# Patient Record
Sex: FEMALE | Race: WHITE | NOT HISPANIC OR LATINO | Employment: OTHER | URBAN - METROPOLITAN AREA
[De-identification: names, ages, dates, MRNs, and addresses within clinical notes are randomized per-mention and may not be internally consistent; named-entity substitution may affect disease eponyms.]

---

## 2017-01-04 DIAGNOSIS — R32 INCONTINENCE IN FEMALE: ICD-10-CM

## 2017-01-04 DIAGNOSIS — E78.5 HYPERLIPIDEMIA: ICD-10-CM

## 2017-01-04 RX ORDER — ATORVASTATIN CALCIUM 10 MG/1
TABLET, FILM COATED ORAL
Qty: 30 TABLET | Refills: 4 | Status: SHIPPED | OUTPATIENT
Start: 2017-01-04 | End: 2017-04-11 | Stop reason: SDUPTHER

## 2017-01-04 RX ORDER — SOLIFENACIN SUCCINATE 10 MG/1
TABLET, FILM COATED ORAL
Qty: 30 TABLET | Refills: 4 | Status: SHIPPED | OUTPATIENT
Start: 2017-01-04 | End: 2017-04-11 | Stop reason: SDUPTHER

## 2017-01-20 DIAGNOSIS — I10 ESSENTIAL HYPERTENSION: ICD-10-CM

## 2017-01-20 RX ORDER — POTASSIUM CHLORIDE 20 MEQ/1
TABLET, EXTENDED RELEASE ORAL
Qty: 30 TABLET | Refills: 4 | Status: SHIPPED | OUTPATIENT
Start: 2017-01-20 | End: 2017-04-11 | Stop reason: SDUPTHER

## 2017-04-11 ENCOUNTER — OFFICE VISIT (OUTPATIENT)
Dept: FAMILY MEDICINE | Facility: CLINIC | Age: 82
End: 2017-04-11
Payer: MEDICARE

## 2017-04-11 VITALS
DIASTOLIC BLOOD PRESSURE: 68 MMHG | HEART RATE: 66 BPM | RESPIRATION RATE: 18 BRPM | BODY MASS INDEX: 47.86 KG/M2 | HEIGHT: 65 IN | SYSTOLIC BLOOD PRESSURE: 118 MMHG | WEIGHT: 287.25 LBS

## 2017-04-11 DIAGNOSIS — M19.90 OSTEOARTHRITIS, UNSPECIFIED OSTEOARTHRITIS TYPE, UNSPECIFIED SITE: ICD-10-CM

## 2017-04-11 DIAGNOSIS — M25.562 CHRONIC PAIN OF LEFT KNEE: ICD-10-CM

## 2017-04-11 DIAGNOSIS — I10 ESSENTIAL HYPERTENSION: ICD-10-CM

## 2017-04-11 DIAGNOSIS — G89.29 CHRONIC PAIN OF LEFT KNEE: ICD-10-CM

## 2017-04-11 DIAGNOSIS — I25.10 ASCVD (ARTERIOSCLEROTIC CARDIOVASCULAR DISEASE): ICD-10-CM

## 2017-04-11 DIAGNOSIS — E66.3 OVERWEIGHT(278.02): ICD-10-CM

## 2017-04-11 DIAGNOSIS — E78.5 HYPERLIPIDEMIA, UNSPECIFIED HYPERLIPIDEMIA TYPE: ICD-10-CM

## 2017-04-11 DIAGNOSIS — G47.33 OSA (OBSTRUCTIVE SLEEP APNEA): Primary | ICD-10-CM

## 2017-04-11 DIAGNOSIS — E03.4 HYPOTHYROIDISM DUE TO ACQUIRED ATROPHY OF THYROID: ICD-10-CM

## 2017-04-11 PROCEDURE — 3078F DIAST BP <80 MM HG: CPT | Performed by: FAMILY MEDICINE

## 2017-04-11 PROCEDURE — 1157F ADVNC CARE PLAN IN RCRD: CPT | Performed by: FAMILY MEDICINE

## 2017-04-11 PROCEDURE — 3074F SYST BP LT 130 MM HG: CPT | Performed by: FAMILY MEDICINE

## 2017-04-11 PROCEDURE — 1159F MED LIST DOCD IN RCRD: CPT | Performed by: FAMILY MEDICINE

## 2017-04-11 PROCEDURE — 99213 OFFICE O/P EST LOW 20 MIN: CPT | Mod: PBBFAC | Performed by: FAMILY MEDICINE

## 2017-04-11 PROCEDURE — 99999 PR PBB SHADOW E&M-EST. PATIENT-LVL III: CPT | Mod: PBBFAC,,, | Performed by: FAMILY MEDICINE

## 2017-04-11 PROCEDURE — 99214 OFFICE O/P EST MOD 30 MIN: CPT | Mod: S$PBB | Performed by: FAMILY MEDICINE

## 2017-04-11 PROCEDURE — 1160F RVW MEDS BY RX/DR IN RCRD: CPT | Performed by: FAMILY MEDICINE

## 2017-04-11 PROCEDURE — 1126F AMNT PAIN NOTED NONE PRSNT: CPT | Performed by: FAMILY MEDICINE

## 2017-04-11 NOTE — PROGRESS NOTES
Pt is a 82 y.o. female who presents for check up for   Encounter Diagnoses   Name Primary?    JOY (obstructive sleep apnea) Yes    Overweight     Osteoarthritis, unspecified osteoarthritis type, unspecified site     Hypothyroidism due to acquired atrophy of thyroid     Hyperlipidemia, unspecified hyperlipidemia type     Essential hypertension     Chronic pain of left knee     ASCVD (arteriosclerotic cardiovascular disease)    . Doing well on current meds. Denies any side effects. Prevention is up to date.    History of present illness: Patient here for a check up.  She was diagnosed with lower extremity weakness due to neuropathy.  Uses a walker which has prevented further falls  She tolerates her blood pressure medication as well as not having side effects such as chronic cough or dizziness.    She takes Synthroid for her hypothyroidism.  She tolerates this well.  She denies having symptoms such as heat or cold intolerance.  Her weight is stable.  She denies any swelling in her thyroid.  Her reflux symptoms are under good control using Zantac  She takes Vesicare for urinary incontinence.  It is very effective.  She does not use CPAP.  She cannot tolerate it.  She uses oxygen at night to sleep however.  Pt's room air at rest O2 was 88 and that pt greatly benefited for using O2 at night   Patient sees cardiology for ASCVD.  This seems to be stable.  She is on Plavix and tolerating it well.  Patient takes Mobic for osteoarthritis of both knees.  This is effective.  She still has pain and weakness in her legs.  She is also overweight which does not help anything.  Patient saw neurologist for spinal stenosis, neuropathy.  PT helped.  She has not fallen in 6 months.  Starting to have memory issues and weakness.    Review of systems: No other complaints    Physical examination:   Vitals:    04/11/17 1233   BP: 118/68   Pulse: 66   Resp: 18     Gen.: Overweight, no apparent distress  HEENT: Pupils equal react to  light extraocular muscles intact throat clear neck supple no lymphadenopathy no JVD.  No bruits.  Normal thyroid  Heart: Regular rate and rhythm no murmurs rubs or gallops.  Occasional PCV.  Lungs: Clear to auscultation  Ext: chronic venous stasis changes.  2+ pulses.  Bilateral lower extremity bruising.   Both knees have midline scars from knee replacement surgery.  Ambulates with walker.    Lab Results   Component Value Date    LDLCALC 67.6 10/11/2016     No results found for: HGBA1C  Lab Results   Component Value Date    TSH 2.298 10/11/2016     Lab Results   Component Value Date    CREATININE 0.9 10/11/2016     Diagnoses/Plan:     Left knee pain.    Status post knee replacement surgery.  Continue using walker  Paperwork filled out for handicap license    HTN (hypertension)  Two gram sodium diet.    Weight loss discussed.    Try to walk 2 miles per day.    Quit smoking.    Current medications will be:  - Lower amlodipine (NORVASC) 2.5 MG tablet; Take 1 tablet once daily  - furosemide (LASIX) 40 MG tablet; Take 1 tablet (40 mg total) by mouth once daily.  Dispense: 30 tablet; Refill: 5  - losartan (COZAAR) 100 MG tablet; Take 1 tablet (100 mg total) by mouth once daily.  Dispense: 30 tablet; Refill: 5  - metoprolol succinate (TOPROL-XL) 200 MG 24 hr tablet; Take 1 tablet (200 mg total) by mouth once daily.  Dispense: 30 tablet; Refill: 5  - potassium chloride SA (K-DUR,KLOR-CON) 20 MEQ tablet; Take 1 tablet (20 mEq total) by mouth once.  Dispense: 1 tablet; Refill: 0    Hyperlipidemia  Low fat diet.  Avoid sweets.  A 10 pound weight loss by the next visit as a  good goal.  Increase consumption of fruits and vegetables, fish and chicken.  Use medications below:  - lipitor 10 mg Tab tablet; Take 1 tablet by mouth once daily.  Dispense: 30 tablet; Refill: 5  - omega-3 acid ethyl esters (LOVAZA) 1 gram capsule; Take 2 capsules (2 g total) by mouth 2 (two) times daily.  Dispense: 120 capsule; Refill:  5    Hypothyroidism  - levothyroxine (SYNTHROID) 75 MCG tablet; Take 1 tablet (75 mcg total) by mouth before breakfast.  Dispense: 30 tablet; Refill: 5  - TSH; Future    Osteoarthritis  - meloxicam (MOBIC) 7.5 MG tablet; Take 1 tablet (7.5 mg total) by mouth every other day.  Dispense: 30 tablet; Refill: 5    JOY (obstructive sleep apnea)  She cannot tolerate CPAP.  Encouraged patient to avoid sleeping supine.  Pt's room air at rest O2 was 88 and that pt greatly benefited for using O2 at night     ASCVD (arteriosclerotic cardiovascular disease)  - clopidogrel (PLAVIX) 75 mg tablet; Take 1 tablet (75 mg total) by mouth once daily.  Dispense: 30 tablet; Refill: 5  Cannot tolerate ASA    GERD (gastroesophageal reflux disease)  - ranitidine (ZANTAC) 150 MG tablet; Take 1 tablet (150 mg total) by mouth 2 (two) times daily.  Dispense: 30 tablet; Refill: 5    OAB  Continue Vesicare    JOY (obstructive sleep apnea)    Overweight    Osteoarthritis, unspecified osteoarthritis type, unspecified site    Hypothyroidism due to acquired atrophy of thyroid    Hyperlipidemia, unspecified hyperlipidemia type    Essential hypertension    Chronic pain of left knee    ASCVD (arteriosclerotic cardiovascular disease)      Return to clinic in 6 months.  She gets BW with Dr. Patel.

## 2017-04-11 NOTE — MR AVS SNAPSHOT
[facility]  [address]  [facility] [address]  Phone: [phone]  Fax: [phone]                  Stephany CARRILLO Brody   2017 12:30 PM   Office Visit    Description:  Female : 1935   Provider:  Pipo Flowers MD   Department:  [facility]           Reason for Visit     Follow-up           Diagnoses this Visit        Comments    JOY (obstructive sleep apnea)    -  Primary     Overweight         Osteoarthritis, unspecified osteoarthritis type, unspecified site         Hypothyroidism due to acquired atrophy of thyroid         Hyperlipidemia, unspecified hyperlipidemia type         Essential hypertension         Chronic pain of left knee         ASCVD (arteriosclerotic cardiovascular disease)                To Do List           Future Appointments        Provider Department Dept Phone    2017 9:45 AM Robby Ortiz MD [facility] [phone]      Goals (5 Years of Data)     None      Follow-Up and Disposition     Return in about 6 months (around 10/11/2017).      [facility]     [facility] On Call Nurse Care Line -  Assistance  Unless otherwise directed by your provider, please contact [facility], our nurse care line that is available for  assistance.     Registered nurses in the [facility] On Call Center provide: appointment scheduling, clinical advisement, health education, and other advisory services.  Call: [phone] (toll free)               Medications           Message regarding Medications     Verify the changes and/or additions to your medication regime listed below are the same as discussed with your clinician today.  If any of these changes or additions are incorrect, please notify your healthcare provider.             Verify that the below list of medications is an accurate representation of the medications you are currently taking.  If none reported, the list may be blank. If incorrect, please contact your  "healthcare provider. Carry this list with you in case of emergency.           Current Medications     amlodipine (NORVASC) 2.5 MG tablet Take 1 tablet (2.5 mg total) by mouth once daily.    atorvastatin (LIPITOR) 10 MG tablet Take 1 tablet (10 mg total) by mouth every evening.    clopidogrel (PLAVIX) 75 mg tablet Take 1 tablet (75 mg total) by mouth once daily.    cyanocobalamin (VITAMIN B-12) 100 MCG tablet Take 100 mcg by mouth once daily.    furosemide (LASIX) 40 MG tablet Take 1 tablet (40 mg total) by mouth once daily.    levothyroxine (SYNTHROID) 100 MCG tablet Take 1 tablet (100 mcg total) by mouth once daily.    meloxicam (MOBIC) 7.5 MG tablet Take 1 tablet (7.5 mg total) by mouth every other day.    metoprolol succinate (TOPROL-XL) 200 MG 24 hr tablet Take 1 tablet (200 mg total) by mouth once daily.    nitroGLYCERIN (NITROSTAT) 0.4 MG SL tablet Place 0.4 mg under the tongue every 5 (five) minutes as needed.    omega-3 acid ethyl esters (LOVAZA) 1 gram capsule Take 2 capsules (2 g total) by mouth 2 (two) times daily.    potassium chloride SA (K-DUR,KLOR-CON) 20 MEQ tablet Take 1 tablet (20 mEq total) by mouth once daily.    ranitidine (ZANTAC) 150 MG tablet Take 1 tablet (150 mg total) by mouth 2 (two) times daily.    solifenacin (VESICARE) 10 MG tablet Take 1 tablet (10 mg total) by mouth once daily.           Clinical Reference Information           Your Vitals Were     BP Pulse Resp Height Weight BMI    118/68 (BP Location: Right arm, Patient Position: Sitting, BP Method: Manual) 66 18 5' 5" (1.651 m) 130.3 kg (287 lb 4.2 oz) 47.8 kg/m2      Blood Pressure          Most Recent Value    BP  118/68      Allergies as of 4/11/2017     Statins-hmg-coa Reductase Inhibitors      Immunizations Administered on Date of Encounter - 4/11/2017     None      [facility] Sign-Up     Activating your [facility] account is as easy as 1-2-3!     1) Visit [email or web address], select Sign Up Now, enter this activation code and your " date of birth, then select Next.  [email or web address]  Expires: 5/26/2017 12:52 PM      2) Create a username and password to use when you visit [facility] in the future and select a security question in case you lose your password and select Next.    3) Enter your e-mail address and click Sign Up!    Additional Information  If you have questions, please e-mail [email or web address] or call [phone] to talk to our [employer] staff. Remember, [employer] is NOT to be used for urgent needs. For medical emergencies, dial 911.         Language Assistance Services     ATTENTION: Language assistance services are available, free of charge. Please call [phone].      ATENCIÓN: Si habla zaki, tiene a carrera disposición servicios gratuitos de asistencia lingüística. Llame al [phone].     CHÚ Ý: N?u b?n nói Ti?ng Vi?t, có các d?ch v? h? tr? ngôn ng? mi?n phí dành cho b?n. G?i s? [phone].         [facility] complies with applicable Federal civil rights laws and does not discriminate on the basis of race, color, national origin, age, disability, or sex.

## 2017-04-19 ENCOUNTER — TELEPHONE (OUTPATIENT)
Dept: FAMILY MEDICINE | Facility: CLINIC | Age: 82
End: 2017-04-19

## 2017-04-19 NOTE — TELEPHONE ENCOUNTER
Pt needs addendum added to last chart note stating that on room air at rest O2 was 88 and that pt greatly benefited for using O2 at night

## 2017-04-19 NOTE — TELEPHONE ENCOUNTER
----- Message from [place] sent at 2017  8:31 AM CDT -----  Contact: self  Stephany Skinner  MRN: [id number]  : 1935  PCP: Pipo Flwoers  Home Phone      [phone]  Work Phone      Not on file.  Mobile          [phone]      MESSAGE: pt says someone called her but she isnt sure if it is someone from out office or not, she didn't have a name, only a number, but it has to do with her having to get a new breathing machine. She is confused. Do you know who this may be contacting her and what it is about?  This is the number that was left for her:  [phone] ext [phone], looks like in her notes from visit  she cannot tolerate her cpap and uses oxygen at night to sleep.    Phone: [phone]

## 2017-05-01 ENCOUNTER — LAB VISIT (OUTPATIENT)
Dept: LAB | Facility: HOSPITAL | Age: 82
End: 2017-05-01
Attending: PSYCHIATRY & NEUROLOGY
Payer: MEDICARE

## 2017-05-01 ENCOUNTER — OFFICE VISIT (OUTPATIENT)
Dept: NEUROLOGY | Facility: CLINIC | Age: 82
End: 2017-05-01
Payer: MEDICARE

## 2017-05-01 VITALS
DIASTOLIC BLOOD PRESSURE: 82 MMHG | WEIGHT: 288.81 LBS | HEART RATE: 60 BPM | BODY MASS INDEX: 45.33 KG/M2 | RESPIRATION RATE: 18 BRPM | SYSTOLIC BLOOD PRESSURE: 148 MMHG | HEIGHT: 67 IN

## 2017-05-01 DIAGNOSIS — E53.8 B12 DEFICIENCY: ICD-10-CM

## 2017-05-01 DIAGNOSIS — G62.9 AXONAL POLYNEUROPATHY: ICD-10-CM

## 2017-05-01 DIAGNOSIS — M48.061 LUMBAR STENOSIS: Primary | ICD-10-CM

## 2017-05-01 DIAGNOSIS — G56.03 BILATERAL CARPAL TUNNEL SYNDROME: ICD-10-CM

## 2017-05-01 LAB — VIT B12 SERPL-MCNC: 1022 PG/ML

## 2017-05-01 PROCEDURE — 36415 COLL VENOUS BLD VENIPUNCTURE: CPT

## 2017-05-01 PROCEDURE — 82607 VITAMIN B-12: CPT

## 2017-05-01 PROCEDURE — 99999 PR PBB SHADOW E&M-EST. PATIENT-LVL III: CPT | Mod: PBBFAC,,, | Performed by: PSYCHIATRY & NEUROLOGY

## 2017-05-01 NOTE — PROGRESS NOTES
HPI:  Stephany Skinner is a 82 y.o. female with a few months of increased falls, some lumbar radicular pain.  Exam shows possible polyneuropathy in the feet. 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 No pain in the legs.   No falling  Some numbness in the feet rarely but not pain in the feet.  She has rare numbness from CTS in the hands/ not requiring brace at this time.  Reports good memory with some difficulty remembering some names- not getting worse. She performs her own bills and is driving without accidents or incidents.     Review of Systems   Constitutional: Negative for fever.   HENT: Negative for hearing loss.    Eyes: Negative for double vision.   Respiratory: Negative for hemoptysis.    Cardiovascular: Negative for chest pain.   Gastrointestinal: Negative for blood in stool.   Genitourinary: Negative for hematuria.   Musculoskeletal: Negative for falls.   Skin: Negative for rash.   Neurological: Negative for seizures.   Psychiatric/Behavioral: The patient does not have insomnia.              Exam:  Gen Appearance, well developed/nourished in no apparent distress  CV: 2+ distal pulses with no edema or swelling  Neuro:  MS: Awake, alert,Sustains attention. Recent/remote memory intact, Language is full to spontaneous speech/comprehension. Fund of Knowledge is full  CN: Optic discs are flat with normal vasculature, PERRL, Extraoccular movements and visual fields are full. Normal facial sensation and strength, Hearing symmetric, Tongue and Palate are midline and strong. Shoulder Shrug symmetric and strong.  Motor: Normal bulk, tone, no abnormal movements. 5/5 strength bilateral upper/lower extremities with 2+ reflexes in the arms, and are less in  the legs and bilateral plantar response  Sensory: symmetric to light touch, pain, temp, and vibration/proprioception except decreased sensation in the legs. Romberg negative  Cerebellar: Finger-nose,Heal-shin, Rapid alternating movements intact  Gait: Wide based stance, some sensory ataxia- improved-- patient has her walker here    Imagin/16 CT L spine: Multilevel degenerative changes of the lumbar spine without evidence for fracture or subluxation.  There is likely a component of mild neural foraminal narrowing and central canal stenosis at the L3-4 and L4-5 levels.  This could be better evaluated on a nonemergent basis with MRI.    2016 MRI L spine: Multilevel degenerative changes of the lumbar spine contributing to central canal stenosis and neural foraminal narrowing as detailed in the above report.    Edema-like signal about the posterior elements on the right at L5-S1 which can be a source of pain.    2016 EMG legs: 1. Mild Bilateral Carpal Tunnel Syndrome  2. 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 Bilateral Lumbar Radiculopathy best localizing from L4-5 on this study    Labs: 10/2016  glucose 136. Reviewed CMP, CBC TSh   SPEP normal  B12 low normal      Assessment/Plan: Stephany Skinner is a 82 y.o. female with a few months of increased falls, some lumbar radicular pain.  Exam shows possible polyneuropathy in the feet. 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 No treatment needed for mild CTS symptoms-- can wear a brace if needed  2. Continue B12 for low normal B12 levels- check level today.  She has some impaired fasting glucose which could be causing her neuropathy.  Reduce obesity and she is monitoring this with PCP- consider aggressive control suggested.   3. She has no pain  for lumbar radiculopathy and has some occassional lower back pain related to stenosis which she treats with rest. She would defer surgical consult. PT helped reduce pain greatly as well as falls. Fall precautions reviewed-- use walker. No pain from neuropathy    RTC 1 year

## 2017-05-01 NOTE — MR AVS SNAPSHOT
[facility]  [address] [address]  Phone: [phone]  Fax: [phone]                  Stephany Skinner   2017 9:45 AM   Office Visit    Description:  Female : 1935   Provider:  Robby Ortiz MD   Department:  [facility]           Reason for Visit     Neurologic Problem           Diagnoses this Visit        Comments    Lumbar stenosis    -  Primary     Axonal polyneuropathy         Bilateral carpal tunnel syndrome         B12 deficiency                To Do List           Future Appointments        Provider Department Dept Phone    2017 9:45 AM Robby Ortiz MD [facility] [phone]      Goals (5 Years of Data)     None      Follow-Up and Disposition     Return in about 1 year (around 2018).      [facility]     [facility] On Call Nurse Care Line -  Assistance  Unless otherwise directed by your provider, please contact [facility], our nurse care line that is available for  assistance.     Registered nurses in the [facility] On Call Center provide: appointment scheduling, clinical advisement, health education, and other advisory services.  Call: [phone] (toll free)               Medications           Message regarding Medications     Verify the changes and/or additions to your medication regime listed below are the same as discussed with your clinician today.  If any of these changes or additions are incorrect, please notify your healthcare provider.             Verify that the below list of medications is an accurate representation of the medications you are currently taking.  If none reported, the list may be blank. If incorrect, please contact your healthcare provider. Carry this list with you in case of emergency.           Current Medications     amlodipine (NORVASC) 2.5 MG tablet Take 1 tablet (2.5 mg total) by mouth once daily.    atorvastatin (LIPITOR) 10 MG tablet Take 1 tablet  "(10 mg total) by mouth every evening.    clopidogrel (PLAVIX) 75 mg tablet Take 1 tablet (75 mg total) by mouth once daily.    cyanocobalamin (VITAMIN B-12) 100 MCG tablet Take 100 mcg by mouth once daily.    furosemide (LASIX) 40 MG tablet Take 1 tablet (40 mg total) by mouth once daily.    levothyroxine (SYNTHROID) 100 MCG tablet Take 1 tablet (100 mcg total) by mouth once daily.    meloxicam (MOBIC) 7.5 MG tablet Take 1 tablet (7.5 mg total) by mouth every other day.    metoprolol succinate (TOPROL-XL) 200 MG 24 hr tablet Take 1 tablet (200 mg total) by mouth once daily.    omega-3 acid ethyl esters (LOVAZA) 1 gram capsule Take 2 capsules (2 g total) by mouth 2 (two) times daily.    potassium chloride SA (K-DUR,KLOR-CON) 20 MEQ tablet Take 1 tablet (20 mEq total) by mouth once daily.    ranitidine (ZANTAC) 150 MG tablet Take 1 tablet (150 mg total) by mouth 2 (two) times daily.    solifenacin (VESICARE) 10 MG tablet Take 1 tablet (10 mg total) by mouth once daily.    nitroGLYCERIN (NITROSTAT) 0.4 MG SL tablet Place 0.4 mg under the tongue every 5 (five) minutes as needed.           Clinical Reference Information           Your Vitals Were     BP Pulse Resp Height Weight BMI    148/82 (BP Location: Right arm, Patient Position: Sitting, BP Method: Manual) 60 18 5' 7" (1.702 m) 131 kg (288 lb 12.8 oz) 45.23 kg/m2      Blood Pressure          Most Recent Value    BP  (!)  148/82      Allergies as of 5/1/2017     Statins-hmg-coa Reductase Inhibitors      Immunizations Administered on Date of Encounter - 5/1/2017     None      Orders Placed During Today's Visit     Future Labs/Procedures Expected by Expires    VITAMIN B12  5/1/2017 6/30/2018      Language Assistance Services     ATTENTION: Language assistance services are available, free of charge. Please call [phone].      ATENCIÓN: Si habla zaki, tiene a carrera disposición servicios gratuitos de asistencia lingüística. Llame al [phone].     CHÚ Ý: N?u " b?n nói Ti?ng Vi?t, có các d?ch v? h? tr? ngôn ng? mi?n phí dành cho b?n. G?i s? [phone].         [facility] complies with applicable Federal civil rights laws and does not discriminate on the basis of race, color, national origin, age, disability, or sex.

## 2017-05-05 ENCOUNTER — TELEPHONE (OUTPATIENT)
Dept: FAMILY MEDICINE | Facility: CLINIC | Age: 82
End: 2017-05-05

## 2017-05-05 NOTE — TELEPHONE ENCOUNTER
----- Message from Tiny Gannon sent at 2017  2:54 PM CDT -----  Contact: [facility]STO  Stephany Donnellyne  MRN: [id number]  : 1935  PCP: Pipo Flowers  Home Phone      [phone]  Work Phone      Not on file.  Mobile          [phone]      MESSAGE:    PT HAS TO RE QUALIFY FOR OXYGEN DUE TO PATIENT HAVING JOY. PATIENT CAN HAVE OFFICE STATS DONE TO QUALIFY OR WOULD HAVE TO HAVE A LAB CPAP TITRATION SO THAT THE OVERNIGHT PULSE OX CAN BE USED. PLEASE CALL [facility] WITH ANY QUESTIONS    PHONE:  [phone]

## 2017-05-05 NOTE — TELEPHONE ENCOUNTER
Last office notes faxed to Yarely at [facility] she will call back if there is and additional information needed

## 2017-07-25 ENCOUNTER — OFFICE VISIT (OUTPATIENT)
Dept: FAMILY MEDICINE | Facility: CLINIC | Age: 82
End: 2017-07-25
Payer: MEDICARE

## 2017-07-25 VITALS
RESPIRATION RATE: 20 BRPM | HEART RATE: 64 BPM | SYSTOLIC BLOOD PRESSURE: 110 MMHG | DIASTOLIC BLOOD PRESSURE: 68 MMHG | BODY MASS INDEX: 45.15 KG/M2 | HEIGHT: 67 IN | WEIGHT: 287.69 LBS

## 2017-07-25 DIAGNOSIS — L97.909 VENOUS STASIS ULCERS: Primary | ICD-10-CM

## 2017-07-25 DIAGNOSIS — I83.009 VENOUS STASIS ULCERS: Primary | ICD-10-CM

## 2017-07-25 PROCEDURE — 29580 STRAPPING UNNA BOOT: CPT | Mod: PBBFAC | Performed by: FAMILY MEDICINE

## 2017-07-25 PROCEDURE — 99213 OFFICE O/P EST LOW 20 MIN: CPT | Mod: S$PBB | Performed by: FAMILY MEDICINE

## 2017-07-25 PROCEDURE — 99999 PR PBB SHADOW E&M-EST. PATIENT-LVL III: CPT | Mod: PBBFAC,,, | Performed by: FAMILY MEDICINE

## 2017-07-25 PROCEDURE — 99213 OFFICE O/P EST LOW 20 MIN: CPT | Mod: PBBFAC | Performed by: FAMILY MEDICINE

## 2017-07-25 RX ORDER — ATORVASTATIN CALCIUM 20 MG/1
TABLET, FILM COATED ORAL
COMMUNITY
Start: 2017-07-11 | End: 2017-07-25 | Stop reason: DRUGHIGH

## 2017-07-25 RX ORDER — AMLODIPINE BESYLATE 5 MG/1
TABLET ORAL
COMMUNITY
Start: 2017-07-11 | End: 2017-07-25 | Stop reason: DRUGHIGH

## 2017-07-25 RX ORDER — LOSARTAN POTASSIUM 100 MG/1
TABLET ORAL
COMMUNITY
Start: 2017-07-06 | End: 2017-07-25

## 2017-07-25 NOTE — PROGRESS NOTES
Subjective:       Patient ID: Stephany Skinner is a 82 y.o. female.    Chief Complaint: Leg Swelling (left leg (redness, drainage) x 3 days)    Patient developed leg swelling and drainage over left leg.  History of venous stasis issues.  No fever or redness.      Review of Systems   Constitutional: Negative for activity change, chills, fatigue, fever and unexpected weight change.   HENT: Negative for sore throat and trouble swallowing.    Respiratory: Negative for cough, chest tightness and shortness of breath.    Cardiovascular: Negative for chest pain and leg swelling.   Gastrointestinal: Negative for abdominal pain.   Endocrine: Negative for cold intolerance and heat intolerance.   Genitourinary: Negative for difficulty urinating.   Musculoskeletal: Negative for back pain and joint swelling.   Skin: Positive for rash and wound.   Neurological: Negative for numbness.   Hematological: Negative for adenopathy.   Psychiatric/Behavioral: Negative for decreased concentration.       Objective:      Vitals:    07/25/17 0847   BP: 110/68   Pulse: 64   Resp: 20     Physical Exam   Constitutional: She appears well-developed and well-nourished.   Cardiovascular: Normal rate, regular rhythm, normal heart sounds and intact distal pulses.    No murmur heard.  Pulmonary/Chest: Effort normal and breath sounds normal.   Skin: There is erythema.   Left leg with venous stasis changes, small draining ulcer over anterior leg       Assessment:       1. Venous stasis ulcers        Plan:   Stephany CARRILLO was seen today for leg swelling.    Diagnoses and all orders for this visit:    Venous stasis ulcers    UNNA boot applied to left leg    RTC in 1 week

## 2017-08-01 ENCOUNTER — OFFICE VISIT (OUTPATIENT)
Dept: FAMILY MEDICINE | Facility: CLINIC | Age: 82
End: 2017-08-01
Payer: MEDICARE

## 2017-08-01 VITALS
RESPIRATION RATE: 20 BRPM | HEART RATE: 64 BPM | WEIGHT: 289 LBS | SYSTOLIC BLOOD PRESSURE: 122 MMHG | DIASTOLIC BLOOD PRESSURE: 72 MMHG | BODY MASS INDEX: 45.36 KG/M2 | HEIGHT: 67 IN

## 2017-08-01 DIAGNOSIS — I87.8 VENOUS STASIS OF LOWER EXTREMITY: Primary | ICD-10-CM

## 2017-08-01 PROCEDURE — 99213 OFFICE O/P EST LOW 20 MIN: CPT | Mod: PBBFAC | Performed by: FAMILY MEDICINE

## 2017-08-01 PROCEDURE — 99213 OFFICE O/P EST LOW 20 MIN: CPT | Mod: S$PBB | Performed by: FAMILY MEDICINE

## 2017-08-01 PROCEDURE — 99999 PR PBB SHADOW E&M-EST. PATIENT-LVL III: CPT | Mod: PBBFAC,,, | Performed by: FAMILY MEDICINE

## 2017-08-01 PROCEDURE — 99999 PR STA SHADOW: CPT | Mod: PBBFAC,,, | Performed by: FAMILY MEDICINE

## 2017-08-01 NOTE — PROGRESS NOTES
Subjective:       Patient ID: Stephany Skinner is a 82 y.o. female.    Chief Complaint: Follow-up (1 week f/u left foot)    Here for recheck of UNNA wrap.  Patient developed leg swelling and drainage over left leg.  History of venous stasis issues.  No fever or redness.  Doing much better.  Wrap removed.  Looks good.      Review of Systems   Constitutional: Negative for activity change, chills, fatigue, fever and unexpected weight change.   HENT: Negative for sore throat and trouble swallowing.    Respiratory: Negative for cough, chest tightness and shortness of breath.    Cardiovascular: Negative for chest pain and leg swelling.   Gastrointestinal: Negative for abdominal pain.   Endocrine: Negative for cold intolerance and heat intolerance.   Genitourinary: Negative for difficulty urinating.   Musculoskeletal: Negative for back pain and joint swelling.   Skin: Positive for rash and wound.   Neurological: Negative for numbness.   Hematological: Negative for adenopathy.   Psychiatric/Behavioral: Negative for decreased concentration.       Objective:      Vitals:    08/01/17 0944   BP: 122/72   Pulse: 64   Resp: 20     Physical Exam   Constitutional: She appears well-developed and well-nourished.   Cardiovascular: Normal rate, regular rhythm, normal heart sounds and intact distal pulses.    No murmur heard.  Pulmonary/Chest: Effort normal and breath sounds normal.   Skin: There is erythema.   Left leg with venous stasis changes, small draining ulcer over anterior leg has resolved       Assessment:       1. Venous stasis of lower extremity        Plan:   Stephany CARRILLO was seen today for follow-up.    Diagnoses and all orders for this visit:    Venous stasis of lower extremity  -     COMPRESSION STOCKINGS        RTC as needed

## 2017-08-07 DIAGNOSIS — M25.562 CHRONIC PAIN OF LEFT KNEE: ICD-10-CM

## 2017-08-07 DIAGNOSIS — G89.29 CHRONIC PAIN OF LEFT KNEE: ICD-10-CM

## 2017-08-07 RX ORDER — MELOXICAM 7.5 MG/1
TABLET ORAL
Qty: 15 TABLET | Refills: 4 | Status: ON HOLD | OUTPATIENT
Start: 2017-08-07 | End: 2018-02-19 | Stop reason: HOSPADM

## 2017-10-23 DIAGNOSIS — R32 INCONTINENCE IN FEMALE: ICD-10-CM

## 2017-10-23 RX ORDER — SOLIFENACIN SUCCINATE 10 MG/1
TABLET, FILM COATED ORAL
Qty: 30 TABLET | Refills: 4 | Status: ON HOLD | OUTPATIENT
Start: 2017-10-23 | End: 2018-02-19 | Stop reason: HOSPADM

## 2017-11-07 DIAGNOSIS — E03.4 HYPOTHYROIDISM DUE TO ACQUIRED ATROPHY OF THYROID: ICD-10-CM

## 2017-11-08 RX ORDER — LEVOTHYROXINE SODIUM 100 UG/1
TABLET ORAL
Qty: 30 TABLET | Refills: 4 | Status: SHIPPED | OUTPATIENT
Start: 2017-11-08 | End: 2018-04-09 | Stop reason: SDUPTHER

## 2017-11-28 ENCOUNTER — OFFICE VISIT (OUTPATIENT)
Dept: FAMILY MEDICINE | Facility: CLINIC | Age: 82
End: 2017-11-28
Payer: MEDICARE

## 2017-11-28 VITALS
SYSTOLIC BLOOD PRESSURE: 124 MMHG | DIASTOLIC BLOOD PRESSURE: 70 MMHG | RESPIRATION RATE: 18 BRPM | WEIGHT: 290.81 LBS | HEIGHT: 67 IN | HEART RATE: 60 BPM | BODY MASS INDEX: 45.64 KG/M2

## 2017-11-28 DIAGNOSIS — L89.893 PRESSURE ULCER OF TOE OF LEFT FOOT, STAGE 3: Primary | ICD-10-CM

## 2017-11-28 DIAGNOSIS — E03.4 HYPOTHYROIDISM DUE TO ACQUIRED ATROPHY OF THYROID: ICD-10-CM

## 2017-11-28 DIAGNOSIS — G57.93 NEUROPATHY INVOLVING BOTH LOWER EXTREMITIES: ICD-10-CM

## 2017-11-28 DIAGNOSIS — E78.5 HYPERLIPIDEMIA, UNSPECIFIED HYPERLIPIDEMIA TYPE: ICD-10-CM

## 2017-11-28 DIAGNOSIS — I10 ESSENTIAL HYPERTENSION: ICD-10-CM

## 2017-11-28 LAB
ALBUMIN SERPL BCP-MCNC: 3.2 G/DL
ALP SERPL-CCNC: 51 U/L
ALT SERPL W/O P-5'-P-CCNC: 18 U/L
ANION GAP SERPL CALC-SCNC: 5 MMOL/L
AST SERPL-CCNC: 18 U/L
BASOPHILS # BLD AUTO: 0.03 K/UL
BASOPHILS NFR BLD: 0.4 %
BILIRUB SERPL-MCNC: 0.3 MG/DL
BUN SERPL-MCNC: 31 MG/DL
CALCIUM SERPL-MCNC: 9 MG/DL
CHLORIDE SERPL-SCNC: 104 MMOL/L
CO2 SERPL-SCNC: 34 MMOL/L
CREAT SERPL-MCNC: 1.2 MG/DL
DIFFERENTIAL METHOD: ABNORMAL
EOSINOPHIL # BLD AUTO: 0.2 K/UL
EOSINOPHIL NFR BLD: 2.5 %
ERYTHROCYTE [DISTWIDTH] IN BLOOD BY AUTOMATED COUNT: 13.9 %
EST. GFR  (AFRICAN AMERICAN): 49 ML/MIN/1.73 M^2
EST. GFR  (NON AFRICAN AMERICAN): 42 ML/MIN/1.73 M^2
GLUCOSE SERPL-MCNC: 112 MG/DL
HCT VFR BLD AUTO: 38.2 %
HGB BLD-MCNC: 11.8 G/DL
LYMPHOCYTES # BLD AUTO: 1.4 K/UL
LYMPHOCYTES NFR BLD: 20.7 %
MCH RBC QN AUTO: 31.3 PG
MCHC RBC AUTO-ENTMCNC: 30.9 G/DL
MCV RBC AUTO: 101 FL
MONOCYTES # BLD AUTO: 0.6 K/UL
MONOCYTES NFR BLD: 9.2 %
NEUTROPHILS # BLD AUTO: 4.5 K/UL
NEUTROPHILS NFR BLD: 67.2 %
PLATELET # BLD AUTO: 224 K/UL
PMV BLD AUTO: 9.8 FL
POTASSIUM SERPL-SCNC: 4.3 MMOL/L
PROT SERPL-MCNC: 6.9 G/DL
RBC # BLD AUTO: 3.77 M/UL
SODIUM SERPL-SCNC: 143 MMOL/L
T4 FREE SERPL-MCNC: 1 NG/DL
TSH SERPL DL<=0.005 MIU/L-ACNC: 4.18 UIU/ML
WBC # BLD AUTO: 6.75 K/UL

## 2017-11-28 PROCEDURE — 80053 COMPREHEN METABOLIC PANEL: CPT

## 2017-11-28 PROCEDURE — 84439 ASSAY OF FREE THYROXINE: CPT

## 2017-11-28 PROCEDURE — 99999 PR STA SHADOW: CPT | Mod: PBBFAC,,, | Performed by: FAMILY MEDICINE

## 2017-11-28 PROCEDURE — 84443 ASSAY THYROID STIM HORMONE: CPT

## 2017-11-28 PROCEDURE — 99999 FLU VACCINE - HIGH DOSE (65+) PRESERVATIVE FREE IM: CPT | Mod: PBBFAC,,,

## 2017-11-28 PROCEDURE — 99999 PR PBB SHADOW E&M-EST. PATIENT-LVL IV: CPT | Mod: PBBFAC,,, | Performed by: FAMILY MEDICINE

## 2017-11-28 PROCEDURE — G0008 ADMIN INFLUENZA VIRUS VAC: HCPCS | Mod: PBBFAC

## 2017-11-28 PROCEDURE — 99214 OFFICE O/P EST MOD 30 MIN: CPT | Mod: S$PBB | Performed by: FAMILY MEDICINE

## 2017-11-28 PROCEDURE — 85025 COMPLETE CBC W/AUTO DIFF WBC: CPT

## 2017-11-28 PROCEDURE — 99214 OFFICE O/P EST MOD 30 MIN: CPT | Mod: PBBFAC,25 | Performed by: FAMILY MEDICINE

## 2017-11-28 PROCEDURE — 36415 COLL VENOUS BLD VENIPUNCTURE: CPT | Mod: PBBFAC | Performed by: FAMILY MEDICINE

## 2017-11-28 RX ORDER — LOSARTAN POTASSIUM 100 MG/1
TABLET ORAL
Status: ON HOLD | COMMUNITY
Start: 2017-11-15 | End: 2018-02-19 | Stop reason: HOSPADM

## 2017-11-28 RX ORDER — SILVER SULFADIAZINE 10 G/1000G
CREAM TOPICAL 2 TIMES DAILY
Qty: 50 G | Refills: 5 | Status: SHIPPED | OUTPATIENT
Start: 2017-11-28 | End: 2017-12-08

## 2017-11-28 RX ORDER — ATORVASTATIN CALCIUM 20 MG/1
TABLET, FILM COATED ORAL
Status: ON HOLD | COMMUNITY
Start: 2017-11-07 | End: 2018-02-19 | Stop reason: HOSPADM

## 2017-11-28 RX ORDER — AMLODIPINE BESYLATE 5 MG/1
TABLET ORAL
COMMUNITY
Start: 2017-11-15 | End: 2017-11-28 | Stop reason: SDUPTHER

## 2017-11-28 NOTE — PROGRESS NOTES
Subjective:       Patient ID: Stephany Skinner is a 82 y.o. female.    Chief Complaint: Sore on Toe (left foot big toe)    Patient has a two-week history of an ulcer on her great toe.  She does not have very good feeling in this area.  It has been draining.  Patient needs blood work today.  She needs refills on her medications.  She takes Synthroid for her hypothyroidism.  She tolerates this well.  She denies having symptoms such as heat or cold intolerance.  Her weight is stable.  She denies any swelling in her thyroid.  She tolerates her blood pressure medication as well as not having side effects such as chronic cough or dizziness.    Patient is taking her statin every day for hyperlipidemia.  She is feeling well on the medication.  She denies side effects such as myalgias.        Review of Systems   Constitutional: Negative for activity change, chills, fatigue, fever and unexpected weight change.   HENT: Negative for sore throat and trouble swallowing.    Respiratory: Negative for cough, chest tightness and shortness of breath.    Cardiovascular: Negative for chest pain and leg swelling.   Gastrointestinal: Negative for abdominal pain.   Endocrine: Negative for cold intolerance and heat intolerance.   Genitourinary: Negative for difficulty urinating.   Musculoskeletal: Negative for back pain and joint swelling.   Skin: Positive for wound. Negative for rash.   Neurological: Negative for numbness.   Hematological: Negative for adenopathy.   Psychiatric/Behavioral: Negative for decreased concentration.       Objective:      Vitals:    11/28/17 1338   BP: 124/70   Pulse: 60   Resp: 18     Physical Exam   Constitutional: She is oriented to person, place, and time. She appears well-developed and well-nourished.   Cardiovascular: Normal rate, regular rhythm, normal heart sounds and intact distal pulses.    No murmur heard.  Pulmonary/Chest: Effort normal and breath sounds normal.   Neurological: She is alert and  oriented to person, place, and time. A sensory deficit (left foot) is present. No cranial nerve deficit.   Skin:   Left great toe with pressure ulcer - stage 3.  It actually has some decent granulation tissue.       Assessment:       1. Pressure ulcer of toe of left foot, stage 3    2. Essential hypertension    3. Hypothyroidism due to acquired atrophy of thyroid    4. Hyperlipidemia, unspecified hyperlipidemia type    5. Neuropathy involving both lower extremities        Plan:   Stephany CARRILLO was seen today for sore on toe.    Diagnoses and all orders for this visit:    Pressure ulcer of toe of left foot, stage 3  -     CBC auto differential; Future  -     Ambulatory referral to Wound Clinic  -     silver sulfADIAZINE 1% (SILVADENE) 1 % cream; Apply topically 2 (two) times daily.  -     CBC auto differential    Essential hypertension  -     Comprehensive metabolic panel; Future  -     Comprehensive metabolic panel    Hypothyroidism due to acquired atrophy of thyroid  -     TSH; Future  -     TSH    Hyperlipidemia, unspecified hyperlipidemia type    Neuropathy involving both lower extremities  -     CBC auto differential; Future  -     Ambulatory referral to Wound Clinic  -     CBC auto differential    Other orders  -     Influenza - High Dose (65+) (PF) (IM)  -     T4, free    Keep pressure off left foot

## 2017-11-29 ENCOUNTER — TELEPHONE (OUTPATIENT)
Dept: FAMILY MEDICINE | Facility: CLINIC | Age: 82
End: 2017-11-29

## 2017-12-01 ENCOUNTER — OFFICE VISIT (OUTPATIENT)
Dept: WOUND CARE | Facility: HOSPITAL | Age: 82
End: 2017-12-01
Attending: FAMILY MEDICINE
Payer: MEDICARE

## 2017-12-01 VITALS
TEMPERATURE: 98 F | DIASTOLIC BLOOD PRESSURE: 78 MMHG | RESPIRATION RATE: 20 BRPM | SYSTOLIC BLOOD PRESSURE: 141 MMHG | HEART RATE: 88 BPM

## 2017-12-01 DIAGNOSIS — L89.893 PRESSURE ULCER OF TOE OF LEFT FOOT, STAGE 3: ICD-10-CM

## 2017-12-01 DIAGNOSIS — L89.893 PRESSURE ULCER OF TOE OF RIGHT FOOT, STAGE 3: ICD-10-CM

## 2017-12-01 PROCEDURE — 11042 DBRDMT SUBQ TIS 1ST 20SQCM/<: CPT

## 2017-12-01 PROCEDURE — 27201912 HC WOUND CARE DEBRIDEMENT SUPPLIES

## 2017-12-01 NOTE — PROGRESS NOTES
[facility]  Wound Care  Progress Note    Problem List Items Addressed This Visit        Derm    Pressure ulcer of toe, stage 3    Decubitus ulcer of toe, stage 3    Overview     HPI:  82 YR OLD FEMALE WITH HISTORY OF WOUNDS TO LEFT GREAT TOE AND RIGHT 5TH TOE DUE TO IMPROPERLY FITTING SHOES  Location: RT 5TH TOE AND LEFT GREAT TOE    Duration: 2 WEEKS    Context: PRESSURE    Associated Signs & Symptoms:     Timing:     Severity:     Quality:  Modifying Factors:                  Pt's wounds are due to poorly fitting shoes that are causing pressure wounds. Wounds debrided today and apperature pads applied with mepilex ag, pt will get a new pair of shoes.  See wound doc progress notes. Documents will be scanned.        Reina Davis  [facility]

## 2017-12-15 ENCOUNTER — OFFICE VISIT (OUTPATIENT)
Dept: WOUND CARE | Facility: HOSPITAL | Age: 82
End: 2017-12-15
Attending: FAMILY MEDICINE
Payer: MEDICARE

## 2017-12-15 VITALS — SYSTOLIC BLOOD PRESSURE: 149 MMHG | DIASTOLIC BLOOD PRESSURE: 96 MMHG | RESPIRATION RATE: 20 BRPM | HEART RATE: 75 BPM

## 2017-12-15 DIAGNOSIS — L89.892 PRESSURE ULCER OF TOE OF RIGHT FOOT, STAGE 2: ICD-10-CM

## 2017-12-15 DIAGNOSIS — L89.893 PRESSURE ULCER OF TOE OF LEFT FOOT, STAGE 3: Primary | ICD-10-CM

## 2017-12-15 PROCEDURE — 11042 DBRDMT SUBQ TIS 1ST 20SQCM/<: CPT

## 2017-12-15 PROCEDURE — 27201912 HC WOUND CARE DEBRIDEMENT SUPPLIES

## 2017-12-15 NOTE — PROGRESS NOTES
[facility]  Wound Care  Progress Note    Problem List Items Addressed This Visit        Derm    Decubitus ulcer of toe, stage 2    Decubitus ulcer of toe, stage 3 - Primary    Overview     HPI:  82 YR OLD FEMALE WITH HISTORY OF WOUNDS TO LEFT GREAT TOE AND RIGHT 5TH TOE DUE TO IMPROPERLY FITTING SHOES  Location: RT 5TH TOE AND LEFT GREAT TOE    Duration: 2 WEEKS    Context: PRESSURE    Associated Signs & Symptoms:     Timing:     Severity:     Quality:  Modifying Factors:                pt AAO X 3, denies chest pain or SOB, obtained supportive shoes, wounds are improved, unable to get compression stockings due to cost of $80. Will use tubi . Pt ambulates with a walker, no complaints voiced at this time. Wound to left plantar toe debrided of non-viable tissue. Will use mepilex and apperature pads with gentian violet to right 5 th that is much improved.  See wound doc progress notes. Documents will be scanned.        Reina Davis  [facility]

## 2017-12-22 ENCOUNTER — OFFICE VISIT (OUTPATIENT)
Dept: WOUND CARE | Facility: HOSPITAL | Age: 82
End: 2017-12-22
Attending: FAMILY MEDICINE
Payer: MEDICARE

## 2017-12-22 VITALS
SYSTOLIC BLOOD PRESSURE: 132 MMHG | RESPIRATION RATE: 20 BRPM | TEMPERATURE: 98 F | HEART RATE: 88 BPM | DIASTOLIC BLOOD PRESSURE: 78 MMHG

## 2017-12-22 DIAGNOSIS — L89.893 PRESSURE ULCER OF TOE OF LEFT FOOT, STAGE 3: ICD-10-CM

## 2017-12-22 DIAGNOSIS — L89.892 PRESSURE ULCER OF TOE OF RIGHT FOOT, STAGE 2: Primary | ICD-10-CM

## 2017-12-22 PROCEDURE — 27201912 HC WOUND CARE DEBRIDEMENT SUPPLIES

## 2017-12-22 PROCEDURE — 11042 DBRDMT SUBQ TIS 1ST 20SQCM/<: CPT

## 2017-12-22 NOTE — PROGRESS NOTES
[facility]  Wound Care  Progress Note    Problem List Items Addressed This Visit        Derm    Decubitus ulcer of toe, stage 2 - Primary    Decubitus ulcer of toe, stage 3    Overview     HPI:  82 YR OLD FEMALE WITH HISTORY OF WOUNDS TO LEFT GREAT TOE AND RIGHT 5TH TOE DUE TO IMPROPERLY FITTING SHOES  Location: RT 5TH TOE AND LEFT GREAT TOE    Duration: 2 WEEKS    Context: PRESSURE    Associated Signs & Symptoms:     Timing:     Severity:     Quality:  Modifying Factors:                    See wound doc progress notes. Documents will be scanned.    Pt AAO X 3, walks with a walker, tubi  stockings half way down her lower legs upon arrival. Denies chest pain or SOB, wounds look better, left plantar great toe debrided of non-viable tissue and will continue mepilex ag   Reina Davis  [facility]

## 2018-01-05 ENCOUNTER — OFFICE VISIT (OUTPATIENT)
Dept: WOUND CARE | Facility: HOSPITAL | Age: 83
End: 2018-01-05
Attending: NURSE PRACTITIONER
Payer: MEDICARE

## 2018-01-05 VITALS — HEART RATE: 66 BPM | RESPIRATION RATE: 20 BRPM | DIASTOLIC BLOOD PRESSURE: 76 MMHG | SYSTOLIC BLOOD PRESSURE: 142 MMHG

## 2018-01-05 DIAGNOSIS — L89.892 PRESSURE ULCER OF TOE OF RIGHT FOOT, STAGE 2: Primary | ICD-10-CM

## 2018-01-05 DIAGNOSIS — L89.893 PRESSURE ULCER OF TOE OF LEFT FOOT, STAGE 3: ICD-10-CM

## 2018-01-05 PROCEDURE — 11042 DBRDMT SUBQ TIS 1ST 20SQCM/<: CPT

## 2018-01-05 PROCEDURE — 27201912 HC WOUND CARE DEBRIDEMENT SUPPLIES

## 2018-01-05 NOTE — PROGRESS NOTES
[facility]  Wound Care  Progress Note    Problem List Items Addressed This Visit        Derm    Decubitus ulcer of toe, stage 2 - Primary    Decubitus ulcer of toe, stage 3    Overview     HPI:  82 YR OLD FEMALE WITH HISTORY OF WOUNDS TO LEFT GREAT TOE AND RIGHT 5TH TOE DUE TO IMPROPERLY FITTING SHOES  Location: RT 5TH TOE AND LEFT GREAT TOE    Duration: 2 WEEKS    Context: PRESSURE    Associated Signs & Symptoms:     Timing:     Severity:     Quality:  Modifying Factors:                Pt AAO X 3, uses a walker to ambulate, denies chest pain or SOB, right 5th toe healed, plantar toe is better, debrided and will continue current treatment.    See wound doc progress notes. Documents will be scanned.        Reina Davis  [facility]

## 2018-01-12 ENCOUNTER — OFFICE VISIT (OUTPATIENT)
Dept: WOUND CARE | Facility: HOSPITAL | Age: 83
End: 2018-01-12
Attending: NURSE PRACTITIONER
Payer: MEDICARE

## 2018-01-12 VITALS
DIASTOLIC BLOOD PRESSURE: 66 MMHG | TEMPERATURE: 98 F | SYSTOLIC BLOOD PRESSURE: 108 MMHG | RESPIRATION RATE: 18 BRPM | HEART RATE: 57 BPM

## 2018-01-12 DIAGNOSIS — L89.893 PRESSURE ULCER OF TOE OF LEFT FOOT, STAGE 3: ICD-10-CM

## 2018-01-12 DIAGNOSIS — L89.892 PRESSURE ULCER OF TOE OF RIGHT FOOT, STAGE 2: Primary | ICD-10-CM

## 2018-01-12 PROCEDURE — 99211 OFF/OP EST MAY X REQ PHY/QHP: CPT

## 2018-01-12 NOTE — PROGRESS NOTES
[facility]  Wound Care  Progress Note    Problem List Items Addressed This Visit        Derm    Decubitus ulcer of toe, stage 2 - Primary    Decubitus ulcer of toe, stage 3    Overview     HPI:  82 YR OLD FEMALE WITH HISTORY OF WOUNDS TO LEFT GREAT TOE AND RIGHT 5TH TOE DUE TO IMPROPERLY FITTING SHOES  Location: RT 5TH TOE AND LEFT GREAT TOE    Duration: 2 WEEKS    Context: PRESSURE    Associated Signs & Symptoms:     Timing:     Severity:     Quality:  Modifying Factors:                  Pt AAO X 3, denies chest pain or SOB, ambulates with a walker. Left plantar toe is closed, right 5th closed but pt c/o pain. Will use gentian violet and apperature pads and re-check in 2 weeks. Pt has thigh high stocking rolled down to calf, encouraged proper use of compression stockings and the need for elevation. Pt instructed to wear shoes while up and walking and not to walk in bare feet.  See wound doc progress notes. Documents will be scanned.        Reina Davis  [facility]

## 2018-01-26 ENCOUNTER — OFFICE VISIT (OUTPATIENT)
Dept: WOUND CARE | Facility: HOSPITAL | Age: 83
End: 2018-01-26
Attending: NURSE PRACTITIONER
Payer: MEDICARE

## 2018-01-26 VITALS
TEMPERATURE: 98 F | HEART RATE: 88 BPM | SYSTOLIC BLOOD PRESSURE: 142 MMHG | RESPIRATION RATE: 20 BRPM | DIASTOLIC BLOOD PRESSURE: 84 MMHG

## 2018-01-26 DIAGNOSIS — L89.893 PRESSURE ULCER OF TOE OF LEFT FOOT, STAGE 3: Primary | ICD-10-CM

## 2018-01-26 PROCEDURE — 99211 OFF/OP EST MAY X REQ PHY/QHP: CPT

## 2018-01-26 NOTE — PROGRESS NOTES
[facility]  Wound Care  Progress Note    Problem List Items Addressed This Visit        Derm    Decubitus ulcer of toe, stage 3 - Primary    Overview     HPI:  82 YR OLD FEMALE WITH HISTORY OF WOUNDS TO LEFT GREAT TOE AND RIGHT 5TH TOE DUE TO IMPROPERLY FITTING SHOES  Location: RT 5TH TOE AND LEFT GREAT TOE    Duration: 2 WEEKS    Context: PRESSURE    Associated Signs & Symptoms:     Timing:     Severity:     Quality:  Modifying Factors:                  Physical Exam   Constitutional: She is oriented to person, place, and time. She appears well-developed and well-nourished.   HENT:   Head: Normocephalic.   Pulmonary/Chest: Effort normal.   Musculoskeletal:   Pt uses a walker to ambulate  2+ edema to both legs   Neurological: She is alert and oriented to person, place, and time.   Skin: Skin is warm and dry.   Psychiatric: She has a normal mood and affect. Her behavior is normal. Judgment and thought content normal.     Left great toe doing well. Will use gentian violet and apperature pad,   See wound doc progress notes. Documents will be scanned.        Reina Davis  [facility]

## 2018-01-28 ENCOUNTER — HOSPITAL ENCOUNTER (EMERGENCY)
Facility: HOSPITAL | Age: 83
Discharge: HOME | End: 2018-01-28
Attending: EMERGENCY MEDICINE
Payer: MEDICARE

## 2018-01-28 VITALS
OXYGEN SATURATION: 94 % | HEIGHT: 67 IN | TEMPERATURE: 99 F | DIASTOLIC BLOOD PRESSURE: 79 MMHG | WEIGHT: 293 LBS | HEART RATE: 81 BPM | SYSTOLIC BLOOD PRESSURE: 170 MMHG | BODY MASS INDEX: 45.99 KG/M2 | RESPIRATION RATE: 20 BRPM

## 2018-01-28 DIAGNOSIS — R06.00 DYSPNEA: ICD-10-CM

## 2018-01-28 DIAGNOSIS — J06.9 UPPER RESPIRATORY TRACT INFECTION, UNSPECIFIED TYPE: Primary | ICD-10-CM

## 2018-01-28 DIAGNOSIS — M54.2 MUSCULOSKELETAL NECK PAIN: ICD-10-CM

## 2018-01-28 LAB
ALBUMIN SERPL BCP-MCNC: 3.6 G/DL
ALP SERPL-CCNC: 55 U/L
ALT SERPL W/O P-5'-P-CCNC: 24 U/L
ANION GAP SERPL CALC-SCNC: 10 MMOL/L
AST SERPL-CCNC: 17 U/L
BASOPHILS # BLD AUTO: 0.02 K/UL
BASOPHILS NFR BLD: 0.2 %
BILIRUB SERPL-MCNC: 1.1 MG/DL
BNP SERPL-MCNC: 250 PG/ML
BUN SERPL-MCNC: 21 MG/DL
CALCIUM SERPL-MCNC: 9.3 MG/DL
CHLORIDE SERPL-SCNC: 100 MMOL/L
CK MB SERPL-MCNC: 0.7 NG/ML
CK MB SERPL-RTO: 2.1 %
CK SERPL-CCNC: 33 U/L
CK SERPL-CCNC: 33 U/L
CO2 SERPL-SCNC: 28 MMOL/L
CREAT SERPL-MCNC: 0.8 MG/DL
DIFFERENTIAL METHOD: ABNORMAL
EOSINOPHIL # BLD AUTO: 0 K/UL
EOSINOPHIL NFR BLD: 0.1 %
ERYTHROCYTE [DISTWIDTH] IN BLOOD BY AUTOMATED COUNT: 13.8 %
EST. GFR  (AFRICAN AMERICAN): >60 ML/MIN/1.73 M^2
EST. GFR  (NON AFRICAN AMERICAN): >60 ML/MIN/1.73 M^2
FLUAV AG SPEC QL IA: NEGATIVE
FLUBV AG SPEC QL IA: NEGATIVE
GLUCOSE SERPL-MCNC: 143 MG/DL
HCT VFR BLD AUTO: 39 %
HGB BLD-MCNC: 12.8 G/DL
LYMPHOCYTES # BLD AUTO: 0.9 K/UL
LYMPHOCYTES NFR BLD: 8.6 %
MCH RBC QN AUTO: 32.2 PG
MCHC RBC AUTO-ENTMCNC: 32.8 G/DL
MCV RBC AUTO: 98 FL
MONOCYTES # BLD AUTO: 1.1 K/UL
MONOCYTES NFR BLD: 10.9 %
NEUTROPHILS # BLD AUTO: 8.1 K/UL
NEUTROPHILS NFR BLD: 80.2 %
PLATELET # BLD AUTO: 192 K/UL
PMV BLD AUTO: 9 FL
POTASSIUM SERPL-SCNC: 4.5 MMOL/L
PROT SERPL-MCNC: 7.4 G/DL
RBC # BLD AUTO: 3.98 M/UL
SODIUM SERPL-SCNC: 138 MMOL/L
SPECIMEN SOURCE: NORMAL
TROPONIN I SERPL DL<=0.01 NG/ML-MCNC: 0.01 NG/ML
WBC # BLD AUTO: 10.11 K/UL

## 2018-01-28 PROCEDURE — 80053 COMPREHEN METABOLIC PANEL: CPT

## 2018-01-28 PROCEDURE — 82550 ASSAY OF CK (CPK): CPT

## 2018-01-28 PROCEDURE — 99284 EMERGENCY DEPT VISIT MOD MDM: CPT | Mod: 25

## 2018-01-28 PROCEDURE — 85025 COMPLETE CBC W/AUTO DIFF WBC: CPT

## 2018-01-28 PROCEDURE — 93010 ELECTROCARDIOGRAM REPORT: CPT | Mod: ,,, | Performed by: INTERNAL MEDICINE

## 2018-01-28 PROCEDURE — 93005 ELECTROCARDIOGRAM TRACING: CPT

## 2018-01-28 PROCEDURE — 82553 CREATINE MB FRACTION: CPT

## 2018-01-28 PROCEDURE — 83880 ASSAY OF NATRIURETIC PEPTIDE: CPT

## 2018-01-28 PROCEDURE — 84484 ASSAY OF TROPONIN QUANT: CPT

## 2018-01-28 PROCEDURE — 96372 THER/PROPH/DIAG INJ SC/IM: CPT

## 2018-01-28 PROCEDURE — 63600175 PHARM REV CODE 636 W HCPCS: Performed by: EMERGENCY MEDICINE

## 2018-01-28 PROCEDURE — 36415 COLL VENOUS BLD VENIPUNCTURE: CPT

## 2018-01-28 PROCEDURE — 25000003 PHARM REV CODE 250: Performed by: EMERGENCY MEDICINE

## 2018-01-28 PROCEDURE — 87400 INFLUENZA A/B EACH AG IA: CPT | Mod: 59

## 2018-01-28 RX ORDER — OXYCODONE AND ACETAMINOPHEN 5; 325 MG/1; MG/1
1 TABLET ORAL
Status: COMPLETED | OUTPATIENT
Start: 2018-01-28 | End: 2018-01-28

## 2018-01-28 RX ORDER — KETOROLAC TROMETHAMINE 30 MG/ML
30 INJECTION, SOLUTION INTRAMUSCULAR; INTRAVENOUS
Status: COMPLETED | OUTPATIENT
Start: 2018-01-28 | End: 2018-01-28

## 2018-01-28 RX ADMIN — KETOROLAC TROMETHAMINE 30 MG: 30 INJECTION, SOLUTION INTRAMUSCULAR at 06:01

## 2018-01-28 RX ADMIN — OXYCODONE HYDROCHLORIDE AND ACETAMINOPHEN 1 TABLET: 5; 325 TABLET ORAL at 06:01

## 2018-01-28 NOTE — ED TRIAGE NOTES
Patient presents to the ER with neck pain, c/o fever and feeling confused since yesterday.  Patient admits that she has not taken her fluid pills for at least 3 days.

## 2018-01-29 ENCOUNTER — TELEPHONE (OUTPATIENT)
Dept: FAMILY MEDICINE | Facility: CLINIC | Age: 83
End: 2018-01-29

## 2018-01-29 NOTE — ED PROVIDER NOTES
[facility]                                                  Chief Complaint  83 y.o. female with Fever; Neck Pain; and Altered Mental Status    History of Present Illness  Stephany Skinner presents to the emergency room with complaints of cough, congestion, fever, neck pain.  Her daughter is with her and says that she has not been acting herself today.  She reports that she took 2 Tylenol this morning for symptoms.  Her past medical history is significant for high blood pressure, atrial fibrillation and thyroid.  Patient appears awake and alert and is conversing appropriately.  At baseline the patient cooks, cleaned, drives, does chores and hobbies.  She does not live alone but with a family member.      Past Medical History:   Diagnosis Date    GERD (gastroesophageal reflux disease)     Hyperlipidemia     Hypertension      Past Surgical History:   Procedure Laterality Date    CHOLECYSTECTOMY      HERNIA REPAIR      HYSTERECTOMY      knee replacemene      deidra    THYROIDECTOMY        Review of patient's allergies indicates:   Allergen Reactions    Statins-hmg-coa reductase inhibitors      Other reaction(s): Unknown        Review of Systems and Physical Exam     Review of Systems  -- Constitution - reports fever but did not take temperature, denies fatigue, no weakness, no chills  -- Eyes - no tearing or redness, no visual disturbance  -- Ear, Nose - no tinnitus or earache, no nasal congestion or discharge  -- Mouth,Throat - no sore throat, no toothache, normal voice, normal swallowing  -- Respiratory - reports cough and congestion, mild shortness of breath, no wheezing  -- Cardiovascular - denies chest pain, no palpitations, denies claudication  -- Gastrointestinal - denies abdominal pain, nausea, vomiting, or diarrhea  -- Genitourinary - no dysuria, no denies flank pain, no hematuria or frequency   -- Musculoskeletal - denies back pain but reports neck pain, negative for myalgias and  "arthralgias   -- Neurological - no headache, denies weakness or seizure; no LOC  -- Skin - denies pallor, rash, or changes in skin. no hives or welts noted    Vital Signs   height is 5' 7" (1.702 m) and weight is 133.8 kg (295 lb). Her oral temperature is 98.8 °F (37.1 °C). Her blood pressure is 186/81 (abnormal) and her pulse is 74. Her respiration is 18 and oxygen saturation is 93% (abnormal).      Physical Exam  -- Nursing note and vitals reviewed  -- Constitutional: Appears well-developed and well-nourished, patient is awake alert and oriented ×3 and does not appear to have any signs of altered mental status.  -- Head: Atraumatic. Normocephalic. No obvious abnormality  -- Eyes: Pupils are equal and reactive to light. Normal conjunctiva and lids  -- Nose: Nose normal in appearance, nares grossly normal. No discharge  -- Throat: Mucous membranes moist, pharynx normal, normal tonsils. No lesions   -- Ears: External ears and TM normal bilaterally. Normal hearing and no drainage  -- Neck: Normal range of motion. Neck supple. No masses, trachea midline  -- Cardiac: Normal rate, regular rhythm and normal heart sounds  -- Pulmonary: Normal respiratory effort, breath sounds clear to auscultation  -- Abdominal: Soft, no tenderness. Normal bowel sounds. Normal liver edge  -- Musculoskeletal: Normal range of motion, no effusions. Joints stable no lower extremity edema.  Posterior neck mildly tender to palpation  -- Neurological: No focal deficits. Showed good interaction with staff  -- Vascular: Posterior tibial, dorsalis pedis and radial pulses 2+ bilaterally    -- Lymphatics: No cervical or peripheral lymphadenopathy. No edema noted  -- Skin: Warm and dry. No evidence of rash or cellulitis  -- Psychiatric: Normal mood and affect. Bedside behavior is appropriate    Emergency Room Course     Treatment and Evaluation  1.  Physical exam unremarkable, vitals within normal limits  2.  CBC/CMP within normal limits  3.  Chest " x-ray negative for acute process  4.  Cardiac enzymes negative ×1 and BMP within normal limits  5.  EKG shows atrial fibrillation without signs of ischemia or arrhythmia.  Patient has known history of A. Fib.  6.  Influenza negative  7.  Toradol 30 IM  8.  Percocet 5 mg by mouth  9.  DC home follow up PCP      Abnormal lab values  Labs Reviewed   CBC W/ AUTO DIFFERENTIAL - Abnormal; Notable for the following:        Result Value    RBC 3.98 (*)     MCH 32.2 (*)     MPV 9.0 (*)     Gran # (ANC) 8.1 (*)     Lymph # 0.9 (*)     Mono # 1.1 (*)     Gran% 80.2 (*)     Lymph% 8.6 (*)     All other components within normal limits   COMPREHENSIVE METABOLIC PANEL - Abnormal; Notable for the following:     Glucose 143 (*)     Total Bilirubin 1.1 (*)     All other components within normal limits   B-TYPE NATRIURETIC PEPTIDE - Abnormal; Notable for the following:      (*)     All other components within normal limits   INFLUENZA A AND B ANTIGEN   TROPONIN I   CK-MB   CK   URINALYSIS       Medications Given  Medications   ketorolac injection 30 mg (30 mg Intramuscular Given 1/28/18 1808)   oxyCODONE-acetaminophen 5-325 mg per tablet 1 tablet (1 tablet Oral Given 1/28/18 1808)           Diagnosis  -- Muscular skeletal neck pain  --URI    Disposition and Plan  -- Disposition: home  -- Condition: stable  -- Follow-up: Patient to follow up with Pipo Flowers MD in 1-2 days.  -- I advised the patient that we have found no life threatening condition today  -- At this time, I believe the patient is clinically stable for discharge.   -- The patient acknowledges that close follow up with a MD is required   -- Patient agrees to comply with all instruction and direction given in the ER  -- Patient counseled on strict return precautions as discussed       Renee Spicer MD  01/28/18 8817

## 2018-01-29 NOTE — TELEPHONE ENCOUNTER
----- Message from Lanie Schaffer sent at 2018  8:11 AM CST -----  Contact: Sister - Shira Skinner  MRN: [id number]  : 1935  PCP: Pipo Flowers  Home Phone      [phone]  Work Phone      Not on file.  Mobile          [phone]      MESSAGE: Seen over the weekend in ER - still not doing better, neck pain, breathing is labored [phone]

## 2018-01-30 ENCOUNTER — HOSPITAL ENCOUNTER (INPATIENT)
Facility: HOSPITAL | Age: 83
LOS: 8 days | Discharge: NURSING, REHAB OR LONG-TERM CARE | DRG: 291 | End: 2018-02-07
Attending: SURGERY | Admitting: INTERNAL MEDICINE
Payer: MEDICARE

## 2018-01-30 DIAGNOSIS — I50.9 ACUTE ON CHRONIC CONGESTIVE HEART FAILURE, UNSPECIFIED CONGESTIVE HEART FAILURE TYPE: Primary | ICD-10-CM

## 2018-01-30 DIAGNOSIS — J96.02 ACUTE RESPIRATORY FAILURE WITH HYPOXIA AND HYPERCARBIA: ICD-10-CM

## 2018-01-30 DIAGNOSIS — R05.9 COUGH: ICD-10-CM

## 2018-01-30 DIAGNOSIS — I50.9 CHF (CONGESTIVE HEART FAILURE): ICD-10-CM

## 2018-01-30 DIAGNOSIS — G70.00 MYASTHENIA GRAVIS, ADULT FORM: ICD-10-CM

## 2018-01-30 DIAGNOSIS — I25.10 CARDIOVASCULAR DISEASE: ICD-10-CM

## 2018-01-30 DIAGNOSIS — I50.9 ACUTE ON CHRONIC CONGESTIVE HEART FAILURE: ICD-10-CM

## 2018-01-30 DIAGNOSIS — J96.01 ACUTE RESPIRATORY FAILURE WITH HYPOXIA AND HYPERCARBIA: ICD-10-CM

## 2018-01-30 DIAGNOSIS — R06.02 SOB (SHORTNESS OF BREATH): ICD-10-CM

## 2018-01-30 DIAGNOSIS — I50.9 HEART FAILURE: ICD-10-CM

## 2018-01-30 PROBLEM — I48.20 CHRONIC ATRIAL FIBRILLATION: Status: ACTIVE | Noted: 2018-01-30

## 2018-01-30 LAB
ALBUMIN SERPL BCP-MCNC: 3.2 G/DL
ALLENS TEST: ABNORMAL
ALP SERPL-CCNC: 63 U/L
ALT SERPL W/O P-5'-P-CCNC: 36 U/L
ANION GAP SERPL CALC-SCNC: 8 MMOL/L
APTT BLDCRRT: 27.6 SEC
AST SERPL-CCNC: 23 U/L
BASOPHILS # BLD AUTO: 0.01 K/UL
BASOPHILS NFR BLD: 0.1 %
BILIRUB SERPL-MCNC: 0.9 MG/DL
BILIRUB UR QL STRIP: NEGATIVE
BNP SERPL-MCNC: 411 PG/ML
BUN SERPL-MCNC: 24 MG/DL
CALCIUM SERPL-MCNC: 9 MG/DL
CHLORIDE SERPL-SCNC: 97 MMOL/L
CK MB SERPL-MCNC: 1 NG/ML
CK MB SERPL-RTO: 3.4 %
CK SERPL-CCNC: 29 U/L
CK SERPL-CCNC: 29 U/L
CLARITY UR: CLEAR
CO2 SERPL-SCNC: 32 MMOL/L
COLOR UR: YELLOW
CREAT SERPL-MCNC: 0.8 MG/DL
D DIMER PPP IA.FEU-MCNC: 1.05 MG/L FEU
DELSYS: ABNORMAL
DIFFERENTIAL METHOD: ABNORMAL
EOSINOPHIL # BLD AUTO: 0 K/UL
EOSINOPHIL NFR BLD: 0 %
ERYTHROCYTE [DISTWIDTH] IN BLOOD BY AUTOMATED COUNT: 13.9 %
EST. GFR  (AFRICAN AMERICAN): >60 ML/MIN/1.73 M^2
EST. GFR  (NON AFRICAN AMERICAN): >60 ML/MIN/1.73 M^2
FLUAV AG SPEC QL IA: NEGATIVE
FLUBV AG SPEC QL IA: NEGATIVE
GLUCOSE SERPL-MCNC: 218 MG/DL
GLUCOSE UR QL STRIP: NEGATIVE
HCO3 UR-SCNC: 32.6 MMOL/L (ref 22–26)
HCT VFR BLD AUTO: 38.5 %
HGB BLD-MCNC: 12.3 G/DL
HGB UR QL STRIP: NEGATIVE
INR PPP: 1.1
KETONES UR QL STRIP: NEGATIVE
LEUKOCYTE ESTERASE UR QL STRIP: NEGATIVE
LYMPHOCYTES # BLD AUTO: 0.6 K/UL
LYMPHOCYTES NFR BLD: 3.9 %
MCH RBC QN AUTO: 31.5 PG
MCHC RBC AUTO-ENTMCNC: 31.9 G/DL
MCV RBC AUTO: 99 FL
MONOCYTES # BLD AUTO: 1 K/UL
MONOCYTES NFR BLD: 6.9 %
NEUTROPHILS # BLD AUTO: 12.9 K/UL
NEUTROPHILS NFR BLD: 89.1 %
NITRITE UR QL STRIP: NEGATIVE
PCO2 BLDA: 59 MMHG (ref 35–45)
PH SMN: 7.35 [PH] (ref 7.35–7.45)
PH UR STRIP: 6 [PH] (ref 5–8)
PLATELET # BLD AUTO: 212 K/UL
PMV BLD AUTO: 9 FL
PO2 BLDA: 81 MMHG (ref 75–100)
POC BE: 5.4 MMOL/L (ref -2–2)
POC COHB: 2 % (ref 0–3)
POC METHB: 1.1 % (ref 0–1.5)
POC O2HB ARTERIAL: 94.7 % (ref 94–100)
POC SATURATED O2: 97.8 % (ref 90–100)
POC TCO2: 34.4 MMOL/L
POC THB: 12.2 G/DL (ref 12–18)
POTASSIUM SERPL-SCNC: 4.4 MMOL/L
PROT SERPL-MCNC: 7.4 G/DL
PROT UR QL STRIP: NEGATIVE
PROTHROMBIN TIME: 11.6 SEC
RBC # BLD AUTO: 3.9 M/UL
SITE: ABNORMAL
SODIUM SERPL-SCNC: 137 MMOL/L
SP GR UR STRIP: 1.01 (ref 1–1.03)
SPECIMEN SOURCE: NORMAL
TROPONIN I SERPL DL<=0.01 NG/ML-MCNC: 0.01 NG/ML
TROPONIN I SERPL DL<=0.01 NG/ML-MCNC: <0.006 NG/ML
TROPONIN I SERPL DL<=0.01 NG/ML-MCNC: <0.006 NG/ML
URN SPEC COLLECT METH UR: NORMAL
UROBILINOGEN UR STRIP-ACNC: NEGATIVE EU/DL
WBC # BLD AUTO: 14.44 K/UL

## 2018-01-30 PROCEDURE — 80053 COMPREHEN METABOLIC PANEL: CPT

## 2018-01-30 PROCEDURE — 93010 ELECTROCARDIOGRAM REPORT: CPT | Mod: ,,, | Performed by: INTERNAL MEDICINE

## 2018-01-30 PROCEDURE — 36600 WITHDRAWAL OF ARTERIAL BLOOD: CPT

## 2018-01-30 PROCEDURE — 94640 AIRWAY INHALATION TREATMENT: CPT

## 2018-01-30 PROCEDURE — 83880 ASSAY OF NATRIURETIC PEPTIDE: CPT

## 2018-01-30 PROCEDURE — 84484 ASSAY OF TROPONIN QUANT: CPT

## 2018-01-30 PROCEDURE — 99220 PR INITIAL OBSERVATION CARE,LEVL III: CPT | Mod: ,,, | Performed by: INTERNAL MEDICINE

## 2018-01-30 PROCEDURE — 25000003 PHARM REV CODE 250: Performed by: SURGERY

## 2018-01-30 PROCEDURE — 87400 INFLUENZA A/B EACH AG IA: CPT

## 2018-01-30 PROCEDURE — 93005 ELECTROCARDIOGRAM TRACING: CPT

## 2018-01-30 PROCEDURE — 85379 FIBRIN DEGRADATION QUANT: CPT

## 2018-01-30 PROCEDURE — 82803 BLOOD GASES ANY COMBINATION: CPT | Performed by: SURGERY

## 2018-01-30 PROCEDURE — 94761 N-INVAS EAR/PLS OXIMETRY MLT: CPT

## 2018-01-30 PROCEDURE — 25000242 PHARM REV CODE 250 ALT 637 W/ HCPCS: Performed by: SURGERY

## 2018-01-30 PROCEDURE — 82553 CREATINE MB FRACTION: CPT

## 2018-01-30 PROCEDURE — 84484 ASSAY OF TROPONIN QUANT: CPT | Mod: 91

## 2018-01-30 PROCEDURE — 63600175 PHARM REV CODE 636 W HCPCS: Performed by: INTERNAL MEDICINE

## 2018-01-30 PROCEDURE — 11000001 HC ACUTE MED/SURG PRIVATE ROOM

## 2018-01-30 PROCEDURE — 85025 COMPLETE CBC W/AUTO DIFF WBC: CPT

## 2018-01-30 PROCEDURE — 99285 EMERGENCY DEPT VISIT HI MDM: CPT | Mod: 25

## 2018-01-30 PROCEDURE — 25000003 PHARM REV CODE 250: Performed by: INTERNAL MEDICINE

## 2018-01-30 PROCEDURE — 81003 URINALYSIS AUTO W/O SCOPE: CPT

## 2018-01-30 PROCEDURE — 85610 PROTHROMBIN TIME: CPT

## 2018-01-30 PROCEDURE — 27000221 HC OXYGEN, UP TO 24 HOURS

## 2018-01-30 PROCEDURE — 85730 THROMBOPLASTIN TIME PARTIAL: CPT

## 2018-01-30 PROCEDURE — 36415 COLL VENOUS BLD VENIPUNCTURE: CPT

## 2018-01-30 RX ORDER — IPRATROPIUM BROMIDE AND ALBUTEROL SULFATE 2.5; .5 MG/3ML; MG/3ML
3 SOLUTION RESPIRATORY (INHALATION)
Status: COMPLETED | OUTPATIENT
Start: 2018-01-30 | End: 2018-01-30

## 2018-01-30 RX ORDER — PANTOPRAZOLE SODIUM 40 MG/1
40 TABLET, DELAYED RELEASE ORAL DAILY
Status: DISCONTINUED | OUTPATIENT
Start: 2018-01-30 | End: 2018-02-07 | Stop reason: HOSPADM

## 2018-01-30 RX ORDER — LOSARTAN POTASSIUM 50 MG/1
100 TABLET ORAL DAILY
Status: DISCONTINUED | OUTPATIENT
Start: 2018-01-31 | End: 2018-02-05

## 2018-01-30 RX ORDER — NAPROXEN SODIUM 220 MG/1
81 TABLET, FILM COATED ORAL
Status: COMPLETED | OUTPATIENT
Start: 2018-01-30 | End: 2018-01-30

## 2018-01-30 RX ORDER — LEVOTHYROXINE SODIUM 100 UG/1
100 TABLET ORAL DAILY
Status: DISCONTINUED | OUTPATIENT
Start: 2018-01-30 | End: 2018-02-07 | Stop reason: HOSPADM

## 2018-01-30 RX ORDER — FUROSEMIDE 10 MG/ML
40 INJECTION INTRAMUSCULAR; INTRAVENOUS 2 TIMES DAILY
Status: DISCONTINUED | OUTPATIENT
Start: 2018-01-30 | End: 2018-02-02

## 2018-01-30 RX ORDER — AMLODIPINE BESYLATE 2.5 MG/1
2.5 TABLET ORAL DAILY
Status: DISCONTINUED | OUTPATIENT
Start: 2018-01-30 | End: 2018-02-07 | Stop reason: HOSPADM

## 2018-01-30 RX ORDER — LEVALBUTEROL 1.25 MG/.5ML
1.25 SOLUTION, CONCENTRATE RESPIRATORY (INHALATION)
Status: COMPLETED | OUTPATIENT
Start: 2018-01-30 | End: 2018-01-30

## 2018-01-30 RX ORDER — ONDANSETRON 2 MG/ML
4 INJECTION INTRAMUSCULAR; INTRAVENOUS EVERY 8 HOURS PRN
Status: DISCONTINUED | OUTPATIENT
Start: 2018-01-30 | End: 2018-02-07 | Stop reason: HOSPADM

## 2018-01-30 RX ORDER — FUROSEMIDE 10 MG/ML
20 INJECTION INTRAMUSCULAR; INTRAVENOUS 2 TIMES DAILY
Status: DISCONTINUED | OUTPATIENT
Start: 2018-01-30 | End: 2018-01-30

## 2018-01-30 RX ORDER — LOSARTAN POTASSIUM 50 MG/1
50 TABLET ORAL DAILY
Status: DISCONTINUED | OUTPATIENT
Start: 2018-01-30 | End: 2018-01-30

## 2018-01-30 RX ORDER — METOPROLOL SUCCINATE 50 MG/1
200 TABLET, EXTENDED RELEASE ORAL DAILY
Status: DISCONTINUED | OUTPATIENT
Start: 2018-01-30 | End: 2018-02-07 | Stop reason: HOSPADM

## 2018-01-30 RX ORDER — ATORVASTATIN CALCIUM 10 MG/1
10 TABLET, FILM COATED ORAL NIGHTLY
Status: DISCONTINUED | OUTPATIENT
Start: 2018-01-30 | End: 2018-02-07 | Stop reason: HOSPADM

## 2018-01-30 RX ORDER — ACETAMINOPHEN 325 MG/1
650 TABLET ORAL EVERY 8 HOURS PRN
Status: DISCONTINUED | OUTPATIENT
Start: 2018-01-30 | End: 2018-02-07 | Stop reason: HOSPADM

## 2018-01-30 RX ORDER — CLOPIDOGREL BISULFATE 75 MG/1
75 TABLET ORAL DAILY
Status: DISCONTINUED | OUTPATIENT
Start: 2018-01-30 | End: 2018-02-07 | Stop reason: HOSPADM

## 2018-01-30 RX ADMIN — ATORVASTATIN CALCIUM 10 MG: 10 TABLET, FILM COATED ORAL at 08:01

## 2018-01-30 RX ADMIN — LEVALBUTEROL 1.25 MG: 1.25 SOLUTION, CONCENTRATE RESPIRATORY (INHALATION) at 09:01

## 2018-01-30 RX ADMIN — IPRATROPIUM BROMIDE AND ALBUTEROL SULFATE 3 ML: .5; 3 SOLUTION RESPIRATORY (INHALATION) at 08:01

## 2018-01-30 RX ADMIN — PANTOPRAZOLE SODIUM 40 MG: 40 TABLET, DELAYED RELEASE ORAL at 11:01

## 2018-01-30 RX ADMIN — METOPROLOL SUCCINATE 200 MG: 50 TABLET, EXTENDED RELEASE ORAL at 11:01

## 2018-01-30 RX ADMIN — FUROSEMIDE 40 MG: 10 INJECTION, SOLUTION INTRAMUSCULAR; INTRAVENOUS at 05:01

## 2018-01-30 RX ADMIN — ACETAMINOPHEN 650 MG: 325 TABLET ORAL at 08:01

## 2018-01-30 RX ADMIN — LEVALBUTEROL 1.25 MG: 1.25 SOLUTION, CONCENTRATE RESPIRATORY (INHALATION) at 11:01

## 2018-01-30 RX ADMIN — ASPIRIN 81 MG 81 MG: 81 TABLET ORAL at 08:01

## 2018-01-30 NOTE — PLAN OF CARE
01/30/18 1140   GUERRA Message   Medicare Outpatient and Observation Notification regarding financial responsibility Given to patient/caregiver;Explained to patient/caregiver;Signed/date by patient/caregiver   Date GUERRA was signed 01/30/18   Time GUERRA was signed 1140

## 2018-01-30 NOTE — SUBJECTIVE & OBJECTIVE
Past Medical History:   Diagnosis Date    GERD (gastroesophageal reflux disease)     Hyperlipidemia     Hypertension        Past Surgical History:   Procedure Laterality Date    CHOLECYSTECTOMY      HERNIA REPAIR      HYSTERECTOMY      knee replacemene      deidra    THYROIDECTOMY         Review of patient's allergies indicates:   Allergen Reactions    Statins-hmg-coa reductase inhibitors      Other reaction(s): Unknown       No current facility-administered medications on file prior to encounter.      Current Outpatient Prescriptions on File Prior to Encounter   Medication Sig    amlodipine (NORVASC) 2.5 MG tablet Take 1 tablet (2.5 mg total) by mouth once daily.    atorvastatin (LIPITOR) 10 MG tablet Take 1 tablet (10 mg total) by mouth every evening.    atorvastatin (LIPITOR) 20 MG tablet     clopidogrel (PLAVIX) 75 mg tablet Take 1 tablet (75 mg total) by mouth once daily.    cyanocobalamin (VITAMIN B-12) 100 MCG tablet Take 100 mcg by mouth once daily.    furosemide (LASIX) 40 MG tablet Take 1 tablet (40 mg total) by mouth once daily.    levothyroxine (SYNTHROID) 100 MCG tablet TAKE ONE TABLET BY MOUTH EVERY DAY    losartan (COZAAR) 100 MG tablet     meloxicam (MOBIC) 7.5 MG tablet TAKE ONE TABLET BY MOUTH EVERY OTHER DAY    metoprolol succinate (TOPROL-XL) 200 MG 24 hr tablet Take 1 tablet (200 mg total) by mouth once daily.    omega-3 acid ethyl esters (LOVAZA) 1 gram capsule Take 2 capsules (2 g total) by mouth 2 (two) times daily.    potassium chloride SA (K-DUR,KLOR-CON) 20 MEQ tablet Take 1 tablet (20 mEq total) by mouth once daily.    ranitidine (ZANTAC) 150 MG tablet Take 1 tablet (150 mg total) by mouth 2 (two) times daily.    VESICARE 10 mg tablet TAKE ONE TABLET BY MOUTH EVERY DAY    nitroGLYCERIN (NITROSTAT) 0.4 MG SL tablet Place 0.4 mg under the tongue every 5 (five) minutes as needed.     Family History     Problem Relation (Age of Onset)    Cancer Sister        Social  History Main Topics    Smoking status: Never Smoker    Smokeless tobacco: Never Used    Alcohol use No    Drug use: No    Sexual activity: Not on file     Review of Systems   Constitutional: Negative for chills and fever.   HENT: Negative for congestion, hearing loss, sinus pressure and sore throat.    Eyes: Negative for photophobia.   Respiratory: Positive for shortness of breath. Negative for cough, choking, chest tightness and wheezing.    Cardiovascular: Negative for chest pain and palpitations.   Gastrointestinal: Negative for blood in stool, nausea and vomiting.   Genitourinary: Negative for dysuria and hematuria.   Musculoskeletal: Positive for neck pain. Negative for arthralgias and myalgias.   Skin: Positive for wound. Negative for pallor.        Toe ulcer    Neurological: Negative for dizziness and numbness.   Hematological: Does not bruise/bleed easily.   Psychiatric/Behavioral: Negative for confusion and suicidal ideas. The patient is not nervous/anxious.      Objective:     Vital Signs (Most Recent):  Temp: 97.9 °F (36.6 °C) (01/30/18 1024)  Pulse: 69 (01/30/18 1149)  Resp: (!) 22 (01/30/18 1149)  BP: (!) 155/71 (01/30/18 1121)  SpO2: 97 % (01/30/18 1149) Vital Signs (24h Range):  Temp:  [97.3 °F (36.3 °C)-97.9 °F (36.6 °C)] 97.9 °F (36.6 °C)  Pulse:  [] 69  Resp:  [20-31] 22  SpO2:  [76 %-98 %] 97 %  BP: (115-155)/(54-79) 155/71     Weight: 118.5 kg (261 lb 3.9 oz)  Body mass index is 40.92 kg/m².    Physical Exam   Constitutional: She is oriented to person, place, and time. She appears well-developed and well-nourished.   HENT:   Head: Normocephalic and atraumatic.   Cardiovascular: Normal rate and normal heart sounds.  An irregularly irregular rhythm present.   Pulmonary/Chest: Effort normal and breath sounds normal.   Abdominal: Soft. Bowel sounds are normal. There is no tenderness.   Musculoskeletal: She exhibits no edema.   Neurological: She is alert and oriented to person, place, and  time.   Skin: Skin is warm and dry.   Psychiatric: She has a normal mood and affect. Her behavior is normal. Judgment and thought content normal.   Nursing note and vitals reviewed.          Significant Labs:   CBC:   Recent Labs  Lab 01/28/18  1709 01/30/18  0846   WBC 10.11 14.44*   HGB 12.8 12.3   HCT 39.0 38.5    212     CMP:   Recent Labs  Lab 01/28/18  1709 01/30/18  0846    137   K 4.5 4.4    97   CO2 28 32*   * 218*   BUN 21 24*   CREATININE 0.8 0.8   CALCIUM 9.3 9.0   PROT 7.4 7.4   ALBUMIN 3.6 3.2*   BILITOT 1.1* 0.9   ALKPHOS 55 63   AST 17 23   ALT 24 36   ANIONGAP 10 8   EGFRNONAA >60 >60     Troponin:   Recent Labs  Lab 01/28/18  1709 01/30/18  0846   TROPONINI 0.009 <0.006     TSH:   Recent Labs  Lab 11/28/17  1411   TSH 4.176*     EKG:  AFIB     Significant Imaging:  Chest, 1 view: The heart isn't enlarged.  The pulmonary vasculature with mild pulmonary edema.  No definite pleural effusions.  The skeletal structures are intact

## 2018-01-30 NOTE — ED NOTES
Bed: ED 06  Expected date:   Expected time:   Means of arrival:   Comments:  83 y.o/female; SOB; oxygen sat 77% on RA4L NC sat up 96%; SR 90 bpm

## 2018-01-30 NOTE — ASSESSMENT & PLAN NOTE
Supplemental O2 via nasal canula; titrate O2 saturation to >92%.  Serial Cardiac enzymes × 3.  Diuretic administration. Monitor electrolytes for hypokalemia and hypomagnesemia.  Cardiology Consultation.  2-D Echocardiogram for evaluation of left ventricle systolic function or any valvular heart disease.  Daily weights.  Strict I/Os.  Fluid restriction.  Na restriction <2g/d.      IV lasix 40 mg bid

## 2018-01-30 NOTE — PLAN OF CARE
01/30/18 1152   Discharge Assessment   Assessment Type Discharge Planning Assessment   Confirmed/corrected address and phone number on facesheet? Yes   Assessment information obtained from? Patient;Caregiver;Medical Record   Expected Length of Stay (days) 2   Communicated expected length of stay with patient/caregiver yes   Prior to hospitilization cognitive status: Alert/Oriented   Prior to hospitalization functional status: Assistive Equipment;Needs Assistance   Current cognitive status: Alert/Oriented   Current Functional Status: Assistive Equipment;Needs Assistance   Lives With other relative(s)  (The pt lives with her sisters.)   Able to Return to Prior Arrangements yes   Is patient able to care for self after discharge? Yes   Who are your caregiver(s) and their phone number(s)? Tarah AriasBlanc [phone]   Patient's perception of discharge disposition home or selfcare   Readmission Within The Last 30 Days no previous admission in last 30 days   Patient currently being followed by outpatient case management? No   Patient currently receives any other outside agency services? No   Equipment Currently Used at Home rollator;walker, rolling;oxygen   Do you have any problems affording any of your prescribed medications? No   Is the patient taking medications as prescribed? yes   Does the patient have transportation home? Yes   Transportation Available family or friend will provide   Does the patient receive services at the [facility]? No   Discharge Plan A Home with family;Home Health   Discharge Plan B Home with family   Patient/Family In Agreement With Plan yes     The pt is a 83 year old female who was admitted with shortness of breath. The pt lives with her sisters. The pt uses a rollator to ambulate. The pt uses O2 at home. The pt does not have hh. The pt is able to afford her medications. The pt drives. The pt has no other SW needs noted at this time. SW will continue to follow and offer support as  needed.    Carlos Ramesh LMSW

## 2018-01-30 NOTE — ED NOTES
Patient placed on continuous cardiac monitor, automatic blood pressure cuff and continuous pulse oximeter. Patient arrived with [facility]. Patient 4 L NC. 20 g IV left hand. Patient dry non productive dry cough. Lungs clear. Patient uses 2 l NC at night.

## 2018-01-30 NOTE — CONSULTS
[facility]  Cardiology  Consult Note    Patient Name: Stephany Skinner  MRN: [id number]  Admission Date: 1/30/2018  Hospital Length of Stay: 0 days  Code Status: Full Code   Attending Provider: Za Herrera MD   Consulting Provider: RACHEL Leyva  Primary Care Physician: Pipo Flowers MD  Principal Problem:Acute on chronic congestive heart failure    Patient information was obtained from patient and ER records.     Inpatient consult to Cardiology-[facility]  Consult performed by: IJEOMA SÁNCHEZ  Consult ordered by: ZA HERRERA        Subjective:     Chief Complaint:  SOB      HPI: 83 year old w/f presents to the ED for the second time in the past few days for worsening SOB over the last few days. She endorses worsening BARGER and orthopnea. She denies chest pain or palpitations. She has apparently not taken Lasix for the past 4 days.     Past Medical History:   Diagnosis Date    GERD (gastroesophageal reflux disease)     Hyperlipidemia     Hypertension        Past Surgical History:   Procedure Laterality Date    CHOLECYSTECTOMY      HERNIA REPAIR      HYSTERECTOMY      knee replacemene      deidra    THYROIDECTOMY         Review of patient's allergies indicates:   Allergen Reactions    Statins-hmg-coa reductase inhibitors      Other reaction(s): Unknown       No current facility-administered medications on file prior to encounter.      Current Outpatient Prescriptions on File Prior to Encounter   Medication Sig    amlodipine (NORVASC) 2.5 MG tablet Take 1 tablet (2.5 mg total) by mouth once daily.    atorvastatin (LIPITOR) 10 MG tablet Take 1 tablet (10 mg total) by mouth every evening.    atorvastatin (LIPITOR) 20 MG tablet     clopidogrel (PLAVIX) 75 mg tablet Take 1 tablet (75 mg total) by mouth once daily.    cyanocobalamin (VITAMIN B-12) 100 MCG tablet Take 100 mcg by mouth once daily.    furosemide (LASIX) 40 MG tablet Take 1 tablet (40 mg total) by mouth once  daily.    levothyroxine (SYNTHROID) 100 MCG tablet TAKE ONE TABLET BY MOUTH EVERY DAY    losartan (COZAAR) 100 MG tablet     meloxicam (MOBIC) 7.5 MG tablet TAKE ONE TABLET BY MOUTH EVERY OTHER DAY    metoprolol succinate (TOPROL-XL) 200 MG 24 hr tablet Take 1 tablet (200 mg total) by mouth once daily.    omega-3 acid ethyl esters (LOVAZA) 1 gram capsule Take 2 capsules (2 g total) by mouth 2 (two) times daily.    potassium chloride SA (K-DUR,KLOR-CON) 20 MEQ tablet Take 1 tablet (20 mEq total) by mouth once daily.    ranitidine (ZANTAC) 150 MG tablet Take 1 tablet (150 mg total) by mouth 2 (two) times daily.    VESICARE 10 mg tablet TAKE ONE TABLET BY MOUTH EVERY DAY    nitroGLYCERIN (NITROSTAT) 0.4 MG SL tablet Place 0.4 mg under the tongue every 5 (five) minutes as needed.     Family History     Problem Relation (Age of Onset)    Cancer Sister        Social History Main Topics    Smoking status: Never Smoker    Smokeless tobacco: Never Used    Alcohol use No    Drug use: No    Sexual activity: Not on file     ROS   Constitutional: Negative   Eyes: Negative    Respiratory: SOB   Cardiovascular: orthopnea   Gastrointestinal: Negative   Genitourinary: Negative   Musculoskeletal: Negative   Skin: Negative .   Neurological: Negative   Objective:     Vital Signs (Most Recent):  Temp: 97.2 °F (36.2 °C) (01/30/18 1121)  Pulse: 86 (01/30/18 1204)  Resp: (!) 22 (01/30/18 1149)  BP: (!) 155/71 (01/30/18 1121)  SpO2: 97 % (01/30/18 1149) Vital Signs (24h Range):  Temp:  [97.2 °F (36.2 °C)-97.9 °F (36.6 °C)] 97.2 °F (36.2 °C)  Pulse:  [] 86  Resp:  [20-31] 22  SpO2:  [76 %-98 %] 97 %  BP: (115-155)/(54-79) 155/71     Weight: 118.5 kg (261 lb 3.9 oz)  Body mass index is 40.92 kg/m².    SpO2: 97 %  O2 Device (Oxygen Therapy): nasal cannula    No intake or output data in the 24 hours ending 01/30/18 1315    Lines/Drains/Airways     Peripheral Intravenous Line                 Peripheral IV - Single Lumen  01/30/18 Left Hand less than 1 day                Physical Exam  General appearance: alert, appears stated age and cooperative  Head: Normocephalic, without obvious abnormality, atraumatic  Eyes: conjunctivae/corneas clear. PERRL  Neck: no carotid bruit, no JVD and supple, symmetrical, trachea midline  Lungs: fine crackles in bilateral bases, normal respiratory effort  Chest Wall: no tenderness  Heart: regular rate and rhythm, S1, S2 normal, no murmur, click, rub or gallop  Abdomen: soft, non-tender; bowel sounds normal; no masses,  no organomegaly  Extremities: Extremities normal, atraumatic, no cyanosis, clubbing, trace BLE edema   Pulses: Dorsalis Pedis R: 2+ (normal)/L: 2+ (normal)  Skin: Skin color, texture, turgor normal. No rashes or lesions  Neurologic: Normal mood and affect  Alert and oriented X 3  Significant Labs:   ABG:   Recent Labs  Lab 01/30/18  1054   PH 7.35   PCO2 59*   HCO3 32.60*   POCSATURATED 97.8   BE 5.40*   , Blood Culture: No results for input(s): LABBLOO in the last 48 hours., BMP:   Recent Labs  Lab 01/28/18  1709 01/30/18  0846   * 218*    137   K 4.5 4.4    97   CO2 28 32*   BUN 21 24*   CREATININE 0.8 0.8   CALCIUM 9.3 9.0   , CMP   Recent Labs  Lab 01/28/18  1709 01/30/18  0846    137   K 4.5 4.4    97   CO2 28 32*   * 218*   BUN 21 24*   CREATININE 0.8 0.8   CALCIUM 9.3 9.0   PROT 7.4 7.4   ALBUMIN 3.6 3.2*   BILITOT 1.1* 0.9   ALKPHOS 55 63   AST 17 23   ALT 24 36   ANIONGAP 10 8   ESTGFRAFRICA >60 >60   EGFRNONAA >60 >60   , CBC   Recent Labs  Lab 01/28/18  1709 01/30/18  0846   WBC 10.11 14.44*   HGB 12.8 12.3   HCT 39.0 38.5    212   , INR   Recent Labs  Lab 01/30/18  0846   INR 1.1   , Lipid Panel No results for input(s): CHOL, HDL, LDLCALC, TRIG, CHOLHDL in the last 48 hours.,   Pathology Results  (Last 10 years)    None       and Troponin   Recent Labs  Lab 01/28/18  1709 01/30/18  0846   TROPONINI 0.009 <0.006       Significant  Imaging: Cardiac Cath: , CT scan: CT ABDOMEN PELVIS WITH CONTRAST: No results found for this visit on 01/30/18. and CT ABDOMEN PELVIS WITHOUT CONTRAST: No results found for this visit on 01/30/18., Echocardiogram: 2D echo with color flow doppler: No results found for this or any previous visit., EKG: , Stress Test: and X-Ray:   Assessment and Plan:     Active Diagnoses:    Diagnosis Date Noted POA    PRINCIPAL PROBLEM:  Acute on chronic congestive heart failure [I50.9] 01/30/2018 Yes    Chronic atrial fibrillation [I48.2] 01/30/2018 Yes    HTN (hypertension) [I10] 08/08/2012 Yes    Hyperlipidemia [E78.5] 08/08/2012 Yes    Hypothyroidism [E03.9] 08/08/2012 Yes      Problems Resolved During this Admission:    Diagnosis Date Noted Date Resolved POA       VTE Risk Mitigation         Ordered     Medium Risk of VTE  Once      01/30/18 1113     Place sequential compression device  Until discontinued      01/30/18 1113        Current Facility-Administered Medications   Medication    acetaminophen tablet 650 mg    amLODIPine tablet 2.5 mg    atorvastatin tablet 10 mg    clopidogrel tablet 75 mg    furosemide injection 40 mg    levothyroxine tablet 100 mcg    losartan tablet 50 mg    metoprolol succinate (TOPROL-XL) 24 hr tablet 200 mg    ondansetron injection 4 mg    pantoprazole EC tablet 40 mg    promethazine (PHENERGAN) 12.5 mg in dextrose 5 % 50 mL IVPB   Acute diastolic CHF due to decreased med compliance/high BP  Also suspect some bronchitis with high WBC chills and cough  Worsening BARGER and orthopnea for the past 3 days- hasnt taken prescribed lasix x 4 days.   Chest X ray with pulmonary edema, elevated BNP, mild volume overload on exam.   Echo 6/2012 EF 60%, stage 1 diastolic dysfunction, no significant valvular pathology.   MPI 1/2013- mild apical reversible ischemia.   Carotid US- less than 40% bilateral 2017    PLAN:iv lasix  Increase losartan to 100 mgqd  Continue amlodipin, atorvastatin,  plavix,metoprolol  dvt prophylaxis  Watch for sepsis/Pneumonia      Bakari Strauss, KAUSHALP  Cardiology   [facility]    I attest that I have personally seen and examined this patient. I have reviewed and discussed the management in detail as outlined above.

## 2018-01-30 NOTE — HPI
Stephany Skinner is a 83 y.o. female  Who is a patient of Dr ambriz and Dr cardenas .  Today was her second visit to ER .  She lives with her sister who reported that she is becoming more short winded for last few days.  She missed her lasix 40 mg daily for last 4 days. + PND, + dyspnea at rest .  + leg swelling is worsening .reports no chest pains   C/o neck pains   Can not recall weight gain .    Placed in observation for CHF.  BNP high   CXR is  suggestive of pulmonary edema

## 2018-01-30 NOTE — PLAN OF CARE
01/30/18 0950   Medicare Message   Important Message from Medicare regarding Discharge Appeal Rights Given to patient/caregiver;Explained to patient/caregiver;Signed/date by patient/caregiver   Date IMM was signed 01/30/18   Time IMM was signed 0950
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
## 2018-01-30 NOTE — H&P
[facility] Hospital Medicine  History & Physical    Patient Name: Stephany Skinner  MRN: [id number]  Admission Date: 1/30/2018  Attending Physician: Za Sandra MD   Primary Care Provider: Pipo Flowers MD         Patient information was obtained from patient, caregiver / friend and ER records.     Subjective:     Principal Problem:Acute on chronic congestive heart failure    Chief Complaint:   Chief Complaint   Patient presents with    Shortness of Breath     Onset Saturday         HPI: Stephany Skinner is a 83 y.o. female  Who is a patient of Dr ambriz and Dr cardenas .  Today was her second visit to ER .  She lives with her sister who reported that she is becoming more short winded for last few days.  She missed her lasix 40 mg daily for last 4 days. + PND, + dyspnea at rest .  + leg swelling is worsening .reports no chest pains   C/o neck pains   Can not recall weight gain .    Placed in observation for CHF.  BNP high   CXR is  suggestive of pulmonary edema        Past Medical History:   Diagnosis Date    GERD (gastroesophageal reflux disease)     Hyperlipidemia     Hypertension        Past Surgical History:   Procedure Laterality Date    CHOLECYSTECTOMY      HERNIA REPAIR      HYSTERECTOMY      knee replacemene      deidra    THYROIDECTOMY         Review of patient's allergies indicates:   Allergen Reactions    Statins-hmg-coa reductase inhibitors      Other reaction(s): Unknown       No current facility-administered medications on file prior to encounter.      Current Outpatient Prescriptions on File Prior to Encounter   Medication Sig    amlodipine (NORVASC) 2.5 MG tablet Take 1 tablet (2.5 mg total) by mouth once daily.    atorvastatin (LIPITOR) 10 MG tablet Take 1 tablet (10 mg total) by mouth every evening.    atorvastatin (LIPITOR) 20 MG tablet     clopidogrel (PLAVIX) 75 mg tablet Take 1 tablet (75 mg total) by mouth once daily.    cyanocobalamin (VITAMIN B-12) 100 MCG  tablet Take 100 mcg by mouth once daily.    furosemide (LASIX) 40 MG tablet Take 1 tablet (40 mg total) by mouth once daily.    levothyroxine (SYNTHROID) 100 MCG tablet TAKE ONE TABLET BY MOUTH EVERY DAY    losartan (COZAAR) 100 MG tablet     meloxicam (MOBIC) 7.5 MG tablet TAKE ONE TABLET BY MOUTH EVERY OTHER DAY    metoprolol succinate (TOPROL-XL) 200 MG 24 hr tablet Take 1 tablet (200 mg total) by mouth once daily.    omega-3 acid ethyl esters (LOVAZA) 1 gram capsule Take 2 capsules (2 g total) by mouth 2 (two) times daily.    potassium chloride SA (K-DUR,KLOR-CON) 20 MEQ tablet Take 1 tablet (20 mEq total) by mouth once daily.    ranitidine (ZANTAC) 150 MG tablet Take 1 tablet (150 mg total) by mouth 2 (two) times daily.    VESICARE 10 mg tablet TAKE ONE TABLET BY MOUTH EVERY DAY    nitroGLYCERIN (NITROSTAT) 0.4 MG SL tablet Place 0.4 mg under the tongue every 5 (five) minutes as needed.     Family History     Problem Relation (Age of Onset)    Cancer Sister        Social History Main Topics    Smoking status: Never Smoker    Smokeless tobacco: Never Used    Alcohol use No    Drug use: No    Sexual activity: Not on file     Review of Systems   Constitutional: Negative for chills and fever.   HENT: Negative for congestion, hearing loss, sinus pressure and sore throat.    Eyes: Negative for photophobia.   Respiratory: Positive for shortness of breath. Negative for cough, choking, chest tightness and wheezing.    Cardiovascular: Negative for chest pain and palpitations.   Gastrointestinal: Negative for blood in stool, nausea and vomiting.   Genitourinary: Negative for dysuria and hematuria.   Musculoskeletal: Positive for neck pain. Negative for arthralgias and myalgias.   Skin: Positive for wound. Negative for pallor.        Toe ulcer    Neurological: Negative for dizziness and numbness.   Hematological: Does not bruise/bleed easily.   Psychiatric/Behavioral: Negative for confusion and suicidal  ideas. The patient is not nervous/anxious.      Objective:     Vital Signs (Most Recent):  Temp: 97.9 °F (36.6 °C) (01/30/18 1024)  Pulse: 69 (01/30/18 1149)  Resp: (!) 22 (01/30/18 1149)  BP: (!) 155/71 (01/30/18 1121)  SpO2: 97 % (01/30/18 1149) Vital Signs (24h Range):  Temp:  [97.3 °F (36.3 °C)-97.9 °F (36.6 °C)] 97.9 °F (36.6 °C)  Pulse:  [] 69  Resp:  [20-31] 22  SpO2:  [76 %-98 %] 97 %  BP: (115-155)/(54-79) 155/71     Weight: 118.5 kg (261 lb 3.9 oz)  Body mass index is 40.92 kg/m².    Physical Exam   Constitutional: She is oriented to person, place, and time. She appears well-developed and well-nourished.   HENT:   Head: Normocephalic and atraumatic.   Cardiovascular: Normal rate and normal heart sounds.  An irregularly irregular rhythm present.   Pulmonary/Chest: Effort normal and breath sounds normal.   Abdominal: Soft. Bowel sounds are normal. There is no tenderness.   Musculoskeletal: She exhibits no edema.   Neurological: She is alert and oriented to person, place, and time.   Skin: Skin is warm and dry.   Psychiatric: She has a normal mood and affect. Her behavior is normal. Judgment and thought content normal.   Nursing note and vitals reviewed.          Significant Labs:   CBC:   Recent Labs  Lab 01/28/18  1709 01/30/18  0846   WBC 10.11 14.44*   HGB 12.8 12.3   HCT 39.0 38.5    212     CMP:   Recent Labs  Lab 01/28/18  1709 01/30/18  0846    137   K 4.5 4.4    97   CO2 28 32*   * 218*   BUN 21 24*   CREATININE 0.8 0.8   CALCIUM 9.3 9.0   PROT 7.4 7.4   ALBUMIN 3.6 3.2*   BILITOT 1.1* 0.9   ALKPHOS 55 63   AST 17 23   ALT 24 36   ANIONGAP 10 8   EGFRNONAA >60 >60     Troponin:   Recent Labs  Lab 01/28/18  1709 01/30/18  0846   TROPONINI 0.009 <0.006     TSH:   Recent Labs  Lab 11/28/17  1411   TSH 4.176*     EKG:  AFIB     Significant Imaging:  Chest, 1 view: The heart isn't enlarged.  The pulmonary vasculature with mild pulmonary edema.  No definite pleural  effusions.  The skeletal structures are intact    Assessment/Plan:     * Acute on chronic congestive heart failure    Supplemental O2 via nasal canula; titrate O2 saturation to >92%.  Serial Cardiac enzymes × 3.  Diuretic administration. Monitor electrolytes for hypokalemia and hypomagnesemia.  Cardiology Consultation.  2-D Echocardiogram for evaluation of left ventricle systolic function or any valvular heart disease.  Daily weights.  Strict I/Os.  Fluid restriction.  Na restriction <2g/d.      IV lasix 40 mg bid          Chronic atrial fibrillation    Continue metoprolol  She tells me she sees Dr cardenas   Continue plavix           HTN (hypertension)      Resume losartan, metoprolol and amlodipine          Hyperlipidemia      Resume lipitor          Hypothyroidism    TSH almost in range   Continue present dose             JOY (obstructive sleep apnea)                VTE Risk Mitigation         Ordered     Medium Risk of VTE  Once      01/30/18 1113     Place sequential compression device  Until discontinued      01/30/18 1113             Za Sandra MD  Department of Hospital Medicine   [facility]

## 2018-01-30 NOTE — ED NOTES
AAOX3. Patient on 4 L NC. Respiration and oxygen saturation abnormal. Remain VSS. Afebrile. Patient transported by stretcher. Lungs clear. Nonproductive cough. Allergy and fall risk band on. Yellow socks. Patient belonging with patient. Safety maintain.

## 2018-01-31 PROBLEM — J96.01 ACUTE RESPIRATORY FAILURE WITH HYPOXIA AND HYPERCARBIA: Status: ACTIVE | Noted: 2018-01-31

## 2018-01-31 PROBLEM — J96.02 ACUTE RESPIRATORY FAILURE WITH HYPOXIA AND HYPERCARBIA: Status: ACTIVE | Noted: 2018-01-31

## 2018-01-31 LAB
ALBUMIN SERPL BCP-MCNC: 3.1 G/DL
ALLENS TEST: ABNORMAL
ALP SERPL-CCNC: 62 U/L
ALT SERPL W/O P-5'-P-CCNC: 32 U/L
ANION GAP SERPL CALC-SCNC: 10 MMOL/L
AST SERPL-CCNC: 17 U/L
BASOPHILS # BLD AUTO: 0.01 K/UL
BASOPHILS NFR BLD: 0.1 %
BILIRUB SERPL-MCNC: 1 MG/DL
BUN SERPL-MCNC: 27 MG/DL
CALCIUM SERPL-MCNC: 9.2 MG/DL
CHLORIDE SERPL-SCNC: 96 MMOL/L
CO2 SERPL-SCNC: 32 MMOL/L
CREAT SERPL-MCNC: 0.8 MG/DL
DELSYS: ABNORMAL
DIFFERENTIAL METHOD: ABNORMAL
EOSINOPHIL # BLD AUTO: 0 K/UL
EOSINOPHIL NFR BLD: 0 %
ERYTHROCYTE [DISTWIDTH] IN BLOOD BY AUTOMATED COUNT: 14.1 %
EST. GFR  (AFRICAN AMERICAN): >60 ML/MIN/1.73 M^2
EST. GFR  (NON AFRICAN AMERICAN): >60 ML/MIN/1.73 M^2
ESTIMATED PA SYSTOLIC PRESSURE: 31.81
GLUCOSE SERPL-MCNC: 162 MG/DL
HCO3 UR-SCNC: 39.7 MMOL/L (ref 22–26)
HCT VFR BLD AUTO: 37.1 %
HGB BLD-MCNC: 11.6 G/DL
LYMPHOCYTES # BLD AUTO: 0.6 K/UL
LYMPHOCYTES NFR BLD: 4.2 %
MCH RBC QN AUTO: 31.2 PG
MCHC RBC AUTO-ENTMCNC: 31.3 G/DL
MCV RBC AUTO: 100 FL
MONOCYTES # BLD AUTO: 1 K/UL
MONOCYTES NFR BLD: 8 %
NEUTROPHILS # BLD AUTO: 11.4 K/UL
NEUTROPHILS NFR BLD: 87.7 %
PCO2 BLDA: 72 MMHG (ref 35–45)
PH SMN: 7.35 [PH] (ref 7.35–7.45)
PLATELET # BLD AUTO: 243 K/UL
PMV BLD AUTO: 8.8 FL
PO2 BLDA: 63 MMHG (ref 75–100)
POC BE: 11.4 MMOL/L (ref -2–2)
POC COHB: 2.3 % (ref 0–3)
POC METHB: 1.1 % (ref 0–1.5)
POC O2HB ARTERIAL: 92.9 % (ref 94–100)
POC SATURATED O2: 96.2 % (ref 90–100)
POC TCO2: 41.9 MMOL/L
POC THB: 12 G/DL (ref 12–18)
POTASSIUM SERPL-SCNC: 4.5 MMOL/L
PROT SERPL-MCNC: 7.4 G/DL
RBC # BLD AUTO: 3.72 M/UL
RETIRED EF AND QEF - SEE NOTES: 55 (ref 55–65)
SITE: ABNORMAL
SODIUM SERPL-SCNC: 138 MMOL/L
TRICUSPID VALVE REGURGITATION: NORMAL
TROPONIN I SERPL DL<=0.01 NG/ML-MCNC: 0.02 NG/ML
TROPONIN I SERPL DL<=0.01 NG/ML-MCNC: 0.02 NG/ML
WBC # BLD AUTO: 12.96 K/UL

## 2018-01-31 PROCEDURE — 80053 COMPREHEN METABOLIC PANEL: CPT

## 2018-01-31 PROCEDURE — 25500020 PHARM REV CODE 255: Performed by: INTERNAL MEDICINE

## 2018-01-31 PROCEDURE — 82803 BLOOD GASES ANY COMBINATION: CPT | Performed by: FAMILY MEDICINE

## 2018-01-31 PROCEDURE — 93306 TTE W/DOPPLER COMPLETE: CPT

## 2018-01-31 PROCEDURE — 99900035 HC TECH TIME PER 15 MIN (STAT)

## 2018-01-31 PROCEDURE — 25000242 PHARM REV CODE 250 ALT 637 W/ HCPCS: Performed by: INTERNAL MEDICINE

## 2018-01-31 PROCEDURE — 84484 ASSAY OF TROPONIN QUANT: CPT

## 2018-01-31 PROCEDURE — 94660 CPAP INITIATION&MGMT: CPT

## 2018-01-31 PROCEDURE — 36415 COLL VENOUS BLD VENIPUNCTURE: CPT

## 2018-01-31 PROCEDURE — 11000001 HC ACUTE MED/SURG PRIVATE ROOM

## 2018-01-31 PROCEDURE — 25000003 PHARM REV CODE 250: Performed by: INTERNAL MEDICINE

## 2018-01-31 PROCEDURE — 25000242 PHARM REV CODE 250 ALT 637 W/ HCPCS: Performed by: NURSE PRACTITIONER

## 2018-01-31 PROCEDURE — 36600 WITHDRAWAL OF ARTERIAL BLOOD: CPT

## 2018-01-31 PROCEDURE — 93010 ELECTROCARDIOGRAM REPORT: CPT | Mod: ,,, | Performed by: INTERNAL MEDICINE

## 2018-01-31 PROCEDURE — 27000221 HC OXYGEN, UP TO 24 HOURS

## 2018-01-31 PROCEDURE — 25000003 PHARM REV CODE 250: Performed by: NURSE PRACTITIONER

## 2018-01-31 PROCEDURE — 63600175 PHARM REV CODE 636 W HCPCS: Performed by: INTERNAL MEDICINE

## 2018-01-31 PROCEDURE — 63600175 PHARM REV CODE 636 W HCPCS: Performed by: NURSE PRACTITIONER

## 2018-01-31 PROCEDURE — 99233 SBSQ HOSP IP/OBS HIGH 50: CPT | Mod: ,,, | Performed by: FAMILY MEDICINE

## 2018-01-31 PROCEDURE — 25000242 PHARM REV CODE 250 ALT 637 W/ HCPCS

## 2018-01-31 PROCEDURE — 93005 ELECTROCARDIOGRAM TRACING: CPT

## 2018-01-31 PROCEDURE — 85025 COMPLETE CBC W/AUTO DIFF WBC: CPT

## 2018-01-31 PROCEDURE — 94640 AIRWAY INHALATION TREATMENT: CPT

## 2018-01-31 PROCEDURE — 27000190 HC CPAP FULL FACE MASK W/VALVE

## 2018-01-31 PROCEDURE — 25000003 PHARM REV CODE 250: Performed by: SURGERY

## 2018-01-31 PROCEDURE — 94761 N-INVAS EAR/PLS OXIMETRY MLT: CPT

## 2018-01-31 RX ORDER — LEVALBUTEROL 1.25 MG/.5ML
1.25 SOLUTION, CONCENTRATE RESPIRATORY (INHALATION) EVERY 6 HOURS
Status: DISCONTINUED | OUTPATIENT
Start: 2018-01-31 | End: 2018-02-07 | Stop reason: HOSPADM

## 2018-01-31 RX ORDER — ENOXAPARIN SODIUM 150 MG/ML
1 INJECTION SUBCUTANEOUS
Status: DISCONTINUED | OUTPATIENT
Start: 2018-01-31 | End: 2018-02-05 | Stop reason: ALTCHOICE

## 2018-01-31 RX ORDER — IPRATROPIUM BROMIDE 0.5 MG/2.5ML
0.5 SOLUTION RESPIRATORY (INHALATION) EVERY 6 HOURS
Status: DISCONTINUED | OUTPATIENT
Start: 2018-01-31 | End: 2018-01-31

## 2018-01-31 RX ORDER — LEVALBUTEROL 1.25 MG/.5ML
SOLUTION, CONCENTRATE RESPIRATORY (INHALATION)
Status: COMPLETED
Start: 2018-01-31 | End: 2018-01-31

## 2018-01-31 RX ORDER — IPRATROPIUM BROMIDE 0.5 MG/2.5ML
0.5 SOLUTION RESPIRATORY (INHALATION) EVERY 6 HOURS
Status: DISCONTINUED | OUTPATIENT
Start: 2018-01-31 | End: 2018-02-07 | Stop reason: HOSPADM

## 2018-01-31 RX ORDER — LEVALBUTEROL 1.25 MG/.5ML
1.25 SOLUTION, CONCENTRATE RESPIRATORY (INHALATION) EVERY 6 HOURS
Status: DISCONTINUED | OUTPATIENT
Start: 2018-01-31 | End: 2018-01-31

## 2018-01-31 RX ADMIN — FUROSEMIDE 40 MG: 10 INJECTION, SOLUTION INTRAMUSCULAR; INTRAVENOUS at 05:01

## 2018-01-31 RX ADMIN — IPRATROPIUM BROMIDE 0.5 MG: 0.5 SOLUTION RESPIRATORY (INHALATION) at 06:01

## 2018-01-31 RX ADMIN — ENOXAPARIN SODIUM 120 MG: 150 INJECTION SUBCUTANEOUS at 04:01

## 2018-01-31 RX ADMIN — IOHEXOL 100 ML: 350 INJECTION, SOLUTION INTRAVENOUS at 02:01

## 2018-01-31 RX ADMIN — LEVALBUTEROL HYDROCHLORIDE: 1.25 SOLUTION, CONCENTRATE RESPIRATORY (INHALATION) at 09:01

## 2018-01-31 RX ADMIN — AMLODIPINE BESYLATE 2.5 MG: 2.5 TABLET ORAL at 09:01

## 2018-01-31 RX ADMIN — LOSARTAN POTASSIUM 100 MG: 50 TABLET, FILM COATED ORAL at 09:01

## 2018-01-31 RX ADMIN — CLOPIDOGREL BISULFATE 75 MG: 75 TABLET ORAL at 09:01

## 2018-01-31 RX ADMIN — PANTOPRAZOLE SODIUM 40 MG: 40 TABLET, DELAYED RELEASE ORAL at 09:01

## 2018-01-31 RX ADMIN — LEVOTHYROXINE SODIUM 100 MCG: 100 TABLET ORAL at 09:01

## 2018-01-31 RX ADMIN — LEVALBUTEROL 1.25 MG: 1.25 SOLUTION, CONCENTRATE RESPIRATORY (INHALATION) at 06:01

## 2018-01-31 RX ADMIN — FUROSEMIDE 40 MG: 10 INJECTION, SOLUTION INTRAMUSCULAR; INTRAVENOUS at 09:01

## 2018-01-31 RX ADMIN — LEVALBUTEROL 1.25 MG: 1.25 SOLUTION, CONCENTRATE RESPIRATORY (INHALATION) at 12:01

## 2018-01-31 RX ADMIN — METOPROLOL SUCCINATE 200 MG: 50 TABLET, EXTENDED RELEASE ORAL at 09:01

## 2018-01-31 NOTE — CARE UPDATE
Pt admitted with acute/chronic CHF. [facility] consulted. afib on telemetry.  On IV lasix here. Faint crackles to right lung base. Shortness of breath and dyspnea on exertion. BLE pitting edema. Diuresing well. Up with assist to BSC. Vitals stable. Call bell within reach. Reviewed plan of care with pt and family; states agreement.

## 2018-01-31 NOTE — NURSING
Bedside report done. Patient resting in bed. NAD. Denies pain. Bed locked and in lowest position. Plan of care reviewed with pt., voiced understanding.  Call bell in reach.

## 2018-01-31 NOTE — ASSESSMENT & PLAN NOTE
Starting Bipap 12/5 and continue supplemental oxygen   Add xopenex q 6 hours   Consult Dr. Watson

## 2018-01-31 NOTE — PROGRESS NOTES
[facility] Medicine  Progress Note    Patient Name: Stephany Skinner  MRN: [id number]  Patient Class: IP- Inpatient   Admission Date: 1/30/2018  Length of Stay: 1 days  Attending Physician: Za Sandra MD  Primary Care Provider: Pipo Flowers MD        Subjective:     Principal Problem:Acute on chronic congestive heart failure    HPI:  Stephany Skinner is a 83 y.o. female  Who is a patient of Dr ambriz and Dr cardenas .  Today was her second visit to ER .  She lives with her sister who reported that she is becoming more short winded for last few days.  She missed her lasix 40 mg daily for last 4 days. + PND, + dyspnea at rest .  + leg swelling is worsening .reports no chest pains   C/o neck pains   Can not recall weight gain .    Placed in observation for CHF.  BNP high   CXR is  suggestive of pulmonary edema        Hospital Course:  1/31/18  84 YO WF admitted for tx of CHF  1-30-18; day 3; She reported missing  her lasix 40 mg daily for last 4 days prior to admit; Now receiving IV lasix twice daily. Continues to have some LE edema and BARGER.   This am on rounds pt developed worsening SOB, now requiring Bipap. Noted D-dimer elevated;  Lab Results   Component Value Date    DDIMER 1.05 (H) 01/30/2018   Pt with known chronic afib- on plavix only per cards. CT of chest ordered to rule out PE ordered.     Echo pending. (last Echo ?2012).   Also noted to have ^WBCs on admit; trending down 12.96 from 14.44 on admission; Dr. Cardenas suggesting possible bronchitis. U/A- ok.  I/O yesterday-  -640, wt 261. Today-pending    Past Medical History:   Diagnosis Date    GERD (gastroesophageal reflux disease)     Hyperlipidemia     Hypertension        Past Surgical History:   Procedure Laterality Date    CHOLECYSTECTOMY      HERNIA REPAIR      HYSTERECTOMY      knee replacemene      deidra    THYROIDECTOMY         Review of patient's allergies indicates:   Allergen Reactions    Statins-hmg-coa  reductase inhibitors      Other reaction(s): Unknown       No current facility-administered medications on file prior to encounter.      Current Outpatient Prescriptions on File Prior to Encounter   Medication Sig    amlodipine (NORVASC) 2.5 MG tablet Take 1 tablet (2.5 mg total) by mouth once daily.    atorvastatin (LIPITOR) 10 MG tablet Take 1 tablet (10 mg total) by mouth every evening.    atorvastatin (LIPITOR) 20 MG tablet     clopidogrel (PLAVIX) 75 mg tablet Take 1 tablet (75 mg total) by mouth once daily.    cyanocobalamin (VITAMIN B-12) 100 MCG tablet Take 100 mcg by mouth once daily.    furosemide (LASIX) 40 MG tablet Take 1 tablet (40 mg total) by mouth once daily.    levothyroxine (SYNTHROID) 100 MCG tablet TAKE ONE TABLET BY MOUTH EVERY DAY    losartan (COZAAR) 100 MG tablet     meloxicam (MOBIC) 7.5 MG tablet TAKE ONE TABLET BY MOUTH EVERY OTHER DAY    metoprolol succinate (TOPROL-XL) 200 MG 24 hr tablet Take 1 tablet (200 mg total) by mouth once daily.    omega-3 acid ethyl esters (LOVAZA) 1 gram capsule Take 2 capsules (2 g total) by mouth 2 (two) times daily.    potassium chloride SA (K-DUR,KLOR-CON) 20 MEQ tablet Take 1 tablet (20 mEq total) by mouth once daily.    ranitidine (ZANTAC) 150 MG tablet Take 1 tablet (150 mg total) by mouth 2 (two) times daily.    VESICARE 10 mg tablet TAKE ONE TABLET BY MOUTH EVERY DAY    nitroGLYCERIN (NITROSTAT) 0.4 MG SL tablet Place 0.4 mg under the tongue every 5 (five) minutes as needed.     Family History     Problem Relation (Age of Onset)    Cancer Sister        Social History Main Topics    Smoking status: Never Smoker    Smokeless tobacco: Never Used    Alcohol use No    Drug use: No    Sexual activity: Not on file     Review of Systems   Unable to perform ROS: Other (pt too SOB to speak )     Objective:     Vital Signs (Most Recent):  Temp: 98.3 °F (36.8 °C) (01/31/18 0319)  Pulse: 93 (01/31/18 0600)  Resp: (!) 22 (01/31/18 0319)  BP:  (!) 162/68 (recheck) (01/31/18 0325)  SpO2: (!) 93 % (recheck after repositioning) (01/31/18 0325) Vital Signs (24h Range):  Temp:  [97.2 °F (36.2 °C)-99.6 °F (37.6 °C)] 98.3 °F (36.8 °C)  Pulse:  [] 93  Resp:  [18-31] 22  SpO2:  [76 %-98 %] 93 %  BP: (115-182)/(54-85) 162/68     Weight: 118.5 kg (261 lb 3.9 oz)  Body mass index is 40.92 kg/m².    Physical Exam   Constitutional: She is oriented to person, place, and time. She appears well-developed and well-nourished. She appears distressed.   She is in resp distress    HENT:   Head: Normocephalic and atraumatic.   Eyes: Pupils are equal, round, and reactive to light.   Neck: Normal range of motion. Neck supple.   Cardiovascular: Normal rate and normal heart sounds.  An irregularly irregular rhythm present.   Pulmonary/Chest: Effort normal and breath sounds normal.   Very poor tidal volume  End exp wheeze    Abdominal: Soft. Bowel sounds are normal. There is no tenderness.   Musculoskeletal: She exhibits no edema.   Neurological: She is alert and oriented to person, place, and time.   Skin: Skin is warm and dry.   Psychiatric: She has a normal mood and affect. Her behavior is normal. Judgment and thought content normal.   Nursing note and vitals reviewed.        CRANIAL NERVES     CN III, IV, VI   Pupils are equal, round, and reactive to light.       Significant Labs:   CBC:   Lab Results   Component Value Date    WBC 12.96 (H) 01/31/2018    HGB 11.6 (L) 01/31/2018    HCT 37.1 01/31/2018     (H) 01/31/2018     01/31/2018           urinalysis- negative WBC  Rapid flu A/B negative  CMP:     Recent Labs  Lab 01/30/18  0846 01/31/18  0626    138   K 4.4 4.5   CL 97 96   CO2 32* 32*   * 162*   BUN 24* 27*   CREATININE 0.8 0.8   CALCIUM 9.0 9.2   PROT 7.4 7.4   ALBUMIN 3.2* 3.1*   BILITOT 0.9 1.0   ALKPHOS 63 62   AST 23 17   ALT 36 32   ANIONGAP 8 10   EGFRNONAA >60 >60     Troponin:     Recent Labs  Lab 01/30/18  1449 01/30/18  1802  01/31/18  0626   TROPONINI <0.006 0.010 0.017     BNP    Recent Labs  Lab 01/30/18  0846   *       TSH:     Recent Labs  Lab 11/28/17  1411   TSH 4.176*     Lab Results   Component Value Date    DDIMER 1.05 (H) 01/30/2018       EKG:  AFIB     Significant Imaging:  Chest, 1 view: The heart isn't enlarged.  The pulmonary vasculature with mild pulmonary edema.  No definite pleural effusions.  The skeletal structures are intact    Assessment/Plan:      * Acute on chronic congestive heart failure    Supplemental O2 via nasal canula; titrate O2 saturation to >92%.  Serial Cardiac enzymes × 3.  Diuretic administration. Monitor electrolytes for hypokalemia and hypomagnesemia.  Cardiology Consultation- Dr. Patel following   2-D Echocardiogram for evaluation of left ventricle systolic function or any valvular heart disease- ordered today  Daily weights.  Strict I/Os.  Fluid restriction.  Na restriction <2g/d.      IV lasix 40 mg bid          Acute respiratory failure with hypoxia and hypercarbia    Starting Bipap 12/5 and continue supplemental oxygen   Add xopenex q 6 hours   Consult Dr. Watson           Chronic atrial fibrillation    Continue metoprolol, plavix   Dr patel following discussed Afib; he reports fairly new dx, plavix was for previous + stress test; pt deferred angiography and had poor Asa tolerance           HTN (hypertension)      Continue losartan, metoprolol and amlodipine  Last several BP readings elevated; 162/68, 182/85, 161/71; d/w Dr. Patel; optimize meds as needed           Hyperlipidemia    Continue atorvastatin          Hypothyroidism    TSH almost in range   Continue present dose             JOY (obstructive sleep apnea)                VTE Risk Mitigation         Ordered     Medium Risk of VTE  Once      01/30/18 1113     Place sequential compression device  Until discontinued      01/30/18 1113              Morris Jain MD  Department of Hospital Medicine   [facility]

## 2018-01-31 NOTE — PROGRESS NOTES
[facility]  Cardiology  Progress Note    Patient Name: Stephany Skinner  MRN: [id number]  Admission Date: 1/30/2018  Hospital Length of Stay: 1 days  Code Status: Full Code   Attending Physician: Za Sandra MD   Primary Care Physician: Pipo Flowers MD  Expected Discharge Date:   Principal Problem:Acute on chronic congestive heart failure    Subjective:     Hospital Course: admitted with acute on chronic diastolic CHF, I&O negative 640ml    Interval History: 83 year old w/f presents to the ED for the second time in the past few days for worsening SOB over the last few days. She endorses worsening BARGER and orthopnea. She denies chest pain or palpitations. She has apparently not taken Lasix for the past 4 days.     ROS   Constitutional: Negative   Eyes: Negative    Respiratory: SOB   Cardiovascular: orthopnea   Gastrointestinal: Negative   Genitourinary: Negative   Musculoskeletal: Negative   Skin: Negative .   Neurological: Negative   Objective:     Vital Signs (Most Recent):  Temp: 98.3 °F (36.8 °C) (01/31/18 0319)  Pulse: 93 (01/31/18 0600)  Resp: (!) 22 (01/31/18 0319)  BP: (!) 162/68 (recheck) (01/31/18 0325)  SpO2: (!) 93 % (recheck after repositioning) (01/31/18 0325) Vital Signs (24h Range):  Temp:  [97.2 °F (36.2 °C)-99.6 °F (37.6 °C)] 98.3 °F (36.8 °C)  Pulse:  [] 93  Resp:  [18-31] 22  SpO2:  [76 %-98 %] 93 %  BP: (115-182)/(54-85) 162/68     Weight: 118.5 kg (261 lb 3.9 oz)  Body mass index is 40.92 kg/m².    SpO2: (!) 93 % (recheck after repositioning)  O2 Device (Oxygen Therapy): nasal cannula      Intake/Output Summary (Last 24 hours) at 01/31/18 0832  Last data filed at 01/31/18 0300   Gross per 24 hour   Intake              360 ml   Output             1000 ml   Net             -640 ml       Lines/Drains/Airways     Peripheral Intravenous Line                 Peripheral IV - Single Lumen 01/30/18 Left Hand 1 day                Physical Exam  General appearance: alert,  appears stated age and cooperative  Head: Normocephalic, without obvious abnormality, atraumatic  Eyes: conjunctivae/corneas clear. PERRL  Neck: no carotid bruit, no JVD and supple, symmetrical, trachea midline  Lungs: fine crackles in bilateral bases, normal respiratory effort  Chest Wall: no tenderness  Heart: regular rate and rhythm, S1, S2 normal, no murmur, click, rub or gallop  Abdomen: soft, non-tender; bowel sounds normal; no masses,  no organomegaly  Extremities: Extremities normal, atraumatic, no cyanosis, clubbing, trace BLE edema   Pulses: Dorsalis Pedis R: 2+ (normal)/L: 2+ (normal)  Skin: Skin color, texture, turgor normal. No rashes or lesions  Neurologic: Normal mood and affect  Alert and oriented X 3  Significant Labs:   Blood Culture: No results for input(s): LABBLOO in the last 48 hours., BMP:   Recent Labs  Lab 01/30/18  0846 01/31/18  0626   * 162*    138   K 4.4 4.5   CL 97 96   CO2 32* 32*   BUN 24* 27*   CREATININE 0.8 0.8   CALCIUM 9.0 9.2   , CMP   Recent Labs  Lab 01/30/18  0846 01/31/18  0626    138   K 4.4 4.5   CL 97 96   CO2 32* 32*   * 162*   BUN 24* 27*   CREATININE 0.8 0.8   CALCIUM 9.0 9.2   PROT 7.4 7.4   ALBUMIN 3.2* 3.1*   BILITOT 0.9 1.0   ALKPHOS 63 62   AST 23 17   ALT 36 32   ANIONGAP 8 10   ESTGFRAFRICA >60 >60   EGFRNONAA >60 >60   , CBC   Recent Labs  Lab 01/30/18  0846 01/31/18  0626   WBC 14.44* 12.96*   HGB 12.3 11.6*   HCT 38.5 37.1    243   , INR   Recent Labs  Lab 01/30/18  0846   INR 1.1   , Lipid Panel No results for input(s): CHOL, HDL, LDLCALC, TRIG, CHOLHDL in the last 48 hours.,   Pathology Results  (Last 10 years)    None       and Troponin   Recent Labs  Lab 01/30/18  1449 01/30/18  1802 01/31/18  0626   TROPONINI <0.006 0.010 0.017       Significant Imaging: CT scan: CT ABDOMEN PELVIS WITH CONTRAST: No results found for this visit on 01/30/18. and CT ABDOMEN PELVIS WITHOUT CONTRAST: No results found for this visit on  01/30/18., Echocardiogram: 2D echo with color flow doppler: No results found for this or any previous visit., EKG:  and X-Ray: CXR: X-Ray Chest 1 View (CXR):   Results for orders placed or performed during the hospital encounter of 01/30/18   X-Ray Chest 1 View    Narrative    Chest, 1 view: The heart isn't enlarged.  The pulmonary vasculature with mild pulmonary edema.  No definite pleural effusions.  The skeletal structures are intact.    Impression     Suspected cardiac decompensation.      Electronically signed by: Lacey Donovan MD  Date:     01/30/18  Time:    08:58      Assessment and Plan:         Active Diagnoses:    Diagnosis Date Noted POA    PRINCIPAL PROBLEM:  Acute on chronic congestive heart failure [I50.9] 01/30/2018 Yes    Chronic atrial fibrillation [I48.2] 01/30/2018 Yes    HTN (hypertension) [I10] 08/08/2012 Yes    Hyperlipidemia [E78.5] 08/08/2012 Yes    Hypothyroidism [E03.9] 08/08/2012 Yes      Problems Resolved During this Admission:    Diagnosis Date Noted Date Resolved POA       VTE Risk Mitigation         Ordered     Medium Risk of VTE  Once      01/30/18 1113     Place sequential compression device  Until discontinued      01/30/18 1113        Current Facility-Administered Medications   Medication    acetaminophen tablet 650 mg    amLODIPine tablet 2.5 mg    atorvastatin tablet 10 mg    clopidogrel tablet 75 mg    furosemide injection 40 mg    levothyroxine tablet 100 mcg    losartan tablet 100 mg    metoprolol succinate (TOPROL-XL) 24 hr tablet 200 mg    ondansetron injection 4 mg    pantoprazole EC tablet 40 mg    promethazine (PHENERGAN) 12.5 mg in dextrose 5 % 50 mL IVPB   Acute diastolic CHF due to decreased med compliance/high BP; improving  Also suspect some bronchitis with high WBC chills and cough  Worsening BARGER and orthopnea for the past 3 days- not taken prescribed lasix x 4 days.   Chest X ray with pulmonary edema, elevated BNP, mild volume overload on exam.    Echo 6/2012 EF 60%, stage 1 diastolic dysfunction, no significant valvular pathology.   MPI 1/2013- small reversible apical ischemia; was started on plavix then, pt had declined [facility]   Carotid US- less than 40% bilateral 2017    I&O negative 640ml      Plan  Questionable A fib. Will repeat EKG.   On Plavix Hx of GI bleeding with ASA. Hx of abnormal perfusion study in 2013. Patient deferred [facility] cath at that time and was started on Plavix.   Echocardiogram pending.   continue iv lasix  Increase losartan to 100 mgqd  Continue amlodipin, atorvastatin, plavix,metoprolol  dvt prophylaxis  Watch for sepsis/Pneumonia  May need to switch plavix to eliquis if AFIB confirmed      Bakari Strauss, RACHEL  Cardiology  [facility]  I attest that I have personally seen and examined this patient. I have reviewed and discussed the management in detail as outlined above.

## 2018-01-31 NOTE — ASSESSMENT & PLAN NOTE
Continue metoprolol, plavix   Dr cardenas following discussed Afib; he reports fairly new dx, plavix was for previous + stress test; pt deferred angiography and had poor Asa tolerance

## 2018-01-31 NOTE — PLAN OF CARE
Problem: Fall Risk (Adult)  Goal: Absence of Falls  Patient will demonstrate the desired outcomes by discharge/transition of care.   Outcome: Ongoing (interventions implemented as appropriate)  Pt free of falls/incidents. Fall precautions maintained. Bed alarm engaged.    Problem: Patient Care Overview  Goal: Plan of Care Review  Outcome: Ongoing (interventions implemented as appropriate)  Continuous bipap maintained. Pt family aware of plan of care. No questions or concerns at this time.

## 2018-01-31 NOTE — PLAN OF CARE
"Problem: Patient Care Overview  Goal: Plan of Care Review  Outcome: Ongoing (interventions implemented as appropriate)  Pt admitted with acute/chronic CHF. [facility] consulted. afib on telemetry. Pt states "didnt take lasix at home last 4 days". On IV lasix here. Faint crackles to right lung base. Shortness of breath and dyspnea on exertion. BLE pitting edema. Diuresing well. Up with assist to BSC. Vitals stable. Call bell within reach. Reviewed plan of care with pt and family; states agreement.      "

## 2018-01-31 NOTE — ASSESSMENT & PLAN NOTE
Continue losartan, metoprolol and amlodipine  Last several BP readings elevated; 162/68, 182/85, 161/71; d/w Dr. Patel; optimize meds as needed

## 2018-01-31 NOTE — SUBJECTIVE & OBJECTIVE
Past Medical History:   Diagnosis Date    GERD (gastroesophageal reflux disease)     Hyperlipidemia     Hypertension        Past Surgical History:   Procedure Laterality Date    CHOLECYSTECTOMY      HERNIA REPAIR      HYSTERECTOMY      knee replacemene      deidra    THYROIDECTOMY         Review of patient's allergies indicates:   Allergen Reactions    Statins-hmg-coa reductase inhibitors      Other reaction(s): Unknown       No current facility-administered medications on file prior to encounter.      Current Outpatient Prescriptions on File Prior to Encounter   Medication Sig    amlodipine (NORVASC) 2.5 MG tablet Take 1 tablet (2.5 mg total) by mouth once daily.    atorvastatin (LIPITOR) 10 MG tablet Take 1 tablet (10 mg total) by mouth every evening.    atorvastatin (LIPITOR) 20 MG tablet     clopidogrel (PLAVIX) 75 mg tablet Take 1 tablet (75 mg total) by mouth once daily.    cyanocobalamin (VITAMIN B-12) 100 MCG tablet Take 100 mcg by mouth once daily.    furosemide (LASIX) 40 MG tablet Take 1 tablet (40 mg total) by mouth once daily.    levothyroxine (SYNTHROID) 100 MCG tablet TAKE ONE TABLET BY MOUTH EVERY DAY    losartan (COZAAR) 100 MG tablet     meloxicam (MOBIC) 7.5 MG tablet TAKE ONE TABLET BY MOUTH EVERY OTHER DAY    metoprolol succinate (TOPROL-XL) 200 MG 24 hr tablet Take 1 tablet (200 mg total) by mouth once daily.    omega-3 acid ethyl esters (LOVAZA) 1 gram capsule Take 2 capsules (2 g total) by mouth 2 (two) times daily.    potassium chloride SA (K-DUR,KLOR-CON) 20 MEQ tablet Take 1 tablet (20 mEq total) by mouth once daily.    ranitidine (ZANTAC) 150 MG tablet Take 1 tablet (150 mg total) by mouth 2 (two) times daily.    VESICARE 10 mg tablet TAKE ONE TABLET BY MOUTH EVERY DAY    nitroGLYCERIN (NITROSTAT) 0.4 MG SL tablet Place 0.4 mg under the tongue every 5 (five) minutes as needed.     Family History     Problem Relation (Age of Onset)    Cancer Sister        Social  History Main Topics    Smoking status: Never Smoker    Smokeless tobacco: Never Used    Alcohol use No    Drug use: No    Sexual activity: Not on file     Review of Systems   Unable to perform ROS: Other (pt too SOB to speak )     Objective:     Vital Signs (Most Recent):  Temp: 98.3 °F (36.8 °C) (01/31/18 0319)  Pulse: 93 (01/31/18 0600)  Resp: (!) 22 (01/31/18 0319)  BP: (!) 162/68 (recheck) (01/31/18 0325)  SpO2: (!) 93 % (recheck after repositioning) (01/31/18 0325) Vital Signs (24h Range):  Temp:  [97.2 °F (36.2 °C)-99.6 °F (37.6 °C)] 98.3 °F (36.8 °C)  Pulse:  [] 93  Resp:  [18-31] 22  SpO2:  [76 %-98 %] 93 %  BP: (115-182)/(54-85) 162/68     Weight: 118.5 kg (261 lb 3.9 oz)  Body mass index is 40.92 kg/m².    Physical Exam   Constitutional: She is oriented to person, place, and time. She appears well-developed and well-nourished. She appears distressed.   She is in resp distress    HENT:   Head: Normocephalic and atraumatic.   Eyes: Pupils are equal, round, and reactive to light.   Neck: Normal range of motion. Neck supple.   Cardiovascular: Normal rate and normal heart sounds.  An irregularly irregular rhythm present.   Pulmonary/Chest: Effort normal and breath sounds normal.   Very poor tidal volume  End exp wheeze    Abdominal: Soft. Bowel sounds are normal. There is no tenderness.   Musculoskeletal: She exhibits no edema.   Neurological: She is alert and oriented to person, place, and time.   Skin: Skin is warm and dry.   Psychiatric: She has a normal mood and affect. Her behavior is normal. Judgment and thought content normal.   Nursing note and vitals reviewed.        CRANIAL NERVES     CN III, IV, VI   Pupils are equal, round, and reactive to light.       Significant Labs:   CBC:   Lab Results   Component Value Date    WBC 12.96 (H) 01/31/2018    HGB 11.6 (L) 01/31/2018    HCT 37.1 01/31/2018     (H) 01/31/2018     01/31/2018           urinalysis- negative WBC  Rapid flu A/B  negative  CMP:     Recent Labs  Lab 01/30/18  0846 01/31/18  0626    138   K 4.4 4.5   CL 97 96   CO2 32* 32*   * 162*   BUN 24* 27*   CREATININE 0.8 0.8   CALCIUM 9.0 9.2   PROT 7.4 7.4   ALBUMIN 3.2* 3.1*   BILITOT 0.9 1.0   ALKPHOS 63 62   AST 23 17   ALT 36 32   ANIONGAP 8 10   EGFRNONAA >60 >60     Troponin:     Recent Labs  Lab 01/30/18  1449 01/30/18  1802 01/31/18  0626   TROPONINI <0.006 0.010 0.017     BNP    Recent Labs  Lab 01/30/18  0846   *       TSH:     Recent Labs  Lab 11/28/17  1411   TSH 4.176*     Lab Results   Component Value Date    DDIMER 1.05 (H) 01/30/2018       EKG:  AFIB     Significant Imaging:  Chest, 1 view: The heart isn't enlarged.  The pulmonary vasculature with mild pulmonary edema.  No definite pleural effusions.  The skeletal structures are intact

## 2018-01-31 NOTE — HOSPITAL COURSE
1/31/18  82 YO WF admitted for tx of CHF  1-30-18; day 3; She reported missing  her lasix 40 mg daily for last 4 days prior to admit; Now receiving IV lasix twice daily. Continues to have some LE edema and BARGER.   This am on rounds pt developed worsening SOB, now requiring Bipap. Noted D-dimer elevated;  Lab Results   Component Value Date    DDIMER 1.05 (H) 01/30/2018   Pt with known chronic afib- on plavix only per cards. CT of chest ordered to rule out PE ordered.     Echo pending. (last Echo ?2012).   Also noted to have ^WBCs on admit; trending down 12.96 from 14.44 on admission; Dr. Patel suggesting possible bronchitis. U/A- ok.  I/O yesterday-  -640, wt 261. Today-pending    2/1/18 CT of chest negative for PE yesterday. Lovenox initiated per cards for recent new onset afib vs atrial tach per Dr. Patel. Pt with continued SOB requiring BIPAP; decreased LOC overnight. ABGs demonstrating ^CO2; required BIPAP adjustment;  ABG    Recent Labs  Lab 01/31/18  2235   PH 7.35   PO2 63   PCO2 72*   HCO3 39.70   BE 11.40   repeat ABGS this am with continued ^PCO2- 74 -Dr. Jain recommending RR ^  More awake now - opening eyes and taking in meds PO.  Sats have been good.    2/2/18- continues with BIPAP. Will respond with maximal stimuli; opens eyes and becomes more alert and awake   resp viral panel pending.   Echo - 1/31/18 Echo ejection fraction is 55-60%   Discussed with Dr. Watson- he feels she is compensated resp failure- does not recommend intubation at this time. Wants to rule out neuromuscular. He started Seroquel yesterday due to pt being very restless with Bipap 2 nights ago. Will discontinue.    Started on Zithromax yesterday for possible bacterial bronchitis.  BP now borderline; seems to now be fluid depleted. Weights reflect ? 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 She received one dose of azithromycin. She did have right upper lobe infil;trates on x ray as well as CTA. She also had leukocytosis on admission. She is also still on 5L NC and usually wears 2L at home. Dr Watson following and ordered cpap. She reports that she has not been able to tolerate that last night. ABG ordered for this am    Diuresed for elevated BNP on admission EF 60%, stage 1 diastolic dysfunction, no significant valvular pathology. No more lasix bnp normal but elevated creat  Over weekend. Reordered for today    Dr Ortiz consulted for poss N/M dz process. She responded well to steroids and mesatonin. Panels drawn and pending.     2/6/18 She is not feeling well today. Feels down today from being here for so long. Her WBC is a little higher today. We restarted rocephin for the pneumonia yesterday. Old CXR shows right upper lobe, she does report that she has choking when she swallows. Especially on liquids. Speech saw her and did not observe any over signs of aspiration. She is still requiring 5L O2 today. Has a CPAP and only able to tolerate for short amounts of time    She was also statred on NS at 100ml/hr. Creat slightly better 2.0>>1.9.   2/7/18 Pt with improved Renal fx with IV fluids and cessation of diuretics. Treating for pneumonia; Started on Levaquin yesterday. Negative for aspiration per speech eval.   Neurology following for new symptoms of diplopia and hypophonic voice, w/u for MG. That combined with hypercapnia is concerning for neuromuscular weakness- all of this improving on steroids as expected. See recommendations for steroid rx per neuro .   Did tolerate some Bipap last pm, but a little agitatied.   Will try to wean O2 (home Oxygen- 2L prn) ; stop IV fluids to avoid overload. Resume ARB when renal fx stable.   Currently on 3LNC-   Did not get up out of bed yet today; ambulated  15 feet yesterday with PT ; goal 50 feet.   Needs further strengthening - plan for skilled ; lives in apt, needs to be able to climb 10 steps.

## 2018-01-31 NOTE — ASSESSMENT & PLAN NOTE
Supplemental O2 via nasal canula; titrate O2 saturation to >92%.  Serial Cardiac enzymes × 3.  Diuretic administration. Monitor electrolytes for hypokalemia and hypomagnesemia.  Cardiology Consultation- Dr. Patel following   2-D Echocardiogram for evaluation of left ventricle systolic function or any valvular heart disease- ordered today  Daily weights.  Strict I/Os.  Fluid restriction.  Na restriction <2g/d.      IV lasix 40 mg bid

## 2018-01-31 NOTE — CONSULTS
[facility]  Pulmonology  Consult Note    Patient Name: Stephany Skinner  MRN: [id number]  Admission Date: 1/30/2018  Hospital Length of Stay: 1 days  Code Status: Full Code  Attending Physician: Za Sandra MD  Primary Care Provider: Pipo Flowers MD   Principal Problem: Acute on chronic congestive heart failure    Consults  Subjective:     HPI:  Stephany Skinner is a 83 y.o. year old female that's presents with a chief complaint of SOB and Wheezing for 5 days.Pt brought in to hospital for chf after diuresis pt continues to wheeze and SOB . Consulted to evaluate Respiratory status.    Past Medical History:   Diagnosis Date    GERD (gastroesophageal reflux disease)     Hyperlipidemia     Hypertension         Past Surgical History:   Procedure Laterality Date    CHOLECYSTECTOMY      HERNIA REPAIR      HYSTERECTOMY      knee replacemene      deidra    THYROIDECTOMY         Prior to Admission medications    Medication Sig Start Date End Date Taking? Authorizing Provider   amlodipine (NORVASC) 2.5 MG tablet Take 1 tablet (2.5 mg total) by mouth once daily. 10/11/16  Yes Pipo Flowers MD   atorvastatin (LIPITOR) 10 MG tablet Take 1 tablet (10 mg total) by mouth every evening. 10/11/16  Yes Pipo Flowers MD   atorvastatin (LIPITOR) 20 MG tablet  11/7/17  Yes Historical Provider, MD   clopidogrel (PLAVIX) 75 mg tablet Take 1 tablet (75 mg total) by mouth once daily. 10/11/16  Yes Pipo Flowers MD   cyanocobalamin (VITAMIN B-12) 100 MCG tablet Take 100 mcg by mouth once daily.   Yes Historical Provider, MD   furosemide (LASIX) 40 MG tablet Take 1 tablet (40 mg total) by mouth once daily. 10/11/16  Yes Pipo Flowers MD   levothyroxine (SYNTHROID) 100 MCG tablet TAKE ONE TABLET BY MOUTH EVERY DAY 11/8/17  Yes Pipo Flowers MD   losartan (COZAAR) 100 MG tablet  11/15/17  Yes Historical Provider, MD   meloxicam (MOBIC) 7.5 MG tablet TAKE ONE TABLET BY MOUTH  EVERY OTHER DAY 8/7/17  Yes Pipo Flowers MD   metoprolol succinate (TOPROL-XL) 200 MG 24 hr tablet Take 1 tablet (200 mg total) by mouth once daily. 10/11/16  Yes Pipo Flowers MD   omega-3 acid ethyl esters (LOVAZA) 1 gram capsule Take 2 capsules (2 g total) by mouth 2 (two) times daily. 10/11/16  Yes Pipo Flowers MD   potassium chloride SA (K-DUR,KLOR-CON) 20 MEQ tablet Take 1 tablet (20 mEq total) by mouth once daily. 10/11/16  Yes Pipo Flowers MD   ranitidine (ZANTAC) 150 MG tablet Take 1 tablet (150 mg total) by mouth 2 (two) times daily. 10/11/16  Yes Pipo Flowers MD   VESICARE 10 mg tablet TAKE ONE TABLET BY MOUTH EVERY DAY 10/23/17  Yes Pipo Flowers MD   nitroGLYCERIN (NITROSTAT) 0.4 MG SL tablet Place 0.4 mg under the tongue every 5 (five) minutes as needed.    Historical Provider, MD       Social History     Social History    Marital status:      Spouse name: N/A    Number of children: N/A    Years of education: N/A     Occupational History    Not on file.     Social History Main Topics    Smoking status: Never Smoker    Smokeless tobacco: Never Used    Alcohol use No    Drug use: No    Sexual activity: Not on file     Other Topics Concern    Not on file     Social History Narrative    No narrative on file       Family History   Problem Relation Age of Onset    Cancer Sister        Review of patient's allergies indicates:   Allergen Reactions    Statins-hmg-coa reductase inhibitors      Other reaction(s): Unknown    Allergies have been reviewed.     ROS: Review of Systems   Constitutional: Negative for chills, fever and weight loss.   HENT: Negative for hearing loss and sore throat.    Eyes: Negative for double vision and photophobia.   Respiratory: Positive for cough, sputum production and shortness of breath.    Cardiovascular: Positive for leg swelling (mild) and PND (occasional). Negative for palpitations.   Gastrointestinal: Negative  for abdominal pain and diarrhea.   Genitourinary: Negative for dysuria and frequency.   Musculoskeletal: Positive for myalgias (generalized). Negative for back pain and neck pain.   Skin: Negative.    Neurological: Negative for dizziness, weakness and headaches.   Endo/Heme/Allergies: Does not bruise/bleed easily.   Psychiatric/Behavioral: Negative for memory loss. The patient has insomnia (intermittant ).        PE:   Vitals:    01/31/18 0946 01/31/18 1003 01/31/18 1200 01/31/18 1209   BP:   (!) 174/84    BP Location:   Left arm    Patient Position:   Lying    Pulse: 94 102 71 69   Resp:   (!) 24 (!) 24   Temp:   98.5 °F (36.9 °C)    TempSrc:   Axillary    SpO2: 97%  97% 97%   Weight:       Height:        Physical Exam    Alert and orientated X 3   HEENT: Head: Normocephalic no trauma                Ears : Normal Pinna No Drainage no Battles sign                Eyes: Vision Unchanged, No conjunctivitis,No drainage                Neck: Supple, No JVD,No Abnormal Carotid Pulsations                Throat: No Erythema, No pus,No Swelling,Mallampati score= 3    Chest: course BS with wheezing and rhonchi   Cardiac: RRR S1+ S2 with a -S3: +M = 2/6, No R/H/G  Abdomen: Bowel Sounds are Normal.Soft Abdomen. No organomegaly of Liver,Spleen,or Kidneys   CNS: Non focal and intact. Cranial nerves 2, 346,8,9,10 and 12 are normal.Norrmal gait.Normal posture.  Extremities: No Clubbing,No Cyanosis with oxygen,Positive mild edema of lower extremities Bilateral  Skin: No Rash, No Ulcerative sores,and No cellulitis of the IV site.    Lab Results   Component Value Date    WBC 12.96 (H) 01/31/2018    HGB 11.6 (L) 01/31/2018    HCT 37.1 01/31/2018     01/31/2018    CHOL 144 10/11/2016    TRIG 177 (H) 10/11/2016    HDL 41 10/11/2016    ALT 32 01/31/2018    AST 17 01/31/2018     01/31/2018    K 4.5 01/31/2018    CL 96 01/31/2018    CREATININE 0.8 01/31/2018    BUN 27 (H) 01/31/2018    CO2 32 (H) 01/31/2018    TSH 4.176 (H)  11/28/2017    INR 1.1 01/30/2018               Assessment/Plan:     * Acute on chronic congestive heart failure    bnp 400        Acute respiratory failure with hypoxia and hypercarbia    Elevated co2 with po2 81        Chronic atrial fibrillation    afib with controlled rate secondary to underlying copd        will continue NIV for decreased ventilatory status which should also recruit and distend alveoli and should improve oxygenation       Thank you for your consult. I will follow-up with patient. Please contact us if you have any additional questions.     Nicolas Watson MD  Pulmonology  [facility]

## 2018-02-01 LAB
ALBUMIN SERPL BCP-MCNC: 2.7 G/DL
ALLENS TEST: ABNORMAL
ALP SERPL-CCNC: 57 U/L
ALT SERPL W/O P-5'-P-CCNC: 29 U/L
ANION GAP SERPL CALC-SCNC: 10 MMOL/L
AST SERPL-CCNC: 18 U/L
BASOPHILS # BLD AUTO: 0.01 K/UL
BASOPHILS NFR BLD: 0.1 %
BILIRUB SERPL-MCNC: 1.2 MG/DL
BNP SERPL-MCNC: 280 PG/ML
BUN SERPL-MCNC: 39 MG/DL
CALCIUM SERPL-MCNC: 9.4 MG/DL
CHLORIDE SERPL-SCNC: 97 MMOL/L
CO2 SERPL-SCNC: 32 MMOL/L
CREAT SERPL-MCNC: 0.9 MG/DL
DELSYS: ABNORMAL
DIFFERENTIAL METHOD: ABNORMAL
EOSINOPHIL # BLD AUTO: 0 K/UL
EOSINOPHIL NFR BLD: 0 %
ERYTHROCYTE [DISTWIDTH] IN BLOOD BY AUTOMATED COUNT: 14.2 %
EST. GFR  (AFRICAN AMERICAN): >60 ML/MIN/1.73 M^2
EST. GFR  (NON AFRICAN AMERICAN): 60 ML/MIN/1.73 M^2
GLUCOSE SERPL-MCNC: 139 MG/DL
HCO3 UR-SCNC: 36.4 MMOL/L (ref 22–26)
HCO3 UR-SCNC: 39 MMOL/L (ref 22–26)
HCO3 UR-SCNC: 39.7 MMOL/L (ref 22–26)
HCT VFR BLD AUTO: 37.2 %
HGB BLD-MCNC: 11.6 G/DL
LYMPHOCYTES # BLD AUTO: 0.5 K/UL
LYMPHOCYTES NFR BLD: 4.3 %
MCH RBC QN AUTO: 31.3 PG
MCHC RBC AUTO-ENTMCNC: 31.2 G/DL
MCV RBC AUTO: 100 FL
MONOCYTES # BLD AUTO: 1.3 K/UL
MONOCYTES NFR BLD: 11 %
NEUTROPHILS # BLD AUTO: 9.8 K/UL
NEUTROPHILS NFR BLD: 84.6 %
PCO2 BLDA: 50 MMHG (ref 35–45)
PCO2 BLDA: 72 MMHG (ref 35–45)
PCO2 BLDA: 74 MMHG (ref 35–45)
PH SMN: 7.33 [PH] (ref 7.35–7.45)
PH SMN: 7.35 [PH] (ref 7.35–7.45)
PH SMN: 7.47 [PH] (ref 7.35–7.45)
PLATELET # BLD AUTO: 219 K/UL
PMV BLD AUTO: 8.5 FL
PO2 BLDA: 51 MMHG (ref 75–100)
PO2 BLDA: 59 MMHG (ref 75–100)
PO2 BLDA: 61 MMHG (ref 75–100)
POC BE: 10.4 MMOL/L (ref -2–2)
POC BE: 11.1 MMOL/L (ref -2–2)
POC BE: 11.5 MMOL/L (ref -2–2)
POC COHB: 2.1 % (ref 0–3)
POC COHB: 2.4 % (ref 0–3)
POC COHB: 2.5 % (ref 0–3)
POC METHB: 0.9 % (ref 0–1.5)
POC METHB: 0.9 % (ref 0–1.5)
POC METHB: 1.2 % (ref 0–1.5)
POC O2HB ARTERIAL: 89.7 % (ref 94–100)
POC O2HB ARTERIAL: 91.7 % (ref 94–100)
POC O2HB ARTERIAL: 92.1 % (ref 94–100)
POC SATURATED O2: 93 % (ref 90–100)
POC SATURATED O2: 94.8 % (ref 90–100)
POC SATURATED O2: 94.9 % (ref 90–100)
POC TCO2: 37.9 MMOL/L
POC TCO2: 41.3 MMOL/L
POC TCO2: 41.9 MMOL/L
POC THB: 11.5 G/DL (ref 12–18)
POC THB: 11.7 G/DL (ref 12–18)
POC THB: 12 G/DL (ref 12–18)
POTASSIUM SERPL-SCNC: 4.7 MMOL/L
PROCALCITONIN SERPL IA-MCNC: 0.39 NG/ML
PROT SERPL-MCNC: 7.1 G/DL
RBC # BLD AUTO: 3.71 M/UL
SITE: ABNORMAL
SODIUM SERPL-SCNC: 139 MMOL/L
WBC # BLD AUTO: 11.63 K/UL

## 2018-02-01 PROCEDURE — 99233 SBSQ HOSP IP/OBS HIGH 50: CPT | Mod: ,,, | Performed by: FAMILY MEDICINE

## 2018-02-01 PROCEDURE — 94640 AIRWAY INHALATION TREATMENT: CPT

## 2018-02-01 PROCEDURE — 85025 COMPLETE CBC W/AUTO DIFF WBC: CPT

## 2018-02-01 PROCEDURE — 84145 PROCALCITONIN (PCT): CPT

## 2018-02-01 PROCEDURE — 63600175 PHARM REV CODE 636 W HCPCS: Performed by: FAMILY MEDICINE

## 2018-02-01 PROCEDURE — 27000221 HC OXYGEN, UP TO 24 HOURS

## 2018-02-01 PROCEDURE — 36415 COLL VENOUS BLD VENIPUNCTURE: CPT

## 2018-02-01 PROCEDURE — 94761 N-INVAS EAR/PLS OXIMETRY MLT: CPT

## 2018-02-01 PROCEDURE — 82803 BLOOD GASES ANY COMBINATION: CPT | Performed by: FAMILY MEDICINE

## 2018-02-01 PROCEDURE — 25000003 PHARM REV CODE 250: Performed by: NURSE PRACTITIONER

## 2018-02-01 PROCEDURE — 94660 CPAP INITIATION&MGMT: CPT

## 2018-02-01 PROCEDURE — 87632 RESP VIRUS 6-11 TARGETS: CPT

## 2018-02-01 PROCEDURE — 80053 COMPREHEN METABOLIC PANEL: CPT

## 2018-02-01 PROCEDURE — 25000242 PHARM REV CODE 250 ALT 637 W/ HCPCS: Performed by: INTERNAL MEDICINE

## 2018-02-01 PROCEDURE — 63600175 PHARM REV CODE 636 W HCPCS: Performed by: NURSE PRACTITIONER

## 2018-02-01 PROCEDURE — 63600175 PHARM REV CODE 636 W HCPCS: Performed by: INTERNAL MEDICINE

## 2018-02-01 PROCEDURE — 11000001 HC ACUTE MED/SURG PRIVATE ROOM

## 2018-02-01 PROCEDURE — 25000003 PHARM REV CODE 250: Performed by: INTERNAL MEDICINE

## 2018-02-01 PROCEDURE — 99900035 HC TECH TIME PER 15 MIN (STAT)

## 2018-02-01 PROCEDURE — 25000003 PHARM REV CODE 250: Performed by: SURGERY

## 2018-02-01 PROCEDURE — 36600 WITHDRAWAL OF ARTERIAL BLOOD: CPT

## 2018-02-01 PROCEDURE — 83880 ASSAY OF NATRIURETIC PEPTIDE: CPT

## 2018-02-01 RX ORDER — QUETIAPINE FUMARATE 25 MG/1
25 TABLET, FILM COATED ORAL 2 TIMES DAILY
Status: DISCONTINUED | OUTPATIENT
Start: 2018-02-01 | End: 2018-02-01

## 2018-02-01 RX ADMIN — LOSARTAN POTASSIUM 100 MG: 50 TABLET, FILM COATED ORAL at 08:02

## 2018-02-01 RX ADMIN — IPRATROPIUM BROMIDE 0.5 MG: 0.5 SOLUTION RESPIRATORY (INHALATION) at 01:02

## 2018-02-01 RX ADMIN — AMLODIPINE BESYLATE 2.5 MG: 2.5 TABLET ORAL at 08:02

## 2018-02-01 RX ADMIN — IPRATROPIUM BROMIDE 0.5 MG: 0.5 SOLUTION RESPIRATORY (INHALATION) at 06:02

## 2018-02-01 RX ADMIN — AZITHROMYCIN MONOHYDRATE 500 MG: 500 INJECTION, POWDER, LYOPHILIZED, FOR SOLUTION INTRAVENOUS at 01:02

## 2018-02-01 RX ADMIN — CLOPIDOGREL BISULFATE 75 MG: 75 TABLET ORAL at 08:02

## 2018-02-01 RX ADMIN — QUETIAPINE FUMARATE 25 MG: 25 TABLET ORAL at 01:02

## 2018-02-01 RX ADMIN — LEVOTHYROXINE SODIUM 100 MCG: 100 TABLET ORAL at 08:02

## 2018-02-01 RX ADMIN — PANTOPRAZOLE SODIUM 40 MG: 40 TABLET, DELAYED RELEASE ORAL at 08:02

## 2018-02-01 RX ADMIN — LEVALBUTEROL 1.25 MG: 1.25 SOLUTION, CONCENTRATE RESPIRATORY (INHALATION) at 06:02

## 2018-02-01 RX ADMIN — LEVALBUTEROL 1.25 MG: 1.25 SOLUTION, CONCENTRATE RESPIRATORY (INHALATION) at 01:02

## 2018-02-01 RX ADMIN — ENOXAPARIN SODIUM 120 MG: 150 INJECTION SUBCUTANEOUS at 05:02

## 2018-02-01 RX ADMIN — IPRATROPIUM BROMIDE 0.5 MG: 0.5 SOLUTION RESPIRATORY (INHALATION) at 12:02

## 2018-02-01 RX ADMIN — FUROSEMIDE 40 MG: 10 INJECTION, SOLUTION INTRAMUSCULAR; INTRAVENOUS at 05:02

## 2018-02-01 RX ADMIN — ACETAMINOPHEN 650 MG: 325 TABLET ORAL at 08:02

## 2018-02-01 RX ADMIN — LEVALBUTEROL 1.25 MG: 1.25 SOLUTION, CONCENTRATE RESPIRATORY (INHALATION) at 12:02

## 2018-02-01 RX ADMIN — METOPROLOL SUCCINATE 200 MG: 50 TABLET, EXTENDED RELEASE ORAL at 08:02

## 2018-02-01 RX ADMIN — FUROSEMIDE 40 MG: 10 INJECTION, SOLUTION INTRAMUSCULAR; INTRAVENOUS at 08:02

## 2018-02-01 NOTE — ASSESSMENT & PLAN NOTE
Continue metoprolol, plavix   Dr cardenas following discussed Afib; he reports fairly new dx, plavix was for previous + abnormal stress test; pt deferred angiography and had poor Asa tolerance.  Lovenox 1mg/kg sq twice daily started yesterday for afib vs Atrial tach

## 2018-02-01 NOTE — NURSING
Notified Dr. Jain of ABG results.  Dr. Jain ordered to increase BIPAP to 18/5.  Will continue to monitor pt.  Will get a ABG in am per Dr. Jain requests.

## 2018-02-01 NOTE — SUBJECTIVE & OBJECTIVE
Interval History: bilateral intestinal pneumonia with groundglass    Objective:     Vital Signs (Most Recent):  Temp: 99.4 °F (37.4 °C) (02/01/18 0832)  Pulse: 99 (02/01/18 0832)  Resp: 20 (02/01/18 0832)  BP: (!) 152/72 (02/01/18 0832)  SpO2: (!) 94 % (02/01/18 0832) Vital Signs (24h Range):  Temp:  [97.2 °F (36.2 °C)-99.6 °F (37.6 °C)] 99.4 °F (37.4 °C)  Pulse:  [] 99  Resp:  [16-30] 20  SpO2:  [92 %-97 %] 94 %  BP: (115-174)/(55-84) 152/72     Weight: 118.5 kg (261 lb 3.9 oz)  Body mass index is 40.92 kg/m².    No intake or output data in the 24 hours ending 02/01/18 0950    Physical Exam   Constitutional: She is oriented to person, place, and time. She appears well-developed and well-nourished. She is cooperative.  Non-toxic appearance. She does not appear ill. No distress.   HENT:   Head: Normocephalic and atraumatic.   Right Ear: Hearing, tympanic membrane, external ear and ear canal normal.   Left Ear: Hearing, tympanic membrane, external ear and ear canal normal.   Nose: Nose normal. No mucosal edema, rhinorrhea or nasal deformity. No epistaxis. Right sinus exhibits no maxillary sinus tenderness and no frontal sinus tenderness. Left sinus exhibits no maxillary sinus tenderness and no frontal sinus tenderness.   Mouth/Throat: Uvula is midline, oropharynx is clear and moist and mucous membranes are normal. No trismus in the jaw. Normal dentition. No uvula swelling. No posterior oropharyngeal erythema.   Eyes: Conjunctivae and lids are normal. No scleral icterus.   Sclera clear bilat   Neck: Trachea normal, full passive range of motion without pain and phonation normal. Neck supple.   Cardiovascular: Normal rate, regular rhythm, normal heart sounds, intact distal pulses and normal pulses.    Pulmonary/Chest: She is in respiratory distress (mild to moderate). She has decreased breath sounds in the right upper field, the right middle field, the right lower field, the left upper field, the left middle field  and the left lower field. She has wheezes in the right middle field, the right lower field, the left middle field and the left lower field. She has rhonchi in the right middle field, the right lower field, the left middle field and the left lower field.           Abdominal: Soft. Normal appearance and bowel sounds are normal. She exhibits no distension. There is no tenderness.   Musculoskeletal: Normal range of motion. She exhibits no edema or deformity.   Neurological: She is alert and oriented to person, place, and time. She exhibits normal muscle tone. Coordination normal.   Skin: Skin is warm, dry and intact. She is not diaphoretic. No pallor.   Psychiatric: She has a normal mood and affect. Her speech is normal and behavior is normal. Judgment and thought content normal. Cognition and memory are normal.   Nursing note and vitals reviewed.      Vents:  Oxygen Concentration (%): 40 (02/01/18 0641)    Lines/Drains/Airways     Peripheral Intravenous Line                 Peripheral IV - Single Lumen 01/30/18 Left Hand 2 days                Significant Labs:    CBC/Anemia Profile:    Recent Labs  Lab 01/31/18  0626 02/01/18  0908   WBC 12.96* 11.63   HGB 11.6* 11.6*   HCT 37.1 37.2    219   * 100*   RDW 14.1 14.2        Chemistries:    Recent Labs  Lab 01/31/18  0626 02/01/18  0908    139   K 4.5 4.7   CL 96 97   CO2 32* 32*   BUN 27* 39*   CREATININE 0.8 0.9   CALCIUM 9.2 9.4   ALBUMIN 3.1* 2.7*   PROT 7.4 7.1   BILITOT 1.0 1.2*   ALKPHOS 62 57   ALT 32 29   AST 17 18       All pertinent labs within the past 24 hours have been reviewed.    Significant Imaging:  I have reviewed all pertinent imaging results/findings within the past 24 hours.

## 2018-02-01 NOTE — PROGRESS NOTES
[facility]  Pulmonology  Progress Note    Patient Name: Stephany Skinner  MRN: [id number]  Admission Date: 1/30/2018  Hospital Length of Stay: 2 days  Code Status: Full Code  Attending Provider: Za Sandra MD  Primary Care Provider: Pipo Flowers MD   Principal Problem: Acute respiratory failure with hypoxia and hypercarbia    Subjective:     Interval History: bilateral intestinal pneumonia with groundglass    Objective:     Vital Signs (Most Recent):  Temp: 99.4 °F (37.4 °C) (02/01/18 0832)  Pulse: 99 (02/01/18 0832)  Resp: 20 (02/01/18 0832)  BP: (!) 152/72 (02/01/18 0832)  SpO2: (!) 94 % (02/01/18 0832) Vital Signs (24h Range):  Temp:  [97.2 °F (36.2 °C)-99.6 °F (37.6 °C)] 99.4 °F (37.4 °C)  Pulse:  [] 99  Resp:  [16-30] 20  SpO2:  [92 %-97 %] 94 %  BP: (115-174)/(55-84) 152/72     Weight: 118.5 kg (261 lb 3.9 oz)  Body mass index is 40.92 kg/m².    No intake or output data in the 24 hours ending 02/01/18 0950    Physical Exam   Constitutional: She is oriented to person, place, and time. She appears well-developed and well-nourished. She is cooperative.  Non-toxic appearance. She does not appear ill. No distress.   HENT:   Head: Normocephalic and atraumatic.   Right Ear: Hearing, tympanic membrane, external ear and ear canal normal.   Left Ear: Hearing, tympanic membrane, external ear and ear canal normal.   Nose: Nose normal. No mucosal edema, rhinorrhea or nasal deformity. No epistaxis. Right sinus exhibits no maxillary sinus tenderness and no frontal sinus tenderness. Left sinus exhibits no maxillary sinus tenderness and no frontal sinus tenderness.   Mouth/Throat: Uvula is midline, oropharynx is clear and moist and mucous membranes are normal. No trismus in the jaw. Normal dentition. No uvula swelling. No posterior oropharyngeal erythema.   Eyes: Conjunctivae and lids are normal. No scleral icterus.   Sclera clear bilat   Neck: Trachea normal, full passive range of motion  without pain and phonation normal. Neck supple.   Cardiovascular: Normal rate, regular rhythm, normal heart sounds, intact distal pulses and normal pulses.    Pulmonary/Chest: She is in respiratory distress (mild to moderate). She has decreased breath sounds in the right upper field, the right middle field, the right lower field, the left upper field, the left middle field and the left lower field. She has wheezes in the right middle field, the right lower field, the left middle field and the left lower field. She has rhonchi in the right middle field, the right lower field, the left middle field and the left lower field.           Abdominal: Soft. Normal appearance and bowel sounds are normal. She exhibits no distension. There is no tenderness.   Musculoskeletal: Normal range of motion. She exhibits no edema or deformity.   Neurological: She is alert and oriented to person, place, and time. She exhibits normal muscle tone. Coordination normal.   Skin: Skin is warm, dry and intact. She is not diaphoretic. No pallor.   Psychiatric: She has a normal mood and affect. Her speech is normal and behavior is normal. Judgment and thought content normal. Cognition and memory are normal.   Nursing note and vitals reviewed.      Vents:  Oxygen Concentration (%): 40 (02/01/18 0641)    Lines/Drains/Airways     Peripheral Intravenous Line                 Peripheral IV - Single Lumen 01/30/18 Left Hand 2 days                Significant Labs:    CBC/Anemia Profile:    Recent Labs  Lab 01/31/18  0626 02/01/18  0908   WBC 12.96* 11.63   HGB 11.6* 11.6*   HCT 37.1 37.2    219   * 100*   RDW 14.1 14.2        Chemistries:    Recent Labs  Lab 01/31/18  0626 02/01/18  0908    139   K 4.5 4.7   CL 96 97   CO2 32* 32*   BUN 27* 39*   CREATININE 0.8 0.9   CALCIUM 9.2 9.4   ALBUMIN 3.1* 2.7*   PROT 7.4 7.1   BILITOT 1.0 1.2*   ALKPHOS 62 57   ALT 32 29   AST 17 18       All pertinent labs within the past 24 hours have been  reviewed.    Significant Imaging:  I have reviewed all pertinent imaging results/findings within the past 24 hours.    Assessment/Plan:     * Acute respiratory failure with hypoxia and hypercarbia    Improving increased bipap  Elevated co2 with po2 81        Chronic atrial fibrillation    afib with controlled rate secondary to underlying copdn        Acute on chronic congestive heart failure    bnp 400n        Hypothyroidism    On meds         JOY (obstructive sleep apnea)    Needs NIV               Nicolas Watson MD  Pulmonology  [facility]

## 2018-02-01 NOTE — NURSING
Dr. Jain notified of pt not being able to swallow meds. Pt confused and mumbles.  Bipap on.  Dr Jain ordered ABG.  Will Continue to monitor.  Family at bedside.

## 2018-02-01 NOTE — ASSESSMENT & PLAN NOTE
Supplemental O2 via nasal canula; titrate O2 saturation to >92%.  Serial Cardiac enzymes × 3.  Diuretic administration. Monitor electrolytes for hypokalemia and hypomagnesemia.  Cardiology Consultation- Dr. Patel following   2-D Echocardiogram for evaluation of left ventricle systolic function or any valvular heart disease- ordered today (not bad at all)  Daily weights.  Strict I/Os.  Fluid restriction.  Na restriction <2g/d.      IV lasix 40 mg bid

## 2018-02-01 NOTE — NURSING
Bedside report received from Hem[place].  No complaints of pain noted.  VS stable.  Family at bedside.  Pt on BIPAP.  Call bell in reach.  Will continue to monitor.

## 2018-02-01 NOTE — PROGRESS NOTES
[facility]  Cardiology  Progress Note    Patient Name: Stephany Skinner  MRN: [id number]  Admission Date: 1/30/2018  Hospital Length of Stay: 2 days  Code Status: Full Code   Attending Physician: Za Sandra MD   Primary Care Physician: Pipo Flowers MD  Expected Discharge Date:   Principal Problem:Acute on chronic congestive heart failure    Subjective:     Hospital Course: patient respiratory status has worsened. CT chest shows ground glass opacities. ABG reveals CO2 in the upper 70s. Patient confused.     Interval History: 83 year old w/f presents to the ED for the second time in the past few days for worsening SOB over the last few days. She endorses worsening BARGER and orthopnea. She denies chest pain or palpitations. She has apparently not taken Lasix for the past 4 days.     ROS   Constitutional: confusion   Eyes: Negative    Respiratory: SOB   Cardiovascular: orthopnea   Gastrointestinal: Negative   Genitourinary: Negative   Musculoskeletal: Negative   Skin: Negative .   Neurological: Negative   Objective:     Vital Signs (Most Recent):  Temp: 99.4 °F (37.4 °C) (02/01/18 0832)  Pulse: 99 (02/01/18 0832)  Resp: 20 (02/01/18 0832)  BP: (!) 152/72 (02/01/18 0832)  SpO2: (!) 94 % (02/01/18 0832) Vital Signs (24h Range):  Temp:  [97.2 °F (36.2 °C)-99.6 °F (37.6 °C)] 99.4 °F (37.4 °C)  Pulse:  [] 99  Resp:  [16-31] 20  SpO2:  [92 %-97 %] 94 %  BP: (115-177)/(55-91) 152/72     Weight: 118.5 kg (261 lb 3.9 oz)  Body mass index is 40.92 kg/m².    SpO2: (!) 94 %  O2 Device (Oxygen Therapy): BiPAP    No intake or output data in the 24 hours ending 02/01/18 0843    Lines/Drains/Airways     Peripheral Intravenous Line                 Peripheral IV - Single Lumen 01/30/18 Left Hand 2 days                Physical Exam  General appearance: somnolent, confused  Head: Normocephalic, without obvious abnormality, atraumatic  Eyes: conjunctivae/corneas clear. PERRL  Neck: no carotid bruit, no JVD  and supple, symmetrical, trachea midline  Lungs: coarse rales bilaterally, shallow inspirations  Chest Wall: no tenderness  Heart: regular rate and rhythm, S1, S2 normal, no murmur, click, rub or gallop  Abdomen: soft, non-tender; bowel sounds normal; no masses,  no organomegaly  Extremities: Extremities normal, atraumatic, no cyanosis, clubbing, trace BLE edema   Pulses: Dorsalis Pedis R: 2+ (normal)/L: 2+ (normal)  Skin: Skin color, texture, turgor normal. No rashes or lesions  Neurologic: Normal mood and affect  Alert and oriented X 3  Significant Labs:   Blood Culture: No results for input(s): LABBLOO in the last 48 hours., BMP:   Recent Labs  Lab 01/31/18  0626   *      K 4.5   CL 96   CO2 32*   BUN 27*   CREATININE 0.8   CALCIUM 9.2   , CMP   Recent Labs  Lab 01/31/18  0626      K 4.5   CL 96   CO2 32*   *   BUN 27*   CREATININE 0.8   CALCIUM 9.2   PROT 7.4   ALBUMIN 3.1*   BILITOT 1.0   ALKPHOS 62   AST 17   ALT 32   ANIONGAP 10   ESTGFRAFRICA >60   EGFRNONAA >60   , CBC   Recent Labs  Lab 01/31/18  0626   WBC 12.96*   HGB 11.6*   HCT 37.1      , INR No results for input(s): INR, PROTIME in the last 48 hours., Lipid Panel No results for input(s): CHOL, HDL, LDLCALC, TRIG, CHOLHDL in the last 48 hours.,   Pathology Results  (Last 10 years)    None       and Troponin   Recent Labs  Lab 01/30/18  1802 01/31/18  0626 01/31/18  1306   TROPONINI 0.010 0.017 0.018       Significant Imaging: Cardiac Cath: , CT scan: CT ABDOMEN PELVIS WITH CONTRAST: No results found for this visit on 01/30/18. and CT ABDOMEN PELVIS WITHOUT CONTRAST: No results found for this visit on 01/30/18., Echocardiogram:   2D echo with color flow doppler:   Results for orders placed or performed during the hospital encounter of 01/30/18   2D echo with color flow doppler   Result Value Ref Range    EF 55 55 - 65    Est. PA Systolic Pressure 31.81     Tricuspid Valve Regurgitation TRIVIAL TO MILD    , EKG: , Stress  Test:  and X-Ray: CXR: X-Ray Chest 1 View (CXR):   Results for orders placed or performed during the hospital encounter of 01/30/18   X-Ray Chest 1 View    Narrative    Chest, 1 view: The heart isn't enlarged.  The pulmonary vasculature with mild pulmonary edema.  No definite pleural effusions.  The skeletal structures are intact.    Impression     Suspected cardiac decompensation.      Electronically signed by: Lacey Donovan MD  Date:     01/30/18  Time:    08:58      Assessment and Plan:       Active Diagnoses:    Diagnosis Date Noted POA    PRINCIPAL PROBLEM:  Acute on chronic congestive heart failure [I50.9] 01/30/2018 Yes    Acute respiratory failure with hypoxia and hypercarbia [J96.01, J96.02] 01/31/2018 Yes    Chronic atrial fibrillation [I48.2] 01/30/2018 Yes    HTN (hypertension) [I10] 08/08/2012 Yes    Hyperlipidemia [E78.5] 08/08/2012 Yes    Hypothyroidism [E03.9] 08/08/2012 Yes      Problems Resolved During this Admission:    Diagnosis Date Noted Date Resolved POA       VTE Risk Mitigation         Ordered     enoxaparin injection 120 mg  Every 12 hours (non-standard times)     Route:  Subcutaneous        01/31/18 1236     Medium Risk of VTE  Once      01/30/18 1113     Place sequential compression device  Until discontinued      01/30/18 1113        Current Facility-Administered Medications   Medication    acetaminophen tablet 650 mg    amLODIPine tablet 2.5 mg    atorvastatin tablet 10 mg    clopidogrel tablet 75 mg    enoxaparin injection 120 mg    furosemide injection 40 mg    ipratropium 0.02 % nebulizer solution 0.5 mg    levalbuterol nebulizer solution 1.25 mg    levothyroxine tablet 100 mcg    losartan tablet 100 mg    metoprolol succinate (TOPROL-XL) 24 hr tablet 200 mg    ondansetron injection 4 mg    pantoprazole EC tablet 40 mg    promethazine (PHENERGAN) 12.5 mg in dextrose 5 % 50 mL IVPB       Confusion with CO2 narcosis/COPD,JOY  AFIB v/s wandering PM; now  anticoagulated  Acute diastolic CHF due to decreased med compliance/high BP  Also suspect some bronchitis with high WBC chills and cough  Worsening BARGER and orthopnea for the past 3 days- hasnt taken prescribed lasix x 4 days.   Chest X ray with pulmonary edema, elevated BNP, mild volume overload on exam. CT shows Ground glass opacities, NO PE   Echo 1/18: EF 60%, stage 1 diastolic dysfunction, no significant valvular pathology.   MPI 1/2013- mild apical reversible ischemia.   Carotid US- less than 40% bilateral 2017     PLAN:iv lasix  Increase losartan to 100 mgqd  Continue amlodipin, atorvastatin, plavix,metoprolol  dvt prophylaxis  Watch for sepsis/Pneumonia  May need to be intubated; pt is full code per daughter    Bakari Strauss, KAUSHALP  Cardiology  [facility]  I attest that I have personally seen and examined this patient. I have reviewed and discussed the management in detail as outlined above.

## 2018-02-01 NOTE — PLAN OF CARE
Problem: Patient Care Overview  Goal: Plan of Care Review  Outcome: Ongoing (interventions implemented as appropriate)  Pt daughter agree with plan of care.  Pt is confused and mumbles.  Pt cannot swallow meds.  Dr. Jain aware.  Pt on BIPAP.  Will recheck pt ABG this am as ordered by dr. Jain. VS stable. Pt turned [id number].  Call bell in reach.  Bed alarm on.  Pt is incontinent of urine and stool. Will continue to monitor.

## 2018-02-01 NOTE — PROGRESS NOTES
[facility] Hospital Medicine  Progress Note    Patient Name: Stephany Skinner  MRN: [id number]  Patient Class: IP- Inpatient   Admission Date: 1/30/2018  Length of Stay: 2 days  Attending Physician: Za Sandra MD  Primary Care Provider: Pipo Flowers MD        Subjective:     Principal Problem:Acute respiratory failure with hypoxia and hypercarbia    HPI:  Stephany Skinner is a 83 y.o. female  Who is a patient of Dr ambriz and Dr cardenas .  Today was her second visit to ER .  She lives with her sister who reported that she is becoming more short winded for last few days.  She missed her lasix 40 mg daily for last 4 days. + PND, + dyspnea at rest .  + leg swelling is worsening .reports no chest pains   C/o neck pains   Can not recall weight gain .    Placed in observation for CHF.  BNP high   CXR is  suggestive of pulmonary edema        Hospital Course:  1/31/18  84 YO WF admitted for tx of CHF  1-30-18; day 3; She reported missing  her lasix 40 mg daily for last 4 days prior to admit; Now receiving IV lasix twice daily. Continues to have some LE edema and BARGER.   This am on rounds pt developed worsening SOB, now requiring Bipap. Noted D-dimer elevated;  Lab Results   Component Value Date    DDIMER 1.05 (H) 01/30/2018   Pt with known chronic afib- on plavix only per cards. CT of chest ordered to rule out PE ordered.     Echo pending. (last Echo ?2012).   Also noted to have ^WBCs on admit; trending down 12.96 from 14.44 on admission; Dr. Cardenas suggesting possible bronchitis. U/A- ok.  I/O yesterday-  -640, wt 261. Today-pending    2/1/18 CT of chest negative for PE yesterday. Lovenox initiated per cards for recent new onset afib vs atrial tach per Dr. Cardenas. Pt with continued SOB requiring BIPAP; decreased LOC overnight. ABGs demonstrating ^CO2; required BIPAP adjustment;  ABG    Recent Labs  Lab 01/31/18  2235   PH 7.35   PO2 63   PCO2 72*   HCO3 39.70   BE 11.40   repeat ABGS this am with  continued ^PCO2- 74 -Dr. Jain recommending RR ^  More awake now - opening eyes and taking in meds PO.  Sats have been good.    Past Medical History:   Diagnosis Date    GERD (gastroesophageal reflux disease)     Hyperlipidemia     Hypertension        Past Surgical History:   Procedure Laterality Date    CHOLECYSTECTOMY      HERNIA REPAIR      HYSTERECTOMY      knee replacemene      deidra    THYROIDECTOMY         Review of patient's allergies indicates:   Allergen Reactions    Statins-hmg-coa reductase inhibitors      Other reaction(s): Unknown       No current facility-administered medications on file prior to encounter.      Current Outpatient Prescriptions on File Prior to Encounter   Medication Sig    amlodipine (NORVASC) 2.5 MG tablet Take 1 tablet (2.5 mg total) by mouth once daily.    atorvastatin (LIPITOR) 10 MG tablet Take 1 tablet (10 mg total) by mouth every evening.    atorvastatin (LIPITOR) 20 MG tablet     clopidogrel (PLAVIX) 75 mg tablet Take 1 tablet (75 mg total) by mouth once daily.    cyanocobalamin (VITAMIN B-12) 100 MCG tablet Take 100 mcg by mouth once daily.    furosemide (LASIX) 40 MG tablet Take 1 tablet (40 mg total) by mouth once daily.    levothyroxine (SYNTHROID) 100 MCG tablet TAKE ONE TABLET BY MOUTH EVERY DAY    losartan (COZAAR) 100 MG tablet     meloxicam (MOBIC) 7.5 MG tablet TAKE ONE TABLET BY MOUTH EVERY OTHER DAY    metoprolol succinate (TOPROL-XL) 200 MG 24 hr tablet Take 1 tablet (200 mg total) by mouth once daily.    omega-3 acid ethyl esters (LOVAZA) 1 gram capsule Take 2 capsules (2 g total) by mouth 2 (two) times daily.    potassium chloride SA (K-DUR,KLOR-CON) 20 MEQ tablet Take 1 tablet (20 mEq total) by mouth once daily.    ranitidine (ZANTAC) 150 MG tablet Take 1 tablet (150 mg total) by mouth 2 (two) times daily.    VESICARE 10 mg tablet TAKE ONE TABLET BY MOUTH EVERY DAY    nitroGLYCERIN (NITROSTAT) 0.4 MG SL tablet Place 0.4 mg under the  tongue every 5 (five) minutes as needed.     Family History     Problem Relation (Age of Onset)    Cancer Sister        Social History Main Topics    Smoking status: Never Smoker    Smokeless tobacco: Never Used    Alcohol use No    Drug use: No    Sexual activity: Not on file     Review of Systems   Unable to perform ROS: Other (pt too SOB to speak )     Objective:     Vital Signs (Most Recent):  Temp: 99.4 °F (37.4 °C) (02/01/18 0832)  Pulse: 79 (02/01/18 1007)  Resp: 20 (02/01/18 0832)  BP: (!) 152/72 (02/01/18 0832)  SpO2: (!) 94 % (02/01/18 0832) Vital Signs (24h Range):  Temp:  [97.2 °F (36.2 °C)-99.6 °F (37.6 °C)] 99.4 °F (37.4 °C)  Pulse:  [67-99] 79  Resp:  [16-30] 20  SpO2:  [92 %-97 %] 94 %  BP: (115-174)/(55-84) 152/72     Weight: 118.5 kg (261 lb 3.9 oz)  Body mass index is 40.92 kg/m².    Physical Exam   Constitutional: She is oriented to person, place, and time. She appears well-developed and well-nourished. She appears distressed.   She is in resp distress    HENT:   Head: Normocephalic and atraumatic.   Eyes: Pupils are equal, round, and reactive to light.   Neck: Normal range of motion. Neck supple.   Cardiovascular: Normal rate and normal heart sounds.  An irregularly irregular rhythm present.   Pulmonary/Chest: Effort normal and breath sounds normal.   Very poor tidal volume  End exp wheeze    Abdominal: Soft. Bowel sounds are normal. There is no tenderness.   Musculoskeletal: She exhibits no edema.   Neurological: She is alert and oriented to person, place, and time.   Skin: Skin is warm and dry.   Psychiatric: She has a normal mood and affect. Her behavior is normal. Judgment and thought content normal.   Nursing note and vitals reviewed.        CRANIAL NERVES     CN III, IV, VI   Pupils are equal, round, and reactive to light.       Significant Labs:   CBC:   Lab Results   Component Value Date    WBC 11.63 02/01/2018    HGB 11.6 (L) 02/01/2018    HCT 37.2 02/01/2018     (H)  02/01/2018     02/01/2018           urinalysis- negative WBC  Rapid flu A/B negative  CMP:     Recent Labs  Lab 01/31/18  0626 02/01/18  0908    139   K 4.5 4.7   CL 96 97   CO2 32* 32*   * 139*   BUN 27* 39*   CREATININE 0.8 0.9   CALCIUM 9.2 9.4   PROT 7.4 7.1   ALBUMIN 3.1* 2.7*   BILITOT 1.0 1.2*   ALKPHOS 62 57   AST 17 18   ALT 32 29   ANIONGAP 10 10   EGFRNONAA >60 60     Troponin:     Recent Labs  Lab 01/30/18  1802 01/31/18  0626 01/31/18  1306   TROPONINI 0.010 0.017 0.018     BNP    Recent Labs  Lab 02/01/18  0908   *       TSH:     Recent Labs  Lab 11/28/17  1411   TSH 4.176*     Lab Results   Component Value Date    DDIMER 1.05 (H) 01/30/2018       EKG:  AFIB     Significant Imaging:  Chest, 1 view: The heart isn't enlarged.  The pulmonary vasculature with mild pulmonary edema.  No definite pleural effusions.  The skeletal structures are intactInterval History: see hc    Review of Systems   Unable to perform ROS: Other (pt too SOB to speak )     Objective:     Vital Signs (Most Recent):  Temp: 97.2 °F (36.2 °C) (02/01/18 0330)  Pulse: 89 (02/01/18 0641)  Resp: (!) 23 (02/01/18 0641)  BP: (!) 115/59 (02/01/18 0329)  SpO2: 97 % (left index finger) (02/01/18 0641) Vital Signs (24h Range):  Temp:  [97.2 °F (36.2 °C)-99.6 °F (37.6 °C)] 97.2 °F (36.2 °C)  Pulse:  [] 89  Resp:  [16-31] 23  SpO2:  [92 %-97 %] 97 %  BP: (115-177)/(55-91) 115/59     Weight: 118.5 kg (261 lb 3.9 oz)  Body mass index is 40.92 kg/m².  No intake or output data in the 24 hours ending 02/01/18 0808   Physical Exam   Constitutional: She is oriented to person, place, and time. She appears well-developed. No distress.   Asleep but opens eyes to command   HENT:   Head: Normocephalic and atraumatic.   Eyes: Pupils are equal, round, and reactive to light.   Neck: Normal range of motion. Neck supple.   Cardiovascular: Normal heart sounds.  An irregularly irregular rhythm present.   Distant heart sounds    Pulmonary/Chest: Effort normal and breath sounds normal.   Very poor tidal volume  End exp wheeze     On BiPap   Abdominal: Soft. Bowel sounds are normal. There is no tenderness.   Musculoskeletal: She exhibits no edema.   Neurological: She is alert and oriented to person, place, and time.   Skin: Skin is warm and dry.   Nursing note and vitals reviewed.      Significant Labs:   ABGs:   Recent Labs  Lab 01/31/18  2235   PH 7.35   PCO2 72*   HCO3 39.70   POCSATURATED 96.2   BE 11.40   TOTALHB 12.0   COHB 2.3   METHB 1.1     CBC:   Recent Labs  Lab 01/30/18  0846 01/31/18  0626   WBC 14.44* 12.96*   HGB 12.3 11.6*   HCT 38.5 37.1    243     CMP:   Recent Labs  Lab 01/30/18  0846 01/31/18  0626    138   K 4.4 4.5   CL 97 96   CO2 32* 32*   * 162*   BUN 24* 27*   CREATININE 0.8 0.8   CALCIUM 9.0 9.2   PROT 7.4 7.4   ALBUMIN 3.2* 3.1*   BILITOT 0.9 1.0   ALKPHOS 63 62   AST 23 17   ALT 36 32   ANIONGAP 8 10   EGFRNONAA >60 >60       Significant Imaging;1.  CT chest 1/31/18-No CT evidence of central pulmonary embolus.  Severely limited study due to use motion artifact.  2.  Bilateral groundglass opacities and minimal dependent subsegmental atelectasis.  CXR - 1-30-18  Chest, 1 view: The heart isn't enlarged.  The pulmonary vasculature with mild pulmonary edema.  No definite pleural effusions.  The skeletal structures are intact.   Impression      Suspected cardiac decompensation.            Assessment/Plan:      * Acute respiratory failure with hypoxia and hypercarbia    Continue  Bipap and continue supplemental oxygen   Add xopenex q 6 hours   Follow pulmonary recs per Dr. Watson   Procalcitonin.  Check resp panel.  Add in azithromycin to cover atypical pna/bacterial bronchitis. Ground glass pattern on CT noted.          Chronic atrial fibrillation    Continue metoprolol, plavix   Dr cardenas following discussed Afib; he reports fairly new dx, plavix was for previous + abnormal stress test; pt deferred  angiography and had poor Asa tolerance.  Lovenox 1mg/kg sq twice daily started yesterday for afib vs Atrial tach          Acute on chronic congestive heart failure    Supplemental O2 via nasal canula; titrate O2 saturation to >92%.  Serial Cardiac enzymes × 3.  Diuretic administration. Monitor electrolytes for hypokalemia and hypomagnesemia.  Cardiology Consultation- Dr. Patel following   2-D Echocardiogram for evaluation of left ventricle systolic function or any valvular heart disease- ordered today (not bad at all)  Daily weights.  Strict I/Os.  Fluid restriction.  Na restriction <2g/d.      IV lasix 40 mg bid          HTN (hypertension)      Continue losartan, metoprolol and amlodipine  Follow cards recs        Hyperlipidemia    Continue atorvastatin          Hypothyroidism    TSH almost in range   Continue present dose             JOY (obstructive sleep apnea)    On BiPap            VTE Risk Mitigation         Ordered     enoxaparin injection 120 mg  Every 12 hours (non-standard times)     Route:  Subcutaneous        01/31/18 1236     Medium Risk of VTE  Once      01/30/18 1113     Place sequential compression device  Until discontinued      01/30/18 1113              Krysten Marquez MD  Department of Hospital Medicine   [facility]

## 2018-02-01 NOTE — ASSESSMENT & PLAN NOTE
Continue  Bipap and continue supplemental oxygen   Add xopenex q 6 hours   Follow pulmonary recs per Dr. Watson   Procalcitonin.  Check resp panel.  Add in azithromycin to cover atypical pna/bacterial bronchitis. Ground glass pattern on CT noted.

## 2018-02-01 NOTE — PLAN OF CARE
Problem: Fall Risk (Adult)  Goal: Absence of Falls  Patient will demonstrate the desired outcomes by discharge/transition of care.   Outcome: Ongoing (interventions implemented as appropriate)  Pt free of falls/incidents. Fall precautions maintained.    Problem: Patient Care Overview  Goal: Plan of Care Review  Outcome: Ongoing (interventions implemented as appropriate)  Ms. Hammond is looking much better this shift. Was able to swallow her pills whole without difficulty. Took sips of clear liquids & milk today. Mouth care performed. IV abx infused x1 dose per MD order. Pt & family aware of plan of care. No questions or concerns at this time.

## 2018-02-01 NOTE — SUBJECTIVE & OBJECTIVE
Past Medical History:   Diagnosis Date    GERD (gastroesophageal reflux disease)     Hyperlipidemia     Hypertension        Past Surgical History:   Procedure Laterality Date    CHOLECYSTECTOMY      HERNIA REPAIR      HYSTERECTOMY      knee replacemene      deidra    THYROIDECTOMY         Review of patient's allergies indicates:   Allergen Reactions    Statins-hmg-coa reductase inhibitors      Other reaction(s): Unknown       No current facility-administered medications on file prior to encounter.      Current Outpatient Prescriptions on File Prior to Encounter   Medication Sig    amlodipine (NORVASC) 2.5 MG tablet Take 1 tablet (2.5 mg total) by mouth once daily.    atorvastatin (LIPITOR) 10 MG tablet Take 1 tablet (10 mg total) by mouth every evening.    atorvastatin (LIPITOR) 20 MG tablet     clopidogrel (PLAVIX) 75 mg tablet Take 1 tablet (75 mg total) by mouth once daily.    cyanocobalamin (VITAMIN B-12) 100 MCG tablet Take 100 mcg by mouth once daily.    furosemide (LASIX) 40 MG tablet Take 1 tablet (40 mg total) by mouth once daily.    levothyroxine (SYNTHROID) 100 MCG tablet TAKE ONE TABLET BY MOUTH EVERY DAY    losartan (COZAAR) 100 MG tablet     meloxicam (MOBIC) 7.5 MG tablet TAKE ONE TABLET BY MOUTH EVERY OTHER DAY    metoprolol succinate (TOPROL-XL) 200 MG 24 hr tablet Take 1 tablet (200 mg total) by mouth once daily.    omega-3 acid ethyl esters (LOVAZA) 1 gram capsule Take 2 capsules (2 g total) by mouth 2 (two) times daily.    potassium chloride SA (K-DUR,KLOR-CON) 20 MEQ tablet Take 1 tablet (20 mEq total) by mouth once daily.    ranitidine (ZANTAC) 150 MG tablet Take 1 tablet (150 mg total) by mouth 2 (two) times daily.    VESICARE 10 mg tablet TAKE ONE TABLET BY MOUTH EVERY DAY    nitroGLYCERIN (NITROSTAT) 0.4 MG SL tablet Place 0.4 mg under the tongue every 5 (five) minutes as needed.     Family History     Problem Relation (Age of Onset)    Cancer Sister        Social  History Main Topics    Smoking status: Never Smoker    Smokeless tobacco: Never Used    Alcohol use No    Drug use: No    Sexual activity: Not on file     Review of Systems   Unable to perform ROS: Other (pt too SOB to speak )     Objective:     Vital Signs (Most Recent):  Temp: 99.4 °F (37.4 °C) (02/01/18 0832)  Pulse: 79 (02/01/18 1007)  Resp: 20 (02/01/18 0832)  BP: (!) 152/72 (02/01/18 0832)  SpO2: (!) 94 % (02/01/18 0832) Vital Signs (24h Range):  Temp:  [97.2 °F (36.2 °C)-99.6 °F (37.6 °C)] 99.4 °F (37.4 °C)  Pulse:  [67-99] 79  Resp:  [16-30] 20  SpO2:  [92 %-97 %] 94 %  BP: (115-174)/(55-84) 152/72     Weight: 118.5 kg (261 lb 3.9 oz)  Body mass index is 40.92 kg/m².    Physical Exam   Constitutional: She is oriented to person, place, and time. She appears well-developed and well-nourished. She appears distressed.   She is in resp distress    HENT:   Head: Normocephalic and atraumatic.   Eyes: Pupils are equal, round, and reactive to light.   Neck: Normal range of motion. Neck supple.   Cardiovascular: Normal rate and normal heart sounds.  An irregularly irregular rhythm present.   Pulmonary/Chest: Effort normal and breath sounds normal.   Very poor tidal volume  End exp wheeze    Abdominal: Soft. Bowel sounds are normal. There is no tenderness.   Musculoskeletal: She exhibits no edema.   Neurological: She is alert and oriented to person, place, and time.   Skin: Skin is warm and dry.   Psychiatric: She has a normal mood and affect. Her behavior is normal. Judgment and thought content normal.   Nursing note and vitals reviewed.        CRANIAL NERVES     CN III, IV, VI   Pupils are equal, round, and reactive to light.       Significant Labs:   CBC:   Lab Results   Component Value Date    WBC 11.63 02/01/2018    HGB 11.6 (L) 02/01/2018    HCT 37.2 02/01/2018     (H) 02/01/2018     02/01/2018           urinalysis- negative WBC  Rapid flu A/B negative  CMP:     Recent Labs  Lab 01/31/18  0671  02/01/18  0908    139   K 4.5 4.7   CL 96 97   CO2 32* 32*   * 139*   BUN 27* 39*   CREATININE 0.8 0.9   CALCIUM 9.2 9.4   PROT 7.4 7.1   ALBUMIN 3.1* 2.7*   BILITOT 1.0 1.2*   ALKPHOS 62 57   AST 17 18   ALT 32 29   ANIONGAP 10 10   EGFRNONAA >60 60     Troponin:     Recent Labs  Lab 01/30/18  1802 01/31/18  0626 01/31/18  1306   TROPONINI 0.010 0.017 0.018     BNP    Recent Labs  Lab 02/01/18  0908   *       TSH:     Recent Labs  Lab 11/28/17  1411   TSH 4.176*     Lab Results   Component Value Date    DDIMER 1.05 (H) 01/30/2018       EKG:  AFIB     Significant Imaging:  Chest, 1 view: The heart isn't enlarged.  The pulmonary vasculature with mild pulmonary edema.  No definite pleural effusions.  The skeletal structures are intactInterval History: see hc    Review of Systems   Unable to perform ROS: Other (pt too SOB to speak )     Objective:     Vital Signs (Most Recent):  Temp: 97.2 °F (36.2 °C) (02/01/18 0330)  Pulse: 89 (02/01/18 0641)  Resp: (!) 23 (02/01/18 0641)  BP: (!) 115/59 (02/01/18 0329)  SpO2: 97 % (left index finger) (02/01/18 0641) Vital Signs (24h Range):  Temp:  [97.2 °F (36.2 °C)-99.6 °F (37.6 °C)] 97.2 °F (36.2 °C)  Pulse:  [] 89  Resp:  [16-31] 23  SpO2:  [92 %-97 %] 97 %  BP: (115-177)/(55-91) 115/59     Weight: 118.5 kg (261 lb 3.9 oz)  Body mass index is 40.92 kg/m².  No intake or output data in the 24 hours ending 02/01/18 0808   Physical Exam   Constitutional: She is oriented to person, place, and time. She appears well-developed. No distress.   Asleep but opens eyes to command   HENT:   Head: Normocephalic and atraumatic.   Eyes: Pupils are equal, round, and reactive to light.   Neck: Normal range of motion. Neck supple.   Cardiovascular: Normal heart sounds.  An irregularly irregular rhythm present.   Distant heart sounds   Pulmonary/Chest: Effort normal and breath sounds normal.   Very poor tidal volume  End exp wheeze     On BiPap   Abdominal: Soft. Bowel  sounds are normal. There is no tenderness.   Musculoskeletal: She exhibits no edema.   Neurological: She is alert and oriented to person, place, and time.   Skin: Skin is warm and dry.   Nursing note and vitals reviewed.      Significant Labs:   ABGs:   Recent Labs  Lab 01/31/18  2235   PH 7.35   PCO2 72*   HCO3 39.70   POCSATURATED 96.2   BE 11.40   TOTALHB 12.0   COHB 2.3   METHB 1.1     CBC:   Recent Labs  Lab 01/30/18  0846 01/31/18  0626   WBC 14.44* 12.96*   HGB 12.3 11.6*   HCT 38.5 37.1    243     CMP:   Recent Labs  Lab 01/30/18  0846 01/31/18  0626    138   K 4.4 4.5   CL 97 96   CO2 32* 32*   * 162*   BUN 24* 27*   CREATININE 0.8 0.8   CALCIUM 9.0 9.2   PROT 7.4 7.4   ALBUMIN 3.2* 3.1*   BILITOT 0.9 1.0   ALKPHOS 63 62   AST 23 17   ALT 36 32   ANIONGAP 8 10   EGFRNONAA >60 >60       Significant Imaging;1.  CT chest 1/31/18-No CT evidence of central pulmonary embolus.  Severely limited study due to use motion artifact.  2.  Bilateral groundglass opacities and minimal dependent subsegmental atelectasis.  CXR - 1-30-18  Chest, 1 view: The heart isn't enlarged.  The pulmonary vasculature with mild pulmonary edema.  No definite pleural effusions.  The skeletal structures are intact.   Impression      Suspected cardiac decompensation.

## 2018-02-01 NOTE — PLAN OF CARE
Problem: Patient Care Overview  Goal: Plan of Care Review  Patient doing well with breathing treatments and tolerating BiPAP well at this time.  ABG's have improved.  SaO2 88-94% with BiPAP in use at 40% FiO2.  Patient resting quietly at this time / no distress noted. Breath sounds are diminished throughout.

## 2018-02-02 LAB
ALLENS TEST: ABNORMAL
ALLENS TEST: ABNORMAL
ANION GAP SERPL CALC-SCNC: 11 MMOL/L
BASOPHILS # BLD AUTO: 0 K/UL
BASOPHILS NFR BLD: 0 %
BUN SERPL-MCNC: 61 MG/DL
CALCIUM SERPL-MCNC: 9.7 MG/DL
CHLORIDE SERPL-SCNC: 97 MMOL/L
CO2 SERPL-SCNC: 33 MMOL/L
CREAT SERPL-MCNC: 1.3 MG/DL
DELSYS: ABNORMAL
DELSYS: ABNORMAL
DIFFERENTIAL METHOD: ABNORMAL
EOSINOPHIL # BLD AUTO: 0 K/UL
EOSINOPHIL NFR BLD: 0.1 %
ERYTHROCYTE [DISTWIDTH] IN BLOOD BY AUTOMATED COUNT: 14.6 %
EST. GFR  (AFRICAN AMERICAN): 44 ML/MIN/1.73 M^2
EST. GFR  (NON AFRICAN AMERICAN): 38 ML/MIN/1.73 M^2
GLUCOSE SERPL-MCNC: 120 MG/DL
HCO3 UR-SCNC: 33.5 MMOL/L (ref 22–26)
HCO3 UR-SCNC: 38.1 MMOL/L (ref 22–26)
HCT VFR BLD AUTO: 37.8 %
HGB BLD-MCNC: 11.9 G/DL
LYMPHOCYTES # BLD AUTO: 0.6 K/UL
LYMPHOCYTES NFR BLD: 5.9 %
MCH RBC QN AUTO: 31.3 PG
MCHC RBC AUTO-ENTMCNC: 31.5 G/DL
MCV RBC AUTO: 100 FL
MONOCYTES # BLD AUTO: 1.2 K/UL
MONOCYTES NFR BLD: 11.5 %
NEUTROPHILS # BLD AUTO: 8.6 K/UL
NEUTROPHILS NFR BLD: 82.5 %
PCO2 BLDA: 45 MMHG (ref 35–45)
PCO2 BLDA: 69 MMHG (ref 35–45)
PH SMN: 7.35 [PH] (ref 7.35–7.45)
PH SMN: 7.48 [PH] (ref 7.35–7.45)
PLATELET # BLD AUTO: 234 K/UL
PMV BLD AUTO: 8.9 FL
PO2 BLDA: 117 MMHG (ref 75–100)
PO2 BLDA: 60 MMHG (ref 75–100)
POC BE: 10 MMOL/L (ref -2–2)
POC BE: 8.8 MMOL/L (ref -2–2)
POC COHB: 1.5 % (ref 0–3)
POC COHB: 1.8 % (ref 0–3)
POC METHB: 1.1 % (ref 0–1.5)
POC METHB: 1.2 % (ref 0–1.5)
POC O2HB ARTERIAL: 92.8 % (ref 94–100)
POC O2HB ARTERIAL: 96.4 % (ref 94–100)
POC SATURATED O2: 95.7 % (ref 90–100)
POC SATURATED O2: 99.1 % (ref 90–100)
POC TCO2: 34.9 MMOL/L
POC TCO2: 40.2 MMOL/L
POC THB: 12.3 G/DL (ref 12–18)
POC THB: 13.1 G/DL (ref 12–18)
POTASSIUM SERPL-SCNC: 4.3 MMOL/L
RBC # BLD AUTO: 3.8 M/UL
SITE: ABNORMAL
SITE: ABNORMAL
SODIUM SERPL-SCNC: 141 MMOL/L
T4 FREE SERPL-MCNC: 1.1 NG/DL
TSH SERPL DL<=0.005 MIU/L-ACNC: 0.37 UIU/ML
WBC # BLD AUTO: 10.44 K/UL

## 2018-02-02 PROCEDURE — 94761 N-INVAS EAR/PLS OXIMETRY MLT: CPT

## 2018-02-02 PROCEDURE — 99232 SBSQ HOSP IP/OBS MODERATE 35: CPT | Mod: ,,, | Performed by: FAMILY MEDICINE

## 2018-02-02 PROCEDURE — 80048 BASIC METABOLIC PNL TOTAL CA: CPT

## 2018-02-02 PROCEDURE — 63600175 PHARM REV CODE 636 W HCPCS: Performed by: NURSE PRACTITIONER

## 2018-02-02 PROCEDURE — 99223 1ST HOSP IP/OBS HIGH 75: CPT | Mod: ,,, | Performed by: PSYCHIATRY & NEUROLOGY

## 2018-02-02 PROCEDURE — 82803 BLOOD GASES ANY COMBINATION: CPT | Performed by: FAMILY MEDICINE

## 2018-02-02 PROCEDURE — 99900035 HC TECH TIME PER 15 MIN (STAT)

## 2018-02-02 PROCEDURE — 36415 COLL VENOUS BLD VENIPUNCTURE: CPT

## 2018-02-02 PROCEDURE — 25000242 PHARM REV CODE 250 ALT 637 W/ HCPCS: Performed by: INTERNAL MEDICINE

## 2018-02-02 PROCEDURE — 63600175 PHARM REV CODE 636 W HCPCS: Performed by: FAMILY MEDICINE

## 2018-02-02 PROCEDURE — 63600175 PHARM REV CODE 636 W HCPCS: Performed by: INTERNAL MEDICINE

## 2018-02-02 PROCEDURE — 85025 COMPLETE CBC W/AUTO DIFF WBC: CPT

## 2018-02-02 PROCEDURE — 94660 CPAP INITIATION&MGMT: CPT

## 2018-02-02 PROCEDURE — 11000001 HC ACUTE MED/SURG PRIVATE ROOM

## 2018-02-02 PROCEDURE — 94010 BREATHING CAPACITY TEST: CPT

## 2018-02-02 PROCEDURE — 94640 AIRWAY INHALATION TREATMENT: CPT

## 2018-02-02 PROCEDURE — 27000221 HC OXYGEN, UP TO 24 HOURS

## 2018-02-02 PROCEDURE — 36600 WITHDRAWAL OF ARTERIAL BLOOD: CPT

## 2018-02-02 PROCEDURE — 83519 RIA NONANTIBODY: CPT | Mod: 59

## 2018-02-02 PROCEDURE — 84443 ASSAY THYROID STIM HORMONE: CPT

## 2018-02-02 PROCEDURE — 84439 ASSAY OF FREE THYROXINE: CPT

## 2018-02-02 PROCEDURE — 25000003 PHARM REV CODE 250: Performed by: INTERNAL MEDICINE

## 2018-02-02 PROCEDURE — 82803 BLOOD GASES ANY COMBINATION: CPT | Performed by: INTERNAL MEDICINE

## 2018-02-02 RX ORDER — FUROSEMIDE 10 MG/ML
20 INJECTION INTRAMUSCULAR; INTRAVENOUS ONCE
Status: COMPLETED | OUTPATIENT
Start: 2018-02-02 | End: 2018-02-02

## 2018-02-02 RX ORDER — PYRIDOSTIGMINE BROMIDE 60 MG/1
60 TABLET ORAL 3 TIMES DAILY
Status: DISCONTINUED | OUTPATIENT
Start: 2018-02-02 | End: 2018-02-07 | Stop reason: HOSPADM

## 2018-02-02 RX ADMIN — METHYLPREDNISOLONE SODIUM SUCCINATE 40 MG: 40 INJECTION, POWDER, FOR SOLUTION INTRAMUSCULAR; INTRAVENOUS at 11:02

## 2018-02-02 RX ADMIN — IPRATROPIUM BROMIDE 0.5 MG: 0.5 SOLUTION RESPIRATORY (INHALATION) at 07:02

## 2018-02-02 RX ADMIN — ENOXAPARIN SODIUM 120 MG: 150 INJECTION SUBCUTANEOUS at 05:02

## 2018-02-02 RX ADMIN — IPRATROPIUM BROMIDE 0.5 MG: 0.5 SOLUTION RESPIRATORY (INHALATION) at 12:02

## 2018-02-02 RX ADMIN — METHYLPREDNISOLONE SODIUM SUCCINATE 40 MG: 40 INJECTION, POWDER, FOR SOLUTION INTRAMUSCULAR; INTRAVENOUS at 05:02

## 2018-02-02 RX ADMIN — LEVALBUTEROL 1.25 MG: 1.25 SOLUTION, CONCENTRATE RESPIRATORY (INHALATION) at 07:02

## 2018-02-02 RX ADMIN — LEVALBUTEROL 1.25 MG: 1.25 SOLUTION, CONCENTRATE RESPIRATORY (INHALATION) at 12:02

## 2018-02-02 RX ADMIN — ATORVASTATIN CALCIUM 10 MG: 10 TABLET, FILM COATED ORAL at 11:02

## 2018-02-02 RX ADMIN — FUROSEMIDE 20 MG: 10 INJECTION, SOLUTION INTRAMUSCULAR; INTRAVENOUS at 10:02

## 2018-02-02 NOTE — SUBJECTIVE & OBJECTIVE
Interval History: still lethargic will increase steroids     Objective:     Vital Signs (Most Recent):  Temp: 98.9 °F (37.2 °C) (02/02/18 0756)  Pulse: 95 (02/02/18 0818)  Resp: (!) 23 (02/02/18 0756)  BP: 124/60 (02/02/18 0756)  SpO2: (!) 94 % (02/02/18 0756) Vital Signs (24h Range):  Temp:  [98.3 °F (36.8 °C)-99.4 °F (37.4 °C)] 98.9 °F (37.2 °C)  Pulse:  [77-95] 95  Resp:  [16-28] 23  SpO2:  [89 %-97 %] 94 %  BP: (108-140)/(49-70) 124/60     Weight: 118.5 kg (261 lb 3.9 oz)  Body mass index is 40.92 kg/m².    No intake or output data in the 24 hours ending 02/02/18 0930    Physical Exam   Constitutional: She is oriented to person, place, and time. She appears well-developed and well-nourished. She is cooperative.  Non-toxic appearance. She does not appear ill. No distress.   HENT:   Head: Normocephalic and atraumatic.   Right Ear: Hearing, tympanic membrane, external ear and ear canal normal.   Left Ear: Hearing, tympanic membrane, external ear and ear canal normal.   Nose: Nose normal. No mucosal edema, rhinorrhea or nasal deformity. No epistaxis. Right sinus exhibits no maxillary sinus tenderness and no frontal sinus tenderness. Left sinus exhibits no maxillary sinus tenderness and no frontal sinus tenderness.   Mouth/Throat: Uvula is midline, oropharynx is clear and moist and mucous membranes are normal. No trismus in the jaw. Normal dentition. No uvula swelling. No posterior oropharyngeal erythema.   Eyes: Conjunctivae and lids are normal. No scleral icterus.   Sclera clear bilat   Neck: Trachea normal, full passive range of motion without pain and phonation normal. Neck supple.   Cardiovascular: Normal rate, regular rhythm, normal heart sounds, intact distal pulses and normal pulses.    Pulmonary/Chest: She is in respiratory distress (mild to moderate). She has decreased breath sounds in the right upper field, the right middle field, the right lower field, the left upper field, the left middle field and the  left lower field. She has wheezes in the right lower field and the left lower field. She has rhonchi in the right lower field and the left lower field.           Abdominal: Soft. Normal appearance and bowel sounds are normal. She exhibits no distension. There is no tenderness.   Musculoskeletal: Normal range of motion. She exhibits no edema or deformity.   Neurological: She is alert and oriented to person, place, and time. She exhibits normal muscle tone. Coordination normal.   Skin: Skin is warm, dry and intact. She is not diaphoretic. No pallor.   Psychiatric: She has a normal mood and affect. Her speech is normal and behavior is normal. Judgment and thought content normal. Cognition and memory are normal.   Nursing note and vitals reviewed.      Vents:  Oxygen Concentration (%): 50 (02/02/18 0703)    Lines/Drains/Airways     Peripheral Intravenous Line                 Peripheral IV - Single Lumen 02/01/18 1653 Right Wrist less than 1 day                Significant Labs:    CBC/Anemia Profile:    Recent Labs  Lab 02/01/18  0908 02/02/18  0735   WBC 11.63 10.44   HGB 11.6* 11.9*   HCT 37.2 37.8    234   * 100*   RDW 14.2 14.6*        Chemistries:    Recent Labs  Lab 02/01/18  0908 02/02/18  0735    141   K 4.7 4.3   CL 97 97   CO2 32* 33*   BUN 39* 61*   CREATININE 0.9 1.3   CALCIUM 9.4 9.7   ALBUMIN 2.7*  --    PROT 7.1  --    BILITOT 1.2*  --    ALKPHOS 57  --    ALT 29  --    AST 18  --        All pertinent labs within the past 24 hours have been reviewed.    Significant Imaging:  I have reviewed all pertinent imaging results/findings within the past 24 hours.

## 2018-02-02 NOTE — PLAN OF CARE
02/02/18 0941   Medicare Message   Important Message from Medicare regarding Discharge Appeal Rights Given to patient/caregiver;Explained to patient/caregiver;Signed/date by patient/caregiver   Date IMM was signed 02/02/18   Time IMM was signed 0941

## 2018-02-02 NOTE — PROGRESS NOTES
[facility]  Pulmonology  Progress Note    Patient Name: Stephany Skinner  MRN: [id number]  Admission Date: 1/30/2018  Hospital Length of Stay: 3 days  Code Status: Full Code  Attending Provider: Za Sandra MD  Primary Care Provider: Pipo Flowers MD   Principal Problem: Acute respiratory failure with hypoxia and hypercarbia    Subjective:     Interval History: still lethargic will increase steroids     Objective:     Vital Signs (Most Recent):  Temp: 98.9 °F (37.2 °C) (02/02/18 0756)  Pulse: 95 (02/02/18 0818)  Resp: (!) 23 (02/02/18 0756)  BP: 124/60 (02/02/18 0756)  SpO2: (!) 94 % (02/02/18 0756) Vital Signs (24h Range):  Temp:  [98.3 °F (36.8 °C)-99.4 °F (37.4 °C)] 98.9 °F (37.2 °C)  Pulse:  [77-95] 95  Resp:  [16-28] 23  SpO2:  [89 %-97 %] 94 %  BP: (108-140)/(49-70) 124/60     Weight: 118.5 kg (261 lb 3.9 oz)  Body mass index is 40.92 kg/m².    No intake or output data in the 24 hours ending 02/02/18 0930    Physical Exam   Constitutional: She is oriented to person, place, and time. She appears well-developed and well-nourished. She is cooperative.  Non-toxic appearance. She does not appear ill. No distress.   HENT:   Head: Normocephalic and atraumatic.   Right Ear: Hearing, tympanic membrane, external ear and ear canal normal.   Left Ear: Hearing, tympanic membrane, external ear and ear canal normal.   Nose: Nose normal. No mucosal edema, rhinorrhea or nasal deformity. No epistaxis. Right sinus exhibits no maxillary sinus tenderness and no frontal sinus tenderness. Left sinus exhibits no maxillary sinus tenderness and no frontal sinus tenderness.   Mouth/Throat: Uvula is midline, oropharynx is clear and moist and mucous membranes are normal. No trismus in the jaw. Normal dentition. No uvula swelling. No posterior oropharyngeal erythema.   Eyes: Conjunctivae and lids are normal. No scleral icterus.   Sclera clear bilat   Neck: Trachea normal, full passive range of motion without  pain and phonation normal. Neck supple.   Cardiovascular: Normal rate, regular rhythm, normal heart sounds, intact distal pulses and normal pulses.    Pulmonary/Chest: She is in respiratory distress (mild to moderate). She has decreased breath sounds in the right upper field, the right middle field, the right lower field, the left upper field, the left middle field and the left lower field. She has wheezes in the right lower field and the left lower field. She has rhonchi in the right lower field and the left lower field.           Abdominal: Soft. Normal appearance and bowel sounds are normal. She exhibits no distension. There is no tenderness.   Musculoskeletal: Normal range of motion. She exhibits no edema or deformity.   Neurological: She is alert and oriented to person, place, and time. She exhibits normal muscle tone. Coordination normal.   Skin: Skin is warm, dry and intact. She is not diaphoretic. No pallor.   Psychiatric: She has a normal mood and affect. Her speech is normal and behavior is normal. Judgment and thought content normal. Cognition and memory are normal.   Nursing note and vitals reviewed.      Vents:  Oxygen Concentration (%): 50 (02/02/18 0703)    Lines/Drains/Airways     Peripheral Intravenous Line                 Peripheral IV - Single Lumen 02/01/18 1653 Right Wrist less than 1 day                Significant Labs:    CBC/Anemia Profile:    Recent Labs  Lab 02/01/18  0908 02/02/18  0735   WBC 11.63 10.44   HGB 11.6* 11.9*   HCT 37.2 37.8    234   * 100*   RDW 14.2 14.6*        Chemistries:    Recent Labs  Lab 02/01/18  0908 02/02/18  0735    141   K 4.7 4.3   CL 97 97   CO2 32* 33*   BUN 39* 61*   CREATININE 0.9 1.3   CALCIUM 9.4 9.7   ALBUMIN 2.7*  --    PROT 7.1  --    BILITOT 1.2*  --    ALKPHOS 57  --    ALT 29  --    AST 18  --        All pertinent labs within the past 24 hours have been reviewed.    Significant Imaging:  I have reviewed all pertinent imaging  results/findings within the past 24 hours.    Assessment/Plan:     * Acute respiratory failure with hypoxia and hypercarbia    Check abg today  Improving increased bipap  Elevated co2 with po2 81        Chronic atrial fibrillation    afib with controlled rate secondary to underlying copdn        Acute on chronic congestive heart failure    bnp 400n        Hypothyroidism    On meds         Overweight(278.02)    Morbid obese         JOY (obstructive sleep apnea)    Needs NIVn               Nicolas Watson MD  Pulmonology  [facility]

## 2018-02-02 NOTE — NURSING
Dr. Marquez notified of pt ABG results.  Respiratory increased Bipap pressure to 18/10.  With a  Rate 20.  Will continue to monitor.  is also aware of pt ABG's.

## 2018-02-02 NOTE — ASSESSMENT & PLAN NOTE
Continue  Bipap and continue supplemental oxygen   Add xopenex q 6 hours   Follow pulmonary recs per Dr. Watson   Procalcitonin.  Check resp panel.  Add in azithromycin to cover atypical pna/bacterial bronchitis. Ground glass pattern on CT noted.  Dr Watson is concerned about neuromsucular process--CO2 level has been stable

## 2018-02-02 NOTE — PLAN OF CARE
Discussed plan of care with patient and her daughter. All are in agreement to continue BiPap support at this time.

## 2018-02-02 NOTE — NURSING
Bedside report received from [place].  Vs stable.  No complaints of pain noted.  Call bell in reach.  Pt remains on BIPAP.  Daughter at bedside.  Pt remains confused and mumbles.  Bed alarm on.  Will continue to monitor.

## 2018-02-02 NOTE — PLAN OF CARE
Problem: Patient Care Overview  Goal: Plan of Care Review  Outcome: Ongoing (interventions implemented as appropriate)  Pt daughter agrees with plan of care.  VS stable. Pt has been very sleepy and not arousable earlier this shift.  Dr. Marquez aware and ABG was performed.  Pt  PCO2 was 74.  Pt Bipap pressures were increased by respiratory.  Pt has become more arousable towards end of shift.  Pt turned q 2hours.  Respiratory treatments continued as ordered.  Daughter at bedside.  Will continue to monitor.

## 2018-02-02 NOTE — CONSULTS
[facility]  Neurology  Consult Note    Patient Name: Stephany Skinner  MRN: [id number]  Admission Date: 1/30/2018  Hospital Length of Stay: 3 days  Code Status: Full Code   Attending Provider: Za Sandra MD   Consulting Provider: Katie Ortiz MD  Primary Care Physician: Pipo Flowers MD  Principal Problem:Acute respiratory failure with hypoxia and hypercarbia    Inpatient consult to Neurology  Consult performed by: KATIE ORTIZ  Consult ordered by: PILAR LIU    Inpatient consult to Neurology  Consult performed by: KATIE ORTIZ  Consult ordered by: AVTAR WATSON         Subjective:     Chief Complaint:  hypercapnia    HPI:   Stephany Skinner is a 83 y.o. 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 More recently, I had been seeing her yearly as her back pain was asymptomatic and gait responded to PT.  She was admitted to [facility] for SOB. She denies upper respiratory symptoms and was seen in the ER two days prior to admission. She states she came in for SOB and some head pain (bilateral/global). She has not had fever per her and family.  She has since been covered for bronchitis and has been followed by Dr Watson for Acute respiratory failure with hypoxia and hypercarbia and Dr Patel for acute on chronic CHF thought to be due to decreased med compliance/high BP.  She is n Bipap now.   She is on Plavix for afib which is rate controlled per Dr Patel.   Last pm she got Seroquel (thought to have some anxiety associated with her breathing difficulty) and has been sedated from that today.   Her hypercapnia prompted Dr Watson to request my consult to rule out neuromuscular disorder contributing to her symptoms.   On further questioning she states she has had diplopia for a few  "months but has not seen eye doc.  She feels "somewhat weak" in her muscles but not severely and has been able to ambulate to the Restroom up until 2 days ago.   Family states patient has been a bit confused since being in the hospital.   She was placed on Steroids by pulmonology today and PCO2 improved to 40     Past Medical History:   Diagnosis Date    GERD (gastroesophageal reflux disease)     Hyperlipidemia     Hypertension        Past Surgical History:   Procedure Laterality Date    CHOLECYSTECTOMY      HERNIA REPAIR      HYSTERECTOMY      knee replacemene      deidra    THYROIDECTOMY         Review of patient's allergies indicates:   Allergen Reactions    Statins-hmg-coa reductase inhibitors      Other reaction(s): Unknown     I have reviewed all of this patient's past medical and surgical histories as well as family and social histories and active allergies and medications as documented in the electronic medical record.    No current facility-administered medications on file prior to encounter.      Current Outpatient Prescriptions on File Prior to Encounter   Medication Sig    amlodipine (NORVASC) 2.5 MG tablet Take 1 tablet (2.5 mg total) by mouth once daily.    atorvastatin (LIPITOR) 10 MG tablet Take 1 tablet (10 mg total) by mouth every evening.    atorvastatin (LIPITOR) 20 MG tablet     clopidogrel (PLAVIX) 75 mg tablet Take 1 tablet (75 mg total) by mouth once daily.    cyanocobalamin (VITAMIN B-12) 100 MCG tablet Take 100 mcg by mouth once daily.    furosemide (LASIX) 40 MG tablet Take 1 tablet (40 mg total) by mouth once daily.    levothyroxine (SYNTHROID) 100 MCG tablet TAKE ONE TABLET BY MOUTH EVERY DAY    losartan (COZAAR) 100 MG tablet     meloxicam (MOBIC) 7.5 MG tablet TAKE ONE TABLET BY MOUTH EVERY OTHER DAY    metoprolol succinate (TOPROL-XL) 200 MG 24 hr tablet Take 1 tablet (200 mg total) by mouth once daily.    omega-3 acid ethyl esters (LOVAZA) 1 gram capsule Take 2 " capsules (2 g total) by mouth 2 (two) times daily.    potassium chloride SA (K-DUR,KLOR-CON) 20 MEQ tablet Take 1 tablet (20 mEq total) by mouth once daily.    ranitidine (ZANTAC) 150 MG tablet Take 1 tablet (150 mg total) by mouth 2 (two) times daily.    VESICARE 10 mg tablet TAKE ONE TABLET BY MOUTH EVERY DAY    nitroGLYCERIN (NITROSTAT) 0.4 MG SL tablet Place 0.4 mg under the tongue every 5 (five) minutes as needed.     Family History     Problem Relation (Age of Onset)    Cancer Sister        Social History Main Topics    Smoking status: Never Smoker    Smokeless tobacco: Never Used    Alcohol use No    Drug use: No    Sexual activity: Not on file     Review of Systems   Constitutional: Negative for fever.   HENT: Negative for nosebleeds.    Eyes: Positive for visual disturbance.   Respiratory: Positive for shortness of breath.    Cardiovascular: Negative for chest pain.   Gastrointestinal: Negative for blood in stool.   Genitourinary: Negative for hematuria.   Musculoskeletal: Negative for gait problem.   Skin: Negative for rash.   Neurological: Negative for facial asymmetry and numbness.        Reports she has been eating well and swallowing well.    Psychiatric/Behavioral: Positive for confusion.   Sleepy/sedated from seroquel last pm  Objective:     Vital Signs (Most Recent):  Temp: 98.9 °F (37.2 °C) (02/02/18 0756)  Pulse: 93 (02/02/18 1256)  Resp: (!) 25 (02/02/18 1256)  BP: 124/60 (02/02/18 0756)  SpO2: 96 % (02/02/18 1256) Vital Signs (24h Range):  Temp:  [98.3 °F (36.8 °C)-98.9 °F (37.2 °C)] 98.9 °F (37.2 °C)  Pulse:  [77-98] 93  Resp:  [16-27] 25  SpO2:  [93 %-97 %] 96 %  BP: (108-140)/(49-70) 124/60     Weight: 118.5 kg (261 lb 3.9 oz)  Body mass index is 40.92 kg/m².    Physical Exam       Exam:  Gen Appearance, well developed/nourished in no apparent distress  CV: 2+ distal pulses with no edema or swelling  Neuro:  MS: Awake, alert,Sustains attention. Oriented to place, partially to  "situation and not time Recent recall is mildly confused/remote memory intact, Language is full to spontaneous speech/comprehension. Fund of Knowledge is full  CN: Optic discs are flat with normal vasculature, PERRL, Extraoccular movements and visual fields are full except restricted extreme left gaze looking laterally. There is mild bilateral ptosis. Normal facial sensation and strength, Hearing symmetric, Tongue and Palate are midline and strong. Shoulder Shrug symmetric and strong.  VOICE IS HYPOPHONIC (family states, "but she is not wearing her dentures.")  Motor: Normal bulk, tone, no abnormal movements. 5/5 strength bilateral upper/lower extremities except perhaps decreased to 4+/5 in the proximal arms with 1+ reflexes in the arms, and are less in the legs and bilateral plantar response  Sensory: symmetric to light touch, pain, temp, and vibration/proprioception except decreased sensation in the legs (chronically). Romberg not done  Cerebellar: Finger-nose,Rapid alternating movements intact  Gait: Not done today, but at baseline: Wide based stance, some sensory ataxia- uses walker    Significant Labs: BMP, CBC today reviewed. Flu negativeTroponin negative. CK normal since admit.  TSH mildly abnormal with normal T4 in 11/2017  Flu swab negative    Significant Imaging: CT head 1/31/2018: 1.  No CT evidence of an acute intracranial abnormality with limitations due to motion artifact.  2.  Mild atrophy and minimal small vessel ischemic changes of the periventricular white matter.    CT chest: no thymoma      Assessment and Plan:   Stephany Skinner is a 83 y.o. female known to me for radiculopathy and polyneuropathy. Here with respiratory failure.     I recommend:    * Acute respiratory failure with hypoxia and hypercarbia    -This patient is well known to me for polyneuropathy in the legs and severe lumbar spinal stenosis. However, her exam is markedly different from that of 9 months ago with her new symptoms of " diplopia and hypophonic voice. That combined with hypercapnia is concerning for neuromuscular weakness.   -Check Myasthenia gravis panel with LEMS panel. Recent CK reassuring  -Monitor for any worsening of symptoms on steroids; however she is currently responding from a respiratory standing point on 40mg Solumedrol Q 6 hours  -Will add mestinon to her treatment for now.   -Consult speech therapy for dysphagia screen  -Consider an EMG when more stable to see if there are any other acute findings /decline. Patient can't currently tolerate MRI brain given Bipap use. However, a neuromuscular junction disorder, if present, explains her diplopia and hypercapnia likely explains her recent confusion.   -If able, avoid sedating medication which will lower respiratory drive  -Check PFTs with NIF.   -I discussed the above recommendations and plan with Dr Watson who will monitor this patient close for need for intubation. Consider monitoring NIFs as as way of monitoring improvement or worsening/need for intubation as discussed with Pulmonology. .         Chronic atrial fibrillation    - no signs of remote CVA by CT head. Rate controlled and on Plavix per Dr Patel.  -Can't tolerate MRI brain while on Bipap, but this is not currently needed unless focal symptoms.         Acute on chronic congestive heart failure    -Cardiology following        Hypothyroidism    -update TSH        JOY (obstructive sleep apnea)    -currently on bipap/monitor            VTE Risk Mitigation         Ordered     enoxaparin injection 120 mg  Every 12 hours (non-standard times)     Route:  Subcutaneous        01/31/18 1236     Medium Risk of VTE  Once      01/30/18 1113     Place sequential compression device  Until discontinued      01/30/18 1113        Pulmonology ordered NIF  I will see this patient back on Monday otherwise.   Thank you for your consult    Robby Ortiz MD  Neurology  [facility]

## 2018-02-02 NOTE — SUBJECTIVE & OBJECTIVE
Interval History: See     Review of Systems  Objective:     Vital Signs (Most Recent):  Temp: 98.9 °F (37.2 °C) (02/02/18 0756)  Pulse: 95 (02/02/18 0818)  Resp: (!) 23 (02/02/18 0756)  BP: 124/60 (02/02/18 0756)  SpO2: (!) 94 % (02/02/18 0756) Vital Signs (24h Range):  Temp:  [98.3 °F (36.8 °C)-99.4 °F (37.4 °C)] 98.9 °F (37.2 °C)  Pulse:  [77-95] 95  Resp:  [16-28] 23  SpO2:  [89 %-97 %] 94 %  BP: (108-140)/(49-70) 124/60     Weight: 118.5 kg (261 lb 3.9 oz)  Body mass index is 40.92 kg/m².  No intake or output data in the 24 hours ending 02/02/18 0904   Physical Exam    Significant Labs:   BMP:   Recent Labs  Lab 02/01/18  0908   *      K 4.7   CL 97   CO2 32*   BUN 39*   CREATININE 0.9   CALCIUM 9.4   .  Lab Results   Component Value Date    TSH 4.176 (H) 11/28/2017       BNP    Recent Labs  Lab 02/01/18  0908   *       Lab Results   Component Value Date    WBC 10.44 02/02/2018    HGB 11.9 (L) 02/02/2018    HCT 37.8 02/02/2018     (H) 02/02/2018     02/02/2018   Procalcitonin- 0.39    CBC:   Recent Labs  Lab 02/01/18  0908 02/02/18  0735   WBC 11.63 10.44   HGB 11.6* 11.9*   HCT 37.2 37.8    234       Significant Imaging: CXR: I have reviewed all pertinent results/findings within the past 24 hours and my personal findings are:  The heart isn't enlarged.  The pulmonary vasculature with mild pulmonary edema.  No definite pleural effusions.     CT of chest negative for PE, + ground glass opacities.

## 2018-02-02 NOTE — HPI
"Stephany Skinner is a 83 y.o. 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 More recently, I had been seeing her yearly as her back pain was asymptomatic and gait responded to PT.  She was admitted to [facility] for SOB. She denies upper respiratory symptoms and was seen in the ER two days prior to admission. She states she came in for SOB and some head pain (bilateral/global). She has not had fever per her and family.  She has since been covered for bronchitis and has been followed by Dr Watson for Acute respiratory failure with hypoxia and hypercarbia and Dr Patel for acute on chronic CHF thought to be due to decreased med compliance/high BP.  She is n Bipap now.   She is on Plavix for afib which is rate controlled per Dr Patel.   Last pm she got Seroquel (thought to have some anxiety associated with her breathing difficulty) and has been sedated from that today.   Her hypercapnia prompted Dr Watson to request my consult to rule out neuromuscular disorder contributing to her symptoms.   On further questioning she states she has had diplopia for a few months but has not seen eye doc.  She feels "somewhat weak" in her muscles but not severely and has been able to ambulate to the Restroom up until 2 days ago.   Family states patient has been a bit confused since being in the hospital.   She was placed on Steroids by pulmonology today and PCO2 improved to 40  "

## 2018-02-02 NOTE — PLAN OF CARE
02/02/18 1023   Final Note   Assessment Type Final Discharge Note   Discharge Disposition Home   What phone number can be called within the next 1-3 days to see how you are doing after discharge? [phone]   Hospital Follow Up  Appt(s) scheduled? Yes   Discharge plans and expectations educations in teach back method with documentation complete? Yes   Right Care Referral Info   Post Acute Recommendation Home-care

## 2018-02-02 NOTE — PROGRESS NOTES
[facility]  Cardiology  Progress Note    Patient Name: Stephany Skinner  MRN: [id number]  Admission Date: 1/30/2018  Hospital Length of Stay: 3 days  Code Status: Full Code   Attending Physician: Za Sandra MD   Primary Care Physician: Pipo Floewrs MD  Expected Discharge Date:   Principal Problem:Acute respiratory failure with hypoxia and hypercarbia    Subjective:     Hospital Course: patient remains on continuous bipap. CO2 remains elevated. Patient still somnolent.     Interval History: 83 year old w/f presents to the ED for the second time in the past few days for worsening SOB over the last few days. She endorses worsening BARGER and orthopnea. She denies chest pain or palpitations. She has apparently not taken Lasix for the past 4 days.     ROS   Constitutional: confusion   Eyes: Negative    Respiratory: SOB   Cardiovascular: orthopnea   Gastrointestinal: Negative   Genitourinary: Negative   Musculoskeletal: Negative   Skin: Negative .   Neurological: Negative   Objective:     Vital Signs (Most Recent):  Temp: 98.9 °F (37.2 °C) (02/02/18 0756)  Pulse: 95 (02/02/18 0818)  Resp: (!) 23 (02/02/18 0756)  BP: 124/60 (02/02/18 0756)  SpO2: (!) 94 % (02/02/18 0756) Vital Signs (24h Range):  Temp:  [98.3 °F (36.8 °C)-99.4 °F (37.4 °C)] 98.9 °F (37.2 °C)  Pulse:  [77-95] 95  Resp:  [16-28] 23  SpO2:  [89 %-97 %] 94 %  BP: (108-140)/(49-70) 124/60     Weight: 118.5 kg (261 lb 3.9 oz)  Body mass index is 40.92 kg/m².    SpO2: (!) 94 %  O2 Device (Oxygen Therapy): BiPAP    No intake or output data in the 24 hours ending 02/02/18 0843    Lines/Drains/Airways     Peripheral Intravenous Line                 Peripheral IV - Single Lumen 02/01/18 1653 Right Wrist less than 1 day                Physical Exam  General appearance: somnolent, confused  Head: Normocephalic, without obvious abnormality, atraumatic  Eyes: conjunctivae/corneas clear. PERRL  Neck: no carotid bruit, no JVD and supple,  symmetrical, trachea midline  Lungs: tight breath sounds with diminished air exchange, shallow respirations.  Chest Wall: no tenderness  Heart: regular rate and rhythm, S1, S2 normal, no murmur, click, rub or gallop  Abdomen: soft, non-tender; bowel sounds normal; no masses,  no organomegaly  Extremities: Extremities normal, atraumatic, no cyanosis, clubbing, trace BLE edema   Pulses: Dorsalis Pedis R: 2+ (normal)/L: 2+ (normal)  Skin: Skin color, texture, turgor normal. No rashes or lesions  Neurologic: Normal mood and affect  Alert and oriented X 3  Significant Labs:   ABG:   Recent Labs  Lab 02/01/18  0757 02/01/18  1206 02/01/18  2125   PH 7.33* 7.47* 7.35   PCO2 74* 50* 72*   HCO3 39.00* 36.40* 39.70   POCSATURATED 94.8 93.0 94.9   BE 10.40* 11.10* 11.50   , Blood Culture: No results for input(s): LABBLOO in the last 48 hours., BMP:   Recent Labs  Lab 02/01/18  0908   *      K 4.7   CL 97   CO2 32*   BUN 39*   CREATININE 0.9   CALCIUM 9.4   , CMP   Recent Labs  Lab 02/01/18  0908      K 4.7   CL 97   CO2 32*   *   BUN 39*   CREATININE 0.9   CALCIUM 9.4   PROT 7.1   ALBUMIN 2.7*   BILITOT 1.2*   ALKPHOS 57   AST 18   ALT 29   ANIONGAP 10   ESTGFRAFRICA >60   EGFRNONAA 60   , CBC   Recent Labs  Lab 02/01/18  0908 02/02/18  0735   WBC 11.63 10.44   HGB 11.6* 11.9*   HCT 37.2 37.8    234   , INR No results for input(s): INR, PROTIME in the last 48 hours., Lipid Panel No results for input(s): CHOL, HDL, LDLCALC, TRIG, CHOLHDL in the last 48 hours.,   Pathology Results  (Last 10 years)    None       and Troponin   Recent Labs  Lab 01/31/18  1306   TROPONINI 0.018       Significant Imaging: Cardiac Cath: , CT scan: CT ABDOMEN PELVIS WITH CONTRAST: No results found for this visit on 01/30/18. and CT ABDOMEN PELVIS WITHOUT CONTRAST: No results found for this visit on 01/30/18., Echocardiogram:   2D echo with color flow doppler:   Results for orders placed or performed during the hospital  encounter of 01/30/18   2D echo with color flow doppler   Result Value Ref Range    EF 55 55 - 65    Est. PA Systolic Pressure 31.81     Tricuspid Valve Regurgitation TRIVIAL TO MILD    , EKG: , Stress Test: and X-Ray: CXR: X-Ray Chest 1 View (CXR):   Results for orders placed or performed during the hospital encounter of 01/30/18   X-Ray Chest 1 View    Narrative    Single view chest portable was obtained at 05:41. Calcification is noted in the aorta. There is mild cardiomegaly. There is infiltrate in the right upper lobe and right middle lobe decreased since prior study to February 2018. No pneumothorax is seen.    Impression     Mild decrease in right upper lobe and right perihilar infiltrate. Mild cardiomegaly. Atherosclerosis.      Electronically signed by: Otis Silverio MD  Date:     02/03/18  Time:    08:41      Assessment and Plan:         Active Diagnoses:    Diagnosis Date Noted POA    PRINCIPAL PROBLEM:  Acute respiratory failure with hypoxia and hypercarbia [J96.01, J96.02] 01/31/2018 Yes    Acute on chronic congestive heart failure [I50.9] 01/30/2018 Yes    Chronic atrial fibrillation [I48.2] 01/30/2018 Yes    HTN (hypertension) [I10] 08/08/2012 Yes    Hyperlipidemia [E78.5] 08/08/2012 Yes    Hypothyroidism [E03.9] 08/08/2012 Yes    JOY (obstructive sleep apnea) [G47.33] 08/08/2012 Yes      Problems Resolved During this Admission:    Diagnosis Date Noted Date Resolved POA       VTE Risk Mitigation         Ordered     enoxaparin injection 120 mg  Every 12 hours (non-standard times)     Route:  Subcutaneous        01/31/18 1236     Medium Risk of VTE  Once      01/30/18 1113     Place sequential compression device  Until discontinued      01/30/18 1113        Current Facility-Administered Medications   Medication    acetaminophen tablet 650 mg    amLODIPine tablet 2.5 mg    atorvastatin tablet 10 mg    clopidogrel tablet 75 mg    enoxaparin injection 120 mg    furosemide injection 40 mg     ipratropium 0.02 % nebulizer solution 0.5 mg    levalbuterol nebulizer solution 1.25 mg    levothyroxine tablet 100 mcg    losartan tablet 100 mg    metoprolol succinate (TOPROL-XL) 24 hr tablet 200 mg    ondansetron injection 4 mg    pantoprazole EC tablet 40 mg    promethazine (PHENERGAN) 12.5 mg in dextrose 5 % 50 mL IVPB     Confusion with CO2 narcosis/COPD,JOY  AFIB v/s wandering PM; now anticoagulated  Acute diastolic CHF due to decreased med compliance/high BP  Also suspect some bronchitis with high WBC chills and cough  Worsening BARGER and orthopnea for the past 3 days- hasnt taken prescribed lasix x 4 days.   Chest X ray with pulmonary edema, elevated BNP, mild volume overload on exam. CT shows Ground glass opacities, NO PE   Echo 1/18: EF 60%, stage 1 diastolic dysfunction, no significant valvular pathology.   MPI 1/2013- mild apical reversible ischemia.   Carotid US- less than 40% bilateral 2017     PLAN:iv lasix  Increase losartan to 100 mgqd  Continue amlodipin, atorvastatin, plavix,metoprolol  dvt prophylaxis  Watch for sepsis/Pneumonia  May need to be intubated; pt is full code per daughter     Bakari Strauss, KAUSHALP  Cardiology  [facility]  I attest that I have personally seen and examined this patient. I have reviewed and discussed the management in detail as outlined above.

## 2018-02-02 NOTE — SUBJECTIVE & OBJECTIVE
Past Medical History:   Diagnosis Date    GERD (gastroesophageal reflux disease)     Hyperlipidemia     Hypertension        Past Surgical History:   Procedure Laterality Date    CHOLECYSTECTOMY      HERNIA REPAIR      HYSTERECTOMY      knee replacemene      deidra    THYROIDECTOMY         Review of patient's allergies indicates:   Allergen Reactions    Statins-hmg-coa reductase inhibitors      Other reaction(s): Unknown     I have reviewed all of this patient's past medical and surgical histories as well as family and social histories and active allergies and medications as documented in the electronic medical record.    No current facility-administered medications on file prior to encounter.      Current Outpatient Prescriptions on File Prior to Encounter   Medication Sig    amlodipine (NORVASC) 2.5 MG tablet Take 1 tablet (2.5 mg total) by mouth once daily.    atorvastatin (LIPITOR) 10 MG tablet Take 1 tablet (10 mg total) by mouth every evening.    atorvastatin (LIPITOR) 20 MG tablet     clopidogrel (PLAVIX) 75 mg tablet Take 1 tablet (75 mg total) by mouth once daily.    cyanocobalamin (VITAMIN B-12) 100 MCG tablet Take 100 mcg by mouth once daily.    furosemide (LASIX) 40 MG tablet Take 1 tablet (40 mg total) by mouth once daily.    levothyroxine (SYNTHROID) 100 MCG tablet TAKE ONE TABLET BY MOUTH EVERY DAY    losartan (COZAAR) 100 MG tablet     meloxicam (MOBIC) 7.5 MG tablet TAKE ONE TABLET BY MOUTH EVERY OTHER DAY    metoprolol succinate (TOPROL-XL) 200 MG 24 hr tablet Take 1 tablet (200 mg total) by mouth once daily.    omega-3 acid ethyl esters (LOVAZA) 1 gram capsule Take 2 capsules (2 g total) by mouth 2 (two) times daily.    potassium chloride SA (K-DUR,KLOR-CON) 20 MEQ tablet Take 1 tablet (20 mEq total) by mouth once daily.    ranitidine (ZANTAC) 150 MG tablet Take 1 tablet (150 mg total) by mouth 2 (two) times daily.    VESICARE 10 mg tablet TAKE ONE TABLET BY MOUTH EVERY DAY     nitroGLYCERIN (NITROSTAT) 0.4 MG SL tablet Place 0.4 mg under the tongue every 5 (five) minutes as needed.     Family History     Problem Relation (Age of Onset)    Cancer Sister        Social History Main Topics    Smoking status: Never Smoker    Smokeless tobacco: Never Used    Alcohol use No    Drug use: No    Sexual activity: Not on file     Review of Systems   Constitutional: Negative for fever.   HENT: Negative for nosebleeds.    Eyes: Positive for visual disturbance.   Respiratory: Positive for shortness of breath.    Cardiovascular: Negative for chest pain.   Gastrointestinal: Negative for blood in stool.   Genitourinary: Negative for hematuria.   Musculoskeletal: Negative for gait problem.   Skin: Negative for rash.   Neurological: Negative for facial asymmetry and numbness.        Reports she has been eating well and swallowing well.    Psychiatric/Behavioral: Positive for confusion.   Sleepy/sedated from seroquel last pm  Objective:     Vital Signs (Most Recent):  Temp: 98.9 °F (37.2 °C) (02/02/18 0756)  Pulse: 93 (02/02/18 1256)  Resp: (!) 25 (02/02/18 1256)  BP: 124/60 (02/02/18 0756)  SpO2: 96 % (02/02/18 1256) Vital Signs (24h Range):  Temp:  [98.3 °F (36.8 °C)-98.9 °F (37.2 °C)] 98.9 °F (37.2 °C)  Pulse:  [77-98] 93  Resp:  [16-27] 25  SpO2:  [93 %-97 %] 96 %  BP: (108-140)/(49-70) 124/60     Weight: 118.5 kg (261 lb 3.9 oz)  Body mass index is 40.92 kg/m².    Physical Exam       Exam:  Gen Appearance, well developed/nourished in no apparent distress  CV: 2+ distal pulses with no edema or swelling  Neuro:  MS: Awake, alert,Sustains attention. Oriented to place, partially to situation and not time Recent recall is mildly confused/remote memory intact, Language is full to spontaneous speech/comprehension. Fund of Knowledge is full  CN: Optic discs are flat with normal vasculature, PERRL, Extraoccular movements and visual fields are full except restricted extreme left gaze looking laterally. There  "is mild bilateral ptosis. Normal facial sensation and strength, Hearing symmetric, Tongue and Palate are midline and strong. Shoulder Shrug symmetric and strong.  VOICE IS HYPOPHONIC (family states, "but she is not wearing her dentures.")  Motor: Normal bulk, tone, no abnormal movements. 5/5 strength bilateral upper/lower extremities except perhaps decreased to 4+/5 in the proximal arms with 1+ reflexes in the arms, and are less in the legs and bilateral plantar response  Sensory: symmetric to light touch, pain, temp, and vibration/proprioception except decreased sensation in the legs (chronically). Romberg not done  Cerebellar: Finger-nose,Rapid alternating movements intact  Gait: Not done today, but at baseline: Wide based stance, some sensory ataxia- uses walker    Significant Labs: BMP, CBC today reviewed. Flu negativeTroponin negative. CK normal since admit.  TSH mildly abnormal with normal T4 in 11/2017  Flu swab negative    Significant Imaging: CT head 1/31/2018: 1.  No CT evidence of an acute intracranial abnormality with limitations due to motion artifact.  2.  Mild atrophy and minimal small vessel ischemic changes of the periventricular white matter.    CT chest: no thymoma    "

## 2018-02-02 NOTE — ASSESSMENT & PLAN NOTE
- no signs of remote CVA by CT head. Rate controlled and on Plavix per Dr Patel.  -Can't tolerate MRI brain while on Bipap, but this is not currently needed unless focal symptoms.

## 2018-02-02 NOTE — PROGRESS NOTES
[facility] Medicine  Progress Note    Patient Name: Stephany Skinner  MRN: [id number]  Patient Class: IP- Inpatient   Admission Date: 1/30/2018  Length of Stay: 3 days  Attending Physician: Za Sandra MD  Primary Care Provider: Pipo Flowers MD        Subjective:     Principal Problem:Acute respiratory failure with hypoxia and hypercarbia    HPI:  Stephany Skinner is a 83 y.o. female  Who is a patient of Dr ambriz and Dr cardenas .  Today was her second visit to ER .  She lives with her sister who reported that she is becoming more short winded for last few days.  She missed her lasix 40 mg daily for last 4 days. + PND, + dyspnea at rest .  + leg swelling is worsening .reports no chest pains   C/o neck pains   Can not recall weight gain .    Placed in observation for CHF.  BNP high   CXR is  suggestive of pulmonary edema        Hospital Course:  1/31/18  82 YO WF admitted for tx of CHF  1-30-18; day 3; She reported missing  her lasix 40 mg daily for last 4 days prior to admit; Now receiving IV lasix twice daily. Continues to have some LE edema and BAGRER.   This am on rounds pt developed worsening SOB, now requiring Bipap. Noted D-dimer elevated;  Lab Results   Component Value Date    DDIMER 1.05 (H) 01/30/2018   Pt with known chronic afib- on plavix only per cards. CT of chest ordered to rule out PE ordered.     Echo pending. (last Echo ?2012).   Also noted to have ^WBCs on admit; trending down 12.96 from 14.44 on admission; Dr. Cardenas suggesting possible bronchitis. U/A- ok.  I/O yesterday-  -640, wt 261. Today-pending    2/1/18 CT of chest negative for PE yesterday. Lovenox initiated per cards for recent new onset afib vs atrial tach per Dr. Cardenas. Pt with continued SOB requiring BIPAP; decreased LOC overnight. ABGs demonstrating ^CO2; required BIPAP adjustment;  ABG    Recent Labs  Lab 01/31/18  2235   PH 7.35   PO2 63   PCO2 72*   HCO3 39.70   BE 11.40   repeat ABGS this am with  continued ^PCO2- 74 -Dr. Jain recommending RR ^  More awake now - opening eyes and taking in meds PO.  Sats have been good.    2/2/18- continues with BIPAP. Will respond with maximal stimuli   resp viral panel pending.   Echo - 1/31/18 Echo ejection fraction is 55-60%     Interval History: See     Review of Systems  Objective:     Vital Signs (Most Recent):  Temp: 98.9 °F (37.2 °C) (02/02/18 0756)  Pulse: 95 (02/02/18 0818)  Resp: (!) 23 (02/02/18 0756)  BP: 124/60 (02/02/18 0756)  SpO2: (!) 94 % (02/02/18 0756) Vital Signs (24h Range):  Temp:  [98.3 °F (36.8 °C)-99.4 °F (37.4 °C)] 98.9 °F (37.2 °C)  Pulse:  [77-95] 95  Resp:  [16-28] 23  SpO2:  [89 %-97 %] 94 %  BP: (108-140)/(49-70) 124/60     Weight: 118.5 kg (261 lb 3.9 oz)  Body mass index is 40.92 kg/m².  No intake or output data in the 24 hours ending 02/02/18 0904   Physical Exam    Significant Labs:   BMP:   Recent Labs  Lab 02/01/18  0908   *      K 4.7   CL 97   CO2 32*   BUN 39*   CREATININE 0.9   CALCIUM 9.4   .  Lab Results   Component Value Date    TSH 4.176 (H) 11/28/2017       BNP    Recent Labs  Lab 02/01/18  0908   *       Lab Results   Component Value Date    WBC 10.44 02/02/2018    HGB 11.9 (L) 02/02/2018    HCT 37.8 02/02/2018     (H) 02/02/2018     02/02/2018   Procalcitonin- 0.39    CBC:   Recent Labs  Lab 02/01/18  0908 02/02/18  0735   WBC 11.63 10.44   HGB 11.6* 11.9*   HCT 37.2 37.8    234       Significant Imaging: CXR: I have reviewed all pertinent results/findings within the past 24 hours and my personal findings are:  The heart isn't enlarged.  The pulmonary vasculature with mild pulmonary edema.  No definite pleural effusions.     CT of chest negative for PE, + ground glass opacities.     Assessment/Plan:      * Acute respiratory failure with hypoxia and hypercarbia    Continue  Bipap and continue supplemental oxygen   Add xopenex q 6 hours   Follow pulmonary recs per Dr. Watson    Procalcitonin.  Check resp panel.  Add in azithromycin to cover atypical pna/bacterial bronchitis. Ground glass pattern on CT noted.  Dr Watson is concerned about neuromsucular process--CO2 level has been stable        Chronic atrial fibrillation    Continue metoprolol, plavix   Dr patel following discussed Afib; he reports fairly new dx, plavix was for previous + abnormal stress test; pt deferred angiography and had poor Asa tolerance.  Lovenox 1mg/kg sq twice daily started yesterday for afib vs Atrial tach          Acute on chronic congestive heart failure    Supplemental O2 via nasal canula; titrate O2 saturation to >92%.  Serial Cardiac enzymes × 3.  Diuretic administration. Monitor electrolytes for hypokalemia and hypomagnesemia.  Cardiology Consultation- Dr. Patel following   2-D Echocardiogram for evaluation of left ventricle systolic function or any valvular heart disease- ordered today (not bad at all)  Daily weights.  Strict I/Os.  Fluid restriction.  Na restriction <2g/d.      IV lasix 40 mg bid          HTN (hypertension)      Continue losartan, metoprolol and amlodipine  Follow cards recs        Hyperlipidemia    Continue atorvastatin          Hypothyroidism    TSH almost in range   Continue present dose             JOY (obstructive sleep apnea)    On BiPap            VTE Risk Mitigation         Ordered     enoxaparin injection 120 mg  Every 12 hours (non-standard times)     Route:  Subcutaneous        01/31/18 1236     Medium Risk of VTE  Once      01/30/18 1113     Place sequential compression device  Until discontinued      01/30/18 1113              Darion Richmond MD  Department of Hospital Medicine   [facility]

## 2018-02-02 NOTE — ASSESSMENT & PLAN NOTE
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 However, her exam is markedly different from that of 9 months ago with her new symptoms of diplopia and hypophonic voice. That combined with hypercapnia is concerning for neuromuscular weakness.   -Check Myasthenia gravis panel with LEMS panel. Recent CK reassuring  -Monitor for any worsening of symptoms on steroids; however she is currently responding from a respiratory standing point on 40mg Solumedrol Q 6 hours  -Will add mestinon to her treatment for now.   -Consult speech therapy for dysphagia screen  -Consider an EMG when more stable to see if there are any other acute findings /decline. Patient can't currently tolerate MRI brain given Bipap use. However, a neuromuscular junction disorder, if present, explains her diplopia and hypercapnia likely explains her recent confusion.   -If able, avoid sedating medication which will lower respiratory drive  -Check PFTs with NIF.   -I discussed the above recommendations and plan with Dr Watson who will monitor this patient close for need for intubation. Consider monitoring NIFs as as way of monitoring improvement or worsening/need for intubation as discussed with Pulmonology. .

## 2018-02-03 PROBLEM — G70.00 MYASTHENIA GRAVIS: Status: ACTIVE | Noted: 2018-02-03

## 2018-02-03 LAB
ANION GAP SERPL CALC-SCNC: 13 MMOL/L
BASOPHILS # BLD AUTO: 0 K/UL
BASOPHILS NFR BLD: 0 %
BNP SERPL-MCNC: 69 PG/ML
BUN SERPL-MCNC: 81 MG/DL
CALCIUM SERPL-MCNC: 9.7 MG/DL
CHLORIDE SERPL-SCNC: 99 MMOL/L
CO2 SERPL-SCNC: 29 MMOL/L
CREAT SERPL-MCNC: 1.6 MG/DL
DIFFERENTIAL METHOD: ABNORMAL
EOSINOPHIL # BLD AUTO: 0 K/UL
EOSINOPHIL NFR BLD: 0 %
ERYTHROCYTE [DISTWIDTH] IN BLOOD BY AUTOMATED COUNT: 14.8 %
EST. GFR  (AFRICAN AMERICAN): 34 ML/MIN/1.73 M^2
EST. GFR  (NON AFRICAN AMERICAN): 30 ML/MIN/1.73 M^2
GLUCOSE SERPL-MCNC: 196 MG/DL
HCT VFR BLD AUTO: 36.7 %
HGB BLD-MCNC: 12 G/DL
LYMPHOCYTES # BLD AUTO: 0.5 K/UL
LYMPHOCYTES NFR BLD: 4.9 %
MCH RBC QN AUTO: 31.1 PG
MCHC RBC AUTO-ENTMCNC: 32.7 G/DL
MCV RBC AUTO: 95 FL
MONOCYTES # BLD AUTO: 0.4 K/UL
MONOCYTES NFR BLD: 4 %
NEUTROPHILS # BLD AUTO: 8.4 K/UL
NEUTROPHILS NFR BLD: 91.1 %
PLATELET # BLD AUTO: 250 K/UL
PMV BLD AUTO: 9.1 FL
POTASSIUM SERPL-SCNC: 3.9 MMOL/L
RBC # BLD AUTO: 3.86 M/UL
SODIUM SERPL-SCNC: 141 MMOL/L
WBC # BLD AUTO: 9.17 K/UL

## 2018-02-03 PROCEDURE — 80048 BASIC METABOLIC PNL TOTAL CA: CPT

## 2018-02-03 PROCEDURE — 25000242 PHARM REV CODE 250 ALT 637 W/ HCPCS: Performed by: INTERNAL MEDICINE

## 2018-02-03 PROCEDURE — 94761 N-INVAS EAR/PLS OXIMETRY MLT: CPT

## 2018-02-03 PROCEDURE — 99900035 HC TECH TIME PER 15 MIN (STAT)

## 2018-02-03 PROCEDURE — 25000003 PHARM REV CODE 250: Performed by: PSYCHIATRY & NEUROLOGY

## 2018-02-03 PROCEDURE — 25000003 PHARM REV CODE 250: Performed by: INTERNAL MEDICINE

## 2018-02-03 PROCEDURE — 99232 SBSQ HOSP IP/OBS MODERATE 35: CPT | Mod: ,,, | Performed by: FAMILY MEDICINE

## 2018-02-03 PROCEDURE — 27000221 HC OXYGEN, UP TO 24 HOURS

## 2018-02-03 PROCEDURE — 94640 AIRWAY INHALATION TREATMENT: CPT

## 2018-02-03 PROCEDURE — 25000003 PHARM REV CODE 250: Performed by: NURSE PRACTITIONER

## 2018-02-03 PROCEDURE — 94010 BREATHING CAPACITY TEST: CPT

## 2018-02-03 PROCEDURE — 99900031 HC PATIENT EDUCATION (STAT)

## 2018-02-03 PROCEDURE — 11000001 HC ACUTE MED/SURG PRIVATE ROOM

## 2018-02-03 PROCEDURE — 63600175 PHARM REV CODE 636 W HCPCS: Performed by: INTERNAL MEDICINE

## 2018-02-03 PROCEDURE — 25000003 PHARM REV CODE 250: Performed by: SURGERY

## 2018-02-03 PROCEDURE — 85025 COMPLETE CBC W/AUTO DIFF WBC: CPT

## 2018-02-03 PROCEDURE — 36415 COLL VENOUS BLD VENIPUNCTURE: CPT

## 2018-02-03 PROCEDURE — 83880 ASSAY OF NATRIURETIC PEPTIDE: CPT

## 2018-02-03 PROCEDURE — 63600175 PHARM REV CODE 636 W HCPCS: Performed by: NURSE PRACTITIONER

## 2018-02-03 RX ADMIN — CLOPIDOGREL BISULFATE 75 MG: 75 TABLET ORAL at 10:02

## 2018-02-03 RX ADMIN — METHYLPREDNISOLONE SODIUM SUCCINATE 40 MG: 40 INJECTION, POWDER, FOR SOLUTION INTRAMUSCULAR; INTRAVENOUS at 06:02

## 2018-02-03 RX ADMIN — ENOXAPARIN SODIUM 120 MG: 150 INJECTION SUBCUTANEOUS at 06:02

## 2018-02-03 RX ADMIN — LEVALBUTEROL 1.25 MG: 1.25 SOLUTION, CONCENTRATE RESPIRATORY (INHALATION) at 01:02

## 2018-02-03 RX ADMIN — LEVALBUTEROL 1.25 MG: 1.25 SOLUTION, CONCENTRATE RESPIRATORY (INHALATION) at 07:02

## 2018-02-03 RX ADMIN — METHYLPREDNISOLONE SODIUM SUCCINATE 40 MG: 40 INJECTION, POWDER, FOR SOLUTION INTRAMUSCULAR; INTRAVENOUS at 11:02

## 2018-02-03 RX ADMIN — METOPROLOL SUCCINATE 200 MG: 50 TABLET, EXTENDED RELEASE ORAL at 09:02

## 2018-02-03 RX ADMIN — IPRATROPIUM BROMIDE 0.5 MG: 0.5 SOLUTION RESPIRATORY (INHALATION) at 07:02

## 2018-02-03 RX ADMIN — IPRATROPIUM BROMIDE 0.5 MG: 0.5 SOLUTION RESPIRATORY (INHALATION) at 01:02

## 2018-02-03 RX ADMIN — METHYLPREDNISOLONE SODIUM SUCCINATE 40 MG: 40 INJECTION, POWDER, FOR SOLUTION INTRAMUSCULAR; INTRAVENOUS at 12:02

## 2018-02-03 RX ADMIN — PANTOPRAZOLE SODIUM 40 MG: 40 TABLET, DELAYED RELEASE ORAL at 09:02

## 2018-02-03 RX ADMIN — PYRIDOSTIGMINE BROMIDE 60 MG: 60 TABLET ORAL at 09:02

## 2018-02-03 RX ADMIN — AMLODIPINE BESYLATE 2.5 MG: 2.5 TABLET ORAL at 09:02

## 2018-02-03 RX ADMIN — ATORVASTATIN CALCIUM 10 MG: 10 TABLET, FILM COATED ORAL at 09:02

## 2018-02-03 RX ADMIN — PYRIDOSTIGMINE BROMIDE 60 MG: 60 TABLET ORAL at 03:02

## 2018-02-03 RX ADMIN — LEVOTHYROXINE SODIUM 100 MCG: 100 TABLET ORAL at 09:02

## 2018-02-03 RX ADMIN — LOSARTAN POTASSIUM 100 MG: 50 TABLET, FILM COATED ORAL at 09:02

## 2018-02-03 NOTE — PLAN OF CARE
Problem: Patient Care Overview  Goal: Plan of Care Review  Outcome: Ongoing (interventions implemented as appropriate)  Patient and daughter aware of plan of care. Patient had good night. IV steroids continued.Tolerated BIPAP throughout night. Daily weight. Daughter at bedtime. No questions or concerns at this time. Agrees with plan of care.

## 2018-02-03 NOTE — SUBJECTIVE & OBJECTIVE
Interval History: looks great sitting up in bed has Myasthenia gravis NIF 21 low will do PFTS looking at MVV and PEFR    Objective:     Vital Signs (Most Recent):  Temp: 99.1 °F (37.3 °C) (02/03/18 0752)  Pulse: 74 (02/03/18 1306)  Resp: 18 (02/03/18 1306)  BP: (!) 116/57 (02/03/18 1158)  SpO2: (!) 92 % (02/03/18 1306) Vital Signs (24h Range):  Temp:  [97.8 °F (36.6 °C)-99.4 °F (37.4 °C)] 99.1 °F (37.3 °C)  Pulse:  [] 74  Resp:  [16-25] 18  SpO2:  [90 %-95 %] 92 %  BP: (108-152)/(56-93) 116/57     Weight: 114.8 kg (253 lb 1.4 oz)  Body mass index is 39.64 kg/m².      Intake/Output Summary (Last 24 hours) at 02/03/18 1414  Last data filed at 02/03/18 1300   Gross per 24 hour   Intake              240 ml   Output                0 ml   Net              240 ml       Physical Exam   Constitutional: She is oriented to person, place, and time. She appears well-developed and well-nourished. She is cooperative.  Non-toxic appearance. She does not appear ill. No distress.   HENT:   Head: Normocephalic and atraumatic.   Right Ear: Hearing, tympanic membrane, external ear and ear canal normal.   Left Ear: Hearing, tympanic membrane, external ear and ear canal normal.   Nose: Nose normal. No mucosal edema, rhinorrhea or nasal deformity. No epistaxis. Right sinus exhibits no maxillary sinus tenderness and no frontal sinus tenderness. Left sinus exhibits no maxillary sinus tenderness and no frontal sinus tenderness.   Mouth/Throat: Uvula is midline, oropharynx is clear and moist and mucous membranes are normal. No trismus in the jaw. Normal dentition. No uvula swelling. No posterior oropharyngeal erythema.   Eyes: Conjunctivae and lids are normal. No scleral icterus.   Sclera clear bilat   Neck: Trachea normal, full passive range of motion without pain and phonation normal. Neck supple.   Cardiovascular: Normal rate, regular rhythm, normal heart sounds, intact distal pulses and normal pulses.    Pulmonary/Chest: Effort  normal. No accessory muscle usage. No apnea and no tachypnea. No respiratory distress. She has decreased breath sounds in the right upper field, the right middle field, the right lower field, the left upper field, the left middle field and the left lower field. She has in the right lower field and the left lower field. She has in the right lower field and the left lower field.           Abdominal: Soft. Normal appearance and bowel sounds are normal. She exhibits no distension. There is no tenderness.   Musculoskeletal: Normal range of motion. She exhibits no edema or deformity.   Neurological: She is alert and oriented to person, place, and time. She exhibits normal muscle tone. Coordination normal.   Skin: Skin is warm, dry and intact. She is not diaphoretic. No pallor.   Psychiatric: She has a normal mood and affect. Her speech is normal and behavior is normal. Judgment and thought content normal. Cognition and memory are normal.   Nursing note and vitals reviewed.      Vents:  Oxygen Concentration (%): 40 (02/03/18 0752)  Negative Inspiratory Force (cm H2O): -50 (02/03/18 1306)    Lines/Drains/Airways     Peripheral Intravenous Line                 Peripheral IV - Single Lumen 02/01/18 1653 Right Wrist 1 day                Significant Labs:    CBC/Anemia Profile:    Recent Labs  Lab 02/02/18  0735 02/03/18  0622   WBC 10.44 9.17   HGB 11.9* 12.0   HCT 37.8 36.7*    250   * 95   RDW 14.6* 14.8*        Chemistries:    Recent Labs  Lab 02/02/18  0735 02/03/18  0622    141   K 4.3 3.9   CL 97 99   CO2 33* 29   BUN 61* 81*   CREATININE 1.3 1.6*   CALCIUM 9.7 9.7       All pertinent labs within the past 24 hours have been reviewed.    Significant Imaging:  I have reviewed all pertinent imaging results/findings within the past 24 hours.

## 2018-02-03 NOTE — PROGRESS NOTES
Staff Handoff  Bedside report received from ROSA ISELA Delaney. Patient lying in bed. NAD. Denies pain. BIPAP in use. Call bell in reach.    Resident Handoff

## 2018-02-03 NOTE — PLAN OF CARE
Problem: Patient Care Overview  Goal: Plan of Care Review  Outcome: Ongoing (interventions implemented as appropriate)  Vitals remained stable, afebrile. No complaints of pain. Bipap worn between meals. Ate all meals. 6L NC. AAO today. Sat up for lunch. Incontinent at times. PT working with pt. Discussed plan of care with pt, stated understanding

## 2018-02-03 NOTE — SUBJECTIVE & OBJECTIVE
Interval History: See     Review of Systems   Neurological:        More alert, talking and appears much stronger     Interval History: See     Review of Systems  Objective:     Vital Signs (Most Recent):  Temp: 99.1 °F (37.3 °C) (02/03/18 0752)  Pulse: 74 (02/03/18 1306)  Resp: 18 (02/03/18 1306)  BP: (!) 116/57 (02/03/18 1158)  SpO2: (!) 92 % (02/03/18 1306) Vital Signs (24h Range):  Temp:  [97.8 °F (36.6 °C)-99.4 °F (37.4 °C)] 99.1 °F (37.3 °C)  Pulse:  [] 74  Resp:  [16-25] 18  SpO2:  [90 %-95 %] 92 %  BP: (108-152)/(56-93) 116/57     Weight: 114.8 kg (253 lb 1.4 oz)  Body mass index is 39.64 kg/m².  No intake or output data in the 24 hours ending 02/03/18 1313   Physical Exam    Significant Labs:   BMP:     Recent Labs  Lab 02/03/18  0622   *      K 3.9   CL 99   CO2 29   BUN 81*   CREATININE 1.6*   CALCIUM 9.7   .  Lab Results   Component Value Date    TSH 0.371 (L) 02/02/2018       BNP    Recent Labs  Lab 02/03/18  0622   BNP 69       Lab Results   Component Value Date    WBC 9.17 02/03/2018    HGB 12.0 02/03/2018    HCT 36.7 (L) 02/03/2018    MCV 95 02/03/2018     02/03/2018   Procalcitonin- 0.39    CBC:     Recent Labs  Lab 02/02/18  0735 02/03/18  0622   WBC 10.44 9.17   HGB 11.9* 12.0   HCT 37.8 36.7*    250       Significant Imaging: CXR: I have reviewed all pertinent results/findings within the past 24 hours and my personal findings are:  The heart isn't enlarged.  The pulmonary vasculature with mild pulmonary edema.  No definite pleural effusions.     CT of chest negative for PE, + ground glass opacities.   Objective:     Vital Signs (Most Recent):  Temp: 99.1 °F (37.3 °C) (02/03/18 0752)  Pulse: 74 (02/03/18 1306)  Resp: 18 (02/03/18 1306)  BP: (!) 116/57 (02/03/18 1158)  SpO2: (!) 74 % (02/03/18 1306) Vital Signs (24h Range):  Temp:  [97.8 °F (36.6 °C)-99.4 °F (37.4 °C)] 99.1 °F (37.3 °C)  Pulse:  [] 74  Resp:  [16-25] 18  SpO2:  [74 %-95 %] 74 %  BP:  (108-152)/(56-93) 116/57     Weight: 114.8 kg (253 lb 1.4 oz)  Body mass index is 39.64 kg/m².  No intake or output data in the 24 hours ending 02/03/18 1313   Physical Exam   Constitutional: She appears well-developed and well-nourished.   HENT:   Head: Normocephalic.   Eyes: Pupils are equal, round, and reactive to light.   Neck: Normal range of motion. Neck supple. No thyromegaly present.   Cardiovascular: Normal rate and regular rhythm.    Pulmonary/Chest: No respiratory distress. She has no wheezes. She has no rales. She exhibits no tenderness.   Abdominal: She exhibits no distension. There is no tenderness. There is no rebound and no guarding.   Musculoskeletal: Normal range of motion. She exhibits no edema or tenderness.   Lymphadenopathy:     She has no cervical adenopathy.   Neurological: She is alert. She displays abnormal reflex. No cranial nerve deficit. She exhibits abnormal muscle tone. Coordination abnormal.   Awake and talking and is wanting to eat   Skin: Skin is warm and dry. No rash noted. No pallor.   Psychiatric: She has a normal mood and affect. Judgment and thought content normal.       Significant Labs:   BMP:     Recent Labs  Lab 02/03/18  0622   *      K 3.9   CL 99   CO2 29   BUN 81*   CREATININE 1.6*   CALCIUM 9.7   .  Lab Results   Component Value Date    TSH 0.371 (L) 02/02/2018       BNP    Recent Labs  Lab 02/03/18  0622   BNP 69       Lab Results   Component Value Date    WBC 9.17 02/03/2018    HGB 12.0 02/03/2018    HCT 36.7 (L) 02/03/2018    MCV 95 02/03/2018     02/03/2018   Procalcitonin- 0.39    CBC:     Recent Labs  Lab 02/02/18  0735 02/03/18  0622   WBC 10.44 9.17   HGB 11.9* 12.0   HCT 37.8 36.7*    250       Significant Imaging: CXR: I have reviewed all pertinent results/findings within the past 24 hours and my personal findings are:  The heart isn't enlarged.  The pulmonary vasculature with mild pulmonary edema.  No definite pleural effusions.      CT of chest negative for PE, + ground glass opacities.

## 2018-02-03 NOTE — PROGRESS NOTES
[facility]  Pulmonology  Progress Note    Patient Name: Stephany Skinner  MRN: [id number]  Admission Date: 1/30/2018  Hospital Length of Stay: 4 days  Code Status: Full Code  Attending Provider: Za Sandra MD  Primary Care Provider: Pipo Flowers MD   Principal Problem: Myasthenia gravis, adult form    Subjective:     Interval History: looks great sitting up in bed has Myasthenia gravis NIF 21 low will do PFTS looking at MVV and PEFR    Objective:     Vital Signs (Most Recent):  Temp: 99.1 °F (37.3 °C) (02/03/18 0752)  Pulse: 74 (02/03/18 1306)  Resp: 18 (02/03/18 1306)  BP: (!) 116/57 (02/03/18 1158)  SpO2: (!) 92 % (02/03/18 1306) Vital Signs (24h Range):  Temp:  [97.8 °F (36.6 °C)-99.4 °F (37.4 °C)] 99.1 °F (37.3 °C)  Pulse:  [] 74  Resp:  [16-25] 18  SpO2:  [90 %-95 %] 92 %  BP: (108-152)/(56-93) 116/57     Weight: 114.8 kg (253 lb 1.4 oz)  Body mass index is 39.64 kg/m².      Intake/Output Summary (Last 24 hours) at 02/03/18 1414  Last data filed at 02/03/18 1300   Gross per 24 hour   Intake              240 ml   Output                0 ml   Net              240 ml       Physical Exam   Constitutional: She is oriented to person, place, and time. She appears well-developed and well-nourished. She is cooperative.  Non-toxic appearance. She does not appear ill. No distress.   HENT:   Head: Normocephalic and atraumatic.   Right Ear: Hearing, tympanic membrane, external ear and ear canal normal.   Left Ear: Hearing, tympanic membrane, external ear and ear canal normal.   Nose: Nose normal. No mucosal edema, rhinorrhea or nasal deformity. No epistaxis. Right sinus exhibits no maxillary sinus tenderness and no frontal sinus tenderness. Left sinus exhibits no maxillary sinus tenderness and no frontal sinus tenderness.   Mouth/Throat: Uvula is midline, oropharynx is clear and moist and mucous membranes are normal. No trismus in the jaw. Normal dentition. No uvula swelling. No  posterior oropharyngeal erythema.   Eyes: Conjunctivae and lids are normal. No scleral icterus.   Sclera clear bilat   Neck: Trachea normal, full passive range of motion without pain and phonation normal. Neck supple.   Cardiovascular: Normal rate, regular rhythm, normal heart sounds, intact distal pulses and normal pulses.    Pulmonary/Chest: Effort normal. No accessory muscle usage. No apnea and no tachypnea. No respiratory distress. She has decreased breath sounds in the right upper field, the right middle field, the right lower field, the left upper field, the left middle field and the left lower field. She has in the right lower field and the left lower field. She has in the right lower field and the left lower field.           Abdominal: Soft. Normal appearance and bowel sounds are normal. She exhibits no distension. There is no tenderness.   Musculoskeletal: Normal range of motion. She exhibits no edema or deformity.   Neurological: She is alert and oriented to person, place, and time. She exhibits normal muscle tone. Coordination normal.   Skin: Skin is warm, dry and intact. She is not diaphoretic. No pallor.   Psychiatric: She has a normal mood and affect. Her speech is normal and behavior is normal. Judgment and thought content normal. Cognition and memory are normal.   Nursing note and vitals reviewed.      Vents:  Oxygen Concentration (%): 40 (02/03/18 0752)  Negative Inspiratory Force (cm H2O): -50 (02/03/18 1306)    Lines/Drains/Airways     Peripheral Intravenous Line                 Peripheral IV - Single Lumen 02/01/18 1653 Right Wrist 1 day                Significant Labs:    CBC/Anemia Profile:    Recent Labs  Lab 02/02/18  0735 02/03/18  0622   WBC 10.44 9.17   HGB 11.9* 12.0   HCT 37.8 36.7*    250   * 95   RDW 14.6* 14.8*        Chemistries:    Recent Labs  Lab 02/02/18  0735 02/03/18  0622    141   K 4.3 3.9   CL 97 99   CO2 33* 29   BUN 61* 81*   CREATININE 1.3 1.6*    CALCIUM 9.7 9.7       All pertinent labs within the past 24 hours have been reviewed.    Significant Imaging:  I have reviewed all pertinent imaging results/findings within the past 24 hours.    Assessment/Plan:     * Myasthenia gravis, adult form    Steroids fixed pt neuro has seen agree need tessalon test and await pfts         Acute respiratory failure with hypoxia and hypercarbia    Much better gases   Check abg today  Improving increased bipap  Elevated co2 with po2 81        Chronic atrial fibrillation    afib with controlled rate secondary to underlying copd n        Acute on chronic congestive heart failure    bnp 400n due to hypoxia        Hypothyroidism    On meds stable        Overweight(278.02)    Morbid obese   n        stable continue steroids await further testing        Nicolas Watson MD  Pulmonology  [facility]

## 2018-02-03 NOTE — PROGRESS NOTES
[facility] Hospital Medicine  Progress Note    Patient Name: Stephany Skinner  MRN: [id number]  Patient Class: IP- Inpatient   Admission Date: 1/30/2018  Length of Stay: 4 days  Attending Physician: Za Sandra MD  Primary Care Provider: Pipo Flowers MD        Subjective:     Principal Problem:Acute respiratory failure with hypoxia and hypercarbia    HPI:  Stephany Skinner is a 83 y.o. female  Who is a patient of Dr ambriz and Dr cardenas .  Today was her second visit to ER .  She lives with her sister who reported that she is becoming more short winded for last few days.  She missed her lasix 40 mg daily for last 4 days. + PND, + dyspnea at rest .  + leg swelling is worsening .reports no chest pains   C/o neck pains   Can not recall weight gain .    Placed in observation for CHF.  BNP high   CXR is  suggestive of pulmonary edema        Hospital Course:  1/31/18  82 YO WF admitted for tx of CHF  1-30-18; day 3; She reported missing  her lasix 40 mg daily for last 4 days prior to admit; Now receiving IV lasix twice daily. Continues to have some LE edema and BARGER.   This am on rounds pt developed worsening SOB, now requiring Bipap. Noted D-dimer elevated;  Lab Results   Component Value Date    DDIMER 1.05 (H) 01/30/2018   Pt with known chronic afib- on plavix only per cards. CT of chest ordered to rule out PE ordered.     Echo pending. (last Echo ?2012).   Also noted to have ^WBCs on admit; trending down 12.96 from 14.44 on admission; Dr. Cardenas suggesting possible bronchitis. U/A- ok.  I/O yesterday-  -640, wt 261. Today-pending    2/1/18 CT of chest negative for PE yesterday. Lovenox initiated per cards for recent new onset afib vs atrial tach per Dr. Cardenas. Pt with continued SOB requiring BIPAP; decreased LOC overnight. ABGs demonstrating ^CO2; required BIPAP adjustment;  ABG    Recent Labs  Lab 01/31/18  2235   PH 7.35   PO2 63   PCO2 72*   HCO3 39.70   BE 11.40   repeat ABGS this am with  continued ^PCO2- 74 -Dr. Jain recommending RR ^  More awake now - opening eyes and taking in meds PO.  Sats have been good.    2/2/18- continues with BIPAP. Will respond with maximal stimuli; opens eyes and becomes more alert and awake   resp viral panel pending.   Echo - 1/31/18 Echo ejection fraction is 55-60%   Discussed with Dr. Watson- he feels she is compensated resp failure- does not recommend intubation at this time. Wants to rule out neuromuscular. He started Seroquel yesterday due to pt being very restless with Bipap 2 nights ago. Will discontinue.    Started on Zithromax yesterday for possible bacterial bronchitis.  BP now borderline; seems to now be fluid depleted. Weights reflect ? 30lb weight loss; will decrease lasix rx    2/3 Pt's pco2 is in the 40s and she is awake and alert--seems to be responding to the steroids and the opinion is she may be battling myasthenia gravidis    Interval History: See     Review of Systems   Neurological:        More alert, talking and appears much stronger     Interval History: See     Review of Systems  Objective:     Vital Signs (Most Recent):  Temp: 99.1 °F (37.3 °C) (02/03/18 0752)  Pulse: 74 (02/03/18 1306)  Resp: 18 (02/03/18 1306)  BP: (!) 116/57 (02/03/18 1158)  SpO2: (!) 92 % (02/03/18 1306) Vital Signs (24h Range):  Temp:  [97.8 °F (36.6 °C)-99.4 °F (37.4 °C)] 99.1 °F (37.3 °C)  Pulse:  [] 74  Resp:  [16-25] 18  SpO2:  [90 %-95 %] 92 %  BP: (108-152)/(56-93) 116/57     Weight: 114.8 kg (253 lb 1.4 oz)  Body mass index is 39.64 kg/m².  No intake or output data in the 24 hours ending 02/03/18 1313   Physical Exam    Significant Labs:   BMP:     Recent Labs  Lab 02/03/18  0622   *      K 3.9   CL 99   CO2 29   BUN 81*   CREATININE 1.6*   CALCIUM 9.7   .  Lab Results   Component Value Date    TSH 0.371 (L) 02/02/2018       BNP    Recent Labs  Lab 02/03/18  0622   BNP 69       Lab Results   Component Value Date    WBC 9.17 02/03/2018     HGB 12.0 02/03/2018    HCT 36.7 (L) 02/03/2018    MCV 95 02/03/2018     02/03/2018   Procalcitonin- 0.39    CBC:     Recent Labs  Lab 02/02/18  0735 02/03/18  0622   WBC 10.44 9.17   HGB 11.9* 12.0   HCT 37.8 36.7*    250       Significant Imaging: CXR: I have reviewed all pertinent results/findings within the past 24 hours and my personal findings are:  The heart isn't enlarged.  The pulmonary vasculature with mild pulmonary edema.  No definite pleural effusions.     CT of chest negative for PE, + ground glass opacities.   Objective:     Vital Signs (Most Recent):  Temp: 99.1 °F (37.3 °C) (02/03/18 0752)  Pulse: 74 (02/03/18 1306)  Resp: 18 (02/03/18 1306)  BP: (!) 116/57 (02/03/18 1158)  SpO2: (!) 74 % (02/03/18 1306) Vital Signs (24h Range):  Temp:  [97.8 °F (36.6 °C)-99.4 °F (37.4 °C)] 99.1 °F (37.3 °C)  Pulse:  [] 74  Resp:  [16-25] 18  SpO2:  [74 %-95 %] 74 %  BP: (108-152)/(56-93) 116/57     Weight: 114.8 kg (253 lb 1.4 oz)  Body mass index is 39.64 kg/m².  No intake or output data in the 24 hours ending 02/03/18 1313   Physical Exam   Constitutional: She appears well-developed and well-nourished.   HENT:   Head: Normocephalic.   Eyes: Pupils are equal, round, and reactive to light.   Neck: Normal range of motion. Neck supple. No thyromegaly present.   Cardiovascular: Normal rate and regular rhythm.    Pulmonary/Chest: No respiratory distress. She has no wheezes. She has no rales. She exhibits no tenderness.   Abdominal: She exhibits no distension. There is no tenderness. There is no rebound and no guarding.   Musculoskeletal: Normal range of motion. She exhibits no edema or tenderness.   Lymphadenopathy:     She has no cervical adenopathy.   Neurological: She is alert. She displays abnormal reflex. No cranial nerve deficit. She exhibits abnormal muscle tone. Coordination abnormal.   Awake and talking and is wanting to eat   Skin: Skin is warm and dry. No rash noted. No pallor.    Psychiatric: She has a normal mood and affect. Judgment and thought content normal.       Significant Labs:   BMP:     Recent Labs  Lab 02/03/18  0622   *      K 3.9   CL 99   CO2 29   BUN 81*   CREATININE 1.6*   CALCIUM 9.7   .  Lab Results   Component Value Date    TSH 0.371 (L) 02/02/2018       BNP    Recent Labs  Lab 02/03/18  0622   BNP 69       Lab Results   Component Value Date    WBC 9.17 02/03/2018    HGB 12.0 02/03/2018    HCT 36.7 (L) 02/03/2018    MCV 95 02/03/2018     02/03/2018   Procalcitonin- 0.39    CBC:     Recent Labs  Lab 02/02/18  0735 02/03/18  0622   WBC 10.44 9.17   HGB 11.9* 12.0   HCT 37.8 36.7*    250       Significant Imaging: CXR: I have reviewed all pertinent results/findings within the past 24 hours and my personal findings are:  The heart isn't enlarged.  The pulmonary vasculature with mild pulmonary edema.  No definite pleural effusions.     CT of chest negative for PE, + ground glass opacities.     Assessment/Plan:      * Acute respiratory failure with hypoxia and hypercarbia    Continue  Bipap and continue supplemental oxygen   Add xopenex q 6 hours   Follow pulmonary recs per Dr. Watson   Procalcitonin.  Check resp panel.  Add in azithromycin to cover atypical pna/bacterial bronchitis. Ground glass pattern on CT noted.  Dr Watson is concerned about neuromsucular process--CO2 level has been stable        Myasthenia gravis      Responding well to steroids        Chronic atrial fibrillation    Continue metoprolol, plavix   Dr cardenas following discussed Afib; he reports fairly new dx, plavix was for previous + abnormal stress test; pt deferred angiography and had poor Asa tolerance.  Lovenox 1mg/kg sq twice daily started yesterday for afib vs Atrial tach          Acute on chronic congestive heart failure    Supplemental O2 via nasal canula; titrate O2 saturation to >92%.  Serial Cardiac enzymes × 3.  Diuretic administration. Monitor electrolytes for hypokalemia  and hypomagnesemia.  Cardiology Consultation- Dr. Patel following   2-D Echocardiogram for evaluation of left ventricle systolic function or any valvular heart disease- ordered today (not bad at all)  Daily weights.  Strict I/Os.  Fluid restriction.  Na restriction <2g/d.      IV lasix 40 mg bid          HTN (hypertension)      Continue losartan, metoprolol and amlodipine  Follow cards recs        Hyperlipidemia    Continue atorvastatin          Hypothyroidism    TSH almost in range   Continue present dose             JOY (obstructive sleep apnea)    On BiPap            VTE Risk Mitigation         Ordered     enoxaparin injection 120 mg  Every 12 hours (non-standard times)     Route:  Subcutaneous        01/31/18 1236     Medium Risk of VTE  Once      01/30/18 1113     Place sequential compression device  Until discontinued      01/30/18 1113              Darion Richmond MD  Department of Hospital Medicine   [facility]

## 2018-02-03 NOTE — PLAN OF CARE
Problem: Patient Care Overview  Goal: Plan of Care Review  Outcome: Ongoing (interventions implemented as appropriate)  Vitals remained stable, afebrile. No complaints of pain. Pt wore Bipap all day. Hard to arouse at times. Answers questions appropriately when alert. Able to eat pudding and soup for supper. Held morning meds. Bipap decreased to 40. ABG done this afternoon, PCO2 45, improved from this AMs ABG. NIF and PFT ordered this afternoon. Dr. Ortiz consulted for possible myasthenia gravis. Solumedrol started. Discussed plan of care with pt, stated understanding. Will continue to keep close watch

## 2018-02-04 PROCEDURE — 11000001 HC ACUTE MED/SURG PRIVATE ROOM

## 2018-02-04 PROCEDURE — 94761 N-INVAS EAR/PLS OXIMETRY MLT: CPT

## 2018-02-04 PROCEDURE — 27000221 HC OXYGEN, UP TO 24 HOURS

## 2018-02-04 PROCEDURE — 94010 BREATHING CAPACITY TEST: CPT

## 2018-02-04 PROCEDURE — 97530 THERAPEUTIC ACTIVITIES: CPT

## 2018-02-04 PROCEDURE — 25000003 PHARM REV CODE 250: Performed by: NURSE PRACTITIONER

## 2018-02-04 PROCEDURE — 94727 GAS DIL/WSHOT DETER LNG VOL: CPT

## 2018-02-04 PROCEDURE — 99900031 HC PATIENT EDUCATION (STAT)

## 2018-02-04 PROCEDURE — 63600175 PHARM REV CODE 636 W HCPCS: Performed by: INTERNAL MEDICINE

## 2018-02-04 PROCEDURE — G8978 MOBILITY CURRENT STATUS: HCPCS | Mod: CL

## 2018-02-04 PROCEDURE — 25000003 PHARM REV CODE 250: Performed by: INTERNAL MEDICINE

## 2018-02-04 PROCEDURE — 25000003 PHARM REV CODE 250: Performed by: PSYCHIATRY & NEUROLOGY

## 2018-02-04 PROCEDURE — 63600175 PHARM REV CODE 636 W HCPCS: Performed by: NURSE PRACTITIONER

## 2018-02-04 PROCEDURE — 94640 AIRWAY INHALATION TREATMENT: CPT

## 2018-02-04 PROCEDURE — 25000242 PHARM REV CODE 250 ALT 637 W/ HCPCS: Performed by: INTERNAL MEDICINE

## 2018-02-04 PROCEDURE — 94660 CPAP INITIATION&MGMT: CPT

## 2018-02-04 PROCEDURE — 99900035 HC TECH TIME PER 15 MIN (STAT)

## 2018-02-04 PROCEDURE — 94729 DIFFUSING CAPACITY: CPT

## 2018-02-04 PROCEDURE — G8979 MOBILITY GOAL STATUS: HCPCS | Mod: CK

## 2018-02-04 PROCEDURE — 25000003 PHARM REV CODE 250: Performed by: SURGERY

## 2018-02-04 PROCEDURE — 99232 SBSQ HOSP IP/OBS MODERATE 35: CPT | Mod: ,,, | Performed by: FAMILY MEDICINE

## 2018-02-04 PROCEDURE — 97161 PT EVAL LOW COMPLEX 20 MIN: CPT

## 2018-02-04 PROCEDURE — 94060 EVALUATION OF WHEEZING: CPT

## 2018-02-04 RX ADMIN — METHYLPREDNISOLONE SODIUM SUCCINATE 40 MG: 40 INJECTION, POWDER, FOR SOLUTION INTRAMUSCULAR; INTRAVENOUS at 05:02

## 2018-02-04 RX ADMIN — LEVALBUTEROL 1.25 MG: 1.25 SOLUTION, CONCENTRATE RESPIRATORY (INHALATION) at 01:02

## 2018-02-04 RX ADMIN — ATORVASTATIN CALCIUM 10 MG: 10 TABLET, FILM COATED ORAL at 09:02

## 2018-02-04 RX ADMIN — ENOXAPARIN SODIUM 120 MG: 150 INJECTION SUBCUTANEOUS at 06:02

## 2018-02-04 RX ADMIN — LEVALBUTEROL 1.25 MG: 1.25 SOLUTION, CONCENTRATE RESPIRATORY (INHALATION) at 07:02

## 2018-02-04 RX ADMIN — LOSARTAN POTASSIUM 100 MG: 50 TABLET, FILM COATED ORAL at 09:02

## 2018-02-04 RX ADMIN — AMLODIPINE BESYLATE 2.5 MG: 2.5 TABLET ORAL at 09:02

## 2018-02-04 RX ADMIN — IPRATROPIUM BROMIDE 0.5 MG: 0.5 SOLUTION RESPIRATORY (INHALATION) at 07:02

## 2018-02-04 RX ADMIN — PANTOPRAZOLE SODIUM 40 MG: 40 TABLET, DELAYED RELEASE ORAL at 09:02

## 2018-02-04 RX ADMIN — IPRATROPIUM BROMIDE 0.5 MG: 0.5 SOLUTION RESPIRATORY (INHALATION) at 01:02

## 2018-02-04 RX ADMIN — ENOXAPARIN SODIUM 120 MG: 150 INJECTION SUBCUTANEOUS at 05:02

## 2018-02-04 RX ADMIN — IPRATROPIUM BROMIDE 0.5 MG: 0.5 SOLUTION RESPIRATORY (INHALATION) at 06:02

## 2018-02-04 RX ADMIN — PYRIDOSTIGMINE BROMIDE 60 MG: 60 TABLET ORAL at 09:02

## 2018-02-04 RX ADMIN — IPRATROPIUM BROMIDE 0.5 MG: 0.5 SOLUTION RESPIRATORY (INHALATION) at 12:02

## 2018-02-04 RX ADMIN — METHYLPREDNISOLONE SODIUM SUCCINATE 40 MG: 40 INJECTION, POWDER, FOR SOLUTION INTRAMUSCULAR; INTRAVENOUS at 12:02

## 2018-02-04 RX ADMIN — LEVALBUTEROL 1.25 MG: 1.25 SOLUTION, CONCENTRATE RESPIRATORY (INHALATION) at 06:02

## 2018-02-04 RX ADMIN — PYRIDOSTIGMINE BROMIDE 60 MG: 60 TABLET ORAL at 05:02

## 2018-02-04 RX ADMIN — LEVOTHYROXINE SODIUM 100 MCG: 100 TABLET ORAL at 09:02

## 2018-02-04 RX ADMIN — PYRIDOSTIGMINE BROMIDE 60 MG: 60 TABLET ORAL at 02:02

## 2018-02-04 RX ADMIN — METOPROLOL SUCCINATE 200 MG: 50 TABLET, EXTENDED RELEASE ORAL at 09:02

## 2018-02-04 RX ADMIN — CLOPIDOGREL BISULFATE 75 MG: 75 TABLET ORAL at 09:02

## 2018-02-04 RX ADMIN — METHYLPREDNISOLONE SODIUM SUCCINATE 40 MG: 40 INJECTION, POWDER, FOR SOLUTION INTRAMUSCULAR; INTRAVENOUS at 06:02

## 2018-02-04 RX ADMIN — LEVALBUTEROL 1.25 MG: 1.25 SOLUTION, CONCENTRATE RESPIRATORY (INHALATION) at 12:02

## 2018-02-04 NOTE — PROGRESS NOTES
Staff Handoff    Bedside report received from ROSA ISELA Delaney. Patient lying in bed. Tolerating BIPAP well. Denies pain. Daughter at bedside. Call bell in reach.   Resident Handoff

## 2018-02-04 NOTE — ASSESSMENT & PLAN NOTE
Responding well to steroids    Eating, talking, getting out of bed--Amazing Recovery from last week

## 2018-02-04 NOTE — PLAN OF CARE
Problem: Patient Care Overview  Goal: Plan of Care Review  Outcome: Ongoing (interventions implemented as appropriate)  Vitals remained stable, afebrile. No complaints of pain. Participated in PT. Off of bipap for most of the day, felt like she needed it this afternoon. Lungs sounds clear and diminished.

## 2018-02-04 NOTE — PT/OT/SLP EVAL
Physical Therapy Evaluation    Patient Name:  Stephany Skinner   MRN:  [id number]    Recommendations:     Discharge Recommendations:  home with home health, home health PT, home health OT   Discharge Equipment Recommendations: none   Barriers to discharge: None    Assessment:     Stephany Skinner is a 83 y.o. female admitted with a medical diagnosis of Myasthenia gravis, adult form.  She presents with the following impairments/functional limitations:  weakness, impaired endurance, impaired sensation, impaired self care skills, impaired functional mobilty, gait instability, decreased upper extremity function, decreased lower extremity function, decreased safety awareness, impaired skin, impaired cardiopulmonary response to activity    Rehab Prognosis:  Fair plus; patient would benefit from acute skilled PT services to address these deficits and reach maximum level of function.      Recent Surgery: * No surgery found *      Plan:     During this hospitalization, patient to be seen  (twice daily  Mon - Fri. once daily weekends) to address the above listed problems via gait training, therapeutic activities, therapeutic exercises  · Plan of Care Expires:      Plan of Care Reviewed with: patient, family    Subjective     Communicated with patient, daughter prior to session.  Patient found supine upon PT entry to room, agreeable to evaluation.      Chief Complaint:  Weakness and breathing difficulty    Patient comments/goals: need to get stronger an d be able to return home  Pain/Comfort:  · Pain Rating 1: 0/10  · Pain Rating Post-Intervention 1: 0/10    Patients cultural, spiritual, Faith conflicts given the current situation:      Living Environment:   Home  Prior to admission, patients level of function was functional with assistance as needed.  Patient has the following equipment: rollator, oxygen.  DME owned (not currently used): none.  Upon discharge, patient will have assistance from family.    Objective:      Patient found with: bed alarm, BIPAP, oxygen, peripheral IV, telemetry     General Precautions: Standard,     Orthopedic Precautions:N/A (previous . totak knee arthroplasties -bilaterally)   Braces:       Exams:  · Gross Motor Coordination:  WFL  · Postural Exam:  Patient presented with the following abnormalities:    · -       Rounded shoulders  · RUE ROM: WFL  · RUE Strength: WFL  · LUE ROM: WFL  · LUE Strength: WFL  · RLE ROM: WFL  · RLE Strength: WFL  · LLE ROM: WFL  · LLE Strength: WFL    Functional Mobility:  · Bed mobility: moderate assistance for rolling right and left - maximal assist for scooting.  · Sit to stand with RW - moderate assistance.  · Stand to sit with RW - moderate assistance and pivot technique.  · Gait tasks with RW and moderate assistance for 15 ft.  Endurance, at present    AM-PAC 6 CLICK MOBILITY  Total Score:11       Therapeutic Activities and Exercises:  Instructed pt to perform frequent ankle pumps while supine and attempt to move legs about while in bed,and or seated in BS chair  Assisted with therapeutic exercises for all four extremities to increase NM tone and fluid dynamics  activity to promote strength and functional gains    Patient left HOB elevated with all lines intact, call button in reach, NSG notified and family present.    GOALS:    Physical Therapy Goals        Problem: Physical Therapy Goal    Goal Priority Disciplines Outcome Goal Variances Interventions   Physical Therapy Goal     PT/OT, PT      Description:  1. Patient will  participate with assistive and active exercises to advance strength in  all four extremities to 4 / 4 levels.  2. All transfers will advance to minimal assistance for safety  3  Endurance for out of bed activities will advance as tolerated  4. Gait with RW will progress to 50 ft intervals with minimal assistance                       History:     Past Medical History:   Diagnosis Date    GERD (gastroesophageal reflux disease)      Hyperlipidemia     Hypertension        Past Surgical History:   Procedure Laterality Date    CHOLECYSTECTOMY      HERNIA REPAIR      HYSTERECTOMY      knee replacemene      deidra    THYROIDECTOMY         Clinical Decision Making:     History  Co-morbidities and personal factors that may impact the plan of care Examination  Body Structures and Functions, activity limitations and participation restrictions that may impact the plan of care Clinical Presentation   Decision Making/ Complexity Score   Co-morbidities:   [x] Time since onset of injury / illness / exacerbation  [x] Status of current condition  []Patient's cognitive status and safety concerns    [] Multiple Medical Problems (see med hx)  Personal Factors:   [] Patient's age  [x] Prior Level of function   [] Patient's home situation (environment and family support)  [] Patient's level of motivation  [] Expected progression of patient      HISTORY:(criteria)    [] 45107 - no personal factors/history    [x] 83362 - has 1-2 personal factor/comorbidity     [] 07063 - has >3 personal factor/comorbidity     Body Regions:  [] Objective examination findings  [] Head     []  Neck  [x] Trunk   [x] Upper Extremity  [] Lower Extremity    Body Systems:  [] For communication ability, affect, cognition, language, and learning style: the assessment of the ability to make needs known, consciousness, orientation (person, place, and time), expected emotional /behavioral responses, and learning preferences (eg, learning barriers, education  needs)  [] For the neuromuscular system: a general assessment of gross coordinated movement (eg, balance, gait, locomotion, transfers, and transitions) and motor function  (motor control and motor learning)  [x] For the musculoskeletal system: the assessment of gross symmetry, gross range of motion, gross strength, height, and weight  [] For the integumentary system: the assessment of pliability(texture), presence of scar formation, skin  color, and skin integrity  [] For cardiovascular/pulmonary system: the assessment of heart rate, respiratory rate, blood pressure, and edema     Activity limitations:    [] Patient's cognitive status and saf ety concerns          [x] Status of current condition      [] Weight bearing restriction  [] Cardiopulmunary Restriction    Participation Restrictions:   [] Goals and goal agreement with the patient     [x] Rehab potential (prognosis) and probable outcome      Examination of Body System: (criteria)    [x] 67310 - addressing 1-2 elements    [] 52144 - addressing a total of 3 or more elements     [] 88486 -  Addressing a total of 4 or more elements         Clinical Presentation: (criteria)  Stable - 84031     On examination of body system using standardized tests and measures patient presents with 1-2 elements from any of the following: body structures and functions, activity limitations, and/or participation restrictions.  Leading to a clinical presentation that is considered stable and/or uncomplicated                              Clinical Decision Making  (Eval Complexity):  Low- 10751     Time Tracking:     PT Received On: 02/04/18  PT Start Time: 0830     PT Stop Time: 0900  PT Total Time (min): 30 min     Billable Minutes: Evaluation 15 min, Therapeutic Activity 15 min and Total Time 30 min      Jordan Garcia, PT  02/04/2018

## 2018-02-04 NOTE — SUBJECTIVE & OBJECTIVE
Interval History: See     Review of Systems   Constitutional: Positive for activity change. Negative for appetite change, chills and fever.        Getting out of bed   HENT: Negative for rhinorrhea, sinus pressure, sore throat and trouble swallowing.    Respiratory: Negative for cough, chest tightness, shortness of breath and wheezing.    Cardiovascular: Negative for chest pain and palpitations.   Gastrointestinal: Negative for abdominal pain, blood in stool, diarrhea, nausea and vomiting.   Genitourinary: Negative for dysuria, flank pain, hematuria, pelvic pain, urgency, vaginal bleeding, vaginal discharge and vaginal pain.   Musculoskeletal: Negative for back pain, joint swelling and neck stiffness.   Skin: Negative for rash.   Neurological: Negative for dizziness, weakness, light-headedness, numbness and headaches.        More alert, talking and appears much stronger   Hematological: Does not bruise/bleed easily.   Psychiatric/Behavioral: Negative for agitation. The patient is not nervous/anxious.      Interval History: See     Review of Systems  Objective:     Vital Signs (Most Recent):  Temp: 97.5 °F (36.4 °C) (02/04/18 1122)  Pulse: 73 (02/04/18 1122)  Resp: 20 (02/04/18 0801)  BP: (!) 116/58 (02/04/18 1122)  SpO2: (!) 92 % (02/04/18 1122) Vital Signs (24h Range):  Temp:  [97.5 °F (36.4 °C)-99.4 °F (37.4 °C)] 97.5 °F (36.4 °C)  Pulse:  [69-92] 73  Resp:  [18-23] 20  SpO2:  [90 %-93 %] 92 %  BP: (108-135)/(54-63) 116/58     Weight: 114.8 kg (253 lb 1.4 oz)  Body mass index is 39.64 kg/m².    Intake/Output Summary (Last 24 hours) at 02/04/18 1155  Last data filed at 02/04/18 1000   Gross per 24 hour   Intake              480 ml   Output                0 ml   Net              480 ml      Physical Exam    Significant Labs:   BMP:     Recent Labs  Lab 02/03/18  0622   *      K 3.9   CL 99   CO2 29   BUN 81*   CREATININE 1.6*   CALCIUM 9.7   .  Lab Results   Component Value Date    TSH 0.371 (L)  02/02/2018       BNP    Recent Labs  Lab 02/03/18  0622   BNP 69       Lab Results   Component Value Date    WBC 9.17 02/03/2018    HGB 12.0 02/03/2018    HCT 36.7 (L) 02/03/2018    MCV 95 02/03/2018     02/03/2018   Procalcitonin- 0.39    CBC:     Recent Labs  Lab 02/03/18  0622   WBC 9.17   HGB 12.0   HCT 36.7*          Significant Imaging: CXR: I have reviewed all pertinent results/findings within the past 24 hours and my personal findings are:  The heart isn't enlarged.  The pulmonary vasculature with mild pulmonary edema.  No definite pleural effusions.     CT of chest negative for PE, + ground glass opacities.   Objective:     Vital Signs (Most Recent):  Temp: 97.5 °F (36.4 °C) (02/04/18 1122)  Pulse: 73 (02/04/18 1122)  Resp: 20 (02/04/18 0801)  BP: (!) 116/58 (02/04/18 1122)  SpO2: (!) 92 % (02/04/18 1122) Vital Signs (24h Range):  Temp:  [97.5 °F (36.4 °C)-99.4 °F (37.4 °C)] 97.5 °F (36.4 °C)  Pulse:  [69-92] 73  Resp:  [18-23] 20  SpO2:  [90 %-93 %] 92 %  BP: (108-135)/(54-63) 116/58     Weight: 114.8 kg (253 lb 1.4 oz)  Body mass index is 39.64 kg/m².    Intake/Output Summary (Last 24 hours) at 02/04/18 1155  Last data filed at 02/04/18 1000   Gross per 24 hour   Intake              480 ml   Output                0 ml   Net              480 ml      Physical Exam   Constitutional: She appears well-developed and well-nourished.   HENT:   Head: Normocephalic.   Eyes: Pupils are equal, round, and reactive to light.   Neck: Normal range of motion. Neck supple. No thyromegaly present.   Cardiovascular: Normal rate and regular rhythm.    Pulmonary/Chest: No respiratory distress. She has no wheezes. She has no rales. She exhibits no tenderness.   Abdominal: She exhibits no distension. There is no tenderness. There is no rebound and no guarding.   Musculoskeletal: Normal range of motion. She exhibits no edema or tenderness.   Lymphadenopathy:     She has no cervical adenopathy.   Neurological: She is  alert. She displays normal reflexes. No cranial nerve deficit. She exhibits normal muscle tone. Coordination normal.   Awake and talking and is wanting to eat   Skin: Skin is warm and dry. No rash noted. No pallor.   Psychiatric: She has a normal mood and affect. Judgment and thought content normal.       Significant Labs:   BMP:     Recent Labs  Lab 02/03/18 0622   *      K 3.9   CL 99   CO2 29   BUN 81*   CREATININE 1.6*   CALCIUM 9.7   .  Lab Results   Component Value Date    TSH 0.371 (L) 02/02/2018       BNP    Recent Labs  Lab 02/03/18 0622   BNP 69       Lab Results   Component Value Date    WBC 9.17 02/03/2018    HGB 12.0 02/03/2018    HCT 36.7 (L) 02/03/2018    MCV 95 02/03/2018     02/03/2018   Procalcitonin- 0.39    CBC:     Recent Labs  Lab 02/03/18 0622   WBC 9.17   HGB 12.0   HCT 36.7*          Significant Imaging: CXR: I have reviewed all pertinent results/findings within the past 24 hours and my personal findings are:  The heart isn't enlarged.  The pulmonary vasculature with mild pulmonary edema.  No definite pleural effusions.     CT of chest negative for PE, + ground glass opacities.

## 2018-02-04 NOTE — ASSESSMENT & PLAN NOTE
NIF today 50 was 21 yesterday   Steroids fixed pt neuro has seen agree need tessalon test and await pfts

## 2018-02-04 NOTE — PLAN OF CARE
Problem: Patient Care Overview  Goal: Plan of Care Review  Outcome: Ongoing (interventions implemented as appropriate)  Patient and daughter aware of plan of care. Tolerated BIPAP throughout night. IV steroids continued. Daughter at bedside. No questions or concerns at this time. Agrees with plan of care.

## 2018-02-04 NOTE — PROGRESS NOTES
[facility] Hospital Medicine  Progress Note    Patient Name: Stephany Skinner  MRN: [id number]  Patient Class: IP- Inpatient   Admission Date: 1/30/2018  Length of Stay: 5 days  Attending Physician: Za Sandra MD  Primary Care Provider: Pipo Flowers MD        Subjective:     Principal Problem:Myasthenia gravis, adult form    HPI:  Stephany Skinner is a 83 y.o. female  Who is a patient of Dr ambriz and Dr cardenas .  Today was her second visit to ER .  She lives with her sister who reported that she is becoming more short winded for last few days.  She missed her lasix 40 mg daily for last 4 days. + PND, + dyspnea at rest .  + leg swelling is worsening .reports no chest pains   C/o neck pains   Can not recall weight gain .    Placed in observation for CHF.  BNP high   CXR is  suggestive of pulmonary edema        Hospital Course:  1/31/18  82 YO WF admitted for tx of CHF  1-30-18; day 3; She reported missing  her lasix 40 mg daily for last 4 days prior to admit; Now receiving IV lasix twice daily. Continues to have some LE edema and BARGER.   This am on rounds pt developed worsening SOB, now requiring Bipap. Noted D-dimer elevated;  Lab Results   Component Value Date    DDIMER 1.05 (H) 01/30/2018   Pt with known chronic afib- on plavix only per cards. CT of chest ordered to rule out PE ordered.     Echo pending. (last Echo ?2012).   Also noted to have ^WBCs on admit; trending down 12.96 from 14.44 on admission; Dr. Cardenas suggesting possible bronchitis. U/A- ok.  I/O yesterday-  -640, wt 261. Today-pending    2/1/18 CT of chest negative for PE yesterday. Lovenox initiated per cards for recent new onset afib vs atrial tach per Dr. Cardenas. Pt with continued SOB requiring BIPAP; decreased LOC overnight. ABGs demonstrating ^CO2; required BIPAP adjustment;  ABG    Recent Labs  Lab 01/31/18  2235   PH 7.35   PO2 63   PCO2 72*   HCO3 39.70   BE 11.40   repeat ABGS this am with continued ^PCO2- 74 -  Cristobal recommending RR ^  More awake now - opening eyes and taking in meds PO.  Sats have been good.    2/2/18- continues with BIPAP. Will respond with maximal stimuli; opens eyes and becomes more alert and awake   resp viral panel pending.   Echo - 1/31/18 Echo ejection fraction is 55-60%   Discussed with Dr. Watson- he feels she is compensated resp failure- does not recommend intubation at this time. Wants to rule out neuromuscular. He started Seroquel yesterday due to pt being very restless with Bipap 2 nights ago. Will discontinue.    Started on Zithromax yesterday for possible bacterial bronchitis.  BP now borderline; seems to now be fluid depleted. Weights reflect ? 30lb weight loss; will decrease lasix rx    2/3 Pt's pco2 is in the 40s and she is awake and alert--seems to be responding to the steroids and the opinion is she may be battling myasthenia gravis    2/4 Pt is amazingly improved this AM, breathing well, getting out of bed and eating todya    Interval History: See HC    Review of Systems   Constitutional: Positive for activity change. Negative for appetite change, chills and fever.        Getting out of bed   HENT: Negative for rhinorrhea, sinus pressure, sore throat and trouble swallowing.    Respiratory: Negative for cough, chest tightness, shortness of breath and wheezing.    Cardiovascular: Negative for chest pain and palpitations.   Gastrointestinal: Negative for abdominal pain, blood in stool, diarrhea, nausea and vomiting.   Genitourinary: Negative for dysuria, flank pain, hematuria, pelvic pain, urgency, vaginal bleeding, vaginal discharge and vaginal pain.   Musculoskeletal: Negative for back pain, joint swelling and neck stiffness.   Skin: Negative for rash.   Neurological: Negative for dizziness, weakness, light-headedness, numbness and headaches.        More alert, talking and appears much stronger   Hematological: Does not bruise/bleed easily.   Psychiatric/Behavioral: Negative for  agitation. The patient is not nervous/anxious.      Interval History: See HC    Review of Systems  Objective:     Vital Signs (Most Recent):  Temp: 97.5 °F (36.4 °C) (02/04/18 1122)  Pulse: 73 (02/04/18 1122)  Resp: 20 (02/04/18 0801)  BP: (!) 116/58 (02/04/18 1122)  SpO2: (!) 92 % (02/04/18 1122) Vital Signs (24h Range):  Temp:  [97.5 °F (36.4 °C)-99.4 °F (37.4 °C)] 97.5 °F (36.4 °C)  Pulse:  [69-92] 73  Resp:  [18-23] 20  SpO2:  [90 %-93 %] 92 %  BP: (108-135)/(54-63) 116/58     Weight: 114.8 kg (253 lb 1.4 oz)  Body mass index is 39.64 kg/m².    Intake/Output Summary (Last 24 hours) at 02/04/18 1155  Last data filed at 02/04/18 1000   Gross per 24 hour   Intake              480 ml   Output                0 ml   Net              480 ml      Physical Exam    Significant Labs:   BMP:     Recent Labs  Lab 02/03/18  0622   *      K 3.9   CL 99   CO2 29   BUN 81*   CREATININE 1.6*   CALCIUM 9.7   .  Lab Results   Component Value Date    TSH 0.371 (L) 02/02/2018       BNP    Recent Labs  Lab 02/03/18  0622   BNP 69       Lab Results   Component Value Date    WBC 9.17 02/03/2018    HGB 12.0 02/03/2018    HCT 36.7 (L) 02/03/2018    MCV 95 02/03/2018     02/03/2018   Procalcitonin- 0.39    CBC:     Recent Labs  Lab 02/03/18 0622   WBC 9.17   HGB 12.0   HCT 36.7*          Significant Imaging: CXR: I have reviewed all pertinent results/findings within the past 24 hours and my personal findings are:  The heart isn't enlarged.  The pulmonary vasculature with mild pulmonary edema.  No definite pleural effusions.     CT of chest negative for PE, + ground glass opacities.   Objective:     Vital Signs (Most Recent):  Temp: 97.5 °F (36.4 °C) (02/04/18 1122)  Pulse: 73 (02/04/18 1122)  Resp: 20 (02/04/18 0801)  BP: (!) 116/58 (02/04/18 1122)  SpO2: (!) 92 % (02/04/18 1122) Vital Signs (24h Range):  Temp:  [97.5 °F (36.4 °C)-99.4 °F (37.4 °C)] 97.5 °F (36.4 °C)  Pulse:  [69-92] 73  Resp:  [18-23] 20  SpO2:   [90 %-93 %] 92 %  BP: (108-135)/(54-63) 116/58     Weight: 114.8 kg (253 lb 1.4 oz)  Body mass index is 39.64 kg/m².    Intake/Output Summary (Last 24 hours) at 02/04/18 1155  Last data filed at 02/04/18 1000   Gross per 24 hour   Intake              480 ml   Output                0 ml   Net              480 ml      Physical Exam   Constitutional: She appears well-developed and well-nourished.   HENT:   Head: Normocephalic.   Eyes: Pupils are equal, round, and reactive to light.   Neck: Normal range of motion. Neck supple. No thyromegaly present.   Cardiovascular: Normal rate and regular rhythm.    Pulmonary/Chest: No respiratory distress. She has no wheezes. She has no rales. She exhibits no tenderness.   Abdominal: She exhibits no distension. There is no tenderness. There is no rebound and no guarding.   Musculoskeletal: Normal range of motion. She exhibits no edema or tenderness.   Lymphadenopathy:     She has no cervical adenopathy.   Neurological: She is alert. She displays normal reflexes. No cranial nerve deficit. She exhibits normal muscle tone. Coordination normal.   Awake and talking and is wanting to eat   Skin: Skin is warm and dry. No rash noted. No pallor.   Psychiatric: She has a normal mood and affect. Judgment and thought content normal.       Significant Labs:   BMP:     Recent Labs  Lab 02/03/18  0622   *      K 3.9   CL 99   CO2 29   BUN 81*   CREATININE 1.6*   CALCIUM 9.7   .  Lab Results   Component Value Date    TSH 0.371 (L) 02/02/2018       BNP    Recent Labs  Lab 02/03/18  0622   BNP 69       Lab Results   Component Value Date    WBC 9.17 02/03/2018    HGB 12.0 02/03/2018    HCT 36.7 (L) 02/03/2018    MCV 95 02/03/2018     02/03/2018   Procalcitonin- 0.39    CBC:     Recent Labs  Lab 02/03/18  0622   WBC 9.17   HGB 12.0   HCT 36.7*          Significant Imaging: CXR: I have reviewed all pertinent results/findings within the past 24 hours and my personal findings  are:  The heart isn't enlarged.  The pulmonary vasculature with mild pulmonary edema.  No definite pleural effusions.     CT of chest negative for PE, + ground glass opacities.     Assessment/Plan:      * Myasthenia gravis, adult form      Responding well to steroids    Eating, talking, getting out of bed--Amazing Recovery from last week        Acute respiratory failure with hypoxia and hypercarbia    Continue  Bipap and continue supplemental oxygen   Add xopenex q 6 hours   Follow pulmonary recs per Dr. Watson   Procalcitonin.  Check resp panel.  Add in azithromycin to cover atypical pna/bacterial bronchitis. Ground glass pattern on CT noted.  Dr Watson is concerned about neuromsucular process--CO2 level has been stable        Chronic atrial fibrillation    Continue metoprolol, plavix   Dr patel following discussed Afib; he reports fairly new dx, plavix was for previous + abnormal stress test; pt deferred angiography and had poor Asa tolerance.  Lovenox 1mg/kg sq twice daily started yesterday for afib vs Atrial tach          Acute on chronic congestive heart failure    Supplemental O2 via nasal canula; titrate O2 saturation to >92%.  Serial Cardiac enzymes × 3.  Diuretic administration. Monitor electrolytes for hypokalemia and hypomagnesemia.  Cardiology Consultation- Dr. Patel following   2-D Echocardiogram for evaluation of left ventricle systolic function or any valvular heart disease- ordered today (not bad at all)  Daily weights.  Strict I/Os.  Fluid restriction.  Na restriction <2g/d.      IV lasix 40 mg bid          HTN (hypertension)      Continue losartan, metoprolol and amlodipine  Follow cards recs        Hyperlipidemia    Continue atorvastatin          Hypothyroidism    TSH almost in range   Continue present dose             JOY (obstructive sleep apnea)    On BiPap            VTE Risk Mitigation         Ordered     enoxaparin injection 120 mg  Every 12 hours (non-standard times)     Route:  Subcutaneous         01/31/18 1236     Medium Risk of VTE  Once      01/30/18 1113     Place sequential compression device  Until discontinued      01/30/18 1113              Darion Richmond MD  Department of Hospital Medicine   [facility]

## 2018-02-04 NOTE — SUBJECTIVE & OBJECTIVE
Interval History: want a difference a day makes     Objective:     Vital Signs (Most Recent):  Temp: 97.9 °F (36.6 °C) (02/04/18 0801)  Pulse: 77 (02/04/18 1019)  Resp: 20 (02/04/18 0801)  BP: 119/62 (02/04/18 0801)  SpO2: (!) 92 % (02/04/18 0801) Vital Signs (24h Range):  Temp:  [97.9 °F (36.6 °C)-99.4 °F (37.4 °C)] 97.9 °F (36.6 °C)  Pulse:  [69-92] 77  Resp:  [18-23] 20  SpO2:  [90 %-93 %] 92 %  BP: (108-135)/(54-63) 119/62     Weight: 114.8 kg (253 lb 1.4 oz)  Body mass index is 39.64 kg/m².      Intake/Output Summary (Last 24 hours) at 02/04/18 1116  Last data filed at 02/04/18 1000   Gross per 24 hour   Intake              480 ml   Output                0 ml   Net              480 ml       Physical Exam   Constitutional: She is oriented to person, place, and time. She appears well-developed and well-nourished. She is cooperative.  Non-toxic appearance. She does not appear ill. No distress.   HENT:   Head: Normocephalic and atraumatic.   Right Ear: Hearing, tympanic membrane, external ear and ear canal normal.   Left Ear: Hearing, tympanic membrane, external ear and ear canal normal.   Nose: Nose normal. No mucosal edema, rhinorrhea or nasal deformity. No epistaxis. Right sinus exhibits no maxillary sinus tenderness and no frontal sinus tenderness. Left sinus exhibits no maxillary sinus tenderness and no frontal sinus tenderness.   Mouth/Throat: Uvula is midline, oropharynx is clear and moist and mucous membranes are normal. No trismus in the jaw. Normal dentition. No uvula swelling. No posterior oropharyngeal erythema.   Eyes: Conjunctivae and lids are normal. No scleral icterus.   Sclera clear bilat   Neck: Trachea normal, full passive range of motion without pain and phonation normal. Neck supple.   Cardiovascular: Normal rate, regular rhythm, normal heart sounds, intact distal pulses and normal pulses.    Pulmonary/Chest: She is in respiratory distress (mild to moderate). She has decreased breath sounds in  the right lower field and the left lower field. She has wheezes. She has rhonchi in the right lower field and the left lower field.   Abdominal: Soft. Normal appearance and bowel sounds are normal. She exhibits no distension. There is no tenderness.   Musculoskeletal: Normal range of motion. She exhibits no edema or deformity.   Neurological: She is alert and oriented to person, place, and time. She exhibits normal muscle tone. Coordination normal.   Skin: Skin is warm, dry and intact. She is not diaphoretic. No pallor.   Psychiatric: She has a normal mood and affect. Her speech is normal and behavior is normal. Judgment and thought content normal. Cognition and memory are normal.   Nursing note and vitals reviewed.      Vents:  Oxygen Concentration (%): 40 (02/04/18 0047)  Negative Inspiratory Force (cm H2O): -50 (02/04/18 0732)    Lines/Drains/Airways     Peripheral Intravenous Line                 Peripheral IV - Single Lumen 02/01/18 1653 Right Wrist 2 days                Significant Labs:    CBC/Anemia Profile:    Recent Labs  Lab 02/03/18  0622   WBC 9.17   HGB 12.0   HCT 36.7*      MCV 95   RDW 14.8*        Chemistries:    Recent Labs  Lab 02/03/18  0622      K 3.9   CL 99   CO2 29   BUN 81*   CREATININE 1.6*   CALCIUM 9.7       All pertinent labs within the past 24 hours have been reviewed.    Significant Imaging:  I have reviewed all pertinent imaging results/findings within the past 24 hours.

## 2018-02-04 NOTE — PROGRESS NOTES
[facility]  Pulmonology  Progress Note    Patient Name: Stephany Skinner  MRN: [id number]  Admission Date: 1/30/2018  Hospital Length of Stay: 5 days  Code Status: Full Code  Attending Provider: Za Sandra MD  Primary Care Provider: Pipo Flowers MD   Principal Problem: Myasthenia gravis, adult form    Subjective:     Interval History: want a difference a day makes     Objective:     Vital Signs (Most Recent):  Temp: 97.9 °F (36.6 °C) (02/04/18 0801)  Pulse: 77 (02/04/18 1019)  Resp: 20 (02/04/18 0801)  BP: 119/62 (02/04/18 0801)  SpO2: (!) 92 % (02/04/18 0801) Vital Signs (24h Range):  Temp:  [97.9 °F (36.6 °C)-99.4 °F (37.4 °C)] 97.9 °F (36.6 °C)  Pulse:  [69-92] 77  Resp:  [18-23] 20  SpO2:  [90 %-93 %] 92 %  BP: (108-135)/(54-63) 119/62     Weight: 114.8 kg (253 lb 1.4 oz)  Body mass index is 39.64 kg/m².      Intake/Output Summary (Last 24 hours) at 02/04/18 1116  Last data filed at 02/04/18 1000   Gross per 24 hour   Intake              480 ml   Output                0 ml   Net              480 ml       Physical Exam   Constitutional: She is oriented to person, place, and time. She appears well-developed and well-nourished. She is cooperative.  Non-toxic appearance. She does not appear ill. No distress.   HENT:   Head: Normocephalic and atraumatic.   Right Ear: Hearing, tympanic membrane, external ear and ear canal normal.   Left Ear: Hearing, tympanic membrane, external ear and ear canal normal.   Nose: Nose normal. No mucosal edema, rhinorrhea or nasal deformity. No epistaxis. Right sinus exhibits no maxillary sinus tenderness and no frontal sinus tenderness. Left sinus exhibits no maxillary sinus tenderness and no frontal sinus tenderness.   Mouth/Throat: Uvula is midline, oropharynx is clear and moist and mucous membranes are normal. No trismus in the jaw. Normal dentition. No uvula swelling. No posterior oropharyngeal erythema.   Eyes: Conjunctivae and lids are normal. No  scleral icterus.   Sclera clear bilat   Neck: Trachea normal, full passive range of motion without pain and phonation normal. Neck supple.   Cardiovascular: Normal rate, regular rhythm, normal heart sounds, intact distal pulses and normal pulses.    Pulmonary/Chest: She is in respiratory distress (mild to moderate). She has decreased breath sounds in the right lower field and the left lower field. She has wheezes. She has rhonchi in the right lower field and the left lower field.   Abdominal: Soft. Normal appearance and bowel sounds are normal. She exhibits no distension. There is no tenderness.   Musculoskeletal: Normal range of motion. She exhibits no edema or deformity.   Neurological: She is alert and oriented to person, place, and time. She exhibits normal muscle tone. Coordination normal.   Skin: Skin is warm, dry and intact. She is not diaphoretic. No pallor.   Psychiatric: She has a normal mood and affect. Her speech is normal and behavior is normal. Judgment and thought content normal. Cognition and memory are normal.   Nursing note and vitals reviewed.      Vents:  Oxygen Concentration (%): 40 (02/04/18 0047)  Negative Inspiratory Force (cm H2O): -50 (02/04/18 0732)    Lines/Drains/Airways     Peripheral Intravenous Line                 Peripheral IV - Single Lumen 02/01/18 1653 Right Wrist 2 days                Significant Labs:    CBC/Anemia Profile:    Recent Labs  Lab 02/03/18  0622   WBC 9.17   HGB 12.0   HCT 36.7*      MCV 95   RDW 14.8*        Chemistries:    Recent Labs  Lab 02/03/18  0622      K 3.9   CL 99   CO2 29   BUN 81*   CREATININE 1.6*   CALCIUM 9.7       All pertinent labs within the past 24 hours have been reviewed.    Significant Imaging:  I have reviewed all pertinent imaging results/findings within the past 24 hours.    Assessment/Plan:     * Myasthenia gravis, adult form    NIF today 50 was 21 yesterday   Steroids fixed pt neuro has seen agree need tessalon test and  await pfts          Acute respiratory failure with hypoxia and hypercarbia    Much better gases resolved   Check abg today  Improving increased bipap  Elevated co2 with po2 81        JOY (obstructive sleep apnea)    Needs NIV at night only        Chronic atrial fibrillation    afib with controlled rate secondary to underlying copd n        Acute on chronic congestive heart failure    bnp 400n due to hypoxia        Hypothyroidism    On meds stable        Overweight(278.02)    Morbid obese n                   Nicolas Watson MD  Pulmonology  [facility]

## 2018-02-05 PROBLEM — I50.33 ACUTE ON CHRONIC DIASTOLIC CONGESTIVE HEART FAILURE: Status: ACTIVE | Noted: 2018-01-30

## 2018-02-05 PROBLEM — J18.9 COMMUNITY ACQUIRED PNEUMONIA OF RIGHT UPPER LOBE OF LUNG: Status: ACTIVE | Noted: 2018-02-05

## 2018-02-05 PROBLEM — N17.9 AKI (ACUTE KIDNEY INJURY): Status: ACTIVE | Noted: 2018-02-05

## 2018-02-05 LAB
ALLENS TEST: ABNORMAL
ANION GAP SERPL CALC-SCNC: 12 MMOL/L
BASOPHILS # BLD AUTO: 0.02 K/UL
BASOPHILS NFR BLD: 0.2 %
BUN SERPL-MCNC: 116 MG/DL
CALCIUM SERPL-MCNC: 8.5 MG/DL
CHLORIDE SERPL-SCNC: 95 MMOL/L
CO2 SERPL-SCNC: 29 MMOL/L
CREAT SERPL-MCNC: 2 MG/DL
DELSYS: ABNORMAL
DIFFERENTIAL METHOD: ABNORMAL
ENTEROVIRUS: NOT DETECTED
EOSINOPHIL # BLD AUTO: 0 K/UL
EOSINOPHIL NFR BLD: 0.1 %
ERYTHROCYTE [DISTWIDTH] IN BLOOD BY AUTOMATED COUNT: 14.7 %
EST. GFR  (AFRICAN AMERICAN): 26 ML/MIN/1.73 M^2
EST. GFR  (NON AFRICAN AMERICAN): 23 ML/MIN/1.73 M^2
GLUCOSE SERPL-MCNC: 406 MG/DL
HCO3 UR-SCNC: 30.3 MMOL/L (ref 22–26)
HCT VFR BLD AUTO: 37 %
HGB BLD-MCNC: 12.1 G/DL
HUMAN BOCAVIRUS: NOT DETECTED
HUMAN CORONAVIRUS, COMMON COLD VIRUS: NOT DETECTED
INFLUENZA A - H1N1-09: NOT DETECTED
LYMPHOCYTES # BLD AUTO: 0.6 K/UL
LYMPHOCYTES NFR BLD: 4.7 %
MCH RBC QN AUTO: 31.3 PG
MCHC RBC AUTO-ENTMCNC: 32.7 G/DL
MCV RBC AUTO: 96 FL
MONOCYTES # BLD AUTO: 0.9 K/UL
MONOCYTES NFR BLD: 6.7 %
NEUTROPHILS # BLD AUTO: 11.4 K/UL
NEUTROPHILS NFR BLD: 88.3 %
PARAINFLUENZA: NOT DETECTED
PCO2 BLDA: 50 MMHG (ref 35–45)
PH SMN: 7.39 [PH] (ref 7.35–7.45)
PLATELET # BLD AUTO: 274 K/UL
PMV BLD AUTO: 9.1 FL
PO2 BLDA: 49 MMHG (ref 75–100)
POC BE: 4.3 MMOL/L (ref -2–2)
POC COHB: 1.4 % (ref 0–3)
POC METHB: 1.2 % (ref 0–1.5)
POC O2HB ARTERIAL: 86.6 % (ref 94–100)
POC SATURATED O2: 88.9 % (ref 90–100)
POC TCO2: 31.8 MMOL/L
POC THB: 12.3 G/DL (ref 12–18)
POCT GLUCOSE: 284 MG/DL (ref 70–110)
POCT GLUCOSE: 304 MG/DL (ref 70–110)
POTASSIUM SERPL-SCNC: 4.3 MMOL/L
RBC # BLD AUTO: 3.87 M/UL
RVP - ADENOVIRUS: NOT DETECTED
RVP - HUMAN METAPNEUMOVIRUS (HMPV): NOT DETECTED
RVP - INFLUENZA A: NOT DETECTED
RVP - INFLUENZA B: NOT DETECTED
RVP - RESPIRATORY SYNCTIAL VIRUS (RSV) A: NOT DETECTED
RVP - RESPIRATORY VIRAL PANEL, SOURCE: NORMAL
RVP - RHINOVIRUS: NOT DETECTED
SITE: ABNORMAL
SODIUM SERPL-SCNC: 136 MMOL/L
WBC # BLD AUTO: 12.89 K/UL

## 2018-02-05 PROCEDURE — 99233 SBSQ HOSP IP/OBS HIGH 50: CPT | Mod: ,,, | Performed by: INTERNAL MEDICINE

## 2018-02-05 PROCEDURE — 99233 SBSQ HOSP IP/OBS HIGH 50: CPT | Mod: ,,, | Performed by: PSYCHIATRY & NEUROLOGY

## 2018-02-05 PROCEDURE — 25000003 PHARM REV CODE 250: Performed by: INTERNAL MEDICINE

## 2018-02-05 PROCEDURE — 85025 COMPLETE CBC W/AUTO DIFF WBC: CPT

## 2018-02-05 PROCEDURE — 25000003 PHARM REV CODE 250: Performed by: SURGERY

## 2018-02-05 PROCEDURE — 94761 N-INVAS EAR/PLS OXIMETRY MLT: CPT

## 2018-02-05 PROCEDURE — 25000003 PHARM REV CODE 250: Performed by: PSYCHIATRY & NEUROLOGY

## 2018-02-05 PROCEDURE — 94640 AIRWAY INHALATION TREATMENT: CPT

## 2018-02-05 PROCEDURE — 25000242 PHARM REV CODE 250 ALT 637 W/ HCPCS: Performed by: INTERNAL MEDICINE

## 2018-02-05 PROCEDURE — 82803 BLOOD GASES ANY COMBINATION: CPT | Performed by: NURSE PRACTITIONER

## 2018-02-05 PROCEDURE — 63600175 PHARM REV CODE 636 W HCPCS: Performed by: INTERNAL MEDICINE

## 2018-02-05 PROCEDURE — 25000003 PHARM REV CODE 250: Performed by: NURSE PRACTITIONER

## 2018-02-05 PROCEDURE — 94010 BREATHING CAPACITY TEST: CPT

## 2018-02-05 PROCEDURE — 27000221 HC OXYGEN, UP TO 24 HOURS

## 2018-02-05 PROCEDURE — 94660 CPAP INITIATION&MGMT: CPT

## 2018-02-05 PROCEDURE — 99900035 HC TECH TIME PER 15 MIN (STAT)

## 2018-02-05 PROCEDURE — 97530 THERAPEUTIC ACTIVITIES: CPT

## 2018-02-05 PROCEDURE — 82962 GLUCOSE BLOOD TEST: CPT

## 2018-02-05 PROCEDURE — 36600 WITHDRAWAL OF ARTERIAL BLOOD: CPT

## 2018-02-05 PROCEDURE — 63600175 PHARM REV CODE 636 W HCPCS: Performed by: NURSE PRACTITIONER

## 2018-02-05 PROCEDURE — 80048 BASIC METABOLIC PNL TOTAL CA: CPT

## 2018-02-05 PROCEDURE — 36415 COLL VENOUS BLD VENIPUNCTURE: CPT

## 2018-02-05 PROCEDURE — 11000001 HC ACUTE MED/SURG PRIVATE ROOM

## 2018-02-05 PROCEDURE — 92610 EVALUATE SWALLOWING FUNCTION: CPT

## 2018-02-05 PROCEDURE — 97110 THERAPEUTIC EXERCISES: CPT

## 2018-02-05 RX ORDER — CEFTRIAXONE 1 G/50ML
1 INJECTION, SOLUTION INTRAVENOUS
Status: DISCONTINUED | OUTPATIENT
Start: 2018-02-05 | End: 2018-02-06

## 2018-02-05 RX ORDER — GLUCAGON 1 MG
1 KIT INJECTION
Status: DISCONTINUED | OUTPATIENT
Start: 2018-02-05 | End: 2018-02-07 | Stop reason: HOSPADM

## 2018-02-05 RX ORDER — PREDNISONE 20 MG/1
40 TABLET ORAL DAILY
Status: DISCONTINUED | OUTPATIENT
Start: 2018-02-06 | End: 2018-02-07 | Stop reason: HOSPADM

## 2018-02-05 RX ORDER — IBUPROFEN 200 MG
16 TABLET ORAL
Status: DISCONTINUED | OUTPATIENT
Start: 2018-02-05 | End: 2018-02-07 | Stop reason: HOSPADM

## 2018-02-05 RX ORDER — SODIUM CHLORIDE 9 MG/ML
INJECTION, SOLUTION INTRAVENOUS CONTINUOUS
Status: DISCONTINUED | OUTPATIENT
Start: 2018-02-05 | End: 2018-02-07

## 2018-02-05 RX ORDER — IBUPROFEN 200 MG
24 TABLET ORAL
Status: DISCONTINUED | OUTPATIENT
Start: 2018-02-05 | End: 2018-02-07 | Stop reason: HOSPADM

## 2018-02-05 RX ORDER — PREDNISONE 20 MG/1
40 TABLET ORAL DAILY
Status: DISCONTINUED | OUTPATIENT
Start: 2018-02-05 | End: 2018-02-05

## 2018-02-05 RX ORDER — INSULIN ASPART 100 [IU]/ML
0-5 INJECTION, SOLUTION INTRAVENOUS; SUBCUTANEOUS
Status: DISCONTINUED | OUTPATIENT
Start: 2018-02-05 | End: 2018-02-07 | Stop reason: HOSPADM

## 2018-02-05 RX ORDER — PREDNISONE 20 MG/1
40 TABLET ORAL DAILY
Status: DISCONTINUED | OUTPATIENT
Start: 2018-02-06 | End: 2018-02-05

## 2018-02-05 RX ADMIN — IPRATROPIUM BROMIDE 0.5 MG: 0.5 SOLUTION RESPIRATORY (INHALATION) at 12:02

## 2018-02-05 RX ADMIN — LEVOTHYROXINE SODIUM 100 MCG: 100 TABLET ORAL at 08:02

## 2018-02-05 RX ADMIN — LEVALBUTEROL 1.25 MG: 1.25 SOLUTION, CONCENTRATE RESPIRATORY (INHALATION) at 12:02

## 2018-02-05 RX ADMIN — IPRATROPIUM BROMIDE 0.5 MG: 0.5 SOLUTION RESPIRATORY (INHALATION) at 01:02

## 2018-02-05 RX ADMIN — PANTOPRAZOLE SODIUM 40 MG: 40 TABLET, DELAYED RELEASE ORAL at 08:02

## 2018-02-05 RX ADMIN — SODIUM CHLORIDE: 0.9 INJECTION, SOLUTION INTRAVENOUS at 11:02

## 2018-02-05 RX ADMIN — INSULIN ASPART 2 UNITS: 100 INJECTION, SOLUTION INTRAVENOUS; SUBCUTANEOUS at 09:02

## 2018-02-05 RX ADMIN — SODIUM CHLORIDE: 0.9 INJECTION, SOLUTION INTRAVENOUS at 01:02

## 2018-02-05 RX ADMIN — METHYLPREDNISOLONE SODIUM SUCCINATE 40 MG: 40 INJECTION, POWDER, FOR SOLUTION INTRAMUSCULAR; INTRAVENOUS at 12:02

## 2018-02-05 RX ADMIN — PYRIDOSTIGMINE BROMIDE 60 MG: 60 TABLET ORAL at 09:02

## 2018-02-05 RX ADMIN — IPRATROPIUM BROMIDE 0.5 MG: 0.5 SOLUTION RESPIRATORY (INHALATION) at 06:02

## 2018-02-05 RX ADMIN — LOSARTAN POTASSIUM 100 MG: 50 TABLET, FILM COATED ORAL at 08:02

## 2018-02-05 RX ADMIN — AMLODIPINE BESYLATE 2.5 MG: 2.5 TABLET ORAL at 08:02

## 2018-02-05 RX ADMIN — PYRIDOSTIGMINE BROMIDE 60 MG: 60 TABLET ORAL at 06:02

## 2018-02-05 RX ADMIN — IPRATROPIUM BROMIDE 0.5 MG: 0.5 SOLUTION RESPIRATORY (INHALATION) at 07:02

## 2018-02-05 RX ADMIN — METOPROLOL SUCCINATE 200 MG: 50 TABLET, EXTENDED RELEASE ORAL at 08:02

## 2018-02-05 RX ADMIN — ATORVASTATIN CALCIUM 10 MG: 10 TABLET, FILM COATED ORAL at 09:02

## 2018-02-05 RX ADMIN — CEFTRIAXONE 1 G: 1 INJECTION, SOLUTION INTRAVENOUS at 11:02

## 2018-02-05 RX ADMIN — APIXABAN 2.5 MG: 2.5 TABLET, FILM COATED ORAL at 09:02

## 2018-02-05 RX ADMIN — ENOXAPARIN SODIUM 120 MG: 150 INJECTION SUBCUTANEOUS at 06:02

## 2018-02-05 RX ADMIN — LEVALBUTEROL 1.25 MG: 1.25 SOLUTION, CONCENTRATE RESPIRATORY (INHALATION) at 06:02

## 2018-02-05 RX ADMIN — PYRIDOSTIGMINE BROMIDE 60 MG: 60 TABLET ORAL at 01:02

## 2018-02-05 RX ADMIN — METHYLPREDNISOLONE SODIUM SUCCINATE 40 MG: 40 INJECTION, POWDER, FOR SOLUTION INTRAMUSCULAR; INTRAVENOUS at 06:02

## 2018-02-05 RX ADMIN — LEVALBUTEROL 1.25 MG: 1.25 SOLUTION, CONCENTRATE RESPIRATORY (INHALATION) at 01:02

## 2018-02-05 RX ADMIN — LEVALBUTEROL 1.25 MG: 1.25 SOLUTION, CONCENTRATE RESPIRATORY (INHALATION) at 07:02

## 2018-02-05 RX ADMIN — INSULIN ASPART 3 UNITS: 100 INJECTION, SOLUTION INTRAVENOUS; SUBCUTANEOUS at 05:02

## 2018-02-05 RX ADMIN — CLOPIDOGREL BISULFATE 75 MG: 75 TABLET ORAL at 08:02

## 2018-02-05 NOTE — ASSESSMENT & PLAN NOTE
Continue  Bipap and continue supplemental oxygen   On home O2 but on 5L here to keep sats  91%, did not use bipap over night. No ABG since 2/2- will repeat today to see how she is doing without NIV. She reports that she is only on 2L at home  Add xopenex q 6 hours   Follow pulmonary recs per Dr. Watson   Procalcitonin.  Check resp panel negative including influenza    Dr Watson is concerned about neuromsucular process--CO2 level to be repeated today

## 2018-02-05 NOTE — PT/OT/SLP EVAL
Speech Language Pathology Evaluation  Bedside Swallow    Patient Name:  Stephany Skinner   MRN:  [id number]  Admitting Diagnosis: Myasthenia gravis, adult form    Recommendations:                 General Recommendations:  Follow up for diet tolerance only  Diet recommendations:  Regular, Thin   Aspiration Precautions: Standard aspiration precautions; 5-6 small meals for energy conservation; meals with good nutritional value d/t muscle fatigue   General Precautions: Standard aspiration precautions   Communication strategies:  none    History:     Past Medical History:   Diagnosis Date    GERD (gastroesophageal reflux disease)     Hyperlipidemia     Hypertension        Past Surgical History:   Procedure Laterality Date    CHOLECYSTECTOMY      HERNIA REPAIR      HYSTERECTOMY      knee replacemene      deidra    THYROIDECTOMY         Social History: Patient lives with a family member; Epic history noted pt was independently completing ADLs prior to admittance    Prior Intubation HX:  n/a    Modified Barium Swallow: n/a    Chest X-Rays: 2/2/18- Mild decrease in right upper lobe and right perihilar infiltrate    Prior diet: Regular diet with thin liquids    Subjective     Patient was awake and alert throughout evaluation. Patient expressed that she was having no difficulties tolerating regular diet but occasional coughing episodes after thin liquids.    Objective:     Oral Musculature Evaluation  · Oral Musculature: WFL  · Dentition:  (pt has upper and lower dentures but does not require them for meals)  · Mucosal Quality: good  · Mandibular Strength and Mobility: WFL  · Oral Labial Strength and Mobility: WFL  · Lingual Strength and Mobility: WFL  · Velar Elevation: WFL    Bedside Swallow Eval:   Consistencies Assessed:  · Thin liquids - pt-regulated cup and straw sip  · Puree - pt-regulated tsp  · Solids - pt-regulated cracker bite     Oral Phase:   · WFL    Pharyngeal Phase:   · No overt clinical signs/symptoms of  aspiration    Compensatory Strategies  · Smaller, more frequent meals to combat fatigue d/t myasthenia gravis; pt instructed to eat meals with high nutrient value for ensured intake of essential vitamins and minerals. Pt and daughter educated about energy conservation strategies and verbalized understanding.       Assessment:     Stephany Skinner is a 83 y.o. female with an SLP diagnosis of a functional swallow.  Follow up x1 for diet tolerance and implementation for compensatory strategies    Plan:     · Patient to be seen:  3 x/week, 4 x/week, 5 x/week   · Plan of Care expires:  02/19/18  · Plan of Care reviewed with:  patient, daughter   · SLP Follow-Up:  Yes     Discharge recommendations:   (per medical team)   Barriers to Discharge:  Level of Skilled Assistance Needed      Time Tracking:     SLP Treatment Date:   02/05/18  Speech Start Time:  0922  Speech Stop Time:  0944     Speech Total Time (min):  22 min    Billable Minutes: Eval Swallow and Oral Function - 22 minutes    EFFIE Anderson  02/05/2018

## 2018-02-05 NOTE — ASSESSMENT & PLAN NOTE
Much better gases resolved   Check abg today  Improving increased bipap  Elevated co2 with po2 81n

## 2018-02-05 NOTE — ASSESSMENT & PLAN NOTE
Responding well to steroids and mesantoin     Eating, talking, getting out of bed--Dr. Ortiz following

## 2018-02-05 NOTE — PLAN OF CARE
Problem: Fall Risk (Adult)  Goal: Absence of Falls  Patient will demonstrate the desired outcomes by discharge/transition of care.   Outcome: Ongoing (interventions implemented as appropriate)  Pt free of falls/incidents. Fall precautions maintained.    Problem: Patient Care Overview  Goal: Plan of Care Review  Outcome: Ongoing (interventions implemented as appropriate)  Pt & family aware of plan of care. No questions or concerns at this time.

## 2018-02-05 NOTE — SUBJECTIVE & OBJECTIVE
Interval History: see     Review of Systems   Constitutional: Negative for activity change, fatigue, fever and unexpected weight change.   HENT: Negative for congestion, ear pain, hearing loss, rhinorrhea and sore throat.    Eyes: Negative for pain, redness and visual disturbance.   Respiratory: Positive for cough (improve). Negative for shortness of breath and wheezing.    Cardiovascular: Negative for chest pain, palpitations and leg swelling.   Gastrointestinal: Negative for abdominal pain, constipation, diarrhea, nausea and vomiting.   Genitourinary: Negative for dysuria, frequency and urgency.   Musculoskeletal: Negative for back pain, joint swelling and neck pain.   Skin: Negative for color change, rash and wound.   Neurological: Negative for dizziness, tremors, weakness, light-headedness and headaches.     Objective:     Vital Signs (Most Recent):  Temp: 98.3 °F (36.8 °C) (02/05/18 0855)  Pulse: 70 (02/05/18 1007)  Resp: 18 (02/05/18 0855)  BP: (!) 110/53 (02/05/18 0855)  SpO2: (!) 91 % (02/05/18 0855) Vital Signs (24h Range):  Temp:  [96.7 °F (35.9 °C)-98.3 °F (36.8 °C)] 98.3 °F (36.8 °C)  Pulse:  [67-85] 70  Resp:  [18-20] 18  SpO2:  [90 %-93 %] 91 %  BP: (104-118)/(53-58) 110/53     Weight: 114.8 kg (253 lb 1.4 oz)  Body mass index is 39.64 kg/m².  No intake or output data in the 24 hours ending 02/05/18 1132   Physical Exam   Constitutional: She is oriented to person, place, and time. She appears well-developed and well-nourished. No distress.   HENT:   Head: Normocephalic and atraumatic.   Right Ear: External ear normal.   Left Ear: External ear normal.   Eyes: Conjunctivae and EOM are normal. Pupils are equal, round, and reactive to light. Right eye exhibits no discharge. Left eye exhibits no discharge.   Neck: Neck supple. No tracheal deviation present.   Cardiovascular: Exam reveals no gallop and no friction rub.    No murmur heard.  irregularly irregular with rate in 60s   Pulmonary/Chest: Effort  normal. No respiratory distress. She has wheezes. She has no rales.   Abdominal: Soft. Bowel sounds are normal. She exhibits no distension. There is no tenderness.   Musculoskeletal: She exhibits edema (trace b/l LE edema with venous stasis dermatitis).   Neurological: She is alert and oriented to person, place, and time. No cranial nerve deficit.   Skin: Skin is warm and dry.   Psychiatric: She has a normal mood and affect. Her behavior is normal.   Nursing note and vitals reviewed.      Significant Labs:   Lab Results   Component Value Date    WBC 12.89 (H) 2018    HGB 12.1 2018    HCT 37.0 2018    MCV 96 2018     2018     BMP  Lab Results   Component Value Date     2018    K 4.3 2018    CL 95 2018    CO2 29 2018     (H) 2018    CREATININE 2.0 (H) 2018    CALCIUM 8.5 (L) 2018    ANIONGAP 12 2018    ESTGFRAFRICA 26 (A) 2018    EGFRNONAA 23 (A) 2018     BNP    Recent Labs  Lab 18  0622   BNP 69     Lab Results   Component Value Date    TSH 0.371 (L) 2018   T4 1.10  MG panel pending  procalcitonin 0.39    Recent Labs  Lab 18  1306   TROPONINI 0.018     resp viral panel negative      Significant Imagin/3 CXR Mild decrease in right upper lobe and right perihilar infiltrate. Mild cardiomegaly. Atherosclerosis.    / CXR The lungs are underexpanded.  There are chronic lung markings seen bilaterally with pulmonary vascular congestion and patchy opacities throughout both lungs.  This could be related to pulmonary edema although superimposed infiltrate in the right upper lobe is not excluded.  The heart is enlarged.  Calcified atheromatous disease affects the aorta.  Age-appropriate degenerative changes affect the skeleton.     CTA lungs 1.  No CT evidence of central pulmonary embolus.  Severely limited study due to use motion artifact.  2.  Bilateral groundglass opacities and minimal  dependent subsegmental atelectasis.    1/31 CT head 1.  No CT evidence of an acute intracranial abnormality with limitations due to motion artifact.  2.  Mild atrophy and minimal small vessel ischemic changes of the periventricular white matter.    Echo Echo 1/18: EF 60%, stage 1 diastolic dysfunction, no significant valvular pathology.     EKG a fib

## 2018-02-05 NOTE — PT/OT/SLP PROGRESS
"Physical Therapy Treatment    Patient Name:  Stephany Skinner   MRN:  [id number]    Recommendations:     Discharge Recommendations:  home with home health, home health PT   Discharge Equipment Recommendations: none   Barriers to discharge: None    Assessment:     Stephany Skinner is a 83 y.o. female admitted with a medical diagnosis of Myasthenia gravis, adult form.  She presents with the following impairments/functional limitations:  weakness, impaired endurance, impaired self care skills, impaired functional mobilty, gait instability, impaired balance, decreased upper extremity function, decreased lower extremity function, decreased safety awareness . Pt with good progression toward POC goals. Pt with improved SOB with mobility as well.    Rehab Prognosis:  Good; patient would benefit from acute skilled PT services to address these deficits and reach maximum level of function.      Recent Surgery: * No surgery found *      Plan:     During this hospitalization, patient to be seen  (1-2x/day Monday-Friday; PRN Weekends/holidays) to address the above listed problems via gait training, therapeutic activities, therapeutic exercises  · Plan of Care Expires:   (upon D/C from facility)   Plan of Care Reviewed with: patient, daughter    Subjective     Communicated with patient prior to session.  Patient found supine in bed upon PT entry to room, agreeable to treatment.      Chief Complaint: weak  Patient comments/goals: "Go home  Pain/Comfort:  · Pain Rating 1: 0/10    Patients cultural, spiritual, Zoroastrian conflicts given the current situation:      Objective:     Patient found with: oxygen     General Precautions: Standard, fall   Orthopedic Precautions:N/A   Braces: N/A     Functional Mobility:  · Bed Mobility:     · Rolling Left:  contact guard assistance  · Rolling Right: contact guard assistance  · Scooting: contact guard assistance  · Bridging: contact guard assistance  · Supine to Sit: minimum assistance  · Sit to " Supine: moderate assistance  · Transfers:     · Sit to Stand:  minimum assistance with rolling walker  · Bed to Chair: minimum assistance with  rolling walker  using  Step Transfer      AM-PAC 6 CLICK MOBILITY  Turning over in bed (including adjusting bedclothes, sheets and blankets)?: 2  Sitting down on and standing up from a chair with arms (e.g., wheelchair, bedside commode, etc.): 2  Moving from lying on back to sitting on the side of the bed?: 2  Moving to and from a bed to a chair (including a wheelchair)?: 2  Need to walk in hospital room?: 2  Climbing 3-5 steps with a railing?: 1  Total Score: 11       Therapeutic Activities and Exercises:   Pt performed BLE seated therapeutic exercises at all joints in available planes of motion with rest breaks as needed to increase strength and endurance with all gross mobility skills.    Patient left up in chair with all lines intact, call button in reach, NSG notified and family present..    GOALS:    Physical Therapy Goals        Problem: Physical Therapy Goal    Goal Priority Disciplines Outcome Goal Variances Interventions   Physical Therapy Goal     PT/OT, PT      Description:  1. Patient will  participate with assistive and active exercises to advance strength in  all four extremities to 4 / 4 levels.  2. All transfers will advance to minimal assistance for safety  3  Endurance for out of bed activities will advance as tolerated  4. Gait with RW will progress to 50 ft intervals with minimal assistance                       Time Tracking:     PT Received On: 02/05/18  PT Start Time: 1054     PT Stop Time: 1117  PT Total Time (min): 23 min     Billable Minutes: Therapeutic Activity 13 minutes and Therapeutic Exercise 10 minutes    Treatment Type: Treatment  PT/PTA: PT           Jacki Rutherford, PT  02/05/2018

## 2018-02-05 NOTE — SUBJECTIVE & OBJECTIVE
Interval History: ready to go home feeling better     Objective:     Vital Signs (Most Recent):  Temp: 96.9 °F (36.1 °C) (02/05/18 0341)  Pulse: 73 (02/05/18 0600)  Resp: 18 (02/05/18 0341)  BP: (!) 118/58 (02/05/18 0341)  SpO2: (!) 93 % (02/05/18 0341) Vital Signs (24h Range):  Temp:  [96.7 °F (35.9 °C)-97.9 °F (36.6 °C)] 96.9 °F (36.1 °C)  Pulse:  [68-87] 73  Resp:  [18-20] 18  SpO2:  [90 %-93 %] 93 %  BP: (104-119)/(56-62) 118/58     Weight: 114.8 kg (253 lb 1.4 oz)  Body mass index is 39.64 kg/m².      Intake/Output Summary (Last 24 hours) at 02/05/18 0731  Last data filed at 02/04/18 1000   Gross per 24 hour   Intake              240 ml   Output                0 ml   Net              240 ml       Physical Exam   Constitutional: She is oriented to person, place, and time. She appears well-developed and well-nourished. She is cooperative.  Non-toxic appearance. She does not appear ill. No distress.   HENT:   Head: Normocephalic and atraumatic.   Right Ear: Hearing, tympanic membrane, external ear and ear canal normal.   Left Ear: Hearing, tympanic membrane, external ear and ear canal normal.   Nose: Nose normal. No mucosal edema, rhinorrhea or nasal deformity. No epistaxis. Right sinus exhibits no maxillary sinus tenderness and no frontal sinus tenderness. Left sinus exhibits no maxillary sinus tenderness and no frontal sinus tenderness.   Mouth/Throat: Uvula is midline, oropharynx is clear and moist and mucous membranes are normal. No trismus in the jaw. Normal dentition. No uvula swelling. No posterior oropharyngeal erythema.   Eyes: Conjunctivae and lids are normal. No scleral icterus.   Sclera clear bilat   Neck: Trachea normal, full passive range of motion without pain and phonation normal. Neck supple.   Cardiovascular: Normal rate, regular rhythm, normal heart sounds, intact distal pulses and normal pulses.    Pulmonary/Chest: She is in respiratory distress (mild to moderate). She has decreased breath  sounds in the right lower field and the left lower field. She has wheezes. She has rhonchi in the right lower field and the left lower field.           Abdominal: Soft. Normal appearance and bowel sounds are normal. She exhibits no distension. There is no tenderness.   Musculoskeletal: Normal range of motion. She exhibits no edema or deformity.   Neurological: She is alert and oriented to person, place, and time. She exhibits normal muscle tone. Coordination normal.   Skin: Skin is warm, dry and intact. She is not diaphoretic. No pallor.   Psychiatric: She has a normal mood and affect. Her speech is normal and behavior is normal. Judgment and thought content normal. Cognition and memory are normal.   Nursing note and vitals reviewed.      Vents:  Oxygen Concentration (%): 40 (02/04/18 0047)  Negative Inspiratory Force (cm H2O): -50 (02/04/18 1810)    Lines/Drains/Airways     Peripheral Intravenous Line                 Peripheral IV - Single Lumen 02/01/18 1653 Right Wrist 3 days                Significant Labs:    CBC/Anemia Profile:  No results for input(s): WBC, HGB, HCT, PLT, MCV, RDW, IRON, FERRITIN, RETIC, FOLATE, [id number], OCCULTBLOOD in the last 48 hours.     Chemistries:  No results for input(s): NA, K, CL, CO2, BUN, CREATININE, CALCIUM, ALBUMIN, PROT, BILITOT, ALKPHOS, ALT, AST, GLUCOSE, MG, PHOS in the last 48 hours.    Recent Lab Results     None        All pertinent labs within the past 24 hours have been reviewed.    Significant Imaging:  I have reviewed all pertinent imaging results/findings within the past 24 hours.

## 2018-02-05 NOTE — PROGRESS NOTES
[facility] Hospital Medicine  Progress Note    Patient Name: Stephany Skinner  MRN: [id number]  Patient Class: IP- Inpatient   Admission Date: 1/30/2018  Length of Stay: 6 days  Attending Physician: Za Sandra MD  Primary Care Provider: Pipo Flowers MD        Subjective:     Principal Problem:Myasthenia gravis, adult form    HPI:  Stephany Skinner is a 83 y.o. female  Who is a patient of Dr ambriz and Dr cardenas .  Today was her second visit to ER .  She lives with her sister who reported that she is becoming more short winded for last few days.  She missed her lasix 40 mg daily for last 4 days. + PND, + dyspnea at rest .  + leg swelling is worsening .reports no chest pains   C/o neck pains   Can not recall weight gain .    Placed in observation for CHF.  BNP high   CXR is  suggestive of pulmonary edema        Hospital Course:  1/31/18  82 YO WF admitted for tx of CHF  1-30-18; day 3; She reported missing  her lasix 40 mg daily for last 4 days prior to admit; Now receiving IV lasix twice daily. Continues to have some LE edema and BARGER.   This am on rounds pt developed worsening SOB, now requiring Bipap. Noted D-dimer elevated;  Lab Results   Component Value Date    DDIMER 1.05 (H) 01/30/2018   Pt with known chronic afib- on plavix only per cards. CT of chest ordered to rule out PE ordered.     Echo pending. (last Echo ?2012).   Also noted to have ^WBCs on admit; trending down 12.96 from 14.44 on admission; Dr. Cardenas suggesting possible bronchitis. U/A- ok.  I/O yesterday-  -640, wt 261. Today-pending    2/1/18 CT of chest negative for PE yesterday. Lovenox initiated per cards for recent new onset afib vs atrial tach per Dr. Cardenas. Pt with continued SOB requiring BIPAP; decreased LOC overnight. ABGs demonstrating ^CO2; required BIPAP adjustment;  ABG    Recent Labs  Lab 01/31/18  2235   PH 7.35   PO2 63   PCO2 72*   HCO3 39.70   BE 11.40   repeat ABGS this am with continued ^PCO2- 74 -  Cristobal recommending RR ^  More awake now - opening eyes and taking in meds PO.  Sats have been good.    2/2/18- continues with BIPAP. Will respond with maximal stimuli; opens eyes and becomes more alert and awake   resp viral panel pending.   Echo - 1/31/18 Echo ejection fraction is 55-60%   Discussed with Dr. Watson- he feels she is compensated resp failure- does not recommend intubation at this time. Wants to rule out neuromuscular. He started Seroquel yesterday due to pt being very restless with Bipap 2 nights ago. Will discontinue.    Started on Zithromax yesterday for possible bacterial bronchitis.  BP now borderline; seems to now be fluid depleted. Weights reflect ? 30lb weight loss; will decrease lasix rx    2/3 Pt's pco2 is in the 40s and she is awake and alert--seems to be responding to the steroids and the opinion is she may be battling myasthenia gravis    2/4 Pt is amazingly improved this AM, breathing well, getting out of bed and eating today    2/5. She received one dose of azithromycin. She did have right upper lobe infil;trates on x ray as well as CTA. She also had leukocytosis on admission. She is also still on 5L NC and usually wears 2L at home. Dr Watson following and ordered cpap. She reports that she has not been able to tolerate that last night. ABG ordered for this am    Diuresed for elevated BNP on admission EF 60%, stage 1 diastolic dysfunction, no significant valvular pathology. No more lasix bnp normal but elevated creat  Over weekend. Reordered for today    Dr Ortiz consulted for poss N/M dz process. She responded well to steroids and mesatonin. Panels drawn and pending.     Interval History: see HC    Review of Systems   Constitutional: Negative for activity change, fatigue, fever and unexpected weight change.   HENT: Negative for congestion, ear pain, hearing loss, rhinorrhea and sore throat.    Eyes: Negative for pain, redness and visual disturbance.   Respiratory: Positive for  cough (improve). Negative for shortness of breath and wheezing.    Cardiovascular: Negative for chest pain, palpitations and leg swelling.   Gastrointestinal: Negative for abdominal pain, constipation, diarrhea, nausea and vomiting.   Genitourinary: Negative for dysuria, frequency and urgency.   Musculoskeletal: Negative for back pain, joint swelling and neck pain.   Skin: Negative for color change, rash and wound.   Neurological: Negative for dizziness, tremors, weakness, light-headedness and headaches.     Objective:     Vital Signs (Most Recent):  Temp: 98.3 °F (36.8 °C) (02/05/18 0855)  Pulse: 70 (02/05/18 1007)  Resp: 18 (02/05/18 0855)  BP: (!) 110/53 (02/05/18 0855)  SpO2: (!) 91 % (02/05/18 0855) Vital Signs (24h Range):  Temp:  [96.7 °F (35.9 °C)-98.3 °F (36.8 °C)] 98.3 °F (36.8 °C)  Pulse:  [67-85] 70  Resp:  [18-20] 18  SpO2:  [90 %-93 %] 91 %  BP: (104-118)/(53-58) 110/53     Weight: 114.8 kg (253 lb 1.4 oz)  Body mass index is 39.64 kg/m².  No intake or output data in the 24 hours ending 02/05/18 1132   Physical Exam   Constitutional: She is oriented to person, place, and time. She appears well-developed and well-nourished. No distress.   HENT:   Head: Normocephalic and atraumatic.   Right Ear: External ear normal.   Left Ear: External ear normal.   Eyes: Conjunctivae and EOM are normal. Pupils are equal, round, and reactive to light. Right eye exhibits no discharge. Left eye exhibits no discharge.   Neck: Neck supple. No tracheal deviation present.   Cardiovascular: Exam reveals no gallop and no friction rub.    No murmur heard.  irregularly irregular with rate in 60s   Pulmonary/Chest: Effort normal. No respiratory distress. She has wheezes. She has no rales.   Abdominal: Soft. Bowel sounds are normal. She exhibits no distension. There is no tenderness.   Musculoskeletal: She exhibits edema (trace b/l LE edema with venous stasis dermatitis).   Neurological: She is alert and oriented to person, place,  and time. No cranial nerve deficit.   Skin: Skin is warm and dry.   Psychiatric: She has a normal mood and affect. Her behavior is normal.   Nursing note and vitals reviewed.      Significant Labs:   Lab Results   Component Value Date    WBC 12.89 (H) 2018    HGB 12.1 2018    HCT 37.0 2018    MCV 96 2018     2018     BMP  Lab Results   Component Value Date     2018    K 4.3 2018    CL 95 2018    CO2 29 2018     (H) 2018    CREATININE 2.0 (H) 2018    CALCIUM 8.5 (L) 2018    ANIONGAP 12 2018    ESTGFRAFRICA 26 (A) 2018    EGFRNONAA 23 (A) 2018     BNP    Recent Labs  Lab 18  0622   BNP 69     Lab Results   Component Value Date    TSH 0.371 (L) 2018   T4 1.10  MG panel pending  procalcitonin 0.39    Recent Labs  Lab 18  1306   TROPONINI 0.018     resp viral panel negative      Significant Imagin/3 CXR Mild decrease in right upper lobe and right perihilar infiltrate. Mild cardiomegaly. Atherosclerosis.     CXR The lungs are underexpanded.  There are chronic lung markings seen bilaterally with pulmonary vascular congestion and patchy opacities throughout both lungs.  This could be related to pulmonary edema although superimposed infiltrate in the right upper lobe is not excluded.  The heart is enlarged.  Calcified atheromatous disease affects the aorta.  Age-appropriate degenerative changes affect the skeleton.     CTA lungs 1.  No CT evidence of central pulmonary embolus.  Severely limited study due to use motion artifact.  2.  Bilateral groundglass opacities and minimal dependent subsegmental atelectasis.     CT head 1.  No CT evidence of an acute intracranial abnormality with limitations due to motion artifact.  2.  Mild atrophy and minimal small vessel ischemic changes of the periventricular white matter.    Echo Echo : EF 60%, stage 1 diastolic dysfunction, no  significant valvular pathology.     EKG a fib    Assessment/Plan:      * Myasthenia gravis, adult form      Responding well to steroids and mesantoin     Eating, talking, getting out of bed--Dr. Ortiz following        PUJA (acute kidney injury)    Repeat BMP today. No longer diuresing   Start IVF 2/5 at 100cc/hr as I feel her elevated Cr due to over diuresis  Hold losartan in acute injury          Community acquired pneumonia of right upper lobe of lung    Restart rocephin 2/5, no azithro if working diagnosis is MG as could worsen symptoms.  Infectious diagnosis based on CXR/leukocytosis on admission. Repeat labs 2/5 show WBC back up (?? Infectious process vs steroids)  Only had one dose azithro (2/1)          Acute respiratory failure with hypoxia and hypercarbia    Continue  Bipap and continue supplemental oxygen   On home O2 but on 5L here to keep sats  91%, did not use bipap over night. No ABG since 2/2- will repeat today to see how she is doing without NIV. She reports that she is only on 2L at home  Add xopenex q 6 hours   Follow pulmonary recs per Dr. Watson   Procalcitonin.  Check resp panel negative including influenza    Dr Watson is concerned about neuromsucular process--CO2 level to be repeated today        Chronic atrial fibrillation    Continue metoprolol, plavix   Dr patel following discussed Afib; he reports fairly new dx, plavix was for previous + abnormal stress test; pt deferred angiography and had poor Asa tolerance.  Lovenox 1mg/kg sq twice daily started for afib consider NAOC at home          Acute on chronic diastolic congestive heart failure    Supplemental O2 via nasal canula; titrate O2 saturation to >92%.  Serial Cardiac enzymes × 3.  Diuretic administration earlier this admission. On 2/5 noted no labs in 24 hours, repeating today as Cr was trending up  Cardiology Consultation- Dr. Patel following   2-D Echocardiogram for evaluation of left ventricle systolic function or any valvular heart  disease-EF 55-60%, stage 1 diastolic dysfunction, no significant valvular pathology.    Daily weights.  Strict I/Os.  Fluid restriction.  Na restriction <2g/d.      2/5: IV lasix 40 mg bid- given x 6 doses but noted creat doubled. Repeating today (BNP went from 411>>69). AT this point she seems to be dry/at her baseline and does not need any further diuresis. However still with O2 requirement above her baseline so I feel there is something more here and cardiac edema (see CAP plan)          HTN (hypertension)    Continue losartan, metoprolol and amlodipine Bp 104//58  Follow cards recs        Hyperlipidemia    Continue atorvastatin/plavix          Hypothyroidism    TSH 0.37  Continue present dose             JOY (obstructive sleep apnea)    On BiPap- just o2 at home, check ABG as she did not use CPAP over night. I suspect she will need to use at least nightly and will need outpatient sleep study to get unit at home            VTE Risk Mitigation         Ordered     apixaban tablet 2.5 mg  2 times daily     Route:  Oral        02/05/18 1135     Medium Risk of VTE  Once      01/30/18 1113     Place sequential compression device  Until discontinued      01/30/18 1113              Edna Mooney MD  Department of Hospital Medicine   [facility]

## 2018-02-05 NOTE — ASSESSMENT & PLAN NOTE
Supplemental O2 via nasal canula; titrate O2 saturation to >92%.  Serial Cardiac enzymes × 3.  Diuretic administration earlier this admission. On 2/5 noted no labs in 24 hours, repeating today as Cr was trending up  Cardiology Consultation- Dr. Patel following   2-D Echocardiogram for evaluation of left ventricle systolic function or any valvular heart disease-EF 55-60%, stage 1 diastolic dysfunction, no significant valvular pathology.    Daily weights.  Strict I/Os.  Fluid restriction.  Na restriction <2g/d.      2/5: IV lasix 40 mg bid- given x 6 doses but noted creat doubled. Repeating today (BNP went from 411>>69). AT this point she seems to be dry/at her baseline and does not need any further diuresis. However still with O2 requirement above her baseline so I feel there is something more here and cardiac edema (see CAP plan)

## 2018-02-05 NOTE — ASSESSMENT & PLAN NOTE
NIF today 50 was 21 yesterday   Steroids fixed pt neuro has seen agree need tessalon test and await pfts n

## 2018-02-05 NOTE — PROGRESS NOTES
[facility]  Pulmonology  Progress Note    Patient Name: Stephany Skinner  MRN: [id number]  Admission Date: 1/30/2018  Hospital Length of Stay: 6 days  Code Status: Full Code  Attending Provider: Za Sandra MD  Primary Care Provider: Pipo Flowers MD   Principal Problem: Myasthenia gravis, adult form    Subjective:     Interval History: ready to go home feeling better     Objective:     Vital Signs (Most Recent):  Temp: 96.9 °F (36.1 °C) (02/05/18 0341)  Pulse: 73 (02/05/18 0600)  Resp: 18 (02/05/18 0341)  BP: (!) 118/58 (02/05/18 0341)  SpO2: (!) 93 % (02/05/18 0341) Vital Signs (24h Range):  Temp:  [96.7 °F (35.9 °C)-97.9 °F (36.6 °C)] 96.9 °F (36.1 °C)  Pulse:  [68-87] 73  Resp:  [18-20] 18  SpO2:  [90 %-93 %] 93 %  BP: (104-119)/(56-62) 118/58     Weight: 114.8 kg (253 lb 1.4 oz)  Body mass index is 39.64 kg/m².      Intake/Output Summary (Last 24 hours) at 02/05/18 0731  Last data filed at 02/04/18 1000   Gross per 24 hour   Intake              240 ml   Output                0 ml   Net              240 ml       Physical Exam   Constitutional: She is oriented to person, place, and time. She appears well-developed and well-nourished. She is cooperative.  Non-toxic appearance. She does not appear ill. No distress.   HENT:   Head: Normocephalic and atraumatic.   Right Ear: Hearing, tympanic membrane, external ear and ear canal normal.   Left Ear: Hearing, tympanic membrane, external ear and ear canal normal.   Nose: Nose normal. No mucosal edema, rhinorrhea or nasal deformity. No epistaxis. Right sinus exhibits no maxillary sinus tenderness and no frontal sinus tenderness. Left sinus exhibits no maxillary sinus tenderness and no frontal sinus tenderness.   Mouth/Throat: Uvula is midline, oropharynx is clear and moist and mucous membranes are normal. No trismus in the jaw. Normal dentition. No uvula swelling. No posterior oropharyngeal erythema.   Eyes: Conjunctivae and lids are normal.  No scleral icterus.   Sclera clear bilat   Neck: Trachea normal, full passive range of motion without pain and phonation normal. Neck supple.   Cardiovascular: Normal rate, regular rhythm, normal heart sounds, intact distal pulses and normal pulses.    Pulmonary/Chest: She is in respiratory distress (mild to moderate). She has decreased breath sounds in the right lower field and the left lower field. She has wheezes. She has rhonchi in the right lower field and the left lower field.           Abdominal: Soft. Normal appearance and bowel sounds are normal. She exhibits no distension. There is no tenderness.   Musculoskeletal: Normal range of motion. She exhibits no edema or deformity.   Neurological: She is alert and oriented to person, place, and time. She exhibits normal muscle tone. Coordination normal.   Skin: Skin is warm, dry and intact. She is not diaphoretic. No pallor.   Psychiatric: She has a normal mood and affect. Her speech is normal and behavior is normal. Judgment and thought content normal. Cognition and memory are normal.   Nursing note and vitals reviewed.      Vents:  Oxygen Concentration (%): 40 (02/04/18 0047)  Negative Inspiratory Force (cm H2O): -50 (02/04/18 1810)    Lines/Drains/Airways     Peripheral Intravenous Line                 Peripheral IV - Single Lumen 02/01/18 1653 Right Wrist 3 days                Significant Labs:    CBC/Anemia Profile:  No results for input(s): WBC, HGB, HCT, PLT, MCV, RDW, IRON, FERRITIN, RETIC, FOLATE, [id number], OCCULTBLOOD in the last 48 hours.     Chemistries:  No results for input(s): NA, K, CL, CO2, BUN, CREATININE, CALCIUM, ALBUMIN, PROT, BILITOT, ALKPHOS, ALT, AST, GLUCOSE, MG, PHOS in the last 48 hours.    Recent Lab Results     None        All pertinent labs within the past 24 hours have been reviewed.    Significant Imaging:  I have reviewed all pertinent imaging results/findings within the past 24 hours.    Assessment/Plan:     * Myasthenia gravis,  adult form    NIF today 50 was 21 yesterday   Steroids fixed pt neuro has seen agree need tessalon test and await pfts n         Acute respiratory failure with hypoxia and hypercarbia    Much better gases resolved   Check abg today  Improving increased bipap  Elevated co2 with po2 81n        JOY (obstructive sleep apnea)    Needs NIV at night only n        Chronic atrial fibrillation    afib with controlled rate secondary to underlying copd n        Acute on chronic congestive heart failure    bnp 400n due to hypoxia n        Hypothyroidism    On meds stable        Overweight(278.02)    Morbid obese n                   Nicolas Watson MD  Pulmonology  [facility]

## 2018-02-05 NOTE — ASSESSMENT & PLAN NOTE
Restart rocephin 2/5, no azithro if working diagnosis is MG as could worsen symptoms.  Infectious diagnosis based on CXR/leukocytosis on admission. Repeat labs 2/5 show WBC back up (?? Infectious process vs steroids)  Only had one dose azithro (2/1)

## 2018-02-05 NOTE — PROGRESS NOTES
[facility]  Neurology  Progress Note     Patient Name: Stephany Skinner  MRN: [id number]  Admission Date: 1/30/2018  Hospital Length of Stay: 3 days  Code Status: Full Code   Attending Provider: Za Sandra MD   Consulting Provider: Katie Ortiz MD  Primary Care Physician: Pipo Flowers MD  Principal Problem:Acute respiratory failure with hypoxia and hypercarbia     Inpatient consult to Neurology  Consult performed by: KTAIE ORTIZ  Consult ordered by: PILAR LIU    Inpatient consult to Neurology  Consult performed by: KATIE ORTIZ  Consult ordered by: AVTAR WATSON        Subjective:      Chief Complaint:  hypercapnia     HPI:   Stephany Skinner is a 83 y.o. 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 More recently, I had been seeing her yearly as her back pain was asymptomatic and gait responded to PT.  She was admitted to [facility] for SOB. She denies upper respiratory symptoms and was seen in the ER two days prior to admission. She states she came in for SOB and some head pain (bilateral/global). She has not had fever per her and family.  She has since been covered for bronchitis and has been followed by Dr Watson for Acute respiratory failure with hypoxia and hypercarbia and Dr Patel for acute on chronic CHF thought to be due to decreased med compliance/high BP.  She is n Bipap now.   She is on Plavix for afib which is rate controlled per Dr Patel.   Last pm she got Seroquel (thought to have some anxiety associated with her breathing difficulty) and has been sedated from that today.   Her hypercapnia prompted Dr Watson to request my consult to rule out neuromuscular disorder contributing to her symptoms.   On further questioning she states she has had diplopia for a few  "months but has not seen eye doc.  She feels "somewhat weak" in her muscles but not severely and has been able to ambulate to the Restroom up until 2 days ago.   Family states patient has been a bit confused since being in the hospital.   She was placed on Steroids by pulmonology today and PCO2 improved to 40    Interval history:2/5/2018: She had a great improvement with NIFs and symptoms over the weekend on steroids and mestinon. She is now on NC for O2  She is no longer confused per family (since CO2 improved) and patient states she has no more double vision. She has not yet walked with PT. Is sitting in chair today.             Past Medical History:   Diagnosis Date    GERD (gastroesophageal reflux disease)      Hyperlipidemia      Hypertension                 Past Surgical History:   Procedure Laterality Date    CHOLECYSTECTOMY        HERNIA REPAIR        HYSTERECTOMY        knee replacemene         deidra    THYROIDECTOMY                   Review of patient's allergies indicates:   Allergen Reactions    Statins-hmg-coa reductase inhibitors         Other reaction(s): Unknown      I have reviewed all of this patient's past medical and surgical histories as well as family and social histories and active allergies and medications as documented in the electronic medical record.     No current facility-administered medications on file prior to encounter.            Current Outpatient Prescriptions on File Prior to Encounter   Medication Sig    amlodipine (NORVASC) 2.5 MG tablet Take 1 tablet (2.5 mg total) by mouth once daily.    atorvastatin (LIPITOR) 10 MG tablet Take 1 tablet (10 mg total) by mouth every evening.    atorvastatin (LIPITOR) 20 MG tablet      clopidogrel (PLAVIX) 75 mg tablet Take 1 tablet (75 mg total) by mouth once daily.    cyanocobalamin (VITAMIN B-12) 100 MCG tablet Take 100 mcg by mouth once daily.    furosemide (LASIX) 40 MG tablet Take 1 tablet (40 mg total) by mouth once daily. "    levothyroxine (SYNTHROID) 100 MCG tablet TAKE ONE TABLET BY MOUTH EVERY DAY    losartan (COZAAR) 100 MG tablet      meloxicam (MOBIC) 7.5 MG tablet TAKE ONE TABLET BY MOUTH EVERY OTHER DAY    metoprolol succinate (TOPROL-XL) 200 MG 24 hr tablet Take 1 tablet (200 mg total) by mouth once daily.    omega-3 acid ethyl esters (LOVAZA) 1 gram capsule Take 2 capsules (2 g total) by mouth 2 (two) times daily.    potassium chloride SA (K-DUR,KLOR-CON) 20 MEQ tablet Take 1 tablet (20 mEq total) by mouth once daily.    ranitidine (ZANTAC) 150 MG tablet Take 1 tablet (150 mg total) by mouth 2 (two) times daily.    VESICARE 10 mg tablet TAKE ONE TABLET BY MOUTH EVERY DAY    nitroGLYCERIN (NITROSTAT) 0.4 MG SL tablet Place 0.4 mg under the tongue every 5 (five) minutes as needed.           Family History      Problem Relation (Age of Onset)     Cancer Sister               Social History Main Topics    Smoking status: Never Smoker    Smokeless tobacco: Never Used    Alcohol use No    Drug use: No    Sexual activity: Not on file      Review of Systems   Constitutional: Negative for fever.   HENT: Negative for nosebleeds.    Eyes: Positive for visual disturbance.   Respiratory: Positive for shortness of breath.    Cardiovascular: Negative for chest pain.   Gastrointestinal: Negative for blood in stool.   Genitourinary: Negative for hematuria.   Musculoskeletal: Negative for gait problem.   Skin: Negative for rash.   Neurological: Negative for facial asymmetry and numbness.        Reports she has been eating well and swallowing well.    Psychiatric/Behavioral: Positive for confusion.   Sleepy/sedated from seroquel last pm  Objective:      Vitals:    02/05/18 0600 02/05/18 0739 02/05/18 0817 02/05/18 0855   BP:    (!) 110/53   BP Location:    Left arm   Patient Position:    Lying   Pulse: 73 67 85 77   Resp:  19  18   Temp:    98.3 °F (36.8 °C)   TempSrc:    Oral   SpO2:  (!) 93%  (!) 91%   Weight:       Height:                 Weight: 118.5 kg (261 lb 3.9 oz)  Body mass index is 40.92 kg/m².     Physical Exam        Exam:  Gen Appearance, well developed/nourished in no apparent distress  CV: 2+ distal pulses with no edema or swelling  Neuro:  MS: Awake, alert,Sustains attention. Oriented to place,situation, person and time.  Recent recall is intact/remote memory intact, Language is full to spontaneous speech/comprehension. Fund of Knowledge is full  CN: Optic discs are flat with normal vasculature, PERRL, Extraoccular movements and visual fields today without ptosis or diplopia (improved). . Normal facial sensation and strength, Hearing symmetric, Tongue and Palate are midline and strong. Shoulder Shrug symmetric and strong.  Voice is improved- not as hypophonic as prior.   Motor: Normal bulk, tone, no abnormal movements. 5/5 strength bilateral upper/lower extremities except perhaps decreased to 4+/5 in the proximal arms with 1+ reflexes in the arms, and are less in the legs and no clonu  Sensory:  Romberg not done  Cerebellar: Finger-nose,Rapid alternating movements intact  Gait: Not done today, but at baseline: Wide based stance, some sensory ataxia- uses walker     Significant Labs: BMP, CBC today reviewed. Flu negativeTroponin negative. CK normal since admit.  TSH mildly abnormal with normal T4 in 11/2017  Flu swab negative    TSH euthymic now  MG panel pending  Glucose 406 noted this am     Significant Imaging: CT head 1/31/2018: 1.  No CT evidence of an acute intracranial abnormality with limitations due to motion artifact.  2.  Mild atrophy and minimal small vessel ischemic changes of the periventricular white matter.    CT chest: no thymoma        Assessment and Plan:   Stephany Skinner is a 83 y.o. female known to me for radiculopathy and polyneuropathy. Here with respiratory failure. Found to have some mild ptosis and gaze restriction as well as hypophonic voice concerning for neuro-muscular cause of hypercapnia.       I recommend:         * Acute respiratory failure with hypoxia and hypercarbia     -This patient is well known to me for polyneuropathy in the legs and severe lumbar spinal stenosis. However, her exam is markedly different from that of 9 months ago with her new symptoms of diplopia and hypophonic voice. That combined with hypercapnia is concerning for neuromuscular weakness- all of this improving on steroids as expected.   -Follow up  Myasthenia gravis panel. If negative, consider rep stim by EMG and rule out of CIDP by EMG    -Change Solumedrol to  Prednisone 40mg daily for a week, then 30mg daily for a week, then 20mg until next seeing Neurology outpatient post d/c unless worsening symptoms again.   -Continue mestinon for now  -She is not a tensilon test candidate given her heart history and her lack of significant ptosis on exam.   -Hopefully, blood glucose will improve on lower dose steroids. Reviewed with primary team- she has impaired fasting glucose at baseline.   -She has not yet walked with PT and may be a good candidate for swing bed if not improved closer to baseline soon.   -Primary team also treating pneumonia.            Chronic atrial fibrillation     - no signs of remote CVA by CT head. Rate controlled and on Plavix per Dr Patel.  -MRI not needed at this point       Acute on chronic congestive heart failure     -Cardiology following       Hypothyroidism     -currently euthymic on treatment       JOY (obstructive sleep apnea)     -improved respiratory status/ off of bipap currently

## 2018-02-05 NOTE — PT/OT/SLP PROGRESS
"Physical Therapy Treatment    Patient Name:  Stephany Skinner   MRN:  [id number]    Recommendations:     Discharge Recommendations:  home with home health, home   Discharge Equipment Recommendations: none   Barriers to discharge: None    Assessment:     Stephany Skinner is a 83 y.o. female admitted with a medical diagnosis of Myasthenia gravis, adult form.  She presents with the following impairments/functional limitations:  weakness, impaired endurance, impaired self care skills, impaired functional mobilty, gait instability, impaired balance, decreased upper extremity function, decreased lower extremity function, decreased safety awareness . Pt progressing well with POC goals. Pt with improving endurance with all mobility.    Rehab Prognosis:  Good; patient would benefit from acute skilled PT services to address these deficits and reach maximum level of function.      Recent Surgery: * No surgery found *      Plan:     During this hospitalization, patient to be seen  (1-2x/day Monday-Friday; PRN Weekends/holidays) to address the above listed problems via gait training, therapeutic activities, therapeutic exercises  · Plan of Care Expires:   (upon D/C from facility)   Plan of Care Reviewed with: patient, daughter    Subjective     Communicated with patient prior to session.  Patient found supine upon PT entry to room, agreeable to treatment.      Chief Complaint: "weak"  Patient comments/goals: "Go home"  Pain/Comfort:  · Pain Rating 1: 0/10    Patients cultural, spiritual, Hindu conflicts given the current situation:      Objective:     Patient found with: oxygen     General Precautions: Standard, fall, respiratory   Orthopedic Precautions:N/A   Braces: N/A     Functional Mobility:  · Transfers:     · Sit to Stand:  minimum assistance with rolling walker      AM-PAC 6 CLICK MOBILITY  Turning over in bed (including adjusting bedclothes, sheets and blankets)?: 2  Sitting down on and standing up from a chair with " arms (e.g., wheelchair, bedside commode, etc.): 2  Moving from lying on back to sitting on the side of the bed?: 2  Moving to and from a bed to a chair (including a wheelchair)?: 2  Need to walk in hospital room?: 2  Climbing 3-5 steps with a railing?: 1  Total Score: 11       Therapeutic Activities and Exercises:   T/F training sit to stand x 3 attempts with rest breaks as needed with RW and min assist with cueing for hand placement and sequencing to decrease pt fall risk. Pt performed BLE seated therapeutic exercises at all joints in available planes of motion with rest breaks as needed to increase strength and endurance with all gross mobility skills.     Patient left up in chair with all lines intact, call button in reach, NSG notified and daughter present..    GOALS:    Physical Therapy Goals        Problem: Physical Therapy Goal    Goal Priority Disciplines Outcome Goal Variances Interventions   Physical Therapy Goal     PT/OT, PT      Description:  1. Patient will  participate with assistive and active exercises to advance strength in  all four extremities to 4 / 4 levels.  2. All transfers will advance to minimal assistance for safety  3  Endurance for out of bed activities will advance as tolerated  4. Gait with RW will progress to 50 ft intervals with minimal assistance                       Time Tracking:     PT Received On: 02/05/18  PT Start Time: 1306     PT Stop Time: 1330  PT Total Time (min): 24 min     Billable Minutes: Therapeutic Activity 14 minutes and Therapeutic Exercise 10 minutes    Treatment Type: Treatment  PT/PTA: PT           Jacki Rutherford, PT  02/05/2018

## 2018-02-05 NOTE — ASSESSMENT & PLAN NOTE
Continue metoprolol, plavix   Dr cardenas following discussed Afib; he reports fairly new dx, plavix was for previous + abnormal stress test; pt deferred angiography and had poor Asa tolerance.  Lovenox 1mg/kg sq twice daily started for afib consider NAOC at home

## 2018-02-05 NOTE — PROGRESS NOTES
Staff Handoff    Bedside report received from ROSA ISELA Delaney. Patient tolerating BIPAP. No complaints at this time. Daughter at bedside. Call bell in reach.  Resident Handoff

## 2018-02-05 NOTE — PROGRESS NOTES
Pt c/o pain to bridge of nose and requesting break from BiPAP.  Placed on 4lpm NC with no distress. Redness noted to bridge of nose and cheeks.

## 2018-02-05 NOTE — PHARMACY MED REC
I spoke to the patient & her sister about medications. She said that she had not taken her lasix for 4 days because she was not staying at home. I explained that in order to stay healthy, she must take it exactly as directed. She agreed. If she is going somewhere, take half in AM & half in PM.  Bedside delivery explained. She can afford her medication.

## 2018-02-05 NOTE — PROGRESS NOTES
[facility]  Cardiology  Progress Note    Patient Name: Stephany Skinner  MRN: [id number]  Admission Date: 1/30/2018  Hospital Length of Stay: 6 days  Code Status: Full Code   Attending Physician: Za Sandra MD   Primary Care Physician: Pipo Flowers MD  Expected Discharge Date:   Principal Problem:Myasthenia gravis, adult form    Subjective:     Interval History: pulmonary status improved, maintaining sats on NC    Review of Systems   Constitution: Negative.   HENT: Negative.    Eyes: Negative.    Cardiovascular: Negative.    Respiratory:        Chronic BARGER   Endocrine: Negative.    Skin: Negative.    Musculoskeletal: Negative.    Gastrointestinal: Negative.    Genitourinary: Negative.    Neurological: Negative.    Psychiatric/Behavioral: Negative.    Allergic/Immunologic: Negative.      Objective:     Vital Signs (Most Recent):  Temp: 96.9 °F (36.1 °C) (02/05/18 0341)  Pulse: 67 (02/05/18 0739)  Resp: 19 (02/05/18 0739)  BP: (!) 118/58 (02/05/18 0341)  SpO2: (!) 93 % (02/05/18 0739) Vital Signs (24h Range):  Temp:  [96.7 °F (35.9 °C)-97.5 °F (36.4 °C)] 96.9 °F (36.1 °C)  Pulse:  [67-78] 67  Resp:  [18-20] 19  SpO2:  [90 %-93 %] 93 %  BP: (104-118)/(56-58) 118/58     Weight: 114.8 kg (253 lb 1.4 oz)  Body mass index is 39.64 kg/m².    SpO2: (!) 93 %  O2 Device (Oxygen Therapy): nasal cannula      Intake/Output Summary (Last 24 hours) at 02/05/18 0851  Last data filed at 02/04/18 1000   Gross per 24 hour   Intake              240 ml   Output                0 ml   Net              240 ml       Lines/Drains/Airways     Peripheral Intravenous Line                 Peripheral IV - Single Lumen 02/01/18 1653 Right Wrist 3 days                Physical Exam   Constitutional: She is oriented to person, place, and time. She appears well-developed and well-nourished.   HENT:   Head: Normocephalic.   Cardiovascular: Normal rate.    No murmur heard.  irreg    Pulmonary/Chest: Effort normal. No  respiratory distress.   diminished   Abdominal: Soft.   Musculoskeletal: Normal range of motion.   Neurological: She is alert and oriented to person, place, and time.   Skin: Skin is warm and dry.   Psychiatric: She has a normal mood and affect. Her behavior is normal. Judgment and thought content normal.   Nursing note and vitals reviewed.      Significant Labs: BMP: No results for input(s): GLU, NA, K, CL, CO2, BUN, CREATININE, CALCIUM, MG in the last 48 hours., CBC No results for input(s): WBC, HGB, HCT, PLT in the last 48 hours. and Troponin No results for input(s): TROPONINI in the last 48 hours.    Significant Imaging: Echocardiogram:   2D echo with color flow doppler:   Results for orders placed or performed during the hospital encounter of 01/30/18   2D echo with color flow doppler   Result Value Ref Range    EF 55 55 - 65    Est. PA Systolic Pressure 31.81     Tricuspid Valve Regurgitation TRIVIAL TO MILD      Assessment and Plan:     Confusion with CO2 narcosis/COPD,JOY--improved/resolving  AFIB v/s wandering PM; now anticoagulated  Acute diastolic CHF due to decreased med compliance/high BP--now normotensive  Myasthenia gravis  Acute renal insufficiency--defer diuretics for now and montior  Also suspect some bronchitis with high WBC chills and cough  Worsening BARGER and orthopnea for the past 3 days- hasnt taken prescribed lasix x 4 days.   Chest X ray with pulmonary edema, elevated BNP, mild volume overload on exam. CT shows Ground glass opacities, NO PE   Echo 1/18: EF 60%, stage 1 diastolic dysfunction, no significant valvular pathology.   MPI 1/2013- mild apical reversible ischemia.   Carotid US- less than 40% bilateral 2017  Nuclear 1/13--small area ischemia apical anterior    Plan: stable CV status  DC lovenox, begin eliquis 2.5mg po bid (based on age and creatinine)  Cont Plavix/amlodpine/losartan/Toprox XL/atorvastatin    Active Diagnoses:    Diagnosis Date Noted POA    PRINCIPAL PROBLEM:   Myasthenia gravis, adult form [G70.00] 02/03/2018 Yes    Acute respiratory failure with hypoxia and hypercarbia [J96.01, J96.02] 01/31/2018 Yes    Acute on chronic congestive heart failure [I50.9] 01/30/2018 Yes    Chronic atrial fibrillation [I48.2] 01/30/2018 Yes    HTN (hypertension) [I10] 08/08/2012 Yes    Hyperlipidemia [E78.5] 08/08/2012 Yes    Hypothyroidism [E03.9] 08/08/2012 Yes    JOY (obstructive sleep apnea) [G47.33] 08/08/2012 Yes      Problems Resolved During this Admission:    Diagnosis Date Noted Date Resolved POA       VTE Risk Mitigation         Ordered     enoxaparin injection 120 mg  Every 12 hours (non-standard times)     Route:  Subcutaneous        01/31/18 1236     Medium Risk of VTE  Once      01/30/18 1113     Place sequential compression device  Until discontinued      01/30/18 1113          Klarissa Lovett NP  Cardiology  [facility]    I attest that I have personally seen and examined this patient. I have reviewed and discussed the management in detail as outlined above.

## 2018-02-05 NOTE — ASSESSMENT & PLAN NOTE
On BiPap- just o2 at home, check ABG as she did not use CPAP over night. I suspect she will need to use at least nightly and will need outpatient sleep study to get unit at home

## 2018-02-05 NOTE — ASSESSMENT & PLAN NOTE
Repeat BMP today. No longer diuresing   Start IVF 2/5 at 100cc/hr as I feel her elevated Cr due to over diuresis  Hold losartan in acute injury

## 2018-02-05 NOTE — PLAN OF CARE
Problem: Patient Care Overview  Goal: Plan of Care Review  Outcome: Ongoing (interventions implemented as appropriate)  Patient and daughter aware of plan of care. IV steroids continued. Patient c/o BIPAP being too tight. Patient requested to take a break. 4L per NC worn during night, tolerated well. Daughter at bedside. No questions or concerns at this time. Agrees with plan of care.

## 2018-02-06 LAB
ANION GAP SERPL CALC-SCNC: 13 MMOL/L
APTT BLDCRRT: 27.9 SEC
BASOPHILS # BLD AUTO: ABNORMAL K/UL
BASOPHILS NFR BLD: 0 %
BUN SERPL-MCNC: 124 MG/DL
CALCIUM SERPL-MCNC: 8.3 MG/DL
CHLORIDE SERPL-SCNC: 97 MMOL/L
CO2 SERPL-SCNC: 27 MMOL/L
CREAT SERPL-MCNC: 1.9 MG/DL
DIFFERENTIAL METHOD: ABNORMAL
EOSINOPHIL # BLD AUTO: ABNORMAL K/UL
EOSINOPHIL NFR BLD: 0 %
ERYTHROCYTE [DISTWIDTH] IN BLOOD BY AUTOMATED COUNT: 14.7 %
EST. GFR  (AFRICAN AMERICAN): 28 ML/MIN/1.73 M^2
EST. GFR  (NON AFRICAN AMERICAN): 24 ML/MIN/1.73 M^2
GLUCOSE SERPL-MCNC: 203 MG/DL
HCT VFR BLD AUTO: 37.9 %
HGB BLD-MCNC: 12.2 G/DL
LYMPHOCYTES # BLD AUTO: ABNORMAL K/UL
LYMPHOCYTES NFR BLD: 2 %
MCH RBC QN AUTO: 31.2 PG
MCHC RBC AUTO-ENTMCNC: 32.2 G/DL
MCV RBC AUTO: 97 FL
MONOCYTES # BLD AUTO: ABNORMAL K/UL
MONOCYTES NFR BLD: 8 %
NEUTROPHILS NFR BLD: 87 %
NEUTS BAND NFR BLD MANUAL: 3 %
PLATELET # BLD AUTO: 291 K/UL
PMV BLD AUTO: 9.3 FL
POCT GLUCOSE: 191 MG/DL (ref 70–110)
POCT GLUCOSE: 223 MG/DL (ref 70–110)
POCT GLUCOSE: 229 MG/DL (ref 70–110)
POTASSIUM SERPL-SCNC: 4.3 MMOL/L
RBC # BLD AUTO: 3.91 M/UL
SODIUM SERPL-SCNC: 137 MMOL/L
WBC # BLD AUTO: 15.59 K/UL

## 2018-02-06 PROCEDURE — 36415 COLL VENOUS BLD VENIPUNCTURE: CPT

## 2018-02-06 PROCEDURE — 25000003 PHARM REV CODE 250: Performed by: SURGERY

## 2018-02-06 PROCEDURE — 85007 BL SMEAR W/DIFF WBC COUNT: CPT

## 2018-02-06 PROCEDURE — 25000242 PHARM REV CODE 250 ALT 637 W/ HCPCS: Performed by: INTERNAL MEDICINE

## 2018-02-06 PROCEDURE — 85730 THROMBOPLASTIN TIME PARTIAL: CPT

## 2018-02-06 PROCEDURE — 11000001 HC ACUTE MED/SURG PRIVATE ROOM

## 2018-02-06 PROCEDURE — 97165 OT EVAL LOW COMPLEX 30 MIN: CPT

## 2018-02-06 PROCEDURE — 80048 BASIC METABOLIC PNL TOTAL CA: CPT

## 2018-02-06 PROCEDURE — 27000221 HC OXYGEN, UP TO 24 HOURS

## 2018-02-06 PROCEDURE — 25000003 PHARM REV CODE 250: Performed by: INTERNAL MEDICINE

## 2018-02-06 PROCEDURE — 25000003 PHARM REV CODE 250: Performed by: NURSE PRACTITIONER

## 2018-02-06 PROCEDURE — 97116 GAIT TRAINING THERAPY: CPT

## 2018-02-06 PROCEDURE — 63600175 PHARM REV CODE 636 W HCPCS: Performed by: PSYCHIATRY & NEUROLOGY

## 2018-02-06 PROCEDURE — 63600175 PHARM REV CODE 636 W HCPCS: Performed by: NURSE PRACTITIONER

## 2018-02-06 PROCEDURE — 94640 AIRWAY INHALATION TREATMENT: CPT

## 2018-02-06 PROCEDURE — 94761 N-INVAS EAR/PLS OXIMETRY MLT: CPT

## 2018-02-06 PROCEDURE — 99900035 HC TECH TIME PER 15 MIN (STAT)

## 2018-02-06 PROCEDURE — 99233 SBSQ HOSP IP/OBS HIGH 50: CPT | Mod: ,,, | Performed by: PSYCHIATRY & NEUROLOGY

## 2018-02-06 PROCEDURE — 94660 CPAP INITIATION&MGMT: CPT

## 2018-02-06 PROCEDURE — 82962 GLUCOSE BLOOD TEST: CPT

## 2018-02-06 PROCEDURE — 97530 THERAPEUTIC ACTIVITIES: CPT

## 2018-02-06 PROCEDURE — 85027 COMPLETE CBC AUTOMATED: CPT

## 2018-02-06 PROCEDURE — G8987 SELF CARE CURRENT STATUS: HCPCS | Mod: CJ

## 2018-02-06 PROCEDURE — 94010 BREATHING CAPACITY TEST: CPT

## 2018-02-06 PROCEDURE — G8988 SELF CARE GOAL STATUS: HCPCS | Mod: CJ

## 2018-02-06 PROCEDURE — 25000003 PHARM REV CODE 250: Performed by: PSYCHIATRY & NEUROLOGY

## 2018-02-06 PROCEDURE — 99233 SBSQ HOSP IP/OBS HIGH 50: CPT | Mod: ,,, | Performed by: INTERNAL MEDICINE

## 2018-02-06 RX ORDER — LEVOFLOXACIN 5 MG/ML
250 INJECTION, SOLUTION INTRAVENOUS
Status: DISCONTINUED | OUTPATIENT
Start: 2018-02-07 | End: 2018-02-07 | Stop reason: HOSPADM

## 2018-02-06 RX ORDER — LEVOFLOXACIN 5 MG/ML
500 INJECTION, SOLUTION INTRAVENOUS ONCE
Status: COMPLETED | OUTPATIENT
Start: 2018-02-06 | End: 2018-02-06

## 2018-02-06 RX ADMIN — LEVALBUTEROL 1.25 MG: 1.25 SOLUTION, CONCENTRATE RESPIRATORY (INHALATION) at 12:02

## 2018-02-06 RX ADMIN — LEVOTHYROXINE SODIUM 100 MCG: 100 TABLET ORAL at 09:02

## 2018-02-06 RX ADMIN — APIXABAN 2.5 MG: 2.5 TABLET, FILM COATED ORAL at 09:02

## 2018-02-06 RX ADMIN — INSULIN ASPART 2 UNITS: 100 INJECTION, SOLUTION INTRAVENOUS; SUBCUTANEOUS at 11:02

## 2018-02-06 RX ADMIN — AMLODIPINE BESYLATE 2.5 MG: 2.5 TABLET ORAL at 09:02

## 2018-02-06 RX ADMIN — IPRATROPIUM BROMIDE 0.5 MG: 0.5 SOLUTION RESPIRATORY (INHALATION) at 07:02

## 2018-02-06 RX ADMIN — ACETAMINOPHEN 650 MG: 325 TABLET ORAL at 11:02

## 2018-02-06 RX ADMIN — IPRATROPIUM BROMIDE 0.5 MG: 0.5 SOLUTION RESPIRATORY (INHALATION) at 11:02

## 2018-02-06 RX ADMIN — ATORVASTATIN CALCIUM 10 MG: 10 TABLET, FILM COATED ORAL at 08:02

## 2018-02-06 RX ADMIN — PANTOPRAZOLE SODIUM 40 MG: 40 TABLET, DELAYED RELEASE ORAL at 09:02

## 2018-02-06 RX ADMIN — CLOPIDOGREL BISULFATE 75 MG: 75 TABLET ORAL at 09:02

## 2018-02-06 RX ADMIN — METOPROLOL SUCCINATE 200 MG: 50 TABLET, EXTENDED RELEASE ORAL at 09:02

## 2018-02-06 RX ADMIN — PYRIDOSTIGMINE BROMIDE 60 MG: 60 TABLET ORAL at 08:02

## 2018-02-06 RX ADMIN — IPRATROPIUM BROMIDE 0.5 MG: 0.5 SOLUTION RESPIRATORY (INHALATION) at 12:02

## 2018-02-06 RX ADMIN — INSULIN ASPART 1 UNITS: 100 INJECTION, SOLUTION INTRAVENOUS; SUBCUTANEOUS at 08:02

## 2018-02-06 RX ADMIN — SODIUM CHLORIDE: 0.9 INJECTION, SOLUTION INTRAVENOUS at 09:02

## 2018-02-06 RX ADMIN — LEVALBUTEROL 1.25 MG: 1.25 SOLUTION, CONCENTRATE RESPIRATORY (INHALATION) at 07:02

## 2018-02-06 RX ADMIN — APIXABAN 2.5 MG: 2.5 TABLET, FILM COATED ORAL at 08:02

## 2018-02-06 RX ADMIN — PREDNISONE 40 MG: 20 TABLET ORAL at 09:02

## 2018-02-06 RX ADMIN — LEVOFLOXACIN 500 MG: 5 INJECTION, SOLUTION INTRAVENOUS at 09:02

## 2018-02-06 RX ADMIN — PYRIDOSTIGMINE BROMIDE 60 MG: 60 TABLET ORAL at 06:02

## 2018-02-06 RX ADMIN — PYRIDOSTIGMINE BROMIDE 60 MG: 60 TABLET ORAL at 03:02

## 2018-02-06 RX ADMIN — LEVALBUTEROL 1.25 MG: 1.25 SOLUTION, CONCENTRATE RESPIRATORY (INHALATION) at 11:02

## 2018-02-06 NOTE — ASSESSMENT & PLAN NOTE
Restart rocephin 2/5, no azithro if working diagnosis is MG as could worsen symptoms.  Infectious diagnosis based on CXR/leukocytosis on admission. Repeat labs 2/5 show WBC back up (?? Infectious process vs steroids)  Only had one dose azithro (2/1)  Spoke with Dr gillette. No definite dx of MG, will change rocephin to levaquin for better pneumonia coverage

## 2018-02-06 NOTE — ASSESSMENT & PLAN NOTE
Continue metoprolol, plavix   Dr cardenas following discussed Afib; he reports fairly new dx, plavix was for previous + abnormal stress test; pt deferred angiography and had poor Asa tolerance.  Changed to eliquis

## 2018-02-06 NOTE — PROGRESS NOTES
[facility]  Pulmonology  Progress Note    Patient Name: Stephany Skinner  MRN: [id number]  Admission Date: 1/30/2018  Hospital Length of Stay: 7 days  Code Status: Full Code  Attending Provider: Za Sandra MD  Primary Care Provider: Pipo Flowers MD   Principal Problem: Myasthenia gravis, adult form    Subjective:     Interval History: doing well no complaints    Objective:     Vital Signs (Most Recent):  Temp: 97.1 °F (36.2 °C) (02/06/18 0251)  Pulse: 72 (02/06/18 0820)  Resp: 18 (02/06/18 0747)  BP: (!) 116/54 (02/06/18 0251)  SpO2: 95 % (02/06/18 0747) Vital Signs (24h Range):  Temp:  [96.3 °F (35.7 °C)-97.3 °F (36.3 °C)] 97.1 °F (36.2 °C)  Pulse:  [63-88] 72  Resp:  [18-29] 18  SpO2:  [90 %-95 %] 95 %  BP: ()/(51-57) 116/54     Weight: 114.8 kg (253 lb 1.4 oz)  Body mass index is 39.64 kg/m².      Intake/Output Summary (Last 24 hours) at 02/06/18 0917  Last data filed at 02/06/18 0600   Gross per 24 hour   Intake          1993.33 ml   Output                0 ml   Net          1993.33 ml       Physical Exam   Constitutional: She is oriented to person, place, and time. She appears well-developed and well-nourished. She is cooperative.  Non-toxic appearance. She does not appear ill. No distress.   HENT:   Head: Normocephalic and atraumatic.   Right Ear: Hearing, tympanic membrane, external ear and ear canal normal.   Left Ear: Hearing, tympanic membrane, external ear and ear canal normal.   Nose: Nose normal. No mucosal edema, rhinorrhea or nasal deformity. No epistaxis. Right sinus exhibits no maxillary sinus tenderness and no frontal sinus tenderness. Left sinus exhibits no maxillary sinus tenderness and no frontal sinus tenderness.   Mouth/Throat: Uvula is midline, oropharynx is clear and moist and mucous membranes are normal. No trismus in the jaw. Normal dentition. No uvula swelling. No posterior oropharyngeal erythema.   Eyes: Conjunctivae and lids are normal. No scleral  icterus.   Sclera clear bilat   Neck: Trachea normal, full passive range of motion without pain and phonation normal. Neck supple.   Cardiovascular: Normal rate, regular rhythm, normal heart sounds, intact distal pulses and normal pulses.    Pulmonary/Chest: She is in respiratory distress (mild to moderate). She has decreased breath sounds in the right upper field, the right middle field, the right lower field, the left upper field, the left middle field and the left lower field. She has wheezes. She has rhonchi in the right lower field and the left lower field.           Abdominal: Soft. Normal appearance and bowel sounds are normal. She exhibits no distension. There is no tenderness.   Musculoskeletal: Normal range of motion. She exhibits no edema or deformity.   Neurological: She is alert and oriented to person, place, and time. She exhibits normal muscle tone. Coordination normal.   Skin: Skin is warm, dry and intact. She is not diaphoretic. No pallor.   Psychiatric: She has a normal mood and affect. Her speech is normal and behavior is normal. Judgment and thought content normal. Cognition and memory are normal.   Nursing note and vitals reviewed.      Vents:  Oxygen Concentration (%): 40 (02/06/18 0747)  Negative Inspiratory Force (cm H2O): -50 (02/05/18 0739)    Lines/Drains/Airways     Peripheral Intravenous Line                 Peripheral IV - Single Lumen 02/01/18 1653 Right Wrist 4 days                Significant Labs:    CBC/Anemia Profile:    Recent Labs  Lab 02/05/18  1049 02/06/18  0533   WBC 12.89* 15.59*   HGB 12.1 12.2   HCT 37.0 37.9    291   MCV 96 97   RDW 14.7* 14.7*        Chemistries:    Recent Labs  Lab 02/05/18  1049 02/06/18  0533    137   K 4.3 4.3   CL 95 97   CO2 29 27   *  --    CREATININE 2.0* 1.9*   CALCIUM 8.5* 8.3*       All pertinent labs within the past 24 hours have been reviewed.    Significant Imaging:  I have reviewed all pertinent imaging results/findings  within the past 24 hours.    Assessment/Plan:     * Myasthenia gravis, adult form     Improving  NIF today 50 was 21 yesterday   Steroids fixed pt neuro has seen agree need tessalon test and await pfts n         Acute respiratory failure with hypoxia and hypercarbia    Stable  Much better gases resolved   Check abg today  Improving increased bipap  Elevated co2 with po2 81n        JOY (obstructive sleep apnea)    Continue niv  Needs NIV at night only n        Hypothyroidism    On meds stable        Pneumonia Right upper Lobe and Right Middle Lobe       Nicolas Watson MD  Pulmonology  [facility]

## 2018-02-06 NOTE — PROGRESS NOTES
[facility] Hospital Medicine  Progress Note    Patient Name: Stephany Skinner  MRN: [id number]  Patient Class: IP- Inpatient   Admission Date: 1/30/2018  Length of Stay: 7 days  Attending Physician: Za Sandra MD  Primary Care Provider: Pipo Flowers MD        Subjective:     Principal Problem:Myasthenia gravis, adult form    HPI:  Stephany Skinner is a 83 y.o. female  Who is a patient of Dr ambriz and Dr cardenas .  Today was her second visit to ER .  She lives with her sister who reported that she is becoming more short winded for last few days.  She missed her lasix 40 mg daily for last 4 days. + PND, + dyspnea at rest .  + leg swelling is worsening .reports no chest pains   C/o neck pains   Can not recall weight gain .    Placed in observation for CHF.  BNP high   CXR is  suggestive of pulmonary edema        Hospital Course:  1/31/18  84 YO WF admitted for tx of CHF  1-30-18; day 3; She reported missing  her lasix 40 mg daily for last 4 days prior to admit; Now receiving IV lasix twice daily. Continues to have some LE edema and BARGER.   This am on rounds pt developed worsening SOB, now requiring Bipap. Noted D-dimer elevated;  Lab Results   Component Value Date    DDIMER 1.05 (H) 01/30/2018   Pt with known chronic afib- on plavix only per cards. CT of chest ordered to rule out PE ordered.     Echo pending. (last Echo ?2012).   Also noted to have ^WBCs on admit; trending down 12.96 from 14.44 on admission; Dr. Cardenas suggesting possible bronchitis. U/A- ok.  I/O yesterday-  -640, wt 261. Today-pending    2/1/18 CT of chest negative for PE yesterday. Lovenox initiated per cards for recent new onset afib vs atrial tach per Dr. Cardenas. Pt with continued SOB requiring BIPAP; decreased LOC overnight. ABGs demonstrating ^CO2; required BIPAP adjustment;  ABG    Recent Labs  Lab 01/31/18  2235   PH 7.35   PO2 63   PCO2 72*   HCO3 39.70   BE 11.40   repeat ABGS this am with continued ^PCO2- 74 -  Cristobal recommending RR ^  More awake now - opening eyes and taking in meds PO.  Sats have been good.    2/2/18- continues with BIPAP. Will respond with maximal stimuli; opens eyes and becomes more alert and awake   resp viral panel pending.   Echo - 1/31/18 Echo ejection fraction is 55-60%   Discussed with Dr. Watson- he feels she is compensated resp failure- does not recommend intubation at this time. Wants to rule out neuromuscular. He started Seroquel yesterday due to pt being very restless with Bipap 2 nights ago. Will discontinue.    Started on Zithromax yesterday for possible bacterial bronchitis.  BP now borderline; seems to now be fluid depleted. Weights reflect ? 30lb weight loss; will decrease lasix rx    2/3 Pt's pco2 is in the 40s and she is awake and alert--seems to be responding to the steroids and the opinion is she may be battling myasthenia gravis    2/4 Pt is amazingly improved this AM, breathing well, getting out of bed and eating today    2/5. She received one dose of azithromycin. She did have right upper lobe infil;trates on x ray as well as CTA. She also had leukocytosis on admission. She is also still on 5L NC and usually wears 2L at home. Dr Watson following and ordered cpap. She reports that she has not been able to tolerate that last night. ABG ordered for this am    Diuresed for elevated BNP on admission EF 60%, stage 1 diastolic dysfunction, no significant valvular pathology. No more lasix bnp normal but elevated creat  Over weekend. Reordered for today    Dr Ortiz consulted for poss N/M dz process. She responded well to steroids and mesatonin. Panels drawn and pending.     2/6/18 She is not feeling well today. Feels down today from being here for so long. Her WBC is a little higher today. We restarted rocephin for the pneumonia yesterday. Old CXR shows right upper lobe, she does report that she has choking when she swallows. Especially on liquids. Speech saw her and did not observe  any over signs of aspiration. She is still requiring 5L O2 today. Has a CPAP and only able to tolerate for short amounts of time    She was also statred on NS at 100ml/hr. Creat slightly better 2.0>>1.9.     Interval History: see     Review of Systems   Constitutional: Negative for activity change, fatigue, fever and unexpected weight change.   HENT: Negative for congestion, ear pain, hearing loss, rhinorrhea and sore throat.    Eyes: Negative for pain, redness and visual disturbance.   Respiratory: Positive for cough (improve). Negative for shortness of breath and wheezing.    Cardiovascular: Negative for chest pain, palpitations and leg swelling.   Gastrointestinal: Negative for abdominal pain, constipation, diarrhea, nausea and vomiting.   Genitourinary: Negative for dysuria, frequency and urgency.   Musculoskeletal: Negative for back pain, joint swelling and neck pain.   Skin: Negative for color change, rash and wound.   Neurological: Negative for dizziness, tremors, weakness, light-headedness and headaches.     Objective:     Vital Signs (Most Recent):  Temp: 97.1 °F (36.2 °C) (02/06/18 0251)  Pulse: 72 (02/06/18 0820)  Resp: 18 (02/06/18 0747)  BP: (!) 116/54 (02/06/18 0251)  SpO2: 95 % (02/06/18 0747) Vital Signs (24h Range):  Temp:  [96.3 °F (35.7 °C)-97.3 °F (36.3 °C)] 97.1 °F (36.2 °C)  Pulse:  [63-88] 72  Resp:  [18-29] 18  SpO2:  [90 %-95 %] 95 %  BP: ()/(51-57) 116/54     Weight: 114.8 kg (253 lb 1.4 oz)  Body mass index is 39.64 kg/m².    Intake/Output Summary (Last 24 hours) at 02/06/18 0931  Last data filed at 02/06/18 0600   Gross per 24 hour   Intake          1993.33 ml   Output                0 ml   Net          1993.33 ml      Physical Exam   Constitutional: She is oriented to person, place, and time. She appears well-developed and well-nourished. No distress.   HENT:   Head: Normocephalic and atraumatic.   Right Ear: External ear normal.   Left Ear: External ear normal.   Eyes:  Conjunctivae and EOM are normal. Pupils are equal, round, and reactive to light. Right eye exhibits no discharge. Left eye exhibits no discharge.   Neck: Neck supple. No tracheal deviation present.   Cardiovascular: Exam reveals no gallop and no friction rub.    No murmur heard.  irregularly irregular with rate in 60s   Pulmonary/Chest: Effort normal. No respiratory distress. She has wheezes. She has no rales.   Abdominal: Soft. Bowel sounds are normal. She exhibits no distension. There is no tenderness.   Musculoskeletal: She exhibits edema (trace b/l LE edema with venous stasis dermatitis).   Neurological: She is alert and oriented to person, place, and time. No cranial nerve deficit.   Skin: Skin is warm and dry.   Psychiatric: She has a normal mood and affect. Her behavior is normal.   Nursing note and vitals reviewed.      Significant Labs:   Lab Results   Component Value Date    WBC 15.59 (H) 2018    HGB 12.2 2018    HCT 37.9 2018    MCV 97 2018     2018     BMP  Lab Results   Component Value Date     2018    K 4.3 2018    CL 97 2018    CO2 27 2018     (H) 2018    CREATININE 1.9 (H) 2018    CALCIUM 8.3 (L) 2018    ANIONGAP 13 2018    ESTGFRAFRICA 28 (A) 2018    EGFRNONAA 24 (A) 2018     BNP    Recent Labs  Lab 18  0622   BNP 69     Lab Results   Component Value Date    TSH 0.371 (L) 2018   T4 1.10  MG panel pending  procalcitonin 0.39    Recent Labs  Lab 18  1306   TROPONINI 0.018     resp viral panel negative      Significant Imagin/3 CXR Mild decrease in right upper lobe and right perihilar infiltrate. Mild cardiomegaly. Atherosclerosis.    2/ CXR The lungs are underexpanded.  There are chronic lung markings seen bilaterally with pulmonary vascular congestion and patchy opacities throughout both lungs.  This could be related to pulmonary edema although superimposed infiltrate  in the right upper lobe is not excluded.  The heart is enlarged.  Calcified atheromatous disease affects the aorta.  Age-appropriate degenerative changes affect the skeleton.    1/31 CTA lungs 1.  No CT evidence of central pulmonary embolus.  Severely limited study due to use motion artifact.  2.  Bilateral groundglass opacities and minimal dependent subsegmental atelectasis.    1/31 CT head 1.  No CT evidence of an acute intracranial abnormality with limitations due to motion artifact.  2.  Mild atrophy and minimal small vessel ischemic changes of the periventricular white matter.    Echo Echo 1/18: EF 60%, stage 1 diastolic dysfunction, no significant valvular pathology.     EKG a fib    Assessment/Plan:      * Myasthenia gravis, adult form      Responding well to steroids and mesantoin     Eating, talking, getting out of bed--Dr. Gillette following        PUJA (acute kidney injury)    Repeat BMP today. No longer diuresing   Start IVF 2/5 at 100cc/hr as I feel her elevated Cr due to over diuresis  Hold losartan in acute injury          Community acquired pneumonia of right upper lobe of lung    Restart rocephin 2/5, no azithro if working diagnosis is MG as could worsen symptoms.  Infectious diagnosis based on CXR/leukocytosis on admission. Repeat labs 2/5 show WBC back up (?? Infectious process vs steroids)  Only had one dose azithro (2/1)  Spoke with Dr gillette. No definite dx of MG, will change rocephin to levaquin for better pneumonia coverage        Acute respiratory failure with hypoxia and hypercarbia    Continue  Bipap and continue supplemental oxygen   On home O2 but on 5L here to keep sats  91%, did not use bipap over night. No ABG since 2/2- will repeat today to see how she is doing without NIV. She reports that she is only on 2L at home  Add xopenex q 6 hours   Follow pulmonary recs per Dr. Watson   Procalcitonin.  Check resp panel negative including influenza    Dr Watson is concerned about  neuromsucular process--CO2 level to be repeated today        Chronic atrial fibrillation    Continue metoprolol, plavix   Dr patel following discussed Afib; he reports fairly new dx, plavix was for previous + abnormal stress test; pt deferred angiography and had poor Asa tolerance.  Changed to eliquis          Acute on chronic diastolic congestive heart failure    Supplemental O2 via nasal canula; titrate O2 saturation to >92%.  Serial Cardiac enzymes × 3.  Diuretic administration earlier this admission. On 2/5 noted no labs in 24 hours, repeating today as Cr was trending up  Cardiology Consultation- Dr. Patel following   2-D Echocardiogram for evaluation of left ventricle systolic function or any valvular heart disease-EF 55-60%, stage 1 diastolic dysfunction, no significant valvular pathology.    Daily weights.  Strict I/Os.  Fluid restriction.  Na restriction <2g/d.      2/5: IV lasix 40 mg bid- given x 6 doses but noted creat doubled. Repeating today (BNP went from 411>>69). AT this point she seems to be dry/at her baseline and does not need any further diuresis. However still with O2 requirement above her baseline so I feel there is something more here and cardiac edema (see CAP plan)          HTN (hypertension)    Continue losartan, metoprolol and amlodipine Bp 104//58  Follow cards recs        Hyperlipidemia    Continue atorvastatin/plavix          Hypothyroidism    TSH 0.37  Continue present dose             JOY (obstructive sleep apnea)    On BiPap- just o2 at home, check ABG as she only used 3hr of CPAP over night. I suspect she will need to use at least nightly and as needed during the day. The more she uses the better she will feel. Will help with heart failure as well as pneumonia           VTE Risk Mitigation         Ordered     apixaban tablet 2.5 mg  2 times daily     Route:  Oral        02/05/18 1135     Medium Risk of VTE  Once      01/30/18 1113     Place sequential compression device  Until  discontinued      01/30/18 1113              Za Sandra MD  Department of Hospital Medicine   [facility]

## 2018-02-06 NOTE — ASSESSMENT & PLAN NOTE
Stable  Much better gases resolved   Check abg today  Improving increased bipap  Elevated co2 with po2 81n

## 2018-02-06 NOTE — PT/OT/SLP EVAL
"Occupational Therapy   Evaluation    Name: Stephany Skinner  MRN: [id number]  Admitting Diagnosis:  Myasthenia gravis, adult form      Recommendations:     Discharge Recommendations: home with home health, home health OT  Discharge Equipment Recommendations:  none  Barriers to discharge:  None    History:     Occupational Profile:  Living Environment: Pt lives in an upstairs apartment with 10 steps to get to with railings on both sides.  She lives with her sister  Previous level of function: Pt. Mod (I) with t/f's and (I) with ADLs/IADLs  Equipment Owned:  shower chair, walker, rolling, walker, standard, raised toilet, other (see comments), bedside commode (Pt has but currently do not use BSC or walkers)  Assistance upon Discharge: Pt will require Min/Mod (A) for t/f and some ADLs (bathing)    Past Medical History:   Diagnosis Date    GERD (gastroesophageal reflux disease)     Hyperlipidemia     Hypertension        Past Surgical History:   Procedure Laterality Date    CHOLECYSTECTOMY      HERNIA REPAIR      HYSTERECTOMY      knee replacemene      deidra    THYROIDECTOMY         Subjective     Chief Complaint: "I'm tired, I just got back from xray."  Communicated with: shane prior to session.  Pain/Comfort:  · Pain Rating 1: 0/10  · Pain Rating Post-Intervention 1: 0/10    Patients cultural, spiritual, Amish conflicts given the current situation: none voiced    Objective:     Patient found with: oxygen, telemetry    General Precautions: Standard, fall   Orthopedic Precautions:N/A   Braces: N/A     Occupational Performance:    Bed Mobility:    · Patient completed Rolling/Turning to Left with  minimum assistance  · Patient completed Scooting/Bridging with stand by assistance  · Patient completed Sit to Supine with supervision and moderate assistance    Functional Mobility/Transfers:  · Patient completed Sit <> Stand Transfer with minimum assistance and moderate assistance  with  rolling walker   · Patient completed " Bed <> Chair Transfer using Stand Pivot and ambulation (2') technique with contact guard assistance with rolling walker  · Functional Mobility: Pt ambulated a ~2' with CGA    Activities of Daily Living:  · UB Dressing: modified independence Pt louis/doff pull over shirt with increase time   · Toileting: dependence Pt with adult diaper, but with three incontient episodes of urination while therapist was with Pt. Pt able to perform perineal hygeine in standing with Min/CGA in standing.     Cognitive/Visual Perceptual:  Cognitive/Psychosocial Skills:     -       Oriented to: Person, Place and Situation   -       Follows Commands/attention:Follows two-step commands    Physical Exam:  Balance:    -       Pt with normal sitting balance with back and feet supported; Fair dynamic standing balance demo with toileting.  Dominant hand:    -       right  Upper Extremity Range of Motion:     -       Right Upper Extremity: WFL  -       Left Upper Extremity: WFL  Upper Extremity Strength:    -       Right Upper Extremity: WFL  -       Left Upper Extremity: WFL   Strength:    -       Right Upper Extremity: WFL  -       Left Upper Extremity: WFL    Patient left supine and HOB elevated with all lines intact, call button in reach, nsg notified and sister present    [facility] 6 Click:  [facility] Total Score: 18      Assessment:     Stephany Skinner is a 83 y.o. female with a medical diagnosis of Myasthenia gravis, adult form.  She presents with decrease self care skills.  Pt appears to have slow processing at times when commands are given, but with time able to perform.  Pt require multiple attempts to perform sit to stand t/f, but requires only Min (A) when completed.    Performance deficits affecting function are impaired endurance, impaired self care skills, impaired functional mobilty.      Rehab Prognosis:  GOOD; patient would benefit from acute skilled OT services to address these deficits and reach maximum level of function.    "      Clinical Decision Makin.  OT Low:  "Pt evaluation falls under low complexity for evaluation coding due to performance deficits noted in 1-3 areas as stated above and 0 co-morbities affecting current functional status. Data obtained from problem focused assessments. No modifications or assistance was required for completion of evaluation. Only brief occupational profile and history review completed."     Plan:     Patient to be seen 3 x/week, 5 x/week to address the above listed problems via self-care/home management, therapeutic activities, therapeutic exercises  · Plan of Care Expires:  (upon d/c)  · Plan of Care Reviewed with: patient, sibling    This Plan of care has been discussed with the patient who was involved in its development and understands and is in agreement with the identified goals and treatment plan    GOALS:    Occupational Therapy Goals        Problem: Occupational Therapy Goal    Goal Priority Disciplines Outcome Interventions   Occupational Therapy Goal     OT, PT/OT     Description:  Goals to be met by: upon d/c     Patient will increase functional independence with ADLs by performing:    LE dressing to be assessed  Pt will increase endurance with dressing by donning/doffing shirt without rest break and pants/skirt without rest break.                      Time Tracking:     OT Date of Treatment: 18  OT Start Time: 0245  OT Stop Time: 0308  OT Total Time (min): 23 min    Billable Minutes:Evaluation 23 min    Phyllis Cruz OT  2018    "

## 2018-02-06 NOTE — SUBJECTIVE & OBJECTIVE
Interval History: see     Review of Systems   Constitutional: Negative for activity change, fatigue, fever and unexpected weight change.   HENT: Negative for congestion, ear pain, hearing loss, rhinorrhea and sore throat.    Eyes: Negative for pain, redness and visual disturbance.   Respiratory: Positive for cough (improve). Negative for shortness of breath and wheezing.    Cardiovascular: Negative for chest pain, palpitations and leg swelling.   Gastrointestinal: Negative for abdominal pain, constipation, diarrhea, nausea and vomiting.   Genitourinary: Negative for dysuria, frequency and urgency.   Musculoskeletal: Negative for back pain, joint swelling and neck pain.   Skin: Negative for color change, rash and wound.   Neurological: Negative for dizziness, tremors, weakness, light-headedness and headaches.     Objective:     Vital Signs (Most Recent):  Temp: 97.1 °F (36.2 °C) (02/06/18 0251)  Pulse: 72 (02/06/18 0820)  Resp: 18 (02/06/18 0747)  BP: (!) 116/54 (02/06/18 0251)  SpO2: 95 % (02/06/18 0747) Vital Signs (24h Range):  Temp:  [96.3 °F (35.7 °C)-97.3 °F (36.3 °C)] 97.1 °F (36.2 °C)  Pulse:  [63-88] 72  Resp:  [18-29] 18  SpO2:  [90 %-95 %] 95 %  BP: ()/(51-57) 116/54     Weight: 114.8 kg (253 lb 1.4 oz)  Body mass index is 39.64 kg/m².    Intake/Output Summary (Last 24 hours) at 02/06/18 0931  Last data filed at 02/06/18 0600   Gross per 24 hour   Intake          1993.33 ml   Output                0 ml   Net          1993.33 ml      Physical Exam   Constitutional: She is oriented to person, place, and time. She appears well-developed and well-nourished. No distress.   HENT:   Head: Normocephalic and atraumatic.   Right Ear: External ear normal.   Left Ear: External ear normal.   Eyes: Conjunctivae and EOM are normal. Pupils are equal, round, and reactive to light. Right eye exhibits no discharge. Left eye exhibits no discharge.   Neck: Neck supple. No tracheal deviation present.   Cardiovascular:  Exam reveals no gallop and no friction rub.    No murmur heard.  irregularly irregular with rate in 60s   Pulmonary/Chest: Effort normal. No respiratory distress. She has wheezes. She has no rales.   Abdominal: Soft. Bowel sounds are normal. She exhibits no distension. There is no tenderness.   Musculoskeletal: She exhibits edema (trace b/l LE edema with venous stasis dermatitis).   Neurological: She is alert and oriented to person, place, and time. No cranial nerve deficit.   Skin: Skin is warm and dry.   Psychiatric: She has a normal mood and affect. Her behavior is normal.   Nursing note and vitals reviewed.      Significant Labs:   Lab Results   Component Value Date    WBC 15.59 (H) 2018    HGB 12.2 2018    HCT 37.9 2018    MCV 97 2018     2018     BMP  Lab Results   Component Value Date     2018    K 4.3 2018    CL 97 2018    CO2 27 2018     (H) 2018    CREATININE 1.9 (H) 2018    CALCIUM 8.3 (L) 2018    ANIONGAP 13 2018    ESTGFRAFRICA 28 (A) 2018    EGFRNONAA 24 (A) 2018     BNP    Recent Labs  Lab 18  0622   BNP 69     Lab Results   Component Value Date    TSH 0.371 (L) 2018   T4 1.10  MG panel pending  procalcitonin 0.39    Recent Labs  Lab 18  1306   TROPONINI 0.018     resp viral panel negative      Significant Imagin/3 CXR Mild decrease in right upper lobe and right perihilar infiltrate. Mild cardiomegaly. Atherosclerosis.    2/ CXR The lungs are underexpanded.  There are chronic lung markings seen bilaterally with pulmonary vascular congestion and patchy opacities throughout both lungs.  This could be related to pulmonary edema although superimposed infiltrate in the right upper lobe is not excluded.  The heart is enlarged.  Calcified atheromatous disease affects the aorta.  Age-appropriate degenerative changes affect the skeleton.     CTA lungs 1.  No CT evidence of  central pulmonary embolus.  Severely limited study due to use motion artifact.  2.  Bilateral groundglass opacities and minimal dependent subsegmental atelectasis.    1/31 CT head 1.  No CT evidence of an acute intracranial abnormality with limitations due to motion artifact.  2.  Mild atrophy and minimal small vessel ischemic changes of the periventricular white matter.    Echo Echo 1/18: EF 60%, stage 1 diastolic dysfunction, no significant valvular pathology.     EKG a fib

## 2018-02-06 NOTE — PT/OT/SLP PROGRESS
"Physical Therapy Treatment    Patient Name:  Stephany Skinner   MRN:  [id number]    Recommendations:     Discharge Recommendations:  home with home health, home health PT   Discharge Equipment Recommendations: none   Barriers to discharge: None    Assessment:     Stephany Skinner is a 83 y.o. female admitted with a medical diagnosis of Myasthenia gravis, adult form.  She presents with the following impairments/functional limitations:  weakness, impaired functional mobilty, gait instability, impaired endurance, impaired self care skills, decreased upper extremity function, decreased lower extremity function, decreased safety awareness.  Pt is progressing towards PT POC goals.  Pt. demonstrated increased distance with gait training.    Rehab Prognosis:  Fair+; patient would benefit from acute skilled PT services to address these deficits and reach maximum level of function.      Recent Surgery: * No surgery found *      Plan:     During this hospitalization, patient to be seen  (1-2x/day(M-F); PRN(Sat.,Sun.)) to address the above listed problems via gait training, therapeutic activities, therapeutic exercises  · Plan of Care Expires:   (Upon D/C from facility)   Plan of Care Reviewed with: patient    Subjective     Communicated with patient prior to session.  Patient found supine in bed, awake, upon PT entry to room, agreeable to treatment.      Chief Complaint: None voiced.  Patient comments/goals: "I don't want to fall."  Pain/Comfort:  · Pain Rating 1: 0/10  · Pain Rating Post-Intervention 1: 0/10    Patients cultural, spiritual, Protestant conflicts given the current situation:      Objective:     Patient found with: oxygen     General Precautions: Standard, fall, respiratory   Orthopedic Precautions:N/A   Braces: N/A     Functional Mobility:  · Bed Mobility:     · Rolling Left:  contact guard assistance  · Rolling Right: contact guard assistance  · Scooting: contact guard assistance  · Bridging: contact guard " assistance  · Supine to Sit: minimum assistance  · Sit to Supine: minimum assistance  · Transfers:     · Sit to Stand:  minimum assistance with rolling walker  · Bed to Chair: minimum assistance with  rolling walker  using  Step Transfer  · Gait: Pt. amb. 15' with RW, min. A.  VC's given to pt. for step placement and postural reminders.  LOB x 1.  Pt. Demonstrated decreased velocity of gait mechanics.  Balance:  Static Sit: FAIR+: Able to take MINIMAL challenges from all directions  Dynamic Sit: FAIR+: Maintains balance through MINIMAL excursions of active trunk motion  Static Stand: POOR+: Needs MINIMAL assist to maintain  · Dynamic stand: POOR: N/A  ·       AM-PAC 6 CLICK MOBILITY  Turning over in bed (including adjusting bedclothes, sheets and blankets)?: 2  Sitting down on and standing up from a chair with arms (e.g., wheelchair, bedside commode, etc.): 2  Moving from lying on back to sitting on the side of the bed?: 2  Moving to and from a bed to a chair (including a wheelchair)?: 2  Need to walk in hospital room?: 2  Climbing 3-5 steps with a railing?: 1  Total Score: 11       Therapeutic Activities and Exercises:  There. Act.:  Weight shifting and weight acceptance tasks performed in standing with min. A.  Trunk control tasks performed with min. A. While pt. seated EOB and in bedside chair.  Seated, EOB and in bedside chair, scooting performed with CGA.  Transfer Training performed with assistance as noted.  Rest periods given as needed.    Patient left up in chair with all lines intact, call button in reach, RN notified and caregiver present..    GOALS:    Physical Therapy Goals        Problem: Physical Therapy Goal    Goal Priority Disciplines Outcome Goal Variances Interventions   Physical Therapy Goal     PT/OT, PT Ongoing (interventions implemented as appropriate)     Description:  1. Patient will  participate with assistive and active exercises to advance strength in  all four extremities to 4 / 4  levels.  2. All transfers will advance to minimal assistance for safety  3  Endurance for out of bed activities will advance as tolerated  4. Gait with RW will progress to 50 ft intervals with minimal assistance                       Time Tracking:     PT Received On: 02/06/18  PT Start Time: 1318     PT Stop Time: 1348  PT Total Time (min): 30 min     Billable Minutes: Gait Training 21 minutes and Therapeutic Activity 9 minutes    Treatment Type: Treatment  PT/PTA: PT           Daniel Garcia, PT  02/06/2018

## 2018-02-06 NOTE — PT/OT/SLP PROGRESS
"Physical Therapy Treatment    Patient Name:  Stephany Skinner   MRN:  [id number]    Recommendations:     Discharge Recommendations:  home with home health, home health PT   Discharge Equipment Recommendations: none   Barriers to discharge: None    Assessment:     Stephany Skinner is a 83 y.o. female admitted with a medical diagnosis of Myasthenia gravis, adult form.  She presents with the following impairments/functional limitations:  weakness, gait instability, decreased upper extremity function, decreased lower extremity function, impaired balance, impaired endurance, impaired self care skills, impaired functional mobilty, decreased safety awareness .  Pt. is progressing towards PT POC goals.  Pt. demonstrated increased distance with gait training.    Rehab Prognosis:  Fair +; patient would benefit from acute skilled PT services to address these deficits and reach maximum level of function.      Recent Surgery: * No surgery found *      Plan:     During this hospitalization, patient to be seen  (1-2x/day(M-F); PRN(Sat.,Sun.)) to address the above listed problems via gait training, therapeutic activities, therapeutic exercises  · Plan of Care Expires:   (Upon D/C from facility)   Plan of Care Reviewed with: patient    Subjective     Communicated with patient prior to session.  Patient found seated in bedside chair, awake, upon PT entry to room, agreeable to treatment.      Chief Complaint: Intermittent SOB.  Patient comments/goals: "I didn't have a good night last night."  Pain/Comfort:  · Pain Rating 1: 0/10  · Pain Rating Post-Intervention 1: 0/10    Patients cultural, spiritual, Faith conflicts given the current situation:      Objective:     Patient found with: oxygen     General Precautions: Standard, fall, respiratory   Orthopedic Precautions:N/A   Braces: N/A     Functional Mobility:  · Bed Mobility:     · Rolling Left:  contact guard assistance  · Rolling Right: contact guard assistance  · Scooting: " contact guard assistance  · Bridging: contact guard assistance  · Supine to Sit: minimum assistance  · Sit to Supine: moderate assistance  · Transfers:     · Sit to Stand:  minimum assistance with rolling walker  · Bed to Chair: minimum assistance with  rolling walker  using  Step Transfer  · Gait: Gait Training:  Pt. amb. 5' x 1 bout, 5 lateral steps x 1 bout, with RW, with min. A.  VC's given to pt. for step placement and postural reminders.  Pt. Demonstrated decreased step length and decreased velocity of gait mechanics.  Balance:    Static Sit: FAIR+: Able to take MINIMAL challenges from all directions  Dynamic Sit: FAIR+: Maintains balance through MINIMAL excursions of active trunk motion  Static Stand: POOR+: Needs MINIMAL assist to maintain  · Dynamic stand: POOR: N/A  ·       AM-PAC 6 CLICK MOBILITY  Turning over in bed (including adjusting bedclothes, sheets and blankets)?: 2  Sitting down on and standing up from a chair with arms (e.g., wheelchair, bedside commode, etc.): 2  Moving from lying on back to sitting on the side of the bed?: 2  Moving to and from a bed to a chair (including a wheelchair)?: 2  Need to walk in hospital room?: 2  Climbing 3-5 steps with a railing?: 1  Total Score: 11       Therapeutic Activities and Exercises:   There. Act.:  Weight shifting and weight acceptance tasks performed in standing with min. A.  Trunk control tasks performed with min. A. While pt. seated EOB and in bedside chair.  Seated, EOB and in bedside chair, scooting performed with CGA.  Transfer Training performed with assistance as noted.  Rest periods given as needed.    Patient left HOB elevated with all lines intact, call button in reach, RN notified and Respiratory Therapy present..    GOALS:    Physical Therapy Goals        Problem: Physical Therapy Goal    Goal Priority Disciplines Outcome Goal Variances Interventions   Physical Therapy Goal     PT/OT, PT Ongoing (interventions implemented as appropriate)      Description:  1. Patient will  participate with assistive and active exercises to advance strength in  all four extremities to 4 / 4 levels.  2. All transfers will advance to minimal assistance for safety  3  Endurance for out of bed activities will advance as tolerated  4. Gait with RW will progress to 50 ft intervals with minimal assistance                       Time Tracking:     PT Received On: 02/06/18  PT Start Time: 1017     PT Stop Time: 1052  PT Total Time (min): 35 min     Billable Minutes: Gait Training 9 minutes and Therapeutic Activity 20 minutes    Treatment Type: Treatment  PT/PTA: PT           Daniel Garcia, PT  02/06/2018

## 2018-02-06 NOTE — SUBJECTIVE & OBJECTIVE
Interval History: doing well no complaints    Objective:     Vital Signs (Most Recent):  Temp: 97.1 °F (36.2 °C) (02/06/18 0251)  Pulse: 72 (02/06/18 0820)  Resp: 18 (02/06/18 0747)  BP: (!) 116/54 (02/06/18 0251)  SpO2: 95 % (02/06/18 0747) Vital Signs (24h Range):  Temp:  [96.3 °F (35.7 °C)-97.3 °F (36.3 °C)] 97.1 °F (36.2 °C)  Pulse:  [63-88] 72  Resp:  [18-29] 18  SpO2:  [90 %-95 %] 95 %  BP: ()/(51-57) 116/54     Weight: 114.8 kg (253 lb 1.4 oz)  Body mass index is 39.64 kg/m².      Intake/Output Summary (Last 24 hours) at 02/06/18 0917  Last data filed at 02/06/18 0600   Gross per 24 hour   Intake          1993.33 ml   Output                0 ml   Net          1993.33 ml       Physical Exam   Constitutional: She is oriented to person, place, and time. She appears well-developed and well-nourished. She is cooperative.  Non-toxic appearance. She does not appear ill. No distress.   HENT:   Head: Normocephalic and atraumatic.   Right Ear: Hearing, tympanic membrane, external ear and ear canal normal.   Left Ear: Hearing, tympanic membrane, external ear and ear canal normal.   Nose: Nose normal. No mucosal edema, rhinorrhea or nasal deformity. No epistaxis. Right sinus exhibits no maxillary sinus tenderness and no frontal sinus tenderness. Left sinus exhibits no maxillary sinus tenderness and no frontal sinus tenderness.   Mouth/Throat: Uvula is midline, oropharynx is clear and moist and mucous membranes are normal. No trismus in the jaw. Normal dentition. No uvula swelling. No posterior oropharyngeal erythema.   Eyes: Conjunctivae and lids are normal. No scleral icterus.   Sclera clear bilat   Neck: Trachea normal, full passive range of motion without pain and phonation normal. Neck supple.   Cardiovascular: Normal rate, regular rhythm, normal heart sounds, intact distal pulses and normal pulses.    Pulmonary/Chest: She is in respiratory distress (mild to moderate). She has decreased breath sounds in the  right upper field, the right middle field, the right lower field, the left upper field, the left middle field and the left lower field. She has wheezes. She has rhonchi in the right lower field and the left lower field.           Abdominal: Soft. Normal appearance and bowel sounds are normal. She exhibits no distension. There is no tenderness.   Musculoskeletal: Normal range of motion. She exhibits no edema or deformity.   Neurological: She is alert and oriented to person, place, and time. She exhibits normal muscle tone. Coordination normal.   Skin: Skin is warm, dry and intact. She is not diaphoretic. No pallor.   Psychiatric: She has a normal mood and affect. Her speech is normal and behavior is normal. Judgment and thought content normal. Cognition and memory are normal.   Nursing note and vitals reviewed.      Vents:  Oxygen Concentration (%): 40 (02/06/18 0747)  Negative Inspiratory Force (cm H2O): -50 (02/05/18 0739)    Lines/Drains/Airways     Peripheral Intravenous Line                 Peripheral IV - Single Lumen 02/01/18 1653 Right Wrist 4 days                Significant Labs:    CBC/Anemia Profile:    Recent Labs  Lab 02/05/18  1049 02/06/18  0533   WBC 12.89* 15.59*   HGB 12.1 12.2   HCT 37.0 37.9    291   MCV 96 97   RDW 14.7* 14.7*        Chemistries:    Recent Labs  Lab 02/05/18  1049 02/06/18  0533    137   K 4.3 4.3   CL 95 97   CO2 29 27   *  --    CREATININE 2.0* 1.9*   CALCIUM 8.5* 8.3*       All pertinent labs within the past 24 hours have been reviewed.    Significant Imaging:  I have reviewed all pertinent imaging results/findings within the past 24 hours.

## 2018-02-06 NOTE — PROGRESS NOTES
[facility]  Cardiology  Progress Note    Patient Name: Stephany Skinner  MRN: [id number]  Admission Date: 1/30/2018  Hospital Length of Stay: 7 days  Code Status: Full Code   Attending Physician: Za Sandra MD   Primary Care Physician: Pipo Flowers MD  Expected Discharge Date:   Principal Problem:Myasthenia gravis, adult form    Subjective:     Interval History: feeling weak today, denies CP, SOB.    Review of Systems   Constitution: Positive for weakness and malaise/fatigue.   HENT: Negative.    Eyes: Negative.    Cardiovascular: Negative.    Respiratory: Negative.    Endocrine: Negative.    Skin: Negative.    Musculoskeletal: Positive for muscle weakness.   Gastrointestinal: Negative.    Genitourinary: Negative.    Psychiatric/Behavioral: Negative.    Allergic/Immunologic: Negative.      Objective:     Vital Signs (Most Recent):  Temp: 97.1 °F (36.2 °C) (02/06/18 0251)  Pulse: 88 (02/06/18 0747)  Resp: 18 (02/06/18 0747)  BP: (!) 116/54 (02/06/18 0251)  SpO2: 95 % (02/06/18 0747) Vital Signs (24h Range):  Temp:  [96.3 °F (35.7 °C)-97.3 °F (36.3 °C)] 97.1 °F (36.2 °C)  Pulse:  [63-88] 88  Resp:  [18-29] 18  SpO2:  [90 %-95 %] 95 %  BP: ()/(51-57) 116/54     Weight: 114.8 kg (253 lb 1.4 oz)  Body mass index is 39.64 kg/m².    SpO2: 95 %  O2 Device (Oxygen Therapy): BiPAP      Intake/Output Summary (Last 24 hours) at 02/06/18 0906  Last data filed at 02/06/18 0600   Gross per 24 hour   Intake          1993.33 ml   Output                0 ml   Net          1993.33 ml       Lines/Drains/Airways     Peripheral Intravenous Line                 Peripheral IV - Single Lumen 02/01/18 1653 Right Wrist 4 days              Current Facility-Administered Medications   Medication    0.9%  NaCl infusion    acetaminophen tablet 650 mg    amLODIPine tablet 2.5 mg    apixaban tablet 2.5 mg    atorvastatin tablet 10 mg    clopidogrel tablet 75 mg    dextrose 50% injection 12.5 g    dextrose  50% injection 25 g    glucagon (human recombinant) injection 1 mg    glucose chewable tablet 16 g    glucose chewable tablet 24 g    insulin aspart pen 0-5 Units    ipratropium 0.02 % nebulizer solution 0.5 mg    levalbuterol nebulizer solution 1.25 mg    [START ON 2/7/2018] levoFLOXacin 250 mg/50 mL IVPB 250 mg    levoFLOXacin 500 mg/100 mL IVPB 500 mg    levothyroxine tablet 100 mcg    metoprolol succinate (TOPROL-XL) 24 hr tablet 200 mg    ondansetron injection 4 mg    pantoprazole EC tablet 40 mg    predniSONE tablet 40 mg    pyridostigmine tablet 60 mg       Physical Exam    Significant Labs:   BMP:   Recent Labs  Lab 02/05/18  1049 02/06/18  0533   * 203*    137   K 4.3 4.3   CL 95 97   CO2 29 27   *  --    CREATININE 2.0* 1.9*   CALCIUM 8.5* 8.3*   , CBC   Recent Labs  Lab 02/05/18  1049 02/06/18  0533   WBC 12.89* 15.59*   HGB 12.1 12.2   HCT 37.0 37.9    291    and Troponin No results for input(s): TROPONINI in the last 48 hours.    Significant Imaging: Echocardiogram:   2D echo with color flow doppler:   Results for orders placed or performed during the hospital encounter of 01/30/18   2D echo with color flow doppler   Result Value Ref Range    EF 55 55 - 65    Est. PA Systolic Pressure 31.81     Tricuspid Valve Regurgitation TRIVIAL TO MILD      Assessment and Plan:   Ups and downs  Resolved Confusion with CO2 narcosis/COPD,JOY--improved/resolving  AFIB v/s wandering PM; now anticoagulated  Acute diastolic CHF due to decreased med compliance/high BP--now normotensive  Myasthenia gravis  Acute renal insufficiency--defer diuretics for now and montior  Also suspect some bronchitis with high WBC chills and cough--poss RUL infiltrate/?pneumonia  Worsening BARGER and orthopnea for the past 3 days- hasnt taken prescribed lasix x 4 days.   Chest X ray with pulmonary edema, elevated BNP, mild volume overload on exam. CT shows Ground glass opacities, NO PE   Echo 1/18: EF 60%,  stage 1 diastolic dysfunction, no significant valvular pathology.   MPI 1/2013- mild apical reversible ischemia.   Carotid US- less than 40% bilateral 2017  Nuclear 1/13--small area ischemia apical anterior    Plan:  Keep as dry as possible but defer diuretics for now given PUJA, receiving gentle hydration  Cont eliquis for PAF/DVT prophylaxis  Cont amlodipine/atorvastatin/plavix/metoprolol     Active Diagnoses:    Diagnosis Date Noted POA    PRINCIPAL PROBLEM:  Myasthenia gravis, adult form [G70.00] 02/03/2018 Yes    Community acquired pneumonia of right upper lobe of lung [J18.1] 02/05/2018 Yes    PUJA (acute kidney injury) [N17.9] 02/05/2018 No    Acute respiratory failure with hypoxia and hypercarbia [J96.01, J96.02] 01/31/2018 Yes    Acute on chronic diastolic congestive heart failure [I50.33] 01/30/2018 Yes    Chronic atrial fibrillation [I48.2] 01/30/2018 Yes    HTN (hypertension) [I10] 08/08/2012 Yes    Hyperlipidemia [E78.5] 08/08/2012 Yes    Hypothyroidism [E03.9] 08/08/2012 Yes    JOY (obstructive sleep apnea) [G47.33] 08/08/2012 Yes      Problems Resolved During this Admission:    Diagnosis Date Noted Date Resolved POA       VTE Risk Mitigation         Ordered     apixaban tablet 2.5 mg  2 times daily     Route:  Oral        02/05/18 1135     Medium Risk of VTE  Once      01/30/18 1113     Place sequential compression device  Until discontinued      01/30/18 1113          Klarissa Lovett NP  Cardiology  [facility]    I attest that I have personally seen and examined this patient. I have reviewed and discussed the management in detail as outlined above.

## 2018-02-06 NOTE — PLAN OF CARE
Problem: Fall Risk (Adult)  Goal: Absence of Falls  Patient will demonstrate the desired outcomes by discharge/transition of care.   Outcome: Ongoing (interventions implemented as appropriate)  Fall precautions maintained. Bed alarm engaged at all times. Family at bedside.     Problem: Patient Care Overview  Goal: Plan of Care Review  Outcome: Ongoing (interventions implemented as appropriate)  Plan of care reviewed with patient and she agrees with the plan of care. Oxygen continues at 5l per nasal cannula when BIPAP not in use. Lungs clear but diminished. IV fluids continue. Nebs continue every 6 hours. Telemetry continues. SCDS in use. No acute distress noted.

## 2018-02-06 NOTE — ASSESSMENT & PLAN NOTE
Improving  NIF today 51 was 21 yesterday   Steroids fixed pt neuro has seen agree need tessalon test and await pfts n

## 2018-02-06 NOTE — ASSESSMENT & PLAN NOTE
On BiPap- just o2 at home, check ABG as she only used 3hr of CPAP over night. I suspect she will need to use at least nightly and as needed during the day. The more she uses the better she will feel. Will help with heart failure as well as pneumonia

## 2018-02-06 NOTE — PT/OT/SLP PROGRESS
Speech Language Pathology Note    Patient seen in AM in room for follow-up regarding use of compensatory strategies during meals. Strategies of energy conservation were recalled with verbal cues from SLP; daughter provided information to aid in pt's recall. Patient and daughter verbalized understanding. Patient's family and nursing reported good tolerance of diet. No further skilled speech services needed at this time.     EFFIE Anderson  2/6/2018

## 2018-02-06 NOTE — PROGRESS NOTES
[facility]  Neurology  Progress Note     Patient Name: Stephany Skinner  MRN: [id number]  Admission Date: 1/30/2018  Hospital Length of Stay: 3 days  Code Status: Full Code   Attending Provider: Za Sandra MD   Consulting Provider: Katie Ortiz MD  Primary Care Physician: Pipo Flowers MD  Principal Problem:Acute respiratory failure with hypoxia and hypercarbia     Inpatient consult to Neurology  Consult performed by: KATIE ORTIZ  Consult ordered by: PILAR LIU    Inpatient consult to Neurology  Consult performed by: KATIE ORTIZ  Consult ordered by: AVTAR WATSON        Subjective:      Chief Complaint:  hypercapnia     HPI:   Stephany Skinner is a 83 y.o. 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 More recently, I had been seeing her yearly as her back pain was asymptomatic and gait responded to PT.  She was admitted to [facility] for SOB. She denies upper respiratory symptoms and was seen in the ER two days prior to admission. She states she came in for SOB and some head pain (bilateral/global). She has not had fever per her and family.  She has since been covered for bronchitis and has been followed by Dr Watson for Acute respiratory failure with hypoxia and hypercarbia and Dr Patel for acute on chronic CHF thought to be due to decreased med compliance/high BP.  She is n Bipap now.   She is on Plavix for afib which is rate controlled per Dr Patel.   Last pm she got Seroquel (thought to have some anxiety associated with her breathing difficulty) and has been sedated from that today.   Her hypercapnia prompted Dr Watson to request my consult to rule out neuromuscular disorder contributing to her symptoms.   On further questioning she states she has had diplopia for a few  "months but has not seen eye doc.  She feels "somewhat weak" in her muscles but not severely and has been able to ambulate to the Restroom up until 2 days ago.   Family states patient has been a bit confused since being in the hospital.   She was placed on Steroids by pulmonology today and PCO2 improved to 40     Interval history:2/5/2018: She had a great improvement with NIFs and symptoms over the weekend on steroids and mestinon. She is now on NC for O2  She is no longer confused per family (since CO2 improved) and patient states she has no more double vision. She has not yet walked with PT. Is sitting in chair today.      Interval history 2/6/18: Patient walked 15 with RW with PT today.Blood glucose is improved. She is still feeling less diplopia and hypophonia.   Patient is more fatigued today and is being treated for pneumonia.  She had a hard time wearing bipap all night.            Past Medical History:   Diagnosis Date    GERD (gastroesophageal reflux disease)      Hyperlipidemia      Hypertension                     Past Surgical History:   Procedure Laterality Date    CHOLECYSTECTOMY        HERNIA REPAIR        HYSTERECTOMY        knee replacemene         deidra    THYROIDECTOMY                       Review of patient's allergies indicates:   Allergen Reactions    Statins-hmg-coa reductase inhibitors         Other reaction(s): Unknown      I have reviewed all of this patient's past medical and surgical histories as well as family and social histories and active allergies and medications as documented in the electronic medical record.     No current facility-administered medications on file prior to encounter.               Current Outpatient Prescriptions on File Prior to Encounter   Medication Sig    amlodipine (NORVASC) 2.5 MG tablet Take 1 tablet (2.5 mg total) by mouth once daily.    atorvastatin (LIPITOR) 10 MG tablet Take 1 tablet (10 mg total) by mouth every evening.    atorvastatin " (LIPITOR) 20 MG tablet      clopidogrel (PLAVIX) 75 mg tablet Take 1 tablet (75 mg total) by mouth once daily.    cyanocobalamin (VITAMIN B-12) 100 MCG tablet Take 100 mcg by mouth once daily.    furosemide (LASIX) 40 MG tablet Take 1 tablet (40 mg total) by mouth once daily.    levothyroxine (SYNTHROID) 100 MCG tablet TAKE ONE TABLET BY MOUTH EVERY DAY    losartan (COZAAR) 100 MG tablet      meloxicam (MOBIC) 7.5 MG tablet TAKE ONE TABLET BY MOUTH EVERY OTHER DAY    metoprolol succinate (TOPROL-XL) 200 MG 24 hr tablet Take 1 tablet (200 mg total) by mouth once daily.    omega-3 acid ethyl esters (LOVAZA) 1 gram capsule Take 2 capsules (2 g total) by mouth 2 (two) times daily.    potassium chloride SA (K-DUR,KLOR-CON) 20 MEQ tablet Take 1 tablet (20 mEq total) by mouth once daily.    ranitidine (ZANTAC) 150 MG tablet Take 1 tablet (150 mg total) by mouth 2 (two) times daily.    VESICARE 10 mg tablet TAKE ONE TABLET BY MOUTH EVERY DAY    nitroGLYCERIN (NITROSTAT) 0.4 MG SL tablet Place 0.4 mg under the tongue every 5 (five) minutes as needed.              Family History      Problem Relation (Age of Onset)     Cancer Sister                  Social History Main Topics    Smoking status: Never Smoker    Smokeless tobacco: Never Used    Alcohol use No    Drug use: No    Sexual activity: Not on file      Review of Systems   Constitutional: Negative for fever.   HENT: Negative for nosebleeds.    Eyes: Positive for visual disturbance.   Respiratory: Positive for shortness of breath.    Cardiovascular: Negative for chest pain.   Gastrointestinal: Negative for blood in stool.   Genitourinary: Negative for hematuria.   Musculoskeletal: Negative for gait problem.   Skin: Negative for rash.   Neurological: Negative for facial asymmetry and numbness.        Reports she has been eating well and swallowing well.    Psychiatric/Behavioral: Positive for confusion.   Sleepy/sedated from seroquel last pm  Objective:       Vitals:    02/06/18 1258 02/06/18 1430 02/06/18 1500 02/06/18 1601   BP:   (!) 131/59    BP Location:       Patient Position:       Pulse: 64 71 74 72   Resp: (!) 22  (!) 22    Temp:   97.3 °F (36.3 °C)    TempSrc:   Oral    SpO2: (!) 93%  (!) 94%    Weight:       Height:                 Weight: 118.5 kg (261 lb 3.9 oz)  Body mass index is 40.92 kg/m².     Physical Exam        Exam:  Gen Appearance, well developed/nourished in no apparent distress  CV: 2+ distal pulses with no edema or swelling  Neuro:  MS: Awake, alert,Sustains attention. Oriented to place,situation, person and partially to  time.  Recent recall is intact/remote memory intact, Language is full to spontaneous speech/comprehension. Fund of Knowledge is full  CN: Optic discs are flat with normal vasculature, PERRL, Extraoccular movements and visual fields today without ptosis or diplopia (improved from last week) . Normal facial sensation and strength, Hearing symmetric, Tongue and Palate are midline and strong. Shoulder Shrug symmetric and strong.  Voice is improved- not as hypophonic as prior but still hypophonic.   Motor: Normal bulk, tone, no abnormal movements. 5/5 strength bilateral upper/lower extremities except perhaps decreased to 4++/5 in the proximal arms with 1+ reflexes in the arms, and are less in the legs and no clonus  Sensory:  Romberg not done and intact to temp and vibration  Cerebellar: Finger-nose,Rapid alternating movements intact  Gait: Not done today, but at baseline: Wide based stance, some sensory ataxia- uses walker     Significant Labs: BMP, CBC today reviewed.   Glucose improved but WBC count is elevated.CO2 normal  Flu negative,Troponin negative. CK normal since admit.  TSH mildly abnormal with normal T4 in 11/2017  Flu swab negative     TSH euthymic now  MG panel pending       Significant Imaging: CT head 1/31/2018: 1.  No CT evidence of an acute intracranial abnormality with limitations due to motion artifact.  2.   Mild atrophy and minimal small vessel ischemic changes of the periventricular white matter.    CT chest: no thymoma        Assessment and Plan:   Stephany Skinner is a 83 y.o. female known to me for radiculopathy and polyneuropathy. Here with respiratory failure. Found to have some mild ptosis and gaze restriction as well as hypophonic voice concerning for neuro-muscular cause of hypercapnia.      I recommend:           * Acute respiratory failure with hypoxia and hypercarbia     -This patient had markedly different exam this admission compared to9 months ago with her new symptoms of diplopia and hypophonic voice. That combined with hypercapnia is concerning for neuromuscular weakness- all of this improving on steroids as expected.   -Follow up  Myasthenia gravis panel. If negative, consider rep stim by EMG and rule out of CIDP (seems less likely)  by EMG    -Changed Solumedrol to  Prednisone 40mg daily for a week (today is day 2/7). Then would need taper to Prednisone   30mg daily for a week, then 20mg until next seeing Neurology outpatient post d/c unless worsening symptoms again. Monitor for any further decompensation- patient was encouraged to use bipap.   -Continue mestinon for now  -She is not a tensilon test candidate given her heart history and her lack of significant ptosis on exam.   -Blood glucose is improved- hopefully this will continue on lower dose steroids- she has impaired fasting glucose at baseline.   -Goal gait with PT is 50 Feet- walked 15 today  -Primary team also treating pneumonia. Benefits of treatment outweigh any MG risk at this point unless she clinically deteriorates with further muscle weakness.           Chronic atrial fibrillation     - no signs of remote CVA by CT head. Rate controlled and on NOAC per Dr Patel.  -MRI not needed at this point       Acute on chronic congestive heart failure     -Cardiology following       Hypothyroidism     -currently euthymic on treatment       JOY  (obstructive sleep apnea)     -improved respiratory status/ off of bipap currently          Will see patient Thursday. Follow up with me per D/c summary and please given steroid taper if she is d/c'ed prior.

## 2018-02-07 ENCOUNTER — HOSPITAL ENCOUNTER (INPATIENT)
Facility: HOSPITAL | Age: 83
LOS: 12 days | DRG: 947 | End: 2018-02-19
Attending: FAMILY MEDICINE | Admitting: INTERNAL MEDICINE
Payer: MEDICARE

## 2018-02-07 VITALS
WEIGHT: 253.06 LBS | HEIGHT: 67 IN | BODY MASS INDEX: 39.72 KG/M2 | TEMPERATURE: 98 F | HEART RATE: 73 BPM | OXYGEN SATURATION: 91 % | RESPIRATION RATE: 18 BRPM | SYSTOLIC BLOOD PRESSURE: 125 MMHG | DIASTOLIC BLOOD PRESSURE: 75 MMHG

## 2018-02-07 DIAGNOSIS — J96.02 ACUTE RESPIRATORY FAILURE WITH HYPOXIA AND HYPERCARBIA: ICD-10-CM

## 2018-02-07 DIAGNOSIS — R53.81 PHYSICAL DECONDITIONING: ICD-10-CM

## 2018-02-07 DIAGNOSIS — R53.81 DEBILITY: ICD-10-CM

## 2018-02-07 DIAGNOSIS — J18.9 COMMUNITY ACQUIRED PNEUMONIA OF RIGHT UPPER LOBE OF LUNG: ICD-10-CM

## 2018-02-07 DIAGNOSIS — J96.01 ACUTE RESPIRATORY FAILURE WITH HYPOXIA AND HYPERCARBIA: ICD-10-CM

## 2018-02-07 DIAGNOSIS — I50.9 ACUTE ON CHRONIC CONGESTIVE HEART FAILURE, UNSPECIFIED CONGESTIVE HEART FAILURE TYPE: ICD-10-CM

## 2018-02-07 LAB
ANION GAP SERPL CALC-SCNC: 10 MMOL/L
BASOPHILS # BLD AUTO: ABNORMAL K/UL
BASOPHILS NFR BLD: 0 %
BUN SERPL-MCNC: 109 MG/DL
CALCIUM SERPL-MCNC: 8.6 MG/DL
CHLORIDE SERPL-SCNC: 101 MMOL/L
CO2 SERPL-SCNC: 28 MMOL/L
CREAT SERPL-MCNC: 1.5 MG/DL
DIFFERENTIAL METHOD: ABNORMAL
EOSINOPHIL # BLD AUTO: ABNORMAL K/UL
EOSINOPHIL NFR BLD: 0 %
ERYTHROCYTE [DISTWIDTH] IN BLOOD BY AUTOMATED COUNT: 14.9 %
EST. GFR  (AFRICAN AMERICAN): 37 ML/MIN/1.73 M^2
EST. GFR  (NON AFRICAN AMERICAN): 32 ML/MIN/1.73 M^2
GLUCOSE SERPL-MCNC: 167 MG/DL
HCT VFR BLD AUTO: 37.8 %
HGB BLD-MCNC: 12 G/DL
LYMPHOCYTES # BLD AUTO: ABNORMAL K/UL
LYMPHOCYTES NFR BLD: 6 %
MCH RBC QN AUTO: 30.8 PG
MCHC RBC AUTO-ENTMCNC: 31.7 G/DL
MCV RBC AUTO: 97 FL
METAMYELOCYTES NFR BLD MANUAL: 1 %
MONOCYTES # BLD AUTO: ABNORMAL K/UL
MONOCYTES NFR BLD: 7 %
MYELOCYTES NFR BLD MANUAL: 2 %
NEUTROPHILS NFR BLD: 79 %
NEUTS BAND NFR BLD MANUAL: 5 %
PLATELET # BLD AUTO: 250 K/UL
PMV BLD AUTO: 9.3 FL
POCT GLUCOSE: 185 MG/DL (ref 70–110)
POCT GLUCOSE: 253 MG/DL (ref 70–110)
POCT GLUCOSE: 257 MG/DL (ref 70–110)
POTASSIUM SERPL-SCNC: 4.7 MMOL/L
RBC # BLD AUTO: 3.89 M/UL
SODIUM SERPL-SCNC: 139 MMOL/L
WBC # BLD AUTO: 15.66 K/UL

## 2018-02-07 PROCEDURE — 63600175 PHARM REV CODE 636 W HCPCS: Performed by: NURSE PRACTITIONER

## 2018-02-07 PROCEDURE — 25000003 PHARM REV CODE 250: Performed by: NURSE PRACTITIONER

## 2018-02-07 PROCEDURE — 25000003 PHARM REV CODE 250: Performed by: SURGERY

## 2018-02-07 PROCEDURE — 99900035 HC TECH TIME PER 15 MIN (STAT)

## 2018-02-07 PROCEDURE — 25000242 PHARM REV CODE 250 ALT 637 W/ HCPCS: Performed by: INTERNAL MEDICINE

## 2018-02-07 PROCEDURE — 94761 N-INVAS EAR/PLS OXIMETRY MLT: CPT

## 2018-02-07 PROCEDURE — 25000003 PHARM REV CODE 250: Performed by: PSYCHIATRY & NEUROLOGY

## 2018-02-07 PROCEDURE — 94640 AIRWAY INHALATION TREATMENT: CPT

## 2018-02-07 PROCEDURE — 25000242 PHARM REV CODE 250 ALT 637 W/ HCPCS: Performed by: NURSE PRACTITIONER

## 2018-02-07 PROCEDURE — G8979 MOBILITY GOAL STATUS: HCPCS | Mod: CK

## 2018-02-07 PROCEDURE — 85007 BL SMEAR W/DIFF WBC COUNT: CPT

## 2018-02-07 PROCEDURE — 94660 CPAP INITIATION&MGMT: CPT

## 2018-02-07 PROCEDURE — 36415 COLL VENOUS BLD VENIPUNCTURE: CPT

## 2018-02-07 PROCEDURE — 97110 THERAPEUTIC EXERCISES: CPT

## 2018-02-07 PROCEDURE — 27000221 HC OXYGEN, UP TO 24 HOURS

## 2018-02-07 PROCEDURE — 11000004 HC SNF PRIVATE

## 2018-02-07 PROCEDURE — 97530 THERAPEUTIC ACTIVITIES: CPT

## 2018-02-07 PROCEDURE — 97116 GAIT TRAINING THERAPY: CPT

## 2018-02-07 PROCEDURE — 99239 HOSP IP/OBS DSCHRG MGMT >30: CPT | Mod: ,,, | Performed by: FAMILY MEDICINE

## 2018-02-07 PROCEDURE — 80048 BASIC METABOLIC PNL TOTAL CA: CPT

## 2018-02-07 PROCEDURE — 63600175 PHARM REV CODE 636 W HCPCS: Performed by: PSYCHIATRY & NEUROLOGY

## 2018-02-07 PROCEDURE — 85027 COMPLETE CBC AUTOMATED: CPT

## 2018-02-07 RX ORDER — PREDNISONE 20 MG/1
40 TABLET ORAL DAILY
Status: CANCELLED | OUTPATIENT
Start: 2018-02-08

## 2018-02-07 RX ORDER — INSULIN ASPART 100 [IU]/ML
0-5 INJECTION, SOLUTION INTRAVENOUS; SUBCUTANEOUS
Status: CANCELLED | OUTPATIENT
Start: 2018-02-07

## 2018-02-07 RX ORDER — METOPROLOL SUCCINATE 50 MG/1
200 TABLET, EXTENDED RELEASE ORAL DAILY
Status: CANCELLED | OUTPATIENT
Start: 2018-02-08

## 2018-02-07 RX ORDER — PANTOPRAZOLE SODIUM 40 MG/1
40 TABLET, DELAYED RELEASE ORAL DAILY
Status: DISCONTINUED | OUTPATIENT
Start: 2018-02-08 | End: 2018-02-19 | Stop reason: HOSPADM

## 2018-02-07 RX ORDER — PREDNISONE 20 MG/1
40 TABLET ORAL DAILY
Status: DISCONTINUED | OUTPATIENT
Start: 2018-02-08 | End: 2018-02-14

## 2018-02-07 RX ORDER — IPRATROPIUM BROMIDE 0.5 MG/2.5ML
0.5 SOLUTION RESPIRATORY (INHALATION) EVERY 6 HOURS
Status: DISCONTINUED | OUTPATIENT
Start: 2018-02-07 | End: 2018-02-15

## 2018-02-07 RX ORDER — LEVOTHYROXINE SODIUM 100 UG/1
100 TABLET ORAL DAILY
Status: DISCONTINUED | OUTPATIENT
Start: 2018-02-08 | End: 2018-02-19 | Stop reason: HOSPADM

## 2018-02-07 RX ORDER — ATORVASTATIN CALCIUM 10 MG/1
10 TABLET, FILM COATED ORAL NIGHTLY
Status: CANCELLED | OUTPATIENT
Start: 2018-02-07

## 2018-02-07 RX ORDER — ACETAMINOPHEN 325 MG/1
650 TABLET ORAL EVERY 8 HOURS PRN
Status: DISCONTINUED | OUTPATIENT
Start: 2018-02-07 | End: 2018-02-19 | Stop reason: HOSPADM

## 2018-02-07 RX ORDER — LEVOFLOXACIN 5 MG/ML
250 INJECTION, SOLUTION INTRAVENOUS
Status: DISCONTINUED | OUTPATIENT
Start: 2018-02-08 | End: 2018-02-11

## 2018-02-07 RX ORDER — LEVALBUTEROL 1.25 MG/.5ML
1.25 SOLUTION, CONCENTRATE RESPIRATORY (INHALATION) EVERY 6 HOURS
Status: DISCONTINUED | OUTPATIENT
Start: 2018-02-07 | End: 2018-02-15

## 2018-02-07 RX ORDER — IPRATROPIUM BROMIDE 0.5 MG/2.5ML
0.5 SOLUTION RESPIRATORY (INHALATION) EVERY 6 HOURS
Status: CANCELLED | OUTPATIENT
Start: 2018-02-07

## 2018-02-07 RX ORDER — IBUPROFEN 200 MG
16 TABLET ORAL
Status: CANCELLED | OUTPATIENT
Start: 2018-02-07

## 2018-02-07 RX ORDER — PYRIDOSTIGMINE BROMIDE 60 MG/1
60 TABLET ORAL 3 TIMES DAILY
Status: CANCELLED | OUTPATIENT
Start: 2018-02-07

## 2018-02-07 RX ORDER — METOPROLOL SUCCINATE 50 MG/1
200 TABLET, EXTENDED RELEASE ORAL DAILY
Status: DISCONTINUED | OUTPATIENT
Start: 2018-02-08 | End: 2018-02-19 | Stop reason: HOSPADM

## 2018-02-07 RX ORDER — PANTOPRAZOLE SODIUM 40 MG/1
40 TABLET, DELAYED RELEASE ORAL DAILY
Status: CANCELLED | OUTPATIENT
Start: 2018-02-08

## 2018-02-07 RX ORDER — IBUPROFEN 200 MG
24 TABLET ORAL
Status: CANCELLED | OUTPATIENT
Start: 2018-02-07

## 2018-02-07 RX ORDER — CLOPIDOGREL BISULFATE 75 MG/1
75 TABLET ORAL DAILY
Status: CANCELLED | OUTPATIENT
Start: 2018-02-08

## 2018-02-07 RX ORDER — CLOPIDOGREL BISULFATE 75 MG/1
75 TABLET ORAL DAILY
Status: DISCONTINUED | OUTPATIENT
Start: 2018-02-08 | End: 2018-02-19 | Stop reason: HOSPADM

## 2018-02-07 RX ORDER — ONDANSETRON 2 MG/ML
4 INJECTION INTRAMUSCULAR; INTRAVENOUS EVERY 8 HOURS PRN
Status: DISCONTINUED | OUTPATIENT
Start: 2018-02-07 | End: 2018-02-19 | Stop reason: HOSPADM

## 2018-02-07 RX ORDER — AMLODIPINE BESYLATE 2.5 MG/1
2.5 TABLET ORAL DAILY
Status: CANCELLED | OUTPATIENT
Start: 2018-02-08

## 2018-02-07 RX ORDER — GLUCAGON 1 MG
1 KIT INJECTION
Status: DISCONTINUED | OUTPATIENT
Start: 2018-02-07 | End: 2018-02-19 | Stop reason: HOSPADM

## 2018-02-07 RX ORDER — ONDANSETRON 2 MG/ML
4 INJECTION INTRAMUSCULAR; INTRAVENOUS EVERY 8 HOURS PRN
Status: CANCELLED | OUTPATIENT
Start: 2018-02-07

## 2018-02-07 RX ORDER — PYRIDOSTIGMINE BROMIDE 60 MG/1
60 TABLET ORAL 3 TIMES DAILY
Status: DISCONTINUED | OUTPATIENT
Start: 2018-02-07 | End: 2018-02-19 | Stop reason: HOSPADM

## 2018-02-07 RX ORDER — IBUPROFEN 200 MG
16 TABLET ORAL
Status: DISCONTINUED | OUTPATIENT
Start: 2018-02-07 | End: 2018-02-19 | Stop reason: HOSPADM

## 2018-02-07 RX ORDER — INSULIN ASPART 100 [IU]/ML
0-5 INJECTION, SOLUTION INTRAVENOUS; SUBCUTANEOUS
Status: DISCONTINUED | OUTPATIENT
Start: 2018-02-07 | End: 2018-02-19 | Stop reason: HOSPADM

## 2018-02-07 RX ORDER — IBUPROFEN 200 MG
24 TABLET ORAL
Status: DISCONTINUED | OUTPATIENT
Start: 2018-02-07 | End: 2018-02-19 | Stop reason: HOSPADM

## 2018-02-07 RX ORDER — ACETAMINOPHEN 325 MG/1
650 TABLET ORAL EVERY 8 HOURS PRN
Status: CANCELLED | OUTPATIENT
Start: 2018-02-07

## 2018-02-07 RX ORDER — GLUCAGON 1 MG
1 KIT INJECTION
Status: CANCELLED | OUTPATIENT
Start: 2018-02-07

## 2018-02-07 RX ORDER — LEVALBUTEROL 1.25 MG/.5ML
1.25 SOLUTION, CONCENTRATE RESPIRATORY (INHALATION) EVERY 6 HOURS
Status: CANCELLED | OUTPATIENT
Start: 2018-02-07

## 2018-02-07 RX ORDER — AMLODIPINE BESYLATE 2.5 MG/1
2.5 TABLET ORAL DAILY
Status: DISCONTINUED | OUTPATIENT
Start: 2018-02-08 | End: 2018-02-19 | Stop reason: HOSPADM

## 2018-02-07 RX ORDER — LEVOFLOXACIN 5 MG/ML
250 INJECTION, SOLUTION INTRAVENOUS
Status: CANCELLED | OUTPATIENT
Start: 2018-02-08

## 2018-02-07 RX ORDER — ATORVASTATIN CALCIUM 10 MG/1
10 TABLET, FILM COATED ORAL NIGHTLY
Status: DISCONTINUED | OUTPATIENT
Start: 2018-02-07 | End: 2018-02-19 | Stop reason: HOSPADM

## 2018-02-07 RX ORDER — LEVOTHYROXINE SODIUM 100 UG/1
100 TABLET ORAL DAILY
Status: CANCELLED | OUTPATIENT
Start: 2018-02-08

## 2018-02-07 RX ADMIN — INSULIN ASPART 3 UNITS: 100 INJECTION, SOLUTION INTRAVENOUS; SUBCUTANEOUS at 11:02

## 2018-02-07 RX ADMIN — PYRIDOSTIGMINE BROMIDE 60 MG: 60 TABLET ORAL at 05:02

## 2018-02-07 RX ADMIN — LEVALBUTEROL 1.25 MG: 1.25 SOLUTION, CONCENTRATE RESPIRATORY (INHALATION) at 07:02

## 2018-02-07 RX ADMIN — PYRIDOSTIGMINE BROMIDE 60 MG: 60 TABLET ORAL at 02:02

## 2018-02-07 RX ADMIN — PREDNISONE 40 MG: 20 TABLET ORAL at 10:02

## 2018-02-07 RX ADMIN — METOPROLOL SUCCINATE 200 MG: 50 TABLET, EXTENDED RELEASE ORAL at 10:02

## 2018-02-07 RX ADMIN — AMLODIPINE BESYLATE 2.5 MG: 2.5 TABLET ORAL at 10:02

## 2018-02-07 RX ADMIN — ATORVASTATIN CALCIUM 10 MG: 10 TABLET, FILM COATED ORAL at 09:02

## 2018-02-07 RX ADMIN — APIXABAN 2.5 MG: 2.5 TABLET, FILM COATED ORAL at 09:02

## 2018-02-07 RX ADMIN — IPRATROPIUM BROMIDE 0.5 MG: 0.5 SOLUTION RESPIRATORY (INHALATION) at 07:02

## 2018-02-07 RX ADMIN — LEVOTHYROXINE SODIUM 100 MCG: 100 TABLET ORAL at 10:02

## 2018-02-07 RX ADMIN — SODIUM CHLORIDE 100 ML/HR: 0.9 INJECTION, SOLUTION INTRAVENOUS at 12:02

## 2018-02-07 RX ADMIN — LEVOFLOXACIN 250 MG: 5 INJECTION, SOLUTION INTRAVENOUS at 10:02

## 2018-02-07 RX ADMIN — CLOPIDOGREL BISULFATE 75 MG: 75 TABLET ORAL at 10:02

## 2018-02-07 RX ADMIN — PANTOPRAZOLE SODIUM 40 MG: 40 TABLET, DELAYED RELEASE ORAL at 10:02

## 2018-02-07 RX ADMIN — APIXABAN 2.5 MG: 2.5 TABLET, FILM COATED ORAL at 10:02

## 2018-02-07 RX ADMIN — IPRATROPIUM BROMIDE 0.5 MG: 0.5 SOLUTION RESPIRATORY (INHALATION) at 01:02

## 2018-02-07 RX ADMIN — PYRIDOSTIGMINE BROMIDE 60 MG: 60 TABLET ORAL at 09:02

## 2018-02-07 RX ADMIN — INSULIN ASPART 1 UNITS: 100 INJECTION, SOLUTION INTRAVENOUS; SUBCUTANEOUS at 09:02

## 2018-02-07 RX ADMIN — LEVALBUTEROL 1.25 MG: 1.25 SOLUTION, CONCENTRATE RESPIRATORY (INHALATION) at 01:02

## 2018-02-07 NOTE — PROGRESS NOTES
[facility]  Cardiology  Progress Note    Patient Name: Stephany Skinner  MRN: [id number]  Admission Date: 1/30/2018  Hospital Length of Stay: 8 days  Code Status: Full Code   Attending Physician: Za Sandra MD   Primary Care Physician: Pipo Flowers MD  Expected Discharge Date:   Principal Problem:Acute respiratory failure with hypoxia and hypercarbia    Subjective:     Hospital Course: admit with SOB    Interval History: Dx with CHF / pneumonia / acute resp failure and Myasthenia gravis.    Diuresed.   PUJA-improving.   On bipap and prednisone.   CO2 and confusion better.  Ambulating with PT.    ROS   Constitution: Positive for weakness and malaise/fatigue.   HENT: Negative.    Eyes: Negative.    Cardiovascular: Negative.    Respiratory: Cough.  Endocrine: Negative.    Skin: Negative.    Musculoskeletal: Positive for muscle weakness.   Gastrointestinal: Negative.    Genitourinary: Negative.    Psychiatric/Behavioral: Negative.    Allergic/Immunologic: Negative  Objective:     Vital Signs (Most Recent):  Temp: 96.8 °F (36 °C) (02/07/18 0741)  Pulse: 74 (02/07/18 0824)  Resp: 20 (02/07/18 0741)  BP: (!) 120/59 (02/07/18 0741)  SpO2: 95 % (02/07/18 0741) Vital Signs (24h Range):  Temp:  [96.7 °F (35.9 °C)-97.4 °F (36.3 °C)] 96.8 °F (36 °C)  Pulse:  [63-76] 74  Resp:  [16-24] 20  SpO2:  [87 %-95 %] 95 %  BP: (105-135)/(54-66) 120/59     Weight: 114.8 kg (253 lb 1.4 oz)  Body mass index is 39.64 kg/m².    SpO2: 95 %  O2 Device (Oxygen Therapy): nasal cannula      Intake/Output Summary (Last 24 hours) at 02/07/18 0937  Last data filed at 02/06/18 1700   Gross per 24 hour   Intake              240 ml   Output                0 ml   Net              240 ml       Lines/Drains/Airways     Peripheral Intravenous Line                 Peripheral IV - Single Lumen 02/01/18 1653 Right Wrist 5 days                Physical Exam  Constitutional: She is oriented to person, place, and time. Morbidly obese.    Eyes: EOMI.   Neck: No JVD.  Obese neck.   Cardiovascular:   irregularly irregular   Pulmonary/Chest: Effort normal. No respiratory distress. She has wheezes. She has no rales.   Abdominal: Soft. Bowel sounds are normal. She exhibits no distension. There is no tenderness.   Musculoskeletal: She exhibits edema   Neurological: She is alert and oriented to person, place, and time.   Skin: Skin is warm and dry.   Psychiatric: She has a normal mood. Nursing note and vitals reviewed.    Significant Labs:   CMP   Recent Labs  Lab 02/05/18  1049 02/06/18  0533 02/07/18  0536    137 139   K 4.3 4.3 4.7   CL 95 97 101   CO2 29 27 28   * 203* 167*   * 124* 109*   CREATININE 2.0* 1.9* 1.5*   CALCIUM 8.5* 8.3* 8.6*   ANIONGAP 12 13 10   ESTGFRAFRICA 26* 28* 37*   EGFRNONAA 23* 24* 32*    and CBC   Recent Labs  Lab 02/05/18  1049 02/06/18  0533 02/07/18  0537   WBC 12.89* 15.59* 15.66*   HGB 12.1 12.2 12.0   HCT 37.0 37.9 37.8    291 250       Significant Imaging: Echocardiogram:   2D echo with color flow doppler:   Results for orders placed or performed during the hospital encounter of 01/30/18   2D echo with color flow doppler   Result Value Ref Range    EF 55 55 - 65    Est. PA Systolic Pressure 31.81     Tricuspid Valve Regurgitation TRIVIAL TO MILD    2/3 CXR Mild decrease in right upper lobe and right perihilar infiltrate. Mild cardiomegaly. Atherosclerosis.     2/2 CXR The lungs are underexpanded.  There are chronic lung markings seen bilaterally with pulmonary vascular congestion and patchy opacities throughout both lungs.  This could be related to pulmonary edema although superimposed infiltrate in the right upper lobe is not excluded.  The heart is enlarged.  Calcified atheromatous disease affects the aorta.  Age-appropriate degenerative changes affect the skeleton.     1/31 CTA lungs 1.  No CT evidence of central pulmonary embolus.  Severely limited study due to use motion artifact.  2.   Bilateral groundglass opacities and minimal dependent subsegmental atelectasis.     1/31 CT head 1.  No CT evidence of an acute intracranial abnormality with limitations due to motion artifact.  2.  Mild atrophy and minimal small vessel ischemic changes of the periventricular white matter.     Echo Echo 1/18: EF 60%, stage 1 diastolic dysfunction, no significant valvular pathology.      EKG a fib  Assessment and Plan:     Brief HPI:   Ups and downs; now with possible Myasthenia Gravis  Resolved Confusion with CO2 narcosis/COPD,JOY--improved/resolving  AFIB v/s wandering PM; now anticoagulated and well controlled  Acute diastolic CHF due to decreased med compliance/high BP--now normotensive  Myasthenia gravis  Acute renal insufficiency--defer diuretics for now and montior  Also suspect some bronchitis with high WBC chills and cough--poss RUL infiltrate/?pneumonia  Worsening BARGER and orthopnea for the past 3 days- hasnt taken prescribed lasix x 4 days.   Chest X ray with pulmonary edema, elevated BNP, mild volume overload on exam. CT shows Ground glass opacities, NO PE   Echo 1/18: EF 60%, stage 1 diastolic dysfunction, no significant valvular pathology.   MPI 1/2013- mild apical reversible ischemia.   Carotid US- less than 40% bilateral 2017  Nuclear 1/13--small area ischemia apical anterior     Plan:  Keep as dry as possible but defer diuretics for now given PUJA, receiving gentle hydration  Cont eliquis for PAF/DVT prophylaxis  Cont amlodipine/atorvastatin/plavix/metoprolol       Active Diagnoses:    Diagnosis Date Noted POA    PRINCIPAL PROBLEM:  Acute respiratory failure with hypoxia and hypercarbia [J96.01, J96.02] 01/31/2018 Yes    Debility [R53.81] 02/07/2018 No    Community acquired pneumonia of right upper lobe of lung [J18.1] 02/05/2018 Yes    PUJA (acute kidney injury) [N17.9] 02/05/2018 No    Myasthenia gravis, adult form [G70.00] 02/03/2018 Yes    Acute on chronic diastolic congestive heart failure  [I50.33] 01/30/2018 Yes    Chronic atrial fibrillation [I48.2] 01/30/2018 Yes    HTN (hypertension) [I10] 08/08/2012 Yes    Hyperlipidemia [E78.5] 08/08/2012 Yes    Hypothyroidism [E03.9] 08/08/2012 Yes    JOY (obstructive sleep apnea) [G47.33] 08/08/2012 Yes      Problems Resolved During this Admission:    Diagnosis Date Noted Date Resolved POA       VTE Risk Mitigation         Ordered     apixaban tablet 2.5 mg  2 times daily     Route:  Oral        02/05/18 1135     Medium Risk of VTE  Once      01/30/18 1113     Place sequential compression device  Until discontinued      01/30/18 1113          Zoey Blakely NP  Cardiology  [facility]    I attest that I have personally seen and examined this patient. I have reviewed and discussed the management in detail as outlined above.

## 2018-02-07 NOTE — ASSESSMENT & PLAN NOTE
Dialy  BMP today.Diuretics stopped.   Renal Fx improving with  IVF 2/5 at 100cc/hr;  feel her elevated Cr due to over diuresis  Hold losartan in acute injury; resume in future once stable. Monitor I&Os, weights, renal Fx.

## 2018-02-07 NOTE — PLAN OF CARE
02/07/18 1128   Discharge Assessment   Assessment Type Discharge Planning Reassessment     Dr Jain feels patient is nearing discharge criteria except for completion of IV antibiotics, and debility. I discussed skilled nursing placement with him, the patient and her family. Discussed therapy goals, medicare coverage of first 20 days at 100%. Patient has 10 steps to enter her apartment and was independent with mobility prior to admit. All are in agreement to admit to skilled level of care. Yoon notified as skilled unit manager representative. lissy notified in business office. House supervisor asked to build pending admit.

## 2018-02-07 NOTE — PT/OT/SLP PROGRESS
"Physical Therapy Treatment    Patient Name:  Stephany Skinner   MRN:  [id number]    Recommendations:     Discharge Recommendations:  home with home health, home health PT   Discharge Equipment Recommendations: none   Barriers to discharge: None    Assessment:     Stephany Skinner is a 83 y.o. female admitted with a medical diagnosis of Acute respiratory failure with hypoxia and hypercarbia.  She presents with the following impairments/functional limitations:  weakness, gait instability, impaired balance, impaired endurance, impaired self care skills, impaired functional mobilty, decreased safety awareness, decreased lower extremity function .  Pt. Has partially achieved PT POC goals and anticipates D/C to SNF.    Rehab Prognosis:  Fair +; patient would benefit from acute skilled PT services to address these deficits and reach maximum level of function.      Recent Surgery: * No surgery found *      Plan:     During this hospitalization, patient to be seen  (Anticipated pt. transfer to SNF unit) to address the above listed problems via gait training, therapeutic activities, therapeutic exercises  · Plan of Care Expires:  02/07/18 (following p.m. PT tx. session.)   Plan of Care Reviewed with: patient    Subjective     Communicated with patient prior to session.  Patient found supine in bed, awake upon PT entry to room, agreeable to treatment.      Chief Complaint: Fatigue  Patient comments/goals: "I don't want to get out of bed.  I'm tired."  Pain/Comfort:  · Pain Rating 1: 0/10  · Pain Rating Post-Intervention 1: 0/10    Patients cultural, spiritual, Scientologist conflicts given the current situation: none voiced    Objective:     Patient found with: oxygen, telemetry     General Precautions: Standard, fall   Orthopedic Precautions:N/A   Braces: N/A     Functional Mobility:  · Bed Mobility:     · Rolling Left:  contact guard assistance  · Rolling Right: contact guard assistance  · Scooting: contact guard " assistance  · Bridging: contact guard assistance      AM-PAC 6 CLICK MOBILITY  Turning over in bed (including adjusting bedclothes, sheets and blankets)?: 2  Sitting down on and standing up from a chair with arms (e.g., wheelchair, bedside commode, etc.): 2  Moving from lying on back to sitting on the side of the bed?: 2  Moving to and from a bed to a chair (including a wheelchair)?: 2  Need to walk in hospital room?: 2  Climbing 3-5 steps with a railing?: 1  Total Score: 11       Therapeutic Activities and Exercises:   Pt. Performed gentle A/A exercises of BLE's while supine in bed, for N.M. Tone and strength and fluid dynamics.  1-on-1 BLE HC stretches performed with pt.  BLE: ankle pumps, heel slides, hip abd/add (2x15) performed by pt.  Bed mobility exercises performed with assistance as noted to improve function.    Patient left HOB elevated with all lines intact, call button in reach and RN notified..    GOALS:    Physical Therapy Goals        Problem: Physical Therapy Goal    Goal Priority Disciplines Outcome Goal Variances Interventions   Physical Therapy Goal     PT/OT, PT Unable to achieve outcome(s) by discharge     Description:  1. Patient will  participate with assistive and active exercises to advance strength in  all four extremities to 4 / 4 levels.  2. All transfers will advance to minimal assistance for safety  3  Endurance for out of bed activities will advance as tolerated  4. Gait with RW will progress to 50 ft intervals with minimal assistance                       Time Tracking:     PT Received On: 02/07/18  PT Start Time: 1409     PT Stop Time: 1424  PT Total Time (min): 15 min     Billable Minutes: Therapeutic Exercise 15 minutes    Treatment Type: Treatment  PT/PTA: PT           Daniel Garcia, PT  02/07/2018

## 2018-02-07 NOTE — PLAN OF CARE
02/07/18 1043   Discharge Reassessment   Assessment Type Discharge Planning Reassessment     Pt signed choice form for SNF with [facility].

## 2018-02-07 NOTE — PROGRESS NOTES
[facility]  Pulmonology  Progress Note    Patient Name: Stephany Skinner  MRN: [id number]  Admission Date: 1/30/2018  Hospital Length of Stay: 8 days  Code Status: Full Code  Attending Provider: Za Sandra MD  Primary Care Provider: Pipo Flowers MD   Principal Problem: Acute respiratory failure with hypoxia and hypercarbia    Subjective:     Interval History: depressed doesn't want to walk    Objective:     Vital Signs (Most Recent):  Temp: 96.8 °F (36 °C) (02/07/18 0741)  Pulse: 70 (02/07/18 1006)  Resp: 20 (02/07/18 0741)  BP: (!) 120/59 (02/07/18 0741)  SpO2: 95 % (02/07/18 0741) Vital Signs (24h Range):  Temp:  [96.7 °F (35.9 °C)-97.4 °F (36.3 °C)] 96.8 °F (36 °C)  Pulse:  [63-76] 70  Resp:  [16-24] 20  SpO2:  [87 %-95 %] 95 %  BP: (105-135)/(54-66) 120/59     Weight: 114.8 kg (253 lb 1.4 oz)  Body mass index is 39.64 kg/m².      Intake/Output Summary (Last 24 hours) at 02/07/18 1022  Last data filed at 02/06/18 1700   Gross per 24 hour   Intake              240 ml   Output                0 ml   Net              240 ml       Physical Exam   Pulmonary/Chest: She has decreased breath sounds in the right middle field, the right lower field, the left middle field and the left lower field.               Vents:  Oxygen Concentration (%): 40 (02/07/18 0322)  Negative Inspiratory Force (cm H2O): -30 (02/06/18 1258)    Lines/Drains/Airways     Peripheral Intravenous Line                 Peripheral IV - Single Lumen 02/01/18 1653 Right Wrist 5 days                Significant Labs:    CBC/Anemia Profile:    Recent Labs  Lab 02/05/18  1049 02/06/18  0533 02/07/18  0537   WBC 12.89* 15.59* 15.66*   HGB 12.1 12.2 12.0   HCT 37.0 37.9 37.8    291 250   MCV 96 97 97   RDW 14.7* 14.7* 14.9*        Chemistries:    Recent Labs  Lab 02/05/18  1049 02/06/18  0533 02/07/18  0536    137 139   K 4.3 4.3 4.7   CL 95 97 101   CO2 29 27 28   * 124* 109*   CREATININE 2.0* 1.9* 1.5*    CALCIUM 8.5* 8.3* 8.6*       All pertinent labs within the past 24 hours have been reviewed.    Significant Imaging:  I have reviewed all pertinent imaging results/findings within the past 24 hours.    Assessment/Plan:     * Acute respiratory failure with hypoxia and hypercarbia    Stable resolved  Much better gases resolved   Check abg today  Improving increased bipap  Elevated co2 with po2 81n        Myasthenia gravis, adult form    Depressed    Improving  NIF today 50 was 21 yesterday   Steroids fixed pt neuro has seen agree need tessalon test and await pfts n         JOY (obstructive sleep apnea)    Continue niv n  Needs NIV at night only n        Hypothyroidism    On meds stable               Nicolas Watson MD  Pulmonology  [facility]

## 2018-02-07 NOTE — SUBJECTIVE & OBJECTIVE
Interval History: see     Review of Systems   Constitutional: Negative for activity change, fatigue, fever and unexpected weight change.   HENT: Negative for congestion, ear pain, hearing loss, rhinorrhea and sore throat.    Eyes: Negative for pain, redness and visual disturbance.   Respiratory: Positive for cough (improve). Negative for shortness of breath and wheezing.    Cardiovascular: Negative for chest pain, palpitations and leg swelling.   Gastrointestinal: Negative for abdominal pain, constipation, diarrhea, nausea and vomiting.   Genitourinary: Negative for dysuria, frequency and urgency.   Musculoskeletal: Negative for back pain, joint swelling and neck pain.   Skin: Negative for color change, rash and wound.   Neurological: Negative for dizziness, tremors, weakness, light-headedness and headaches.     Objective:     Vital Signs (Most Recent):  Temp: 96.8 °F (36 °C) (02/07/18 0741)  Pulse: 76 (02/07/18 0741)  Resp: 20 (02/07/18 0741)  BP: (!) 120/59 (02/07/18 0741)  SpO2: 95 % (02/07/18 0741) Vital Signs (24h Range):  Temp:  [96.7 °F (35.9 °C)-97.4 °F (36.3 °C)] 96.8 °F (36 °C)  Pulse:  [63-88] 76  Resp:  [16-24] 20  SpO2:  [87 %-95 %] 95 %  BP: (105-135)/(54-66) 120/59     Weight: 114.8 kg (253 lb 1.4 oz)  Body mass index is 39.64 kg/m².    Intake/Output Summary (Last 24 hours) at 02/07/18 0744  Last data filed at 02/06/18 1700   Gross per 24 hour   Intake              360 ml   Output                0 ml   Net              360 ml      Physical Exam   Constitutional: She is oriented to person, place, and time. She appears well-developed and well-nourished. No distress.   HENT:   Head: Normocephalic and atraumatic.   Right Ear: External ear normal.   Left Ear: External ear normal.   Eyes: Conjunctivae and EOM are normal. Pupils are equal, round, and reactive to light. Right eye exhibits no discharge. Left eye exhibits no discharge.   Neck: Neck supple. No tracheal deviation present.   Cardiovascular: Exam  reveals no gallop and no friction rub.    No murmur heard.  irregularly irregular with rate in 60s   Pulmonary/Chest: Effort normal. No respiratory distress. She has no wheezes. She has no rales.   Abdominal: Soft. Bowel sounds are normal. She exhibits no distension. There is no tenderness.   Musculoskeletal: She exhibits edema (trace b/l LE edema with venous stasis dermatitis).   Neurological: She is alert and oriented to person, place, and time. No cranial nerve deficit.   Skin: Skin is warm and dry.   Psychiatric: She has a normal mood and affect. Her behavior is normal.   Nursing note and vitals reviewed.      Significant Labs:   Lab Results   Component Value Date    WBC 15.66 (H) 2018    HGB 12.0 2018    HCT 37.8 2018    MCV 97 2018     2018     BMP  Lab Results   Component Value Date     2018    K 4.7 2018     2018    CO2 28 2018     (H) 2018    CREATININE 1.5 (H) 2018    CALCIUM 8.6 (L) 2018    ANIONGAP 10 2018    ESTGFRAFRICA 37 (A) 2018    EGFRNONAA 32 (A) 2018     BNP    Recent Labs  Lab 18  0622   BNP 69     Lab Results   Component Value Date    TSH 0.371 (L) 2018   T4 1.10  MG panel pending  procalcitonin 0.39    Recent Labs  Lab 18  1306   TROPONINI 0.018     resp viral panel negative      Significant Imagin/3 CXR Mild decrease in right upper lobe and right perihilar infiltrate. Mild cardiomegaly. Atherosclerosis.    2/ CXR The lungs are underexpanded.  There are chronic lung markings seen bilaterally with pulmonary vascular congestion and patchy opacities throughout both lungs.  This could be related to pulmonary edema although superimposed infiltrate in the right upper lobe is not excluded.  The heart is enlarged.  Calcified atheromatous disease affects the aorta.  Age-appropriate degenerative changes affect the skeleton.     CTA lungs 1.  No CT evidence of  central pulmonary embolus.  Severely limited study due to use motion artifact.  2.  Bilateral groundglass opacities and minimal dependent subsegmental atelectasis.    1/31 CT head 1.  No CT evidence of an acute intracranial abnormality with limitations due to motion artifact.  2.  Mild atrophy and minimal small vessel ischemic changes of the periventricular white matter.    Echo Echo 1/18: EF 60%, stage 1 diastolic dysfunction, no significant valvular pathology.     EKG a fib

## 2018-02-07 NOTE — SUBJECTIVE & OBJECTIVE
Interval History: depressed doesn't want to walk    Objective:     Vital Signs (Most Recent):  Temp: 96.8 °F (36 °C) (02/07/18 0741)  Pulse: 70 (02/07/18 1006)  Resp: 20 (02/07/18 0741)  BP: (!) 120/59 (02/07/18 0741)  SpO2: 95 % (02/07/18 0741) Vital Signs (24h Range):  Temp:  [96.7 °F (35.9 °C)-97.4 °F (36.3 °C)] 96.8 °F (36 °C)  Pulse:  [63-76] 70  Resp:  [16-24] 20  SpO2:  [87 %-95 %] 95 %  BP: (105-135)/(54-66) 120/59     Weight: 114.8 kg (253 lb 1.4 oz)  Body mass index is 39.64 kg/m².      Intake/Output Summary (Last 24 hours) at 02/07/18 1022  Last data filed at 02/06/18 1700   Gross per 24 hour   Intake              240 ml   Output                0 ml   Net              240 ml       Physical Exam   Pulmonary/Chest: She has decreased breath sounds in the right middle field, the right lower field, the left middle field and the left lower field.               Vents:  Oxygen Concentration (%): 40 (02/07/18 0322)  Negative Inspiratory Force (cm H2O): -30 (02/06/18 1258)    Lines/Drains/Airways     Peripheral Intravenous Line                 Peripheral IV - Single Lumen 02/01/18 1653 Right Wrist 5 days                Significant Labs:    CBC/Anemia Profile:    Recent Labs  Lab 02/05/18  1049 02/06/18  0533 02/07/18  0537   WBC 12.89* 15.59* 15.66*   HGB 12.1 12.2 12.0   HCT 37.0 37.9 37.8    291 250   MCV 96 97 97   RDW 14.7* 14.7* 14.9*        Chemistries:    Recent Labs  Lab 02/05/18  1049 02/06/18  0533 02/07/18  0536    137 139   K 4.3 4.3 4.7   CL 95 97 101   CO2 29 27 28   * 124* 109*   CREATININE 2.0* 1.9* 1.5*   CALCIUM 8.5* 8.3* 8.6*       All pertinent labs within the past 24 hours have been reviewed.    Significant Imaging:  I have reviewed all pertinent imaging results/findings within the past 24 hours.

## 2018-02-07 NOTE — PLAN OF CARE
02/07/18 1106   Final Note   Assessment Type Final Discharge Note   Discharge Disposition Home   What phone number can be called within the next 1-3 days to see how you are doing after discharge? [phone]   Hospital Follow Up  Appt(s) scheduled? Yes   Discharge plans and expectations educations in teach back method with documentation complete? Yes   Right Care Referral Info   Post Acute Recommendation SNF / Sub-Acute Rehab   Referral Type home-care   Facility Name [facility] [address]   [place], [place]

## 2018-02-07 NOTE — PHYSICIAN QUERY
PT Name: Stephany Skinner  MR #: [id number]  Physician Query Form - Renal Clarification     CDS/: KULWINDER Noriega, RN, CCDS               Contact information: [phone]    This form is a permanent document in the medical record.     QueryDate: February 7, 2018    By submitting this query, we are merely seeking further clarification of documentation. Please utilize your independent clinical judgment when addressing the question(s) below.    The Medical record contains the following:   Indicator Supporting Clinical Findings Location in Medical Record    Kidney (Renal) Insufficiency     X Kidney (Renal) Failure / Injury PUJA (acute kidney injury)   DC Summary: 2/7    Nephrotoxic Agents     X BUN/Creatinine GFR No more lasix bnp normal but elevated creat  Over weekend. Reordered for today     also statred on NS at 100ml/hr. Creat slightly better 2.0>>1.9.    DC Summary: 2/7    Urine: Casts         Eosinophils      Dehydration      Nausea/Vomiting      Dialysis/CRRT     X Treatment: improved Renal fx with IV fluids and cessation of diuretics     Dialy  BMP today.Diuretics stopped.   Renal Fx improving with  IVF 2/5 at 100cc/hr;  feel her elevated Cr due to over diuresis  Hold losartan in acute injury; resume in future once stable. Monitor I&Os, weights, renal Fx.     DC Summary: 2/7    Other:          Provider, please specify the diagnosis or diagnoses associated with above clinical findings.    [ ] Acute Kidney Failure/Injury with Acute Cortical Necrosis  [ ] Acute Kidney Failure/Injury with Medullary Necrosis  [ ] Acute Kidney Failure/Injury with Tubular Necrosis  [ ] Other Acute Kidney Failure/Injury (please specify): ____________  [x ] Unspecified Acute Kidney Failure/Injury  [ ] Acute Nephritic Syndrome  [ ] Acute Renal Insufficiency  [ ] Acute on Chronic Renal Insufficiency (please specify stage*): _____________  [ ] Acute on Chronic Renal Failure (please specify stage*): _____________  [ ] Chronic Renal  Insufficiency (please specify stage*): _____________  [ ] Chronic Kidney Disease (CKD) (please specify stage* below):      *[employer] Definitions:   Stage Description      eGFR (mL/min)   [ ] I  Slight kidney damage with normal or increased filtration  90+   [ ] II  Mildly reduced kidney function     60-89   [ ] III  Moderately reduced kidney function    30-59   [ ] IV  Severly reduced kidney function     15-29   [ ] V  Kidney failure, requiring transplant or dialysis   < 15      [ ] CKD on Chronic Hemodialysis (please specify stage*): _____________  [ ] End Stage Renal Disease (ESRD)  [ ] Other (please specify): _______________________________  [ ] Clinically Undetermined    Please document in your progress notes daily for the duration of treatment, until resolved, and include in your discharge summary.

## 2018-02-07 NOTE — ASSESSMENT & PLAN NOTE
Plan to place on skilled nursing to top prolonged hospitalization with complications, deconditioned, need to improve to baseline; pt resides in apt with 10 steps.

## 2018-02-07 NOTE — PT/OT/SLP DISCHARGE
Occupational Therapy Discharge Summary    Stephany Skinner  MRN: [id number]   Principal Problem: Acute respiratory failure with hypoxia and hypercarbia      Patient Discharged from acute Occupational Therapy on 02/07/2018.  Please refer to prior OT note dated 02/06/2018 for functional status.    Assessment:      Patient appropriate for care in another setting.    Objective:     GOALS:    Occupational Therapy Goals        Problem: Occupational Therapy Goal    Goal Priority Disciplines Outcome Interventions   Occupational Therapy Goal     OT, PT/OT     Description:  Goals to be met by: upon d/c     Patient will increase functional independence with ADLs by performing:    LE dressing to be assessed  Pt will increase endurance with dressing by donning/doffing shirt without rest break and pants/skirt without rest break.                      Reasons for Discontinuation of Therapy Services  Transfer to alternate level of care.      Plan:     Patient Discharged to: Skilled Nursing Facility    Phyllis Cruz OT  2/7/2018

## 2018-02-07 NOTE — ASSESSMENT & PLAN NOTE
Stable resolved  Much better gases resolved   Check abg today  Improving increased bipap  Elevated co2 with po2 81n

## 2018-02-07 NOTE — PROGRESS NOTES
[facility] Medicine  Progress Note    Patient Name: Stephany Skinner  MRN: [id number]  Patient Class: IP- Inpatient   Admission Date: 1/30/2018  Length of Stay: 8 days  Attending Physician: Za Sandra MD  Primary Care Provider: Pipo Flowers MD        Subjective:     Principal Problem:Acute respiratory failure with hypoxia and hypercarbia    HPI:  Stephany Skinner is a 83 y.o. female  Who is a patient of Dr ambriz and Dr cardenas .  Today was her second visit to ER .  She lives with her sister who reported that she is becoming more short winded for last few days.  She missed her lasix 40 mg daily for last 4 days. + PND, + dyspnea at rest .  + leg swelling is worsening .reports no chest pains   C/o neck pains   Can not recall weight gain .    Placed in observation for CHF.  BNP high   CXR is  suggestive of pulmonary edema        Hospital Course:  1/31/18  82 YO WF admitted for tx of CHF  1-30-18; day 3; She reported missing  her lasix 40 mg daily for last 4 days prior to admit; Now receiving IV lasix twice daily. Continues to have some LE edema and BARGER.   This am on rounds pt developed worsening SOB, now requiring Bipap. Noted D-dimer elevated;  Lab Results   Component Value Date    DDIMER 1.05 (H) 01/30/2018   Pt with known chronic afib- on plavix only per cards. CT of chest ordered to rule out PE ordered.     Echo pending. (last Echo ?2012).   Also noted to have ^WBCs on admit; trending down 12.96 from 14.44 on admission; Dr. Cardenas suggesting possible bronchitis. U/A- ok.  I/O yesterday-  -640, wt 261. Today-pending    2/1/18 CT of chest negative for PE yesterday. Lovenox initiated per cards for recent new onset afib vs atrial tach per Dr. Cardenas. Pt with continued SOB requiring BIPAP; decreased LOC overnight. ABGs demonstrating ^CO2; required BIPAP adjustment;  ABG    Recent Labs  Lab 01/31/18  2235   PH 7.35   PO2 63   PCO2 72*   HCO3 39.70   BE 11.40   repeat ABGS this am with  continued ^PCO2- 74 -Dr. Jain recommending RR ^  More awake now - opening eyes and taking in meds PO.  Sats have been good.    2/2/18- continues with BIPAP. Will respond with maximal stimuli; opens eyes and becomes more alert and awake   resp viral panel pending.   Echo - 1/31/18 Echo ejection fraction is 55-60%   Discussed with Dr. Watson- he feels she is compensated resp failure- does not recommend intubation at this time. Wants to rule out neuromuscular. He started Seroquel yesterday due to pt being very restless with Bipap 2 nights ago. Will discontinue.    Started on Zithromax yesterday for possible bacterial bronchitis.  BP now borderline; seems to now be fluid depleted. Weights reflect ? 30lb weight loss; will decrease lasix rx    2/3 Pt's pco2 is in the 40s and she is awake and alert--seems to be responding to the steroids and the opinion is she may be battling myasthenia gravis    2/4 Pt is amazingly improved this AM, breathing well, getting out of bed and eating today    2/5. She received one dose of azithromycin. She did have right upper lobe infil;trates on x ray as well as CTA. She also had leukocytosis on admission. She is also still on 5L NC and usually wears 2L at home. Dr Watson following and ordered cpap. She reports that she has not been able to tolerate that last night. ABG ordered for this am    Diuresed for elevated BNP on admission EF 60%, stage 1 diastolic dysfunction, no significant valvular pathology. No more lasix bnp normal but elevated creat  Over weekend. Reordered for today    Dr Ortiz consulted for poss N/M dz process. She responded well to steroids and mesatonin. Panels drawn and pending.     2/6/18 She is not feeling well today. Feels down today from being here for so long. Her WBC is a little higher today. We restarted rocephin for the pneumonia yesterday. Old CXR shows right upper lobe, she does report that she has choking when she swallows. Especially on liquids. Speech saw  her and did not observe any over signs of aspiration. She is still requiring 5L O2 today. Has a CPAP and only able to tolerate for short amounts of time    She was also statred on NS at 100ml/hr. Creat slightly better 2.0>>1.9.   2/7/18 Pt with improved Renal fx with IV fluids and cessation of diuretics. Treating for pneumonia; Started on Levaquin yesterday. Negative for aspiration per speech eval.   Neurology following for  new symptoms of diplopia and hypophonic voice, w/u for MG. That combined with hypercapnia is concerning for neuromuscular weakness- all of this improving on steroids as expected. See recommendations for steroid rx per neuro .   Did tolerate some Bipap last pm, but a little agitatied.   Will try to wean O2 (home Oxygen- 2L prn) ; stop IV fluids to avoid overload. Resume ARB when renal fx stable.   Currently on 3LNC-   Did not get up out of bed yet today; ambulated 15 feet yesterday with PT ; goal 50 feet.   Needs further strengthening - plan for skilled ; lives in apt, needs to be able to climb 10 steps.     Interval History: see HC    Review of Systems   Constitutional: Negative for activity change, fatigue, fever and unexpected weight change.   HENT: Negative for congestion, ear pain, hearing loss, rhinorrhea and sore throat.    Eyes: Negative for pain, redness and visual disturbance.   Respiratory: Positive for cough (improve). Negative for shortness of breath and wheezing.    Cardiovascular: Negative for chest pain, palpitations and leg swelling.   Gastrointestinal: Negative for abdominal pain, constipation, diarrhea, nausea and vomiting.   Genitourinary: Negative for dysuria, frequency and urgency.   Musculoskeletal: Negative for back pain, joint swelling and neck pain.   Skin: Negative for color change, rash and wound.   Neurological: Negative for dizziness, tremors, weakness, light-headedness and headaches.     Objective:     Vital Signs (Most Recent):  Temp: 96.8 °F (36 °C) (02/07/18  0741)  Pulse: 76 (02/07/18 0741)  Resp: 20 (02/07/18 0741)  BP: (!) 120/59 (02/07/18 0741)  SpO2: 95 % (02/07/18 0741) Vital Signs (24h Range):  Temp:  [96.7 °F (35.9 °C)-97.4 °F (36.3 °C)] 96.8 °F (36 °C)  Pulse:  [63-88] 76  Resp:  [16-24] 20  SpO2:  [87 %-95 %] 95 %  BP: (105-135)/(54-66) 120/59     Weight: 114.8 kg (253 lb 1.4 oz)  Body mass index is 39.64 kg/m².    Intake/Output Summary (Last 24 hours) at 02/07/18 0744  Last data filed at 02/06/18 1700   Gross per 24 hour   Intake              360 ml   Output                0 ml   Net              360 ml      Physical Exam   Constitutional: She is oriented to person, place, and time. She appears well-developed and well-nourished. No distress.   HENT:   Head: Normocephalic and atraumatic.   Right Ear: External ear normal.   Left Ear: External ear normal.   Eyes: Conjunctivae and EOM are normal. Pupils are equal, round, and reactive to light. Right eye exhibits no discharge. Left eye exhibits no discharge.   Neck: Neck supple. No tracheal deviation present.   Cardiovascular: Exam reveals no gallop and no friction rub.    No murmur heard.  irregularly irregular with rate in 60s   Pulmonary/Chest: Effort normal. No respiratory distress. She has no wheezes. She has no rales.   Abdominal: Soft. Bowel sounds are normal. She exhibits no distension. There is no tenderness.   Musculoskeletal: She exhibits edema (trace b/l LE edema with venous stasis dermatitis).   Neurological: She is alert and oriented to person, place, and time. No cranial nerve deficit.   Skin: Skin is warm and dry.   Psychiatric: She has a normal mood and affect. Her behavior is normal.   Nursing note and vitals reviewed.      Significant Labs:   Lab Results   Component Value Date    WBC 15.66 (H) 02/07/2018    HGB 12.0 02/07/2018    HCT 37.8 02/07/2018    MCV 97 02/07/2018     02/07/2018     BMP  Lab Results   Component Value Date     02/07/2018    K 4.7 02/07/2018     02/07/2018     CO2 28 2018     (H) 2018    CREATININE 1.5 (H) 2018    CALCIUM 8.6 (L) 2018    ANIONGAP 10 2018    ESTGFRAFRICA 37 (A) 2018    EGFRNONAA 32 (A) 2018     BNP    Recent Labs  Lab 18  0622   BNP 69     Lab Results   Component Value Date    TSH 0.371 (L) 2018   T4 1.10  MG panel pending  procalcitonin 0.39    Recent Labs  Lab 18  1306   TROPONINI 0.018     resp viral panel negative      Significant Imagin/3 CXR Mild decrease in right upper lobe and right perihilar infiltrate. Mild cardiomegaly. Atherosclerosis.     CXR The lungs are underexpanded.  There are chronic lung markings seen bilaterally with pulmonary vascular congestion and patchy opacities throughout both lungs.  This could be related to pulmonary edema although superimposed infiltrate in the right upper lobe is not excluded.  The heart is enlarged.  Calcified atheromatous disease affects the aorta.  Age-appropriate degenerative changes affect the skeleton.     CTA lungs 1.  No CT evidence of central pulmonary embolus.  Severely limited study due to use motion artifact.  2.  Bilateral groundglass opacities and minimal dependent subsegmental atelectasis.     CT head 1.  No CT evidence of an acute intracranial abnormality with limitations due to motion artifact.  2.  Mild atrophy and minimal small vessel ischemic changes of the periventricular white matter.    Echo Echo : EF 60%, stage 1 diastolic dysfunction, no significant valvular pathology.     EKG a fib    Assessment/Plan:      * Acute respiratory failure with hypoxia and hypercarbia    Continue  Bipap and continue supplemental oxygen   On home O2 but on 5L here to keep sats  91%, did not use bipap over night.    She reports that she is only on 2L at home  Continue xopenex q 6 hours   Follow pulmonary recs per Dr. Watson      resp viral  panel negative including influenza    Dr. Harrison following for  neuromsucular  process-improving with steroids        Debility    Plan to place on skilled nursing to top prolonged hospitalization with complications, deconditioned, need to improve to baseline; pt resides in apt with 10 steps.         PUJA (acute kidney injury)    Dialy  BMP today.Diuretics stopped.   Renal Fx improving with  IVF 2/5 at 100cc/hr;  feel her elevated Cr due to over diuresis  Hold losartan in acute injury; resume in future once stable. Monitor I&Os, weights, renal Fx.           Community acquired pneumonia of right upper lobe of lung    Restart rocephin 2/5,Stopped Rocehphin 2-6-17 and added Levaquin- Day 2 today Spoke with Dr gillette. No definite dx of MG- will proceed with Levaquin  Infectious diagnosis based on CXR/leukocytosis on admission. Repeat labs 2/5 show WBC back up (?? Infectious process vs steroids)- currenlty WBC about the same- not rising.   Only had one dose azithro (2/1)          Myasthenia gravis, adult form      Responding well to steroids and mesantoin     Eating, talking, getting out of bed--Dr. Gillette following        Chronic atrial fibrillation    Continue metoprolol,eliquis    Dr patel following discussed Afib; he reports fairly new dx, plavix was for previous + abnormal stress test; pt deferred angiography and had poor Asa tolerance.            Acute on chronic diastolic congestive heart failure    Admitted with HF after missing several days of lasix. Was treated with IV lasix 40mg IV twice daily up unitl 2-2-18 when she demonstrated volume depletion/weight loss with subsequent worsening renal function.   Diuretics discontinued due to acute renal insuff.  Cr was trending up, required IV fluids; creat now improving  Cardiology  Dr. Patel following   2-D Echocardiogram for evaluation of left ventricle systolic function or any valvular heart disease-EF 55-60%, stage 1 diastolic dysfunction, no significant valvular pathology.    Daily weights.  Monitor  I/Os   Na restriction  <2g/d.      2/5: IV lasix 40 mg bid- given x 6 doses but noted creat doubled. Repeating today (BNP went from 411>>69). AT this point she seems to be dry/at her baseline and does not need any further diuresis. However still with O2 requirement above her baseline so I feel there is something more here and cardiac edema (see CAP plan)          HTN (hypertension)    Continue losartan, metoprolol and amlodipine Bp 104//58  Follow cards recs        Hyperlipidemia    Continue atorvastatin/plavix          Hypothyroidism    TSH 0.37  Continue present dose             JOY (obstructive sleep apnea)    On BiPap- just o2 at home, check ABG as she only used 3hr of CPAP over night. I suspect she will need to use at least nightly and as needed during the day. The more she uses the better she will feel. Will help with heart failure as well as pneumonia           VTE Risk Mitigation         Ordered     apixaban tablet 2.5 mg  2 times daily     Route:  Oral        02/05/18 1135     Medium Risk of VTE  Once      01/30/18 1113     Place sequential compression device  Until discontinued      01/30/18 1113              Morris Jain MD  Department of Hospital Medicine   [facility]

## 2018-02-07 NOTE — ASSESSMENT & PLAN NOTE
Depressed    Improving  NIF today 50 was 21 yesterday   Steroids fixed pt neuro has seen agree need tessalon test and await pfts n

## 2018-02-07 NOTE — PT/OT/SLP DISCHARGE
Physical Therapy Discharge Summary    Name: Stephany Skinner  MRN: [id number]   Principal Problem: Acute respiratory failure with hypoxia and hypercarbia     Patient Discharged from acute Physical Therapy on 2/7/2018 following p.m. PT tx. Session.  Please refer to prior PT noted date on 2/7/2018 for functional status.     Assessment:     Patient appropriate for care in another setting.    Objective:     GOALS:    Physical Therapy Goals        Problem: Physical Therapy Goal    Goal Priority Disciplines Outcome Goal Variances Interventions   Physical Therapy Goal     PT/OT, PT Unable to achieve outcome(s) by discharge     Description:  1. Patient will  participate with assistive and active exercises to advance strength in  all four extremities to 4 / 4 levels.  2. All transfers will advance to minimal assistance for safety  3  Endurance for out of bed activities will advance as tolerated  4. Gait with RW will progress to 50 ft intervals with minimal assistance                       Reasons for Discontinuation of Therapy Services  Transfer to alternate level of care.      Plan:     Patient Discharged to: Skilled Nursing Facility.    Daniel Garcia, PT  2/7/2018

## 2018-02-07 NOTE — ASSESSMENT & PLAN NOTE
Continue  Bipap and continue supplemental oxygen   On home O2 but on 5L here to keep sats  91%, did not use bipap over night.    She reports that she is only on 2L at home  Continue xopenex q 6 hours   Follow pulmonary recs per Dr. Watson      resp viral  panel negative including influenza    Dr. Harrison following for  neuromsucular process-improving with steroids

## 2018-02-07 NOTE — NURSING
Bedside report received from Summer.  No complaints of pain noted. VS stable.  IV fluids infusing as ordered.  Pt on 5l nasal cannula.  Daughter at bedside.  Call bell in reach. Will continue to monitor.

## 2018-02-07 NOTE — ASSESSMENT & PLAN NOTE
Admitted with HF after missing several days of lasix. Was treated with IV lasix 40mg IV twice daily up unitl 2-2-18 when she demonstrated volume depletion/weight loss with subsequent worsening renal function.   Diuretics discontinued due to acute renal insuff.  Cr was trending up, required IV fluids; creat now improving  Cardiology  Dr. Patel following   2-D Echocardiogram for evaluation of left ventricle systolic function or any valvular heart disease-EF 55-60%, stage 1 diastolic dysfunction, no significant valvular pathology.    Daily weights.  Monitor  I/Os   Na restriction <2g/d.      2/5: IV lasix 40 mg bid- given x 6 doses but noted creat doubled. Repeating today (BNP went from 411>>69). AT this point she seems to be dry/at her baseline and does not need any further diuresis. However still with O2 requirement above her baseline so I feel there is something more here and cardiac edema (see CAP plan)

## 2018-02-07 NOTE — ASSESSMENT & PLAN NOTE
Continue metoprolol,eliquis    Dr cardenas following discussed Afib; he reports fairly new dx, plavix was for previous + abnormal stress test; pt deferred angiography and had poor Asa tolerance.

## 2018-02-07 NOTE — ASSESSMENT & PLAN NOTE
Plan to place on skilled nursing due to prolonged hospitalization with complications, deconditioned, need to improve to baseline; pt resides in apt with 10 steps.

## 2018-02-07 NOTE — PLAN OF CARE
02/07/18 1044   Medicare Message   Important Message from Medicare regarding Discharge Appeal Rights Given to patient/caregiver;Explained to patient/caregiver;Signed/date by patient/caregiver   Date IMM was signed 02/07/18   Time IMM was signed 1044

## 2018-02-07 NOTE — PT/OT/SLP PROGRESS
"Physical Therapy Treatment    Patient Name:  Stephany Skinner   MRN:  [id number]    Recommendations:     Discharge Recommendations:  home with home health, home health PT   Discharge Equipment Recommendations: none   Barriers to discharge: None    Assessment:     Stephany Skinner is a 83 y.o. female admitted with a medical diagnosis of Acute respiratory failure with hypoxia and hypercarbia.  She presents with the following impairments/functional limitations:  weakness, gait instability, impaired balance, impaired endurance, impaired functional mobilty, decreased safety awareness, decreased lower extremity function .  Pt. Demonstrated increased observable endurance.  Pt. is progressing towards PT POC goals.    Rehab Prognosis:  Fair+; patient would benefit from acute skilled PT services to address these deficits and reach maximum level of function.      Recent Surgery: * No surgery found *      Plan:     During this hospitalization, patient to be seen  (1-2x/day(M-F); PRN(Sat.,Sun.)) to address the above listed problems via therapeutic exercises, therapeutic activities, gait training  · Plan of Care Expires:   (Upon D/C from facility)   Plan of Care Reviewed with: patient    Subjective     Communicated with patient prior to session.  Patient found supine in bed, awake, upon PT entry to room, agreeable to treatment.        Patient comments/goals: "I'd like to try to walk."  Pain/Comfort:  · Pain Rating 1: 0/10  · Pain Rating Post-Intervention 1: 0/10    Patients cultural, spiritual, Hindu conflicts given the current situation: none voiced    Objective:     Patient found with: oxygen, telemetry     General Precautions: Standard, fall   Orthopedic Precautions:N/A   Braces: N/A     Functional Mobility:  · Bed Mobility:     · Rolling Left:  contact guard assistance  · Rolling Right: contact guard assistance  · Scooting: contact guard assistance  · Bridging: contact guard assistance  · Supine to Sit: minimum " assistance  · Sit to Supine: moderate assistance  · Transfers:     · Sit to Stand:  minimum assistance with rolling walker  · Bed to Chair: minimum assistance with  rolling walker  using  Step Transfer  · Gait: Gait Training:  Pt. amb. 15' with RW, min. A.  VC's given to pt. for step placement and postural reminders.  Balance:    Static Sit: FAIR+: Able to take MINIMAL challenges from all directions  Dynamic Sit: FAIR+: Maintains balance through MINIMAL excursions of active trunk motion  Static Stand: POOR+: Needs MINIMAL assist to maintain  · Dynamic stand: POOR: N/A  ·       AM-PAC 6 CLICK MOBILITY  Turning over in bed (including adjusting bedclothes, sheets and blankets)?: 2  Sitting down on and standing up from a chair with arms (e.g., wheelchair, bedside commode, etc.): 2  Moving from lying on back to sitting on the side of the bed?: 2  Moving to and from a bed to a chair (including a wheelchair)?: 2  Need to walk in hospital room?: 2  Climbing 3-5 steps with a railing?: 1  Total Score: 11       Therapeutic Activities and Exercises:   There. Act.:  Weight shifting and weight acceptance tasks performed in standing with min. A.  Trunk control tasks performed with min. A. While pt. seated EOB and in bedside chair.  Seated, EOB and in bedside chair, scooting performed with CGA.  Transfer Training performed with assistance as noted.      Patient left up in chair with all lines intact, call button in reach, RN notified and nursing staff present..    GOALS:    Physical Therapy Goals        Problem: Physical Therapy Goal    Goal Priority Disciplines Outcome Goal Variances Interventions   Physical Therapy Goal     PT/OT, PT Ongoing (interventions implemented as appropriate)     Description:  1. Patient will  participate with assistive and active exercises to advance strength in  all four extremities to 4 / 4 levels.  2. All transfers will advance to minimal assistance for safety  3  Endurance for out of bed activities  will advance as tolerated  4. Gait with RW will progress to 50 ft intervals with minimal assistance                       Time Tracking:     PT Received On: 02/07/18  PT Start Time: 1038     PT Stop Time: 1108  PT Total Time (min): 30 min     Billable Minutes: Gait Training 10 minutes and Therapeutic Activity 20 minutes    Treatment Type: Treatment  PT/PTA: PT           Daniel Garcia, PT  02/07/2018

## 2018-02-07 NOTE — DISCHARGE SUMMARY
[facility] Medicine  Discharge Summary      Patient Name: Stephany Skinner  MRN: [id number]  Admission Date: 1/30/2018  Hospital Length of Stay: 8 days  Discharge Date and Time:  02/07/2018 10:40 AM  Attending Physician: Za Sandra MD   Discharging Provider: Melanie Baker NP  Primary Care Provider: Pipo Flowers MD      HPI:   Stephany Skinner is a 83 y.o. female  Who is a patient of Dr ambriz and Dr cardenas .  Today was her second visit to ER .  She lives with her sister who reported that she is becoming more short winded for last few days.  She missed her lasix 40 mg daily for last 4 days. + PND, + dyspnea at rest .  + leg swelling is worsening .reports no chest pains   C/o neck pains   Can not recall weight gain .    Placed in observation for CHF.  BNP high   CXR is  suggestive of pulmonary edema        * No surgery found *      Hospital Course:   1/31/18  82 YO WF admitted for tx of CHF  1-30-18; day 3; She reported missing  her lasix 40 mg daily for last 4 days prior to admit; Now receiving IV lasix twice daily. Continues to have some LE edema and BARGER.   This am on rounds pt developed worsening SOB, now requiring Bipap. Noted D-dimer elevated;  Lab Results   Component Value Date    DDIMER 1.05 (H) 01/30/2018   Pt with known chronic afib- on plavix only per cards. CT of chest ordered to rule out PE ordered.     Echo pending. (last Echo ?2012).   Also noted to have ^WBCs on admit; trending down 12.96 from 14.44 on admission; Dr. Cardenas suggesting possible bronchitis. U/A- ok.  I/O yesterday-  -640, wt 261. Today-pending    2/1/18 CT of chest negative for PE yesterday. Lovenox initiated per cards for recent new onset afib vs atrial tach per Dr. Cardenas. Pt with continued SOB requiring BIPAP; decreased LOC overnight. ABGs demonstrating ^CO2; required BIPAP adjustment;  ABG    Recent Labs  Lab 01/31/18  2235   PH 7.35   PO2 63   PCO2 72*   HCO3 39.70   BE 11.40   repeat ABGS this  am with continued ^PCO2- 74 -Dr. Jain recommending RR ^  More awake now - opening eyes and taking in meds PO.  Sats have been good.    2/2/18- continues with BIPAP. Will respond with maximal stimuli; opens eyes and becomes more alert and awake   resp viral panel pending.   Echo - 1/31/18 Echo ejection fraction is 55-60%   Discussed with Dr. Watson- he feels she is compensated resp failure- does not recommend intubation at this time. Wants to rule out neuromuscular. He started Seroquel yesterday due to pt being very restless with Bipap 2 nights ago. Will discontinue.    Started on Zithromax yesterday for possible bacterial bronchitis.  BP now borderline; seems to now be fluid depleted. Weights reflect ? 30lb weight loss; will decrease lasix rx    2/3 Pt's pco2 is in the 40s and she is awake and alert--seems to be responding to the steroids and the opinion is she may be battling myasthenia gravis    2/4 Pt is amazingly improved this AM, breathing well, getting out of bed and eating today    2/5. She received one dose of azithromycin. She did have right upper lobe infil;trates on x ray as well as CTA. She also had leukocytosis on admission. She is also still on 5L NC and usually wears 2L at home. Dr Watson following and ordered cpap. She reports that she has not been able to tolerate that last night. ABG ordered for this am    Diuresed for elevated BNP on admission EF 60%, stage 1 diastolic dysfunction, no significant valvular pathology. No more lasix bnp normal but elevated creat  Over weekend. Reordered for today    Dr Ortiz consulted for poss N/M dz process. She responded well to steroids and mesatonin. Panels drawn and pending.     2/6/18 She is not feeling well today. Feels down today from being here for so long. Her WBC is a little higher today. We restarted rocephin for the pneumonia yesterday. Old CXR shows right upper lobe, she does report that she has choking when she swallows. Especially on liquids.  Speech saw her and did not observe any over signs of aspiration. She is still requiring 5L O2 today. Has a CPAP and only able to tolerate for short amounts of time    She was also statred on NS at 100ml/hr. Creat slightly better 2.0>>1.9.   2/7/18 Pt with improved Renal fx with IV fluids and cessation of diuretics. Treating for pneumonia; Started on Levaquin yesterday. Negative for aspiration per speech eval.   Neurology following for  new symptoms of diplopia and hypophonic voice, w/u for MG. That combined with hypercapnia is concerning for neuromuscular weakness- all of this improving on steroids as expected. See recommendations for steroid rx per neuro .   Did tolerate some Bipap last pm, but a little agitatied.   Will try to wean O2 (home Oxygen- 2L prn) ; stop IV fluids to avoid overload. Resume ARB when renal fx stable.   Currently on 3LNC-   Did not get up out of bed yet today; ambulated 15 feet yesterday with PT ; goal 50 feet.   Needs further strengthening - plan for skilled ; lives in apt, needs to be able to climb 10 steps.      Consults:   Consults         Status Ordering Provider     Inpatient consult to Cardiology-[facility]  Once     Provider:  Zeke Patel MD    Completed MIRELLA MEREDITH     Inpatient consult to Neurology  Once     Provider:  Robby Ortiz MD    Completed AVTAR WATSON     Inpatient consult to Neurology  Once     Provider:  Robby Ortiz MD    Completed PILAR LIU     Inpatient consult to Pulmonology  Once     Provider:  Avtar Watson MD    Acknowledged KYRIE GUPTA          * Acute respiratory failure with hypoxia and hypercarbia    Continue  Bipap and continue supplemental oxygen   On home O2 but on 5L here to keep sats  91%, did not use bipap over night.    She reports that she is only on 2L at home  Continue xopenex q 6 hours   Follow pulmonary recs per Dr. Watson      resp viral  panel negative including influenza    Dr. Harrisno following for   neuromsucular process-improving with steroids        Debility    Plan to place on skilled nursing due to prolonged hospitalization with complications, deconditioned, need to improve to baseline; pt resides in apt with 10 steps.         PUJA (acute kidney injury)    Dialy  BMP today.Diuretics stopped.   Renal Fx improving with  IVF 2/5 at 100cc/hr;  feel her elevated Cr due to over diuresis  Hold losartan in acute injury; resume in future once stable. Monitor I&Os, weights, renal Fx.           Community acquired pneumonia of right upper lobe of lung    Restart rocephin 2/5,Stopped Rocehphin 2-6-17 and added Levaquin- Day 2 today Spoke with Dr gillette. No definite dx of MG- will proceed with Levaquin  Infectious diagnosis based on CXR/leukocytosis on admission. Repeat labs 2/5 show WBC back up (?? Infectious process vs steroids)- currenlty WBC about the same- not rising.   Only had one dose azithro (2/1)          Myasthenia gravis, adult form      Responding well to steroids and mesantoin     Eating, talking, getting out of bed--Dr. Gillette following        Chronic atrial fibrillation    Continue metoprolol,eliquis    Dr patel following discussed Afib; he reports fairly new dx, plavix was for previous + abnormal stress test; pt deferred angiography and had poor Asa tolerance.            Acute on chronic diastolic congestive heart failure    Admitted with HF after missing several days of lasix. Was treated with IV lasix 40mg IV twice daily up unitl 2-2-18 when she demonstrated volume depletion/weight loss with subsequent worsening renal function.   Diuretics discontinued due to acute renal insuff.  Cr was trending up, required IV fluids; creat now improving  Cardiology  Dr. Patel following   2-D Echocardiogram for evaluation of left ventricle systolic function or any valvular heart disease-EF 55-60%, stage 1 diastolic dysfunction, no significant valvular pathology.    Daily weights.  Monitor  I/Os   Na restriction  <2g/d.      2/5: IV lasix 40 mg bid- given x 6 doses but noted creat doubled. Repeating today (BNP went from 411>>69). AT this point she seems to be dry/at her baseline and does not need any further diuresis. However still with O2 requirement above her baseline so I feel there is something more here and cardiac edema (see CAP plan)          HTN (hypertension)    Continue losartan, metoprolol and amlodipine Bp 104//58  Follow cards recs        Hyperlipidemia    Continue atorvastatin/plavix          Hypothyroidism    TSH 0.37  Continue present dose             JOY (obstructive sleep apnea)    On BiPap- just o2 at home,continue BiPAP over night. I suspect she will need to use at least nightly and as needed during the day. The more she uses the better she will feel. Will help with heart failure as well as pneumonia           Final Active Diagnoses:    Diagnosis Date Noted POA    PRINCIPAL PROBLEM:  Acute respiratory failure with hypoxia and hypercarbia [J96.01, J96.02] 01/31/2018 Yes    Debility [R53.81] 02/07/2018 No    Community acquired pneumonia of right upper lobe of lung [J18.1] 02/05/2018 Yes    PUJA (acute kidney injury) [N17.9] 02/05/2018 No    Myasthenia gravis, adult form [G70.00] 02/03/2018 Yes    Acute on chronic diastolic congestive heart failure [I50.33] 01/30/2018 Yes    Chronic atrial fibrillation [I48.2] 01/30/2018 Yes    HTN (hypertension) [I10] 08/08/2012 Yes    Hyperlipidemia [E78.5] 08/08/2012 Yes    Hypothyroidism [E03.9] 08/08/2012 Yes    JOY (obstructive sleep apnea) [G47.33] 08/08/2012 Yes      Problems Resolved During this Admission:    Diagnosis Date Noted Date Resolved POA       Discharged Condition: good    Disposition: Swing Bed    Follow Up:  Follow-up Information     Pipo Flowers MD.    Specialty:  Family Medicine  Why:  outpatient services  Contact information:  [facility] [address] [address] [phone]             Robby Ortiz MD In 3 weeks.     Specialty:  Neurology  Contact information:  [address]  [facility] [address]  [phone]                 Patient Instructions:   No discharge procedures on file.    Significant Diagnostic Studies:   Significant Labs:         Lab Results   Component Value Date     WBC 15.66 (H) 2018     HGB 12.0 2018     HCT 37.8 2018     MCV 97 2018      2018      BMP        Lab Results   Component Value Date      2018     K 4.7 2018      2018     CO2 28 2018      (H) 2018     CREATININE 1.5 (H) 2018     CALCIUM 8.6 (L) 2018     ANIONGAP 10 2018     ESTGFRAFRICA 37 (A) 2018     EGFRNONAA 32 (A) 2018      BNP     Recent Labs  Lab 18  0622   BNP 69            Lab Results   Component Value Date     TSH 0.371 (L) 2018   T4 1.10  MG panel pending  procalcitonin 0.39     Recent Labs  Lab 18  1306   TROPONINI 0.018      resp viral panel negative        Significant Imagin/3 CXR Mild decrease in right upper lobe and right perihilar infiltrate. Mild cardiomegaly. Atherosclerosis.     / CXR The lungs are underexpanded.  There are chronic lung markings seen bilaterally with pulmonary vascular congestion and patchy opacities throughout both lungs.  This could be related to pulmonary edema although superimposed infiltrate in the right upper lobe is not excluded.  The heart is enlarged.  Calcified atheromatous disease affects the aorta.  Age-appropriate degenerative changes affect the skeleton.      CTA lungs 1.  No CT evidence of central pulmonary embolus.  Severely limited study due to use motion artifact.  2.  Bilateral groundglass opacities and minimal dependent subsegmental atelectasis.      CT head 1.  No CT evidence of an acute intracranial abnormality with limitations due to motion artifact.  2.  Mild atrophy and minimal small vessel ischemic changes of the periventricular white  matter.     Echo Echo 1/18: EF 60%, stage 1 diastolic dysfunction, no significant valvular pathology.      EKG a fib         Pending Diagnostic Studies:     Procedure Component Value Units Date/Time    Myasthenia Gravis/Lambert-Eaton [[id number]] Collected:  02/02/18 1824    Order Status:  Sent Lab Status:  In process Updated:  02/02/18 2204    Specimen:  Blood          Medications:  Transfer Medications (for Discharge Readmit only):   Current Facility-Administered Medications   Medication Dose Route Frequency Provider Last Rate Last Dose    acetaminophen tablet 650 mg  650 mg Oral Q8H PRN Grant Raza MD   650 mg at 02/06/18 2352    amLODIPine tablet 2.5 mg  2.5 mg Oral Daily Grant Raza MD   2.5 mg at 02/07/18 1000    apixaban tablet 2.5 mg  2.5 mg Oral BID Klarissa Lovett NP   2.5 mg at 02/07/18 1000    atorvastatin tablet 10 mg  10 mg Oral QHS Za Sandra MD   10 mg at 02/06/18 2048    clopidogrel tablet 75 mg  75 mg Oral Daily Grant Raza MD   75 mg at 02/07/18 1000    dextrose 50% injection 12.5 g  12.5 g Intravenous PRN Nadine Morelos NP        dextrose 50% injection 25 g  25 g Intravenous PRN Nadine Morelos NP        glucagon (human recombinant) injection 1 mg  1 mg Intramuscular PRN Nadine Morelos NP        glucose chewable tablet 16 g  16 g Oral PRN Nadine Morelos NP        glucose chewable tablet 24 g  24 g Oral PRN Nadine Morelos NP        insulin aspart pen 0-5 Units  0-5 Units Subcutaneous QID (AC + HS) PRN Nadine Morelos NP   1 Units at 02/06/18 2049    ipratropium 0.02 % nebulizer solution 0.5 mg  0.5 mg Nebulization Q6H Nicolas Watson MD   0.5 mg at 02/07/18 0701    levalbuterol nebulizer solution 1.25 mg  1.25 mg Nebulization Q6H Za Sandra MD   1.25 mg at 02/07/18 0701    levoFLOXacin 250 mg/50 mL IVPB 250 mg  250 mg Intravenous Q24H Nadine Morelos, CINTHYA 50 mL/hr at 02/07/18 1024 250 mg at 02/07/18 1024    levothyroxine tablet 100  mcg  100 mcg Oral Daily Grant Raza MD   100 mcg at 02/07/18 1000    metoprolol succinate (TOPROL-XL) 24 hr tablet 200 mg  200 mg Oral Daily Grant Raza MD   200 mg at 02/07/18 1000    ondansetron injection 4 mg  4 mg Intravenous Q8H PRN Grant Raza MD        pantoprazole EC tablet 40 mg  40 mg Oral Daily Grant Raza MD   40 mg at 02/07/18 1000    predniSONE tablet 40 mg  40 mg Oral Daily Robby Ortiz MD   40 mg at 02/07/18 1000    pyridostigmine tablet 60 mg  60 mg Oral TID Robby Ortiz MD   60 mg at 02/07/18 0530       Indwelling Lines/Drains at time of discharge:   Lines/Drains/Airways          No matching active lines, drains, or airways          Time spent on the discharge of patient: 20 minutes  Patient was seen and examined on the date of discharge and determined to be suitable for discharge.         Melanie Baker NP  Department of Hospital Medicine  [facility]

## 2018-02-07 NOTE — ASSESSMENT & PLAN NOTE
On BiPap- just o2 at home,continue BiPAP over night. I suspect she will need to use at least nightly and as needed during the day. The more she uses the better she will feel. Will help with heart failure as well as pneumonia

## 2018-02-07 NOTE — ASSESSMENT & PLAN NOTE
Restart rocephin 2/5,Stopped Rocehphin 2-6-17 and added Levaquin- Day 2 today Spoke with Dr gillette. No definite dx of MG- will proceed with Levaquin  Infectious diagnosis based on CXR/leukocytosis on admission. Repeat labs 2/5 show WBC back up (?? Infectious process vs steroids)- currenlty WBC about the same- not rising.   Only had one dose azithro (2/1)

## 2018-02-07 NOTE — PLAN OF CARE
Problem: Patient Care Overview  Goal: Plan of Care Review  Outcome: Ongoing (interventions implemented as appropriate)  Vitals remained stable, afebrile. No complaints of pain. Sat up for lunch. Very tired today due to pt not sleeping last night. Eating well. Still incontinent. O2 sats WNL on 2L NC. weaned down from 5L. Bipap at night. Participated in PT. Discussed plan of care, stated understanding

## 2018-02-07 NOTE — PLAN OF CARE
Problem: Patient Care Overview  Goal: Plan of Care Review  Outcome: Ongoing (interventions implemented as appropriate)  Pt agrees with plan of care.  VS stable.  Bipap on as ordered.  Pt oriented but very agitated this shift.  Pt daughter at bedside.  Tylenol given po as requested by pt to help with generalized aches.  Pt turned q2 hours.  Incontinent care provided.  Iv fluids infusing as ordered.  Call bell in reach.  Bed alarm on.  VS stable.  Will continue to monitor.

## 2018-02-08 LAB
ANION GAP SERPL CALC-SCNC: 11 MMOL/L
ANISOCYTOSIS BLD QL SMEAR: SLIGHT
BASOPHILS # BLD AUTO: ABNORMAL K/UL
BASOPHILS NFR BLD: 0 %
BUN SERPL-MCNC: 96 MG/DL
CALCIUM SERPL-MCNC: 8.9 MG/DL
CHLORIDE SERPL-SCNC: 101 MMOL/L
CO2 SERPL-SCNC: 28 MMOL/L
CREAT SERPL-MCNC: 1.5 MG/DL
DIFFERENTIAL METHOD: ABNORMAL
EOSINOPHIL # BLD AUTO: ABNORMAL K/UL
EOSINOPHIL NFR BLD: 1 %
ERYTHROCYTE [DISTWIDTH] IN BLOOD BY AUTOMATED COUNT: 14.9 %
EST. GFR  (AFRICAN AMERICAN): 37 ML/MIN/1.73 M^2
EST. GFR  (NON AFRICAN AMERICAN): 32 ML/MIN/1.73 M^2
GLUCOSE SERPL-MCNC: 170 MG/DL
HCT VFR BLD AUTO: 38.2 %
HGB BLD-MCNC: 12 G/DL
HYPOCHROMIA BLD QL SMEAR: ABNORMAL
LYMPHOCYTES # BLD AUTO: ABNORMAL K/UL
LYMPHOCYTES NFR BLD: 5 %
MCH RBC QN AUTO: 30.6 PG
MCHC RBC AUTO-ENTMCNC: 31.4 G/DL
MCV RBC AUTO: 97 FL
METAMYELOCYTES NFR BLD MANUAL: 5 %
MONOCYTES # BLD AUTO: ABNORMAL K/UL
MONOCYTES NFR BLD: 0 %
NEUTROPHILS NFR BLD: 79 %
NEUTS BAND NFR BLD MANUAL: 10 %
OVALOCYTES BLD QL SMEAR: ABNORMAL
PLATELET # BLD AUTO: 228 K/UL
PLATELET BLD QL SMEAR: ABNORMAL
PMV BLD AUTO: 9.6 FL
POCT GLUCOSE: 187 MG/DL (ref 70–110)
POCT GLUCOSE: 212 MG/DL (ref 70–110)
POCT GLUCOSE: 261 MG/DL (ref 70–110)
POCT GLUCOSE: 265 MG/DL (ref 70–110)
POIKILOCYTOSIS BLD QL SMEAR: SLIGHT
POTASSIUM SERPL-SCNC: 5 MMOL/L
RBC # BLD AUTO: 3.92 M/UL
SODIUM SERPL-SCNC: 140 MMOL/L
STOMATOCYTES BLD QL SMEAR: PRESENT
WBC # BLD AUTO: 15.47 K/UL

## 2018-02-08 PROCEDURE — 94761 N-INVAS EAR/PLS OXIMETRY MLT: CPT

## 2018-02-08 PROCEDURE — 25000242 PHARM REV CODE 250 ALT 637 W/ HCPCS: Performed by: NURSE PRACTITIONER

## 2018-02-08 PROCEDURE — 27000221 HC OXYGEN, UP TO 24 HOURS

## 2018-02-08 PROCEDURE — 85027 COMPLETE CBC AUTOMATED: CPT

## 2018-02-08 PROCEDURE — 97802 MEDICAL NUTRITION INDIV IN: CPT

## 2018-02-08 PROCEDURE — G8982 BODY POS GOAL STATUS: HCPCS | Mod: CK

## 2018-02-08 PROCEDURE — 80048 BASIC METABOLIC PNL TOTAL CA: CPT

## 2018-02-08 PROCEDURE — 11000004 HC SNF PRIVATE

## 2018-02-08 PROCEDURE — G8981 BODY POS CURRENT STATUS: HCPCS | Mod: CL

## 2018-02-08 PROCEDURE — 99306 1ST NF CARE HIGH MDM 50: CPT | Mod: ,,, | Performed by: PSYCHIATRY & NEUROLOGY

## 2018-02-08 PROCEDURE — 97530 THERAPEUTIC ACTIVITIES: CPT

## 2018-02-08 PROCEDURE — 25000003 PHARM REV CODE 250: Performed by: NURSE PRACTITIONER

## 2018-02-08 PROCEDURE — 63600175 PHARM REV CODE 636 W HCPCS: Performed by: NURSE PRACTITIONER

## 2018-02-08 PROCEDURE — 85007 BL SMEAR W/DIFF WBC COUNT: CPT

## 2018-02-08 PROCEDURE — 99900035 HC TECH TIME PER 15 MIN (STAT)

## 2018-02-08 PROCEDURE — 94660 CPAP INITIATION&MGMT: CPT

## 2018-02-08 PROCEDURE — 97116 GAIT TRAINING THERAPY: CPT

## 2018-02-08 PROCEDURE — 97110 THERAPEUTIC EXERCISES: CPT

## 2018-02-08 PROCEDURE — 99232 SBSQ HOSP IP/OBS MODERATE 35: CPT | Mod: ,,, | Performed by: FAMILY MEDICINE

## 2018-02-08 PROCEDURE — 97161 PT EVAL LOW COMPLEX 20 MIN: CPT

## 2018-02-08 PROCEDURE — 94640 AIRWAY INHALATION TREATMENT: CPT

## 2018-02-08 PROCEDURE — 36415 COLL VENOUS BLD VENIPUNCTURE: CPT

## 2018-02-08 RX ADMIN — LEVALBUTEROL 1.25 MG: 1.25 SOLUTION, CONCENTRATE RESPIRATORY (INHALATION) at 07:02

## 2018-02-08 RX ADMIN — IPRATROPIUM BROMIDE 0.5 MG: 0.5 SOLUTION RESPIRATORY (INHALATION) at 01:02

## 2018-02-08 RX ADMIN — PYRIDOSTIGMINE BROMIDE 60 MG: 60 TABLET ORAL at 06:02

## 2018-02-08 RX ADMIN — INSULIN ASPART 3 UNITS: 100 INJECTION, SOLUTION INTRAVENOUS; SUBCUTANEOUS at 04:02

## 2018-02-08 RX ADMIN — IPRATROPIUM BROMIDE 0.5 MG: 0.5 SOLUTION RESPIRATORY (INHALATION) at 12:02

## 2018-02-08 RX ADMIN — LEVOFLOXACIN 250 MG: 5 INJECTION, SOLUTION INTRAVENOUS at 10:02

## 2018-02-08 RX ADMIN — IPRATROPIUM BROMIDE 0.5 MG: 0.5 SOLUTION RESPIRATORY (INHALATION) at 07:02

## 2018-02-08 RX ADMIN — CLOPIDOGREL BISULFATE 75 MG: 75 TABLET ORAL at 10:02

## 2018-02-08 RX ADMIN — LEVOTHYROXINE SODIUM 100 MCG: 100 TABLET ORAL at 10:02

## 2018-02-08 RX ADMIN — PYRIDOSTIGMINE BROMIDE 60 MG: 60 TABLET ORAL at 02:02

## 2018-02-08 RX ADMIN — APIXABAN 2.5 MG: 2.5 TABLET, FILM COATED ORAL at 09:02

## 2018-02-08 RX ADMIN — PYRIDOSTIGMINE BROMIDE 60 MG: 60 TABLET ORAL at 09:02

## 2018-02-08 RX ADMIN — APIXABAN 2.5 MG: 2.5 TABLET, FILM COATED ORAL at 10:02

## 2018-02-08 RX ADMIN — LEVALBUTEROL 1.25 MG: 1.25 SOLUTION, CONCENTRATE RESPIRATORY (INHALATION) at 12:02

## 2018-02-08 RX ADMIN — INSULIN ASPART 1 UNITS: 100 INJECTION, SOLUTION INTRAVENOUS; SUBCUTANEOUS at 09:02

## 2018-02-08 RX ADMIN — PANTOPRAZOLE SODIUM 40 MG: 40 TABLET, DELAYED RELEASE ORAL at 10:02

## 2018-02-08 RX ADMIN — PREDNISONE 40 MG: 20 TABLET ORAL at 10:02

## 2018-02-08 RX ADMIN — ATORVASTATIN CALCIUM 10 MG: 10 TABLET, FILM COATED ORAL at 09:02

## 2018-02-08 RX ADMIN — METOPROLOL SUCCINATE 200 MG: 50 TABLET, EXTENDED RELEASE ORAL at 10:02

## 2018-02-08 RX ADMIN — AMLODIPINE BESYLATE 2.5 MG: 2.5 TABLET ORAL at 10:02

## 2018-02-08 RX ADMIN — INSULIN ASPART 2 UNITS: 100 INJECTION, SOLUTION INTRAVENOUS; SUBCUTANEOUS at 01:02

## 2018-02-08 RX ADMIN — LEVALBUTEROL 1.25 MG: 1.25 SOLUTION, CONCENTRATE RESPIRATORY (INHALATION) at 01:02

## 2018-02-08 NOTE — PT/OT/SLP EVAL
"Physical Therapy Evaluation    Patient Name:  Stephany Skinner   MRN:  [id number]    Recommendations:     Discharge Recommendations:  home with home health, home health PT   Discharge Equipment Recommendations: none   Barriers to discharge: None    Assessment:     Stephany Skinner is a 83 y.o. female admitted with a medical diagnosis of Debility.  She presents with the following impairments/functional limitations:  weakness, gait instability, impaired balance, impaired endurance, decreased lower extremity function, impaired self care skills, impaired functional mobilty.  Pt would benefit from PT to increase independence with ADL's with pt.'s goal to return home with assistance from family.    Rehab Prognosis:  Fair+; patient would benefit from acute skilled PT services to address these deficits and reach maximum level of function.      Recent Surgery: * No surgery found *      Plan:     During this hospitalization, patient to be seen  (1-2x/day(M-F); PRN(Sat.,Sun.)) to address the above listed problems via therapeutic activities, gait training, therapeutic exercises  · Plan of Care Expires:   (Upon D/C from facility)   Plan of Care Reviewed with: patient    Subjective     Communicated with patient prior to session.  Patient found supine in bed, awake, upon PT entry to room, agreeable to evaluation.      Chief Complaint: B/B concerns  Patient comments/goals: "I know I need to get up."  Pain/Comfort:  · Pain Rating 1: 0/10  · Pain Rating Post-Intervention 1: 0/10    Patients cultural, spiritual, Judaism conflicts given the current situation: none voiced    Living Environment:  Pt. Lives at home with supportive family  Prior to admission, patients level of function was mod. I. With A.D.'s.  Stairs present to enter home. Patient has the following equipment: bedside commode, walker, rolling, shower chair.  DME owned (not currently used): none.  Upon discharge, patient will have assistance from family.    Objective: "     Patient found with: oxygen, telemetry     General Precautions: Standard, fall   Orthopedic Precautions:N/A   Braces: N/A     Exams:  · Cognitive Exam:  Patient is oriented to Person, Place, Time and Situation and follows 100% of Verbal commands   · Gross Motor Coordination:  WFL  · RLE ROM: WFL  · RLE Strength: Deficits: Grossly 4/5  · LLE ROM: WFL  · LLE Strength: Deficits: Grossly 4/5    Functional Mobility:  · Bed Mobility:     · Rolling Left:  contact guard assistance  · Rolling Right: contact guard assistance  · Scooting: contact guard assistance  · Bridging: contact guard assistance  · Supine to Sit: minimum assistance  · Sit to Supine: moderate assistance  · Transfers:     · Sit to Stand:  minimum assistance with rolling walker  · Bed to Chair: minimum assistance with  rolling walker  using  Step Transfer  · Toilet Transfer: minimum assistance with  rolling walker  using  Step Transfer  · Gait: Gait Training:  Pt. amb. 15' x 1 bout with min. A. With supplemental O2.  VC's given to pt. for step placement and postural reminders.  Pt. Demonstrated decreased velocity of gait mechanics.  Balance:   Static Sit: FAIR+: Able to take MINIMAL challenges from all directions  Dynamic Sit: FAIR+: Maintains balance through MINIMAL excursions of active trunk motion  Static Stand: POOR+: Needs MINIMAL assist to maintain  · Dynamic stand: POOR: N/A  ·     AM-PAC 6 CLICK MOBILITY  Total Score:11       Patient left up in chair with all lines intact, call button in reach and RN notified.    GOALS:  Physical Therapy Goals     Goals to be met by: 2/15/2018     Patient will increase functional independence with mobility by performin. Supine to sit with Stand-by Assistance  2. Sit to supine with Stand-by Assistance  3. Rolling to Left and Right with Stand-by Assistance.  4. Sit to stand transfer with Stand-by Assistance, with RW  5. Bed to chair transfer with Stand-by Assistance using Rolling Walker  6. Gait  x 50 feet  with Stand-by Assistance using Rolling Walker.   7. Ascend/descend 3 stairs with bilateral Handrails Minimal Assistance         History:     Past Medical History:   Diagnosis Date    GERD (gastroesophageal reflux disease)     Hyperlipidemia     Hypertension        Past Surgical History:   Procedure Laterality Date    CHOLECYSTECTOMY      HERNIA REPAIR      HYSTERECTOMY      knee replacemene      deidra    THYROIDECTOMY         Clinical Decision Making:     History  Co-morbidities and personal factors that may impact the plan of care Examination  Body Structures and Functions, activity limitations and participation restrictions that may impact the plan of care Clinical Presentation   Decision Making/ Complexity Score   Co-morbidities:   [x] Time since onset of injury / illness / exacerbation  [] Status of current condition  []Patient's cognitive status and safety concerns    [x] Multiple Medical Problems (see med hx)  Personal Factors:   [] Patient's age  [] Prior Level of function   [] Patient's home situation (environment and family support)  [] Patient's level of motivation  [] Expected progression of patient      HISTORY:(criteria)    [] 84236 - no personal factors/history    [x] 75492 - has 1-2 personal factor/comorbidity     [] 64629 - has >3 personal factor/comorbidity     Body Regions:  [] Objective examination findings  [] Head     []  Neck  [] Trunk   [] Upper Extremity  [] Lower Extremity    Body Systems:  [] For communication ability, affect, cognition, language, and learning style: the assessment of the ability to make needs known, consciousness, orientation (person, place, and time), expected emotional /behavioral responses, and learning preferences (eg, learning barriers, education  needs)  [x] For the neuromuscular system: a general assessment of gross coordinated movement (eg, balance, gait, locomotion, transfers, and transitions) and motor function  (motor control and motor learning)  [] For  the musculoskeletal system: the assessment of gross symmetry, gross range of motion, gross strength, height, and weight  [] For the integumentary system: the assessment of pliability(texture), presence of scar formation, skin color, and skin integrity  [x] For cardiovascular/pulmonary system: the assessment of heart rate, respiratory rate, blood pressure, and edema     Activity limitations:    [] Patient's cognitive status and saf ety concerns          [] Status of current condition      [] Weight bearing restriction  [x] Cardiopulmunary Restriction    Participation Restrictions:   [] Goals and goal agreement with the patient     [] Rehab potential (prognosis) and probable outcome      Examination of Body System: (criteria)    [] 74153 - addressing 1-2 elements    [x] 24319 - addressing a total of 3 or more elements     [] 41744 -  Addressing a total of 4 or more elements         Clinical Presentation: (criteria)  Stable - 11442     On examination of body system using standardized tests and measures patient presents with 3 or more elements from any of the following: body structures and functions, activity limitations, and/or participation restrictions.  Leading to a clinical presentation that is considered stable and/or uncomplicated                              Clinical Decision Making  (Eval Complexity):  Low- 92735     Time Tracking:     PT Received On: 02/08/18  PT Start Time: 0907     PT Stop Time: 0937  PT Total Time (min): 30 min     Billable Minutes: Evaluation 15 minutes and Gait Training 15 minutes      Daniel Garcia, PT  02/08/2018

## 2018-02-08 NOTE — PROGRESS NOTES
[facility]  Pulmonology  Progress Note    Patient Name: Stephany Skinner  MRN: [id number]  Admission Date: 2/7/2018  Hospital Length of Stay: 1 days  Code Status: Full Code  Attending Provider: Morris Jain MD  Primary Care Provider: Pipo Flowers MD   Principal Problem: <principal problem not specified>    Subjective:     Interval History: doing well depression     Objective:     Vital Signs (Most Recent):  Temp: 96.4 °F (35.8 °C) (02/08/18 0745)  Pulse: 67 (02/08/18 0816)  Resp: 18 (02/08/18 0745)  BP: 136/75 (02/08/18 0745)  SpO2: 96 % (02/08/18 0745) Vital Signs (24h Range):  Temp:  [96 °F (35.6 °C)-97.8 °F (36.6 °C)] 96.4 °F (35.8 °C)  Pulse:  [60-82] 67  Resp:  [16-24] 18  SpO2:  [91 %-98 %] 96 %  BP: (123-148)/(58-75) 136/75     Weight: 114.8 kg (253 lb 1.4 oz)  Body mass index is 39.64 kg/m².      Intake/Output Summary (Last 24 hours) at 02/08/18 0937  Last data filed at 02/07/18 1700   Gross per 24 hour   Intake              240 ml   Output                0 ml   Net              240 ml       Physical Exam   Constitutional: She is oriented to person, place, and time. She appears well-developed and well-nourished. She is cooperative.  Non-toxic appearance. She does not appear ill. No distress.   HENT:   Head: Normocephalic and atraumatic.   Right Ear: Hearing, tympanic membrane, external ear and ear canal normal.   Left Ear: Hearing, tympanic membrane, external ear and ear canal normal.   Nose: Nose normal. No mucosal edema, rhinorrhea or nasal deformity. No epistaxis. Right sinus exhibits no maxillary sinus tenderness and no frontal sinus tenderness. Left sinus exhibits no maxillary sinus tenderness and no frontal sinus tenderness.   Mouth/Throat: Uvula is midline, oropharynx is clear and moist and mucous membranes are normal. No trismus in the jaw. Normal dentition. No uvula swelling. No posterior oropharyngeal erythema.   Eyes: Conjunctivae and lids are normal. No scleral  icterus.   Sclera clear bilat   Neck: Trachea normal, full passive range of motion without pain and phonation normal. Neck supple.   Cardiovascular: Normal rate, regular rhythm, normal heart sounds, intact distal pulses and normal pulses.    Pulmonary/Chest: She is in respiratory distress (mild to moderate). She has decreased breath sounds in the right lower field and the left lower field. She has wheezes. She has rhonchi.           Abdominal: Soft. Normal appearance and bowel sounds are normal. She exhibits no distension. There is no tenderness.   Musculoskeletal: Normal range of motion. She exhibits no edema or deformity.   Neurological: She is alert and oriented to person, place, and time. She exhibits normal muscle tone. Coordination normal.   Skin: Skin is warm, dry and intact. She is not diaphoretic. No pallor.   Psychiatric: She has a normal mood and affect. Her speech is normal and behavior is normal. Judgment and thought content normal. Cognition and memory are normal.   Nursing note and vitals reviewed.      Vents:  Oxygen Concentration (%): 40 (via BiPAP/now on NC 5lpm) (02/08/18 0741)    Lines/Drains/Airways     Peripheral Intravenous Line                 Peripheral IV - Single Lumen 02/01/18 1653 Right Wrist 6 days                Significant Labs:    CBC/Anemia Profile:    Recent Labs  Lab 02/07/18  0537 02/08/18  0527   WBC 15.66* 15.47*   HGB 12.0 12.0   HCT 37.8 38.2    228   MCV 97 97   RDW 14.9* 14.9*        Chemistries:    Recent Labs  Lab 02/07/18  0536 02/08/18  0527    140   K 4.7 5.0    101   CO2 28 28   * 96*   CREATININE 1.5* 1.5*   CALCIUM 8.6* 8.9       All pertinent labs within the past 24 hours have been reviewed.    Significant Imaging:  I have reviewed all pertinent imaging results/findings within the past 24 hours.    Assessment/Plan:     Myasthenia gravis, adult form    Stable         Acute respiratory failure with hypoxia and hypercarbia    Resolved          Acute on chronic congestive heart failure    Stable n                Nicolas Watson MD  Pulmonology  [facility]

## 2018-02-08 NOTE — PT/OT/SLP PROGRESS
"Physical Therapy Treatment    Patient Name:  Stephany Skinner   MRN:  [id number]    Recommendations:     Discharge Recommendations:  home health PT, home with home health   Discharge Equipment Recommendations: none   Barriers to discharge: None    Assessment:     Stephany Skinner is a 83 y.o. female admitted with a medical diagnosis of Debility.  She presents with the following impairments/functional limitations:  weakness, gait instability, impaired balance, impaired endurance, decreased lower extremity function, decreased safety awareness, impaired self care skills, impaired functional mobilty .  Pt. Is progressing towards PT POC goals.    Rehab Prognosis:  Fair+; patient would benefit from acute skilled PT services to address these deficits and reach maximum level of function.      Recent Surgery: * No surgery found *      Plan:     During this hospitalization, patient to be seen  (1-2x/day(M-F); PRN(Sat.,Sun.)) to address the above listed problems via therapeutic exercises, therapeutic activities, gait training  · Plan of Care Expires:   (Upon D/C from facility)   Plan of Care Reviewed with: patient    Subjective     Communicated with patient prior to session.  Patient found seated in bedside chair, awake, upon PT entry to room, agreeable to treatment.      Chief Complaint: Fatigue  Patient comments/goals: "I'm really tired."  Pain/Comfort:  · Pain Rating 1: 0/10  · Pain Rating Post-Intervention 1: 0/10    Patients cultural, spiritual, Mosque conflicts given the current situation: none voiced    Objective:     Patient found with: telemetry, oxygen     General Precautions: Standard, fall   Orthopedic Precautions:N/A   Braces: N/A     Functional Mobility:  · Bed Mobility:     · Rolling Left:  contact guard assistance  · Rolling Right: contact guard assistance  · Scooting: contact guard assistance  · Bridging: contact guard assistance  · Supine to Sit: minimum assistance  · Sit to Supine: moderate " assistance  · Transfers:     · Sit to Stand:  minimum assistance with rolling walker  · Bed to Chair: minimum assistance with  rolling walker  using  Step Transfer  Balance:   Static Sit: FAIR+: Able to take MINIMAL challenges from all directions  Dynamic Sit: FAIR+: Maintains balance through MINIMAL excursions of active trunk motion  Static Stand: POOR+: Needs MINIMAL assist to maintain        AM-PAC 6 CLICK MOBILITY  Turning over in bed (including adjusting bedclothes, sheets and blankets)?: 2  Sitting down on and standing up from a chair with arms (e.g., wheelchair, bedside commode, etc.): 2  Moving from lying on back to sitting on the side of the bed?: 2  Moving to and from a bed to a chair (including a wheelchair)?: 2  Need to walk in hospital room?: 2  Climbing 3-5 steps with a railing?: 1  Total Score: 11       Therapeutic Activities and Exercises:   There. Act.:  Weight shifting and weight acceptance tasks performed in standing with min. A.  Trunk control tasks performed with min. A. while pt. seated EOB and in bedside chair.  Seated, EOB and in bedside chair, scooting performed with CGA.  Transfer Training performed with assistance as noted.  Manual guidance given to pt. for sequencing.    Pt. Performed gentle A/A exercises of BLE's while supine in bed, for N.M. Tone and strength and fluid dynamics.  1-on-1 BLE HC stretches performed with pt.  BLE: ankle pumps, heel slides, hip abd/add (2x10) performed by pt.  Bed mobility exercises performed with assistance as noted to improve function.    Patient left HOB elevated with all lines intact, call button in reach and RN notified..    GOALS:    Physical Therapy Goals        Problem: Physical Therapy Goal    Goal Priority Disciplines Outcome Goal Variances Interventions   Physical Therapy Goal     PT/OT, PT Ongoing (interventions implemented as appropriate)     Description:  Goals to be met by: 2/23/2018     Patient will increase functional independence with  mobility by performin. Supine to sit with Stand-by Assistance  2. Sit to supine with Stand-by Assistance  3. Rolling to Left and Right with Stand-by Assistance.  4. Sit to stand transfer with Stand-by Assistance, with RW  5. Bed to chair transfer with Stand-by Assistance using Rolling Walker  6. Gait  x 50 feet with Stand-by Assistance using Rolling Walker.   7. Ascend/descend 3 stairs with bilateral Handrails Minimal Assistance               Multidisciplinary Problems (Resolved)        Problem: Physical Therapy Goal    Goal Priority Disciplines Outcome Goal Variances Interventions   Physical Therapy Goal   (Resolved)     PT/OT, PT  Error                     Time Tracking:     PT Received On: 18  PT Start Time: 1234     PT Stop Time: 1257  PT Total Time (min): 23 min     Billable Minutes: Therapeutic Activity 15 minutes and Therapeutic Exercise 8 minutes    Treatment Type: Treatment  PT/PTA: PT           Daniel Garcia, PT  2018

## 2018-02-08 NOTE — SUBJECTIVE & OBJECTIVE
Interval History: doing well depression     Objective:     Vital Signs (Most Recent):  Temp: 96.4 °F (35.8 °C) (02/08/18 0745)  Pulse: 67 (02/08/18 0816)  Resp: 18 (02/08/18 0745)  BP: 136/75 (02/08/18 0745)  SpO2: 96 % (02/08/18 0745) Vital Signs (24h Range):  Temp:  [96 °F (35.6 °C)-97.8 °F (36.6 °C)] 96.4 °F (35.8 °C)  Pulse:  [60-82] 67  Resp:  [16-24] 18  SpO2:  [91 %-98 %] 96 %  BP: (123-148)/(58-75) 136/75     Weight: 114.8 kg (253 lb 1.4 oz)  Body mass index is 39.64 kg/m².      Intake/Output Summary (Last 24 hours) at 02/08/18 0937  Last data filed at 02/07/18 1700   Gross per 24 hour   Intake              240 ml   Output                0 ml   Net              240 ml       Physical Exam   Constitutional: She is oriented to person, place, and time. She appears well-developed and well-nourished. She is cooperative.  Non-toxic appearance. She does not appear ill. No distress.   HENT:   Head: Normocephalic and atraumatic.   Right Ear: Hearing, tympanic membrane, external ear and ear canal normal.   Left Ear: Hearing, tympanic membrane, external ear and ear canal normal.   Nose: Nose normal. No mucosal edema, rhinorrhea or nasal deformity. No epistaxis. Right sinus exhibits no maxillary sinus tenderness and no frontal sinus tenderness. Left sinus exhibits no maxillary sinus tenderness and no frontal sinus tenderness.   Mouth/Throat: Uvula is midline, oropharynx is clear and moist and mucous membranes are normal. No trismus in the jaw. Normal dentition. No uvula swelling. No posterior oropharyngeal erythema.   Eyes: Conjunctivae and lids are normal. No scleral icterus.   Sclera clear bilat   Neck: Trachea normal, full passive range of motion without pain and phonation normal. Neck supple.   Cardiovascular: Normal rate, regular rhythm, normal heart sounds, intact distal pulses and normal pulses.    Pulmonary/Chest: She is in respiratory distress (mild to moderate). She has decreased breath sounds in the right  lower field and the left lower field. She has wheezes. She has rhonchi.           Abdominal: Soft. Normal appearance and bowel sounds are normal. She exhibits no distension. There is no tenderness.   Musculoskeletal: Normal range of motion. She exhibits no edema or deformity.   Neurological: She is alert and oriented to person, place, and time. She exhibits normal muscle tone. Coordination normal.   Skin: Skin is warm, dry and intact. She is not diaphoretic. No pallor.   Psychiatric: She has a normal mood and affect. Her speech is normal and behavior is normal. Judgment and thought content normal. Cognition and memory are normal.   Nursing note and vitals reviewed.      Vents:  Oxygen Concentration (%): 40 (via BiPAP/now on NC 5lpm) (02/08/18 0741)    Lines/Drains/Airways     Peripheral Intravenous Line                 Peripheral IV - Single Lumen 02/01/18 1653 Right Wrist 6 days                Significant Labs:    CBC/Anemia Profile:    Recent Labs  Lab 02/07/18  0537 02/08/18  0527   WBC 15.66* 15.47*   HGB 12.0 12.0   HCT 37.8 38.2    228   MCV 97 97   RDW 14.9* 14.9*        Chemistries:    Recent Labs  Lab 02/07/18  0536 02/08/18  0527    140   K 4.7 5.0    101   CO2 28 28   * 96*   CREATININE 1.5* 1.5*   CALCIUM 8.6* 8.9       All pertinent labs within the past 24 hours have been reviewed.    Significant Imaging:  I have reviewed all pertinent imaging results/findings within the past 24 hours.

## 2018-02-08 NOTE — ASSESSMENT & PLAN NOTE
Place on skilled nursing 2/7/18 for prolonged hospitalization with complications, +deconditioned.  Needs to improve to baseline; pt resides in apt with 10 steps. Goal 50 feet. Currenlty ambulating 15feet.

## 2018-02-08 NOTE — PHYSICIAN QUERY
"PT Name: Stephany Skinner  MR #: [id number]    Physician Query Form - Pneumonia Clarification     CDS/: Josh Marcus, ADILIAN, RN, CCDS               Contact information: [phone]  This form is a permanent document in the medical record.    Query Date:  February 8, 2018    By submitting this query, we are merely seeking further clarification of documentation. Please utilize your independent clinical judgment when addressing the question(s) below.    The Medical record contains the following:   Indicators   Supporting Clinical Findings Location in Medical Record   X "Pneumonia" documented Community acquired pneumonia of right upper lobe of lung   DC Summary: 2/7   X Chest X-Ray: The lungs are underexpanded.  There are chronic lung markings seen bilaterally with pulmonary vascular congestion and patchy opacities throughout both lungs.  This could be related to pulmonary edema although superimposed infiltrate in the right upper lobe is not excluded   DC Summary: 2/7      PaO2    PaCO2     O2 sat      Cultures        X Treatment  Restart rocephin 2/5,Stopped Rocehphin 2-6-17 and added Levaquin-    DC Summary: 2/7    Supplemental O2      Other         Provider, please specify type of Community acquired pneumonia of right upper lobe of lung.    [ x ] Bacterial Pneumonia (Specify organism): _____strep pneumo_________________  [  ] Bacterial, Gram Negative organism Pneumonia  [  ] Viral Pneumonia (Specify virus): _______________________  [  ] Fungal Pneumonia (Specify organism): _______________________  [  ] Aspiration Pneumonia  [  ] Other type of pneumonia (please specify): ______________________________________  [  ] Clinically undetermined    Please document in your progress notes daily for the duration of treatment, until resolved, and include in your discharge summary.    .                                                                                    "

## 2018-02-08 NOTE — PROGRESS NOTES
[facility] Medicine  Progress Note    Patient Name: Stephany Skinner  MRN: [id number]  Patient Class: IP- Swing   Admission Date: 2/7/2018  Length of Stay: 1 days  Attending Physician: Morris Jain MD  Primary Care Provider: Pipo Flowers MD        Subjective:     Principal Problem:Debility    HPI:  84 yo WF initially admitted with with CHF after missing several doses of lasix; Hospital course complicated by  pneumonia / acute resp failure and neuromuscular changes, possible Myasthenia gravis, confirmation labs pending. Neuro noted some mild ptosis and gaze restriction as well as hypophonic voice concerning for neuro-muscular cause of hypercapnia.  Responding to steroids.   Overdiuresed and developed PUJA-improving.   On bipap- tolerated all night. Prednisone taper initiated per neuro recs. Currently has been weaned to home dose O2- 2L [place],  CO2 and confusion better.  placed on skilled nursing 2-7-18 Ambulating with PT- ambulated 15ft yesterday - goal is 50; transfer training also performed.    Hospital Course:  No notes on file    Past Medical History:   Diagnosis Date    GERD (gastroesophageal reflux disease)     Hyperlipidemia     Hypertension        Past Surgical History:   Procedure Laterality Date    CHOLECYSTECTOMY      HERNIA REPAIR      HYSTERECTOMY      knee replacemene      deidra    THYROIDECTOMY         Review of patient's allergies indicates:   Allergen Reactions    Statins-hmg-coa reductase inhibitors      Other reaction(s): Unknown       Current Facility-Administered Medications on File Prior to Encounter   Medication    [DISCONTINUED] acetaminophen tablet 650 mg    [DISCONTINUED] amLODIPine tablet 2.5 mg    [DISCONTINUED] apixaban tablet 2.5 mg    [DISCONTINUED] atorvastatin tablet 10 mg    [DISCONTINUED] clopidogrel tablet 75 mg    [DISCONTINUED] dextrose 50% injection 12.5 g    [DISCONTINUED] dextrose 50% injection 25 g    [DISCONTINUED] glucagon  (human recombinant) injection 1 mg    [DISCONTINUED] glucose chewable tablet 16 g    [DISCONTINUED] glucose chewable tablet 24 g    [DISCONTINUED] insulin aspart pen 0-5 Units    [DISCONTINUED] ipratropium 0.02 % nebulizer solution 0.5 mg    [DISCONTINUED] levalbuterol nebulizer solution 1.25 mg    [DISCONTINUED] levoFLOXacin 250 mg/50 mL IVPB 250 mg    [DISCONTINUED] levothyroxine tablet 100 mcg    [DISCONTINUED] metoprolol succinate (TOPROL-XL) 24 hr tablet 200 mg    [DISCONTINUED] ondansetron injection 4 mg    [DISCONTINUED] pantoprazole EC tablet 40 mg    [DISCONTINUED] predniSONE tablet 40 mg    [DISCONTINUED] pyridostigmine tablet 60 mg     Current Outpatient Prescriptions on File Prior to Encounter   Medication Sig    amlodipine (NORVASC) 2.5 MG tablet Take 1 tablet (2.5 mg total) by mouth once daily.    atorvastatin (LIPITOR) 10 MG tablet Take 1 tablet (10 mg total) by mouth every evening.    atorvastatin (LIPITOR) 20 MG tablet     clopidogrel (PLAVIX) 75 mg tablet Take 1 tablet (75 mg total) by mouth once daily.    cyanocobalamin (VITAMIN B-12) 100 MCG tablet Take 100 mcg by mouth once daily.    furosemide (LASIX) 40 MG tablet Take 1 tablet (40 mg total) by mouth once daily.    levothyroxine (SYNTHROID) 100 MCG tablet TAKE ONE TABLET BY MOUTH EVERY DAY    losartan (COZAAR) 100 MG tablet     meloxicam (MOBIC) 7.5 MG tablet TAKE ONE TABLET BY MOUTH EVERY OTHER DAY    metoprolol succinate (TOPROL-XL) 200 MG 24 hr tablet Take 1 tablet (200 mg total) by mouth once daily.    nitroGLYCERIN (NITROSTAT) 0.4 MG SL tablet Place 0.4 mg under the tongue every 5 (five) minutes as needed.    omega-3 acid ethyl esters (LOVAZA) 1 gram capsule Take 2 capsules (2 g total) by mouth 2 (two) times daily.    potassium chloride SA (K-DUR,KLOR-CON) 20 MEQ tablet Take 1 tablet (20 mEq total) by mouth once daily.    ranitidine (ZANTAC) 150 MG tablet Take 1 tablet (150 mg total) by mouth 2 (two) times daily.     VESICARE 10 mg tablet TAKE ONE TABLET BY MOUTH EVERY DAY     Family History     Problem Relation (Age of Onset)    Cancer Sister        Social History Main Topics    Smoking status: Never Smoker    Smokeless tobacco: Never Used    Alcohol use No    Drug use: No    Sexual activity: Not on file     Review of Systems   Constitutional: Positive for fatigue. Negative for activity change, fever and unexpected weight change.   HENT: Negative for congestion, ear pain, hearing loss, rhinorrhea and sore throat.    Eyes: Negative for pain, redness and visual disturbance.   Respiratory: Positive for cough (improve). Negative for shortness of breath and wheezing.    Cardiovascular: Negative for chest pain, palpitations and leg swelling.   Gastrointestinal: Negative for abdominal pain, constipation, diarrhea, nausea and vomiting.   Genitourinary: Negative for dysuria, frequency and urgency.   Musculoskeletal: Negative for back pain, joint swelling and neck pain.   Skin: Negative for color change, rash and wound.   Neurological: Positive for weakness. Negative for dizziness, tremors, light-headedness and headaches.     Objective:     Vital Signs (Most Recent):  Temp: 96.4 °F (35.8 °C) (02/08/18 0745)  Pulse: 67 (02/08/18 0816)  Resp: 18 (02/08/18 0745)  BP: 136/75 (02/08/18 0745)  SpO2: 96 % (02/08/18 0745) Vital Signs (24h Range):  Temp:  [96 °F (35.6 °C)-97.8 °F (36.6 °C)] 96.4 °F (35.8 °C)  Pulse:  [60-82] 67  Resp:  [16-24] 18  SpO2:  [91 %-98 %] 96 %  BP: (123-148)/(58-75) 136/75     Weight: 114.8 kg (253 lb 1.4 oz)  Body mass index is 39.64 kg/m².    Physical Exam   Constitutional: She is oriented to person, place, and time. She appears well-developed. No distress.   Morbidly obese   HENT:   Head: Normocephalic and atraumatic.   Macroglossia, open mouth breathing   Eyes: Conjunctivae are normal. Pupils are equal, round, and reactive to light.   No ptosis   Neck: Normal range of motion. Neck supple. No thyromegaly  present.   Cardiovascular: Normal rate, normal heart sounds and intact distal pulses.    Pulmonary/Chest: Effort normal and breath sounds normal. No respiratory distress. She has no wheezes.   Some rhonchi - clear with cough   Abdominal: Soft. Bowel sounds are normal. There is no tenderness.   Musculoskeletal: Normal range of motion. She exhibits edema.   Lymphadenopathy:     She has no cervical adenopathy.   Neurological: She is alert and oriented to person, place, and time.   Skin: Skin is warm and dry. No rash noted.   Venous stasis change   Psychiatric: She has a normal mood and affect. Her behavior is normal.   Nursing note and vitals reviewed.        CRANIAL NERVES     CN III, IV, VI   Pupils are equal, round, and reactive to light.       Significant Labs:   CBC:   Recent Labs  Lab 18  0537 18  0527   WBC 15.66* 15.47*   HGB 12.0 12.0   HCT 37.8 38.2    228     CMP:   Recent Labs  Lab 18  0536 18  0527    140   K 4.7 5.0    101   CO2 28 28   * 170*   * 96*   CREATININE 1.5* 1.5*   CALCIUM 8.6* 8.9   ANIONGAP 10 11   EGFRNONAA 32* 32*     BNP     Recent Labs  Lab 18  0622   BNP 69                Lab Results   Component Value Date     TSH 0.371 (L) 2018   T4 1.10  MG panel pending  procalcitonin 0.39     Recent Labs  Lab 18  1306   TROPONINI 0.018      resp viral panel negative        Significant Imagin/6/18 CXR- There are bilateral patchy opacities which are worse at the right lung base when compared to previous performed 3 days earlier. There is no pneumothorax. The remainder of the examination is unchanged.    2/3 CXR Mild decrease in right upper lobe and right perihilar infiltrate. Mild cardiomegaly. Atherosclerosis.     2/2 CXR The lungs are underexpanded.  There are chronic lung markings seen bilaterally with pulmonary vascular congestion and patchy opacities throughout both lungs.  This could be related to pulmonary edema  although superimposed infiltrate in the right upper lobe is not excluded.  The heart is enlarged.  Calcified atheromatous disease affects the aorta.  Age-appropriate degenerative changes affect the skeleton.     1/31 CTA lungs 1.  No CT evidence of central pulmonary embolus.  Severely limited study due to use motion artifact.  2.  Bilateral groundglass opacities and minimal dependent subsegmental atelectasis.     1/31 CT head 1.  No CT evidence of an acute intracranial abnormality with limitations due to motion artifact.  2.  Mild atrophy and minimal small vessel ischemic changes of the periventricular white matter.     Echo Echo 1/18: EF 60%, stage 1 diastolic dysfunction, no significant valvular pathology.      EKG a fib                   Pending Diagnostic Studies:      Procedure Component Value Units Date/Time     Myasthenia Gravis/Lambert-Eaton [[id number]] Collected:  02/02/18 1824     Order Status:  Sent Lab Status:  In process Updated:  02/02/18 2204     Specimen:  Blood                 Assessment/Plan:      * Debility      Place on skilled nursing 2/7/18 for prolonged hospitalization with complications, +deconditioned.  Needs to improve to baseline; pt resides in apt with 10 steps. Goal 50 feet. Currenlty ambulating 15feet.         Acute on chronic congestive heart failure    Initially tx for fluid overload after missed lasix rx;   2-D Echocardiogram for evaluation of left ventricle systolic function or any valvular heart disease-EF 55-60%, stage 1 diastolic dysfunction, no significant valvular pathology.  2-2-18 when she demonstrated volume depletion/weight loss with subsequent worsening renal function.   Diuretics discontinued due to acute renal insuff.  Cr was trending up, required IV fluids; creat now improving  Cardiology  Dr. Patel following     Daily weights.  Monitor  I/Os   Na restriction <2g/d.           PUJA (acute kidney injury)      Dialy  BMP today.Diuretics stopped.   Renal Fx improving with   IVF 2/5 at 100cc/hr;  feel her elevated Cr due to over diuresis  Hold losartan in acute injury; resume in future once stable. Monitor I&Os, weights, renal Fx.         Community acquired pneumonia of right upper lobe of lung    2/6/18 Started levofloxacin- day #3/5 today; continue Nebs - Bipap          Myasthenia gravis, adult form      Neuro eval documentin mild ptosis and gaze restriction as well as hypophonic voice concerning for neuro-muscular cause of hypercapnia. MG labs pending. Responding to steroids; follow neuro recs for taper   Dr. Ortiz following        Acute respiratory failure with hypoxia and hypercarbia    Co2 much better., tolerating BIPAP at night. Down to home Rx O2 2LNC    + Neuro eval noting  mild ptosis and gaze restriction as well as hypophonic voice concerning for neuro-muscular cause of hypercapnia. Responding to steroids.           VTE Risk Mitigation         Ordered     apixaban tablet 2.5 mg  2 times daily     Route:  Oral        02/07/18 1740     Medium Risk of VTE  Once      02/07/18 1740     Place sequential compression device  Until discontinued      02/07/18 1740              Krysten Marquez MD  Department of Hospital Medicine   [facility]

## 2018-02-08 NOTE — PLAN OF CARE
Problem: Patient Care Overview  Goal: Plan of Care Review  Outcome: Ongoing (interventions implemented as appropriate)  Vitals remained stable, afebrile. No complaints of pain. Participating in PT. O2 down to 2L NC sating in high 90s. Incontinent at times. Uneventful day. Discussed plan of care,stated understanding

## 2018-02-08 NOTE — PLAN OF CARE
Problem: Patient Care Overview  Goal: Plan of Care Review  Outcome: Ongoing (interventions implemented as appropriate)  Patient and daughter aware of plan of care. BIPAP worn throughout night, tolerated well. Glycemic monitoring. Daughter at bedside. No questions or concerns at this time. Agrees with plan of care.

## 2018-02-08 NOTE — ASSESSMENT & PLAN NOTE
Nutrition Diagnosis  Inadequate energy intake    Related to (etiology):   Decreased appetite    Signs and Symptoms (as evidenced by):   Reports decreased appetite, intake varies     Interventions/Recommendations (treatment strategy):  Cardiac Diabetic Diet  Boost Glucose with meals  Honor preferences per Diet order  See RD Full Note 2/8/18    Nutrition Diagnosis Status:   New

## 2018-02-08 NOTE — HPI
84 yo WF initially admitted with with CHF after missing several doses of lasix; Hospital course complicated by  pneumonia / acute resp failure and neuromuscular changes, possible Myasthenia gravis, confirmation labs pending. Neuro noted some mild ptosis and gaze restriction as well as hypophonic voice concerning for neuro-muscular cause of hypercapnia.  Responding to steroids.   Overdiuresed and developed PUJA-improving.   On bipap- tolerated all night. Prednisone taper initiated per neuro recs. Currently has been weaned to home dose O2- 2L [place],  CO2 and confusion better.  placed on skilled nursing 2-7-18 Ambulating with PT- ambulated 15ft yesterday - goal is 50; transfer training also performed.

## 2018-02-08 NOTE — ASSESSMENT & PLAN NOTE
Co2 much better., tolerating BIPAP at night. Down to home Rx O2 2LNC    + Neuro eval noting  mild ptosis and gaze restriction as well as hypophonic voice concerning for neuro-muscular cause of hypercapnia. Responding to steroids.

## 2018-02-08 NOTE — PROGRESS NOTES
"[facility]  Cardiology  Progress Note    Patient Name: Stephany Skinner  MRN: [id number]  Admission Date: 2/7/2018  Hospital Length of Stay: 1 days  Code Status: Full Code   Attending Physician: Morris Jain MD   Primary Care Physician: Pipo Flowers MD  Expected Discharge Date:   Principal Problem:<principal problem not specified>    Subjective:     Hospital Course: admit with SOB    Interval History: Dx with CHF / pneumonia / acute resp failure and Myasthenia gravis.    Diuresed.   PUJA-improving.   On bipap and prednisone.   CO2 and confusion better.  Ambulating with PT.   wearing bipap and doing better    ROS   "feeling better"  Constitution: Positive for weakness and malaise/fatigue.   HENT: Negative.    Eyes: Negative.    Cardiovascular: Negative.    Respiratory: Cough.  Endocrine: Negative.    Skin: Negative.    Musculoskeletal: Positive for muscle weakness.   Gastrointestinal: Negative.    Genitourinary: Negative.    Psychiatric/Behavioral: Negative.    Allergic/Immunologic: Negative    Objective:     Vital Signs (Most Recent):  Temp: 96.4 °F (35.8 °C) (02/08/18 0745)  Pulse: 70 (02/08/18 0745)  Resp: 18 (02/08/18 0745)  BP: 136/75 (02/08/18 0745)  SpO2: 96 % (02/08/18 0745) Vital Signs (24h Range):  Temp:  [96 °F (35.6 °C)-97.8 °F (36.6 °C)] 96.4 °F (35.8 °C)  Pulse:  [60-82] 70  Resp:  [16-24] 18  SpO2:  [91 %-98 %] 96 %  BP: (123-148)/(58-75) 136/75     Weight: 114.8 kg (253 lb 1.4 oz)  Body mass index is 39.64 kg/m².    SpO2: 96 %  O2 Device (Oxygen Therapy): nasal cannula      Intake/Output Summary (Last 24 hours) at 02/08/18 0837  Last data filed at 02/07/18 1700   Gross per 24 hour   Intake              240 ml   Output                0 ml   Net              240 ml       Lines/Drains/Airways     Peripheral Intravenous Line                 Peripheral IV - Single Lumen 02/01/18 1653 Right Wrist 6 days                Physical Exam  Constitutional: She is oriented to person, " place, and time. Morbidly obese.   Eyes: EOMI.   Neck: No JVD.  Obese neck.   Cardiovascular:   irregularly irregular   Pulmonary/Chest: Effort normal. No respiratory distress. CTA.  She has no rales.   Abdominal: Soft. Bowel sounds are normal. She exhibits no distension. There is no tenderness.   Musculoskeletal: She exhibits edema -mild and improving.  Neurological: She is alert and oriented to person, place, and time.   Skin: Skin is warm and dry.   Psychiatric: She has a normal mood. Nursing note and vitals reviewed.  More awake.  Significant Labs:   CMP   Recent Labs  Lab 02/07/18  0536 02/08/18  0527    140   K 4.7 5.0    101   CO2 28 28   * 170*   * 96*   CREATININE 1.5* 1.5*   CALCIUM 8.6* 8.9   ANIONGAP 10 11   ESTGFRAFRICA 37* 37*   EGFRNONAA 32* 32*    and CBC   Recent Labs  Lab 02/07/18  0537 02/08/18  0527   WBC 15.66* 15.47*   HGB 12.0 12.0   HCT 37.8 38.2    228       Significant Imaging: reviewed   2D echo with color flow doppler:   Results for orders placed or performed during the hospital encounter of 01/30/18   2D echo with color flow doppler   Result Value Ref Range     EF 55 55 - 65     Est. PA Systolic Pressure 31.81       Tricuspid Valve Regurgitation TRIVIAL TO MILD     2/3 CXR Mild decrease in right upper lobe and right perihilar infiltrate. Mild cardiomegaly. Atherosclerosis.     2/2 CXR The lungs are underexpanded.  There are chronic lung markings seen bilaterally with pulmonary vascular congestion and patchy opacities throughout both lungs.  This could be related to pulmonary edema although superimposed infiltrate in the right upper lobe is not excluded.  The heart is enlarged.  Calcified atheromatous disease affects the aorta.  Age-appropriate degenerative changes affect the skeleton.     1/31 CTA lungs 1.  No CT evidence of central pulmonary embolus.  Severely limited study due to use motion artifact.  2.  Bilateral groundglass opacities and minimal  dependent subsegmental atelectasis.     1/31 CT head 1.  No CT evidence of an acute intracranial abnormality with limitations due to motion artifact.  2.  Mild atrophy and minimal small vessel ischemic changes of the periventricular white matter.     Echo Echo 1/18: EF 60%, stage 1 diastolic dysfunction, no significant valvular pathology.      EKG a fib    Assessment and Plan:     Brief HPI:   Much improved; tolerated bipap last night  Ups and downs; now with possible Myasthenia Gravis; labs still pending  Resolved Confusion with CO2 narcosis/COPD,JOY--improved/resolving  AFIB v/s wandering PM; now anticoagulated and well controlled  Acute diastolic CHF due to decreased med compliance/high BP--now normotensive  Myasthenia gravis  Acute renal insufficiency--defer diuretics for now and montior  Also suspect some bronchitis with high WBC chills and cough--poss RUL infiltrate/?pneumonia  Worsening BARGER and orthopnea for the past 3 days- hasnt taken prescribed lasix x 4 days.   Chest X ray with pulmonary edema, elevated BNP, mild volume overload on exam. CT shows Ground glass opacities, NO PE   Echo 1/18: EF 60%, stage 1 diastolic dysfunction, no significant valvular pathology.   MPI 1/2013- mild apical reversible ischemia.   Carotid US- less than 40% bilateral 2017  Nuclear 1/13--small area ischemia apical anterior     Plan:  Keep as dry as possible but defer diuretics for now given PUJA, receiving gentle hydration  Cont eliquis for PAF/DVT prophylaxis  Cont amlodipine/atorvastatin/plavix/metoprolol    Active Diagnoses:    Diagnosis Date Noted POA    Acute on chronic congestive heart failure [I50.9] 02/07/2018 Yes      Problems Resolved During this Admission:    Diagnosis Date Noted Date Resolved POA       VTE Risk Mitigation         Ordered     apixaban tablet 2.5 mg  2 times daily     Route:  Oral        02/07/18 1740     Medium Risk of VTE  Once      02/07/18 1740     Place sequential compression device  Until  discontinued      02/07/18 1740          Zoey Blakely NP  Cardiology  [facility]    I attest that I have personally seen and examined this patient. I have reviewed and discussed the management in detail as outlined above.

## 2018-02-08 NOTE — ASSESSMENT & PLAN NOTE
Neuro eval documentin mild ptosis and gaze restriction as well as hypophonic voice concerning for neuro-muscular cause of hypercapnia. MG labs pending. Responding to steroids; follow neuro recs for taper   Dr. Ortiz following

## 2018-02-08 NOTE — PROGRESS NOTES
[facility]  Neurology  Progress Note     Patient Name: Stephany Skinner  MRN: [id number]  Admission Date: 1/30/2018  Code Status: Full Code   Attending Provider: Za Sandra MD   Consulting Provider: Katie Ortiz MD  Primary Care Physician: Pipo Flowers MD  Principal Problem:Acute respiratory failure with hypoxia and hypercarbia     Inpatient consult to Neurology  Consult performed by: KATIE ORTIZ  Consult ordered by: PILAR LIU    Inpatient consult to Neurology  Consult performed by: KATIE ORTIZ  Consult ordered by: AVTAR DISLA        Subjective:      Chief Complaint: deconditioning     HPI: Stephany Skinner is a 83 y.o. female known to me for radiculopathy and polyneuropathy. Here with respiratory failure. Found to have some mild ptosis and gaze restriction as well as hypophonic voice concerning for neuro-muscular cause of hypercapnia. She is now admitted to swing bed for deconditioning/more therapy.  She walked 15 feet with PT once today  She denies double vision, speech changes or focal weakness            Past Medical History:   Diagnosis Date    GERD (gastroesophageal reflux disease)      Hyperlipidemia      Hypertension                     Past Surgical History:   Procedure Laterality Date    CHOLECYSTECTOMY        HERNIA REPAIR        HYSTERECTOMY        knee replacemene         deidra    THYROIDECTOMY                       Review of patient's allergies indicates:   Allergen Reactions    Statins-hmg-coa reductase inhibitors         Other reaction(s): Unknown      I have reviewed all of this patient's past medical and surgical histories as well as family and social histories and active allergies and medications as documented in the electronic medical record.     No current facility-administered medications on file prior to encounter.               Current Outpatient Prescriptions on File Prior to Encounter   Medication Sig    amlodipine  (NORVASC) 2.5 MG tablet Take 1 tablet (2.5 mg total) by mouth once daily.    atorvastatin (LIPITOR) 10 MG tablet Take 1 tablet (10 mg total) by mouth every evening.    atorvastatin (LIPITOR) 20 MG tablet      clopidogrel (PLAVIX) 75 mg tablet Take 1 tablet (75 mg total) by mouth once daily.    cyanocobalamin (VITAMIN B-12) 100 MCG tablet Take 100 mcg by mouth once daily.    furosemide (LASIX) 40 MG tablet Take 1 tablet (40 mg total) by mouth once daily.    levothyroxine (SYNTHROID) 100 MCG tablet TAKE ONE TABLET BY MOUTH EVERY DAY    losartan (COZAAR) 100 MG tablet      meloxicam (MOBIC) 7.5 MG tablet TAKE ONE TABLET BY MOUTH EVERY OTHER DAY    metoprolol succinate (TOPROL-XL) 200 MG 24 hr tablet Take 1 tablet (200 mg total) by mouth once daily.    omega-3 acid ethyl esters (LOVAZA) 1 gram capsule Take 2 capsules (2 g total) by mouth 2 (two) times daily.    potassium chloride SA (K-DUR,KLOR-CON) 20 MEQ tablet Take 1 tablet (20 mEq total) by mouth once daily.    ranitidine (ZANTAC) 150 MG tablet Take 1 tablet (150 mg total) by mouth 2 (two) times daily.    VESICARE 10 mg tablet TAKE ONE TABLET BY MOUTH EVERY DAY    nitroGLYCERIN (NITROSTAT) 0.4 MG SL tablet Place 0.4 mg under the tongue every 5 (five) minutes as needed.              Family History      Problem Relation (Age of Onset)     Cancer Sister                  Social History Main Topics    Smoking status: Never Smoker    Smokeless tobacco: Never Used    Alcohol use No    Drug use: No    Sexual activity: Not on file      Review of Systems   Constitutional: Negative for fever.   HENT: Negative for nosebleeds.    Eyes:Negative for visual disturbance  Respiratory: Negative for shortness of breath    Cardiovascular: Negative for chest pain.   Gastrointestinal: Negative for blood in stool.   Genitourinary: Negative for hematuria.   Musculoskeletal: Negative for gait problem.   Skin: Negative for rash.   Neurological: Negative for facial  asymmetry and numbness.        Reports she has been eating well and swallowing well.    Psychiatric/Behavioral: Negative for confusion     Objective:      Vitals:    02/08/18 1211 02/08/18 1305 02/08/18 1450 02/08/18 1603   BP:    124/66   BP Location:       Patient Position:    Lying   Pulse: 64 71 72 78   Resp:  16  16   Temp:    98.2 °F (36.8 °C)   TempSrc:    Tympanic   SpO2:  97%  (!) 93%   Weight:       Height:                    Weight: 118.5 kg (261 lb 3.9 oz)  Body mass index is 40.92 kg/m².     Physical Exam        Exam:  Gen Appearance, well developed/nourished in no apparent distress  CV: 2+ distal pulses with no edema or swelling  Neuro:  MS: Awake, alert,Sustains attention. Oriented to place,situation, person and partially to  time.  Recent recall is intact/remote memory intact, Language is full to spontaneous speech/comprehension. Fund of Knowledge is full  CN: Optic discs are flat with normal vasculature, PERRL, Extraoccular movements and visual fields today without ptosis or diplopia (improved from last week) . Normal facial sensation and strength, Hearing symmetric, Tongue and Palate are midline and strong. Shoulder Shrug symmetric and strong.  Voice is improved- not as hypophonic as prior but still hypophonic.   Motor: Normal bulk, tone, no abnormal movements. 5/5 strength bilateral upper/lower extremities except perhaps decreased to 5/5 in the proximal arms with 1+ reflexes in the arms, and are less in the legs and no clonus  Sensory:  Romberg not done and intact to temp and vibration  Cerebellar: Finger-nose,Rapid alternating movements intact  Gait: Not done today (reviewed PT notes), but at baseline: Wide based stance, some sensory ataxia- uses walker     Significant Labs: BMP, CBC today reviewed. WBC count stable, hyperglycemia not worse  Glucose improved but WBC count is elevated.CO2 normal    MG panel pending        Significant Imaging: CT head 1/31/2018: 1.  No CT evidence of an acute  intracranial abnormality with limitations due to motion artifact.  2.  Mild atrophy and minimal small vessel ischemic changes of the periventricular white matter.    CT chest: no thymoma        Assessment and Plan:   Stephany Skinner is a 83 y.o. female known to me for radiculopathy and polyneuropathy. Here with respiratory failure. Found to have some mild ptosis and gaze restriction as well as hypophonic voice concerning for neuro-muscular cause of hypercapnia.   Now admitted to swing bed for deconditioning/therapy     I recommend:           * Acute respiratory failure with hypoxia and hypercarbia     -This patient had markedly different exam this admission compared to 9 months ago with her new symptoms of diplopia and hypophonic voice. That combined with hypercapnia wasconcerning for neuromuscular weakness- all of this improving on steroids as expected.   -Follow up  Myasthenia gravis panel. If negative, consider outpatient rep stim by EMG and rule out of CIDP (seems less likely)  by EMG    -Changed Solumedrol to  Prednisone 40mg daily for a week (today is day 3/7). Then recommend taper to Prednisone  30mg daily for a week, then 20mg daily until next seeing Neurology outpatient post d/c (within 3 weeks) unless worsening symptoms again. Monitor for any further decompensation  -Blood glucose is improved- hopefully this will continue on lower dose steroids/ primary team monitoring- she has impaired fasting glucose at baseline.   -Goal gait with PT is 50 Feet- walked 15 today   -Primary team also treating pneumonia. Benefits of treatment outweigh any MG risk at this point unless she clinically deteriorates with further muscle weakness.           Chronic atrial fibrillation     - no signs of remote CVA by CT head. Rate controlled and on NOAC per Dr Patel.  -MRI not needed at this point       Acute on chronic congestive heart failure     -Cardiology following       Hypothyroidism     -currently euthymic on treatment        JOY (obstructive sleep apnea)     -improved respiratory status/ encourage bipap at night          I will not be available to see this patient until 2/19/18. Please continue her steroid taper as outlined above.  She would need more aggressive treatment and monitoring for any further decline in her respiratory or neuromuscular status.  Otherwise, follow up with me per d/c summary.

## 2018-02-08 NOTE — PROGRESS NOTES
Staff Handoff    Bedside report received from ROSA ISELA Delaney. Patient sleeping. NAD. 2L O2 per NC, tolerating well. Daughter at bedside. Call bell in reach.  Resident Handoff

## 2018-02-08 NOTE — ASSESSMENT & PLAN NOTE
Initially tx for fluid overload after missed lasix rx;   2-D Echocardiogram for evaluation of left ventricle systolic function or any valvular heart disease-EF 55-60%, stage 1 diastolic dysfunction, no significant valvular pathology.  2-2-18 when she demonstrated volume depletion/weight loss with subsequent worsening renal function.   Diuretics discontinued due to acute renal insuff.  Cr was trending up, required IV fluids; creat now improving  Cardiology  Dr. Patel following     Daily weights.  Monitor  I/Os   Na restriction <2g/d.

## 2018-02-08 NOTE — PROGRESS NOTES
[facility]  Adult Nutrition  Progress Note    SUMMARY     Recommendations    Recommendation/Intervention: Change diet to Cardiac DIabetic as tolerated. Boost Glucose with meals if pt consumes trial today. Honor preferences per diet order  Goals: adequate po intake >=75% by discharge  Nutrition Goal Status: new  Communication of MAYDA Recs: discussed on rounds    Nutrition Discharge Planning: Cardiac Diabetic Diet with adequate intake to meet EEN & EPN    Continuum of Care Plan    Referral to Outpatient Services: home care    Reason for Assessment    Reason for Assessment: per organizational policy, other (see comments) (SWING)  Diagnosis: cardiac disease  Relevent Medical History: HTN, HLD, GERD, CHF   Interdisciplinary Rounds: attended     General Information Comments: Pt reports poor appetite at this time. She sates she does not like the grits and eggs just her preferences. She would like to try some cereal in the morning. Discussed trying Boost Glucose for additional calories    Nutrition Prescription Ordered    Current Diet Order: Diabetic  Nutrition Order Comments: 50% intake      Evaluation of Received Nutrients/Fluid Intake    Energy Calories Required: not meeting needs  Protein Required: not meeting needs  Fluid Required: meeting needs     Tolerance: tolerating  % Intake of Estimated Energy Needs: 50 - 75 %  % Meal Intake: 50%     Nutrition Risk Screen     Nutrition Risk Screen: no indicators present    Nutrition/Diet History    Food Preferences: no cultural or Yarsanism preferences        Factors Affecting Nutritional Intake: decreased appetite    Labs/Tests/Procedures/Meds    Diagnostic Test/Procedure Review: reviewed, pertinent  Pertinent Labs Reviewed: reviewed, pertinent  Pertinent Labs Comments: Glu 170H, BUN 96H, Cr 1.5H, GFR 32A, POCT Glu 187-257  Pertinent Medications Reviewed: reviewed, pertinent  Pertinent Medications Comments: atorvastin, pantoprazole, prednisone    Physical  "Findings    Overall Physical Appearance: on oxygen therapy, obese  Tubes:  (-)  Oral/Mouth Cavity: no grinding or difficulty chewing, no pain or sensitivity  Skin:  (toe ulcer)    Anthropometrics    Temp: 96.4 °F (35.8 °C)     Height: 5' 7" (170.2 cm)  Weight Method: Bed Scale  Weight: 114.8 kg (253 lb 1.4 oz)     Ideal Body Weight (IBW), Female: 135 lb     % Ideal Body Weight, Female (lb): 187.47 lb  BMI (Calculated): 39.7  BMI Grade: 35 - 39.9 - obesity - grade II    Estimated/Assessed Needs    Weight Used For Calorie Calculations: 114.8 kg (253 lb 1.4 oz)         Energy Need Method: [place]-St Jeor (No AF BMI >25)     RMR ([place]-St. Jeor Equation): 1640.62        Weight Used For Protein Calculations: 114.8 kg (253 lb 1.4 oz)  Protein Requirements:  (0.8-1.0)  0.8 gm Protein (gm): 92.03 and 1.0 gm Protein (gm): 115.04  Fluid Requirements (mL): 1ml/kcal or per MD     CHO Requirement: 50% calories     Assessment and Plan    Acute respiratory failure with hypoxia and hypercarbia    Nutrition Diagnosis  Inadequate energy intake    Related to (etiology):   Decreased appetite    Signs and Symptoms (as evidenced by):   Reports decreased appetite, intake varies     Interventions/Recommendations (treatment strategy):  Cardiac Diabetic Diet  Boost Glucose with meals  Honor preferences per Diet order  See RD Full Note 2/8/18    Nutrition Diagnosis Status:   New              Monitor and Evaluation    Food and Nutrient Intake: food and beverage intake  Food and Nutrient Adminstration: diet order  Knowledge/Beliefs/Attitudes: food and nutrition knowledge/skill, beliefs and attitudes  Physical Activity and Function: nutrition-related ADLs and IADLs  Anthropometric Measurements: weight change, weight  Biochemical Data, Medical Tests and Procedures: electrolyte and renal panel, glucose/endocrine profile, gastrointestinal profile  Nutrition-Focused Physical Findings: overall appearance    Nutrition Risk    Level of Risk:  " (f/U 2x/wk)    Nutrition Follow-Up    RD Follow-up?: Yes

## 2018-02-08 NOTE — PLAN OF CARE
Problem: Patient Care Overview  Goal: Plan of Care Review  Outcome: Ongoing (interventions implemented as appropriate)  Nutrition Goals: adequate po intake >=75% by discharge  Nutrition Goal Status: new  Communication of RD Recs: discussed on rounds    Nutrition Discharge Planning: Cardiac Diabetic Diet with adequate intake to meet EEN & EPN    Continuum of Care PlanReferral to Outpatient Services: home care

## 2018-02-08 NOTE — SUBJECTIVE & OBJECTIVE
Past Medical History:   Diagnosis Date    GERD (gastroesophageal reflux disease)     Hyperlipidemia     Hypertension        Past Surgical History:   Procedure Laterality Date    CHOLECYSTECTOMY      HERNIA REPAIR      HYSTERECTOMY      knee replacemene      deidra    THYROIDECTOMY         Review of patient's allergies indicates:   Allergen Reactions    Statins-hmg-coa reductase inhibitors      Other reaction(s): Unknown       Current Facility-Administered Medications on File Prior to Encounter   Medication    [DISCONTINUED] acetaminophen tablet 650 mg    [DISCONTINUED] amLODIPine tablet 2.5 mg    [DISCONTINUED] apixaban tablet 2.5 mg    [DISCONTINUED] atorvastatin tablet 10 mg    [DISCONTINUED] clopidogrel tablet 75 mg    [DISCONTINUED] dextrose 50% injection 12.5 g    [DISCONTINUED] dextrose 50% injection 25 g    [DISCONTINUED] glucagon (human recombinant) injection 1 mg    [DISCONTINUED] glucose chewable tablet 16 g    [DISCONTINUED] glucose chewable tablet 24 g    [DISCONTINUED] insulin aspart pen 0-5 Units    [DISCONTINUED] ipratropium 0.02 % nebulizer solution 0.5 mg    [DISCONTINUED] levalbuterol nebulizer solution 1.25 mg    [DISCONTINUED] levoFLOXacin 250 mg/50 mL IVPB 250 mg    [DISCONTINUED] levothyroxine tablet 100 mcg    [DISCONTINUED] metoprolol succinate (TOPROL-XL) 24 hr tablet 200 mg    [DISCONTINUED] ondansetron injection 4 mg    [DISCONTINUED] pantoprazole EC tablet 40 mg    [DISCONTINUED] predniSONE tablet 40 mg    [DISCONTINUED] pyridostigmine tablet 60 mg     Current Outpatient Prescriptions on File Prior to Encounter   Medication Sig    amlodipine (NORVASC) 2.5 MG tablet Take 1 tablet (2.5 mg total) by mouth once daily.    atorvastatin (LIPITOR) 10 MG tablet Take 1 tablet (10 mg total) by mouth every evening.    atorvastatin (LIPITOR) 20 MG tablet     clopidogrel (PLAVIX) 75 mg tablet Take 1 tablet (75 mg total) by mouth once daily.    cyanocobalamin (VITAMIN  B-12) 100 MCG tablet Take 100 mcg by mouth once daily.    furosemide (LASIX) 40 MG tablet Take 1 tablet (40 mg total) by mouth once daily.    levothyroxine (SYNTHROID) 100 MCG tablet TAKE ONE TABLET BY MOUTH EVERY DAY    losartan (COZAAR) 100 MG tablet     meloxicam (MOBIC) 7.5 MG tablet TAKE ONE TABLET BY MOUTH EVERY OTHER DAY    metoprolol succinate (TOPROL-XL) 200 MG 24 hr tablet Take 1 tablet (200 mg total) by mouth once daily.    nitroGLYCERIN (NITROSTAT) 0.4 MG SL tablet Place 0.4 mg under the tongue every 5 (five) minutes as needed.    omega-3 acid ethyl esters (LOVAZA) 1 gram capsule Take 2 capsules (2 g total) by mouth 2 (two) times daily.    potassium chloride SA (K-DUR,KLOR-CON) 20 MEQ tablet Take 1 tablet (20 mEq total) by mouth once daily.    ranitidine (ZANTAC) 150 MG tablet Take 1 tablet (150 mg total) by mouth 2 (two) times daily.    VESICARE 10 mg tablet TAKE ONE TABLET BY MOUTH EVERY DAY     Family History     Problem Relation (Age of Onset)    Cancer Sister        Social History Main Topics    Smoking status: Never Smoker    Smokeless tobacco: Never Used    Alcohol use No    Drug use: No    Sexual activity: Not on file     Review of Systems   Constitutional: Positive for fatigue. Negative for activity change, fever and unexpected weight change.   HENT: Negative for congestion, ear pain, hearing loss, rhinorrhea and sore throat.    Eyes: Negative for pain, redness and visual disturbance.   Respiratory: Positive for cough (improve). Negative for shortness of breath and wheezing.    Cardiovascular: Negative for chest pain, palpitations and leg swelling.   Gastrointestinal: Negative for abdominal pain, constipation, diarrhea, nausea and vomiting.   Genitourinary: Negative for dysuria, frequency and urgency.   Musculoskeletal: Negative for back pain, joint swelling and neck pain.   Skin: Negative for color change, rash and wound.   Neurological: Positive for weakness. Negative for  dizziness, tremors, light-headedness and headaches.     Objective:     Vital Signs (Most Recent):  Temp: 96.4 °F (35.8 °C) (02/08/18 0745)  Pulse: 67 (02/08/18 0816)  Resp: 18 (02/08/18 0745)  BP: 136/75 (02/08/18 0745)  SpO2: 96 % (02/08/18 0745) Vital Signs (24h Range):  Temp:  [96 °F (35.6 °C)-97.8 °F (36.6 °C)] 96.4 °F (35.8 °C)  Pulse:  [60-82] 67  Resp:  [16-24] 18  SpO2:  [91 %-98 %] 96 %  BP: (123-148)/(58-75) 136/75     Weight: 114.8 kg (253 lb 1.4 oz)  Body mass index is 39.64 kg/m².    Physical Exam   Constitutional: She is oriented to person, place, and time. She appears well-developed. No distress.   Morbidly obese   HENT:   Head: Normocephalic and atraumatic.   Macroglossia, open mouth breathing   Eyes: Conjunctivae are normal. Pupils are equal, round, and reactive to light.   No ptosis   Neck: Normal range of motion. Neck supple. No thyromegaly present.   Cardiovascular: Normal rate, normal heart sounds and intact distal pulses.    Pulmonary/Chest: Effort normal and breath sounds normal. No respiratory distress. She has no wheezes.   Some rhonchi - clear with cough   Abdominal: Soft. Bowel sounds are normal. There is no tenderness.   Musculoskeletal: Normal range of motion. She exhibits edema.   Lymphadenopathy:     She has no cervical adenopathy.   Neurological: She is alert and oriented to person, place, and time.   Skin: Skin is warm and dry. No rash noted.   Venous stasis change   Psychiatric: She has a normal mood and affect. Her behavior is normal.   Nursing note and vitals reviewed.        CRANIAL NERVES     CN III, IV, VI   Pupils are equal, round, and reactive to light.       Significant Labs:   CBC:   Recent Labs  Lab 02/07/18  0537 02/08/18  0527   WBC 15.66* 15.47*   HGB 12.0 12.0   HCT 37.8 38.2    228     CMP:   Recent Labs  Lab 02/07/18  0536 02/08/18  0527    140   K 4.7 5.0    101   CO2 28 28   * 170*   * 96*   CREATININE 1.5* 1.5*   CALCIUM 8.6* 8.9    ANIONGAP 10 11   EGFRNONAA 32* 32*     BNP     Recent Labs  Lab 18  0622   BNP 69                Lab Results   Component Value Date     TSH 0.371 (L) 2018   T4 1.10  MG panel pending  procalcitonin 0.39     Recent Labs  Lab 18  1306   TROPONINI 0.018      resp viral panel negative        Significant Imagin/6/18 CXR- There are bilateral patchy opacities which are worse at the right lung base when compared to previous performed 3 days earlier. There is no pneumothorax. The remainder of the examination is unchanged.    2/3 CXR Mild decrease in right upper lobe and right perihilar infiltrate. Mild cardiomegaly. Atherosclerosis.      CXR The lungs are underexpanded.  There are chronic lung markings seen bilaterally with pulmonary vascular congestion and patchy opacities throughout both lungs.  This could be related to pulmonary edema although superimposed infiltrate in the right upper lobe is not excluded.  The heart is enlarged.  Calcified atheromatous disease affects the aorta.  Age-appropriate degenerative changes affect the skeleton.      CTA lungs 1.  No CT evidence of central pulmonary embolus.  Severely limited study due to use motion artifact.  2.  Bilateral groundglass opacities and minimal dependent subsegmental atelectasis.      CT head 1.  No CT evidence of an acute intracranial abnormality with limitations due to motion artifact.  2.  Mild atrophy and minimal small vessel ischemic changes of the periventricular white matter.     Echo Echo : EF 60%, stage 1 diastolic dysfunction, no significant valvular pathology.      EKG a fib                   Pending Diagnostic Studies:      Procedure Component Value Units Date/Time     Myasthenia Gravis/Lambert-Eaton [[id number]] Collected:  18     Order Status:  Sent Lab Status:  In process Updated:  18     Specimen:  Blood

## 2018-02-09 LAB
ACHR BIND AB SER-SCNC: 0 NMOL/L
ACHR MOD AB/ACHR TOTAL SFR SER: 0 %
ANION GAP SERPL CALC-SCNC: 8 MMOL/L
BASOPHILS # BLD AUTO: ABNORMAL K/UL
BASOPHILS NFR BLD: 0 %
BUN SERPL-MCNC: 84 MG/DL
CALCIUM SERPL-MCNC: 8.7 MG/DL
CHLORIDE SERPL-SCNC: 100 MMOL/L
CO2 SERPL-SCNC: 30 MMOL/L
CREAT SERPL-MCNC: 1.3 MG/DL
DIFFERENTIAL METHOD: ABNORMAL
EOSINOPHIL # BLD AUTO: ABNORMAL K/UL
EOSINOPHIL NFR BLD: 0 %
ERYTHROCYTE [DISTWIDTH] IN BLOOD BY AUTOMATED COUNT: 14.8 %
EST. GFR  (AFRICAN AMERICAN): 44 ML/MIN/1.73 M^2
EST. GFR  (NON AFRICAN AMERICAN): 38 ML/MIN/1.73 M^2
GLUCOSE SERPL-MCNC: 206 MG/DL
HCT VFR BLD AUTO: 36.2 %
HGB BLD-MCNC: 11.7 G/DL
LYMPHOCYTES # BLD AUTO: ABNORMAL K/UL
LYMPHOCYTES NFR BLD: 2 %
MCH RBC QN AUTO: 31 PG
MCHC RBC AUTO-ENTMCNC: 32.3 G/DL
MCV RBC AUTO: 96 FL
METAMYELOCYTES NFR BLD MANUAL: 1 %
MONOCYTES # BLD AUTO: ABNORMAL K/UL
MONOCYTES NFR BLD: 4 %
NEUTROPHILS NFR BLD: 92 %
NEUTS BAND NFR BLD MANUAL: 1 %
PLATELET # BLD AUTO: 237 K/UL
PMV BLD AUTO: 9.5 FL
POCT GLUCOSE: 218 MG/DL (ref 70–110)
POCT GLUCOSE: 252 MG/DL (ref 70–110)
POCT GLUCOSE: 320 MG/DL (ref 70–110)
POTASSIUM SERPL-SCNC: 5.2 MMOL/L
RBC # BLD AUTO: 3.78 M/UL
SODIUM SERPL-SCNC: 138 MMOL/L
STRIA MUS AB TITR SER: NEGATIVE TITER
VGCC-N BIND AB SER-SCNC: 0 NMOL/L
VGCC-N BIND AB SER-SCNC: NORMAL PMOL/L
VGCC-P/Q BIND AB SER-SCNC: 0.01 NMOL/L
WBC # BLD AUTO: 15.39 K/UL

## 2018-02-09 PROCEDURE — 99232 SBSQ HOSP IP/OBS MODERATE 35: CPT | Mod: ,,, | Performed by: INTERNAL MEDICINE

## 2018-02-09 PROCEDURE — 63600175 PHARM REV CODE 636 W HCPCS: Performed by: NURSE PRACTITIONER

## 2018-02-09 PROCEDURE — 85027 COMPLETE CBC AUTOMATED: CPT

## 2018-02-09 PROCEDURE — 25000003 PHARM REV CODE 250: Performed by: NURSE PRACTITIONER

## 2018-02-09 PROCEDURE — 94640 AIRWAY INHALATION TREATMENT: CPT

## 2018-02-09 PROCEDURE — 94761 N-INVAS EAR/PLS OXIMETRY MLT: CPT

## 2018-02-09 PROCEDURE — 11000004 HC SNF PRIVATE

## 2018-02-09 PROCEDURE — 97530 THERAPEUTIC ACTIVITIES: CPT

## 2018-02-09 PROCEDURE — 94660 CPAP INITIATION&MGMT: CPT

## 2018-02-09 PROCEDURE — 85007 BL SMEAR W/DIFF WBC COUNT: CPT

## 2018-02-09 PROCEDURE — 99900035 HC TECH TIME PER 15 MIN (STAT)

## 2018-02-09 PROCEDURE — 80048 BASIC METABOLIC PNL TOTAL CA: CPT

## 2018-02-09 PROCEDURE — 36415 COLL VENOUS BLD VENIPUNCTURE: CPT

## 2018-02-09 PROCEDURE — 27000221 HC OXYGEN, UP TO 24 HOURS

## 2018-02-09 PROCEDURE — 25000242 PHARM REV CODE 250 ALT 637 W/ HCPCS: Performed by: NURSE PRACTITIONER

## 2018-02-09 RX ADMIN — PYRIDOSTIGMINE BROMIDE 60 MG: 60 TABLET ORAL at 05:02

## 2018-02-09 RX ADMIN — LEVOFLOXACIN 250 MG: 5 INJECTION, SOLUTION INTRAVENOUS at 11:02

## 2018-02-09 RX ADMIN — LEVOTHYROXINE SODIUM 100 MCG: 100 TABLET ORAL at 08:02

## 2018-02-09 RX ADMIN — LEVALBUTEROL 1.25 MG: 1.25 SOLUTION, CONCENTRATE RESPIRATORY (INHALATION) at 06:02

## 2018-02-09 RX ADMIN — LEVALBUTEROL 1.25 MG: 1.25 SOLUTION, CONCENTRATE RESPIRATORY (INHALATION) at 01:02

## 2018-02-09 RX ADMIN — IPRATROPIUM BROMIDE 0.5 MG: 0.5 SOLUTION RESPIRATORY (INHALATION) at 01:02

## 2018-02-09 RX ADMIN — IPRATROPIUM BROMIDE 0.5 MG: 0.5 SOLUTION RESPIRATORY (INHALATION) at 06:02

## 2018-02-09 RX ADMIN — INSULIN ASPART 1 UNITS: 100 INJECTION, SOLUTION INTRAVENOUS; SUBCUTANEOUS at 09:02

## 2018-02-09 RX ADMIN — LEVALBUTEROL 1.25 MG: 1.25 SOLUTION, CONCENTRATE RESPIRATORY (INHALATION) at 12:02

## 2018-02-09 RX ADMIN — AMLODIPINE BESYLATE 2.5 MG: 2.5 TABLET ORAL at 08:02

## 2018-02-09 RX ADMIN — PREDNISONE 40 MG: 20 TABLET ORAL at 08:02

## 2018-02-09 RX ADMIN — IPRATROPIUM BROMIDE 0.5 MG: 0.5 SOLUTION RESPIRATORY (INHALATION) at 12:02

## 2018-02-09 RX ADMIN — METOPROLOL SUCCINATE 200 MG: 50 TABLET, EXTENDED RELEASE ORAL at 08:02

## 2018-02-09 RX ADMIN — PANTOPRAZOLE SODIUM 40 MG: 40 TABLET, DELAYED RELEASE ORAL at 08:02

## 2018-02-09 RX ADMIN — INSULIN ASPART 2 UNITS: 100 INJECTION, SOLUTION INTRAVENOUS; SUBCUTANEOUS at 07:02

## 2018-02-09 RX ADMIN — APIXABAN 2.5 MG: 2.5 TABLET, FILM COATED ORAL at 09:02

## 2018-02-09 RX ADMIN — INSULIN ASPART 4 UNITS: 100 INJECTION, SOLUTION INTRAVENOUS; SUBCUTANEOUS at 05:02

## 2018-02-09 RX ADMIN — APIXABAN 2.5 MG: 2.5 TABLET, FILM COATED ORAL at 08:02

## 2018-02-09 RX ADMIN — ATORVASTATIN CALCIUM 10 MG: 10 TABLET, FILM COATED ORAL at 09:02

## 2018-02-09 RX ADMIN — PYRIDOSTIGMINE BROMIDE 60 MG: 60 TABLET ORAL at 09:02

## 2018-02-09 RX ADMIN — PYRIDOSTIGMINE BROMIDE 60 MG: 60 TABLET ORAL at 02:02

## 2018-02-09 RX ADMIN — CLOPIDOGREL BISULFATE 75 MG: 75 TABLET ORAL at 08:02

## 2018-02-09 NOTE — PT/OT/SLP PROGRESS
"Physical Therapy      Patient Name:  Stephany Skinner   MRN:  [id number]    Patient not seen today secondary to Patient unwilling to participate (Pt asleep upon entry to room. Pt stated, "I really need to rest. Come back tomorrow."). Will follow-up tomorrow.    Jacki Rutherford, PT    "

## 2018-02-09 NOTE — PT/OT/SLP PROGRESS
"Physical Therapy Treatment    Patient Name:  Stephany Skinner   MRN:  [id number]    Recommendations:     Discharge Recommendations:  home with home health, home health PT   Discharge Equipment Recommendations: wheelchair   Barriers to discharge: None    Assessment:     Stephany Skinner is a 83 y.o. female admitted with a medical diagnosis of Debility.  She presents with the following impairments/functional limitations:  weakness, impaired endurance, impaired self care skills, impaired functional mobilty, gait instability, impaired balance, decreased upper extremity function, decreased lower extremity function, decreased safety awareness . Pt continues with difficulty with T/F and ambulation today. Pt also with diarrhea and inability to control bladder with activity.    Rehab Prognosis:  Fair; patient would benefit from acute skilled PT services to address these deficits and reach maximum level of function.      Recent Surgery: * No surgery found *      Plan:     During this hospitalization, patient to be seen  (1-2x/day Monday-Friday; PRN Weekends/Holidays) to address the above listed problems via gait training, therapeutic activities, therapeutic exercises  · Plan of Care Expires:   (upon D/C from facility)   Plan of Care Reviewed with: patient    Subjective     Communicated with patient prior to session.  Patient found up in chair upon PT entry to room, agreeable to treatment.      Chief Complaint: "Get stronger"  Patient comments/goals: "Go home"  Pain/Comfort:  · Pain Rating 1: 0/10    Patients cultural, spiritual, Protestant conflicts given the current situation:      Objective:     Patient found with: telemetry, oxygen     General Precautions: Standard, fall   Orthopedic Precautions:    Braces: N/A     Functional Mobility:  · Transfers:     · Sit to Stand:  minimum assistance with rolling walker  · Bed to Chair: minimum assistance with  rolling walker  using  Step Transfer  · Toilet Transfer: minimum assistance " with  rolling walker  using  Step Transfer  · Gait: Gait training 5 feet with RW and min assist with cueing for sequencing and proper use of AD to decrease pt fall risk.       AM-PAC 6 CLICK MOBILITY  Turning over in bed (including adjusting bedclothes, sheets and blankets)?: 2  Sitting down on and standing up from a chair with arms (e.g., wheelchair, bedside commode, etc.): 2  Moving from lying on back to sitting on the side of the bed?: 2  Moving to and from a bed to a chair (including a wheelchair)?: 2  Need to walk in hospital room?: 2  Climbing 3-5 steps with a railing?: 1  Total Score: 11     Patient left up in chair with all lines intact, call button in reach, NSG notified and family present..    GOALS:    Physical Therapy Goals        Problem: Physical Therapy Goal    Goal Priority Disciplines Outcome Goal Variances Interventions   Physical Therapy Goal     PT/OT, PT Ongoing (interventions implemented as appropriate)     Description:  Goals to be met by: 2/15/2018     Patient will increase functional independence with mobility by performin. Supine to sit with Stand-by Assistance  2. Sit to supine with Stand-by Assistance  3. Rolling to Left and Right with Stand-by Assistance.  4. Sit to stand transfer with Stand-by Assistance, with RW  5. Bed to chair transfer with Stand-by Assistance using Rolling Walker  6. Gait  x 50 feet with Stand-by Assistance using Rolling Walker.   7. Ascend/descend 3 stairs with bilateral Handrails Minimal Assistance               Multidisciplinary Problems (Resolved)        Problem: Physical Therapy Goal    Goal Priority Disciplines Outcome Goal Variances Interventions   Physical Therapy Goal   (Resolved)     PT/OT, PT  Error                     Time Tracking:     PT Received On: 18  PT Start Time: 1025     PT Stop Time: 1055  PT Total Time (min): 30 min     Billable Minutes: Therapeutic Activity 30 minutes    Treatment Type: Treatment  PT/PTA: PT            Jacki Rutherford, PT  02/09/2018

## 2018-02-09 NOTE — SUBJECTIVE & OBJECTIVE
Past Medical History:   Diagnosis Date    GERD (gastroesophageal reflux disease)     Hyperlipidemia     Hypertension        Past Surgical History:   Procedure Laterality Date    CHOLECYSTECTOMY      HERNIA REPAIR      HYSTERECTOMY      knee replacemene      deidra    THYROIDECTOMY         Review of patient's allergies indicates:   Allergen Reactions    Statins-hmg-coa reductase inhibitors      Other reaction(s): Unknown       No current facility-administered medications on file prior to encounter.      Current Outpatient Prescriptions on File Prior to Encounter   Medication Sig    amlodipine (NORVASC) 2.5 MG tablet Take 1 tablet (2.5 mg total) by mouth once daily.    atorvastatin (LIPITOR) 10 MG tablet Take 1 tablet (10 mg total) by mouth every evening.    atorvastatin (LIPITOR) 20 MG tablet     clopidogrel (PLAVIX) 75 mg tablet Take 1 tablet (75 mg total) by mouth once daily.    cyanocobalamin (VITAMIN B-12) 100 MCG tablet Take 100 mcg by mouth once daily.    furosemide (LASIX) 40 MG tablet Take 1 tablet (40 mg total) by mouth once daily.    levothyroxine (SYNTHROID) 100 MCG tablet TAKE ONE TABLET BY MOUTH EVERY DAY    losartan (COZAAR) 100 MG tablet     meloxicam (MOBIC) 7.5 MG tablet TAKE ONE TABLET BY MOUTH EVERY OTHER DAY    metoprolol succinate (TOPROL-XL) 200 MG 24 hr tablet Take 1 tablet (200 mg total) by mouth once daily.    nitroGLYCERIN (NITROSTAT) 0.4 MG SL tablet Place 0.4 mg under the tongue every 5 (five) minutes as needed.    omega-3 acid ethyl esters (LOVAZA) 1 gram capsule Take 2 capsules (2 g total) by mouth 2 (two) times daily.    potassium chloride SA (K-DUR,KLOR-CON) 20 MEQ tablet Take 1 tablet (20 mEq total) by mouth once daily.    ranitidine (ZANTAC) 150 MG tablet Take 1 tablet (150 mg total) by mouth 2 (two) times daily.    VESICARE 10 mg tablet TAKE ONE TABLET BY MOUTH EVERY DAY     Family History     Problem Relation (Age of Onset)    Cancer Sister        Social  History Main Topics    Smoking status: Never Smoker    Smokeless tobacco: Never Used    Alcohol use No    Drug use: No    Sexual activity: Not on file     Review of Systems   Constitutional: Positive for fatigue. Negative for activity change, fever and unexpected weight change.   HENT: Negative for congestion, ear pain, hearing loss, rhinorrhea and sore throat.    Eyes: Negative for pain, redness and visual disturbance.   Respiratory: Positive for cough (improve). Negative for shortness of breath and wheezing.    Cardiovascular: Negative for chest pain, palpitations and leg swelling.   Gastrointestinal: Negative for abdominal pain, constipation, diarrhea, nausea and vomiting.   Genitourinary: Negative for dysuria, frequency and urgency.   Musculoskeletal: Negative for back pain, joint swelling and neck pain.   Skin: Negative for color change, rash and wound.   Neurological: Positive for weakness (generalized, worse ont he left). Negative for dizziness, tremors, light-headedness and headaches.     Objective:     Vital Signs (Most Recent):  Temp: 97.4 °F (36.3 °C) (02/09/18 0355)  Pulse: 66 (02/09/18 0800)  Resp: 20 (02/09/18 0618)  BP: 133/62 (02/09/18 0355)  SpO2: 98 % (02/09/18 0618) Vital Signs (24h Range):  Temp:  [97.2 °F (36.2 °C)-98.7 °F (37.1 °C)] 97.4 °F (36.3 °C)  Pulse:  [57-78] 66  Resp:  [16-23] 20  SpO2:  [92 %-99 %] 98 %  BP: (103-133)/(52-66) 133/62     Weight: 114.8 kg (253 lb 1.4 oz)  Body mass index is 39.64 kg/m².    Physical Exam   Constitutional: She is oriented to person, place, and time. She appears well-developed. No distress.   Morbidly obese   HENT:   Head: Normocephalic and atraumatic.   Right Ear: External ear normal.   Left Ear: External ear normal.   Macroglossia, open mouth breathing   Eyes: Conjunctivae and EOM are normal. Pupils are equal, round, and reactive to light.   Neck: Normal range of motion. Neck supple. No tracheal deviation present.   Cardiovascular: Normal rate,  regular rhythm and normal heart sounds.    Pulmonary/Chest: Effort normal and breath sounds normal. No respiratory distress. She has no wheezes.   Abdominal: Soft. Bowel sounds are normal. There is no tenderness.   Musculoskeletal: Normal range of motion. She exhibits edema.   Neurological: She is alert and oriented to person, place, and time.   Skin: Skin is warm and dry. No rash noted.   Venous stasis change   Psychiatric: She has a normal mood and affect. Her behavior is normal.   Nursing note and vitals reviewed.        CRANIAL NERVES     CN III, IV, VI   Pupils are equal, round, and reactive to light.  Extraocular motions are normal.        Significant Labs:   CBC:     Recent Labs  Lab 18  0518  0625   WBC 15.47* 15.39*   HGB 12.0 11.7*   HCT 38.2 36.2*    237     CMP:     Recent Labs  Lab 18  0527 18  0625    138   K 5.0 5.2*    100   CO2 28 30*   * 206*   BUN 96* 84*   CREATININE 1.5* 1.3   CALCIUM 8.9 8.7   ANIONGAP 11 8   EGFRNONAA 32* 38*     BNP     Recent Labs  Lab 18  0622   BNP 69                Lab Results   Component Value Date     TSH 0.371 (L) 2018   T4 1.10  MG panel pending  procalcitonin 0.39     Recent Labs  Lab 18  1306   TROPONINI 0.018      resp viral panel negative        Significant Imagin/6/18 CXR- There are bilateral patchy opacities which are worse at the right lung base when compared to previous performed 3 days earlier. There is no pneumothorax. The remainder of the examination is unchanged.    2/3 CXR Mild decrease in right upper lobe and right perihilar infiltrate. Mild cardiomegaly. Atherosclerosis.     2/2 CXR The lungs are underexpanded.  There are chronic lung markings seen bilaterally with pulmonary vascular congestion and patchy opacities throughout both lungs.  This could be related to pulmonary edema although superimposed infiltrate in the right upper lobe is not excluded.  The heart is enlarged.   Calcified atheromatous disease affects the aorta.  Age-appropriate degenerative changes affect the skeleton.     1/31 CTA lungs 1.  No CT evidence of central pulmonary embolus.  Severely limited study due to use motion artifact.  2.  Bilateral groundglass opacities and minimal dependent subsegmental atelectasis.     1/31 CT head 1.  No CT evidence of an acute intracranial abnormality with limitations due to motion artifact.  2.  Mild atrophy and minimal small vessel ischemic changes of the periventricular white matter.     Echo Echo 1/18: EF 60%, stage 1 diastolic dysfunction, no significant valvular pathology.      EKG a fib                   Pending Diagnostic Studies:      Procedure Component Value Units Date/Time     Myasthenia Gravis/Lambert-Eaton [[id number]] Collected:  02/02/18 1824     Order Status:  Sent Lab Status:  In process Updated:  02/02/18 2204     Specimen:  Blood

## 2018-02-09 NOTE — PROGRESS NOTES
[facility] Hospital Medicine  Progress Note    Patient Name: Stephany Skinner  MRN: [id number]  Patient Class: IP- Swing   Admission Date: 2/7/2018  Length of Stay: 2 days  Attending Physician: Morris Jain MD  Primary Care Provider: Pipo Flowers MD        Subjective:     Principal Problem:Debility    HPI:  84 yo WF initially admitted with with CHF after missing several doses of lasix; Hospital course complicated by  pneumonia / acute resp failure and neuromuscular changes, possible Myasthenia gravis, confirmation labs pending. Neuro noted some mild ptosis and gaze restriction as well as hypophonic voice concerning for neuro-muscular cause of hypercapnia.  Responding to steroids.   Overdiuresed and developed PUJA-improving.   On bipap- tolerated all night. Prednisone taper initiated per neuro recs. Currently has been weaned to home dose O2- 2L [place],  CO2 and confusion better.  placed on skilled nursing 2-7-18 Ambulating with PT- ambulated 15ft yesterday - goal is 50; transfer training also performed.    Hospital Course:  82 YO WF maintained on skilled since 2-7-18  for debility. Goal ambulation 50 feet. (has 10 steps/stairs at home)  Renal fx is much better after recent over-diuresing. Cr 1.3 today. Will need to monitor closely to avoid fluid volume overload.   Feeling ok this am.       Past Medical History:   Diagnosis Date    GERD (gastroesophageal reflux disease)     Hyperlipidemia     Hypertension        Past Surgical History:   Procedure Laterality Date    CHOLECYSTECTOMY      HERNIA REPAIR      HYSTERECTOMY      knee replacemene      deidra    THYROIDECTOMY         Review of patient's allergies indicates:   Allergen Reactions    Statins-hmg-coa reductase inhibitors      Other reaction(s): Unknown       No current facility-administered medications on file prior to encounter.      Current Outpatient Prescriptions on File Prior to Encounter   Medication Sig    amlodipine  (NORVASC) 2.5 MG tablet Take 1 tablet (2.5 mg total) by mouth once daily.    atorvastatin (LIPITOR) 10 MG tablet Take 1 tablet (10 mg total) by mouth every evening.    atorvastatin (LIPITOR) 20 MG tablet     clopidogrel (PLAVIX) 75 mg tablet Take 1 tablet (75 mg total) by mouth once daily.    cyanocobalamin (VITAMIN B-12) 100 MCG tablet Take 100 mcg by mouth once daily.    furosemide (LASIX) 40 MG tablet Take 1 tablet (40 mg total) by mouth once daily.    levothyroxine (SYNTHROID) 100 MCG tablet TAKE ONE TABLET BY MOUTH EVERY DAY    losartan (COZAAR) 100 MG tablet     meloxicam (MOBIC) 7.5 MG tablet TAKE ONE TABLET BY MOUTH EVERY OTHER DAY    metoprolol succinate (TOPROL-XL) 200 MG 24 hr tablet Take 1 tablet (200 mg total) by mouth once daily.    nitroGLYCERIN (NITROSTAT) 0.4 MG SL tablet Place 0.4 mg under the tongue every 5 (five) minutes as needed.    omega-3 acid ethyl esters (LOVAZA) 1 gram capsule Take 2 capsules (2 g total) by mouth 2 (two) times daily.    potassium chloride SA (K-DUR,KLOR-CON) 20 MEQ tablet Take 1 tablet (20 mEq total) by mouth once daily.    ranitidine (ZANTAC) 150 MG tablet Take 1 tablet (150 mg total) by mouth 2 (two) times daily.    VESICARE 10 mg tablet TAKE ONE TABLET BY MOUTH EVERY DAY     Family History     Problem Relation (Age of Onset)    Cancer Sister        Social History Main Topics    Smoking status: Never Smoker    Smokeless tobacco: Never Used    Alcohol use No    Drug use: No    Sexual activity: Not on file     Review of Systems   Constitutional: Positive for fatigue. Negative for activity change, fever and unexpected weight change.   HENT: Negative for congestion, ear pain, hearing loss, rhinorrhea and sore throat.    Eyes: Negative for pain, redness and visual disturbance.   Respiratory: Positive for cough (improve). Negative for shortness of breath and wheezing.    Cardiovascular: Negative for chest pain, palpitations and leg swelling.    Gastrointestinal: Negative for abdominal pain, constipation, diarrhea, nausea and vomiting.   Genitourinary: Negative for dysuria, frequency and urgency.   Musculoskeletal: Negative for back pain, joint swelling and neck pain.   Skin: Negative for color change, rash and wound.   Neurological: Positive for weakness (generalized, worse ont he left). Negative for dizziness, tremors, light-headedness and headaches.     Objective:     Vital Signs (Most Recent):  Temp: 97.4 °F (36.3 °C) (02/09/18 0355)  Pulse: 66 (02/09/18 0800)  Resp: 20 (02/09/18 0618)  BP: 133/62 (02/09/18 0355)  SpO2: 98 % (02/09/18 0618) Vital Signs (24h Range):  Temp:  [97.2 °F (36.2 °C)-98.7 °F (37.1 °C)] 97.4 °F (36.3 °C)  Pulse:  [57-78] 66  Resp:  [16-23] 20  SpO2:  [92 %-99 %] 98 %  BP: (103-133)/(52-66) 133/62     Weight: 114.8 kg (253 lb 1.4 oz)  Body mass index is 39.64 kg/m².    Physical Exam   Constitutional: She is oriented to person, place, and time. She appears well-developed. No distress.   Morbidly obese   HENT:   Head: Normocephalic and atraumatic.   Right Ear: External ear normal.   Left Ear: External ear normal.   Macroglossia, open mouth breathing   Eyes: Conjunctivae and EOM are normal. Pupils are equal, round, and reactive to light.   Neck: Normal range of motion. Neck supple. No tracheal deviation present.   Cardiovascular: Normal rate, regular rhythm and normal heart sounds.    Pulmonary/Chest: Effort normal and breath sounds normal. No respiratory distress. She has no wheezes.   Abdominal: Soft. Bowel sounds are normal. There is no tenderness.   Musculoskeletal: Normal range of motion. She exhibits edema.   Neurological: She is alert and oriented to person, place, and time.   Skin: Skin is warm and dry. No rash noted.   Venous stasis change   Psychiatric: She has a normal mood and affect. Her behavior is normal.   Nursing note and vitals reviewed.        CRANIAL NERVES     CN III, IV, VI   Pupils are equal, round, and  reactive to light.  Extraocular motions are normal.        Significant Labs:   CBC:     Recent Labs  Lab 18  0527 18  0625   WBC 15.47* 15.39*   HGB 12.0 11.7*   HCT 38.2 36.2*    237     CMP:     Recent Labs  Lab 18  0527 18  0625    138   K 5.0 5.2*    100   CO2 28 30*   * 206*   BUN 96* 84*   CREATININE 1.5* 1.3   CALCIUM 8.9 8.7   ANIONGAP 11 8   EGFRNONAA 32* 38*     BNP     Recent Labs  Lab 18  0622   BNP 69                Lab Results   Component Value Date     TSH 0.371 (L) 2018   T4 1.10  MG panel pending  procalcitonin 0.39     Recent Labs  Lab 18  1306   TROPONINI 0.018      resp viral panel negative        Significant Imagin/6/18 CXR- There are bilateral patchy opacities which are worse at the right lung base when compared to previous performed 3 days earlier. There is no pneumothorax. The remainder of the examination is unchanged.    /3 CXR Mild decrease in right upper lobe and right perihilar infiltrate. Mild cardiomegaly. Atherosclerosis.      CXR The lungs are underexpanded.  There are chronic lung markings seen bilaterally with pulmonary vascular congestion and patchy opacities throughout both lungs.  This could be related to pulmonary edema although superimposed infiltrate in the right upper lobe is not excluded.  The heart is enlarged.  Calcified atheromatous disease affects the aorta.  Age-appropriate degenerative changes affect the skeleton.      CTA lungs 1.  No CT evidence of central pulmonary embolus.  Severely limited study due to use motion artifact.  2.  Bilateral groundglass opacities and minimal dependent subsegmental atelectasis.      CT head 1.  No CT evidence of an acute intracranial abnormality with limitations due to motion artifact.  2.  Mild atrophy and minimal small vessel ischemic changes of the periventricular white matter.     Echo Echo : EF 60%, stage 1 diastolic dysfunction, no  significant valvular pathology.      EKG a fib                   Pending Diagnostic Studies:      Procedure Component Value Units Date/Time     Myasthenia Gravis/Lambert-Eaton [[id number]] Collected:  02/02/18 1824     Order Status:  Sent Lab Status:  In process Updated:  02/02/18 2204     Specimen:  Blood                 Assessment/Plan:      * Debility      Place on skilled nursing 2/7/18 for prolonged hospitalization with complications, +deconditioned.  Needs to improve to baseline; pt resides in apt with 10 steps. Goal 50 feet. Currenlty ambulating         Physical deconditioning    Continue with PT on skilled- ^^ activity as tolerated- get back to baseling        Acute on chronic congestive heart failure    Initially tx for fluid overload after missed lasix rx;   2-D Echocardiogram for evaluation of left ventricle systolic function or any valvular heart disease-EF 55-60%, stage 1 diastolic dysfunction, no significant valvular pathology.  2-2-18 when she demonstrated volume depletion/weight loss with subsequent worsening renal function.   Diuretics discontinued due to acute renal insuff.  Cr was trending up, required IV fluids; creat now improving. Off IVF  Cardiology  Dr. Patel following     Daily weights.  Monitor  I/Os   Na restriction <2g/d.           PUJA (acute kidney injury)      Dialy  BMP today.Diuretics stopped.   Renal Fx improving;  feel her elevated Cr due to over diuresis  Hold losartan in acute injury; resume in future once stable. Monitor I&Os, weights, renal Fx.         Community acquired pneumonia of right upper lobe of lung    2/6/18 Started levofloxacin- day #4/5 today (d/c 2/10/18); continue Nebs - Bipap          Myasthenia gravis, adult form      Neuro eval documentin mild ptosis and gaze restriction as well as hypophonic voice concerning for neuro-muscular cause of hypercapnia. MG labs pending. Responding to steroids; follow neuro recs for taper Prednisone 40mg daily for a week (started 2/7).  Then would need taper to Prednisone   30mg daily for a week, then 20mg until next seeing Neurology outpatient post d/c unless worsening symptoms again.    Dr. Ortiz following        Acute respiratory failure with hypoxia and hypercarbia    Co2 much better., tolerating BIPAP at night. Down to home Rx O2 2LNC    + Neuro eval noting  mild ptosis and gaze restriction as well as hypophonic voice concerning for neuro-muscular cause of hypercapnia. Responding to steroids.           VTE Risk Mitigation         Ordered     apixaban tablet 2.5 mg  2 times daily     Route:  Oral        02/07/18 1740     Medium Risk of VTE  Once      02/07/18 1740     Place sequential compression device  Until discontinued      02/07/18 1740              Edna Mooney MD  Department of Hospital Medicine   [facility]

## 2018-02-09 NOTE — PROGRESS NOTES
Staff Handoff    Bedside report received from ROSA ISELA Delaney. Patient resting in bed. Family at bedside. NAD. 2L O2 per NC, tolerating well. Call bell in reach.   Resident Handoff

## 2018-02-09 NOTE — ASSESSMENT & PLAN NOTE
Initially tx for fluid overload after missed lasix rx;   2-D Echocardiogram for evaluation of left ventricle systolic function or any valvular heart disease-EF 55-60%, stage 1 diastolic dysfunction, no significant valvular pathology.  2-2-18 when she demonstrated volume depletion/weight loss with subsequent worsening renal function.   Diuretics discontinued due to acute renal insuff.  Cr was trending up, required IV fluids; creat now improving. Off IVF  Cardiology  Dr. Patel following     Daily weights.  Monitor  I/Os   Na restriction <2g/d.

## 2018-02-09 NOTE — ASSESSMENT & PLAN NOTE
Neuro eval documentin mild ptosis and gaze restriction as well as hypophonic voice concerning for neuro-muscular cause of hypercapnia. MG labs pending. Responding to steroids; follow neuro recs for taper Prednisone 40mg daily for a week (started 2/7). Then would need taper to Prednisone   30mg daily for a week, then 20mg until next seeing Neurology outpatient post d/c unless worsening symptoms again.    Dr. Ortiz following

## 2018-02-09 NOTE — HOSPITAL COURSE
"82 YO WF maintained on skilled since 2-7-18  for debility. Goal ambulation 50 feet. (has 10 steps/stairs at home)  Renal fx is much better after recent over-diuresing. Cr 1.3 today. Will need to monitor closely to avoid fluid volume overload.   Feeling ok this am.       2/12/18  Progressing well with PT. Walked 40 ft, goal 50ft. Lantus started due to steroid induced hyperglycemia. She reports burning on the tongue that started yesterday.     2/14/18  Pt progressing well with Pt; ambulated to lobby over the weekend.   However, She has not done any step walking yet and has 10 stairs at home.   She is feeling much better. Did not wear bipap much last night. Does not use home bipap but uses CPAP at home.  Does wear oxygen 2lnc.   Encouraged to use CPAP tonight and will work on step walking.   She notes mask does not fit her right and aggravates her- [facility]ses [facility]; will consult for help with fitting  Addendum* pt unable to climb stairs and scared;  discussed with fly will try to get moved to downstairs apt. Due to debility; also CM discussed advising against driving in future    2/16/18 Has met goal of 50 feet but now having problem of getting up stairs at home apt. Lower floor currently not available. Skilled NSG at [facility] home until able to get floor apt/ possibly on Monday d/c to [facility] home.  Up in chair. Feeling well. Only recent c/o LE- which is chornic; wound care suggests 6" ACE wrap; unable to fit SCDs.    2/17/18 she was planned to go home to house but unable to climb stairs. She has decided to go to [place] to complete her therapy. This is planned for today. She is actually off her O2 and pox > 90.    2/19/18  MG was negative but will cont to wean steroids slowly. Decrease by 10mg every Thursday. Insulin will be weaned with steroids as she was not on this prior to admission. Cont pyridostigmine until f/u with Dr Ortiz    She is doing well today. Ready for d/c to nursing home, no complaints. " VSS/afebrile. Labs yesterday stable

## 2018-02-09 NOTE — PLAN OF CARE
Problem: Patient Care Overview  Goal: Plan of Care Review  Outcome: Ongoing (interventions implemented as appropriate)  Patient aware of plan of care. Patient had good night. Tolerating BIPAP, but does complain of mask being too tight. [place] [facility] [place]. A-fib on tele monitor. glycemic monitoring. No falls/injuries. No questions or concerns at this time. Family at bedside. Agrees with plan of care.

## 2018-02-09 NOTE — PROGRESS NOTES
Pt complaining of pain with both medium and large full face mask.  Changed pt to nasal mask; states she feels much better.

## 2018-02-09 NOTE — PROGRESS NOTES
Placed pt on BiPAP at this time with settings as charted. No distress noted; resting comfortably.

## 2018-02-09 NOTE — ASSESSMENT & PLAN NOTE
Place on skilled nursing 2/7/18 for prolonged hospitalization with complications, +deconditioned.  Needs to improve to baseline; pt resides in apt with 10 steps. Goal 50 feet. Currenlty ambulating

## 2018-02-09 NOTE — NURSING
Pt complaint of BIPAP mask being too tight. Redness noted to forehead. 2L O2 per NC applied. Respiratory informed. NAD. O2 sat 95%.

## 2018-02-09 NOTE — ASSESSMENT & PLAN NOTE
Dialy  BMP today.Diuretics stopped.   Renal Fx improving;  feel her elevated Cr due to over diuresis  Hold losartan in acute injury; resume in future once stable. Monitor I&Os, weights, renal Fx.

## 2018-02-10 LAB
ANION GAP SERPL CALC-SCNC: 9 MMOL/L
BASOPHILS # BLD AUTO: 0.02 K/UL
BASOPHILS NFR BLD: 0.2 %
BUN SERPL-MCNC: 70 MG/DL
CALCIUM SERPL-MCNC: 8.9 MG/DL
CHLORIDE SERPL-SCNC: 101 MMOL/L
CO2 SERPL-SCNC: 29 MMOL/L
CREAT SERPL-MCNC: 1.2 MG/DL
DIFFERENTIAL METHOD: ABNORMAL
EOSINOPHIL # BLD AUTO: 0.1 K/UL
EOSINOPHIL NFR BLD: 0.8 %
ERYTHROCYTE [DISTWIDTH] IN BLOOD BY AUTOMATED COUNT: 14.5 %
EST. GFR  (AFRICAN AMERICAN): 49 ML/MIN/1.73 M^2
EST. GFR  (NON AFRICAN AMERICAN): 42 ML/MIN/1.73 M^2
GLUCOSE SERPL-MCNC: 181 MG/DL
HCT VFR BLD AUTO: 36.7 %
HGB BLD-MCNC: 11.9 G/DL
LYMPHOCYTES # BLD AUTO: 0.9 K/UL
LYMPHOCYTES NFR BLD: 6.7 %
MCH RBC QN AUTO: 31.2 PG
MCHC RBC AUTO-ENTMCNC: 32.4 G/DL
MCV RBC AUTO: 96 FL
MONOCYTES # BLD AUTO: 1.3 K/UL
MONOCYTES NFR BLD: 9.7 %
NEUTROPHILS # BLD AUTO: 10.8 K/UL
NEUTROPHILS NFR BLD: 82.6 %
PLATELET # BLD AUTO: 269 K/UL
PMV BLD AUTO: 9.4 FL
POCT GLUCOSE: 200 MG/DL (ref 70–110)
POCT GLUCOSE: 230 MG/DL (ref 70–110)
POCT GLUCOSE: 248 MG/DL (ref 70–110)
POCT GLUCOSE: 291 MG/DL (ref 70–110)
POTASSIUM SERPL-SCNC: 5.1 MMOL/L
RBC # BLD AUTO: 3.81 M/UL
SODIUM SERPL-SCNC: 139 MMOL/L
WBC # BLD AUTO: 13.02 K/UL

## 2018-02-10 PROCEDURE — 80048 BASIC METABOLIC PNL TOTAL CA: CPT

## 2018-02-10 PROCEDURE — 94761 N-INVAS EAR/PLS OXIMETRY MLT: CPT

## 2018-02-10 PROCEDURE — 97530 THERAPEUTIC ACTIVITIES: CPT

## 2018-02-10 PROCEDURE — 25000003 PHARM REV CODE 250: Performed by: NURSE PRACTITIONER

## 2018-02-10 PROCEDURE — 97116 GAIT TRAINING THERAPY: CPT

## 2018-02-10 PROCEDURE — 99900035 HC TECH TIME PER 15 MIN (STAT)

## 2018-02-10 PROCEDURE — 11000004 HC SNF PRIVATE

## 2018-02-10 PROCEDURE — 82962 GLUCOSE BLOOD TEST: CPT

## 2018-02-10 PROCEDURE — 27000221 HC OXYGEN, UP TO 24 HOURS

## 2018-02-10 PROCEDURE — 85025 COMPLETE CBC W/AUTO DIFF WBC: CPT

## 2018-02-10 PROCEDURE — 63600175 PHARM REV CODE 636 W HCPCS: Performed by: INTERNAL MEDICINE

## 2018-02-10 PROCEDURE — 25000242 PHARM REV CODE 250 ALT 637 W/ HCPCS: Performed by: NURSE PRACTITIONER

## 2018-02-10 PROCEDURE — 63600175 PHARM REV CODE 636 W HCPCS: Performed by: NURSE PRACTITIONER

## 2018-02-10 PROCEDURE — 94640 AIRWAY INHALATION TREATMENT: CPT

## 2018-02-10 PROCEDURE — 36415 COLL VENOUS BLD VENIPUNCTURE: CPT

## 2018-02-10 PROCEDURE — 94660 CPAP INITIATION&MGMT: CPT

## 2018-02-10 RX ADMIN — INSULIN ASPART 2 UNITS: 100 INJECTION, SOLUTION INTRAVENOUS; SUBCUTANEOUS at 11:02

## 2018-02-10 RX ADMIN — METOPROLOL SUCCINATE 200 MG: 50 TABLET, EXTENDED RELEASE ORAL at 08:02

## 2018-02-10 RX ADMIN — INSULIN ASPART 1 UNITS: 100 INJECTION, SOLUTION INTRAVENOUS; SUBCUTANEOUS at 09:02

## 2018-02-10 RX ADMIN — PANTOPRAZOLE SODIUM 40 MG: 40 TABLET, DELAYED RELEASE ORAL at 08:02

## 2018-02-10 RX ADMIN — LEVOTHYROXINE SODIUM 100 MCG: 100 TABLET ORAL at 08:02

## 2018-02-10 RX ADMIN — LEVALBUTEROL 1.25 MG: 1.25 SOLUTION, CONCENTRATE RESPIRATORY (INHALATION) at 06:02

## 2018-02-10 RX ADMIN — APIXABAN 2.5 MG: 2.5 TABLET, FILM COATED ORAL at 09:02

## 2018-02-10 RX ADMIN — IPRATROPIUM BROMIDE 0.5 MG: 0.5 SOLUTION RESPIRATORY (INHALATION) at 06:02

## 2018-02-10 RX ADMIN — INSULIN DETEMIR 15 UNITS: 100 INJECTION, SOLUTION SUBCUTANEOUS at 09:02

## 2018-02-10 RX ADMIN — LEVALBUTEROL 1.25 MG: 1.25 SOLUTION, CONCENTRATE RESPIRATORY (INHALATION) at 01:02

## 2018-02-10 RX ADMIN — LEVALBUTEROL 1.25 MG: 1.25 SOLUTION, CONCENTRATE RESPIRATORY (INHALATION) at 07:02

## 2018-02-10 RX ADMIN — PYRIDOSTIGMINE BROMIDE 60 MG: 60 TABLET ORAL at 10:02

## 2018-02-10 RX ADMIN — ATORVASTATIN CALCIUM 10 MG: 10 TABLET, FILM COATED ORAL at 09:02

## 2018-02-10 RX ADMIN — IPRATROPIUM BROMIDE 0.5 MG: 0.5 SOLUTION RESPIRATORY (INHALATION) at 07:02

## 2018-02-10 RX ADMIN — AMLODIPINE BESYLATE 2.5 MG: 2.5 TABLET ORAL at 08:02

## 2018-02-10 RX ADMIN — LEVOFLOXACIN 250 MG: 5 INJECTION, SOLUTION INTRAVENOUS at 11:02

## 2018-02-10 RX ADMIN — PYRIDOSTIGMINE BROMIDE 60 MG: 60 TABLET ORAL at 02:02

## 2018-02-10 RX ADMIN — INSULIN ASPART 3 UNITS: 100 INJECTION, SOLUTION INTRAVENOUS; SUBCUTANEOUS at 04:02

## 2018-02-10 RX ADMIN — APIXABAN 2.5 MG: 2.5 TABLET, FILM COATED ORAL at 08:02

## 2018-02-10 RX ADMIN — PYRIDOSTIGMINE BROMIDE 60 MG: 60 TABLET ORAL at 06:02

## 2018-02-10 RX ADMIN — CLOPIDOGREL BISULFATE 75 MG: 75 TABLET ORAL at 08:02

## 2018-02-10 RX ADMIN — IPRATROPIUM BROMIDE 0.5 MG: 0.5 SOLUTION RESPIRATORY (INHALATION) at 01:02

## 2018-02-10 RX ADMIN — PREDNISONE 40 MG: 20 TABLET ORAL at 08:02

## 2018-02-10 RX ADMIN — IPRATROPIUM BROMIDE 0.5 MG: 0.5 SOLUTION RESPIRATORY (INHALATION) at 12:02

## 2018-02-10 RX ADMIN — LEVALBUTEROL 1.25 MG: 1.25 SOLUTION, CONCENTRATE RESPIRATORY (INHALATION) at 12:02

## 2018-02-10 NOTE — PLAN OF CARE
Problem: Patient Care Overview  Goal: Plan of Care Review  Outcome: Ongoing (interventions implemented as appropriate)  Denies pain. Denies sob. Oxygen continued at 2L per nasal cannula. IV levaquin continued. Up in chair all day. Sat in front lobby, enjoyed visiting with family. V/S stable. Incontinent. Ambulating minimally with walker and assist. Swelling to BLE. Elevated when in chair. Patient and family understand plan of care and agrees.

## 2018-02-10 NOTE — PLAN OF CARE
Problem: Patient Care Overview  Goal: Plan of Care Review  Outcome: Ongoing (interventions implemented as appropriate)  Pt aware of plan of care. No questions or concerns at this time.    Problem: Fall Risk (Adult)  Goal: Absence of Falls  Patient will demonstrate the desired outcomes by discharge/transition of care.   Outcome: Ongoing (interventions implemented as appropriate)  Pt free of falls/incidents. Fall precautions maintained.

## 2018-02-10 NOTE — PT/OT/SLP PROGRESS
Physical Therapy Treatment    Patient Name:  Stephany Skinner   MRN:  [id number]    Recommendations:     Discharge Recommendations:      Discharge Equipment Recommendations:     Barriers to discharge: None    Assessment:     Stephany Skinner is a 83 y.o. female admitted with a medical diagnosis of Debility.  She presents with the following impairments/functional limitations:    weakness, debility decreased walking endurance.    Rehab Prognosis:  Good minus; patient would benefit from acute skilled PT services to address these deficits and reach maximum level of function.      Recent Surgery: * No surgery found *      Plan:     During this hospitalization, patient to be seen  (1-2x/day(M-F); PRN(Sat.,Sun.)) to address the above listed problems via gait training, therapeutic activities, therapeutic exercises  · Plan of Care Expires:   (Upon D/C from facility)   Plan of Care Reviewed with: patient, daughter    Subjective     Communicated with patient and daughter prior to session.  Patient found up in bed side chair upon PT entry to room, agreeable to treatment.      Chief Complaint: weakness  Patient comments/goals: to improve enough to return home  Pain/Comfort:  · Pain Rating 1: 0/10  · Pain Rating Post-Intervention 1: 0/10    Patients cultural, spiritual, Amish conflicts given the current situation: none voiced    Objective:     Patient found with:       General Precautions: Standard, fall   Orthopedic Precautions:N/A   Braces:       Functional Mobility:  · Bed Mobility:     · Rolling Left:  moderate assistance  · Rolling Right: moderate assistance  · Supine to Sit: moderate assistance  · Sit to Supine: maximal assistance  · Transfers:     · Sit to Stand:  moderate assistance with rolling walker  · Bed to Chair: moderate assistance with  rolling walker  using  Stand Pivot  · Gait: 35 ft of gait training accomplished today with RW and min. assistance once able to arise from chair. portable oxygen use. Decreased  step and stride, decreased foot clearance fom floor,. improved upright posture during gait      AM-PAC 6 CLICK MOBILITY          Therapeutic Activities and Exercises:    Transfer  training : Bed and chair mobility tasks- required moderate assistance and verbal cues     Assistive therapeutic exercises for all four extremities with focus on bilateral lower extremity- strength and motoin facilitation.    Patient left up in chair with all lines intact, RN staff notified, daughter present and use of oxygen..    GOALS:    Physical Therapy Goals        Problem: Physical Therapy Goal    Goal Priority Disciplines Outcome Goal Variances Interventions   Physical Therapy Goal     PT/OT, PT Ongoing (interventions implemented as appropriate)     Description:  Goals to be met by: 2/15/2018     Patient will increase functional independence with mobility by performin. Supine to sit with Stand-by Assistance  2. Sit to supine with Stand-by Assistance  3. Rolling to Left and Right with Stand-by Assistance.  4. Sit to stand transfer with Stand-by Assistance, with RW  5. Bed to chair transfer with Stand-by Assistance using Rolling Walker  6. Gait  x 50 feet with Stand-by Assistance using Rolling Walker.   7. Ascend/descend 3 stairs with bilateral Handrails Minimal Assistance            Problem: Physical Therapy Goal    Goal Priority Disciplines Outcome Goal Variances Interventions   Physical Therapy Goal     PT/OT, PT      Description:  Pt will increase participation in established goal progress              Multidisciplinary Problems (Resolved)        Problem: Physical Therapy Goal    Goal Priority Disciplines Outcome Goal Variances Interventions   Physical Therapy Goal   (Resolved)     PT/OT, PT  Error                     Time Tracking:     PT Received On: 02/10/18  PT Start Time: 0835     PT Stop Time: 0900  PT Total Time (min): 25 min     Billable Minutes: Gait Training 15 min, Therapeutic Activity transfer training,  and therapeutic exercises as tolerated and Total Time 25min    Treatment Type: Treatment  PT/PTA: PT           Jordan Garcia, PT  02/10/2018

## 2018-02-10 NOTE — NURSING
Bedside report received from Summer.  No complaints of pain noted.  VS stable.  Pt on  2l nasal cannula.  Call bell in reach.  Will continue to monitor.

## 2018-02-10 NOTE — PROGRESS NOTES
Bedside report received from ROSA ISELA Renee. Patient lying in bed with Bipap on. Denies needs. Family at side. No distress noted.

## 2018-02-10 NOTE — PROGRESS NOTES
Patient was offered to sit in lobby today. Patient agreed. Patient said she enjoyed getting out the room. Patient said the outing made her day.

## 2018-02-10 NOTE — PLAN OF CARE
Problem: Patient Care Overview  Goal: Plan of Care Review  Outcome: Ongoing (interventions implemented as appropriate)  Pt agrees with plan of care.  VS stable.  Pt had BIPAP on this shift.  Family at bedside.  No complaints of pain noted.  Call bell in reach.  Bed alarm on.  Incontinent care provided. Pt turned q2 hours.  Will continue to monitor.

## 2018-02-11 LAB
ANION GAP SERPL CALC-SCNC: 9 MMOL/L
BASOPHILS # BLD AUTO: ABNORMAL K/UL
BASOPHILS NFR BLD: 0 %
BUN SERPL-MCNC: 61 MG/DL
CALCIUM SERPL-MCNC: 8.9 MG/DL
CHLORIDE SERPL-SCNC: 100 MMOL/L
CO2 SERPL-SCNC: 29 MMOL/L
CREAT SERPL-MCNC: 1.3 MG/DL
DIFFERENTIAL METHOD: ABNORMAL
EOSINOPHIL # BLD AUTO: ABNORMAL K/UL
EOSINOPHIL NFR BLD: 0 %
ERYTHROCYTE [DISTWIDTH] IN BLOOD BY AUTOMATED COUNT: 14.4 %
EST. GFR  (AFRICAN AMERICAN): 44 ML/MIN/1.73 M^2
EST. GFR  (NON AFRICAN AMERICAN): 38 ML/MIN/1.73 M^2
GLUCOSE SERPL-MCNC: 171 MG/DL
HCT VFR BLD AUTO: 36.7 %
HGB BLD-MCNC: 11.8 G/DL
LYMPHOCYTES # BLD AUTO: ABNORMAL K/UL
LYMPHOCYTES NFR BLD: 11 %
MCH RBC QN AUTO: 30.6 PG
MCHC RBC AUTO-ENTMCNC: 32.2 G/DL
MCV RBC AUTO: 95 FL
MONOCYTES # BLD AUTO: ABNORMAL K/UL
MONOCYTES NFR BLD: 5 %
MYELOCYTES NFR BLD MANUAL: 1 %
NEUTROPHILS # BLD AUTO: ABNORMAL K/UL
NEUTROPHILS NFR BLD: 79 %
NEUTS BAND NFR BLD MANUAL: 4 %
PLATELET # BLD AUTO: 295 K/UL
PLATELET BLD QL SMEAR: ABNORMAL
PMV BLD AUTO: 9.4 FL
POCT GLUCOSE: 195 MG/DL (ref 70–110)
POCT GLUCOSE: 199 MG/DL (ref 70–110)
POCT GLUCOSE: 234 MG/DL (ref 70–110)
POCT GLUCOSE: 273 MG/DL (ref 70–110)
POTASSIUM SERPL-SCNC: 5.4 MMOL/L
RBC # BLD AUTO: 3.85 M/UL
SODIUM SERPL-SCNC: 138 MMOL/L
WBC # BLD AUTO: 14.52 K/UL

## 2018-02-11 PROCEDURE — 80048 BASIC METABOLIC PNL TOTAL CA: CPT

## 2018-02-11 PROCEDURE — 27000221 HC OXYGEN, UP TO 24 HOURS

## 2018-02-11 PROCEDURE — 11000004 HC SNF PRIVATE

## 2018-02-11 PROCEDURE — 85007 BL SMEAR W/DIFF WBC COUNT: CPT

## 2018-02-11 PROCEDURE — 94640 AIRWAY INHALATION TREATMENT: CPT

## 2018-02-11 PROCEDURE — 97116 GAIT TRAINING THERAPY: CPT

## 2018-02-11 PROCEDURE — 99900035 HC TECH TIME PER 15 MIN (STAT)

## 2018-02-11 PROCEDURE — 25000003 PHARM REV CODE 250: Performed by: NURSE PRACTITIONER

## 2018-02-11 PROCEDURE — 63600175 PHARM REV CODE 636 W HCPCS: Performed by: NURSE PRACTITIONER

## 2018-02-11 PROCEDURE — 94660 CPAP INITIATION&MGMT: CPT

## 2018-02-11 PROCEDURE — 36415 COLL VENOUS BLD VENIPUNCTURE: CPT

## 2018-02-11 PROCEDURE — 82962 GLUCOSE BLOOD TEST: CPT

## 2018-02-11 PROCEDURE — 25000242 PHARM REV CODE 250 ALT 637 W/ HCPCS: Performed by: NURSE PRACTITIONER

## 2018-02-11 PROCEDURE — 94761 N-INVAS EAR/PLS OXIMETRY MLT: CPT

## 2018-02-11 PROCEDURE — 97530 THERAPEUTIC ACTIVITIES: CPT

## 2018-02-11 PROCEDURE — 85027 COMPLETE CBC AUTOMATED: CPT

## 2018-02-11 RX ADMIN — PREDNISONE 40 MG: 20 TABLET ORAL at 09:02

## 2018-02-11 RX ADMIN — PYRIDOSTIGMINE BROMIDE 60 MG: 60 TABLET ORAL at 09:02

## 2018-02-11 RX ADMIN — LEVALBUTEROL 1.25 MG: 1.25 SOLUTION, CONCENTRATE RESPIRATORY (INHALATION) at 07:02

## 2018-02-11 RX ADMIN — CLOPIDOGREL BISULFATE 75 MG: 75 TABLET ORAL at 09:02

## 2018-02-11 RX ADMIN — IPRATROPIUM BROMIDE 0.5 MG: 0.5 SOLUTION RESPIRATORY (INHALATION) at 06:02

## 2018-02-11 RX ADMIN — LEVALBUTEROL 1.25 MG: 1.25 SOLUTION, CONCENTRATE RESPIRATORY (INHALATION) at 12:02

## 2018-02-11 RX ADMIN — APIXABAN 2.5 MG: 2.5 TABLET, FILM COATED ORAL at 09:02

## 2018-02-11 RX ADMIN — IPRATROPIUM BROMIDE 0.5 MG: 0.5 SOLUTION RESPIRATORY (INHALATION) at 07:02

## 2018-02-11 RX ADMIN — LEVALBUTEROL 1.25 MG: 1.25 SOLUTION, CONCENTRATE RESPIRATORY (INHALATION) at 06:02

## 2018-02-11 RX ADMIN — AMLODIPINE BESYLATE 2.5 MG: 2.5 TABLET ORAL at 09:02

## 2018-02-11 RX ADMIN — LEVOTHYROXINE SODIUM 100 MCG: 100 TABLET ORAL at 09:02

## 2018-02-11 RX ADMIN — PYRIDOSTIGMINE BROMIDE 60 MG: 60 TABLET ORAL at 06:02

## 2018-02-11 RX ADMIN — IPRATROPIUM BROMIDE 0.5 MG: 0.5 SOLUTION RESPIRATORY (INHALATION) at 12:02

## 2018-02-11 RX ADMIN — INSULIN ASPART 2 UNITS: 100 INJECTION, SOLUTION INTRAVENOUS; SUBCUTANEOUS at 04:02

## 2018-02-11 RX ADMIN — INSULIN ASPART 1 UNITS: 100 INJECTION, SOLUTION INTRAVENOUS; SUBCUTANEOUS at 09:02

## 2018-02-11 RX ADMIN — PYRIDOSTIGMINE BROMIDE 60 MG: 60 TABLET ORAL at 01:02

## 2018-02-11 RX ADMIN — ATORVASTATIN CALCIUM 10 MG: 10 TABLET, FILM COATED ORAL at 09:02

## 2018-02-11 RX ADMIN — METOPROLOL SUCCINATE 200 MG: 50 TABLET, EXTENDED RELEASE ORAL at 09:02

## 2018-02-11 RX ADMIN — PANTOPRAZOLE SODIUM 40 MG: 40 TABLET, DELAYED RELEASE ORAL at 09:02

## 2018-02-11 NOTE — PROGRESS NOTES
Report received from ROSA ISELA Bynum. Patient lying in bed with oxygen per nasal cannula. Denies sob or pain. No distress noted.

## 2018-02-11 NOTE — PLAN OF CARE
Problem: Patient Care Overview  Goal: Plan of Care Review  Outcome: Ongoing (interventions implemented as appropriate)  V/S stable. Up in chair all day. Denies sob. Oxygen continued at 2L per nasal cannula. Walked 40 ft with PT. Using walker with assist. Accu checks. Insulin given per sliding scale. Afib on tele. No distress  This shift. Patient and family understand plan of care and agree.

## 2018-02-11 NOTE — PLAN OF CARE
Problem: Patient Care Overview  Goal: Plan of Care Review  Outcome: Ongoing (interventions implemented as appropriate)  Plan of care reviewed with patient and she agrees with the plan of care. Oxygen continues at 2l per nasal cannula when bipap not in use. Lungs diminished with expiratory wheezes noted. Dyspneic on exertion. Family at bedside.

## 2018-02-11 NOTE — PT/OT/SLP PROGRESS
Physical Therapy Treatment    Patient Name:  Stephany Skinner   MRN:  [id number]    Recommendations:     Discharge Recommendations:      Discharge Equipment Recommendations:     Barriers to discharge: None    Assessment:     Stephany Skinner is a 83 y.o. female admitted with a medical diagnosis of Debility.  She presents with the following impairments/functional limitations:    weakness, decreased walking endurance,impaired self care skills.    Rehab Prognosis:  Good minus  patient would benefit from acute skilled PT services to address these deficits and reach maximum level of function.      Recent Surgery: * No surgery found *      Plan:     During this hospitalization, patient to be seen  (1-2x/day(M-F); PRN(Sat.,Sun.)) to address the above listed problems via gait training, therapeutic activities, therapeutic exercises  · Plan of Care Expires:   (Upon D/C from facility)   Plan of Care Reviewed with: patient, daughter    Subjective     Communicated with patient and daughter  prior to session.  Patient found seqated in bed side chair upon PT entry to room, agreeable to treatment.      Chief Complaint: inability to walk as previously     Patient comments/goals: to feel stronger and return home   Pain/Comfort:  · Pain Rating 1: 0/10  · Pain Rating Post-Intervention 1: 0/10    Patients cultural, spiritual, Taoist conflicts given the current situation: none voiced    Objective:     Patient found with:       General Precautions: Standard, fall   Orthopedic Precautions:N/A   Braces:       Functional Mobility:  · Transfers:     · Sit to Stand:  moderate assistance with rolling walker  · Bed to Chair: moderate assistance with  rolling walker  using  Stand Pivot  · Gait: Completed 40 ft of gait training tasks this sessiuon with RW and contact guard assistance for safetry. Improveed step placement and upright posture observed.       AM-PAC 6 CLICK MOBILITY  Turning over in bed (including adjusting bedclothes, sheets and  blankets)?: 2  Sitting down on and standing up from a chair with arms (e.g., wheelchair, bedside commode, etc.): 2  Moving from lying on back to sitting on the side of the bed?: 2  Moving to and from a bed to a chair (including a wheelchair)?: 2  Need to walk in hospital room?: 2  Climbing 3-5 steps with a railing?: 1  Total Score: 11       Therapeutic Activities and Exercises:   Transfer training sit to stand and vice versa for increased lower extremity and trunk strength participation to advance self help for this task    Assistive exercises rendered for bilateral lower extremities to increase fluid dynamics of lower extremities and NM strength & function    Patient left up in chair with all lines intact, call button in reach, NSG notified and family present..    GOALS:    Physical Therapy Goals        Problem: Physical Therapy Goal    Goal Priority Disciplines Outcome Goal Variances Interventions   Physical Therapy Goal     PT/OT, PT Ongoing (interventions implemented as appropriate)     Description:  Goals to be met by: 2/15/2018     Patient will increase functional independence with mobility by performin. Supine to sit with Stand-by Assistance  2. Sit to supine with Stand-by Assistance  3. Rolling to Left and Right with Stand-by Assistance.  4. Sit to stand transfer with Stand-by Assistance, with RW  5. Bed to chair transfer with Stand-by Assistance using Rolling Walker  6. Gait  x 50 feet with Stand-by Assistance using Rolling Walker.   7. Ascend/descend 3 stairs with bilateral Handrails Minimal Assistance            Problem: Physical Therapy Goal    Goal Priority Disciplines Outcome Goal Variances Interventions   Physical Therapy Goal     PT/OT, PT      Description:  Pt will increase participation in established goal progress              Multidisciplinary Problems (Resolved)        Problem: Physical Therapy Goal    Goal Priority Disciplines Outcome Goal Variances Interventions   Physical Therapy Goal    (Resolved)     PT/OT, PT  Error                     Time Tracking:     PT Received On: 02/11/18  PT Start Time: 0915     PT Stop Time: 0940  PT Total Time (min): 25 min     Billable Minutes: Gait Training 15 min, Therapeutic Activity 10 min and Total Time 25 min    Treatment Type: Treatment  PT/PTA: PT           Jordan Garcia, PT  02/11/2018

## 2018-02-11 NOTE — PLAN OF CARE
Problem: Pressure Ulcer Risk (Tom Scale) (Adult,Obstetrics,Pediatric)  Goal: Skin Integrity  Patient will demonstrate the desired outcomes by discharge/transition of care.   Outcome: Ongoing (interventions implemented as appropriate)  Patient turned and repositioned every 2 hours. Heels elevated off of bed on pillows.

## 2018-02-12 LAB
ANION GAP SERPL CALC-SCNC: 7 MMOL/L
BASOPHILS # BLD AUTO: 0.01 K/UL
BASOPHILS NFR BLD: 0.1 %
BUN SERPL-MCNC: 50 MG/DL
CALCIUM SERPL-MCNC: 8.6 MG/DL
CHLORIDE SERPL-SCNC: 99 MMOL/L
CO2 SERPL-SCNC: 29 MMOL/L
CREAT SERPL-MCNC: 1.1 MG/DL
DIFFERENTIAL METHOD: ABNORMAL
EOSINOPHIL # BLD AUTO: 0.1 K/UL
EOSINOPHIL NFR BLD: 1 %
ERYTHROCYTE [DISTWIDTH] IN BLOOD BY AUTOMATED COUNT: 14.1 %
EST. GFR  (AFRICAN AMERICAN): 54 ML/MIN/1.73 M^2
EST. GFR  (NON AFRICAN AMERICAN): 47 ML/MIN/1.73 M^2
GLUCOSE SERPL-MCNC: 128 MG/DL
HCT VFR BLD AUTO: 33.8 %
HGB BLD-MCNC: 11 G/DL
LYMPHOCYTES # BLD AUTO: 0.9 K/UL
LYMPHOCYTES NFR BLD: 7.8 %
MCH RBC QN AUTO: 31.3 PG
MCHC RBC AUTO-ENTMCNC: 32.5 G/DL
MCV RBC AUTO: 96 FL
MONOCYTES # BLD AUTO: 1.3 K/UL
MONOCYTES NFR BLD: 10.7 %
NEUTROPHILS # BLD AUTO: 9.5 K/UL
NEUTROPHILS NFR BLD: 80.4 %
PLATELET # BLD AUTO: 280 K/UL
PMV BLD AUTO: 9.2 FL
POCT GLUCOSE: 141 MG/DL (ref 70–110)
POCT GLUCOSE: 144 MG/DL (ref 70–110)
POCT GLUCOSE: 185 MG/DL (ref 70–110)
POCT GLUCOSE: 240 MG/DL (ref 70–110)
POTASSIUM SERPL-SCNC: 5.1 MMOL/L
RBC # BLD AUTO: 3.52 M/UL
SODIUM SERPL-SCNC: 135 MMOL/L
WBC # BLD AUTO: 11.85 K/UL

## 2018-02-12 PROCEDURE — 94660 CPAP INITIATION&MGMT: CPT

## 2018-02-12 PROCEDURE — 80048 BASIC METABOLIC PNL TOTAL CA: CPT

## 2018-02-12 PROCEDURE — 85025 COMPLETE CBC W/AUTO DIFF WBC: CPT

## 2018-02-12 PROCEDURE — 99232 SBSQ HOSP IP/OBS MODERATE 35: CPT | Mod: ,,, | Performed by: INTERNAL MEDICINE

## 2018-02-12 PROCEDURE — 82962 GLUCOSE BLOOD TEST: CPT

## 2018-02-12 PROCEDURE — 97530 THERAPEUTIC ACTIVITIES: CPT

## 2018-02-12 PROCEDURE — 25000242 PHARM REV CODE 250 ALT 637 W/ HCPCS: Performed by: NURSE PRACTITIONER

## 2018-02-12 PROCEDURE — 25000003 PHARM REV CODE 250: Performed by: INTERNAL MEDICINE

## 2018-02-12 PROCEDURE — 63600175 PHARM REV CODE 636 W HCPCS: Performed by: NURSE PRACTITIONER

## 2018-02-12 PROCEDURE — 36415 COLL VENOUS BLD VENIPUNCTURE: CPT

## 2018-02-12 PROCEDURE — 94640 AIRWAY INHALATION TREATMENT: CPT

## 2018-02-12 PROCEDURE — 99900035 HC TECH TIME PER 15 MIN (STAT)

## 2018-02-12 PROCEDURE — 27000221 HC OXYGEN, UP TO 24 HOURS

## 2018-02-12 PROCEDURE — 25000003 PHARM REV CODE 250: Performed by: NURSE PRACTITIONER

## 2018-02-12 PROCEDURE — 94761 N-INVAS EAR/PLS OXIMETRY MLT: CPT

## 2018-02-12 PROCEDURE — 97116 GAIT TRAINING THERAPY: CPT

## 2018-02-12 PROCEDURE — 11000004 HC SNF PRIVATE

## 2018-02-12 PROCEDURE — 97803 MED NUTRITION INDIV SUBSEQ: CPT

## 2018-02-12 RX ORDER — FUROSEMIDE 20 MG/1
20 TABLET ORAL DAILY
Status: DISCONTINUED | OUTPATIENT
Start: 2018-02-13 | End: 2018-02-15

## 2018-02-12 RX ORDER — NYSTATIN 100000 [USP'U]/ML
500000 SUSPENSION ORAL 4 TIMES DAILY
Status: DISCONTINUED | OUTPATIENT
Start: 2018-02-12 | End: 2018-02-18

## 2018-02-12 RX ADMIN — ATORVASTATIN CALCIUM 10 MG: 10 TABLET, FILM COATED ORAL at 09:02

## 2018-02-12 RX ADMIN — PYRIDOSTIGMINE BROMIDE 60 MG: 60 TABLET ORAL at 05:02

## 2018-02-12 RX ADMIN — IPRATROPIUM BROMIDE 0.5 MG: 0.5 SOLUTION RESPIRATORY (INHALATION) at 12:02

## 2018-02-12 RX ADMIN — IPRATROPIUM BROMIDE 0.5 MG: 0.5 SOLUTION RESPIRATORY (INHALATION) at 01:02

## 2018-02-12 RX ADMIN — LEVALBUTEROL 1.25 MG: 1.25 SOLUTION, CONCENTRATE RESPIRATORY (INHALATION) at 06:02

## 2018-02-12 RX ADMIN — METOPROLOL SUCCINATE 200 MG: 50 TABLET, EXTENDED RELEASE ORAL at 09:02

## 2018-02-12 RX ADMIN — IPRATROPIUM BROMIDE 0.5 MG: 0.5 SOLUTION RESPIRATORY (INHALATION) at 07:02

## 2018-02-12 RX ADMIN — NYSTATIN 500000 UNITS: 100000 SUSPENSION ORAL at 05:02

## 2018-02-12 RX ADMIN — AMLODIPINE BESYLATE 2.5 MG: 2.5 TABLET ORAL at 09:02

## 2018-02-12 RX ADMIN — CLOPIDOGREL BISULFATE 75 MG: 75 TABLET ORAL at 09:02

## 2018-02-12 RX ADMIN — NYSTATIN 500000 UNITS: 100000 SUSPENSION ORAL at 11:02

## 2018-02-12 RX ADMIN — PREDNISONE 40 MG: 20 TABLET ORAL at 09:02

## 2018-02-12 RX ADMIN — IPRATROPIUM BROMIDE 0.5 MG: 0.5 SOLUTION RESPIRATORY (INHALATION) at 06:02

## 2018-02-12 RX ADMIN — LEVALBUTEROL 1.25 MG: 1.25 SOLUTION, CONCENTRATE RESPIRATORY (INHALATION) at 07:02

## 2018-02-12 RX ADMIN — APIXABAN 2.5 MG: 2.5 TABLET, FILM COATED ORAL at 09:02

## 2018-02-12 RX ADMIN — LEVALBUTEROL 1.25 MG: 1.25 SOLUTION, CONCENTRATE RESPIRATORY (INHALATION) at 01:02

## 2018-02-12 RX ADMIN — INSULIN ASPART 2 UNITS: 100 INJECTION, SOLUTION INTRAVENOUS; SUBCUTANEOUS at 05:02

## 2018-02-12 RX ADMIN — LEVALBUTEROL 1.25 MG: 1.25 SOLUTION, CONCENTRATE RESPIRATORY (INHALATION) at 12:02

## 2018-02-12 RX ADMIN — NYSTATIN 500000 UNITS: 100000 SUSPENSION ORAL at 12:02

## 2018-02-12 RX ADMIN — LEVOTHYROXINE SODIUM 100 MCG: 100 TABLET ORAL at 09:02

## 2018-02-12 RX ADMIN — PANTOPRAZOLE SODIUM 40 MG: 40 TABLET, DELAYED RELEASE ORAL at 09:02

## 2018-02-12 RX ADMIN — PYRIDOSTIGMINE BROMIDE 60 MG: 60 TABLET ORAL at 02:02

## 2018-02-12 RX ADMIN — PYRIDOSTIGMINE BROMIDE 60 MG: 60 TABLET ORAL at 09:02

## 2018-02-12 NOTE — PT/OT/SLP PROGRESS
"Physical Therapy Treatment    Patient Name:  Stephany Skinner   MRN:  [id number]    Recommendations:     Discharge Recommendations:  home with home health, home health PT   Discharge Equipment Recommendations: wheelchair   Barriers to discharge: None    Assessment:     Stephany Skinner is a 83 y.o. female admitted with a medical diagnosis of Debility.  She presents with the following impairments/functional limitations:  weakness, impaired endurance, impaired self care skills, impaired functional mobilty, gait instability, impaired balance, decreased upper extremity function, decreased lower extremity function, decreased safety awareness . Pt with good progress with T/F and ambulation distance today. Pt progressing well with POC goals.    Rehab Prognosis:  Good; patient would benefit from acute skilled PT services to address these deficits and reach maximum level of function.      Recent Surgery: * No surgery found *      Plan:     During this hospitalization, patient to be seen  (1-2x/day Monday-Friday; PRN Weekends/Holidays) to address the above listed problems via gait training, therapeutic activities, therapeutic exercises  · Plan of Care Expires:   (upon D/C from facility)   Plan of Care Reviewed with: patient, daughter    Subjective     Communicated with patient prior to session.  Patient found supine in bed upon PT entry to room, agreeable to treatment.      Chief Complaint: "a little weak"  Patient comments/goals: "Go home"  Pain/Comfort:  · Pain Rating 1: 0/10    Patients cultural, spiritual, Judaism conflicts given the current situation:      Objective:     Patient found with: oxygen, telemetry     General Precautions: Standard, fall, respiratory   Orthopedic Precautions:N/A   Braces: N/A     Functional Mobility:  · Bed Mobility:     · Rolling Left:  minimum assistance  · Rolling Right: minimum assistance  · Scooting: minimum assistance  · Bridging: minimum assistance  · Supine to Sit: minimum " assistance  · Sit to Supine: minimum assistance  · Transfers:     · Sit to Stand:  contact guard assistance with rolling walker  · Bed to Chair: contact guard assistance with  rolling walker  using  Step Transfer  · Gait: Gait training x 50 feet with RW and CGA with cueing for gait sequencing and safety awareness to decrease pt fall risk.       AM-PAC 6 CLICK MOBILITY  Turning over in bed (including adjusting bedclothes, sheets and blankets)?: 3  Sitting down on and standing up from a chair with arms (e.g., wheelchair, bedside commode, etc.): 3  Moving from lying on back to sitting on the side of the bed?: 3  Moving to and from a bed to a chair (including a wheelchair)?: 3  Need to walk in hospital room?: 3  Climbing 3-5 steps with a railing?: 1  Total Score: 16     Patient left up in chair with all lines intact, call button in reach, NSG notified and family present..    GOALS:    Physical Therapy Goals        Problem: Physical Therapy Goal    Goal Priority Disciplines Outcome Goal Variances Interventions   Physical Therapy Goal     PT/OT, PT Ongoing (interventions implemented as appropriate)     Description:  Goals to be met by: 2/15/2018     Patient will increase functional independence with mobility by performin. Supine to sit with Stand-by Assistance  2. Sit to supine with Stand-by Assistance  3. Rolling to Left and Right with Stand-by Assistance.  4. Sit to stand transfer with Stand-by Assistance, with RW  5. Bed to chair transfer with Stand-by Assistance using Rolling Walker  6. Gait  x 50 feet with Stand-by Assistance using Rolling Walker.   7. Ascend/descend 3 stairs with bilateral Handrails Minimal Assistance            Problem: Physical Therapy Goal    Goal Priority Disciplines Outcome Goal Variances Interventions   Physical Therapy Goal     PT/OT, PT      Description:  Pt will increase participation in established goal progress              Multidisciplinary Problems (Resolved)        Problem:  Physical Therapy Goal    Goal Priority Disciplines Outcome Goal Variances Interventions   Physical Therapy Goal   (Resolved)     PT/OT, PT  Error                     Time Tracking:     PT Received On: 02/12/18  PT Start Time: 1035     PT Stop Time: 1100  PT Total Time (min): 25 min     Billable Minutes: Gait Training 15 minutes and Therapeutic Activity 10 minutes    Treatment Type: Treatment  PT/PTA: PT           Jacki Rutherford, PT  02/12/2018

## 2018-02-12 NOTE — ASSESSMENT & PLAN NOTE
Nutrition Diagnosis  Inadequate energy intake    Related to (etiology):   Decreased appetite    Signs and Symptoms (as evidenced by):   Reports decreased appetite, intake varies     Interventions/Recommendations (treatment strategy):  Low Sodium, Diabetic Diet  Boost Glucose as needed  Honor preferences per Diet order  See RD Full Note 2/12/18    Nutrition Diagnosis Status:   Improving

## 2018-02-12 NOTE — PLAN OF CARE
Skilled level of care patient care conference held. Patient has made good progress toward her goals and should be able to discharge this week.

## 2018-02-12 NOTE — ASSESSMENT & PLAN NOTE
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 MG labs negative. Responding to steroids; follow neuro recs for taper Prednisone 40mg daily for a week (started 2/7). Then would need taper to Prednisone   30mg daily for a week, then 20mg until next seeing Neurology outpatient post d/c unless worsening symptoms again.    Dr. Ortiz following    Even with negative labs had good steroid response and will continue with taper. Will see neuro outpatient for further work up

## 2018-02-12 NOTE — PROGRESS NOTES
"[facility]  Cardiology  Progress Note    Patient Name: Stephany Skinner  MRN: [id number]  Admission Date: 2/7/2018  Hospital Length of Stay: 5 days  Code Status: Full Code   Attending Physician: Morris Jain MD   Primary Care Physician: Pipo Flowers MD  Expected Discharge Date:   Principal Problem:Debility    Subjective:     Hospital Course: admit with SOB     Interval History: Dx with CHF / pneumonia / acute resp failure and Myasthenia gravis.    Diuresed.   PUJA-improving.   On bipap and prednisone.   CO2 and confusion better.  Ambulating with PT.   wearing bipap and doing better    ROS   "feeling better"  Constitution: Positive for weakness and malaise/fatigue.   HENT: Negative.    Eyes: Negative.    Cardiovascular: Negative.    Respiratory: Cough.  Endocrine: Negative.    Skin: Negative.    Musculoskeletal: Positive for muscle weakness.   Gastrointestinal: Negative.    Genitourinary: Negative.    Psychiatric/Behavioral: Negative.    Allergic/Immunologic: Negative    Objective:     Vital Signs (Most Recent):  Temp: 97 °F (36.1 °C) (02/12/18 0702)  Pulse: 65 (02/12/18 0733)  Resp: 16 (02/12/18 0733)  BP: 127/66 (02/12/18 0702)  SpO2: 95 % (02/12/18 0733) Vital Signs (24h Range):  Temp:  [97 °F (36.1 °C)-98.9 °F (37.2 °C)] 97 °F (36.1 °C)  Pulse:  [62-79] 65  Resp:  [16-22] 16  SpO2:  [91 %-98 %] 95 %  BP: (114-173)/(53-74) 127/66     Weight: 114.8 kg (253 lb 1.4 oz)  Body mass index is 39.64 kg/m².    SpO2: 95 %  O2 Device (Oxygen Therapy): nasal cannula      Intake/Output Summary (Last 24 hours) at 02/12/18 0841  Last data filed at 02/11/18 1300   Gross per 24 hour   Intake              240 ml   Output                0 ml   Net              240 ml       Lines/Drains/Airways     Peripheral Intravenous Line                 Peripheral IV - Single Lumen 02/01/18 1653 Right Wrist 10 days                Physical Exam   Musculoskeletal: She exhibits edema (1+ pitting BLE).    "   Constitutional: She is oriented to person, place, and time. Morbidly obese.   Eyes: EOMI.   Neck: No JVD.  Obese neck.   Cardiovascular:   irregularly irregular   Pulmonary/Chest: Effort normal. No respiratory distress. CTA.  She has no rales.   Abdominal: Soft. Bowel sounds are normal. She exhibits no distension. There is no tenderness.   Musculoskeletal: She exhibits edema -mild and improving.  Neurological: She is alert and oriented to person, place, and time.   Skin: Skin is warm and dry.   Psychiatric: She has a normal mood. Nursing note and vitals reviewed.  More awake.    Significant Labs:   ABG: No results for input(s): PH, PCO2, HCO3, POCSATURATED, BE in the last 48 hours., Blood Culture: No results for input(s): LABBLOO in the last 48 hours., BMP:   Recent Labs  Lab 02/11/18  0614 02/12/18  0521   * 128*    135*   K 5.4* 5.1    99   CO2 29 29   BUN 61* 50*   CREATININE 1.3 1.1   CALCIUM 8.9 8.6*   , CMP   Recent Labs  Lab 02/11/18  0614 02/12/18  0521    135*   K 5.4* 5.1    99   CO2 29 29   * 128*   BUN 61* 50*   CREATININE 1.3 1.1   CALCIUM 8.9 8.6*   ANIONGAP 9 7*   ESTGFRAFRICA 44* 54*   EGFRNONAA 38* 47*   , CBC   Recent Labs  Lab 02/11/18  0614 02/12/18  0521   WBC 14.52* 11.85   HGB 11.8* 11.0*   HCT 36.7* 33.8*    280   , INR No results for input(s): INR, PROTIME in the last 48 hours., Lipid Panel No results for input(s): CHOL, HDL, LDLCALC, TRIG, CHOLHDL in the last 48 hours.,   Pathology Results  (Last 10 years)    None      , Troponin No results for input(s): TROPONINI in the last 48 hours., All pertinent lab results from the last 24 hours have been reviewed. and   Recent Lab Results       02/12/18  0530 02/12/18  0521 02/11/18  2132 02/11/18  1628 02/11/18  1211      Anion Gap  7(L)        Baso #  0.01        Basophil%  0.1        BUN, Bld  50(H)        Calcium  8.6(L)        Chloride  99        CO2  29        Creatinine  1.1        Differential  Method  Automated        eGFR if   54(A)        eGFR if non   47  Comment:  Calculation used to obtain the estimated glomerular filtration  rate (eGFR) is the CKD-EPI equation.   (A)        Eos #  0.1        Eosinophil%  1.0        Glucose  128(H)        Gran # (ANC)  9.5(H)        Gran%  80.4(H)        Hematocrit  33.8(L)        Hemoglobin  11.0(L)        Lymph #  0.9(L)        Lymph%  7.8(L)        MCH  31.3(H)        MCHC  32.5        MCV  96        Mono #  1.3(H)        Mono%  10.7        MPV  9.2        Platelets  280        POCT Glucose 144(H)  273(H) 234(H) 195(H)     Potassium  5.1        RBC  3.52(L)        RDW  14.1        Sodium  135(L)        WBC  11.85                        Significant Imaging: CT scan: CT ABDOMEN PELVIS WITH CONTRAST: No results found for this visit on 02/07/18. and CT ABDOMEN PELVIS WITHOUT CONTRAST: No results found for this visit on 02/07/18., Echocardiogram:   2D echo with color flow doppler:   Results for orders placed or performed during the hospital encounter of 01/30/18   2D echo with color flow doppler   Result Value Ref Range    EF 55 55 - 65    Est. PA Systolic Pressure 31.81     Tricuspid Valve Regurgitation TRIVIAL TO MILD     and X-Ray: CXR: X-Ray Chest 1 View (CXR): No results found for this visit on 02/07/18. and X-Ray Chest PA and Lateral (CXR): No results found for this visit on 02/07/18. and KUB: X-Ray Abdomen AP 1 View (KUB): No results found for this visit on 02/07/18.  Assessment and Plan:       Active Diagnoses:    Diagnosis Date Noted POA    PRINCIPAL PROBLEM:  Debility [R53.81] 02/07/2018 Yes    Physical deconditioning [R53.81]  Yes    Acute on chronic congestive heart failure [I50.9] 02/07/2018 Yes    PUJA (acute kidney injury) [N17.9] 02/05/2018 Yes    Community acquired pneumonia of right upper lobe of lung [J18.1] 02/05/2018 Yes    Myasthenia gravis, adult form [G70.00] 02/03/2018 Yes    Acute respiratory failure with  hypoxia and hypercarbia [J96.01, J96.02] 01/31/2018 Yes      Problems Resolved During this Admission:    Diagnosis Date Noted Date Resolved POA       VTE Risk Mitigation         Ordered     apixaban tablet 2.5 mg  2 times daily     Route:  Oral        02/07/18 1740     Medium Risk of VTE  Once      02/07/18 1740     Place sequential compression device  Until discontinued      02/07/18 1740 2/3 CXR Mild decrease in right upper lobe and right perihilar infiltrate. Mild cardiomegaly. Atherosclerosis.     2/2 CXR The lungs are underexpanded.  There are chronic lung markings seen bilaterally with pulmonary vascular congestion and patchy opacities throughout both lungs.  This could be related to pulmonary edema although superimposed infiltrate in the right upper lobe is not excluded.  The heart is enlarged.  Calcified atheromatous disease affects the aorta.  Age-appropriate degenerative changes affect the skeleton.     1/31 CTA lungs 1.  No CT evidence of central pulmonary embolus.  Severely limited study due to use motion artifact.  2.  Bilateral groundglass opacities and minimal dependent subsegmental atelectasis.     1/31 CT head 1.  No CT evidence of an acute intracranial abnormality with limitations due to motion artifact.  2.  Mild atrophy and minimal small vessel ischemic changes of the periventricular white matter.     Echo Echo 1/18: EF 60%, stage 1 diastolic dysfunction, no significant valvular pathology.      EKG a fib     Current Facility-Administered Medications   Medication    acetaminophen tablet 650 mg    amLODIPine tablet 2.5 mg    apixaban tablet 2.5 mg    atorvastatin tablet 10 mg    clopidogrel tablet 75 mg    dextrose 50% injection 12.5 g    dextrose 50% injection 25 g    glucagon (human recombinant) injection 1 mg    glucose chewable tablet 16 g    glucose chewable tablet 24 g    insulin aspart pen 0-5 Units    insulin detemir pen 25 Units    ipratropium 0.02 % nebulizer solution  0.5 mg    levalbuterol nebulizer solution 1.25 mg    levothyroxine tablet 100 mcg    metoprolol succinate (TOPROL-XL) 24 hr tablet 200 mg    ondansetron injection 4 mg    pantoprazole EC tablet 40 mg    predniSONE tablet 40 mg    pyridostigmine tablet 60 mg   Continues to improve  Resolved Confusion with CO2 narcosis/COPD,JOY--improved/resolving  AFIB v/s wandering PM; now anticoagulated and well controlled  Acute diastolic CHF due to decreased med compliance/high BP--now normotensive  Myasthenia gravis;- labs but good response to steroids  Acute renal insufficiency--defer diuretics for now and montior  Also suspect some bronchitis with high WBC chills and cough--poss RUL infiltrate/?pneumonia  Worsening BARGER and orthopnea for the past 3 days- hasnt taken prescribed lasix x 4 days.   Chest X ray with pulmonary edema, elevated BNP, mild volume overload on exam. CT shows Ground glass opacities, NO PE   Echo 1/18: EF 60%, stage 1 diastolic dysfunction, no significant valvular pathology.   MPI 1/2013- mild apical reversible ischemia.   Carotid US- less than 40% bilateral 2017  Nuclear 1/13--small area ischemia apical anterior     Plan:  Keep as dry as possible ; creatinine at baseline, would restart lasix 20 mg qam and monitor bmp daily  Cont eliquis for PAF/DVT prophylaxis  Cont amlodipine/atorvastatin/plavix/metoprolol    Transcribed by   Kevin Nino NP for Dr DIO Patel  Cardiology  [facility]    I attest that I have personally seen and examined this patient. I have reviewed and discussed the management in detail as outlined above.

## 2018-02-12 NOTE — PROGRESS NOTES
[facility]  Adult Nutrition  Progress Note    SUMMARY     Recommendations    Recommendation/Intervention: Continue Low Sodium Diabetic Diet as tolerated encouraging good intake. Boost Glucose Control as needed. [place] preferences per diet order.  Goals: adequate po intake >=75% by discharge  Nutrition Goal Status: goal met  Communication of RD Recs: discussed on rounds    Nutrition Discharge Planning: Low Sodium Diabeticdiet with adequate intake to meet EEN & EPN    Continuum of Care Plan    Referral to Outpatient Services: home care    Reason for Assessment    Reason for Assessment: RD follow-up  Diagnosis: cardiac disease  Relevent Medical History: HTN, HLD, GERD, CHF   Interdisciplinary Rounds: attended     General Information Comments: Pt states appetite is getting better. She reports a burning on her tongue that started yesterday. Spoke withteam about missing Low Sodium restriction. Low Sodium restriction added to diet    Nutrition Prescription Ordered    Current Diet Order: Diabetic Low Sodium  Nutrition Order Comments: 75% intake    Evaluation of Received Nutrients/Fluid Intake    Energy Calories Required: meeting needs  Protein Required: meeting needs  Fluid Required: meeting needs     Tolerance: tolerating  % Intake of Estimated Energy Needs: 50 - 75 %  % Meal Intake: 75%     Nutrition Risk Screen     Nutrition Risk Screen: no indicators present    Nutrition/Diet History    Food Preferences: no cultural or Rastafari preferences        Factors Affecting Nutritional Intake: decreased appetite    Labs/Tests/Procedures/Meds    Diagnostic Test/Procedure Review: reviewed, pertinent  Pertinent Labs Reviewed: reviewed, pertinent  Pertinent Labs Comments: Na 135L, Glu 128H, BUN 50H, Ca 8.6L, GFR 47A, POCT Glu 144-320  Pertinent Medications Reviewed: reviewed, pertinent  Pertinent Medications Comments: atorvastin, pantoprazole, prednisone    Physical Findings    Overall Physical Appearance: on  "oxygen therapy, obese  Tubes:  (-)  Oral/Mouth Cavity: no grinding or difficulty chewing, no pain or sensitivity  Skin:  (toe ulcer)    Anthropometrics    Temp: 97 °F (36.1 °C)     Height: 5' 7" (170.2 cm)  Weight Method: Bed Scale  Weight: 114.8 kg (253 lb 1.4 oz) (rd reviewed, no new)     Ideal Body Weight (IBW), Female: 135 lb     % Ideal Body Weight, Female (lb): 187.47 lb  BMI (Calculated): 39.7  BMI Grade: 35 - 39.9 - obesity - grade II    Estimated/Assessed Needs    Weight Used For Calorie Calculations: 114.8 kg (253 lb 1.4 oz)         Energy Need Method: Martin-St Jeor (No AF BMI >25)     RMR (Martin-St. Jeor Equation): 1640.62        Weight Used For Protein Calculations: 114.8 kg (253 lb 1.4 oz)  Protein Requirements:  (0.8-1.0)  0.8 gm Protein (gm): 92.03 and 1.0 gm Protein (gm): 115.04  Fluid Requirements (mL): 1ml/kcal or per MD     CHO Requirement: 50% calories     Assessment and Plan    Acute respiratory failure with hypoxia and hypercarbia    Nutrition Diagnosis  Inadequate energy intake    Related to (etiology):   Decreased appetite    Signs and Symptoms (as evidenced by):   Reports decreased appetite, intake varies     Interventions/Recommendations (treatment strategy):  Low Sodium, Diabetic Diet  Boost Glucose as needed  [place] preferences per Diet order  See RD Full Note 2/12/18    Nutrition Diagnosis Status:   Improving              Monitor and Evaluation    Food and Nutrient Intake: food and beverage intake  Food and Nutrient Adminstration: diet order  Knowledge/Beliefs/Attitudes: food and nutrition knowledge/skill, beliefs and attitudes  Physical Activity and Function: nutrition-related ADLs and IADLs  Anthropometric Measurements: weight, weight change  Biochemical Data, Medical Tests and Procedures: electrolyte and renal panel, glucose/endocrine profile  Nutrition-Focused Physical Findings: overall appearance    Nutrition Risk    Level of Risk:  (F/U 1x/wk)    Nutrition Follow-Up    RD " Follow-up?: Yes

## 2018-02-12 NOTE — PT/OT/SLP PROGRESS
"Physical Therapy Treatment    Patient Name:  Stephany Skinner   MRN:  [id number]    Recommendations:     Discharge Recommendations:  home with home health, home health PT   Discharge Equipment Recommendations: wheelchair   Barriers to discharge: None    Assessment:     Stephany Skinner is a 83 y.o. female admitted with a medical diagnosis of Debility.  She presents with the following impairments/functional limitations:  weakness, impaired endurance, impaired self care skills, impaired functional mobilty, gait instability, impaired balance, decreased upper extremity function, decreased lower extremity function, decreased safety awareness . Pt progressing well with POC goals.    Rehab Prognosis:  Good; patient would benefit from acute skilled PT services to address these deficits and reach maximum level of function.      Recent Surgery: * No surgery found *      Plan:     During this hospitalization, patient to be seen  (1-2x/day Monday-Friday; PRN Weekends/Holidays) to address the above listed problems via gait training, therapeutic activities, therapeutic exercises  · Plan of Care Expires:   (upon D/C from facility)   Plan of Care Reviewed with: patient, daughter    Subjective     Communicated with patient and her daughter prior to session.  Patient found sitting in chair upon PT entry to room, agreeable to treatment.      Chief Complaint: "weak"  Patient comments/goals: "go home"  Pain/Comfort:  · Pain Rating 1: 0/10    Patients cultural, spiritual, Gnosticism conflicts given the current situation:      Objective:     Patient found with: oxygen, telemetry     General Precautions: Standard, fall, respiratory   Orthopedic Precautions:N/A   Braces: N/A     Functional Mobility:  · Transfers:     · Sit to Stand:  moderate assistance with rolling walker  · Bed to Chair: moderate assistance with  rolling walker  using  Stand Pivot  · Gait: Gait training x 50 feet with RW and CGA with cueing for safety awareness to decrease pt " fall risk.       AM-PAC 6 CLICK MOBILITY  Turning over in bed (including adjusting bedclothes, sheets and blankets)?: 3  Sitting down on and standing up from a chair with arms (e.g., wheelchair, bedside commode, etc.): 3  Moving from lying on back to sitting on the side of the bed?: 3  Moving to and from a bed to a chair (including a wheelchair)?: 3  Need to walk in hospital room?: 3  Climbing 3-5 steps with a railing?: 1  Total Score: 16     Patient left up in chair with all lines intact, call button in reach, NSG notified and daughter present..    GOALS:    Physical Therapy Goals        Problem: Physical Therapy Goal    Goal Priority Disciplines Outcome Goal Variances Interventions   Physical Therapy Goal     PT/OT, PT Ongoing (interventions implemented as appropriate)     Description:  Goals to be met by: 2/15/2018     Patient will increase functional independence with mobility by performin. Supine to sit with Stand-by Assistance  2. Sit to supine with Stand-by Assistance  3. Rolling to Left and Right with Stand-by Assistance.  4. Sit to stand transfer with Stand-by Assistance, with RW  5. Bed to chair transfer with Stand-by Assistance using Rolling Walker  6. Gait  x 50 feet with Stand-by Assistance using Rolling Walker.   7. Ascend/descend 3 stairs with bilateral Handrails Minimal Assistance            Problem: Physical Therapy Goal    Goal Priority Disciplines Outcome Goal Variances Interventions   Physical Therapy Goal     PT/OT, PT      Description:  Pt will increase participation in established goal progress              Multidisciplinary Problems (Resolved)        Problem: Physical Therapy Goal    Goal Priority Disciplines Outcome Goal Variances Interventions   Physical Therapy Goal   (Resolved)     PT/OT, PT  Error                     Time Tracking:     PT Received On: 18  PT Start Time: 1300     PT Stop Time: 1324  PT Total Time (min): 24 min     Billable Minutes: Gait Training 14  minutes and Therapeutic Activity 10 minutes    Treatment Type: Treatment  PT/PTA: PT           Jacki Rutherford, PT  02/12/2018

## 2018-02-12 NOTE — PLAN OF CARE
Problem: Patient Care Overview  Goal: Plan of Care Review  Outcome: Ongoing (interventions implemented as appropriate)  Plan of care reviewed with patient and she agrees with the plan of care. Oxygen continues at 2l per nasal cannula when bipap not in use. Lungs diminished. Dyspneic on exertion. Family at bedside.     Problem: Fall Risk (Adult)  Goal: Absence of Falls  Patient will demonstrate the desired outcomes by discharge/transition of care.   Fall precautions maintained. Bed alarm engaged at all times. Family at bedside.     Problem: Pressure Ulcer Risk (Tom Scale) (Adult,Obstetrics,Pediatric)  Goal: Skin Integrity  Patient will demonstrate the desired outcomes by discharge/transition of care.   Outcome: Ongoing (interventions implemented as appropriate)  Patient turned and repositioned every 2 hours.

## 2018-02-12 NOTE — ASSESSMENT & PLAN NOTE
Place on skilled nursing 2/7/18 for prolonged hospitalization with complications, +deconditioned.  Needs to improve to baseline;  Goal 50 feet. Currenlty ambulating 40 ft

## 2018-02-12 NOTE — PROGRESS NOTES
[facility] Medicine  Progress Note    Patient Name: Stephany Skinner  MRN: [id number]  Patient Class: IP- Swing   Admission Date: 2/7/2018  Length of Stay: 5 days  Attending Physician: Morris Jain MD  Primary Care Provider: Pipo Flowers MD        Subjective:     Principal Problem:Debility    HPI:  84 yo WF initially admitted with with CHF after missing several doses of lasix; Hospital course complicated by  pneumonia / acute resp failure and neuromuscular changes, possible Myasthenia gravis, confirmation labs pending. Neuro noted some mild ptosis and gaze restriction as well as hypophonic voice concerning for neuro-muscular cause of hypercapnia.  Responding to steroids.   Overdiuresed and developed PUJA-improving.   On bipap- tolerated all night. Prednisone taper initiated per neuro recs. Currently has been weaned to home dose O2- 2L [place],  CO2 and confusion better.  placed on skilled nursing 2-7-18 Ambulating with PT- ambulated 15ft yesterday - goal is 50; transfer training also performed.    Hospital Course:  84 YO WF maintained on skilled since 2-7-18  for debility. Goal ambulation 50 feet. (has 10 steps/stairs at home)  Renal fx is much better after recent over-diuresing. Cr 1.3 today. Will need to monitor closely to avoid fluid volume overload.   Feeling ok this am.       2/12/18  Progressing well with PT. Walked 40 ft, goal 50ft. Lantus started due to steroid induced hyperglycemia. She reports burning on the tongue that started yesterday.     Past Medical History:   Diagnosis Date    GERD (gastroesophageal reflux disease)     Hyperlipidemia     Hypertension        Past Surgical History:   Procedure Laterality Date    CHOLECYSTECTOMY      HERNIA REPAIR      HYSTERECTOMY      knee replacemene      deidra    THYROIDECTOMY         Review of patient's allergies indicates:   Allergen Reactions    Statins-hmg-coa reductase inhibitors      Other reaction(s): Unknown  "      No current facility-administered medications on file prior to encounter.      Current Outpatient Prescriptions on File Prior to Encounter   Medication Sig    amlodipine (NORVASC) 2.5 MG tablet Take 1 tablet (2.5 mg total) by mouth once daily.    atorvastatin (LIPITOR) 10 MG tablet Take 1 tablet (10 mg total) by mouth every evening.    atorvastatin (LIPITOR) 20 MG tablet     clopidogrel (PLAVIX) 75 mg tablet Take 1 tablet (75 mg total) by mouth once daily.    cyanocobalamin (VITAMIN B-12) 100 MCG tablet Take 100 mcg by mouth once daily.    furosemide (LASIX) 40 MG tablet Take 1 tablet (40 mg total) by mouth once daily.    levothyroxine (SYNTHROID) 100 MCG tablet TAKE ONE TABLET BY MOUTH EVERY DAY    losartan (COZAAR) 100 MG tablet     meloxicam (MOBIC) 7.5 MG tablet TAKE ONE TABLET BY MOUTH EVERY OTHER DAY    metoprolol succinate (TOPROL-XL) 200 MG 24 hr tablet Take 1 tablet (200 mg total) by mouth once daily.    nitroGLYCERIN (NITROSTAT) 0.4 MG SL tablet Place 0.4 mg under the tongue every 5 (five) minutes as needed.    omega-3 acid ethyl esters (LOVAZA) 1 gram capsule Take 2 capsules (2 g total) by mouth 2 (two) times daily.    potassium chloride SA (K-DUR,KLOR-CON) 20 MEQ tablet Take 1 tablet (20 mEq total) by mouth once daily.    ranitidine (ZANTAC) 150 MG tablet Take 1 tablet (150 mg total) by mouth 2 (two) times daily.    VESICARE 10 mg tablet TAKE ONE TABLET BY MOUTH EVERY DAY     Family History     Problem Relation (Age of Onset)    Cancer Sister        Social History Main Topics    Smoking status: Never Smoker    Smokeless tobacco: Never Used    Alcohol use No    Drug use: No    Sexual activity: Not on file     Review of Systems   Constitutional: Positive for fatigue. Negative for activity change, fever and unexpected weight change.   HENT: Negative for congestion, ear pain, hearing loss, rhinorrhea and sore throat.         "tongue burns"   Eyes: Negative for pain, redness and " visual disturbance.   Respiratory: Negative for cough, shortness of breath and wheezing.    Cardiovascular: Positive for leg swelling (1+ b/l LE edema, more chronic). Negative for chest pain and palpitations.   Gastrointestinal: Negative for abdominal pain, constipation, diarrhea, nausea and vomiting.   Genitourinary: Negative for dysuria, frequency and urgency.   Musculoskeletal: Negative for back pain, joint swelling and neck pain.   Skin: Negative for color change, rash and wound.   Neurological: Positive for weakness (generalized, worse ont he left). Negative for dizziness, tremors, light-headedness and headaches.     Objective:     Vital Signs (Most Recent):  Temp: 97 °F (36.1 °C) (02/12/18 0702)  Pulse: 80 (02/12/18 1026)  Resp: 16 (02/12/18 0733)  BP: 127/66 (02/12/18 0702)  SpO2: 95 % (02/12/18 0733) Vital Signs (24h Range):  Temp:  [97 °F (36.1 °C)-98.9 °F (37.2 °C)] 97 °F (36.1 °C)  Pulse:  [62-80] 80  Resp:  [16-22] 16  SpO2:  [91 %-98 %] 95 %  BP: (114-173)/(53-74) 127/66     Weight: 114.8 kg (253 lb 1.4 oz)  Body mass index is 39.64 kg/m².    Physical Exam   Constitutional: She is oriented to person, place, and time. She appears well-developed. No distress.   Morbidly obese   HENT:   Head: Normocephalic and atraumatic.   Right Ear: External ear normal.   Left Ear: External ear normal.   Macroglossia, open mouth breathing   Eyes: Conjunctivae and EOM are normal. Pupils are equal, round, and reactive to light.   Neck: Normal range of motion. Neck supple. No tracheal deviation present.   Cardiovascular: Normal rate, regular rhythm and normal heart sounds.    Pulmonary/Chest: Effort normal and breath sounds normal. No respiratory distress. She has no wheezes.   Abdominal: Soft. Bowel sounds are normal. There is no tenderness.   Musculoskeletal: Normal range of motion. She exhibits edema.   Neurological: She is alert and oriented to person, place, and time.   Skin: Skin is warm and dry. No rash noted.    Venous stasis change   Psychiatric: She has a normal mood and affect. Her behavior is normal.   Nursing note and vitals reviewed.        CRANIAL NERVES     CN III, IV, VI   Pupils are equal, round, and reactive to light.  Extraocular motions are normal.        Significant Labs:   CBC:     Recent Labs  Lab 1814 18  0521   WBC 14.52* 11.85   HGB 11.8* 11.0*   HCT 36.7* 33.8*    280     CMP:     Recent Labs  Lab 18  0614 18  0521    135*   K 5.4* 5.1    99   CO2 29 29   * 128*   BUN 61* 50*   CREATININE 1.3 1.1   CALCIUM 8.9 8.6*   ANIONGAP 9 7*   EGFRNONAA 38* 47*     BNP     Recent Labs  Lab 18  0622   BNP 69                Lab Results   Component Value Date     TSH 0.371 (L) 2018   T4 1.10  MG panel pending  procalcitonin 0.39     Recent Labs  Lab 18  1306   TROPONINI 0.018      resp viral panel negative        Significant Imagin/6/18 CXR- There are bilateral patchy opacities which are worse at the right lung base when compared to previous performed 3 days earlier. There is no pneumothorax. The remainder of the examination is unchanged.    2/3 CXR Mild decrease in right upper lobe and right perihilar infiltrate. Mild cardiomegaly. Atherosclerosis.     2/ CXR The lungs are underexpanded.  There are chronic lung markings seen bilaterally with pulmonary vascular congestion and patchy opacities throughout both lungs.  This could be related to pulmonary edema although superimposed infiltrate in the right upper lobe is not excluded.  The heart is enlarged.  Calcified atheromatous disease affects the aorta.  Age-appropriate degenerative changes affect the skeleton.      CTA lungs 1.  No CT evidence of central pulmonary embolus.  Severely limited study due to use motion artifact.  2.  Bilateral groundglass opacities and minimal dependent subsegmental atelectasis.      CT head 1.  No CT evidence of an acute intracranial abnormality with  limitations due to motion artifact.  2.  Mild atrophy and minimal small vessel ischemic changes of the periventricular white matter.     Echo Echo 1/18: EF 60%, stage 1 diastolic dysfunction, no significant valvular pathology.      EKG a fib                   Pending Diagnostic Studies:      Procedure Component Value Units Date/Time     Myasthenia Gravis/Lambert-Eaton [[id number]] Collected:  02/02/18 1824     Order Status:  Sent Lab Status:  In process Updated:  02/02/18 2204     Specimen:  Blood                 Assessment/Plan:      * Debility      Place on skilled nursing 2/7/18 for prolonged hospitalization with complications, +deconditioned.  Needs to improve to baseline;  Goal 50 feet. Currenlty ambulating 40 ft        Physical deconditioning    Continue with PT on skilled- ^^ activity as tolerated- get back to baseling        Acute on chronic congestive heart failure    Initially tx for fluid overload after missed lasix rx;   2-D Echocardiogram for evaluation of left ventricle systolic function or any valvular heart disease-EF 55-60%, stage 1 diastolic dysfunction, no significant valvular pathology.  2-2-18 when she demonstrated volume depletion/weight loss with subsequent worsening renal function.   Diuretics discontinued due to acute renal insuff.  Cr was trending up, required IV fluids; creat now improving. Off IVF  Cardiology  Dr. Patel following     Daily weights.  Monitor  I/Os   Na restriction <2g/d.           PUJA (acute kidney injury)      Dialy  BMP today.Diuretics stopped.   Renal Fx improving;  feel her elevated Cr due to over diuresis  Hold losartan in acute injury; resume in future once stable. Monitor I&Os, weights, renal Fx.         Community acquired pneumonia of right upper lobe of lung    2/6/18 Started levofloxacin- day #5/5 today (d/c 2/10/18); continue Nebs - Bipap          Myasthenia gravis, adult form      Neuro eval document mild ptosis and gaze restriction as well as hypophonic voice  concerning for neuro-muscular cause of hypercapnia. MG labs negative. Responding to steroids; follow neuro recs for taper Prednisone 40mg daily for a week (started 2/7). Then would need taper to Prednisone   30mg daily for a week, then 20mg until next seeing Neurology outpatient post d/c unless worsening symptoms again.    Dr. Ortiz following    Even with negative labs had good steroid response and will continue with taper. Will see neuro outpatient for further work up        Acute respiratory failure with hypoxia and hypercarbia    CO2 much better., tolerating BIPAP at night. Down to home Rx O2 2LNC    + Neuro eval noting  mild ptosis and gaze restriction as well as hypophonic voice concerning for neuro-muscular cause of hypercapnia. Responding to steroids.           VTE Risk Mitigation         Ordered     Place MALKA hose  Until discontinued      02/12/18 0916     apixaban tablet 2.5 mg  2 times daily     Route:  Oral        02/07/18 1740     Medium Risk of VTE  Once      02/07/18 1740     Place sequential compression device  Until discontinued      02/07/18 1740              Edna Mooney MD  Department of Hospital Medicine   [facility]

## 2018-02-12 NOTE — SUBJECTIVE & OBJECTIVE
Past Medical History:   Diagnosis Date    GERD (gastroesophageal reflux disease)     Hyperlipidemia     Hypertension        Past Surgical History:   Procedure Laterality Date    CHOLECYSTECTOMY      HERNIA REPAIR      HYSTERECTOMY      knee replacemene      deidra    THYROIDECTOMY         Review of patient's allergies indicates:   Allergen Reactions    Statins-hmg-coa reductase inhibitors      Other reaction(s): Unknown       No current facility-administered medications on file prior to encounter.      Current Outpatient Prescriptions on File Prior to Encounter   Medication Sig    amlodipine (NORVASC) 2.5 MG tablet Take 1 tablet (2.5 mg total) by mouth once daily.    atorvastatin (LIPITOR) 10 MG tablet Take 1 tablet (10 mg total) by mouth every evening.    atorvastatin (LIPITOR) 20 MG tablet     clopidogrel (PLAVIX) 75 mg tablet Take 1 tablet (75 mg total) by mouth once daily.    cyanocobalamin (VITAMIN B-12) 100 MCG tablet Take 100 mcg by mouth once daily.    furosemide (LASIX) 40 MG tablet Take 1 tablet (40 mg total) by mouth once daily.    levothyroxine (SYNTHROID) 100 MCG tablet TAKE ONE TABLET BY MOUTH EVERY DAY    losartan (COZAAR) 100 MG tablet     meloxicam (MOBIC) 7.5 MG tablet TAKE ONE TABLET BY MOUTH EVERY OTHER DAY    metoprolol succinate (TOPROL-XL) 200 MG 24 hr tablet Take 1 tablet (200 mg total) by mouth once daily.    nitroGLYCERIN (NITROSTAT) 0.4 MG SL tablet Place 0.4 mg under the tongue every 5 (five) minutes as needed.    omega-3 acid ethyl esters (LOVAZA) 1 gram capsule Take 2 capsules (2 g total) by mouth 2 (two) times daily.    potassium chloride SA (K-DUR,KLOR-CON) 20 MEQ tablet Take 1 tablet (20 mEq total) by mouth once daily.    ranitidine (ZANTAC) 150 MG tablet Take 1 tablet (150 mg total) by mouth 2 (two) times daily.    VESICARE 10 mg tablet TAKE ONE TABLET BY MOUTH EVERY DAY     Family History     Problem Relation (Age of Onset)    Cancer Sister        Social  "History Main Topics    Smoking status: Never Smoker    Smokeless tobacco: Never Used    Alcohol use No    Drug use: No    Sexual activity: Not on file     Review of Systems   Constitutional: Positive for fatigue. Negative for activity change, fever and unexpected weight change.   HENT: Negative for congestion, ear pain, hearing loss, rhinorrhea and sore throat.         "tongue burns"   Eyes: Negative for pain, redness and visual disturbance.   Respiratory: Negative for cough, shortness of breath and wheezing.    Cardiovascular: Positive for leg swelling (1+ b/l LE edema, more chronic). Negative for chest pain and palpitations.   Gastrointestinal: Negative for abdominal pain, constipation, diarrhea, nausea and vomiting.   Genitourinary: Negative for dysuria, frequency and urgency.   Musculoskeletal: Negative for back pain, joint swelling and neck pain.   Skin: Negative for color change, rash and wound.   Neurological: Positive for weakness (generalized, worse ont he left). Negative for dizziness, tremors, light-headedness and headaches.     Objective:     Vital Signs (Most Recent):  Temp: 97 °F (36.1 °C) (02/12/18 0702)  Pulse: 80 (02/12/18 1026)  Resp: 16 (02/12/18 0733)  BP: 127/66 (02/12/18 0702)  SpO2: 95 % (02/12/18 0733) Vital Signs (24h Range):  Temp:  [97 °F (36.1 °C)-98.9 °F (37.2 °C)] 97 °F (36.1 °C)  Pulse:  [62-80] 80  Resp:  [16-22] 16  SpO2:  [91 %-98 %] 95 %  BP: (114-173)/(53-74) 127/66     Weight: 114.8 kg (253 lb 1.4 oz)  Body mass index is 39.64 kg/m².    Physical Exam   Constitutional: She is oriented to person, place, and time. She appears well-developed. No distress.   Morbidly obese   HENT:   Head: Normocephalic and atraumatic.   Right Ear: External ear normal.   Left Ear: External ear normal.   Macroglossia, open mouth breathing   Eyes: Conjunctivae and EOM are normal. Pupils are equal, round, and reactive to light.   Neck: Normal range of motion. Neck supple. No tracheal deviation " present.   Cardiovascular: Normal rate, regular rhythm and normal heart sounds.    Pulmonary/Chest: Effort normal and breath sounds normal. No respiratory distress. She has no wheezes.   Abdominal: Soft. Bowel sounds are normal. There is no tenderness.   Musculoskeletal: Normal range of motion. She exhibits edema.   Neurological: She is alert and oriented to person, place, and time.   Skin: Skin is warm and dry. No rash noted.   Venous stasis change   Psychiatric: She has a normal mood and affect. Her behavior is normal.   Nursing note and vitals reviewed.        CRANIAL NERVES     CN III, IV, VI   Pupils are equal, round, and reactive to light.  Extraocular motions are normal.        Significant Labs:   CBC:     Recent Labs  Lab 18  0614 18  0521   WBC 14.52* 11.85   HGB 11.8* 11.0*   HCT 36.7* 33.8*    280     CMP:     Recent Labs  Lab 18  0614 18  0521    135*   K 5.4* 5.1    99   CO2 29 29   * 128*   BUN 61* 50*   CREATININE 1.3 1.1   CALCIUM 8.9 8.6*   ANIONGAP 9 7*   EGFRNONAA 38* 47*     BNP     Recent Labs  Lab 18  0622   BNP 69                Lab Results   Component Value Date     TSH 0.371 (L) 2018   T4 1.10  MG panel pending  procalcitonin 0.39     Recent Labs  Lab 18  1306   TROPONINI 0.018      resp viral panel negative        Significant Imagin/6/18 CXR- There are bilateral patchy opacities which are worse at the right lung base when compared to previous performed 3 days earlier. There is no pneumothorax. The remainder of the examination is unchanged.    2/3 CXR Mild decrease in right upper lobe and right perihilar infiltrate. Mild cardiomegaly. Atherosclerosis.     2/2 CXR The lungs are underexpanded.  There are chronic lung markings seen bilaterally with pulmonary vascular congestion and patchy opacities throughout both lungs.  This could be related to pulmonary edema although superimposed infiltrate in the right upper lobe is  not excluded.  The heart is enlarged.  Calcified atheromatous disease affects the aorta.  Age-appropriate degenerative changes affect the skeleton.     1/31 CTA lungs 1.  No CT evidence of central pulmonary embolus.  Severely limited study due to use motion artifact.  2.  Bilateral groundglass opacities and minimal dependent subsegmental atelectasis.     1/31 CT head 1.  No CT evidence of an acute intracranial abnormality with limitations due to motion artifact.  2.  Mild atrophy and minimal small vessel ischemic changes of the periventricular white matter.     Echo Echo 1/18: EF 60%, stage 1 diastolic dysfunction, no significant valvular pathology.      EKG a fib                   Pending Diagnostic Studies:      Procedure Component Value Units Date/Time     Myasthenia Gravis/Lambert-Eaton [[id number]] Collected:  02/02/18 1824     Order Status:  Sent Lab Status:  In process Updated:  02/02/18 2204     Specimen:  Blood

## 2018-02-12 NOTE — PLAN OF CARE
Problem: Patient Care Overview  Goal: Plan of Care Review  Outcome: Ongoing (interventions implemented as appropriate)  Nutrition Goals: adequate po intake >=75% by discharge  Nutrition Goal Status: goal met  Communication of RD Recs: discussed on rounds    Nutrition Discharge Planning: Low Sodium Diabeticdiet with adequate intake to meet EEN & EPN    Continuum of Care Plan Referral to Outpatient Services: home care

## 2018-02-13 LAB
ANION GAP SERPL CALC-SCNC: 7 MMOL/L
BASOPHILS # BLD AUTO: 0.01 K/UL
BASOPHILS NFR BLD: 0.1 %
BUN SERPL-MCNC: 43 MG/DL
CALCIUM SERPL-MCNC: 8.8 MG/DL
CHLORIDE SERPL-SCNC: 99 MMOL/L
CO2 SERPL-SCNC: 32 MMOL/L
CREAT SERPL-MCNC: 1.1 MG/DL
DIFFERENTIAL METHOD: ABNORMAL
EOSINOPHIL # BLD AUTO: 0.1 K/UL
EOSINOPHIL NFR BLD: 0.7 %
ERYTHROCYTE [DISTWIDTH] IN BLOOD BY AUTOMATED COUNT: 14.1 %
EST. GFR  (AFRICAN AMERICAN): 54 ML/MIN/1.73 M^2
EST. GFR  (NON AFRICAN AMERICAN): 47 ML/MIN/1.73 M^2
GLUCOSE SERPL-MCNC: 106 MG/DL
HCT VFR BLD AUTO: 34.7 %
HGB BLD-MCNC: 11.2 G/DL
LYMPHOCYTES # BLD AUTO: 0.9 K/UL
LYMPHOCYTES NFR BLD: 7.8 %
MCH RBC QN AUTO: 30.6 PG
MCHC RBC AUTO-ENTMCNC: 32.3 G/DL
MCV RBC AUTO: 95 FL
MONOCYTES # BLD AUTO: 1.3 K/UL
MONOCYTES NFR BLD: 11.3 %
NEUTROPHILS # BLD AUTO: 9.4 K/UL
NEUTROPHILS NFR BLD: 80.1 %
PLATELET # BLD AUTO: 297 K/UL
PMV BLD AUTO: 9.1 FL
POCT GLUCOSE: 116 MG/DL (ref 70–110)
POCT GLUCOSE: 129 MG/DL (ref 70–110)
POCT GLUCOSE: 202 MG/DL (ref 70–110)
POCT GLUCOSE: 380 MG/DL (ref 70–110)
POCT GLUCOSE: 414 MG/DL (ref 70–110)
POTASSIUM SERPL-SCNC: 5.1 MMOL/L
RBC # BLD AUTO: 3.66 M/UL
SODIUM SERPL-SCNC: 138 MMOL/L
WBC # BLD AUTO: 11.75 K/UL

## 2018-02-13 PROCEDURE — 25000003 PHARM REV CODE 250: Performed by: INTERNAL MEDICINE

## 2018-02-13 PROCEDURE — 80048 BASIC METABOLIC PNL TOTAL CA: CPT

## 2018-02-13 PROCEDURE — 25000242 PHARM REV CODE 250 ALT 637 W/ HCPCS: Performed by: NURSE PRACTITIONER

## 2018-02-13 PROCEDURE — 94761 N-INVAS EAR/PLS OXIMETRY MLT: CPT

## 2018-02-13 PROCEDURE — 27000221 HC OXYGEN, UP TO 24 HOURS

## 2018-02-13 PROCEDURE — 25000003 PHARM REV CODE 250: Performed by: NURSE PRACTITIONER

## 2018-02-13 PROCEDURE — 85025 COMPLETE CBC W/AUTO DIFF WBC: CPT

## 2018-02-13 PROCEDURE — 99900035 HC TECH TIME PER 15 MIN (STAT)

## 2018-02-13 PROCEDURE — 82962 GLUCOSE BLOOD TEST: CPT

## 2018-02-13 PROCEDURE — 36415 COLL VENOUS BLD VENIPUNCTURE: CPT

## 2018-02-13 PROCEDURE — 11000004 HC SNF PRIVATE

## 2018-02-13 PROCEDURE — 63600175 PHARM REV CODE 636 W HCPCS: Performed by: NURSE PRACTITIONER

## 2018-02-13 PROCEDURE — 94640 AIRWAY INHALATION TREATMENT: CPT

## 2018-02-13 PROCEDURE — 94660 CPAP INITIATION&MGMT: CPT

## 2018-02-13 RX ADMIN — IPRATROPIUM BROMIDE 0.5 MG: 0.5 SOLUTION RESPIRATORY (INHALATION) at 07:02

## 2018-02-13 RX ADMIN — PYRIDOSTIGMINE BROMIDE 60 MG: 60 TABLET ORAL at 02:02

## 2018-02-13 RX ADMIN — INSULIN ASPART 5 UNITS: 100 INJECTION, SOLUTION INTRAVENOUS; SUBCUTANEOUS at 05:02

## 2018-02-13 RX ADMIN — NYSTATIN 500000 UNITS: 100000 SUSPENSION ORAL at 05:02

## 2018-02-13 RX ADMIN — LEVALBUTEROL 1.25 MG: 1.25 SOLUTION, CONCENTRATE RESPIRATORY (INHALATION) at 12:02

## 2018-02-13 RX ADMIN — ATORVASTATIN CALCIUM 10 MG: 10 TABLET, FILM COATED ORAL at 09:02

## 2018-02-13 RX ADMIN — METOPROLOL SUCCINATE 200 MG: 50 TABLET, EXTENDED RELEASE ORAL at 10:02

## 2018-02-13 RX ADMIN — INSULIN ASPART 1 UNITS: 100 INJECTION, SOLUTION INTRAVENOUS; SUBCUTANEOUS at 09:02

## 2018-02-13 RX ADMIN — APIXABAN 2.5 MG: 2.5 TABLET, FILM COATED ORAL at 10:02

## 2018-02-13 RX ADMIN — IPRATROPIUM BROMIDE 0.5 MG: 0.5 SOLUTION RESPIRATORY (INHALATION) at 06:02

## 2018-02-13 RX ADMIN — LEVALBUTEROL 1.25 MG: 1.25 SOLUTION, CONCENTRATE RESPIRATORY (INHALATION) at 01:02

## 2018-02-13 RX ADMIN — PYRIDOSTIGMINE BROMIDE 60 MG: 60 TABLET ORAL at 09:02

## 2018-02-13 RX ADMIN — NYSTATIN 500000 UNITS: 100000 SUSPENSION ORAL at 12:02

## 2018-02-13 RX ADMIN — IPRATROPIUM BROMIDE 0.5 MG: 0.5 SOLUTION RESPIRATORY (INHALATION) at 01:02

## 2018-02-13 RX ADMIN — LEVOTHYROXINE SODIUM 100 MCG: 100 TABLET ORAL at 10:02

## 2018-02-13 RX ADMIN — CLOPIDOGREL BISULFATE 75 MG: 75 TABLET ORAL at 10:02

## 2018-02-13 RX ADMIN — NYSTATIN 500000 UNITS: 100000 SUSPENSION ORAL at 11:02

## 2018-02-13 RX ADMIN — PREDNISONE 40 MG: 20 TABLET ORAL at 10:02

## 2018-02-13 RX ADMIN — APIXABAN 2.5 MG: 2.5 TABLET, FILM COATED ORAL at 09:02

## 2018-02-13 RX ADMIN — IPRATROPIUM BROMIDE 0.5 MG: 0.5 SOLUTION RESPIRATORY (INHALATION) at 12:02

## 2018-02-13 RX ADMIN — LEVALBUTEROL 1.25 MG: 1.25 SOLUTION, CONCENTRATE RESPIRATORY (INHALATION) at 07:02

## 2018-02-13 RX ADMIN — LEVALBUTEROL 1.25 MG: 1.25 SOLUTION, CONCENTRATE RESPIRATORY (INHALATION) at 06:02

## 2018-02-13 RX ADMIN — PANTOPRAZOLE SODIUM 40 MG: 40 TABLET, DELAYED RELEASE ORAL at 10:02

## 2018-02-13 RX ADMIN — PYRIDOSTIGMINE BROMIDE 60 MG: 60 TABLET ORAL at 05:02

## 2018-02-13 RX ADMIN — AMLODIPINE BESYLATE 2.5 MG: 2.5 TABLET ORAL at 10:02

## 2018-02-13 RX ADMIN — FUROSEMIDE 20 MG: 20 TABLET ORAL at 10:02

## 2018-02-13 NOTE — PLAN OF CARE
Problem: Patient Care Overview  Goal: Plan of Care Review  Outcome: Ongoing (interventions implemented as appropriate)  Pt remains on swing bed for debility. Ambulating with physical therapy approximately 40 feet with minimal assist and walker. Taking prednisone for myasthenia gravis; monitoring blood sugars AC & HS; covering with SSI. Complains of mouth pain; nystatin ordered for thrush. Remains afib on telemetry; rate controled. BLE swelling; legs elevated on pillows. Call bell within reach. Reviewed plan of care with pt and sister; state agreement.

## 2018-02-13 NOTE — PROGRESS NOTES
Patient up in chair. Patient watching parade on t.v.. Family present. Offered patient and family to watch parade on lobby t.v.. Patient said, we'll stay in room. Patient is reminiscing about [place] parades.

## 2018-02-13 NOTE — PLAN OF CARE
Problem: Patient Care Overview  Goal: Plan of Care Review  Outcome: Ongoing (interventions implemented as appropriate)  Pt agrees with plan of care.  VS stable.  Pt had bipap this shift and tolerated well.  Pt turned q2 hours.  Family at bedside. Glucose monitoring continued.  Will continue to monitor.  Call bell in reach.

## 2018-02-13 NOTE — PLAN OF CARE
Problem: Patient Care Overview  Goal: Plan of Care Review  Outcome: Ongoing (interventions implemented as appropriate)  Vitals stable, afebrile. No complaints of pain. Sat in chair all day. Eating well. 2L NC sating well. Uneventful shift. Glucose elevated at end of shift due to sweets, covered with sliding scale per Dr. Mooney

## 2018-02-13 NOTE — PROGRESS NOTES
[facility]  Food & Nutrition Services      Diet: Diabetic 1800, Low Sodium 2gm    Pt requested something sweet for a snack. RN approved sweet snack stating pt is not a true diabetic. Tray of a rice krispy treat, 100 calorie cookies, chocolate ramirez dessert, and willie crackers sent to floor for pt choosing.    Carmen Swain MS, RDN, LDN

## 2018-02-13 NOTE — NURSING
Bedside report received from Poonam.  Call maxwell in reach.  Pt on 2l nasal cannula.  Family at  Bedside.  No complaints of pain noted.  Will continue to monitor.    Detail Level: Zone

## 2018-02-14 LAB
ANION GAP SERPL CALC-SCNC: 9 MMOL/L
BASOPHILS # BLD AUTO: 0.01 K/UL
BASOPHILS NFR BLD: 0.1 %
BUN SERPL-MCNC: 36 MG/DL
CALCIUM SERPL-MCNC: 8.5 MG/DL
CHLORIDE SERPL-SCNC: 102 MMOL/L
CO2 SERPL-SCNC: 28 MMOL/L
CREAT SERPL-MCNC: 0.9 MG/DL
DIFFERENTIAL METHOD: ABNORMAL
EOSINOPHIL # BLD AUTO: 0.4 K/UL
EOSINOPHIL NFR BLD: 4.2 %
ERYTHROCYTE [DISTWIDTH] IN BLOOD BY AUTOMATED COUNT: 14.2 %
EST. GFR  (AFRICAN AMERICAN): >60 ML/MIN/1.73 M^2
EST. GFR  (NON AFRICAN AMERICAN): 60 ML/MIN/1.73 M^2
GLUCOSE SERPL-MCNC: 97 MG/DL
HCT VFR BLD AUTO: 34.2 %
HGB BLD-MCNC: 11.2 G/DL
LYMPHOCYTES # BLD AUTO: 1 K/UL
LYMPHOCYTES NFR BLD: 10 %
MCH RBC QN AUTO: 31.1 PG
MCHC RBC AUTO-ENTMCNC: 32.7 G/DL
MCV RBC AUTO: 95 FL
MONOCYTES # BLD AUTO: 0.9 K/UL
MONOCYTES NFR BLD: 8.4 %
NEUTROPHILS # BLD AUTO: 7.8 K/UL
NEUTROPHILS NFR BLD: 77.3 %
PLATELET # BLD AUTO: 255 K/UL
PMV BLD AUTO: 9.9 FL
POCT GLUCOSE: 101 MG/DL (ref 70–110)
POCT GLUCOSE: 215 MG/DL (ref 70–110)
POCT GLUCOSE: 264 MG/DL (ref 70–110)
POTASSIUM SERPL-SCNC: 4.9 MMOL/L
RBC # BLD AUTO: 3.6 M/UL
SODIUM SERPL-SCNC: 139 MMOL/L
WBC # BLD AUTO: 10.07 K/UL

## 2018-02-14 PROCEDURE — 25000003 PHARM REV CODE 250: Performed by: NURSE PRACTITIONER

## 2018-02-14 PROCEDURE — 27000221 HC OXYGEN, UP TO 24 HOURS

## 2018-02-14 PROCEDURE — 85025 COMPLETE CBC W/AUTO DIFF WBC: CPT

## 2018-02-14 PROCEDURE — 99223 1ST HOSP IP/OBS HIGH 75: CPT | Mod: ,,, | Performed by: FAMILY MEDICINE

## 2018-02-14 PROCEDURE — 97530 THERAPEUTIC ACTIVITIES: CPT

## 2018-02-14 PROCEDURE — 63600175 PHARM REV CODE 636 W HCPCS: Performed by: NURSE PRACTITIONER

## 2018-02-14 PROCEDURE — 25000003 PHARM REV CODE 250: Performed by: INTERNAL MEDICINE

## 2018-02-14 PROCEDURE — 97116 GAIT TRAINING THERAPY: CPT

## 2018-02-14 PROCEDURE — 94640 AIRWAY INHALATION TREATMENT: CPT

## 2018-02-14 PROCEDURE — 11000004 HC SNF PRIVATE

## 2018-02-14 PROCEDURE — 25000242 PHARM REV CODE 250 ALT 637 W/ HCPCS: Performed by: NURSE PRACTITIONER

## 2018-02-14 PROCEDURE — 80048 BASIC METABOLIC PNL TOTAL CA: CPT

## 2018-02-14 PROCEDURE — 36415 COLL VENOUS BLD VENIPUNCTURE: CPT

## 2018-02-14 PROCEDURE — 94761 N-INVAS EAR/PLS OXIMETRY MLT: CPT

## 2018-02-14 RX ADMIN — NYSTATIN 500000 UNITS: 100000 SUSPENSION ORAL at 06:02

## 2018-02-14 RX ADMIN — ATORVASTATIN CALCIUM 10 MG: 10 TABLET, FILM COATED ORAL at 09:02

## 2018-02-14 RX ADMIN — LEVOTHYROXINE SODIUM 100 MCG: 100 TABLET ORAL at 09:02

## 2018-02-14 RX ADMIN — PYRIDOSTIGMINE BROMIDE 60 MG: 60 TABLET ORAL at 06:02

## 2018-02-14 RX ADMIN — LEVALBUTEROL 1.25 MG: 1.25 SOLUTION, CONCENTRATE RESPIRATORY (INHALATION) at 12:02

## 2018-02-14 RX ADMIN — CLOPIDOGREL BISULFATE 75 MG: 75 TABLET ORAL at 09:02

## 2018-02-14 RX ADMIN — NYSTATIN 500000 UNITS: 100000 SUSPENSION ORAL at 05:02

## 2018-02-14 RX ADMIN — INSULIN ASPART 1 UNITS: 100 INJECTION, SOLUTION INTRAVENOUS; SUBCUTANEOUS at 09:02

## 2018-02-14 RX ADMIN — AMLODIPINE BESYLATE 2.5 MG: 2.5 TABLET ORAL at 09:02

## 2018-02-14 RX ADMIN — NYSTATIN 500000 UNITS: 100000 SUSPENSION ORAL at 01:02

## 2018-02-14 RX ADMIN — NYSTATIN 500000 UNITS: 100000 SUSPENSION ORAL at 11:02

## 2018-02-14 RX ADMIN — LEVALBUTEROL 1.25 MG: 1.25 SOLUTION, CONCENTRATE RESPIRATORY (INHALATION) at 07:02

## 2018-02-14 RX ADMIN — APIXABAN 2.5 MG: 2.5 TABLET, FILM COATED ORAL at 09:02

## 2018-02-14 RX ADMIN — INSULIN ASPART 3 UNITS: 100 INJECTION, SOLUTION INTRAVENOUS; SUBCUTANEOUS at 05:02

## 2018-02-14 RX ADMIN — PREDNISONE 40 MG: 20 TABLET ORAL at 09:02

## 2018-02-14 RX ADMIN — FUROSEMIDE 20 MG: 20 TABLET ORAL at 09:02

## 2018-02-14 RX ADMIN — LEVALBUTEROL 1.25 MG: 1.25 SOLUTION, CONCENTRATE RESPIRATORY (INHALATION) at 08:02

## 2018-02-14 RX ADMIN — PANTOPRAZOLE SODIUM 40 MG: 40 TABLET, DELAYED RELEASE ORAL at 09:02

## 2018-02-14 RX ADMIN — PYRIDOSTIGMINE BROMIDE 60 MG: 60 TABLET ORAL at 09:02

## 2018-02-14 RX ADMIN — IPRATROPIUM BROMIDE 0.5 MG: 0.5 SOLUTION RESPIRATORY (INHALATION) at 12:02

## 2018-02-14 RX ADMIN — PYRIDOSTIGMINE BROMIDE 60 MG: 60 TABLET ORAL at 02:02

## 2018-02-14 RX ADMIN — METOPROLOL SUCCINATE 200 MG: 50 TABLET, EXTENDED RELEASE ORAL at 09:02

## 2018-02-14 RX ADMIN — IPRATROPIUM BROMIDE 0.5 MG: 0.5 SOLUTION RESPIRATORY (INHALATION) at 07:02

## 2018-02-14 RX ADMIN — IPRATROPIUM BROMIDE 0.5 MG: 0.5 SOLUTION RESPIRATORY (INHALATION) at 08:02

## 2018-02-14 NOTE — NURSING
Bedside report complete with ROSA ISELA Cohen; pt lying awake in bed, sister at bedside. No c.o or concerns at this time. Call bell in reach, bed low.

## 2018-02-14 NOTE — ASSESSMENT & PLAN NOTE
Initially tx for fluid overload after missed lasix rx;   2-D Echocardiogram for evaluation of left ventricle systolic function or any valvular heart disease-EF 55-60%, stage 1 diastolic dysfunction, no significant valvular pathology.  2-2-18 when she demonstrated volume depletion/weight loss with subsequent worsening renal function.   Diuretics discontinued due to acute renal insuff.  Cr was trending up, required IV fluids; creat now improving. Off IVF  Cardiology  Dr. Patel following     This is improved and stable  Daily weights.  Monitor  I/Os   Na restriction <2g/d.

## 2018-02-14 NOTE — SUBJECTIVE & OBJECTIVE
Past Medical History:   Diagnosis Date    GERD (gastroesophageal reflux disease)     Hyperlipidemia     Hypertension        Past Surgical History:   Procedure Laterality Date    CHOLECYSTECTOMY      HERNIA REPAIR      HYSTERECTOMY      knee replacemene      deidra    THYROIDECTOMY         Review of patient's allergies indicates:   Allergen Reactions    Statins-hmg-coa reductase inhibitors      Other reaction(s): Unknown       No current facility-administered medications on file prior to encounter.      Current Outpatient Prescriptions on File Prior to Encounter   Medication Sig    amlodipine (NORVASC) 2.5 MG tablet Take 1 tablet (2.5 mg total) by mouth once daily.    atorvastatin (LIPITOR) 10 MG tablet Take 1 tablet (10 mg total) by mouth every evening.    atorvastatin (LIPITOR) 20 MG tablet     clopidogrel (PLAVIX) 75 mg tablet Take 1 tablet (75 mg total) by mouth once daily.    cyanocobalamin (VITAMIN B-12) 100 MCG tablet Take 100 mcg by mouth once daily.    furosemide (LASIX) 40 MG tablet Take 1 tablet (40 mg total) by mouth once daily.    levothyroxine (SYNTHROID) 100 MCG tablet TAKE ONE TABLET BY MOUTH EVERY DAY    losartan (COZAAR) 100 MG tablet     meloxicam (MOBIC) 7.5 MG tablet TAKE ONE TABLET BY MOUTH EVERY OTHER DAY    metoprolol succinate (TOPROL-XL) 200 MG 24 hr tablet Take 1 tablet (200 mg total) by mouth once daily.    nitroGLYCERIN (NITROSTAT) 0.4 MG SL tablet Place 0.4 mg under the tongue every 5 (five) minutes as needed.    omega-3 acid ethyl esters (LOVAZA) 1 gram capsule Take 2 capsules (2 g total) by mouth 2 (two) times daily.    potassium chloride SA (K-DUR,KLOR-CON) 20 MEQ tablet Take 1 tablet (20 mEq total) by mouth once daily.    ranitidine (ZANTAC) 150 MG tablet Take 1 tablet (150 mg total) by mouth 2 (two) times daily.    VESICARE 10 mg tablet TAKE ONE TABLET BY MOUTH EVERY DAY     Family History     Problem Relation (Age of Onset)    Cancer Sister        Social  History Main Topics    Smoking status: Never Smoker    Smokeless tobacco: Never Used    Alcohol use No    Drug use: No    Sexual activity: Not on file     Review of Systems   Constitutional: Positive for fatigue. Negative for activity change, fever and unexpected weight change.   HENT: Negative for congestion, ear pain, hearing loss, rhinorrhea and sore throat.    Eyes: Negative for pain, redness and visual disturbance.   Respiratory: Negative for cough, shortness of breath and wheezing.    Cardiovascular: Positive for leg swelling (1+ b/l LE edema, more chronic). Negative for chest pain and palpitations.   Gastrointestinal: Negative for abdominal pain, constipation, diarrhea, nausea and vomiting.   Genitourinary: Negative for dysuria, frequency and urgency.   Musculoskeletal: Negative for back pain, joint swelling and neck pain.   Skin: Negative for color change, rash and wound.   Neurological: Positive for weakness (generalized, worse ont he left). Negative for dizziness, tremors, light-headedness and headaches.     Objective:     Vital Signs (Most Recent):  Temp: 97.6 °F (36.4 °C) (02/14/18 0925)  Pulse: 77 (02/14/18 0925)  Resp: 18 (02/14/18 0925)  BP: (!) 115/56 (02/14/18 0925)  SpO2: 99 % (02/14/18 0925) Vital Signs (24h Range):  Temp:  [96.9 °F (36.1 °C)-98.7 °F (37.1 °C)] 97.6 °F (36.4 °C)  Pulse:  [57-77] 77  Resp:  [18-20] 18  SpO2:  [94 %-99 %] 99 %  BP: (102-138)/(47-67) 115/56     Weight: 114.8 kg (253 lb 1.4 oz) (rd reviewed, no new)  Body mass index is 39.64 kg/m².    Physical Exam   Constitutional: She is oriented to person, place, and time. She appears well-developed. No distress.   Morbidly obese   HENT:   Head: Normocephalic and atraumatic.   Right Ear: External ear normal.   Left Ear: External ear normal.   Macroglossia, open mouth breathing   Eyes: Conjunctivae and EOM are normal. Pupils are equal, round, and reactive to light.   Neck: Normal range of motion. Neck supple. No tracheal  deviation present.   Cardiovascular: Normal rate, regular rhythm and normal heart sounds.    Pulmonary/Chest: Effort normal and breath sounds normal. No respiratory distress. She has no wheezes.   Abdominal: Soft. Bowel sounds are normal. There is no tenderness.   Musculoskeletal: Normal range of motion. She exhibits edema.   Neurological: She is alert and oriented to person, place, and time.   Skin: Skin is warm and dry. No rash noted.   Venous stasis change   Psychiatric: She has a normal mood and affect. Her behavior is normal.   Nursing note and vitals reviewed.        CRANIAL NERVES     CN III, IV, VI   Pupils are equal, round, and reactive to light.  Extraocular motions are normal.        Significant Labs:   CBC:     Recent Labs  Lab 18  0534 18  0557   WBC 11.75 10.07   HGB 11.2* 11.2*   HCT 34.7* 34.2*    255     CMP:     Recent Labs  Lab 18  0534 18  0557    139   K 5.1 4.9   CL 99 102   CO2 32* 28    97   BUN 43* 36*   CREATININE 1.1 0.9   CALCIUM 8.8 8.5*   ANIONGAP 7* 9   EGFRNONAA 47* 60     BNP     Recent Labs  Lab 18  0622   BNP 69                Lab Results   Component Value Date     TSH 0.371 (L) 2018   T4 1.10  MG panel pending  procalcitonin 0.39     Recent Labs  Lab 18  1306   TROPONINI 0.018      resp viral panel negative        Significant Imagin/6/18 CXR- There are bilateral patchy opacities which are worse at the right lung base when compared to previous performed 3 days earlier. There is no pneumothorax. The remainder of the examination is unchanged.    2/3 CXR Mild decrease in right upper lobe and right perihilar infiltrate. Mild cardiomegaly. Atherosclerosis.     2/2 CXR The lungs are underexpanded.  There are chronic lung markings seen bilaterally with pulmonary vascular congestion and patchy opacities throughout both lungs.  This could be related to pulmonary edema although superimposed infiltrate in the right upper lobe  is not excluded.  The heart is enlarged.  Calcified atheromatous disease affects the aorta.  Age-appropriate degenerative changes affect the skeleton.     1/31 CTA lungs 1.  No CT evidence of central pulmonary embolus.  Severely limited study due to use motion artifact.  2.  Bilateral groundglass opacities and minimal dependent subsegmental atelectasis.     1/31 CT head 1.  No CT evidence of an acute intracranial abnormality with limitations due to motion artifact.  2.  Mild atrophy and minimal small vessel ischemic changes of the periventricular white matter.     Echo Echo 1/18: EF 60%, stage 1 diastolic dysfunction, no significant valvular pathology.      EKG a fib                   Pending Diagnostic Studies:      Procedure Component Value Units Date/Time     Myasthenia Gravis/Lambert-Eaton [[id number]] Collected:  02/02/18 1824     Order Status:  Sent Lab Status:  In process Updated:  02/02/18 2204     Specimen:  Blood

## 2018-02-14 NOTE — PT/OT/SLP PROGRESS
"Physical Therapy Treatment    Patient Name:  Stephany Skinner   MRN:  [id number]    Recommendations:     Discharge Recommendations:  home with home health, home health PT   Discharge Equipment Recommendations: wheelchair (bariatric RW)   Barriers to discharge: None    Assessment:     Stephany Skinner is a 83 y.o. female admitted with a medical diagnosis of Debility.  She presents with the following impairments/functional limitations:  weakness, impaired endurance, impaired self care skills, impaired functional mobilty, gait instability, impaired balance, decreased upper extremity function, decreased lower extremity function, decreased safety awareness . Pt with improving independence with gait and T/F. Pt unable to asecend more than 2 steps with mod assist and bilateral hand rails secondary to weakness in BLE. Pt also with extreme fear of falling when negotiating steps with increased anxiety and SOB with supplemental O2.    Rehab Prognosis:  Good; patient would benefit from acute skilled PT services to address these deficits and reach maximum level of function.      Recent Surgery: * No surgery found *      Plan:     During this hospitalization, patient to be seen  (1-2x/day Monday-Friday; PRN Weekends/Holidays) to address the above listed problems via gait training, therapeutic activities, therapeutic exercises  · Plan of Care Expires:   (upon D/C from facility)   Plan of Care Reviewed with: patient, family    Subjective     Communicated with patient prior to session.  Patient found up in chair upon PT entry to room, agreeable to treatment.      Chief Complaint: "weak"  Patient comments/goals: "Go home"  Pain/Comfort:  · Pain Rating 1: 0/10    Patients cultural, spiritual, Yarsanism conflicts given the current situation:      Objective:     Patient found with: oxygen     General Precautions: Standard, fall, respiratory   Orthopedic Precautions:N/A   Braces: N/A     Functional Mobility:  · Transfers:     · Sit to " Stand:  contact guard assistance with rolling walker  · Bed to Chair: contact guard assistance with  rolling walker  using  Step Transfer  · Gait: Gait training x 45 feet with RW and CGA with cueing for proper use of AD and upright posturing to decrease pt fall risk.   · Stairs:  Pt ascended/descended 2 stair(s) with No Assistive Device with bilateral handrails with Moderate Assistance.      Attempted 2nd trial on stair negotiation without success.      AM-PAC 6 CLICK MOBILITY  Turning over in bed (including adjusting bedclothes, sheets and blankets)?: 3  Sitting down on and standing up from a chair with arms (e.g., wheelchair, bedside commode, etc.): 3  Moving from lying on back to sitting on the side of the bed?: 3  Moving to and from a bed to a chair (including a wheelchair)?: 3  Need to walk in hospital room?: 3  Climbing 3-5 steps with a railing?: 2  Total Score: 17     Patient left up in chair with all lines intact, call button in reach, NSG notified and family present..    GOALS:    Physical Therapy Goals        Problem: Physical Therapy Goal    Goal Priority Disciplines Outcome Goal Variances Interventions   Physical Therapy Goal     PT/OT, PT Ongoing (interventions implemented as appropriate)     Description:  Goals to be met by: 2/15/2018     Patient will increase functional independence with mobility by performin. Supine to sit with Stand-by Assistance  2. Sit to supine with Stand-by Assistance  3. Rolling to Left and Right with Stand-by Assistance.  4. Sit to stand transfer with Stand-by Assistance, with RW  5. Bed to chair transfer with Stand-by Assistance using Rolling Walker  6. Gait  x 50 feet with Stand-by Assistance using Rolling Walker.   7. Ascend/descend 3 stairs with bilateral Handrails Minimal Assistance            Problem: Physical Therapy Goal    Goal Priority Disciplines Outcome Goal Variances Interventions   Physical Therapy Goal     PT/OT, PT      Description:  Pt will increase  participation in established goal progress              Multidisciplinary Problems (Resolved)        Problem: Physical Therapy Goal    Goal Priority Disciplines Outcome Goal Variances Interventions   Physical Therapy Goal   (Resolved)     PT/OT, PT  Error                     Time Tracking:     PT Received On: 02/14/18  PT Start Time: 1000     PT Stop Time: 1030  PT Total Time (min): 30 min     Billable Minutes: Gait Training 30 minutes    Treatment Type: Treatment  PT/PTA: PT           Jacki Rutherford, PT  02/14/2018

## 2018-02-14 NOTE — PROGRESS NOTES
Staff Handoff    Bedside report received from ROSA ISELA Delaney. Patient lying in bed with 2L O2 per NC in use. NAD. Denies pain. Family at bedside. Call bell in reach.   Resident Handoff

## 2018-02-14 NOTE — ASSESSMENT & PLAN NOTE
Place on skilled nursing 2/7/18 for prolonged hospitalization with complications, +deconditioned.  Needs to improve to baseline;  Goal 50 feet. Emaenlty ambulating 40 ft. She has ten steps at home. We need to make arrangements for discharging her somewhere else(with family perhaps) as I do not see her being able to climb steps anytime soon.

## 2018-02-14 NOTE — ASSESSMENT & PLAN NOTE
CO2 much better., tolerating BIPAP at night. Down to home Rx O2 2LNC    + Neuro eval noting  mild ptosis and gaze restriction as well as hypophonic voice concerning for neuro-muscular cause of hypercapnia. Responding to steroids. Thank you Dr. Ortiz

## 2018-02-14 NOTE — PT/OT/SLP PROGRESS
"Physical Therapy Treatment    Patient Name:  Stephany Skinner   MRN:  [id number]    Recommendations:     Discharge Recommendations:  home with home health, home health PT   Discharge Equipment Recommendations: wheelchair (bariatric RW)   Barriers to discharge: None    Assessment:     Stephany Skinner is a 83 y.o. female admitted with a medical diagnosis of Debility.  She presents with the following impairments/functional limitations:  weakness, impaired endurance, impaired self care skills, impaired functional mobilty, gait instability, impaired balance, decreased upper extremity function, decreased lower extremity function, decreased safety awareness . Pt progressing well with POC goals. Pt unable to attempt stair negotiation this PM secondary to fear/anxiety of falling when trying.    Rehab Prognosis:  Fair; patient would benefit from acute skilled PT services to address these deficits and reach maximum level of function.      Recent Surgery: * No surgery found *      Plan:     During this hospitalization, patient to be seen  (1-2x/day Monday-Friday; PRN Weekends/Holidays) to address the above listed problems via gait training, therapeutic activities, therapeutic exercises  · Plan of Care Expires:   (upon D/C from facility)   Plan of Care Reviewed with: patient, daughter    Subjective     Communicated with patient and family prior to session.  Patient found sitting in chair upon PT entry to room, agreeable to treatment.      Chief Complaint: "weak"   Patient comments/goals: "Go home"  Pain/Comfort:  · Pain Rating 1: 0/10    Patients cultural, spiritual, Restorationism conflicts given the current situation:      Objective:     Patient found with: oxygen     General Precautions: Standard, fall, respiratory   Orthopedic Precautions:N/A   Braces: N/A     Functional Mobility:  · Transfers:     · Sit to Stand:  contact guard assistance with rolling walker  · Bed to Chair: contact guard assistance with  rolling walker  using  " Step Transfer  · Gait: Gait training x 45 feet with RW and CGA with cueing for safety awareness and sequencing to decrease pt fall risk.       AM-PAC 6 CLICK MOBILITY  Turning over in bed (including adjusting bedclothes, sheets and blankets)?: 3  Sitting down on and standing up from a chair with arms (e.g., wheelchair, bedside commode, etc.): 3  Moving from lying on back to sitting on the side of the bed?: 3  Moving to and from a bed to a chair (including a wheelchair)?: 3  Need to walk in hospital room?: 3  Climbing 3-5 steps with a railing?: 2  Total Score: 17     Patient left up in chair with all lines intact, call button in reach, NSG notified and family present..    GOALS:    Physical Therapy Goals        Problem: Physical Therapy Goal    Goal Priority Disciplines Outcome Goal Variances Interventions   Physical Therapy Goal     PT/OT, PT Ongoing (interventions implemented as appropriate)     Description:  Goals to be met by: 2/15/2018     Patient will increase functional independence with mobility by performin. Supine to sit with Stand-by Assistance  2. Sit to supine with Stand-by Assistance  3. Rolling to Left and Right with Stand-by Assistance.  4. Sit to stand transfer with Stand-by Assistance, with RW  5. Bed to chair transfer with Stand-by Assistance using Rolling Walker  6. Gait  x 50 feet with Stand-by Assistance using Rolling Walker.   7. Ascend/descend 3 stairs with bilateral Handrails Minimal Assistance            Problem: Physical Therapy Goal    Goal Priority Disciplines Outcome Goal Variances Interventions   Physical Therapy Goal     PT/OT, PT      Description:  Pt will increase participation in established goal progress              Multidisciplinary Problems (Resolved)        Problem: Physical Therapy Goal    Goal Priority Disciplines Outcome Goal Variances Interventions   Physical Therapy Goal   (Resolved)     PT/OT, PT  Error                     Time Tracking:     PT Received  On: 02/14/18  PT Start Time: 1300     PT Stop Time: 1325  PT Total Time (min): 25 min     Billable Minutes: Gait Training 15 minutes and Therapeutic Activity 10 minutes    Treatment Type: Treatment  PT/PTA: PT           Jacki Rutherford, PT  02/14/2018

## 2018-02-14 NOTE — PROGRESS NOTES
[facility] Medicine  Progress Note    Patient Name: Stephany Skinner  MRN: [id number]  Patient Class: IP- Swing   Admission Date: 2/7/2018  Length of Stay: 7 days  Attending Physician: Morris Jain MD  Primary Care Provider: Pipo Flowers MD        Subjective:     Principal Problem:Debility    HPI:  82 yo WF initially admitted with with CHF after missing several doses of lasix; Hospital course complicated by  pneumonia / acute resp failure and neuromuscular changes, possible Myasthenia gravis, confirmation labs pending. Neuro noted some mild ptosis and gaze restriction as well as hypophonic voice concerning for neuro-muscular cause of hypercapnia.  Responding to steroids.   Overdiuresed and developed PUJA-improving.   On bipap- tolerated all night. Prednisone taper initiated per neuro recs. Currently has been weaned to home dose O2- 2L [place],  CO2 and confusion better.  placed on skilled nursing 2-7-18 Ambulating with PT- ambulated 15ft yesterday - goal is 50; transfer training also performed.    Hospital Course:  82 YO WF maintained on skilled since 2-7-18  for debility. Goal ambulation 50 feet. (has 10 steps/stairs at home)  Renal fx is much better after recent over-diuresing. Cr 1.3 today. Will need to monitor closely to avoid fluid volume overload.   Feeling ok this am.       2/12/18  Progressing well with PT. Walked 40 ft, goal 50ft. Lantus started due to steroid induced hyperglycemia. She reports burning on the tongue that started yesterday.     2/14/18  Pt progressing well with Pt; ambulated to lobby over the weekend.   However, She has not done any step walking yet and has 10 stairs at home.   She is feeling much better. Did not wear bipap much last night. Does not use home bipap but uses CPAP at home.  Does wear oxygen 2lnc.   Encouraged to use CPAP tonight and will work on step walking.   She notes mask does not fit her right and aggravates her- [facility]; will  consult for help with fitting  Addendum* pt unable to climb stairs and scared;  discussed with fly will try to get moved to downstairs apt. Due to debility; also CM discussed advising against driving in future    Past Medical History:   Diagnosis Date    GERD (gastroesophageal reflux disease)     Hyperlipidemia     Hypertension        Past Surgical History:   Procedure Laterality Date    CHOLECYSTECTOMY      HERNIA REPAIR      HYSTERECTOMY      knee replacemene      deidra    THYROIDECTOMY         Review of patient's allergies indicates:   Allergen Reactions    Statins-hmg-coa reductase inhibitors      Other reaction(s): Unknown       No current facility-administered medications on file prior to encounter.      Current Outpatient Prescriptions on File Prior to Encounter   Medication Sig    amlodipine (NORVASC) 2.5 MG tablet Take 1 tablet (2.5 mg total) by mouth once daily.    atorvastatin (LIPITOR) 10 MG tablet Take 1 tablet (10 mg total) by mouth every evening.    atorvastatin (LIPITOR) 20 MG tablet     clopidogrel (PLAVIX) 75 mg tablet Take 1 tablet (75 mg total) by mouth once daily.    cyanocobalamin (VITAMIN B-12) 100 MCG tablet Take 100 mcg by mouth once daily.    furosemide (LASIX) 40 MG tablet Take 1 tablet (40 mg total) by mouth once daily.    levothyroxine (SYNTHROID) 100 MCG tablet TAKE ONE TABLET BY MOUTH EVERY DAY    losartan (COZAAR) 100 MG tablet     meloxicam (MOBIC) 7.5 MG tablet TAKE ONE TABLET BY MOUTH EVERY OTHER DAY    metoprolol succinate (TOPROL-XL) 200 MG 24 hr tablet Take 1 tablet (200 mg total) by mouth once daily.    nitroGLYCERIN (NITROSTAT) 0.4 MG SL tablet Place 0.4 mg under the tongue every 5 (five) minutes as needed.    omega-3 acid ethyl esters (LOVAZA) 1 gram capsule Take 2 capsules (2 g total) by mouth 2 (two) times daily.    potassium chloride SA (K-DUR,KLOR-CON) 20 MEQ tablet Take 1 tablet (20 mEq total) by mouth once daily.    ranitidine (ZANTAC)  150 MG tablet Take 1 tablet (150 mg total) by mouth 2 (two) times daily.    VESICARE 10 mg tablet TAKE ONE TABLET BY MOUTH EVERY DAY     Family History     Problem Relation (Age of Onset)    Cancer Sister        Social History Main Topics    Smoking status: Never Smoker    Smokeless tobacco: Never Used    Alcohol use No    Drug use: No    Sexual activity: Not on file     Review of Systems   Constitutional: Positive for fatigue. Negative for activity change, fever and unexpected weight change.   HENT: Negative for congestion, ear pain, hearing loss, rhinorrhea and sore throat.    Eyes: Negative for pain, redness and visual disturbance.   Respiratory: Negative for cough, shortness of breath and wheezing.    Cardiovascular: Positive for leg swelling (1+ b/l LE edema, more chronic). Negative for chest pain and palpitations.   Gastrointestinal: Negative for abdominal pain, constipation, diarrhea, nausea and vomiting.   Genitourinary: Negative for dysuria, frequency and urgency.   Musculoskeletal: Negative for back pain, joint swelling and neck pain.   Skin: Negative for color change, rash and wound.   Neurological: Positive for weakness (generalized, worse ont he left). Negative for dizziness, tremors, light-headedness and headaches.     Objective:     Vital Signs (Most Recent):  Temp: 97.6 °F (36.4 °C) (02/14/18 0925)  Pulse: 77 (02/14/18 0925)  Resp: 18 (02/14/18 0925)  BP: (!) 115/56 (02/14/18 0925)  SpO2: 99 % (02/14/18 0925) Vital Signs (24h Range):  Temp:  [96.9 °F (36.1 °C)-98.7 °F (37.1 °C)] 97.6 °F (36.4 °C)  Pulse:  [57-77] 77  Resp:  [18-20] 18  SpO2:  [94 %-99 %] 99 %  BP: (102-138)/(47-67) 115/56     Weight: 114.8 kg (253 lb 1.4 oz) (rd reviewed, no new)  Body mass index is 39.64 kg/m².    Physical Exam   Constitutional: She is oriented to person, place, and time. She appears well-developed. No distress.   Morbidly obese   HENT:   Head: Normocephalic and atraumatic.   Right Ear: External ear normal.    Left Ear: External ear normal.   Macroglossia, open mouth breathing   Eyes: Conjunctivae and EOM are normal. Pupils are equal, round, and reactive to light.   Neck: Normal range of motion. Neck supple. No tracheal deviation present.   Cardiovascular: Normal rate, regular rhythm and normal heart sounds.    Pulmonary/Chest: Effort normal and breath sounds normal. No respiratory distress. She has no wheezes.   Abdominal: Soft. Bowel sounds are normal. There is no tenderness.   Musculoskeletal: Normal range of motion. She exhibits edema.   Neurological: She is alert and oriented to person, place, and time.   Skin: Skin is warm and dry. No rash noted.   Venous stasis change   Psychiatric: She has a normal mood and affect. Her behavior is normal.   Nursing note and vitals reviewed.        CRANIAL NERVES     CN III, IV, VI   Pupils are equal, round, and reactive to light.  Extraocular motions are normal.        Significant Labs:   CBC:     Recent Labs  Lab 18  0534 18  0557   WBC 11.75 10.07   HGB 11.2* 11.2*   HCT 34.7* 34.2*    255     CMP:     Recent Labs  Lab 18  0534 18  0557    139   K 5.1 4.9   CL 99 102   CO2 32* 28    97   BUN 43* 36*   CREATININE 1.1 0.9   CALCIUM 8.8 8.5*   ANIONGAP 7* 9   EGFRNONAA 47* 60     BNP     Recent Labs  Lab 18  0622   BNP 69                Lab Results   Component Value Date     TSH 0.371 (L) 2018   T4 1.10  MG panel pending  procalcitonin 0.39     Recent Labs  Lab 18  1306   TROPONINI 0.018      resp viral panel negative        Significant Imagin/6/18 CXR- There are bilateral patchy opacities which are worse at the right lung base when compared to previous performed 3 days earlier. There is no pneumothorax. The remainder of the examination is unchanged.    2/3 CXR Mild decrease in right upper lobe and right perihilar infiltrate. Mild cardiomegaly. Atherosclerosis.     2/2 CXR The lungs are underexpanded.  There are  chronic lung markings seen bilaterally with pulmonary vascular congestion and patchy opacities throughout both lungs.  This could be related to pulmonary edema although superimposed infiltrate in the right upper lobe is not excluded.  The heart is enlarged.  Calcified atheromatous disease affects the aorta.  Age-appropriate degenerative changes affect the skeleton.     1/31 CTA lungs 1.  No CT evidence of central pulmonary embolus.  Severely limited study due to use motion artifact.  2.  Bilateral groundglass opacities and minimal dependent subsegmental atelectasis.     1/31 CT head 1.  No CT evidence of an acute intracranial abnormality with limitations due to motion artifact.  2.  Mild atrophy and minimal small vessel ischemic changes of the periventricular white matter.     Echo Echo 1/18: EF 60%, stage 1 diastolic dysfunction, no significant valvular pathology.      EKG a fib                   Pending Diagnostic Studies:      Procedure Component Value Units Date/Time     Myasthenia Gravis/Lambert-Eaton [[id number]] Collected:  02/02/18 1824     Order Status:  Sent Lab Status:  In process Updated:  02/02/18 2204     Specimen:  Blood                 Assessment/Plan:      * Debility      Place on skilled nursing 2/7/18 for prolonged hospitalization with complications, +deconditioned.  Needs to improve to baseline;  Goal 50 feet. Currenlty ambulating 40 ft. She has ten steps at home. We need to make arrangements for discharging her somewhere else(with family perhaps) as I do not see her being able to climb steps anytime soon.         Physical deconditioning    Continue with PT on skilled- ^^ activity as tolerated- get back to baseling        Acute on chronic congestive heart failure    Initially tx for fluid overload after missed lasix rx;   2-D Echocardiogram for evaluation of left ventricle systolic function or any valvular heart disease-EF 55-60%, stage 1 diastolic dysfunction, no significant valvular  pathology.  2-2-18 when she demonstrated volume depletion/weight loss with subsequent worsening renal function.   Diuretics discontinued due to acute renal insuff.  Cr was trending up, required IV fluids; creat now improving. Off IVF  Cardiology  Dr. Patel following     This is improved and stable  Daily weights.  Monitor  I/Os   Na restriction <2g/d.           PUJA (acute kidney injury)      Dialy  BMP today.Diuretics stopped.   Renal Fx improving;  feel her elevated Cr due to over diuresis  Hold losartan in acute injury; resume in future once stable. Monitor I&Os, weights, renal Fx.         Community acquired pneumonia of right upper lobe of lung    2/6/18 Started levofloxacin- day #5/5 today (d/c 2/10/18); continue Nebs - Bipap          Myasthenia gravis, adult form      Neuro eval document mild ptosis and gaze restriction as well as hypophonic voice concerning for neuro-muscular cause of hypercapnia. MG labs negative. Responding to steroids; follow neuro recs for taper Prednisone 40mg daily for a week (started 2/7). Then would need taper to Prednisone   30mg daily for a week, then 20mg until next seeing Neurology outpatient post d/c unless worsening symptoms again.    Dr. Ortiz following    Even with negative labs had good steroid response and will continue with taper. Will see neuro outpatient for further work up        Acute respiratory failure with hypoxia and hypercarbia    CO2 much better., tolerating BIPAP at night. Down to home Rx O2 2LNC    + Neuro eval noting  mild ptosis and gaze restriction as well as hypophonic voice concerning for neuro-muscular cause of hypercapnia. Responding to steroids. Thank you Dr. Ortiz         Acute on chronic diastolic congestive heart failure                VTE Risk Mitigation         Ordered     Place MALKA hose  Until discontinued      02/12/18 0916     apixaban tablet 2.5 mg  2 times daily     Route:  Oral        02/07/18 1740     Medium Risk of VTE  Once       02/07/18 1740     Place sequential compression device  Until discontinued      02/07/18 1740              Morris Jain MD  Department of Hospital Medicine   [facility]

## 2018-02-14 NOTE — PLAN OF CARE
Problem: Patient Care Overview  Goal: Plan of Care Review  Outcome: Ongoing (interventions implemented as appropriate)  Patient aware of plan of care. Patient had restful night. Requested a break from BIPAP during night. C/o mask being too tight. Respiratory informed. BIPAP taken off, 3L O2 per NC applied, tolerated well. Glycemic monitoring continued. A-fib on tele monitor, rate controlled. Sister at bedside. No questions or concerns at this time. Agrees with plan of care.

## 2018-02-15 LAB
ANION GAP SERPL CALC-SCNC: 7 MMOL/L
BASOPHILS # BLD AUTO: 0.01 K/UL
BASOPHILS NFR BLD: 0.1 %
BUN SERPL-MCNC: 33 MG/DL
CALCIUM SERPL-MCNC: 8.3 MG/DL
CHLORIDE SERPL-SCNC: 100 MMOL/L
CO2 SERPL-SCNC: 33 MMOL/L
CREAT SERPL-MCNC: 0.9 MG/DL
DIFFERENTIAL METHOD: ABNORMAL
EOSINOPHIL # BLD AUTO: 0.1 K/UL
EOSINOPHIL NFR BLD: 0.6 %
ERYTHROCYTE [DISTWIDTH] IN BLOOD BY AUTOMATED COUNT: 14.1 %
EST. GFR  (AFRICAN AMERICAN): >60 ML/MIN/1.73 M^2
EST. GFR  (NON AFRICAN AMERICAN): 60 ML/MIN/1.73 M^2
GLUCOSE SERPL-MCNC: 97 MG/DL
HCT VFR BLD AUTO: 32.9 %
HGB BLD-MCNC: 10.6 G/DL
LYMPHOCYTES # BLD AUTO: 1.3 K/UL
LYMPHOCYTES NFR BLD: 12.8 %
MCH RBC QN AUTO: 30.8 PG
MCHC RBC AUTO-ENTMCNC: 32.2 G/DL
MCV RBC AUTO: 96 FL
MONOCYTES # BLD AUTO: 0.9 K/UL
MONOCYTES NFR BLD: 8.6 %
NEUTROPHILS # BLD AUTO: 7.7 K/UL
NEUTROPHILS NFR BLD: 77.9 %
PLATELET # BLD AUTO: 311 K/UL
PMV BLD AUTO: 9.1 FL
POCT GLUCOSE: 118 MG/DL (ref 70–110)
POCT GLUCOSE: 138 MG/DL (ref 70–110)
POCT GLUCOSE: 174 MG/DL (ref 70–110)
POTASSIUM SERPL-SCNC: 4.1 MMOL/L
RBC # BLD AUTO: 3.44 M/UL
SODIUM SERPL-SCNC: 140 MMOL/L
WBC # BLD AUTO: 9.93 K/UL

## 2018-02-15 PROCEDURE — 25000242 PHARM REV CODE 250 ALT 637 W/ HCPCS: Performed by: NURSE PRACTITIONER

## 2018-02-15 PROCEDURE — 25000003 PHARM REV CODE 250: Performed by: FAMILY MEDICINE

## 2018-02-15 PROCEDURE — 25000003 PHARM REV CODE 250: Performed by: NURSE PRACTITIONER

## 2018-02-15 PROCEDURE — 94640 AIRWAY INHALATION TREATMENT: CPT

## 2018-02-15 PROCEDURE — 63600175 PHARM REV CODE 636 W HCPCS: Performed by: FAMILY MEDICINE

## 2018-02-15 PROCEDURE — 94761 N-INVAS EAR/PLS OXIMETRY MLT: CPT

## 2018-02-15 PROCEDURE — 11000004 HC SNF PRIVATE

## 2018-02-15 PROCEDURE — 97116 GAIT TRAINING THERAPY: CPT

## 2018-02-15 PROCEDURE — 97530 THERAPEUTIC ACTIVITIES: CPT

## 2018-02-15 PROCEDURE — 99900035 HC TECH TIME PER 15 MIN (STAT)

## 2018-02-15 PROCEDURE — 36415 COLL VENOUS BLD VENIPUNCTURE: CPT

## 2018-02-15 PROCEDURE — 25000003 PHARM REV CODE 250: Performed by: INTERNAL MEDICINE

## 2018-02-15 PROCEDURE — 85025 COMPLETE CBC W/AUTO DIFF WBC: CPT

## 2018-02-15 PROCEDURE — 80048 BASIC METABOLIC PNL TOTAL CA: CPT

## 2018-02-15 PROCEDURE — 97803 MED NUTRITION INDIV SUBSEQ: CPT

## 2018-02-15 PROCEDURE — 27000221 HC OXYGEN, UP TO 24 HOURS

## 2018-02-15 PROCEDURE — 94660 CPAP INITIATION&MGMT: CPT

## 2018-02-15 RX ORDER — IPRATROPIUM BROMIDE 0.5 MG/2.5ML
0.5 SOLUTION RESPIRATORY (INHALATION)
Status: DISCONTINUED | OUTPATIENT
Start: 2018-02-16 | End: 2018-02-19 | Stop reason: HOSPADM

## 2018-02-15 RX ORDER — LEVALBUTEROL 1.25 MG/.5ML
1.25 SOLUTION, CONCENTRATE RESPIRATORY (INHALATION)
Status: DISCONTINUED | OUTPATIENT
Start: 2018-02-16 | End: 2018-02-19 | Stop reason: HOSPADM

## 2018-02-15 RX ORDER — FUROSEMIDE 20 MG/1
20 TABLET ORAL 2 TIMES DAILY
Status: DISCONTINUED | OUTPATIENT
Start: 2018-02-15 | End: 2018-02-19 | Stop reason: HOSPADM

## 2018-02-15 RX ADMIN — LEVOTHYROXINE SODIUM 100 MCG: 100 TABLET ORAL at 08:02

## 2018-02-15 RX ADMIN — NYSTATIN 500000 UNITS: 100000 SUSPENSION ORAL at 01:02

## 2018-02-15 RX ADMIN — LEVALBUTEROL 1.25 MG: 1.25 SOLUTION, CONCENTRATE RESPIRATORY (INHALATION) at 06:02

## 2018-02-15 RX ADMIN — LEVALBUTEROL 1.25 MG: 1.25 SOLUTION, CONCENTRATE RESPIRATORY (INHALATION) at 12:02

## 2018-02-15 RX ADMIN — LEVALBUTEROL 1.25 MG: 1.25 SOLUTION, CONCENTRATE RESPIRATORY (INHALATION) at 07:02

## 2018-02-15 RX ADMIN — FUROSEMIDE 20 MG: 20 TABLET ORAL at 06:02

## 2018-02-15 RX ADMIN — NYSTATIN 500000 UNITS: 100000 SUSPENSION ORAL at 11:02

## 2018-02-15 RX ADMIN — APIXABAN 2.5 MG: 2.5 TABLET, FILM COATED ORAL at 08:02

## 2018-02-15 RX ADMIN — METOPROLOL SUCCINATE 200 MG: 50 TABLET, EXTENDED RELEASE ORAL at 08:02

## 2018-02-15 RX ADMIN — ATORVASTATIN CALCIUM 10 MG: 10 TABLET, FILM COATED ORAL at 09:02

## 2018-02-15 RX ADMIN — PANTOPRAZOLE SODIUM 40 MG: 40 TABLET, DELAYED RELEASE ORAL at 08:02

## 2018-02-15 RX ADMIN — NYSTATIN 500000 UNITS: 100000 SUSPENSION ORAL at 06:02

## 2018-02-15 RX ADMIN — PYRIDOSTIGMINE BROMIDE 60 MG: 60 TABLET ORAL at 05:02

## 2018-02-15 RX ADMIN — PYRIDOSTIGMINE BROMIDE 60 MG: 60 TABLET ORAL at 09:02

## 2018-02-15 RX ADMIN — APIXABAN 2.5 MG: 2.5 TABLET, FILM COATED ORAL at 09:02

## 2018-02-15 RX ADMIN — FUROSEMIDE 20 MG: 20 TABLET ORAL at 08:02

## 2018-02-15 RX ADMIN — NYSTATIN 500000 UNITS: 100000 SUSPENSION ORAL at 05:02

## 2018-02-15 RX ADMIN — CLOPIDOGREL BISULFATE 75 MG: 75 TABLET ORAL at 08:02

## 2018-02-15 RX ADMIN — IPRATROPIUM BROMIDE 0.5 MG: 0.5 SOLUTION RESPIRATORY (INHALATION) at 12:02

## 2018-02-15 RX ADMIN — IPRATROPIUM BROMIDE 0.5 MG: 0.5 SOLUTION RESPIRATORY (INHALATION) at 06:02

## 2018-02-15 RX ADMIN — IPRATROPIUM BROMIDE 0.5 MG: 0.5 SOLUTION RESPIRATORY (INHALATION) at 07:02

## 2018-02-15 RX ADMIN — PYRIDOSTIGMINE BROMIDE 60 MG: 60 TABLET ORAL at 01:02

## 2018-02-15 RX ADMIN — PREDNISONE 30 MG: 20 TABLET ORAL at 08:02

## 2018-02-15 RX ADMIN — AMLODIPINE BESYLATE 2.5 MG: 2.5 TABLET ORAL at 08:02

## 2018-02-15 NOTE — PT/OT/SLP PROGRESS
"Physical Therapy Treatment    Patient Name:  Stephany Skinner   MRN:  [id number]    Recommendations:     Discharge Recommendations:  home with home health, home health PT   Discharge Equipment Recommendations: wheelchair   Barriers to discharge: None    Assessment:     Stephany Skinner is a 83 y.o. female admitted with a medical diagnosis of Debility.  She presents with the following impairments/functional limitations:  weakness, gait instability, impaired balance, impaired endurance, decreased lower extremity function, impaired self care skills, impaired functional mobilty, decreased safety awareness .  Pt. Is progressing towards PT goals.  Pt. Demonstrates increased endurance.    Rehab Prognosis:  Fair; patient would benefit from acute skilled PT services to address these deficits and reach maximum level of function.      Recent Surgery: * No surgery found *      Plan:     During this hospitalization, patient to be seen  (1-2x/day(M-F); PRN(Sat.,Sun.)) to address the above listed problems via gait training, therapeutic activities, therapeutic exercises  · Plan of Care Expires:   (Upon D/C from facility)   Plan of Care Reviewed with: patient    Subjective     Communicated with patient prior to session.  Patient found seated in bedside chair, awake, upon PT entry to room, agreeable to treatment.      Chief Complaint: Fear of stair climbing  Patient comments/goals: "My walking is getting better."  Pain/Comfort:  · Pain Rating 1: 0/10  · Pain Rating Post-Intervention 1: 0/10    Patients cultural, spiritual, Jewish conflicts given the current situation: none voiced    Objective:     Patient found with: oxygen     General Precautions: Standard, fall, respiratory   Orthopedic Precautions:N/A   Braces: N/A     Functional Mobility:  · Transfers:     · Sit to Stand:  contact guard assistance with rolling walker, x 2 bouts  · Bed to Chair: contact guard assistance with  rolling walker  using  Step Transfer  · Gait: Gait " Training:  Pt. amb. 45' x 1 bout, 25' x 1 bout with RW, CGA.  VC's given to pt. for step placement and A.D. placement.  Pt. demonstrated decreased velocity of gait mechanics.  Balance:   Static Sit: GOOD-: Takes MODERATE challenges from all directions but inconsistently  Dynamic Sit: GOOD-: Maintains balance through MODERATE excursions of active trunk movement,     Static Stand: FAIR+: Takes MINIMAL challenges from all directions  · Dynamic stand: FAIR: Needs CONTACT GUARD during gait  ·       AM-PAC 6 CLICK MOBILITY  Turning over in bed (including adjusting bedclothes, sheets and blankets)?: 3  Sitting down on and standing up from a chair with arms (e.g., wheelchair, bedside commode, etc.): 3  Moving from lying on back to sitting on the side of the bed?: 3  Moving to and from a bed to a chair (including a wheelchair)?: 3  Need to walk in hospital room?: 3  Climbing 3-5 steps with a railing?: 2  Total Score: 17       Therapeutic Activities and Exercises:   There. Act.:  Weight shifting and weight acceptance tasks performed in standing with min. A.  Trunk control tasks performed with min. A. while pt. seated EOB and in bedside chair.  Seated scooting in bedside chair, performed with CGA.  Transfer Training performed with assistance as noted.  Manual guidance and VC's given to pt. for sequencing.    Patient left up in chair with all lines intact, call button in reach, RN notified and family present..    GOALS:    Physical Therapy Goals        Problem: Physical Therapy Goal    Goal Priority Disciplines Outcome Goal Variances Interventions   Physical Therapy Goal     PT/OT, PT Ongoing (interventions implemented as appropriate)     Description:  Goals to be met by: 2/15/2018     Patient will increase functional independence with mobility by performin. Supine to sit with Stand-by Assistance  2. Sit to supine with Stand-by Assistance  3. Rolling to Left and Right with Stand-by Assistance.  4. Sit to stand transfer  with Stand-by Assistance, with RW  5. Bed to chair transfer with Stand-by Assistance using Rolling Walker  6. Gait  x 50 feet with Stand-by Assistance using Rolling Walker.   7. Ascend/descend 3 stairs with bilateral Handrails Minimal Assistance            Problem: Physical Therapy Goal    Goal Priority Disciplines Outcome Goal Variances Interventions   Physical Therapy Goal     PT/OT, PT      Description:  Pt will increase participation in established goal progress              Multidisciplinary Problems (Resolved)        Problem: Physical Therapy Goal    Goal Priority Disciplines Outcome Goal Variances Interventions   Physical Therapy Goal   (Resolved)     PT/OT, PT  Error                     Time Tracking:     PT Received On: 02/15/18  PT Start Time: 0942     PT Stop Time: 1013  PT Total Time (min): 31 min     Billable Minutes: Gait Training 18 minutes and Therapeutic Activity 13 minutes    Treatment Type: Treatment  PT/PTA: PT           Daniel Garcia, PT  02/15/2018

## 2018-02-15 NOTE — PT/OT/SLP PROGRESS
"Physical Therapy Treatment    Patient Name:  Stephany Skinner   MRN:  [id number]    Recommendations:     Discharge Recommendations:  home with home health, home health PT   Discharge Equipment Recommendations: wheelchair   Barriers to discharge: None    Assessment:     Stephany Skinner is a 83 y.o. female admitted with a medical diagnosis of Debility.  She presents with the following impairments/functional limitations:  weakness, gait instability, impaired balance, impaired endurance, impaired self care skills, impaired functional mobilty, decreased safety awareness .  Pt. Is progressing towards PT POC goals.    Rehab Prognosis:  Fair; patient would benefit from acute skilled PT services to address these deficits and reach maximum level of function.      Recent Surgery: * No surgery found *      Plan:     During this hospitalization, patient to be seen  (1-2x/day(M-F); PRN(Sat.,Sun.)) to address the above listed problems via gait training, therapeutic activities, therapeutic exercises  · Plan of Care Expires:   (upon D/C from facility)   Plan of Care Reviewed with: patient    Subjective     Communicated with patient prior to session.  Patient found seated in bedside chair upon PT entry to room, agreeable to treatment.        Patient comments/goals: "I'd like to walk."  Pain/Comfort:  · Pain Rating 1: 0/10  · Pain Rating Post-Intervention 1: 0/10    Patients cultural, spiritual, Judaism conflicts given the current situation: none voiced    Objective:     Patient found with: oxygen     General Precautions: Standard, fall, respiratory   Orthopedic Precautions:N/A   Braces: N/A     Functional Mobility:  · Transfers:     · Sit to Stand:  contact guard assistance with rolling walker  · Gait: Gait Training:  Pt. amb. 30' x 2 bouts with RW, CGA.  VC's given to pt. for step placement and A.D. placement.  Pt. demonstrated decreased velocity of gait mechanics.  Balance:    Static Sit: GOOD-: Takes MODERATE challenges from all " directions but inconsistently  Dynamic Sit: GOOD-: Maintains balance through MODERATE excursions of active trunk movement,     Static Stand: FAIR+: Takes MINIMAL challenges from all directions  · Dynamic stand: FAIR: Needs CONTACT GUARD during gait  ·       AM-PAC 6 CLICK MOBILITY  Turning over in bed (including adjusting bedclothes, sheets and blankets)?: 3  Sitting down on and standing up from a chair with arms (e.g., wheelchair, bedside commode, etc.): 3  Moving from lying on back to sitting on the side of the bed?: 3  Moving to and from a bed to a chair (including a wheelchair)?: 3  Need to walk in hospital room?: 3  Climbing 3-5 steps with a railing?: 2  Total Score: 17       Patient left up in chair with all lines intact, call button in reach and RN notified..    GOALS:    Physical Therapy Goals        Problem: Physical Therapy Goal    Goal Priority Disciplines Outcome Goal Variances Interventions   Physical Therapy Goal     PT/OT, PT Ongoing (interventions implemented as appropriate)     Description:  Goals to be met by: 2/15/2018     Patient will increase functional independence with mobility by performin. Supine to sit with Stand-by Assistance  2. Sit to supine with Stand-by Assistance  3. Rolling to Left and Right with Stand-by Assistance.  4. Sit to stand transfer with Stand-by Assistance, with RW  5. Bed to chair transfer with Stand-by Assistance using Rolling Walker  6. Gait  x 50 feet with Stand-by Assistance using Rolling Walker.   7. Ascend/descend 3 stairs with bilateral Handrails Minimal Assistance            Problem: Physical Therapy Goal    Goal Priority Disciplines Outcome Goal Variances Interventions   Physical Therapy Goal     PT/OT, PT      Description:  Pt will increase participation in established goal progress              Multidisciplinary Problems (Resolved)        Problem: Physical Therapy Goal    Goal Priority Disciplines Outcome Goal Variances Interventions   Physical Therapy  Goal   (Resolved)     PT/OT, PT  Error                     Time Tracking:     PT Received On: 02/15/18  PT Start Time: 1332     PT Stop Time: 1347  PT Total Time (min): 15 min     Billable Minutes: Gait Training 15 minutes    Treatment Type: Treatment  PT/PTA: PT           Daniel Garcia, PT  02/15/2018

## 2018-02-15 NOTE — PLAN OF CARE
Skilled level of care patient care conference held. Patient is progressing toward her goals. She is contemplating nursing home placement at discharge.  seeking form 142.

## 2018-02-15 NOTE — ASSESSMENT & PLAN NOTE
Nutrition Diagnosis  Inadequate energy intake    Related to (etiology):   Decreased appetite    Signs and Symptoms (as evidenced by):   Reports decreased appetite, intake varies     Interventions/Recommendations (treatment strategy):  Low Sodium  Boost Glucose as needed  Honor preferences per Diet order  See RD Full Note 2/15/18    Nutrition Diagnosis Status:   Improving

## 2018-02-15 NOTE — PLAN OF CARE
Ms Skinner and her daughter have expressed they are considering nursing home placement at discharge. Patient's daughter encouraged to visit several nursing homes and inquire about bed availability.

## 2018-02-15 NOTE — PLAN OF CARE
02/15/18 1341   Discharge Reassessment   Assessment Type Discharge Planning Reassessment     Navya spoke with the pt and pt's daughter Tarah about the pt's d/c plan. The pt is unable to climb stair to enter her apartment and will be unable to go back there. Navya discussed getting an apt on the lower floor of the apt and nursing home placement. The pt's daughter stated that her aunt contacted the apt mgr and a first floor apt is available next month. The pt's name will be added to the waiting list. The pt's daughter would like to look into SNF for her mother. Sw will complete [facility] for nursing home placement.

## 2018-02-15 NOTE — PROGRESS NOTES
[facility]  Adult Nutrition  Progress Note    SUMMARY     Recommendations    Recommendation/Intervention: Continue Low Sodium diet as tolerated encouraging good intake; Boost Glucose as needed; Honor preferences per diet order.  Goals: adequate po intake >=75% by discharge  Nutrition Goal Status: progressing towards goal  Communication of RD Recs: discussed on rounds    Nutrition Discharge Planning: Low Sodium diet with adequate intake toe meet EEN & EPN    Continuum of Care Plan    Referral to Outpatient Services: home care    Reason for Assessment    Reason for Assessment: RD follow-up  Diagnosis: cardiac disease  Relevent Medical History: HTN, HLD, GERD, CHF   Interdisciplinary Rounds: attended     General Information Comments: Pt appetite continues the same. Pt no longer on diabetic restrictions; Pt has no Hx of diabetes    Nutrition Prescription Ordered    Current Diet Order: Low Sodium  Nutrition Order Comments: 75% breakfast, 25% lunch      Evaluation of Received Nutrients/Fluid Intake    Energy Calories Required: meeting needs  Protein Required: meeting needs  Fluid Required: meeting needs     Tolerance: tolerating  % Intake of Estimated Energy Needs: 50 - 75 %  % Meal Intake: 25% Lunch     Nutrition Risk Screen     Nutrition Risk Screen: no indicators present    Nutrition/Diet History    Food Preferences: no cultural or Gnosticism preferences        Factors Affecting Nutritional Intake: decreased appetite    Labs/Tests/Procedures/Meds    Diagnostic Test/Procedure Review: reviewed, pertinent  Pertinent Labs Reviewed: reviewed, pertinent  Pertinent Labs Comments: BUN 33H, Ca 8.3L, POCT Glu 101-414  Pertinent Medications Reviewed: reviewed, pertinent  Pertinent Medications Comments: atorvastin, pantoprazole, prednisone    Physical Findings    Overall Physical Appearance: on oxygen therapy, obese  Tubes:  (-)  Oral/Mouth Cavity: no grinding or difficulty chewing, no pain or sensitivity  Skin:  " (toe ulcer)    Anthropometrics    Temp: 98.2 °F (36.8 °C)     Height: 5' 7" (170.2 cm)  Weight Method: Bed Scale  Weight: 114.8 kg (253 lb 1.4 oz) (rd reviewed, no new)     Ideal Body Weight (IBW), Female: 135 lb     % Ideal Body Weight, Female (lb): 187.47 lb  BMI (Calculated): 39.7  BMI Grade: 35 - 39.9 - obesity - grade II    Estimated/Assessed Needs    Weight Used For Calorie Calculations: 114.8 kg (253 lb 1.4 oz)         Energy Need Method: Dent-St Jeor (No AF BMI >25)     RMR (Dent-St. Jeor Equation): 1640.62        Weight Used For Protein Calculations: 114.8 kg (253 lb 1.4 oz)  Protein Requirements:  (0.8-1.0)  0.8 gm Protein (gm): 92.03 and 1.0 gm Protein (gm): 115.04  Fluid Requirements (mL): 1ml/kcal or per MD      CHO Requirement: 50% calories     Assessment and Plan    Acute respiratory failure with hypoxia and hypercarbia    Nutrition Diagnosis  Inadequate energy intake    Related to (etiology):   Decreased appetite    Signs and Symptoms (as evidenced by):   Reports decreased appetite, intake varies     Interventions/Recommendations (treatment strategy):  Low Sodium  Boost Glucose as needed  Honor preferences per Diet order  See RD Full Note 2/15/18    Nutrition Diagnosis Status:   Improving              Monitor and Evaluation    Food and Nutrient Intake: food and beverage intake  Food and Nutrient Adminstration: diet order  Knowledge/Beliefs/Attitudes: beliefs and attitudes, food and nutrition knowledge/skill  Physical Activity and Function: nutrition-related ADLs and IADLs  Anthropometric Measurements: weight, weight change  Biochemical Data, Medical Tests and Procedures: gastrointestinal profile, electrolyte and renal panel, glucose/endocrine profile  Nutrition-Focused Physical Findings: overall appearance    Nutrition Risk    Level of Risk:  (F/U 1x/wk)    Nutrition Follow-Up    RD Follow-up?: Yes  "

## 2018-02-16 LAB
ANION GAP SERPL CALC-SCNC: 8 MMOL/L
BASOPHILS # BLD AUTO: 0 K/UL
BASOPHILS NFR BLD: 0 %
BUN SERPL-MCNC: 30 MG/DL
CALCIUM SERPL-MCNC: 8.1 MG/DL
CHLORIDE SERPL-SCNC: 97 MMOL/L
CO2 SERPL-SCNC: 33 MMOL/L
CREAT SERPL-MCNC: 0.9 MG/DL
DIFFERENTIAL METHOD: ABNORMAL
EOSINOPHIL # BLD AUTO: 0.1 K/UL
EOSINOPHIL NFR BLD: 0.9 %
ERYTHROCYTE [DISTWIDTH] IN BLOOD BY AUTOMATED COUNT: 14.1 %
EST. GFR  (AFRICAN AMERICAN): >60 ML/MIN/1.73 M^2
EST. GFR  (NON AFRICAN AMERICAN): 60 ML/MIN/1.73 M^2
GLUCOSE SERPL-MCNC: 95 MG/DL
HCT VFR BLD AUTO: 32.3 %
HGB BLD-MCNC: 10.4 G/DL
LYMPHOCYTES # BLD AUTO: 1.4 K/UL
LYMPHOCYTES NFR BLD: 14.6 %
MCH RBC QN AUTO: 31 PG
MCHC RBC AUTO-ENTMCNC: 32.2 G/DL
MCV RBC AUTO: 96 FL
MONOCYTES # BLD AUTO: 0.8 K/UL
MONOCYTES NFR BLD: 8.6 %
NEUTROPHILS # BLD AUTO: 7.5 K/UL
NEUTROPHILS NFR BLD: 75.9 %
PLATELET # BLD AUTO: 297 K/UL
PMV BLD AUTO: 9.1 FL
POCT GLUCOSE: 107 MG/DL (ref 70–110)
POCT GLUCOSE: 171 MG/DL (ref 70–110)
POCT GLUCOSE: 275 MG/DL (ref 70–110)
POCT GLUCOSE: 84 MG/DL (ref 70–110)
POTASSIUM SERPL-SCNC: 3.8 MMOL/L
RBC # BLD AUTO: 3.36 M/UL
SODIUM SERPL-SCNC: 138 MMOL/L
WBC # BLD AUTO: 9.82 K/UL

## 2018-02-16 PROCEDURE — 86580 TB INTRADERMAL TEST: CPT | Performed by: INTERNAL MEDICINE

## 2018-02-16 PROCEDURE — 25000242 PHARM REV CODE 250 ALT 637 W/ HCPCS: Performed by: FAMILY MEDICINE

## 2018-02-16 PROCEDURE — 94761 N-INVAS EAR/PLS OXIMETRY MLT: CPT

## 2018-02-16 PROCEDURE — 36415 COLL VENOUS BLD VENIPUNCTURE: CPT

## 2018-02-16 PROCEDURE — 80048 BASIC METABOLIC PNL TOTAL CA: CPT

## 2018-02-16 PROCEDURE — 97110 THERAPEUTIC EXERCISES: CPT

## 2018-02-16 PROCEDURE — 94660 CPAP INITIATION&MGMT: CPT

## 2018-02-16 PROCEDURE — 97116 GAIT TRAINING THERAPY: CPT

## 2018-02-16 PROCEDURE — 82962 GLUCOSE BLOOD TEST: CPT

## 2018-02-16 PROCEDURE — 25000003 PHARM REV CODE 250: Performed by: NURSE PRACTITIONER

## 2018-02-16 PROCEDURE — 11000004 HC SNF PRIVATE

## 2018-02-16 PROCEDURE — 63600175 PHARM REV CODE 636 W HCPCS: Performed by: INTERNAL MEDICINE

## 2018-02-16 PROCEDURE — 25000003 PHARM REV CODE 250: Performed by: INTERNAL MEDICINE

## 2018-02-16 PROCEDURE — 99232 SBSQ HOSP IP/OBS MODERATE 35: CPT | Mod: ,,, | Performed by: INTERNAL MEDICINE

## 2018-02-16 PROCEDURE — 97530 THERAPEUTIC ACTIVITIES: CPT

## 2018-02-16 PROCEDURE — 25000003 PHARM REV CODE 250: Performed by: FAMILY MEDICINE

## 2018-02-16 PROCEDURE — 99900035 HC TECH TIME PER 15 MIN (STAT)

## 2018-02-16 PROCEDURE — 94640 AIRWAY INHALATION TREATMENT: CPT

## 2018-02-16 PROCEDURE — 63600175 PHARM REV CODE 636 W HCPCS: Performed by: FAMILY MEDICINE

## 2018-02-16 PROCEDURE — 85025 COMPLETE CBC W/AUTO DIFF WBC: CPT

## 2018-02-16 PROCEDURE — 27000221 HC OXYGEN, UP TO 24 HOURS

## 2018-02-16 RX ADMIN — LEVALBUTEROL 1.25 MG: 1.25 SOLUTION, CONCENTRATE RESPIRATORY (INHALATION) at 01:02

## 2018-02-16 RX ADMIN — PANTOPRAZOLE SODIUM 40 MG: 40 TABLET, DELAYED RELEASE ORAL at 08:02

## 2018-02-16 RX ADMIN — INSULIN ASPART 3 UNITS: 100 INJECTION, SOLUTION INTRAVENOUS; SUBCUTANEOUS at 05:02

## 2018-02-16 RX ADMIN — NYSTATIN 500000 UNITS: 100000 SUSPENSION ORAL at 01:02

## 2018-02-16 RX ADMIN — NYSTATIN 500000 UNITS: 100000 SUSPENSION ORAL at 05:02

## 2018-02-16 RX ADMIN — METOPROLOL SUCCINATE 200 MG: 50 TABLET, EXTENDED RELEASE ORAL at 08:02

## 2018-02-16 RX ADMIN — LEVALBUTEROL 1.25 MG: 1.25 SOLUTION, CONCENTRATE RESPIRATORY (INHALATION) at 08:02

## 2018-02-16 RX ADMIN — APIXABAN 2.5 MG: 2.5 TABLET, FILM COATED ORAL at 08:02

## 2018-02-16 RX ADMIN — APIXABAN 2.5 MG: 2.5 TABLET, FILM COATED ORAL at 09:02

## 2018-02-16 RX ADMIN — CLOPIDOGREL BISULFATE 75 MG: 75 TABLET ORAL at 08:02

## 2018-02-16 RX ADMIN — PYRIDOSTIGMINE BROMIDE 60 MG: 60 TABLET ORAL at 02:02

## 2018-02-16 RX ADMIN — FUROSEMIDE 20 MG: 20 TABLET ORAL at 08:02

## 2018-02-16 RX ADMIN — LEVOTHYROXINE SODIUM 100 MCG: 100 TABLET ORAL at 08:02

## 2018-02-16 RX ADMIN — IPRATROPIUM BROMIDE 0.5 MG: 0.5 SOLUTION RESPIRATORY (INHALATION) at 07:02

## 2018-02-16 RX ADMIN — PREDNISONE 30 MG: 20 TABLET ORAL at 08:02

## 2018-02-16 RX ADMIN — LEVALBUTEROL 1.25 MG: 1.25 SOLUTION, CONCENTRATE RESPIRATORY (INHALATION) at 07:02

## 2018-02-16 RX ADMIN — IPRATROPIUM BROMIDE 0.5 MG: 0.5 SOLUTION RESPIRATORY (INHALATION) at 08:02

## 2018-02-16 RX ADMIN — ATORVASTATIN CALCIUM 10 MG: 10 TABLET, FILM COATED ORAL at 09:02

## 2018-02-16 RX ADMIN — IPRATROPIUM BROMIDE 0.5 MG: 0.5 SOLUTION RESPIRATORY (INHALATION) at 01:02

## 2018-02-16 RX ADMIN — TUBERCULIN PURIFIED PROTEIN DERIVATIVE 5 UNITS: 5 INJECTION INTRADERMAL at 01:02

## 2018-02-16 RX ADMIN — AMLODIPINE BESYLATE 2.5 MG: 2.5 TABLET ORAL at 08:02

## 2018-02-16 RX ADMIN — PYRIDOSTIGMINE BROMIDE 60 MG: 60 TABLET ORAL at 05:02

## 2018-02-16 RX ADMIN — FUROSEMIDE 20 MG: 20 TABLET ORAL at 05:02

## 2018-02-16 RX ADMIN — PYRIDOSTIGMINE BROMIDE 60 MG: 60 TABLET ORAL at 09:02

## 2018-02-16 NOTE — SUBJECTIVE & OBJECTIVE
Review of Systems   Constitutional: Positive for fatigue. Negative for activity change, fever and unexpected weight change.   HENT: Negative for congestion, ear pain, hearing loss, rhinorrhea and sore throat.    Eyes: Negative for pain, redness and visual disturbance.   Respiratory: Negative for cough, shortness of breath and wheezing.    Cardiovascular: Positive for leg swelling (1+ b/l LE edema, more chronic). Negative for chest pain and palpitations.   Gastrointestinal: Negative for abdominal pain, constipation, diarrhea, nausea and vomiting.   Genitourinary: Negative for dysuria, frequency and urgency.   Musculoskeletal: Negative for back pain, joint swelling and neck pain.   Skin: Negative for color change, rash and wound.   Neurological: Positive for weakness (generalized, worse ont he left). Negative for dizziness, tremors, light-headedness and headaches.     Objective:     Vital Signs (Most Recent):  Temp: 96.6 °F (35.9 °C) (02/16/18 0705)  Pulse: 61 (02/16/18 0827)  Resp: 16 (02/16/18 0713)  BP: (!) 122/58 (02/16/18 0705)  SpO2: 96 % (02/16/18 0713) Vital Signs (24h Range):  Temp:  [96.6 °F (35.9 °C)-98.2 °F (36.8 °C)] 96.6 °F (35.9 °C)  Pulse:  [55-97] 61  Resp:  [16-20] 16  SpO2:  [94 %-100 %] 96 %  BP: (116-123)/(54-62) 122/58     Weight: 114.8 kg (253 lb 1.4 oz) (rd reviewed, no new)  Body mass index is 39.64 kg/m².    Physical Exam   Constitutional: She is oriented to person, place, and time. She appears well-developed. No distress.   Morbidly obese   HENT:   Head: Normocephalic and atraumatic.   Right Ear: External ear normal.   Left Ear: External ear normal.   Macroglossia, open mouth breathing   Eyes: Conjunctivae and EOM are normal. Pupils are equal, round, and reactive to light.   Neck: Normal range of motion. Neck supple. No tracheal deviation present.   Cardiovascular: Normal rate, regular rhythm and normal heart sounds.    Pulmonary/Chest: Effort normal and breath sounds normal. No  respiratory distress. She has no wheezes.   Abdominal: Soft. Bowel sounds are normal. There is no tenderness.   Musculoskeletal: Normal range of motion. She exhibits edema.   Neurological: She is alert and oriented to person, place, and time.   Skin: Skin is warm and dry. No rash noted.   Venous stasis change   Psychiatric: She has a normal mood and affect. Her behavior is normal.   Nursing note and vitals reviewed.        CRANIAL NERVES     CN III, IV, VI   Pupils are equal, round, and reactive to light.  Extraocular motions are normal.        Significant Labs:   CBC:     Recent Labs  Lab 02/15/18  0409 18  0344   WBC 9.93 9.82   HGB 10.6* 10.4*   HCT 32.9* 32.3*    297     CMP:     Recent Labs  Lab 02/15/18  0409 18  0344    138   K 4.1 3.8    97   CO2 33* 33*   GLU 97 95   BUN 33* 30*   CREATININE 0.9 0.9   CALCIUM 8.3* 8.1*   ANIONGAP 7* 8   EGFRNONAA 60 60     BNP     Recent Labs  Lab 18  0622   BNP 69                Lab Results   Component Value Date     TSH 0.371 (L) 2018   T4 1.10  MG panel pending  procalcitonin 0.39     Recent Labs  Lab 18  1306   TROPONINI 0.018      resp viral panel negative        Significant Imagin/6/18 CXR- There are bilateral patchy opacities which are worse at the right lung base when compared to previous performed 3 days earlier. There is no pneumothorax. The remainder of the examination is unchanged.    2/3 CXR Mild decrease in right upper lobe and right perihilar infiltrate. Mild cardiomegaly. Atherosclerosis.     2/2 CXR The lungs are underexpanded.  There are chronic lung markings seen bilaterally with pulmonary vascular congestion and patchy opacities throughout both lungs.  This could be related to pulmonary edema although superimposed infiltrate in the right upper lobe is not excluded.  The heart is enlarged.  Calcified atheromatous disease affects the aorta.  Age-appropriate degenerative changes affect the  skeleton.     1/31 CTA lungs 1.  No CT evidence of central pulmonary embolus.  Severely limited study due to use motion artifact.  2.  Bilateral groundglass opacities and minimal dependent subsegmental atelectasis.     1/31 CT head 1.  No CT evidence of an acute intracranial abnormality with limitations due to motion artifact.  2.  Mild atrophy and minimal small vessel ischemic changes of the periventricular white matter.     Echo Echo 1/18: EF 60%, stage 1 diastolic dysfunction, no significant valvular pathology.      EKG a fib                   Pending Diagnostic Studies:      Procedure Component Value Units Date/Time     Myasthenia Gravis/Lambert-Eaton [[id number]] Collected:  02/02/18 1824     Order Status:  Sent Lab Status:  In process Updated:  02/02/18 2204     Specimen:  Blood

## 2018-02-16 NOTE — PLAN OF CARE
Problem: Patient Care Overview  Goal: Plan of Care Review  Outcome: Ongoing (interventions implemented as appropriate)  Pt agrees with plan of care.  VS stable.  Pt on Bipap this shift.  Tolerating well. Continue blood glucose monitoring as ordered. Family at bedside.  Call bell in reach.  Will continue to monitor.  No complaints of pain noted.

## 2018-02-16 NOTE — CONSULTS
[facility]  Food & Nutrition Services        RN Consult: Poor Appetite    Assessment Completed per flow sheet (see Adult Nutrition Assessment) yesterday 2/15/18.     Per visit, pt appetite continues the same. Boost Glucose offered as needed. Boost Glucose now added to all trays. Preferences honored.    Recommendation/Intervention: Continue Low Sodium diet as tolerated encouraging good intake; Boost Glucose as needed; Honor preferences per diet order.  Goals: adequate po intake >=75% by discharge  Nutrition Goal Status: progressing towards goal  Communication of RD Recs: discussed on rounds     Nutrition Discharge Planning: Low Sodium diet with adequate intake toe meet EEN & EPN     Continuum of Care Plan Referral to Outpatient Services: home care          Acute respiratory failure with hypoxia and hypercarbia     Nutrition Diagnosis  Inadequate energy intake     Related to (etiology):   Decreased appetite     Signs and Symptoms (as evidenced by):   Reports decreased appetite, intake varies      Interventions/Recommendations (treatment strategy):  Low Sodium  Boost Glucose as needed  Honor preferences per Diet order  See RD Full Note 2/15/18     Nutrition Diagnosis Status:   Improving     Thanks!  Carmen Swain MS, RDN, LDN

## 2018-02-16 NOTE — PROGRESS NOTES
[facility]  Cardiology  Progress Note    Patient Name: Stephany Skinner  MRN: [id number]  Admission Date: 2/7/2018  Hospital Length of Stay: 9 days  Code Status: Full Code   Attending Physician: Morris Jain MD   Primary Care Physician: Pipo Flowers MD  Expected Discharge Date:   Principal Problem:Debility    Subjective:     Hospital Course: admit with SOB     Interval History: Dx with CHF / pneumonia / acute resp failure and Myasthenia gravis.    Diuresed.   PUJA-improving.   On bipap and prednisone.   CO2 and confusion better.  Ambulating with PT.   wearing bipap and doing better       ROS   Constitution: Positive for weakness and malaise/fatigue.   HENT: Negative.    Eyes: Negative.    Cardiovascular: Negative.    Respiratory: Cough.  Endocrine: Negative.    Skin: Negative.    Musculoskeletal: Positive for muscle weakness.   Gastrointestinal: Negative.    Genitourinary: Negative.    Psychiatric/Behavioral: Negative.    Allergic/Immunologic: Negative       Objective:     Vital Signs (Most Recent):  Temp: 96.6 °F (35.9 °C) (02/16/18 0705)  Pulse: 61 (02/16/18 0827)  Resp: 16 (02/16/18 0713)  BP: (!) 122/58 (02/16/18 0705)  SpO2: 96 % (02/16/18 0713) Vital Signs (24h Range):  Temp:  [96.6 °F (35.9 °C)-98.2 °F (36.8 °C)] 96.6 °F (35.9 °C)  Pulse:  [55-97] 61  Resp:  [16-20] 16  SpO2:  [94 %-100 %] 96 %  BP: (116-123)/(54-62) 122/58     Weight: 114.8 kg (253 lb 1.4 oz) (rd reviewed, no new)  Body mass index is 39.64 kg/m².    SpO2: 96 %  O2 Device (Oxygen Therapy): nasal cannula      Intake/Output Summary (Last 24 hours) at 02/16/18 0912  Last data filed at 02/16/18 0400   Gross per 24 hour   Intake              240 ml   Output              200 ml   Net               40 ml       Lines/Drains/Airways     Peripheral Intravenous Line                 Peripheral IV - Single Lumen 02/01/18 1653 Right Wrist 14 days                Physical Exam   Musculoskeletal: She exhibits edema (2+ pitting  BLE).   Constitutional: She is oriented to person, place, and time. Morbidly obese.   Eyes: EOMI.   Neck: No JVD.  Obese neck.   Cardiovascular:   irregularly irregular   Pulmonary/Chest: Effort normal. No respiratory distress. CTA.  She has no rales.   Abdominal: Soft. Bowel sounds are normal. She exhibits no distension. There is no tenderness.   Musculoskeletal: She exhibits edema -mild and improving.  Neurological: She is alert and oriented to person, place, and time.   Skin: Skin is warm and dry.   Psychiatric: She has a normal mood. Nursing note and vitals reviewed.  More awake.    Significant Labs:   ABG: No results for input(s): PH, PCO2, HCO3, POCSATURATED, BE in the last 48 hours., Blood Culture: No results for input(s): LABBLOO in the last 48 hours., BMP:   Recent Labs  Lab 02/15/18  0409 02/16/18  0344   GLU 97 95    138   K 4.1 3.8    97   CO2 33* 33*   BUN 33* 30*   CREATININE 0.9 0.9   CALCIUM 8.3* 8.1*   , CMP   Recent Labs  Lab 02/15/18  0409 02/16/18  0344    138   K 4.1 3.8    97   CO2 33* 33*   GLU 97 95   BUN 33* 30*   CREATININE 0.9 0.9   CALCIUM 8.3* 8.1*   ANIONGAP 7* 8   ESTGFRAFRICA >60 >60   EGFRNONAA 60 60   , CBC   Recent Labs  Lab 02/15/18  0409 02/16/18  0344   WBC 9.93 9.82   HGB 10.6* 10.4*   HCT 32.9* 32.3*    297   , INR No results for input(s): INR, PROTIME in the last 48 hours., Lipid Panel No results for input(s): CHOL, HDL, LDLCALC, TRIG, CHOLHDL in the last 48 hours.,   Pathology Results  (Last 10 years)    None      , Troponin No results for input(s): TROPONINI in the last 48 hours., All pertinent lab results from the last 24 hours have been reviewed. and   Recent Lab Results       02/16/18  0505 02/16/18  0344 02/15/18  2143 02/15/18  1728 02/15/18  1204      Anion Gap  8        Baso #  0.00        Basophil%  0.0        BUN, Bld  30(H)        Calcium  8.1(L)        Chloride  97        CO2  33(H)        Creatinine  0.9        Differential Method   Automated        eGFR if   >60        eGFR if non   60  Comment:  Calculation used to obtain the estimated glomerular filtration  rate (eGFR) is the CKD-EPI equation.           Eos #  0.1        Eosinophil%  0.9        Glucose  95        Gran # (ANC)  7.5        Gran%  75.9(H)        Hematocrit  32.3(L)        Hemoglobin  10.4(L)        Lymph #  1.4        Lymph%  14.6(L)        MCH  31.0        MCHC  32.2        MCV  96        Mono #  0.8        Mono%  8.6        MPV  9.1(L)        Platelets  297        POCT Glucose 84  138(H) 174(H) 118(H)     Potassium  3.8        RBC  3.36(L)        RDW  14.1        Sodium  138        WBC  9.82                        Significant Imaging: CT scan: CT ABDOMEN PELVIS WITH CONTRAST: No results found for this visit on 02/07/18. and CT ABDOMEN PELVIS WITHOUT CONTRAST: No results found for this visit on 02/07/18., Echocardiogram:   2D echo with color flow doppler:   Results for orders placed or performed during the hospital encounter of 01/30/18   2D echo with color flow doppler   Result Value Ref Range    EF 55 55 - 65    Est. PA Systolic Pressure 31.81     Tricuspid Valve Regurgitation TRIVIAL TO MILD     and X-Ray: CXR: X-Ray Chest 1 View (CXR): No results found for this visit on 02/07/18. and X-Ray Chest PA and Lateral (CXR): No results found for this visit on 02/07/18. and KUB: X-Ray Abdomen AP 1 View (KUB): No results found for this visit on 02/07/18.  Assessment and Plan:       Active Diagnoses:    Diagnosis Date Noted POA    PRINCIPAL PROBLEM:  Debility [R53.81] 02/07/2018 Yes    Physical deconditioning [R53.81]  Yes    Acute on chronic congestive heart failure [I50.9] 02/07/2018 Yes    PUJA (acute kidney injury) [N17.9] 02/05/2018 Yes    Community acquired pneumonia of right upper lobe of lung [J18.1] 02/05/2018 Yes    Myasthenia gravis, adult form [G70.00] 02/03/2018 Yes    Acute respiratory failure with hypoxia and hypercarbia [J96.01,  J96.02] 01/31/2018 Yes    Acute on chronic diastolic congestive heart failure [I50.33] 01/30/2018 Yes      Problems Resolved During this Admission:    Diagnosis Date Noted Date Resolved POA       VTE Risk Mitigation         Ordered     Place MALKA hose  Until discontinued      02/12/18 0916     apixaban tablet 2.5 mg  2 times daily     Route:  Oral        02/07/18 1740     Medium Risk of VTE  Once      02/07/18 1740     Place sequential compression device  Until discontinued      02/07/18 1740        Current Facility-Administered Medications   Medication    acetaminophen tablet 650 mg    amLODIPine tablet 2.5 mg    apixaban tablet 2.5 mg    atorvastatin tablet 10 mg    clopidogrel tablet 75 mg    dextrose 50% injection 12.5 g    dextrose 50% injection 25 g    furosemide tablet 20 mg    glucagon (human recombinant) injection 1 mg    glucose chewable tablet 16 g    glucose chewable tablet 24 g    insulin aspart pen 0-5 Units    insulin detemir pen 25 Units    ipratropium 0.02 % nebulizer solution 0.5 mg    levalbuterol nebulizer solution 1.25 mg    levothyroxine tablet 100 mcg    metoprolol succinate (TOPROL-XL) 24 hr tablet 200 mg    nystatin 100,000 unit/mL suspension 500,000 Units    ondansetron injection 4 mg    pantoprazole EC tablet 40 mg    predniSONE tablet 30 mg    pyridostigmine tablet 60 mg     Continues to improve  Resolved Confusion with CO2 narcosis/COPD,JOY--improved/resolving  AFIB v/s wandering PM; now anticoagulated and well controlled  Acute diastolic CHF due to decreased med compliance/high BP--now normotensive  Myasthenia gravis;- labs but good response to steroids  Acute renal insufficiency--Resolved. Continue gentle diuresis.  Chest X ray with pulmonary edema, elevated BNP, mild volume overload on exam. CT shows Ground glass opacities, NO PE   Echo 1/18: EF 60%, stage 1 diastolic dysfunction, no significant valvular pathology.   MPI 1/2013- mild apical reversible ischemia.    Carotid US- less than 40% bilateral 2017  Nuclear 1/13--small area ischemia apical anterior     Plan:  Keep as dry as possible ; creatinine at baseline, would Continue lasix 20 mg qam and monitor bmp daily  MALKA hose, elevate lower extremities.   Low sodium diet.  Cont eliquis for PAF/DVT prophylaxis  Cont amlodipine/atorvastatin/plavix/metoprolol    Kevin Nino, CINTHYA  Cardiology  [facility]

## 2018-02-16 NOTE — PROGRESS NOTES
[facility] Medicine  Progress Note    Patient Name: Stephany Skinner  MRN: [id number]  Patient Class: IP- Swing   Admission Date: 2/7/2018  Length of Stay: 9 days  Attending Physician: Morris Jain MD  Primary Care Provider: Pipo Flowers MD        Subjective:     Principal Problem:Debility    HPI:  84 yo WF initially admitted with with CHF after missing several doses of lasix; Hospital course complicated by  pneumonia / acute resp failure and neuromuscular changes, possible Myasthenia gravis, confirmation labs pending. Neuro noted some mild ptosis and gaze restriction as well as hypophonic voice concerning for neuro-muscular cause of hypercapnia.  Responding to steroids.   Overdiuresed and developed PUJA-improving.   On bipap- tolerated all night. Prednisone taper initiated per neuro recs. Currently has been weaned to home dose O2- 2L [place],  CO2 and confusion better.  placed on skilled nursing 2-7-18 Ambulating with PT- ambulated 15ft yesterday - goal is 50; transfer training also performed.    Hospital Course:  84 YO WF maintained on skilled since 2-7-18  for debility. Goal ambulation 50 feet. (has 10 steps/stairs at home)  Renal fx is much better after recent over-diuresing. Cr 1.3 today. Will need to monitor closely to avoid fluid volume overload.   Feeling ok this am.       2/12/18  Progressing well with PT. Walked 40 ft, goal 50ft. Lantus started due to steroid induced hyperglycemia. She reports burning on the tongue that started yesterday.     2/14/18  Pt progressing well with Pt; ambulated to lobby over the weekend.   However, She has not done any step walking yet and has 10 stairs at home.   She is feeling much better. Did not wear bipap much last night. Does not use home bipap but uses CPAP at home.  Does wear oxygen 2lnc.   Encouraged to use CPAP tonight and will work on step walking.   She notes mask does not fit her right and aggravates her- [facility]ses [facility]; will  "consult for help with fitting  Addendum* pt unable to climb stairs and scared;  discussed with fly will try to get moved to downstairs apt. Due to debility; also CM discussed advising against driving in future    2/16/18 Has met goal of 50 feet but now having problem of getting up stairs at home apt. Lower floor currently not available. Skilled NSG at [facility] home until able to get floor apt/ possibly on Monday d/c to [facility] home.  Up in chair. Feeling well. Only recent c/o LE- which is chornic; wound care suggests 6" ACE wrap; unable to fit SCDs.        Review of Systems   Constitutional: Positive for fatigue. Negative for activity change, fever and unexpected weight change.   HENT: Negative for congestion, ear pain, hearing loss, rhinorrhea and sore throat.    Eyes: Negative for pain, redness and visual disturbance.   Respiratory: Negative for cough, shortness of breath and wheezing.    Cardiovascular: Positive for leg swelling (1+ b/l LE edema, more chronic). Negative for chest pain and palpitations.   Gastrointestinal: Negative for abdominal pain, constipation, diarrhea, nausea and vomiting.   Genitourinary: Negative for dysuria, frequency and urgency.   Musculoskeletal: Negative for back pain, joint swelling and neck pain.   Skin: Negative for color change, rash and wound.   Neurological: Positive for weakness (generalized, worse ont he left). Negative for dizziness, tremors, light-headedness and headaches.     Objective:     Vital Signs (Most Recent):  Temp: 96.6 °F (35.9 °C) (02/16/18 0705)  Pulse: 61 (02/16/18 0827)  Resp: 16 (02/16/18 0713)  BP: (!) 122/58 (02/16/18 0705)  SpO2: 96 % (02/16/18 0713) Vital Signs (24h Range):  Temp:  [96.6 °F (35.9 °C)-98.2 °F (36.8 °C)] 96.6 °F (35.9 °C)  Pulse:  [55-97] 61  Resp:  [16-20] 16  SpO2:  [94 %-100 %] 96 %  BP: (116-123)/(54-62) 122/58     Weight: 114.8 kg (253 lb 1.4 oz) (rd reviewed, no new)  Body mass index is 39.64 kg/m².    Physical Exam "   Constitutional: She is oriented to person, place, and time. She appears well-developed. No distress.   Morbidly obese   HENT:   Head: Normocephalic and atraumatic.   Right Ear: External ear normal.   Left Ear: External ear normal.   Macroglossia, open mouth breathing   Eyes: Conjunctivae and EOM are normal. Pupils are equal, round, and reactive to light.   Neck: Normal range of motion. Neck supple. No tracheal deviation present.   Cardiovascular: Normal rate, regular rhythm and normal heart sounds.    Pulmonary/Chest: Effort normal and breath sounds normal. No respiratory distress. She has no wheezes.   Abdominal: Soft. Bowel sounds are normal. There is no tenderness.   Musculoskeletal: Normal range of motion. She exhibits edema.   Neurological: She is alert and oriented to person, place, and time.   Skin: Skin is warm and dry. No rash noted.   Venous stasis change   Psychiatric: She has a normal mood and affect. Her behavior is normal.   Nursing note and vitals reviewed.        CRANIAL NERVES     CN III, IV, VI   Pupils are equal, round, and reactive to light.  Extraocular motions are normal.        Significant Labs:   CBC:     Recent Labs  Lab 02/15/18  0409 18  0344   WBC 9.93 9.82   HGB 10.6* 10.4*   HCT 32.9* 32.3*    297     CMP:     Recent Labs  Lab 02/15/18  0409 18  0344    138   K 4.1 3.8    97   CO2 33* 33*   GLU 97 95   BUN 33* 30*   CREATININE 0.9 0.9   CALCIUM 8.3* 8.1*   ANIONGAP 7* 8   EGFRNONAA 60 60     BNP     Recent Labs  Lab 18  0622   BNP 69                Lab Results   Component Value Date     TSH 0.371 (L) 2018   T4 1.10  MG panel pending  procalcitonin 0.39     Recent Labs  Lab 18  1306   TROPONINI 0.018      resp viral panel negative        Significant Imagin/6/18 CXR- There are bilateral patchy opacities which are worse at the right lung base when compared to previous performed 3 days earlier. There is no pneumothorax. The remainder  of the examination is unchanged.    2/3 CXR Mild decrease in right upper lobe and right perihilar infiltrate. Mild cardiomegaly. Atherosclerosis.     2/2 CXR The lungs are underexpanded.  There are chronic lung markings seen bilaterally with pulmonary vascular congestion and patchy opacities throughout both lungs.  This could be related to pulmonary edema although superimposed infiltrate in the right upper lobe is not excluded.  The heart is enlarged.  Calcified atheromatous disease affects the aorta.  Age-appropriate degenerative changes affect the skeleton.     1/31 CTA lungs 1.  No CT evidence of central pulmonary embolus.  Severely limited study due to use motion artifact.  2.  Bilateral groundglass opacities and minimal dependent subsegmental atelectasis.     1/31 CT head 1.  No CT evidence of an acute intracranial abnormality with limitations due to motion artifact.  2.  Mild atrophy and minimal small vessel ischemic changes of the periventricular white matter.     Echo Echo 1/18: EF 60%, stage 1 diastolic dysfunction, no significant valvular pathology.      EKG a fib                   Pending Diagnostic Studies:      Procedure Component Value Units Date/Time     Myasthenia Gravis/Lambert-Eaton [[id number]] Collected:  02/02/18 1824     Order Status:  Sent Lab Status:  In process Updated:  02/02/18 2204     Specimen:  Blood                 Assessment/Plan:      * Debility      Place on skilled nursing 2/7/18 for prolonged hospitalization with complications, +deconditioned.  Needs to improve to baseline;  Goal 50 feet.   2/16/18 currently meeting goal of ambulating 50 feet but still unable to climb stairs; discussed Skilled NSG home until able to get 1st floor apt        Physical deconditioning    Continue with PT on skilled- ^^ activity as tolerated- get back to baseling        Acute on chronic congestive heart failure    Initially tx for fluid overload after missed lasix rx;   2-D Echocardiogram for evaluation  of left ventricle systolic function or any valvular heart disease-EF 55-60%, stage 1 diastolic dysfunction, no significant valvular pathology.  2-2-18 when she demonstrated volume depletion/weight loss with subsequent worsening renal function.   Diuretics discontinued due to acute renal insuff.  Cr was trending up, required IV fluids; creat now improving. Off IVF  Cardiology  Dr. Patel following     This is improved and stable  Daily weights.  Monitor  I/Os   Na restriction <2g/d.           PUJA (acute kidney injury)      Resolved- resume future losartan- monitor BP , renal fx        Community acquired pneumonia of right upper lobe of lung    Completed levofloxacin- day #5/5 today (d/c 2/10/18); continue Nebs - Bipap          Myasthenia gravis, adult form      Neuro eval document mild ptosis and gaze restriction as well as hypophonic voice concerning for neuro-muscular cause of hypercapnia. MG labs negative. Responding to steroids; follow neuro recs for taper Prednisone 40mg daily for a week (started 2/7). Then would need taper to Prednisone   30mg daily for a week, then 20mg until next seeing Neurology outpatient post d/c unless worsening symptoms again.    Dr. Ortiz following    Even with negative labs had good steroid response and will continue with taper. Will see neuro outpatient for further work up        Acute respiratory failure with hypoxia and hypercarbia    CO2 much better., tolerating BIPAP at night. Down to home Rx O2 2LNC    + Neuro eval noting  mild ptosis and gaze restriction as well as hypophonic voice concerning for neuro-muscular cause of hypercapnia. Responding to steroids. Thank you Dr. Ortiz         Acute on chronic diastolic congestive heart failure    Stable with current Rx          VTE Risk Mitigation         Ordered     Place MALKA hose  Until discontinued      02/12/18 0916     apixaban tablet 2.5 mg  2 times daily     Route:  Oral        02/07/18 1740     Medium Risk of VTE  Once       02/07/18 1740     Place sequential compression device  Until discontinued      02/07/18 1740              Za Sandra MD  Department of Hospital Medicine   [facility]

## 2018-02-16 NOTE — PLAN OF CARE
Discussed discharge plan with Ms Skinner and her sister as well as her daughter. Patient expresses inability to climb stairs that lead up to her apartment. She fears she will fall and it appears unrealistic that she will be able to that in the near future if at all. She has decided to opt for nursing home placement and is seeking [facility]. Will be able to discharge to [facility] Monday am.

## 2018-02-16 NOTE — PT/OT/SLP PROGRESS
"Physical Therapy Treatment    Patient Name:  Stephany Skinner   MRN:  [id number]    Recommendations:     Discharge Recommendations:  nursing facility, basic   Discharge Equipment Recommendations: wheelchair (bariatric RW)   Barriers to discharge: None    Assessment:     Stephany Skinner is a 83 y.o. female admitted with a medical diagnosis of Debility.  She presents with the following impairments/functional limitations:  weakness, impaired endurance, impaired self care skills, impaired functional mobilty, gait instability, decreased upper extremity function, decreased lower extremity function, decreased safety awareness . Pt progressing well with T/F and ambulation. Pt continues with inability to climb steps at this time.    Rehab Prognosis:  Good; patient would benefit from acute skilled PT services to address these deficits and reach maximum level of function.      Recent Surgery: * No surgery found *      Plan:     During this hospitalization, patient to be seen  (1-2x/day Monday-Friday; PRN Weekends/Holiday) to address the above listed problems via gait training, therapeutic activities, therapeutic exercises  · Plan of Care Expires:   (upon D/C from facility)   Plan of Care Reviewed with: patient, daughter    Subjective     Communicated with patient and family prior to session.  Patient found sitting in chair upon PT entry to room, agreeable to treatment.      Chief Complaint: "cannot climb steps"  Patient comments/goals: "Go home and get stronger"  Pain/Comfort:  · Pain Rating 1: 0/10    Patients cultural, spiritual, Advent conflicts given the current situation:      Objective:     Patient found with: oxygen     General Precautions: Standard, fall, respiratory   Orthopedic Precautions:N/A   Braces: N/A     Functional Mobility:  · Transfers:     · Sit to Stand:  contact guard assistance with rolling walker  · Bed to Chair: contact guard assistance with  rolling walker  using  Step Transfer  · Gait: Gait training " x 50 feet with RW and CGA with cueing for gait sequencing and upright posturing to decrease pt fall risk.   Attempted stair training without success this PM    AM-PAC 6 CLICK MOBILITY  Turning over in bed (including adjusting bedclothes, sheets and blankets)?: 3  Sitting down on and standing up from a chair with arms (e.g., wheelchair, bedside commode, etc.): 3  Moving from lying on back to sitting on the side of the bed?: 3  Moving to and from a bed to a chair (including a wheelchair)?: 3  Need to walk in hospital room?: 3  Climbing 3-5 steps with a railing?: 1  Total Score: 16       Patient left up in chair with all lines intact, call button in reach, NSG notified and family present..    GOALS:    Physical Therapy Goals        Problem: Physical Therapy Goal    Goal Priority Disciplines Outcome Goal Variances Interventions   Physical Therapy Goal     PT/OT, PT Ongoing (interventions implemented as appropriate)     Description:  Goals to be met by: 2/15/2018     Patient will increase functional independence with mobility by performin. Supine to sit with Stand-by Assistance  2. Sit to supine with Stand-by Assistance  3. Rolling to Left and Right with Stand-by Assistance.  4. Sit to stand transfer with Stand-by Assistance, with RW  5. Bed to chair transfer with Stand-by Assistance using Rolling Walker  6. Gait  x 50 feet with Stand-by Assistance using Rolling Walker.   7. Ascend/descend 3 stairs with bilateral Handrails Minimal Assistance            Problem: Physical Therapy Goal    Goal Priority Disciplines Outcome Goal Variances Interventions   Physical Therapy Goal     PT/OT, PT      Description:  Pt will increase participation in established goal progress              Multidisciplinary Problems (Resolved)        Problem: Physical Therapy Goal    Goal Priority Disciplines Outcome Goal Variances Interventions   Physical Therapy Goal   (Resolved)     PT/OT, PT  Error                     Time  Tracking:     PT Received On: 02/16/18  PT Start Time: 1036     PT Stop Time: 1100  PT Total Time (min): 24 min     Billable Minutes: Gait Training 15 minutes and Therapeutic Activity 9 minutes    Treatment Type: Treatment  PT/PTA: PT           Jacki Rutherford, PT  02/16/2018

## 2018-02-16 NOTE — NURSING
Bedside report received from Summer.  No complaints of pain noted. VS stable.  Call bell in reach.  Will continue to monitor. Family at bedside.

## 2018-02-16 NOTE — ASSESSMENT & PLAN NOTE
Place on skilled nursing 2/7/18 for prolonged hospitalization with complications, +deconditioned.  Needs to improve to baseline;  Goal 50 feet.   2/16/18 currently meeting goal of ambulating 50 feet but still unable to climb stairs; discussed Skilled NSG home until able to get 1st floor apt

## 2018-02-16 NOTE — PLAN OF CARE
02/16/18 1355   Discharge Reassessment   Assessment Type Discharge Planning Reassessment     Pt daughter signed choice form for nh placement at the [facility]. Sw faxed pt's information through [employer]. [facility] ([facility] informed [facility] that the pt was accepted at the [facility].

## 2018-02-16 NOTE — PT/OT/SLP PROGRESS
"Physical Therapy Treatment    Patient Name:  Stephany Skinner   MRN:  [id number]    Recommendations:     Discharge Recommendations:  nursing facility, basic   Discharge Equipment Recommendations: wheelchair (bariatric RW)   Barriers to discharge: None    Assessment:     tSephany Skinner is a 83 y.o. female admitted with a medical diagnosis of Debility.  She presents with the following impairments/functional limitations:  weakness, impaired endurance, impaired self care skills, impaired functional mobilty, gait instability, impaired balance, decreased upper extremity function, decreased lower extremity function . Pt progressing well with POC goals.    Rehab Prognosis:  Good; patient would benefit from acute skilled PT services to address these deficits and reach maximum level of function.      Recent Surgery: * No surgery found *      Plan:     During this hospitalization, patient to be seen  (1-2x/day Monday-Friday; PRN Weekends/Holidays) to address the above listed problems via gait training, therapeutic activities, therapeutic exercises  · Plan of Care Expires:   (upon D/C from facility)   Plan of Care Reviewed with: patient, daughter    Subjective     Communicated with patient prior to session.  Patient found sitting up in chair upon PT entry to room, agreeable to treatment.      Chief Complaint: Pt with no c/o  Patient comments/goals: "Go home"  Pain/Comfort:  · Pain Rating 1: 0/10    Patients cultural, spiritual, Alevism conflicts given the current situation:      Objective:     Patient found with: oxygen     General Precautions: Standard, fall, respiratory   Orthopedic Precautions:N/A   Braces: N/A     AM-PAC 6 CLICK MOBILITY  Turning over in bed (including adjusting bedclothes, sheets and blankets)?: 3  Sitting down on and standing up from a chair with arms (e.g., wheelchair, bedside commode, etc.): 3  Moving from lying on back to sitting on the side of the bed?: 3  Moving to and from a bed to a chair " (including a wheelchair)?: 3  Need to walk in hospital room?: 3  Climbing 3-5 steps with a railing?: 1  Total Score: 16       Therapeutic Activities and Exercises:   Therapeutic exercises performed on BLE at all joints in available planes of motion with assistance as needed to increase strength and endurance with all gross mobility skills. Pt and daughter educated on HEP.     Patient left up in chair with all lines intact, call button in reach, NSG notified and daughter present..    GOALS:    Physical Therapy Goals        Problem: Physical Therapy Goal    Goal Priority Disciplines Outcome Goal Variances Interventions   Physical Therapy Goal     PT/OT, PT Ongoing (interventions implemented as appropriate)     Description:  Goals to be met by: 2/15/2018     Patient will increase functional independence with mobility by performin. Supine to sit with Stand-by Assistance  2. Sit to supine with Stand-by Assistance  3. Rolling to Left and Right with Stand-by Assistance.  4. Sit to stand transfer with Stand-by Assistance, with RW  5. Bed to chair transfer with Stand-by Assistance using Rolling Walker  6. Gait  x 50 feet with Stand-by Assistance using Rolling Walker.   7. Ascend/descend 3 stairs with bilateral Handrails Minimal Assistance            Problem: Physical Therapy Goal    Goal Priority Disciplines Outcome Goal Variances Interventions   Physical Therapy Goal     PT/OT, PT      Description:  Pt will increase participation in established goal progress              Multidisciplinary Problems (Resolved)        Problem: Physical Therapy Goal    Goal Priority Disciplines Outcome Goal Variances Interventions   Physical Therapy Goal   (Resolved)     PT/OT, PT  Error                     Time Tracking:     PT Received On: 18  PT Start Time: 1329     PT Stop Time: 1343  PT Total Time (min): 14 min     Billable Minutes: Therapeutic Exercise 14 minutes    Treatment Type: Treatment  PT/PTA: PT            Jacki Rutherford, PT  02/16/2018

## 2018-02-17 LAB
ANION GAP SERPL CALC-SCNC: 8 MMOL/L
BASOPHILS # BLD AUTO: 0.01 K/UL
BASOPHILS NFR BLD: 0.1 %
BUN SERPL-MCNC: 31 MG/DL
CALCIUM SERPL-MCNC: 8.2 MG/DL
CHLORIDE SERPL-SCNC: 98 MMOL/L
CO2 SERPL-SCNC: 34 MMOL/L
CREAT SERPL-MCNC: 0.9 MG/DL
DIFFERENTIAL METHOD: ABNORMAL
EOSINOPHIL # BLD AUTO: 0.1 K/UL
EOSINOPHIL NFR BLD: 0.9 %
ERYTHROCYTE [DISTWIDTH] IN BLOOD BY AUTOMATED COUNT: 14.1 %
EST. GFR  (AFRICAN AMERICAN): >60 ML/MIN/1.73 M^2
EST. GFR  (NON AFRICAN AMERICAN): 60 ML/MIN/1.73 M^2
GLUCOSE SERPL-MCNC: 104 MG/DL
HCT VFR BLD AUTO: 32.8 %
HGB BLD-MCNC: 10.5 G/DL
LYMPHOCYTES # BLD AUTO: 1.4 K/UL
LYMPHOCYTES NFR BLD: 13.6 %
MCH RBC QN AUTO: 30.6 PG
MCHC RBC AUTO-ENTMCNC: 32 G/DL
MCV RBC AUTO: 96 FL
MONOCYTES # BLD AUTO: 0.9 K/UL
MONOCYTES NFR BLD: 8.6 %
NEUTROPHILS # BLD AUTO: 7.8 K/UL
NEUTROPHILS NFR BLD: 76.8 %
PLATELET # BLD AUTO: 277 K/UL
PMV BLD AUTO: 9.1 FL
POCT GLUCOSE: 107 MG/DL (ref 70–110)
POCT GLUCOSE: 184 MG/DL (ref 70–110)
POCT GLUCOSE: 197 MG/DL (ref 70–110)
POCT GLUCOSE: 214 MG/DL (ref 70–110)
POTASSIUM SERPL-SCNC: 3.7 MMOL/L
RBC # BLD AUTO: 3.43 M/UL
SODIUM SERPL-SCNC: 140 MMOL/L
WBC # BLD AUTO: 10.1 K/UL

## 2018-02-17 PROCEDURE — 97530 THERAPEUTIC ACTIVITIES: CPT

## 2018-02-17 PROCEDURE — 63600175 PHARM REV CODE 636 W HCPCS: Performed by: FAMILY MEDICINE

## 2018-02-17 PROCEDURE — 27000221 HC OXYGEN, UP TO 24 HOURS

## 2018-02-17 PROCEDURE — 99900035 HC TECH TIME PER 15 MIN (STAT)

## 2018-02-17 PROCEDURE — 82962 GLUCOSE BLOOD TEST: CPT

## 2018-02-17 PROCEDURE — 94640 AIRWAY INHALATION TREATMENT: CPT

## 2018-02-17 PROCEDURE — 36415 COLL VENOUS BLD VENIPUNCTURE: CPT

## 2018-02-17 PROCEDURE — 85025 COMPLETE CBC W/AUTO DIFF WBC: CPT

## 2018-02-17 PROCEDURE — 94660 CPAP INITIATION&MGMT: CPT

## 2018-02-17 PROCEDURE — 25000003 PHARM REV CODE 250: Performed by: NURSE PRACTITIONER

## 2018-02-17 PROCEDURE — 94761 N-INVAS EAR/PLS OXIMETRY MLT: CPT

## 2018-02-17 PROCEDURE — 25000003 PHARM REV CODE 250: Performed by: INTERNAL MEDICINE

## 2018-02-17 PROCEDURE — 11000004 HC SNF PRIVATE

## 2018-02-17 PROCEDURE — 25000003 PHARM REV CODE 250: Performed by: FAMILY MEDICINE

## 2018-02-17 PROCEDURE — 25000242 PHARM REV CODE 250 ALT 637 W/ HCPCS: Performed by: FAMILY MEDICINE

## 2018-02-17 PROCEDURE — 80048 BASIC METABOLIC PNL TOTAL CA: CPT

## 2018-02-17 PROCEDURE — 97116 GAIT TRAINING THERAPY: CPT

## 2018-02-17 RX ADMIN — PREDNISONE 30 MG: 20 TABLET ORAL at 09:02

## 2018-02-17 RX ADMIN — IPRATROPIUM BROMIDE 0.5 MG: 0.5 SOLUTION RESPIRATORY (INHALATION) at 07:02

## 2018-02-17 RX ADMIN — PANTOPRAZOLE SODIUM 40 MG: 40 TABLET, DELAYED RELEASE ORAL at 09:02

## 2018-02-17 RX ADMIN — NYSTATIN 500000 UNITS: 100000 SUSPENSION ORAL at 09:02

## 2018-02-17 RX ADMIN — PYRIDOSTIGMINE BROMIDE 60 MG: 60 TABLET ORAL at 03:02

## 2018-02-17 RX ADMIN — NYSTATIN 500000 UNITS: 100000 SUSPENSION ORAL at 06:02

## 2018-02-17 RX ADMIN — CLOPIDOGREL BISULFATE 75 MG: 75 TABLET ORAL at 09:02

## 2018-02-17 RX ADMIN — NYSTATIN 500000 UNITS: 100000 SUSPENSION ORAL at 05:02

## 2018-02-17 RX ADMIN — APIXABAN 2.5 MG: 2.5 TABLET, FILM COATED ORAL at 09:02

## 2018-02-17 RX ADMIN — LEVALBUTEROL 1.25 MG: 1.25 SOLUTION, CONCENTRATE RESPIRATORY (INHALATION) at 07:02

## 2018-02-17 RX ADMIN — PYRIDOSTIGMINE BROMIDE 60 MG: 60 TABLET ORAL at 10:02

## 2018-02-17 RX ADMIN — ATORVASTATIN CALCIUM 10 MG: 10 TABLET, FILM COATED ORAL at 09:02

## 2018-02-17 RX ADMIN — INSULIN ASPART 1 UNITS: 100 INJECTION, SOLUTION INTRAVENOUS; SUBCUTANEOUS at 10:02

## 2018-02-17 RX ADMIN — FUROSEMIDE 20 MG: 20 TABLET ORAL at 09:02

## 2018-02-17 RX ADMIN — FUROSEMIDE 20 MG: 20 TABLET ORAL at 05:02

## 2018-02-17 RX ADMIN — IPRATROPIUM BROMIDE 0.5 MG: 0.5 SOLUTION RESPIRATORY (INHALATION) at 02:02

## 2018-02-17 RX ADMIN — AMLODIPINE BESYLATE 2.5 MG: 2.5 TABLET ORAL at 09:02

## 2018-02-17 RX ADMIN — LEVALBUTEROL 1.25 MG: 1.25 SOLUTION, CONCENTRATE RESPIRATORY (INHALATION) at 02:02

## 2018-02-17 RX ADMIN — PYRIDOSTIGMINE BROMIDE 60 MG: 60 TABLET ORAL at 06:02

## 2018-02-17 RX ADMIN — METOPROLOL SUCCINATE 200 MG: 50 TABLET, EXTENDED RELEASE ORAL at 09:02

## 2018-02-17 RX ADMIN — LEVOTHYROXINE SODIUM 100 MCG: 100 TABLET ORAL at 09:02

## 2018-02-17 RX ADMIN — NYSTATIN 500000 UNITS: 100000 SUSPENSION ORAL at 12:02

## 2018-02-17 NOTE — PROGRESS NOTES
Visited patient. Up in chair watching game show. Family present. Patient sat in lobby before lunch.

## 2018-02-17 NOTE — PLAN OF CARE
Problem: Patient Care Overview  Goal: Plan of Care Review  Outcome: Ongoing (interventions implemented as appropriate)  Pt remains on swing bed for debility. Ambulating well with PT; pt unable to walk up stairs; pt has 10 steps to get into apartment. Plans to go to [facility] for rehab. Family working with landlord to get apartment on ground floor. Monitoring blood sugars AC&HS; covering with SSI as needed. Swelling to BLE; lasix PO BID. Afib on telemetry; rate controlled. Vitals stable. Reviewed plan of care with pt and family; states agreement.

## 2018-02-17 NOTE — NURSING
Bedside report done. Pt up in chair with friend at bedside. O2 in use at 2L/NC. Denies C/O at this time. Plan of care reviewed with pt, voiced understanding. Call bell in reach, fall precautions maintained.

## 2018-02-17 NOTE — CARE UPDATE
Quiet hours, no change in status. Rested well, wore bipap for 5 hours last pm. POC reviewed with pt, voiced understanding.

## 2018-02-17 NOTE — PLAN OF CARE
Problem: Patient Care Overview  Goal: Plan of Care Review  Outcome: Ongoing (interventions implemented as appropriate)  Pt remains on swing bed for debility. Ambulating with walker and PT. Pt still unable to climb stairs. Plans for admit to [facility] on Monday for continued rehab. Pt taken off oxygen this afternoon; maintaining oxygen sats @ 93% on room air. Pt denies shortness of breath. Breath sounds clear; diminished. Monitoring blood sugars AC&HS; covering with SSI as needed. afib on telemetry; rate controlled. Call bell within reach. Bed in low, locked position. Reviewed plan of care with pt and family; state understanding.

## 2018-02-17 NOTE — PT/OT/SLP PROGRESS
Physical Therapy Treatment    Patient Name:  Stephany Skinner   MRN:  [id number]    Recommendations:     Discharge Recommendations:      Discharge Equipment Recommendations:     Barriers to discharge: None    Assessment:     Stephany Skinner is a 83 y.o. female admitted with a medical diagnosis of Debility.  She presents with the following impairments/functional limitations:    decreased endurance and ability to ascend stairs .    Rehab Prognosis:  good patient would benefit from acute skilled PT services to address these deficits and reach maximum level of function.      Recent Surgery: * No surgery found *      Plan:     During this hospitalization, patient to be seen  (1-2x/day(M-F); PRN(Sat.,Sun.)) to address the above listed problems via gait training, therapeutic activities, therapeutic exercises  · Plan of Care Expires:   (upon D/C from facility)   Plan of Care Reviewed with: patient, sibling    Subjective     Communicated with patient and sibling prior to session.  Patient found seated at edge of bed upon PT entry to room, agreeable to treatment.      Chief Complaint:   Inability to ascend stairs for return to apartment  Patient comments/goals: to regain strength and waling ability for return to previous levels of ADL  Pain/Comfort:  · Pain Rating 1: 0/10  · Pain Rating Post-Intervention 1: 0/10    Patients cultural, spiritual, Gnosticist conflicts given the current situation: none voiced    Objective:     Patient found with: oxygen     General Precautions: Standard, respiratory, fall   Orthopedic Precautions:N/A   Braces:       Functional Mobility:  · Transfers:     · Sit to Stand:  contact guard assistance with rolling walker and verbal,tactile cues  · Bed to Chair: minimum assistance with  rolling walker  using  Stand Pivot  · Gait: RW and contact guard assist, verbal and tactile cues. decreased foot clearnce from floor, decrease velocity      AM-PAC 6 CLICK MOBILITY  Turning over in bed (including adjusting  bedclothes, sheets and blankets)?: 3  Sitting down on and standing up from a chair with arms (e.g., wheelchair, bedside commode, etc.): 3  Moving from lying on back to sitting on the side of the bed?: 3  Moving to and from a bed to a chair (including a wheelchair)?: 3  Need to walk in hospital room?: 3  Climbing 3-5 steps with a railing?: 1  Total Score: 16       Therapeutic Activities and Exercises:   Transfer training  postural cues gait  training with RW:  Reinforced appropriate foot clearance and step pattern  Gentle active exercises for all four extremites as tolerated  Out of bed sitting in Bedside chair    Patient left up in chair with all lines intact, call button in reach, NSG service notified and sibling  present..    GOALS:    Physical Therapy Goals        Problem: Physical Therapy Goal    Goal Priority Disciplines Outcome Goal Variances Interventions   Physical Therapy Goal     PT/OT, PT Ongoing (interventions implemented as appropriate)     Description:  Goals to be met by: 2/15/2018     Patient will increase functional independence with mobility by performin. Supine to sit with Stand-by Assistance  2. Sit to supine with Stand-by Assistance  3. Rolling to Left and Right with Stand-by Assistance.  4. Sit to stand transfer with Stand-by Assistance, with RW  5. Bed to chair transfer with Stand-by Assistance using Rolling Walker  6. Gait  x 50 feet with Stand-by Assistance using Rolling Walker.   7. Ascend/descend 3 stairs with bilateral Handrails Minimal Assistance            Problem: Physical Therapy Goal    Goal Priority Disciplines Outcome Goal Variances Interventions   Physical Therapy Goal     PT/OT, PT      Description:  Pt will increase participation in established goal progress           Problem: Physical Therapy Goal    Goal Priority Disciplines Outcome Goal Variances Interventions   Physical Therapy Goal     PT/OT, PT      Description:  Continue plan of care , prepare for discharge               Multidisciplinary Problems (Resolved)        Problem: Physical Therapy Goal    Goal Priority Disciplines Outcome Goal Variances Interventions   Physical Therapy Goal   (Resolved)     PT/OT, PT  Error                     Time Tracking:     PT Received On: 02/17/18  PT Start Time: 0845     PT Stop Time: 0910  PT Total Time (min): 25 min     Billable Minutes: Gait Training 15 min, Therapeutic Activity 10 min and Total Time 25 min    Treatment Type: Treatment  PT/PTA: PT           Jordan Garcia, PT  02/17/2018

## 2018-02-18 LAB
ANION GAP SERPL CALC-SCNC: 9 MMOL/L
BASOPHILS # BLD AUTO: 0 K/UL
BASOPHILS NFR BLD: 0 %
BUN SERPL-MCNC: 32 MG/DL
CALCIUM SERPL-MCNC: 8.2 MG/DL
CHLORIDE SERPL-SCNC: 97 MMOL/L
CO2 SERPL-SCNC: 35 MMOL/L
CREAT SERPL-MCNC: 0.9 MG/DL
DIFFERENTIAL METHOD: ABNORMAL
EOSINOPHIL # BLD AUTO: 0.1 K/UL
EOSINOPHIL NFR BLD: 1 %
ERYTHROCYTE [DISTWIDTH] IN BLOOD BY AUTOMATED COUNT: 14.3 %
EST. GFR  (AFRICAN AMERICAN): >60 ML/MIN/1.73 M^2
EST. GFR  (NON AFRICAN AMERICAN): 60 ML/MIN/1.73 M^2
GLUCOSE SERPL-MCNC: 105 MG/DL
HCT VFR BLD AUTO: 33.1 %
HGB BLD-MCNC: 10.5 G/DL
LYMPHOCYTES # BLD AUTO: 1.5 K/UL
LYMPHOCYTES NFR BLD: 14.7 %
MCH RBC QN AUTO: 30.6 PG
MCHC RBC AUTO-ENTMCNC: 31.7 G/DL
MCV RBC AUTO: 97 FL
MONOCYTES # BLD AUTO: 0.9 K/UL
MONOCYTES NFR BLD: 9 %
NEUTROPHILS # BLD AUTO: 7.7 K/UL
NEUTROPHILS NFR BLD: 75.3 %
PLATELET # BLD AUTO: 269 K/UL
PMV BLD AUTO: 9 FL
POCT GLUCOSE: 111 MG/DL (ref 70–110)
POCT GLUCOSE: 138 MG/DL (ref 70–110)
POCT GLUCOSE: 178 MG/DL (ref 70–110)
POCT GLUCOSE: 227 MG/DL (ref 70–110)
POTASSIUM SERPL-SCNC: 3.5 MMOL/L
RBC # BLD AUTO: 3.43 M/UL
SODIUM SERPL-SCNC: 141 MMOL/L
TB INDURATION 48 - 72 HR READ: 0 MM
WBC # BLD AUTO: 10.16 K/UL

## 2018-02-18 PROCEDURE — 94761 N-INVAS EAR/PLS OXIMETRY MLT: CPT

## 2018-02-18 PROCEDURE — 94660 CPAP INITIATION&MGMT: CPT

## 2018-02-18 PROCEDURE — 97116 GAIT TRAINING THERAPY: CPT

## 2018-02-18 PROCEDURE — 94640 AIRWAY INHALATION TREATMENT: CPT

## 2018-02-18 PROCEDURE — 25000003 PHARM REV CODE 250: Performed by: FAMILY MEDICINE

## 2018-02-18 PROCEDURE — 82962 GLUCOSE BLOOD TEST: CPT

## 2018-02-18 PROCEDURE — 97110 THERAPEUTIC EXERCISES: CPT

## 2018-02-18 PROCEDURE — 85025 COMPLETE CBC W/AUTO DIFF WBC: CPT

## 2018-02-18 PROCEDURE — 27000221 HC OXYGEN, UP TO 24 HOURS

## 2018-02-18 PROCEDURE — 63600175 PHARM REV CODE 636 W HCPCS: Performed by: FAMILY MEDICINE

## 2018-02-18 PROCEDURE — 25000003 PHARM REV CODE 250: Performed by: NURSE PRACTITIONER

## 2018-02-18 PROCEDURE — 11000004 HC SNF PRIVATE

## 2018-02-18 PROCEDURE — 25000242 PHARM REV CODE 250 ALT 637 W/ HCPCS: Performed by: FAMILY MEDICINE

## 2018-02-18 PROCEDURE — 36415 COLL VENOUS BLD VENIPUNCTURE: CPT

## 2018-02-18 PROCEDURE — 99900035 HC TECH TIME PER 15 MIN (STAT)

## 2018-02-18 PROCEDURE — 25000003 PHARM REV CODE 250: Performed by: INTERNAL MEDICINE

## 2018-02-18 PROCEDURE — 80048 BASIC METABOLIC PNL TOTAL CA: CPT

## 2018-02-18 RX ADMIN — LEVALBUTEROL 1.25 MG: 1.25 SOLUTION, CONCENTRATE RESPIRATORY (INHALATION) at 07:02

## 2018-02-18 RX ADMIN — FUROSEMIDE 20 MG: 20 TABLET ORAL at 08:02

## 2018-02-18 RX ADMIN — APIXABAN 2.5 MG: 2.5 TABLET, FILM COATED ORAL at 08:02

## 2018-02-18 RX ADMIN — FUROSEMIDE 20 MG: 20 TABLET ORAL at 06:02

## 2018-02-18 RX ADMIN — PYRIDOSTIGMINE BROMIDE 60 MG: 60 TABLET ORAL at 09:02

## 2018-02-18 RX ADMIN — IPRATROPIUM BROMIDE 0.5 MG: 0.5 SOLUTION RESPIRATORY (INHALATION) at 07:02

## 2018-02-18 RX ADMIN — AMLODIPINE BESYLATE 2.5 MG: 2.5 TABLET ORAL at 08:02

## 2018-02-18 RX ADMIN — CLOPIDOGREL BISULFATE 75 MG: 75 TABLET ORAL at 08:02

## 2018-02-18 RX ADMIN — PYRIDOSTIGMINE BROMIDE 60 MG: 60 TABLET ORAL at 06:02

## 2018-02-18 RX ADMIN — INSULIN ASPART 1 UNITS: 100 INJECTION, SOLUTION INTRAVENOUS; SUBCUTANEOUS at 09:02

## 2018-02-18 RX ADMIN — IPRATROPIUM BROMIDE 0.5 MG: 0.5 SOLUTION RESPIRATORY (INHALATION) at 02:02

## 2018-02-18 RX ADMIN — APIXABAN 2.5 MG: 2.5 TABLET, FILM COATED ORAL at 09:02

## 2018-02-18 RX ADMIN — ATORVASTATIN CALCIUM 10 MG: 10 TABLET, FILM COATED ORAL at 09:02

## 2018-02-18 RX ADMIN — PANTOPRAZOLE SODIUM 40 MG: 40 TABLET, DELAYED RELEASE ORAL at 08:02

## 2018-02-18 RX ADMIN — METOPROLOL SUCCINATE 200 MG: 50 TABLET, EXTENDED RELEASE ORAL at 08:02

## 2018-02-18 RX ADMIN — PREDNISONE 30 MG: 20 TABLET ORAL at 08:02

## 2018-02-18 RX ADMIN — NYSTATIN 500000 UNITS: 100000 SUSPENSION ORAL at 06:02

## 2018-02-18 RX ADMIN — LEVALBUTEROL 1.25 MG: 1.25 SOLUTION, CONCENTRATE RESPIRATORY (INHALATION) at 02:02

## 2018-02-18 RX ADMIN — PYRIDOSTIGMINE BROMIDE 60 MG: 60 TABLET ORAL at 01:02

## 2018-02-18 RX ADMIN — LEVOTHYROXINE SODIUM 100 MCG: 100 TABLET ORAL at 08:02

## 2018-02-18 NOTE — PROGRESS NOTES
Staff Handoff    Report received from Poonam RN and patient assessed per flowsheet. Sitting in chair with company at bedside. On room air, 02 sats 89%, 02 replaced at 1L N/C and sats went up to 93-94%. Bilateral leg edema. Incont of bladder. Generalized weakness. Afib on telemetry. Instructed to call for needs/asst.  Resident Handoff

## 2018-02-18 NOTE — PLAN OF CARE
Problem: Patient Care Overview  Goal: Plan of Care Review  Outcome: Ongoing (interventions implemented as appropriate)  Pt on swing bed for debility. Ambulating well with physical therapy. Will be discharged to [facility] tomorrow for continued rehab. Vitals stable. afib on telemetry; rate controlled. Monitoring blood sugars AC&HS; covering with SSI as needed. Pt visiting with family throughout the day; sitting in recliner chair with legs elevated. Call bell within reach. Reviewed plan of care with pt and family; state agreement.

## 2018-02-18 NOTE — PLAN OF CARE
Problem: Patient Care Overview  Goal: Plan of Care Review  Outcome: Ongoing (interventions implemented as appropriate)  Patient had a good night. Slept well on Bipap. No changes or complaints. Afib on telemetry. POC reviewed and instructed to call for needs/asst.

## 2018-02-18 NOTE — PT/OT/SLP PROGRESS
Physical Therapy Treatment    Patient Name:  Stephany Skinner   MRN:  [id number]    Recommendations:     Discharge Recommendations:      Discharge Equipment Recommendations:     Barriers to discharge: None    Assessment:     Stephany Skinner is a 83 y.o. female admitted with a medical diagnosis of Debility.  She presents with the following impairments/functional limitations:    impaired self care skills,wekness BLE, gait and endurance deficits.    Rehab Prognosis:  good; patient would benefit from acute skilled PT services to address these deficits and reach maximum level of function.      Recent Surgery: * No surgery found *      Plan:     During this hospitalization, patient to be seen  (1-2x/day(M-F); PRN(Sat.,Sun.)) to address the above listed problems via gait training, therapeutic activities, therapeutic exercises  · Plan of Care Expires:   (upon D/C from facility)   Plan of Care Reviewed with: patient, sibling    Subjective     Communicated with patient and sibling prior to session.  Patient found seated in bedside chair upon PT entry to room, agreeable to treatment.      Chief Complaint: inability to climb 10 stairs  Patient comments/goals: I lifting my left leg better during walking. I walked to the end of the gallo yesterday afternoon (with NSG.and RW).  Pain/Comfort:  · Pain Rating 1: 0/10  · Pain Rating Post-Intervention 1: 0/10    Patients cultural, spiritual, Nondenominational conflicts given the current situation: none voiced    Objective:     Patient found with: oxygen, telemetry     General Precautions: Standard, respiratory, fall   Orthopedic Precautions:N/A   Braces:       Functional Mobility:  · Gait: Completeed 100 ft of gait training  with RW and contact guard assist . Improved foot clearance from floor, Decreased stride and cautions for turning activites      AM-PAC 6 CLICK MOBILITY          Therapeutic Activities and Exercises:   Active and assistive therapeutic exercises for major joint excursions as  tolerated: to increase strength, motion ,and fluid dynamic of both  lower extremities    Patient left up in chair with all lines intact, call button in reach, NSG notified and sibling present..    GOALS:    Physical Therapy Goals        Problem: Physical Therapy Goal    Goal Priority Disciplines Outcome Goal Variances Interventions   Physical Therapy Goal     PT/OT, PT Ongoing (interventions implemented as appropriate)     Description:  Goals to be met by: 2/15/2018     Patient will increase functional independence with mobility by performin. Supine to sit with Stand-by Assistance  2. Sit to supine with Stand-by Assistance  3. Rolling to Left and Right with Stand-by Assistance.  4. Sit to stand transfer with Stand-by Assistance, with RW  5. Bed to chair transfer with Stand-by Assistance using Rolling Walker  6. Gait  x 50 feet with Stand-by Assistance using Rolling Walker.   7. Ascend/descend 3 stairs with bilateral Handrails Minimal Assistance            Problem: Physical Therapy Goal    Goal Priority Disciplines Outcome Goal Variances Interventions   Physical Therapy Goal     PT/OT, PT      Description:  Pt will increase participation in established goal progress           Problem: Physical Therapy Goal    Goal Priority Disciplines Outcome Goal Variances Interventions   Physical Therapy Goal     PT/OT, PT      Description:  Continue plan of care , prepare for discharge              Multidisciplinary Problems (Resolved)        Problem: Physical Therapy Goal    Goal Priority Disciplines Outcome Goal Variances Interventions   Physical Therapy Goal   (Resolved)     PT/OT, PT  Error                     Time Tracking:     PT Received On: 18  PT Start Time: 0825     PT Stop Time: 0850  PT Total Time (min): 25 min     Billable Minutes: Gait Training 15 min, Therapeutic Exercise 10 min and Total Time 25min    Treatment Type: Treatment  PT/PTA: PT           Jordan Garcia, PT  2018

## 2018-02-19 ENCOUNTER — TELEPHONE (OUTPATIENT)
Dept: NEUROLOGY | Facility: CLINIC | Age: 83
End: 2018-02-19

## 2018-02-19 VITALS
OXYGEN SATURATION: 93 % | RESPIRATION RATE: 17 BRPM | HEART RATE: 71 BPM | WEIGHT: 253.06 LBS | HEIGHT: 67 IN | DIASTOLIC BLOOD PRESSURE: 58 MMHG | SYSTOLIC BLOOD PRESSURE: 130 MMHG | TEMPERATURE: 97 F | BODY MASS INDEX: 39.72 KG/M2

## 2018-02-19 LAB
POCT GLUCOSE: 112 MG/DL (ref 70–110)
POCT GLUCOSE: 72 MG/DL (ref 70–110)

## 2018-02-19 PROCEDURE — 90471 IMMUNIZATION ADMIN: CPT | Performed by: INTERNAL MEDICINE

## 2018-02-19 PROCEDURE — 63600175 PHARM REV CODE 636 W HCPCS: Performed by: INTERNAL MEDICINE

## 2018-02-19 PROCEDURE — 99900035 HC TECH TIME PER 15 MIN (STAT)

## 2018-02-19 PROCEDURE — 63600175 PHARM REV CODE 636 W HCPCS: Performed by: FAMILY MEDICINE

## 2018-02-19 PROCEDURE — 25000003 PHARM REV CODE 250: Performed by: NURSE PRACTITIONER

## 2018-02-19 PROCEDURE — 99232 SBSQ HOSP IP/OBS MODERATE 35: CPT | Mod: ,,, | Performed by: INTERNAL MEDICINE

## 2018-02-19 PROCEDURE — 90670 PCV13 VACCINE IM: CPT | Performed by: INTERNAL MEDICINE

## 2018-02-19 PROCEDURE — 25000003 PHARM REV CODE 250: Performed by: FAMILY MEDICINE

## 2018-02-19 PROCEDURE — 25000242 PHARM REV CODE 250 ALT 637 W/ HCPCS: Performed by: FAMILY MEDICINE

## 2018-02-19 PROCEDURE — 94640 AIRWAY INHALATION TREATMENT: CPT

## 2018-02-19 PROCEDURE — 94760 N-INVAS EAR/PLS OXIMETRY 1: CPT

## 2018-02-19 PROCEDURE — G0009 ADMIN PNEUMOCOCCAL VACCINE: HCPCS | Performed by: INTERNAL MEDICINE

## 2018-02-19 PROCEDURE — 27000221 HC OXYGEN, UP TO 24 HOURS

## 2018-02-19 RX ORDER — PREDNISONE 10 MG/1
TABLET ORAL
Qty: 30 TABLET | Refills: 0 | Status: SHIPPED | OUTPATIENT
Start: 2018-02-19 | End: 2018-04-17 | Stop reason: SDUPTHER

## 2018-02-19 RX ORDER — PEN NEEDLE, DIABETIC 29 G X1/2"
1 NEEDLE, DISPOSABLE MISCELLANEOUS DAILY
Qty: 100 EACH | Refills: 0 | Status: SHIPPED | OUTPATIENT
Start: 2018-02-19 | End: 2018-04-17 | Stop reason: SDUPTHER

## 2018-02-19 RX ORDER — PYRIDOSTIGMINE BROMIDE 60 MG/1
60 TABLET ORAL 3 TIMES DAILY
Qty: 90 TABLET | Refills: 1 | Status: SHIPPED | OUTPATIENT
Start: 2018-02-19 | End: 2018-04-09 | Stop reason: SDUPTHER

## 2018-02-19 RX ADMIN — PREDNISONE 30 MG: 20 TABLET ORAL at 08:02

## 2018-02-19 RX ADMIN — FUROSEMIDE 20 MG: 20 TABLET ORAL at 08:02

## 2018-02-19 RX ADMIN — IPRATROPIUM BROMIDE 0.5 MG: 0.5 SOLUTION RESPIRATORY (INHALATION) at 01:02

## 2018-02-19 RX ADMIN — LEVOTHYROXINE SODIUM 100 MCG: 100 TABLET ORAL at 08:02

## 2018-02-19 RX ADMIN — LEVALBUTEROL 1.25 MG: 1.25 SOLUTION, CONCENTRATE RESPIRATORY (INHALATION) at 01:02

## 2018-02-19 RX ADMIN — IPRATROPIUM BROMIDE 0.5 MG: 0.5 SOLUTION RESPIRATORY (INHALATION) at 07:02

## 2018-02-19 RX ADMIN — CLOPIDOGREL BISULFATE 75 MG: 75 TABLET ORAL at 08:02

## 2018-02-19 RX ADMIN — PANTOPRAZOLE SODIUM 40 MG: 40 TABLET, DELAYED RELEASE ORAL at 08:02

## 2018-02-19 RX ADMIN — AMLODIPINE BESYLATE 2.5 MG: 2.5 TABLET ORAL at 08:02

## 2018-02-19 RX ADMIN — PYRIDOSTIGMINE BROMIDE 60 MG: 60 TABLET ORAL at 03:02

## 2018-02-19 RX ADMIN — APIXABAN 2.5 MG: 2.5 TABLET, FILM COATED ORAL at 08:02

## 2018-02-19 RX ADMIN — PYRIDOSTIGMINE BROMIDE 60 MG: 60 TABLET ORAL at 05:02

## 2018-02-19 RX ADMIN — LEVALBUTEROL 1.25 MG: 1.25 SOLUTION, CONCENTRATE RESPIRATORY (INHALATION) at 07:02

## 2018-02-19 RX ADMIN — PNEUMOCOCCAL 13-VALENT CONJUGATE VACCINE 0.5 ML: 2.2; 2.2; 2.2; 2.2; 2.2; 4.4; 2.2; 2.2; 2.2; 2.2; 2.2; 2.2; 2.2 INJECTION, SUSPENSION INTRAMUSCULAR at 03:02

## 2018-02-19 RX ADMIN — METOPROLOL SUCCINATE 200 MG: 50 TABLET, EXTENDED RELEASE ORAL at 08:02

## 2018-02-19 NOTE — PT/OT/SLP DISCHARGE
Physical Therapy Discharge Summary    Name: Stephany Skinner  MRN: [id number]   Principal Problem: Debility     Patient Discharged from acute Physical Therapy on 2018.  Please refer to prior PT noted date on 2018 for functional status.     Assessment:     Goals partially met.    Objective:     GOALS:    Physical Therapy Goals     Not on file          Multidisciplinary Problems (Resolved)        Problem: Physical Therapy Goal    Goal Priority Disciplines Outcome Goal Variances Interventions   Physical Therapy Goal   (Resolved)     PT/OT, PT Outcome(s) achieved     Description:  Goals to be met by: 2/15/2018     Patient will increase functional independence with mobility by performin. Supine to sit with Stand-by Assistance -- NOT MET  2. Sit to supine with Stand-by Assistance -- NOT MET  3. Rolling to Left and Right with Stand-by Assistance.-- GOAL MET  4. Sit to stand transfer with Stand-by Assistance, with RW -- NOT MET  5. Bed to chair transfer with Stand-by Assistance using Rolling Walker -- NOT MET  6. Gait  x 50 feet with Stand-by Assistance using Rolling Walker.  -- NOT MET  7. Ascend/descend 3 stairs with bilateral Handrails Minimal Assistance  -- NOT MET           Problem: Physical Therapy Goal    Goal Priority Disciplines Outcome Goal Variances Interventions   Physical Therapy Goal   (Resolved)     PT/OT, PT  Error            Problem: Physical Therapy Goal    Goal Priority Disciplines Outcome Goal Variances Interventions   Physical Therapy Goal   (Resolved)     PT/OT, PT Outcome(s) achieved     Description:  Pt will increase participation in established goal progress -- GOAL MET           Problem: Physical Therapy Goal    Goal Priority Disciplines Outcome Goal Variances Interventions   Physical Therapy Goal   (Resolved)     PT/OT, PT Outcome(s) achieved     Description:  Continue plan of care , prepare for discharge -- GOAL MET                    Reasons for Discontinuation of  Therapy Services  Transfer to alternate level of care.      Plan:     Patient Discharged to: Skilled Nursing Facility.    Jacki Rutherford, PT  2/19/2018

## 2018-02-19 NOTE — ASSESSMENT & PLAN NOTE
Place on skilled nursing 2/7/18 for prolonged hospitalization with complications, +deconditioned.  Needs to improve to baseline;  Goal 50 feet.   2/16/18 currently meeting goal of ambulating 50 feet but still unable to climb stairs; discussed Skilled NSG home until able to get 1st floor apt    D/c to [facility] today for further conditioning with PT

## 2018-02-19 NOTE — ASSESSMENT & PLAN NOTE
CO2 much better., tolerating BIPAP at night- will resume CPAP at home Down to home Rx O2 2LNC    + Neuro eval noting  mild ptosis and gaze restriction as well as hypophonic voice concerning for neuro-muscular cause of hypercapnia. Responding to steroids. Thank you Dr. Ortiz, MG panel negative, but still weaning steroids slowly. Will cont pyridostigmine until f/u with Dr Voss

## 2018-02-19 NOTE — ASSESSMENT & PLAN NOTE
Initially tx for fluid overload after missed lasix rx;   2-D Echocardiogram for evaluation of left ventricle systolic function or any valvular heart disease-EF 55-60%, stage 1 diastolic dysfunction, no significant valvular pathology.  2-2-18 when she demonstrated volume depletion/weight loss with subsequent worsening renal function.   Diuretics discontinued due to acute renal insuff.  Cr was trending up, required IV fluids; creat now improving. Off IVF  Cardiology  Dr. Patel following     This is improved and stable  Daily weights.  Monitor  I/Os   Na restriction <2g/d.  Renal function at baseline, resumed lasix 20mg po BID

## 2018-02-19 NOTE — SUBJECTIVE & OBJECTIVE
Interval note: pt doing well, but has not met goals with PT so will continue therapy at [facility]    Review of Systems   Constitutional: Negative for activity change, fatigue, fever and unexpected weight change.   HENT: Negative for congestion, ear pain, hearing loss, rhinorrhea and sore throat.    Eyes: Negative for pain, redness and visual disturbance.   Respiratory: Negative for cough, shortness of breath and wheezing.    Cardiovascular: Negative for chest pain, palpitations and leg swelling (chronic, baseline).   Gastrointestinal: Negative for abdominal pain, constipation, diarrhea, nausea and vomiting.   Genitourinary: Negative for dysuria, frequency and urgency.   Musculoskeletal: Negative for back pain, joint swelling and neck pain.   Skin: Negative for color change, rash and wound.   Neurological: Negative for dizziness, tremors, weakness, light-headedness and headaches.     Objective:     Vital Signs (Most Recent):  Temp: 97.1 °F (36.2 °C) (02/19/18 0942)  Pulse: 73 (02/19/18 0942)  Resp: 18 (02/19/18 0942)  BP: (!) 107/48 (02/19/18 0942)  SpO2: 96 % (02/19/18 0729) Vital Signs (24h Range):  Temp:  [96.7 °F (35.9 °C)-99.7 °F (37.6 °C)] 97.1 °F (36.2 °C)  Pulse:  [59-80] 73  Resp:  [16-20] 18  SpO2:  [96 %-98 %] 96 %  BP: (103-134)/(48-64) 107/48     Weight: 114.8 kg (253 lb 1.4 oz) (rd reviewed, no new)  Body mass index is 39.64 kg/m².  No intake or output data in the 24 hours ending 02/19/18 0954   Physical Exam   Constitutional: She is oriented to person, place, and time. She appears well-developed and well-nourished. No distress.   HENT:   Head: Normocephalic and atraumatic.   Right Ear: External ear normal.   Left Ear: External ear normal.   Eyes: Conjunctivae and EOM are normal. Pupils are equal, round, and reactive to light. Right eye exhibits no discharge. Left eye exhibits no discharge.   Neck: Neck supple. No tracheal deviation present.   Cardiovascular: Normal rate and regular rhythm.    No murmur  heard.  Pulmonary/Chest: Effort normal and breath sounds normal. No respiratory distress. She has no wheezes. She has no rales.   Abdominal: Soft. Bowel sounds are normal. She exhibits no distension. There is no tenderness.   Neurological: She is alert and oriented to person, place, and time. No cranial nerve deficit.   Skin: Skin is warm and dry.   Venous stasis dermatitis   Psychiatric: She has a normal mood and affect. Her behavior is normal.   Nursing note and vitals reviewed.      Significant Labs:   CBC:     Recent Labs  Lab 18  0455   WBC 10.16   HGB 10.5*   HCT 33.1*        CMP:     Recent Labs  Lab 18  0455      K 3.5   CL 97   CO2 35*      BUN 32*   CREATININE 0.9   CALCIUM 8.2*   ANIONGAP 9   EGFRNONAA 60     BNP     Recent Labs  Lab 18  0622   BNP 69                Lab Results   Component Value Date     TSH 0.371 (L) 2018   T4 1.10  MG panel negative  procalcitonin 0.39     Recent Labs  Lab 18  1306   TROPONINI 0.018      resp viral panel negative        Significant Imagin/6/18 CXR- There are bilateral patchy opacities which are worse at the right lung base when compared to previous performed 3 days earlier. There is no pneumothorax. The remainder of the examination is unchanged.    2/3 CXR Mild decrease in right upper lobe and right perihilar infiltrate. Mild cardiomegaly. Atherosclerosis.     2/2 CXR The lungs are underexpanded.  There are chronic lung markings seen bilaterally with pulmonary vascular congestion and patchy opacities throughout both lungs.  This could be related to pulmonary edema although superimposed infiltrate in the right upper lobe is not excluded.  The heart is enlarged.  Calcified atheromatous disease affects the aorta.  Age-appropriate degenerative changes affect the skeleton.      CTA lungs 1.  No CT evidence of central pulmonary embolus.  Severely limited study due to use motion artifact.  2.  Bilateral groundglass  opacities and minimal dependent subsegmental atelectasis.     1/31 CT head 1.  No CT evidence of an acute intracranial abnormality with limitations due to motion artifact.  2.  Mild atrophy and minimal small vessel ischemic changes of the periventricular white matter.     Echo Echo 1/18: EF 60%, stage 1 diastolic dysfunction, no significant valvular pathology.      EKG a fib

## 2018-02-19 NOTE — PROGRESS NOTES
Staff Handoff  Report received from Poonam RN and patient assessed per flowsheet. Lying in bed with company at side. 2L N/C in use. Bipap at bedside for bedtime use. Afib on telemetry. Generalized weakness and incont at times. Lower ext edema. Instructed to call for needs/asst.    Resident Handoff

## 2018-02-19 NOTE — PLAN OF CARE
Problem: Patient Care Overview  Goal: Plan of Care Review  Outcome: Outcome(s) achieved Date Met: 02/19/18  Pt & family aware of plan of care. No questions or concerns at this time.    Problem: Fall Risk (Adult)  Goal: Absence of Falls  Patient will demonstrate the desired outcomes by discharge/transition of care.   Outcome: Outcome(s) achieved Date Met: 02/19/18  Pt free of falls/incidents. Fall precautions maintained.

## 2018-02-19 NOTE — DISCHARGE SUMMARY
[facility] Hospital Medicine  Discharge Summary      Patient Name: Stephany Skinner  MRN: [id number]  Admission Date: 2/7/2018  Hospital Length of Stay: 12 days  Discharge Date and Time:  02/19/2018 2:49 PM  Attending Physician: Morris Jain MD   Discharging Provider: Edna Mooney MD  Primary Care Provider: Pipo Flowers MD      HPI:   84 yo WF initially admitted with with CHF after missing several doses of lasix; Hospital course complicated by  pneumonia / acute resp failure and neuromuscular changes, possible Myasthenia gravis, confirmation labs pending. Neuro noted some mild ptosis and gaze restriction as well as hypophonic voice concerning for neuro-muscular cause of hypercapnia.  Responding to steroids.   Overdiuresed and developed PUJA-improving.   On bipap- tolerated all night. Prednisone taper initiated per neuro recs. Currently has been weaned to home dose O2- 2L [place],  CO2 and confusion better.  placed on skilled nursing 2-7-18 Ambulating with PT- ambulated 15ft yesterday - goal is 50; transfer training also performed.    * No surgery found *      Hospital Course:   84 YO WF maintained on skilled since 2-7-18  for debility. Goal ambulation 50 feet. (has 10 steps/stairs at home)  Renal fx is much better after recent over-diuresing. Cr 1.3 today. Will need to monitor closely to avoid fluid volume overload.   Feeling ok this am.       2/12/18  Progressing well with PT. Walked 40 ft, goal 50ft. Lantus started due to steroid induced hyperglycemia. She reports burning on the tongue that started yesterday.     2/14/18  Pt progressing well with Pt; ambulated to lobby over the weekend.   However, She has not done any step walking yet and has 10 stairs at home.   She is feeling much better. Did not wear bipap much last night. Does not use home bipap but uses CPAP at home.  Does wear oxygen 2lnc.   Encouraged to use CPAP tonight and will work on step walking.   She notes mask does  "not fit her right and aggravates her- [facility]; will consult for help with fitting  Addendum* pt unable to climb stairs and scared;  discussed with fly will try to get moved to downstairs apt. Due to debility; also CM discussed advising against driving in future    2/16/18 Has met goal of 50 feet but now having problem of getting up stairs at home apt. Lower floor currently not available. [facility]G [facility] until able to get floor apt/ possibly on Monday d/c to [facility].  Up in chair. Feeling well. Only recent c/o LE- which is chornic; wound care suggests 6" ACE wrap; unable to fit SCDs.    2/17/18 she was planned to go home to house but unable to climb stairs. She has decided to go to [place] to complete her therapy. This is planned for today. She is actually off her O2 and pox > 90.    2/19/18  MG was negative but will cont to wean steroids slowly. Decrease by 10mg every Thursday. Insulin will be weaned with steroids as she was not on this prior to admission. Cont pyridostigmine until f/u with Dr Ortiz    She is doing well today. Ready for d/c to nursing home, no complaints. VSS/afebrile. Labs yesterday stable     Consults:   Consults         Status Ordering Provider     Inpatient consult to Pulmonology  Once     Provider:  Nicolas Watson MD    Acknowledged PILAR LIU     Inpatient consult to Registered Dietitian/Nutritionist  Once     Provider:  (Not yet assigned)    Completed SEAN PROCTOR          * Debility      Place on skilled nursing 2/7/18 for prolonged hospitalization with complications, +deconditioned.  Needs to improve to baseline;  Goal 50 feet.   2/16/18 currently meeting goal of ambulating 50 feet but still unable to climb stairs; discussed Skilled NSG home until able to get 1st floor apt    [facility] for further conditioning with PT        Physical deconditioning    Continue with PT on skilled- ^^ activity as tolerated- get back to [facility] for further management        Acute on chronic congestive heart failure    Initially tx for fluid overload after missed lasix rx;   2-D Echocardiogram for evaluation of left ventricle systolic function or any valvular heart disease-EF 55-60%, stage 1 diastolic dysfunction, no significant valvular pathology.  2-2-18 when she demonstrated volume depletion/weight loss with subsequent worsening renal function.   Diuretics discontinued due to acute renal insuff.  Cr was trending up, required IV fluids; creat now improving. Off IVF  Cardiology  Dr. Patel following     This is improved and stable  Daily weights.  Monitor  I/Os   Na restriction <2g/d.  Renal function at baseline, resumed lasix 20mg po BID           PUJA (acute kidney injury)      bp a little low, will not restart losartan on d/c, consider restarting outpt        Community acquired pneumonia of right upper lobe of lung    Completed levofloxacin- day #5/5 today (d/c 2/10/18); continue Nebs - Bipap          Myasthenia gravis, adult form      Neuro eval document mild ptosis and gaze restriction as well as hypophonic voice concerning for neuro-muscular cause of hypercapnia. MG labs negative. Responding to steroids; follow neuro recs for taper Prednisone 40mg daily for a week (started 2/7). Then would need taper to Prednisone   30mg daily for a week, then 20mg (start on 2/22/18) until next seeing Neurology outpatient post d/c unless worsening symptoms again.    Dr. Ortiz following    Even with negative labs had good steroid response and will continue with taper. Will see neuro outpatient for further work up        Acute respiratory failure with hypoxia and hypercarbia    CO2 much better., tolerating BIPAP at night- will resume CPAP at home Down to home Rx O2 2LNC    + Neuro eval noting  mild ptosis and gaze restriction as well as hypophonic voice concerning for neuro-muscular cause of hypercapnia. Responding to steroids. Thank you   Diana, MG panel negative, but still weaning steroids slowly. Will cont pyridostigmine until f/u with Dr Voss        Acute on chronic diastolic congestive heart failure    Stable with current Rx  BP controlled  Lasix 20mg PO BID            Final Active Diagnoses:    Diagnosis Date Noted POA    PRINCIPAL PROBLEM:  Debility [R53.81] 02/07/2018 Yes    Physical deconditioning [R53.81]  Yes    Acute on chronic congestive heart failure [I50.9] 02/07/2018 Yes    PUJA (acute kidney injury) [N17.9] 02/05/2018 Yes    Community acquired pneumonia of right upper lobe of lung [J18.1] 02/05/2018 Yes    Myasthenia gravis, adult form [G70.00] 02/03/2018 Yes    Acute respiratory failure with hypoxia and hypercarbia [J96.01, J96.02] 01/31/2018 Yes    Acute on chronic diastolic congestive heart failure [I50.33] 01/30/2018 Yes      Problems Resolved During this Admission:    Diagnosis Date Noted Date Resolved POA       Discharged Condition: good    Disposition: Home or Self Care    Follow Up:  Follow-up Information     Pipo Flowers MD.    Specialty:  Family Medicine  Why:  outpatient services  Contact information:  [address] [place] [address]  [phone]             Robby Ortiz MD In 3 weeks.    Specialty:  Neurology  Contact information:  [address]  [place] [address]  [phone]             Pipo Flowers MD In 2 weeks.    Specialty:  Family Medicine  Contact information:  [address] [place] [address]  [phone]                 Patient Instructions:   No discharge procedures on file.    Significant Diagnostic Studies: Labs:   CMP   Recent Labs  Lab 02/18/18  0455      K 3.5   CL 97   CO2 35*      BUN 32*   CREATININE 0.9   CALCIUM 8.2*   ANIONGAP 9   ESTGFRAFRICA >60   EGFRNONAA 60   , CBC   Recent Labs  Lab 02/18/18  0455   WBC 10.16   HGB 10.5*   HCT 33.1*       and All labs within the past 24 hours have been reviewed    Pending Diagnostic Studies:      None         Medications:  Reconciled Home Medications:   Current Discharge Medication List      START taking these medications    Details   apixaban 2.5 mg Tab Take 1 tablet (2.5 mg total) by mouth 2 (two) times daily.  Qty: 60 tablet, Refills: 0      insulin detemir U-100 (LEVEMIR FLEXTOUCH) 100 unit/mL (3 mL) SubQ InPn pen Inject 25 Units into the skin every evening.  Qty: 15 mL, Refills: 0      predniSONE (DELTASONE) 10 MG tablet Take 30mg by mouth daily for next 2 days then on Thursday 2/22 decrease to 20mg by mouth daily for 1 week then Thursday 3/1 decrease to 10 mg by mouth daily  Qty: 30 tablet, Refills: 0      pyridostigmine (MESTINON) 60 mg Tab Take 1 tablet (60 mg total) by mouth 3 (three) times daily.  Qty: 90 tablet, Refills: 1         CONTINUE these medications which have NOT CHANGED    Details   amlodipine (NORVASC) 2.5 MG tablet Take 1 tablet (2.5 mg total) by mouth once daily.  Qty: 30 tablet, Refills: 5    Associated Diagnoses: Essential hypertension      atorvastatin (LIPITOR) 10 MG tablet Take 1 tablet (10 mg total) by mouth every evening.  Qty: 30 tablet, Refills: 5    Associated Diagnoses: Hyperlipidemia, unspecified hyperlipidemia type      clopidogrel (PLAVIX) 75 mg tablet Take 1 tablet (75 mg total) by mouth once daily.  Qty: 30 tablet, Refills: 5    Associated Diagnoses: ASCVD (arteriosclerotic cardiovascular disease)      cyanocobalamin (VITAMIN B-12) 100 MCG tablet Take 100 mcg by mouth once daily.      furosemide (LASIX) 40 MG tablet Take 1 tablet (40 mg total) by mouth once daily.  Qty: 30 tablet, Refills: 5    Associated Diagnoses: Essential hypertension      levothyroxine (SYNTHROID) 100 MCG tablet TAKE ONE TABLET BY MOUTH EVERY DAY  Qty: 30 tablet, Refills: 4    Associated Diagnoses: Hypothyroidism due to acquired atrophy of thyroid      metoprolol succinate (TOPROL-XL) 200 MG 24 hr tablet Take 1 tablet (200 mg total) by mouth once daily.  Qty: 30 tablet, Refills: 5     Associated Diagnoses: Essential hypertension      omega-3 acid ethyl esters (LOVAZA) 1 gram capsule Take 2 capsules (2 g total) by mouth 2 (two) times daily.  Qty: 120 capsule, Refills: 5    Associated Diagnoses: Hyperlipidemia, unspecified hyperlipidemia type      potassium chloride SA (K-DUR,KLOR-CON) 20 MEQ tablet Take 1 tablet (20 mEq total) by mouth once daily.  Qty: 30 tablet, Refills: 5    Associated Diagnoses: Essential hypertension         STOP taking these medications       losartan (COZAAR) 100 MG tablet Comments:   Reason for Stopping:         meloxicam (MOBIC) 7.5 MG tablet Comments:   Reason for Stopping:         nitroGLYCERIN (NITROSTAT) 0.4 MG SL tablet Comments:   Reason for Stopping:         ranitidine (ZANTAC) 150 MG tablet Comments:   Reason for Stopping:         VESICARE 10 mg tablet Comments:   Reason for Stopping:               Indwelling Lines/Drains at time of discharge:   Lines/Drains/Airways          No matching active lines, drains, or airways          Time spent on the discharge of patient: 40 minutes  Patient was seen and examined on the date of discharge and determined to be suitable for discharge.         Edna Mooney MD  Department of Hospital Medicine  [facility]

## 2018-02-19 NOTE — ASSESSMENT & PLAN NOTE
Resolved- resume future losartan- monitor BP , renal fx  bp a little low, will not restart on d/c, consider restarting outpt

## 2018-02-19 NOTE — ASSESSMENT & PLAN NOTE
Continue with PT on skilled- ^^ activity as tolerated- get back to baseling  [place] for further management

## 2018-02-19 NOTE — PLAN OF CARE
Problem: Patient Care Overview  Goal: Plan of Care Review  Outcome: Ongoing (interventions implemented as appropriate)  Patient had a good night. Slept well on Bipap. No changes, Afib on telemetry. POC reviewed and patient instructed to call for needs/asst.

## 2018-02-19 NOTE — PROGRESS NOTES
[facility] Medicine  Progress Note    Patient Name: Stephany Skinner  MRN: [id number]  Patient Class: IP- Swing   Admission Date: 2/7/2018  Length of Stay: 12 days  Attending Physician: Morris Jain MD  Primary Care Provider: Pipo Flowers MD        Subjective:     Principal Problem:Debility    HPI:  84 yo WF initially admitted with with CHF after missing several doses of lasix; Hospital course complicated by  pneumonia / acute resp failure and neuromuscular changes, possible Myasthenia gravis, confirmation labs pending. Neuro noted some mild ptosis and gaze restriction as well as hypophonic voice concerning for neuro-muscular cause of hypercapnia.  Responding to steroids.   Overdiuresed and developed PUJA-improving.   On bipap- tolerated all night. Prednisone taper initiated per neuro recs. Currently has been weaned to home dose O2- 2L [place],  CO2 and confusion better.  placed on skilled nursing 2-7-18 Ambulating with PT- ambulated 15ft yesterday - goal is 50; transfer training also performed.    Hospital Course:  84 YO WF maintained on skilled since 2-7-18  for debility. Goal ambulation 50 feet. (has 10 steps/stairs at home)  Renal fx is much better after recent over-diuresing. Cr 1.3 today. Will need to monitor closely to avoid fluid volume overload.   Feeling ok this am.       2/12/18  Progressing well with PT. Walked 40 ft, goal 50ft. Lantus started due to steroid induced hyperglycemia. She reports burning on the tongue that started yesterday.     2/14/18  Pt progressing well with Pt; ambulated to lobby over the weekend.   However, She has not done any step walking yet and has 10 stairs at home.   She is feeling much better. Did not wear bipap much last night. Does not use home bipap but uses CPAP at home.  Does wear oxygen 2lnc.   Encouraged to use CPAP tonight and will work on step walking.   She notes mask does not fit her right and aggravates her- [facility]; will  "consult for help with fitting  Addendum* pt unable to climb stairs and scared;  discussed with fly will try to get moved to downstairs apt. Due to debility; also CM discussed advising against driving in future    2/16/18 Has met goal of 50 feet but now having problem of getting up stairs at home apt. Lower floor currently not available. Skilled NSG at [facility] until able to get floor apt/ possibly on Monday d/c to [facility].  Up in chair. Feeling well. Only recent c/o LE- which is chornic; wound care suggests 6" ACE wrap; unable to fit SCDs.    2/17/18 she was planned to go home to house but unable to climb stairs. She has decided to go to [place] to complete her therapy. This is planned for today. She is actually off her O2 and pox > 90.    2/19/18  MG was negative but will cont to wean steroids slowly. Decrease by 10mg every Thursday. Insulin will be weaned with steroids as she was not on this prior to admission. Cont pyridostigmine until f/u with Dr Ortiz    She is doing well today. Ready for d/c to nursing home, no complaints. VSS/afebrile. Labs yesterday stable    Interval note: pt doing well, but has not met goals with PT so will continue therapy at [place]    Review of Systems   Constitutional: Negative for activity change, fatigue, fever and unexpected weight change.   HENT: Negative for congestion, ear pain, hearing loss, rhinorrhea and sore throat.    Eyes: Negative for pain, redness and visual disturbance.   Respiratory: Negative for cough, shortness of breath and wheezing.    Cardiovascular: Negative for chest pain, palpitations and leg swelling (chronic, baseline).   Gastrointestinal: Negative for abdominal pain, constipation, diarrhea, nausea and vomiting.   Genitourinary: Negative for dysuria, frequency and urgency.   Musculoskeletal: Negative for back pain, joint swelling and neck pain.   Skin: Negative for color change, rash and wound.   Neurological: Negative for dizziness, " tremors, weakness, light-headedness and headaches.     Objective:     Vital Signs (Most Recent):  Temp: 97.1 °F (36.2 °C) (02/19/18 0942)  Pulse: 73 (02/19/18 0942)  Resp: 18 (02/19/18 0942)  BP: (!) 107/48 (02/19/18 0942)  SpO2: 96 % (02/19/18 0729) Vital Signs (24h Range):  Temp:  [96.7 °F (35.9 °C)-99.7 °F (37.6 °C)] 97.1 °F (36.2 °C)  Pulse:  [59-80] 73  Resp:  [16-20] 18  SpO2:  [96 %-98 %] 96 %  BP: (103-134)/(48-64) 107/48     Weight: 114.8 kg (253 lb 1.4 oz) (rd reviewed, no new)  Body mass index is 39.64 kg/m².  No intake or output data in the 24 hours ending 02/19/18 0954   Physical Exam   Constitutional: She is oriented to person, place, and time. She appears well-developed and well-nourished. No distress.   HENT:   Head: Normocephalic and atraumatic.   Right Ear: External ear normal.   Left Ear: External ear normal.   Eyes: Conjunctivae and EOM are normal. Pupils are equal, round, and reactive to light. Right eye exhibits no discharge. Left eye exhibits no discharge.   Neck: Neck supple. No tracheal deviation present.   Cardiovascular: Normal rate and regular rhythm.    No murmur heard.  Pulmonary/Chest: Effort normal and breath sounds normal. No respiratory distress. She has no wheezes. She has no rales.   Abdominal: Soft. Bowel sounds are normal. She exhibits no distension. There is no tenderness.   Neurological: She is alert and oriented to person, place, and time. No cranial nerve deficit.   Skin: Skin is warm and dry.   Venous stasis dermatitis   Psychiatric: She has a normal mood and affect. Her behavior is normal.   Nursing note and vitals reviewed.      Significant Labs:   CBC:     Recent Labs  Lab 02/18/18  0455   WBC 10.16   HGB 10.5*   HCT 33.1*        CMP:     Recent Labs  Lab 02/18/18  0455      K 3.5   CL 97   CO2 35*      BUN 32*   CREATININE 0.9   CALCIUM 8.2*   ANIONGAP 9   EGFRNONAA 60     BNP     Recent Labs  Lab 02/03/18  0622   BNP 69                Lab Results    Component Value Date     TSH 0.371 (L) 2018   T4 1.10  MG panel negative  procalcitonin 0.39     Recent Labs  Lab 18  1306   TROPONINI 0.018      resp viral panel negative        Significant Imagin/6/18 CXR- There are bilateral patchy opacities which are worse at the right lung base when compared to previous performed 3 days earlier. There is no pneumothorax. The remainder of the examination is unchanged.    2/3 CXR Mild decrease in right upper lobe and right perihilar infiltrate. Mild cardiomegaly. Atherosclerosis.      CXR The lungs are underexpanded.  There are chronic lung markings seen bilaterally with pulmonary vascular congestion and patchy opacities throughout both lungs.  This could be related to pulmonary edema although superimposed infiltrate in the right upper lobe is not excluded.  The heart is enlarged.  Calcified atheromatous disease affects the aorta.  Age-appropriate degenerative changes affect the skeleton.      CTA lungs 1.  No CT evidence of central pulmonary embolus.  Severely limited study due to use motion artifact.  2.  Bilateral groundglass opacities and minimal dependent subsegmental atelectasis.      CT head 1.  No CT evidence of an acute intracranial abnormality with limitations due to motion artifact.  2.  Mild atrophy and minimal small vessel ischemic changes of the periventricular white matter.     Echo Echo : EF 60%, stage 1 diastolic dysfunction, no significant valvular pathology.      EKG a fib       Assessment/Plan:      * Debility      Place on skilled nursing 18 for prolonged hospitalization with complications, +deconditioned.  Needs to improve to baseline;  Goal 50 feet.   18 currently meeting goal of ambulating 50 feet but still unable to climb stairs; discussed Skilled NSG home until able to get 1st floor apt    [facility] today for further conditioning with PT        Physical deconditioning    Continue with PT on skilled- ^^  activity as tolerated- get back to [facility]  [employer] for further management        Acute on chronic congestive heart failure    Initially tx for fluid overload after missed lasix rx;   2-D Echocardiogram for evaluation of left ventricle systolic function or any valvular heart disease-EF 55-60%, stage 1 diastolic dysfunction, no significant valvular pathology.  2-2-18 when she demonstrated volume depletion/weight loss with subsequent worsening renal function.   Diuretics discontinued due to acute renal insuff.  Cr was trending up, required IV fluids; creat now improving. Off IVF  Cardiology  Dr. Patel following     This is improved and stable  Daily weights.  Monitor  I/Os   Na restriction <2g/d.  Renal function at baseline, resumed lasix 20mg po BID           PUJA (acute kidney injury)    Resolved- resume future losartan- monitor BP , renal fx  bp a little low, will not restart on d/c, consider restarting outpt        Community acquired pneumonia of right upper lobe of lung    Completed levofloxacin- day #5/5 today (d/c 2/10/18); continue Nebs - Bipap          Myasthenia gravis, adult form      Neuro eval document mild ptosis and gaze restriction as well as hypophonic voice concerning for neuro-muscular cause of hypercapnia. MG labs negative. Responding to steroids; follow neuro recs for taper Prednisone 40mg daily for a week (started 2/7). Then would need taper to Prednisone   30mg daily for a week, then 20mg (start on 2/22/18) until next seeing Neurology outpatient post d/c unless worsening symptoms again.    Dr. Ortiz following    Even with negative labs had good steroid response and will continue with taper. Will see neuro outpatient for further work up        Acute respiratory failure with hypoxia and hypercarbia    CO2 much better., tolerating BIPAP at night- will resume CPAP at home Down to home Rx O2 2LNC    + Neuro eval noting  mild ptosis and gaze restriction as well as hypophonic voice concerning for  neuro-muscular cause of hypercapnia. Responding to steroids. Thank you Dr. Ortiz, MG panel negative, but still weaning steroids slowly. Will cont pyridostigmine until f/u with Dr Voss        Acute on chronic diastolic congestive heart failure    Stable with current Rx          VTE Risk Mitigation         Ordered     Place MALKA hose  Until discontinued      02/12/18 0916     apixaban tablet 2.5 mg  2 times daily     Route:  Oral        02/07/18 1740     Medium Risk of VTE  Once      02/07/18 1740     Place sequential compression device  Until discontinued      02/07/18 1740              Edna Mooney MD  Department of Hospital Medicine   [facility]

## 2018-02-19 NOTE — ASSESSMENT & PLAN NOTE
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 MG labs negative. Responding to steroids; follow neuro recs for taper Prednisone 40mg daily for a week (started 2/7). Then would need taper to Prednisone   30mg daily for a week, then 20mg (start on 2/22/18) until next seeing Neurology outpatient post d/c unless worsening symptoms again.    Dr. Ortiz following    Even with negative labs had good steroid response and will continue with taper. Will see neuro outpatient for further work up

## 2018-02-19 NOTE — ASSESSMENT & PLAN NOTE
Continue with PT on skilled- ^^ activity as tolerated- get back to [facility]  [place] for further management

## 2018-02-19 NOTE — PLAN OF CARE
02/18/18 1114   Medicare Message   Important Message from Medicare regarding Discharge Appeal Rights Given to patient/caregiver;Explained to patient/caregiver;Signed/date by patient/caregiver  (PT NOMNOC signed.)   Date IMM was signed 02/18/18   Time IMM was signed 1117

## 2018-02-20 NOTE — PHYSICIAN QUERY
"PT Name: Stephany Skinner  MR #: [id number]    Physician Query Form - Pneumonia Clarification     CDS/: KULWINDER Noriega, RN, CCDS               Contact information: [phone]  This form is a permanent document in the medical record.    Query Date:  February 20, 2018    By submitting this query, we are merely seeking further clarification of documentation. Please utilize your independent clinical judgment when addressing the question(s) below.    The Medical record contains the following:   Indicators   Supporting Clinical Findings Location in Medical Record   X "Pneumonia" documented Community acquired pneumonia of right upper lobe of lung   DC Summary: 2/19    Chest X-Ray:      PaO2    PaCO2     O2 sat      Cultures      X Treatment  Completed levofloxacin- day #5/5 today (d/c 2/10/18);    DC Summary: 2/19    Supplemental O2      Other         Provider, please specify type of Community acquired pneumonia of right upper lobe of lung .    [  ] Bacterial Pneumonia (Specify organism): _______Strep pneumonia_______________  [  ] Bacterial, Gram Negative organism Pneumonia  [  ] Viral Pneumonia (Specify virus): _______________________  [  ] Fungal Pneumonia (Specify organism): _______________________  [  ] Aspiration Pneumonia  [  ] Other type of pneumonia (please specify): ______________________________________  [  ] Clinically undetermined    Please document in your progress notes daily for the duration of treatment, until resolved, and include in your discharge summary.    .                                                                                    "

## 2018-02-20 NOTE — PLAN OF CARE
02/20/18 1404   Final Note   Assessment Type Final Discharge Note   Discharge Disposition SNF  ([facility])   What phone number can be called within the next 1-3 days to see how you are doing after discharge? [phone]   Hospital Follow Up  Appt(s) scheduled? Yes   Discharge plans and expectations educations in teach back method with documentation complete? Yes   Right Care Referral Info   Post Acute Recommendation SNF / Sub-Acute Rehab   Referral Type SNF   Facility Name [address]    [place], [place]

## 2018-02-20 NOTE — TELEPHONE ENCOUNTER
Please contact patient's nurse at the [facility] to set up a follow up visit with me in the next 3-4 weeks. Patient was recently d/c'ed from [facility] to [place].    Note for MD: MG panel normal, but steroids are being tapered slowly given her response to treatment. Consider EMG depending on her clinical picture at follow up.

## 2018-02-20 NOTE — PHYSICIAN QUERY
PT Name: Stephany Skinner  MR #: [id number]  Physician Query Form - Renal Clarification     CDS/: KULWINDER Noriega, RN, CCDS               Contact information: [phone]    This form is a permanent document in the medical record.     QueryDate: February 20, 2018    By submitting this query, we are merely seeking further clarification of documentation. Please utilize your independent clinical judgment when addressing the question(s) below.    The Medical record contains the following:   Indicator Supporting Clinical Findings Location in Medical Record    Kidney (Renal) Insufficiency     X Kidney (Renal) Failure / Injury Overdiuresed and developed PUJA-improving     PUJA (acute kidney injury)    DC Summary: 2/19            Nephrotoxic Agents      BUN/Creatinine GFR BUN: 109, 70, 43, 30  CR: 1.5, 1.2, 1.1, 0.9  GFR: 32, 42, 47, 60   Lab: 2/7, 2/10, 2/13, 2/16    Urine: Casts         Eosinophils      Dehydration      Nausea/Vomiting      Dialysis/CRRT     X Treatment: Dialy  BMP today.Diuretics stopped.   Renal Fx improving with  IVF 2/5 at 100cc/hr;  feel her elevated Cr due to over diuresis  Hold losartan in acute injury; resume in future once stable   Progress Note: 2/8    Other:          Provider, please specify the diagnosis associated with above clinical findings.    [ ] Acute Kidney Failure/Injury with Acute Cortical Necrosis  [ ] Acute Kidney Failure/Injury with Medullary Necrosis  [ ] Acute Kidney Failure/Injury with Tubular Necrosis  [ ] Other Acute Kidney Failure/Injury (please specify): ____________  [ ] Unspecified Acute Kidney Failure/Injury  [ ] Acute Nephritic Syndrome  [X ] Acute Renal Insufficiency  [ ] Other (please specify): _______________________________  [ ] Clinically Undetermined    Please document in your progress notes daily for the duration of treatment, until resolved, and include in your discharge summary.

## 2018-02-21 NOTE — TELEPHONE ENCOUNTER
Left message for Lisset so we can set up follow up for patient, she is in charge of scheduling the patients.

## 2018-04-04 ENCOUNTER — TELEPHONE (OUTPATIENT)
Dept: NEUROLOGY | Facility: CLINIC | Age: 83
End: 2018-04-04

## 2018-04-04 ENCOUNTER — OFFICE VISIT (OUTPATIENT)
Dept: NEUROLOGY | Facility: CLINIC | Age: 83
End: 2018-04-04
Payer: MEDICARE

## 2018-04-04 VITALS
DIASTOLIC BLOOD PRESSURE: 76 MMHG | HEIGHT: 68 IN | HEART RATE: 72 BPM | RESPIRATION RATE: 16 BRPM | SYSTOLIC BLOOD PRESSURE: 122 MMHG

## 2018-04-04 DIAGNOSIS — G70.00 MYASTHENIA GRAVIS, ADULT FORM: Primary | ICD-10-CM

## 2018-04-04 DIAGNOSIS — I48.20 CHRONIC ATRIAL FIBRILLATION: ICD-10-CM

## 2018-04-04 DIAGNOSIS — J96.01 ACUTE RESPIRATORY FAILURE WITH HYPOXIA AND HYPERCARBIA: ICD-10-CM

## 2018-04-04 DIAGNOSIS — J96.02 ACUTE RESPIRATORY FAILURE WITH HYPOXIA AND HYPERCARBIA: ICD-10-CM

## 2018-04-04 DIAGNOSIS — N17.9 AKI (ACUTE KIDNEY INJURY): ICD-10-CM

## 2018-04-04 PROCEDURE — 99214 OFFICE O/P EST MOD 30 MIN: CPT | Mod: PBBFAC | Performed by: NURSE PRACTITIONER

## 2018-04-04 PROCEDURE — 99999 PR PBB SHADOW E&M-EST. PATIENT-LVL IV: CPT | Mod: PBBFAC,,, | Performed by: NURSE PRACTITIONER

## 2018-04-04 PROCEDURE — 99999 PR STA SHADOW: CPT | Mod: PBBFAC,,, | Performed by: NURSE PRACTITIONER

## 2018-04-04 PROCEDURE — 99214 OFFICE O/P EST MOD 30 MIN: CPT | Mod: S$PBB | Performed by: NURSE PRACTITIONER

## 2018-04-04 RX ORDER — METOPROLOL SUCCINATE 100 MG/1
100 TABLET, EXTENDED RELEASE ORAL DAILY
COMMUNITY
End: 2018-04-09 | Stop reason: SDUPTHER

## 2018-04-04 RX ORDER — POTASSIUM CHLORIDE 1500 MG/1
20 TABLET, EXTENDED RELEASE ORAL 2 TIMES DAILY
COMMUNITY
End: 2018-04-09 | Stop reason: SDUPTHER

## 2018-04-04 RX ORDER — LYSINE HCL 500 MG
2 TABLET ORAL DAILY
COMMUNITY

## 2018-04-04 NOTE — PROGRESS NOTES
HPI:  Stephany Skinenr is a 82 y.o. female with a few months of increased falls, some lumbar radicular pain.  Exam shows possible polyneuropathy in the feet. 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     Patient presents today for a hospital follow up for an admission on 1/30/2018 for acute respiratory failure with hypoxia and hypercapnia, with mild ptosis and gaze restriction and hypophonic voice. Myasthenia Gravis antibodies were negative at the time of admission; however, she did respond to steroids inpatient.     There has been no further ptosis, or visual complaints-no diplopia. No dysphagia or SOB. She is on supplemental O2 per NC.     She will be discharged by the [facility] soon with home health and PT. She will live with her sister.     Minor neuropathic complaints and occasional lumbar pain.     CTS complaints rare.     Review of Systems   Constitutional: Negative for fever.   HENT: Negative for hearing loss.    Eyes: Negative for double vision.   Respiratory: Negative for hemoptysis.    Cardiovascular: Negative for chest pain.   Gastrointestinal: Negative for blood in stool.   Genitourinary: Negative for hematuria.   Musculoskeletal: Negative for falls.   Skin: Negative for rash.   Neurological: Positive for weakness. Negative for seizures.   Psychiatric/Behavioral: The patient does not have insomnia.      Exam:  Gen Appearance, well developed/nourished in no apparent distress; on O2 NC  CV: 2+ distal pulses with no edema or swelling  Neuro:  MS: Awake, alert,Sustains attention. Recent/remote memory intact, Language is full to spontaneous speech/comprehension. Fund of Knowledge is full  CN: Optic discs are flat with normal vasculature, PERRL, Extraoccular movements and visual fields are full-no gaze restriction today. Normal  facial sensation and strength-no ptosis, Hearing symmetric, Tongue and Palate are midline and strong. Shoulder Shrug symmetric and strong.  Motor: Normal bulk, tone, no abnormal movements. 5/5 strength bilateral upper/lower extremities with 1+ reflexes in the arms, and are less in the legs and bilateral plantar response  Sensory: symmetric to light touch, pain, temp, and vibration/proprioception except decreased sensation in the legs. Romberg negative  Cerebellar: Finger-nose,Heal-shin, Rapid alternating movements intact  Gait: not done; seated in wheelchair today    Imagin/16 CT L spine: Multilevel degenerative changes of the lumbar spine without evidence for fracture or subluxation.  There is likely a component of mild neural foraminal narrowing and central canal stenosis at the L3-4 and L4-5 levels.  This could be better evaluated on a nonemergent basis with MRI.    2016 MRI L spine: Multilevel degenerative changes of the lumbar spine contributing to central canal stenosis and neural foraminal narrowing as detailed in the above report.    Edema-like signal about the posterior elements on the right at L5-S1 which can be a source of pain.    2016 EMG legs:   1. Mild Bilateral Carpal Tunnel Syndrome  2. 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 Bilateral Lumbar Radiculopathy best localizing from L4-5 on this study    Labs: 10/2016  glucose 136. Reviewed CMP, CBC TSh   SPEP normal  B12 low normal    Assessment/Plan: Stephany Skinner is a 83 y.o. female with a few months of increased falls, some lumbar radicular pain.  Exam shows possible polyneuropathy in the feet. 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 Admitted for ARF with hypercapnea, ptosis, and gaze restriction in 2018; response to steroids  suggestive of myasthenia, though antibodies are negative.     I recommend:   1. Refer to [facility] for EMG with repetitive stimulation to confirm diagnosis of myasthenia gravis, as antibodies were negative.   2. Continue Mestinon tid.   3. Continue Prednisone 10 mg qd. Check CBC and CMP now, and monthly. Consider steroid sparing therapy, such as Imuran versus Cellcept after EMG complete.   4. She will be d/c from the [facility] this week, and will have home PT.   4. Advised to go to ED with SOB. Advised on symptoms of myasthenic crisis.   5. No treatment needed for mild CTS symptoms-- can wear a brace if needed  6. Continue B12 for low normal B12 levels- check level at next visit.  She has some impaired fasting glucose which could be causing her neuropathy.  Reduce obesity and she is monitoring this with PCP- consider aggressive control suggested.   7. She has no pain for lumbar radiculopathy and has some occasional lower back pain related to stenosis which she treats with rest. She would defer surgical consult. PT helped reduce pain greatly as well as falls prior. Fall precautions reviewed-- use walker. No pain from neuropathy  8. Patient is taking both Plavix and Eliquis. Will clarify this with Cardiology and PCP, as this combination increases the risk of serious bleeding events. She has a history of A-fib, as well as CAD.  9. Note PUJA during 1/2018 admit, secondary to overdiuresis.     RTC 1 week after EMG with rep stim.   Will see Dr. Ortiz in August 2018

## 2018-04-04 NOTE — TELEPHONE ENCOUNTER
Please clarify with [facility] if patient is taking both Plavix and Eliquis, as this is dual therapy.

## 2018-04-04 NOTE — TELEPHONE ENCOUNTER
----- Message from Anne Proctor MA sent at 4/4/2018  1:33 PM CDT -----  Regarding: EMG w Rep Stem  Hello,  I called to schedule this patient with Dr Mata, or another movement disorders specialist, for an EMG with Repetative Stimulation. She is scheduled for 5/28/18 at 1:30 pm. Can you please tell me if this is scheduled as requested?    Thanks, Anne

## 2018-04-05 NOTE — TELEPHONE ENCOUNTER
Patient needs to see Cardiology to discuss whether she needs to continue on both Eliquis AND Plavix, and this increases her risk of bleeding events.     Plavix is prescribed by PCP, and Eliquis is prescribed by Cardiology.

## 2018-04-09 DIAGNOSIS — J44.9 CHRONIC OBSTRUCTIVE PULMONARY DISEASE, UNSPECIFIED COPD TYPE: ICD-10-CM

## 2018-04-09 DIAGNOSIS — E78.5 HYPERLIPIDEMIA, UNSPECIFIED HYPERLIPIDEMIA TYPE: ICD-10-CM

## 2018-04-09 DIAGNOSIS — I10 ESSENTIAL HYPERTENSION: ICD-10-CM

## 2018-04-09 DIAGNOSIS — E11.9 TYPE 2 DIABETES MELLITUS WITHOUT COMPLICATION, WITHOUT LONG-TERM CURRENT USE OF INSULIN: Primary | ICD-10-CM

## 2018-04-09 DIAGNOSIS — E53.8 B12 DEFICIENCY: ICD-10-CM

## 2018-04-09 DIAGNOSIS — I25.10 ASCVD (ARTERIOSCLEROTIC CARDIOVASCULAR DISEASE): ICD-10-CM

## 2018-04-09 DIAGNOSIS — G70.00 MYASTHENIA GRAVIS: ICD-10-CM

## 2018-04-09 DIAGNOSIS — E03.4 HYPOTHYROIDISM DUE TO ACQUIRED ATROPHY OF THYROID: ICD-10-CM

## 2018-04-09 RX ORDER — LYSINE HCL 500 MG
2 TABLET ORAL ONCE
Qty: 2 TABLET | Refills: 0 | Status: CANCELLED | COMMUNITY
Start: 2018-04-09 | End: 2018-04-09

## 2018-04-09 RX ORDER — PREDNISONE 10 MG/1
TABLET ORAL
Qty: 30 TABLET | Refills: 0 | Status: CANCELLED | OUTPATIENT
Start: 2018-04-09

## 2018-04-09 NOTE — TELEPHONE ENCOUNTER
----- Message from Camille Montiel sent at 2018  2:55 PM CDT -----  Contact: Che[facility]  Stephany Skinner  MRN: [id number]  : 1935  PCP: Pipo Flowers  Home Phone      [phone]  Work Phone      Not on file.  Mobile          Not on file.      MESSAGE:   Please call [facility] to discuss physical therapy assesment.     Nilo  [phone]

## 2018-04-09 NOTE — TELEPHONE ENCOUNTER
Spoke with Nilo, gave verbal for pt to have PT through [facility].    Received fax, pt was discharged from nursing home with only 2 days of medications.   Pt needs refills, she has appt on 4/17/18

## 2018-04-10 RX ORDER — LEVOTHYROXINE SODIUM 100 UG/1
100 TABLET ORAL DAILY
Qty: 30 TABLET | Refills: 5 | Status: SHIPPED | OUTPATIENT
Start: 2018-04-10 | End: 2018-08-28 | Stop reason: SDUPTHER

## 2018-04-10 RX ORDER — OMEGA-3-ACID ETHYL ESTERS 1 G/1
2 CAPSULE, LIQUID FILLED ORAL 2 TIMES DAILY
Qty: 120 CAPSULE | Refills: 5 | Status: SHIPPED | OUTPATIENT
Start: 2018-04-10 | End: 2018-08-28 | Stop reason: SDUPTHER

## 2018-04-10 RX ORDER — AMLODIPINE BESYLATE 2.5 MG/1
2.5 TABLET ORAL DAILY
Qty: 30 TABLET | Refills: 5 | Status: SHIPPED | OUTPATIENT
Start: 2018-04-10 | End: 2018-08-28 | Stop reason: SDUPTHER

## 2018-04-10 RX ORDER — FUROSEMIDE 40 MG/1
40 TABLET ORAL DAILY
Qty: 30 TABLET | Refills: 5 | Status: SHIPPED | OUTPATIENT
Start: 2018-04-10 | End: 2018-08-28 | Stop reason: SDUPTHER

## 2018-04-10 RX ORDER — PNV NO.95/FERROUS FUM/FOLIC AC 28MG-0.8MG
100 TABLET ORAL DAILY
COMMUNITY
Start: 2018-04-10 | End: 2019-02-26 | Stop reason: SDUPTHER

## 2018-04-10 RX ORDER — CLOPIDOGREL BISULFATE 75 MG/1
75 TABLET ORAL DAILY
Qty: 30 TABLET | Refills: 5 | Status: SHIPPED | OUTPATIENT
Start: 2018-04-10 | End: 2018-08-20

## 2018-04-10 RX ORDER — POTASSIUM CHLORIDE 1500 MG/1
20 TABLET, EXTENDED RELEASE ORAL 2 TIMES DAILY
Qty: 60 TABLET | Refills: 5 | Status: SHIPPED | OUTPATIENT
Start: 2018-04-10 | End: 2018-04-17 | Stop reason: SDUPTHER

## 2018-04-10 RX ORDER — PYRIDOSTIGMINE BROMIDE 60 MG/1
60 TABLET ORAL 3 TIMES DAILY
Qty: 90 TABLET | Refills: 1 | Status: SHIPPED | OUTPATIENT
Start: 2018-04-10 | End: 2018-06-11 | Stop reason: SDUPTHER

## 2018-04-10 RX ORDER — ATORVASTATIN CALCIUM 10 MG/1
10 TABLET, FILM COATED ORAL NIGHTLY
Qty: 30 TABLET | Refills: 5 | Status: SHIPPED | OUTPATIENT
Start: 2018-04-10 | End: 2018-08-28 | Stop reason: SDUPTHER

## 2018-04-10 RX ORDER — METOPROLOL SUCCINATE 100 MG/1
100 TABLET, EXTENDED RELEASE ORAL DAILY
Qty: 30 TABLET | Refills: 0 | Status: SHIPPED | OUTPATIENT
Start: 2018-04-10 | End: 2018-04-17 | Stop reason: SDUPTHER

## 2018-04-11 ENCOUNTER — TELEPHONE (OUTPATIENT)
Dept: FAMILY MEDICINE | Facility: CLINIC | Age: 83
End: 2018-04-11

## 2018-04-11 NOTE — TELEPHONE ENCOUNTER
----- Message from Blanca Roy sent at 2018  3:06 PM CDT -----  Contact: Celia/[facility]  Stephany Skinner  MRN: [id number]  : 1935  PCP: Pipo Flowers  Home Phone      [phone]  Work Phone      Not on file.  Mobile          Not on file.    MESSAGE:  Received an order for a nebulizer.  Would like to speak to nurse concerning this. Please call.    Phone: [phone]

## 2018-04-11 NOTE — TELEPHONE ENCOUNTER
Notified Celia with [facility] that patient has not been seen in office since 11/17. States nebulizer order was received from [facility]. States she will call [facility] to determine the next step.

## 2018-04-17 ENCOUNTER — OFFICE VISIT (OUTPATIENT)
Dept: FAMILY MEDICINE | Facility: CLINIC | Age: 83
End: 2018-04-17
Payer: MEDICARE

## 2018-04-17 VITALS
DIASTOLIC BLOOD PRESSURE: 70 MMHG | SYSTOLIC BLOOD PRESSURE: 114 MMHG | WEIGHT: 269.38 LBS | OXYGEN SATURATION: 97 % | HEART RATE: 78 BPM | HEIGHT: 68 IN | RESPIRATION RATE: 20 BRPM | BODY MASS INDEX: 40.83 KG/M2

## 2018-04-17 DIAGNOSIS — I50.9 CONGESTIVE HEART FAILURE, UNSPECIFIED CONGESTIVE HEART FAILURE CHRONICITY, UNSPECIFIED CONGESTIVE HEART FAILURE TYPE: ICD-10-CM

## 2018-04-17 DIAGNOSIS — Z79.4 TYPE 2 DIABETES MELLITUS WITH OTHER CIRCULATORY COMPLICATION, WITH LONG-TERM CURRENT USE OF INSULIN: ICD-10-CM

## 2018-04-17 DIAGNOSIS — I87.8 VENOUS STASIS OF LOWER EXTREMITY: ICD-10-CM

## 2018-04-17 DIAGNOSIS — I10 ESSENTIAL HYPERTENSION: ICD-10-CM

## 2018-04-17 DIAGNOSIS — E03.4 HYPOTHYROIDISM DUE TO ACQUIRED ATROPHY OF THYROID: ICD-10-CM

## 2018-04-17 DIAGNOSIS — R30.0 DYSURIA: Primary | ICD-10-CM

## 2018-04-17 DIAGNOSIS — J44.9 CHRONIC OBSTRUCTIVE PULMONARY DISEASE, UNSPECIFIED COPD TYPE: ICD-10-CM

## 2018-04-17 DIAGNOSIS — E11.59 TYPE 2 DIABETES MELLITUS WITH OTHER CIRCULATORY COMPLICATION, WITH LONG-TERM CURRENT USE OF INSULIN: ICD-10-CM

## 2018-04-17 DIAGNOSIS — G70.00 MYASTHENIA GRAVIS, ADULT FORM: ICD-10-CM

## 2018-04-17 DIAGNOSIS — I25.10 ASCVD (ARTERIOSCLEROTIC CARDIOVASCULAR DISEASE): ICD-10-CM

## 2018-04-17 PROCEDURE — 99214 OFFICE O/P EST MOD 30 MIN: CPT | Mod: S$PBB | Performed by: FAMILY MEDICINE

## 2018-04-17 PROCEDURE — 99213 OFFICE O/P EST LOW 20 MIN: CPT | Mod: PBBFAC | Performed by: FAMILY MEDICINE

## 2018-04-17 PROCEDURE — 81001 URINALYSIS AUTO W/SCOPE: CPT | Mod: PBBFAC | Performed by: FAMILY MEDICINE

## 2018-04-17 PROCEDURE — 99999 PR PBB SHADOW E&M-EST. PATIENT-LVL III: CPT | Mod: PBBFAC,,, | Performed by: FAMILY MEDICINE

## 2018-04-17 PROCEDURE — 99999 POCT URINE SEDIMENT EXAM: CPT | Mod: PBBFAC,,, | Performed by: FAMILY MEDICINE

## 2018-04-17 PROCEDURE — 99999 POCT URINALYSIS, DIPSTICK OR TABLET REAGENT, AUTOMATED, WITH MICROSCOP: CPT | Mod: PBBFAC,,, | Performed by: FAMILY MEDICINE

## 2018-04-17 PROCEDURE — 99999 PR STA SHADOW: CPT | Mod: PBBFAC,,, | Performed by: FAMILY MEDICINE

## 2018-04-17 PROCEDURE — 81000 URINALYSIS NONAUTO W/SCOPE: CPT | Mod: PBBFAC | Performed by: FAMILY MEDICINE

## 2018-04-17 RX ORDER — POTASSIUM CHLORIDE 1500 MG/1
20 TABLET, EXTENDED RELEASE ORAL 2 TIMES DAILY
Qty: 60 TABLET | Refills: 5 | Status: SHIPPED | OUTPATIENT
Start: 2018-04-17 | End: 2018-07-31

## 2018-04-17 RX ORDER — POTASSIUM CHLORIDE 20 MEQ/1
TABLET, EXTENDED RELEASE ORAL
COMMUNITY
Start: 2018-04-10 | End: 2018-04-17 | Stop reason: SDUPTHER

## 2018-04-17 RX ORDER — PEN NEEDLE, DIABETIC 29 G X1/2"
1 NEEDLE, DISPOSABLE MISCELLANEOUS DAILY
Qty: 100 EACH | Refills: 11 | Status: SHIPPED | OUTPATIENT
Start: 2018-04-17 | End: 2019-12-09

## 2018-04-17 RX ORDER — PREDNISONE 10 MG/1
10 TABLET ORAL DAILY
Qty: 30 TABLET | Refills: 0 | Status: SHIPPED | OUTPATIENT
Start: 2018-04-17 | End: 2018-05-15 | Stop reason: SDUPTHER

## 2018-04-17 RX ORDER — POTASSIUM CHLORIDE 20 MEQ/1
20 TABLET, EXTENDED RELEASE ORAL 2 TIMES DAILY
Qty: 60 TABLET | Refills: 5 | Status: SHIPPED | OUTPATIENT
Start: 2018-04-17 | End: 2018-04-18

## 2018-04-17 RX ORDER — METOPROLOL SUCCINATE 100 MG/1
100 TABLET, EXTENDED RELEASE ORAL DAILY
Qty: 30 TABLET | Refills: 0 | Status: SHIPPED | OUTPATIENT
Start: 2018-04-17 | End: 2018-06-11 | Stop reason: SDUPTHER

## 2018-04-17 NOTE — PROGRESS NOTES
Subjective:       Patient ID: Stephany Skinner is a 83 y.o. female.    Chief Complaint: Follow-up    Patient here for checkup.  Since her last office visit she was sent to wound care for an ulcer on her toe.  She was admitted to the hospital for congestive heart failure and pneumonia.   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  Needs refill on prednisone.  Since starting the prednisone her strength has improved remarkably well.  She now only needs a cane for ambulation.  Her urine smells very strongly.  She's having some hesitancy.  She is unable to give a urine sample in the office so she will collect one at home.  She was sent home from the nursing home.  She takes insulin for her diabetes.  He did not give her glucometer.  She takes 25 units of Lantus daily.  Patient has COPD.  She was taking neb treatments in the nursing home.  They did not send her home with a nebulizer.  This seems to be in the works.  Patient needs blood work today.  She needs refills on her medications.  She takes Synthroid for her hypothyroidism.  She tolerates this well.  She denies having symptoms such as heat or cold intolerance.  Her weight is stable.  She denies any swelling in her thyroid.  She tolerates her blood pressure medication as well as not having side effects such as chronic cough or dizziness.    Patient is taking her statin every day for hyperlipidemia.  She is feeling well on the medication.  She denies side effects such as myalgias.        Review of Systems   Constitutional: Negative for activity change, chills, fatigue, fever and unexpected weight change.   HENT: Negative for sore throat and trouble swallowing.    Respiratory: Negative for cough, chest tightness and shortness of breath.    Cardiovascular: Negative for chest pain and leg swelling.   Gastrointestinal: Negative for abdominal pain.   Endocrine: Negative for cold intolerance and heat intolerance.   Genitourinary: Negative for difficulty urinating.   Musculoskeletal:  Negative for back pain and joint swelling.   Skin: Positive for wound. Negative for rash.   Neurological: Negative for numbness.   Hematological: Negative for adenopathy.   Psychiatric/Behavioral: Negative for decreased concentration.       Objective:      Vitals:    04/17/18 1508   BP: 114/70   Pulse: 78   Resp: 20     Physical Exam   Constitutional: She is oriented to person, place, and time. She appears well-developed and well-nourished.   Neck: Neck supple.   Cardiovascular: Normal rate, regular rhythm, normal heart sounds and intact distal pulses.  Exam reveals no gallop and no friction rub.    No murmur heard.  Pulmonary/Chest: Effort normal. She has wheezes. She has rales.   Neurological: She is alert and oriented to person, place, and time. A sensory deficit (left foot) is present. No cranial nerve deficit.   Psychiatric: She has a normal mood and affect. Her behavior is normal. Judgment and thought content normal.       Assessment:       1. Dysuria    2. Essential hypertension    3. Hypothyroidism due to acquired atrophy of thyroid    4. Myasthenia gravis, adult form    5. Venous stasis of lower extremity    6. Type 2 diabetes mellitus with other circulatory complication, with long-term current use of insulin    7. ASCVD (arteriosclerotic cardiovascular disease)    8. Congestive heart failure, unspecified congestive heart failure chronicity, unspecified congestive heart failure type    9. Chronic obstructive pulmonary disease, unspecified COPD type        Plan:   Stephany CARRILLO was seen today for sore on toe.    Diagnoses and all orders for this visit:    Pressure ulcer of toe of left foot, stage 3  This seems to be healed    Essential hypertension  Continue amlodipine 2.5 mg, Lasix 40 mg, Toprol- mg,  Continue potassium    Hypothyroidism due to acquired atrophy of thyroid  Continue Synthroid 100 µg by mouth daily    Hyperlipidemia, unspecified hyperlipidemia type  Continue fish oil  Continue Lipitor 10  mg    Myasthenia gravis  Continue prednisone 10 mg daily  Continue Mestinon  Keep appointment with neurology    Dysuria  Check urinalysis as soon as possible    COPD  Continue DuoNeb treatments when the nebulizer on his    Congestive heart failure/atrial fibrillation  Continue Plavix and Eliquis    Return to clinic in 2 weeks

## 2018-04-18 ENCOUNTER — CLINICAL SUPPORT (OUTPATIENT)
Dept: FAMILY MEDICINE | Facility: CLINIC | Age: 83
End: 2018-04-18
Payer: MEDICARE

## 2018-04-18 ENCOUNTER — TELEPHONE (OUTPATIENT)
Dept: FAMILY MEDICINE | Facility: CLINIC | Age: 83
End: 2018-04-18

## 2018-04-18 DIAGNOSIS — R30.0 DYSURIA: Primary | ICD-10-CM

## 2018-04-18 DIAGNOSIS — N30.01 ACUTE CYSTITIS WITH HEMATURIA: Primary | ICD-10-CM

## 2018-04-18 DIAGNOSIS — N30.01 ACUTE CYSTITIS WITH HEMATURIA: ICD-10-CM

## 2018-04-18 LAB
BACTERIA SPEC CULT: ABNORMAL
BILIRUB SERPL-MCNC: NORMAL MG/DL
BLOOD URINE, POC: NORMAL
CASTS: ABNORMAL
COLOR, POC UA: NORMAL
CRYSTALS: ABNORMAL
GLUCOSE UR QL STRIP: NORMAL
KETONES UR QL STRIP: NORMAL
LEUKOCYTE ESTERASE URINE, POC: NORMAL
NITRITE, POC UA: NORMAL
PH, POC UA: 9
PROTEIN, POC: NORMAL
RBC CELLS COUNTED: ABNORMAL
SPECIFIC GRAVITY, POC UA: 1
UROBILINOGEN, POC UA: NORMAL
WHITE BLOOD CELLS: 20

## 2018-04-18 PROCEDURE — 87086 URINE CULTURE/COLONY COUNT: CPT

## 2018-04-18 PROCEDURE — 87077 CULTURE AEROBIC IDENTIFY: CPT

## 2018-04-18 PROCEDURE — 87088 URINE BACTERIA CULTURE: CPT

## 2018-04-18 PROCEDURE — 87186 SC STD MICRODIL/AGAR DIL: CPT

## 2018-04-18 RX ORDER — SULFAMETHOXAZOLE AND TRIMETHOPRIM 800; 160 MG/1; MG/1
1 TABLET ORAL 2 TIMES DAILY
Qty: 14 TABLET | Refills: 0 | Status: SHIPPED | OUTPATIENT
Start: 2018-04-18 | End: 2018-04-19 | Stop reason: ALTCHOICE

## 2018-04-18 NOTE — TELEPHONE ENCOUNTER
----- Message from Camille Montiel sent at 2018  5:23 PM CDT -----  Contact: Sister Shira Skinner  MRN: [id number]  : 1935  PCP: Pipo Flowers  Home Phone      [phone]  Work Phone      Not on file.  Mobile          [phone]      MESSAGE:   Pt requesting refill or new Rx.   Is this a refill or new RX:  New  RX name and strength: Replacement for sulfamethoxazole-trimethoprim 800-160mg (BACTRIM DS) 800-160 mg Tab  Last office visit: 18  Is this a 30-day or 90-day RX:  N/A  Pharmacy name and location:  [facility]  Comments:  Patient is allergic to sulfur so she will need something different called in. Please advise.    Phone:  Shira [phone]

## 2018-04-18 NOTE — PROGRESS NOTES
Tell patient that urine has less of bacteria and white blood cells.  Start Bactrim DS.  Culture has been ordered.

## 2018-04-18 NOTE — TELEPHONE ENCOUNTER
Spoke with nurse from [facility], pt was seen on 4/17/18.    Pt has potassium cloride (K-tab) and potassium cloride SA ( K-Dur) both sent in-    Do you want her on both K+?    Pt was on k-tab in nursing home. Please advise, thank you      Nilo [phone]

## 2018-04-19 ENCOUNTER — TELEPHONE (OUTPATIENT)
Dept: FAMILY MEDICINE | Facility: CLINIC | Age: 83
End: 2018-04-19

## 2018-04-19 RX ORDER — NITROFURANTOIN (MACROCRYSTALS) 100 MG/1
100 CAPSULE ORAL EVERY 12 HOURS
Qty: 14 CAPSULE | Refills: 0 | Status: SHIPPED | OUTPATIENT
Start: 2018-04-19 | End: 2018-04-29

## 2018-04-19 NOTE — TELEPHONE ENCOUNTER
----- Message from Rodney Rodriguez sent at 2018 11:38 AM CDT -----  Contact: Lacey @ [employer]  Stephany Skinner  MRN: [id number]  : 1935  PCP: Pipo Flowers  Home Phone      [phone]  Work Phone      Not on file.  Mobile          [phone]      MESSAGE: prescribed a medication yesterday she thinks she may have reaction to -- she thinks she is allergic to Sulfa -- please advise    Call Lacey @ [phone]    PCP: [facility]

## 2018-04-20 LAB — BACTERIA UR CULT: NORMAL

## 2018-04-20 NOTE — TELEPHONE ENCOUNTER
Spoke to Lacey, pt hadn't taken the abx yet because she THOUGHT she had a sulfa allergy. Notified Lacey that pt has no drug allergies to sulfa. Lacey stated she will speak to pt, verbalized understanding.

## 2018-04-23 RX ORDER — CEFUROXIME AXETIL 500 MG/1
500 TABLET ORAL 2 TIMES DAILY
Qty: 14 TABLET | Refills: 0 | Status: SHIPPED | OUTPATIENT
Start: 2018-04-23 | End: 2018-05-01

## 2018-04-23 NOTE — PROGRESS NOTES
Tell patient that urine culture shows that the bacteria is resistant to Macrodantin.  Stop Macrodantin.  Take Ceftin 500 mg twice daily for 1 week

## 2018-04-27 ENCOUNTER — TELEPHONE (OUTPATIENT)
Dept: FAMILY MEDICINE | Facility: CLINIC | Age: 83
End: 2018-04-27

## 2018-04-27 NOTE — TELEPHONE ENCOUNTER
----- Message from Rodney Rodriguez sent at 2018 11:38 AM CDT -----  Contact: Lacey @ [employer]  Stephany Skinner  MRN: [id number]  : 1935  PCP: Pipo Flowers  Home Phone      [phone]  Work Phone      Not on file.  Mobile          [phone]      MESSAGE: received Rx for diabetic supplies -- must state type of meter -- patient has One Touch Ultra -- fax to [employer] @ [phone]    Call Lacey @ [phone]    PCP: [facility]

## 2018-04-27 NOTE — TELEPHONE ENCOUNTER
Lacey with [employer]--MESSAGE: received Rx for diabetic supplies -- must state type of meter -- patient has One Touch Ultra -- fax to [employer] @ [phone]

## 2018-05-01 ENCOUNTER — OFFICE VISIT (OUTPATIENT)
Dept: FAMILY MEDICINE | Facility: CLINIC | Age: 83
End: 2018-05-01
Payer: MEDICARE

## 2018-05-01 VITALS
WEIGHT: 264.5 LBS | DIASTOLIC BLOOD PRESSURE: 62 MMHG | HEIGHT: 68 IN | SYSTOLIC BLOOD PRESSURE: 98 MMHG | RESPIRATION RATE: 18 BRPM | BODY MASS INDEX: 40.09 KG/M2 | HEART RATE: 92 BPM

## 2018-05-01 DIAGNOSIS — Z79.4 TYPE 2 DIABETES MELLITUS WITH OTHER CIRCULATORY COMPLICATION, WITH LONG-TERM CURRENT USE OF INSULIN: ICD-10-CM

## 2018-05-01 DIAGNOSIS — I48.20 CHRONIC ATRIAL FIBRILLATION: Primary | ICD-10-CM

## 2018-05-01 DIAGNOSIS — G70.00 MYASTHENIA GRAVIS, ADULT FORM: ICD-10-CM

## 2018-05-01 DIAGNOSIS — I10 ESSENTIAL HYPERTENSION: ICD-10-CM

## 2018-05-01 DIAGNOSIS — E11.59 TYPE 2 DIABETES MELLITUS WITH OTHER CIRCULATORY COMPLICATION, WITH LONG-TERM CURRENT USE OF INSULIN: ICD-10-CM

## 2018-05-01 DIAGNOSIS — I50.32 CHRONIC DIASTOLIC CONGESTIVE HEART FAILURE: ICD-10-CM

## 2018-05-01 DIAGNOSIS — G47.33 OSA (OBSTRUCTIVE SLEEP APNEA): ICD-10-CM

## 2018-05-01 DIAGNOSIS — E78.5 HYPERLIPIDEMIA, UNSPECIFIED HYPERLIPIDEMIA TYPE: ICD-10-CM

## 2018-05-01 DIAGNOSIS — E03.4 HYPOTHYROIDISM DUE TO ACQUIRED ATROPHY OF THYROID: ICD-10-CM

## 2018-05-01 PROBLEM — J96.02 ACUTE RESPIRATORY FAILURE WITH HYPOXIA AND HYPERCARBIA: Status: RESOLVED | Noted: 2018-01-31 | Resolved: 2018-05-01

## 2018-05-01 PROBLEM — I50.33 ACUTE ON CHRONIC DIASTOLIC CONGESTIVE HEART FAILURE: Status: RESOLVED | Noted: 2018-01-30 | Resolved: 2018-05-01

## 2018-05-01 PROBLEM — J18.9 COMMUNITY ACQUIRED PNEUMONIA OF RIGHT UPPER LOBE OF LUNG: Status: RESOLVED | Noted: 2018-02-05 | Resolved: 2018-05-01

## 2018-05-01 PROBLEM — L89.893: Status: RESOLVED | Noted: 2017-12-01 | Resolved: 2018-05-01

## 2018-05-01 PROBLEM — N17.9 AKI (ACUTE KIDNEY INJURY): Status: RESOLVED | Noted: 2018-02-05 | Resolved: 2018-05-01

## 2018-05-01 PROBLEM — L89.892: Status: RESOLVED | Noted: 2017-12-01 | Resolved: 2018-05-01

## 2018-05-01 PROBLEM — J96.01 ACUTE RESPIRATORY FAILURE WITH HYPOXIA AND HYPERCARBIA: Status: RESOLVED | Noted: 2018-01-31 | Resolved: 2018-05-01

## 2018-05-01 LAB
ANION GAP SERPL CALC-SCNC: 11 MMOL/L
BUN SERPL-MCNC: 27 MG/DL
CALCIUM SERPL-MCNC: 9.6 MG/DL
CHLORIDE SERPL-SCNC: 101 MMOL/L
CO2 SERPL-SCNC: 26 MMOL/L
CREAT SERPL-MCNC: 1.1 MG/DL
EST. GFR  (AFRICAN AMERICAN): 54 ML/MIN/1.73 M^2
EST. GFR  (NON AFRICAN AMERICAN): 47 ML/MIN/1.73 M^2
GLUCOSE SERPL-MCNC: 134 MG/DL
POTASSIUM SERPL-SCNC: 4.8 MMOL/L
SODIUM SERPL-SCNC: 138 MMOL/L
TSH SERPL DL<=0.005 MIU/L-ACNC: 1.18 UIU/ML

## 2018-05-01 PROCEDURE — 99214 OFFICE O/P EST MOD 30 MIN: CPT | Mod: S$PBB | Performed by: FAMILY MEDICINE

## 2018-05-01 PROCEDURE — 36415 COLL VENOUS BLD VENIPUNCTURE: CPT | Mod: PBBFAC

## 2018-05-01 PROCEDURE — 83036 HEMOGLOBIN GLYCOSYLATED A1C: CPT

## 2018-05-01 PROCEDURE — 80048 BASIC METABOLIC PNL TOTAL CA: CPT

## 2018-05-01 PROCEDURE — 99213 OFFICE O/P EST LOW 20 MIN: CPT | Mod: PBBFAC | Performed by: FAMILY MEDICINE

## 2018-05-01 PROCEDURE — 99999 PR PBB SHADOW E&M-EST. PATIENT-LVL III: CPT | Mod: PBBFAC,,, | Performed by: FAMILY MEDICINE

## 2018-05-01 PROCEDURE — 99999 PR STA SHADOW: CPT | Mod: PBBFAC,,, | Performed by: FAMILY MEDICINE

## 2018-05-01 PROCEDURE — 84443 ASSAY THYROID STIM HORMONE: CPT

## 2018-05-01 NOTE — PROGRESS NOTES
Subjective:       Patient ID: Stephany Skinner is a 83 y.o. female.    Chief Complaint: Follow-up (2 week follow up)    Patient here for checkup.  She was admitted to the hospital for congestive heart failure and pneumonia.   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  Currently on prednisone.  Since starting the prednisone her strength has improved remarkably well.  She now only needs a cane for ambulation.  She is currently pyridostigmine 60 mg daily.  She is doing better with CHF.  She walked 100 feet in the office and 02 sat stayed above 93%.  She uses O2 at home as needed but it seems be less and less..  Urinates well.  Patient has type 2 diabetes and seems be doing well.  She takes insulin for her diabetes.  He did not give her glucometer.  She takes 25 units of Lantus daily.  Patient has COPD.  She was taking neb treatments in the nursing home as needed.  They did not send her home with a nebulizer.  This seems to be in the works.  She takes Synthroid for her hypothyroidism.  She tolerates this well.  She denies having symptoms such as heat or cold intolerance.  Her weight is stable.  She denies any swelling in her thyroid.  She tolerates her blood pressure medication as well as not having side effects such as chronic cough or dizziness.    Patient is taking her statin every day for hyperlipidemia.  She is feeling well on the medication.  She denies side effects such as myalgias.        Review of Systems   Constitutional: Negative for activity change, chills, fatigue, fever and unexpected weight change.   HENT: Negative for sore throat and trouble swallowing.    Respiratory: Negative for cough, chest tightness and shortness of breath.    Cardiovascular: Negative for chest pain and leg swelling.   Gastrointestinal: Negative for abdominal pain.   Endocrine: Negative for cold intolerance and heat intolerance.   Genitourinary: Negative for difficulty urinating.   Musculoskeletal: Negative for back pain and joint swelling.    Skin: Positive for wound. Negative for rash.   Neurological: Negative for numbness.   Hematological: Negative for adenopathy.   Psychiatric/Behavioral: Negative for decreased concentration.       Objective:      Vitals:    05/01/18 1258   BP: 98/62   Pulse: 92   Resp: 18     Physical Exam   Constitutional: She is oriented to person, place, and time. She appears well-developed and well-nourished.   Neck: Neck supple.   Cardiovascular: Normal rate, regular rhythm, normal heart sounds and intact distal pulses.  Exam reveals no gallop and no friction rub.    No murmur heard.  Pulmonary/Chest: Effort normal. She has wheezes. She has rales.   Neurological: She is alert and oriented to person, place, and time. A sensory deficit (left foot) is present. No cranial nerve deficit.   Psychiatric: She has a normal mood and affect. Her behavior is normal. Judgment and thought content normal.       Lab Results   Component Value Date    TSH 0.371 (L) 02/02/2018     Lab Results   Component Value Date    LDLCALC 67.6 10/11/2016     BMP  Lab Results   Component Value Date     02/18/2018    K 3.5 02/18/2018    CL 97 02/18/2018    CO2 35 (H) 02/18/2018    BUN 32 (H) 02/18/2018    CREATININE 0.9 02/18/2018    CALCIUM 8.2 (L) 02/18/2018    ANIONGAP 9 02/18/2018    ESTGFRAFRICA >60 02/18/2018    EGFRNONAA 60 02/18/2018   No results found for: LABA1C, HGBA1C      Assessment:       1. Chronic atrial fibrillation    2. Essential hypertension    3. Hyperlipidemia, unspecified hyperlipidemia type    4. Hypothyroidism due to acquired atrophy of thyroid    5. Myasthenia gravis, adult form    6. JOY (obstructive sleep apnea)    7. Chronic diastolic congestive heart failure    8. Type 2 diabetes mellitus with other circulatory complication, with long-term current use of insulin        Plan:   Stephany CARRILLO was seen today for sore on toe.    Diagnoses and all orders for this visit:    Type 2 diabetes  Continue Lantus at current dose  Check  hemoglobin A1c    Pressure ulcer of toe of left foot, stage 3  This seems to be healed    Essential hypertension/CHF  Continue amlodipine 2.5 mg, Lasix 40 mg, Toprol- mg,  Continue potassium    Hypothyroidism due to acquired atrophy of thyroid  Continue Synthroid 100 µg by mouth daily    Hyperlipidemia, unspecified hyperlipidemia type  Continue fish oil  Continue Lipitor 10 mg    Myasthenia gravis  Continue prednisone 10 mg daily  Continue Mestinon  Keep appointment with neurology    COPD  Continue DuoNeb treatments when the nebulizer on his    Congestive heart failure/atrial fibrillation  Continue Plavix and Eliquis    Return to clinic in 1 month

## 2018-05-02 LAB
ESTIMATED AVG GLUCOSE: 126 MG/DL
HBA1C MFR BLD HPLC: 6 %

## 2018-05-07 ENCOUNTER — TELEPHONE (OUTPATIENT)
Dept: FAMILY MEDICINE | Facility: CLINIC | Age: 83
End: 2018-05-07

## 2018-05-07 DIAGNOSIS — Z79.4 TYPE 2 DIABETES MELLITUS WITH OTHER CIRCULATORY COMPLICATION, WITH LONG-TERM CURRENT USE OF INSULIN: Primary | ICD-10-CM

## 2018-05-07 DIAGNOSIS — E11.59 TYPE 2 DIABETES MELLITUS WITH OTHER CIRCULATORY COMPLICATION, WITH LONG-TERM CURRENT USE OF INSULIN: Primary | ICD-10-CM

## 2018-05-07 RX ORDER — INSULIN PUMP SYRINGE, 3 ML
1 EACH MISCELLANEOUS 2 TIMES DAILY
COMMUNITY
End: 2018-05-07 | Stop reason: SDUPTHER

## 2018-05-07 RX ORDER — INSULIN PUMP SYRINGE, 3 ML
1 EACH MISCELLANEOUS 2 TIMES DAILY
Qty: 1 EACH | Refills: 5 | Status: SHIPPED | OUTPATIENT
Start: 2018-05-07 | End: 2024-03-13

## 2018-05-07 NOTE — TELEPHONE ENCOUNTER
----- Message from Rodney Rodriguez sent at 2018  9:11 AM CDT -----  Contact: Lacey @ [employer]  Stephany Skinner  MRN: [id number]  : 1935  PCP: Pipo Flowers  Home Phone      [phone]  Work Phone      Not on file.  Mobile          [phone]      MESSAGE: needs Rx from prescription pad for Glucose meter -- must state type of meter - diagnosis - & times testing daily -- fax to [phone]    Call Lacey @ [phone]    PCP: Lionel

## 2018-05-07 NOTE — TELEPHONE ENCOUNTER
----- Message from Blanca Roy sent at 2018  9:55 AM CDT -----  Contact: Amy/ [employer]  Stephany Skinner  MRN: [id number]  : 1935  PCP: Pipo Flowers  Home Phone      [phone]  Work Phone      Not on file.  Mobile          [phone]    MESSAGE:   Calling to check status and to see if we received the fax for diabetic testing supplies that they sent over. Please call.    Phone: [phone]

## 2018-05-07 NOTE — TELEPHONE ENCOUNTER
Spoke with Lacey, pt needs one touch ultra meter and supplies    [facility] pharmacist told her that pt needs a rx     Thank you

## 2018-05-11 ENCOUNTER — TELEPHONE (OUTPATIENT)
Dept: FAMILY MEDICINE | Facility: CLINIC | Age: 83
End: 2018-05-11

## 2018-05-11 NOTE — TELEPHONE ENCOUNTER
----- Message from Blanca Roy sent at 2018  2:51 PM CDT -----  Contact: Linus/Cheikh  Stephany Skinner  MRN: [id number]  : 1935  PCP: Pipo Flowers  Home Phone      [phone]  Work Phone      Not on file.  Mobile          [phone]    MESSAGE:   Requesting Diflucan to be called in for yeast infection. Just finished a course of antibiotics. Please call.    Pharmacy:  [facility] [place]    Phone: [phone]

## 2018-05-14 RX ORDER — FLUCONAZOLE 150 MG/1
150 TABLET ORAL ONCE
Qty: 1 TABLET | Refills: 1 | Status: SHIPPED | OUTPATIENT
Start: 2018-05-14 | End: 2018-05-14

## 2018-05-15 ENCOUNTER — TELEPHONE (OUTPATIENT)
Dept: FAMILY MEDICINE | Facility: CLINIC | Age: 83
End: 2018-05-15

## 2018-05-15 DIAGNOSIS — G70.00 MYASTHENIA GRAVIS, ADULT FORM: ICD-10-CM

## 2018-05-15 NOTE — TELEPHONE ENCOUNTER
----- Message from Camille Montiel sent at 5/15/2018 11:32 AM CDT -----  Contact: Cheikh  Stephany Skinner  MRN: [id number]  : 1935  PCP: Pipo Flowers  Home Phone      [phone]  Work Phone      Not on file.  Mobile          [phone]      MESSAGE:   Patient is discharged from speech therapy with all goals met.    Janiya  [phone]

## 2018-05-16 RX ORDER — PREDNISONE 10 MG/1
TABLET ORAL
Qty: 30 TABLET | Refills: 1 | Status: SHIPPED | OUTPATIENT
Start: 2018-05-16 | End: 2018-07-09 | Stop reason: SDUPTHER

## 2018-05-16 NOTE — TELEPHONE ENCOUNTER
----- Message from Camille Montiel sent at 2018 12:25 PM CDT -----  Contact: Jase ag/ [facility]  Stephany Skinner  MRN: [id number]  : 1935  PCP: Pipo Flowers  Home Phone      [phone]  Work Phone      Not on file.  Mobile          [phone]      MESSAGE:   [facility] will be sending a request for bloos sugar tools.    Canceling orders for home health aid as she can safely bathe alone. Please advise.    Linus  [phone]

## 2018-05-23 ENCOUNTER — TELEPHONE (OUTPATIENT)
Dept: FAMILY MEDICINE | Facility: CLINIC | Age: 83
End: 2018-05-23

## 2018-05-23 NOTE — TELEPHONE ENCOUNTER
----- Message from Camille Montiel sent at 2018  1:48 PM CDT -----  Contact: [facility]  Stephany Skinner  MRN: [id number]  : 1935  PCP: Pipo Flowesr  Home Phone      [phone]  Work Phone      Not on file.  Mobile          [phone]      MESSAGE:   [facility] keeps receiving faxes back declining the diabetic supply, but home health keeps calling them requesting the supply. Please give them a call to discuss.    Ref [id number]  [phone]

## 2018-05-23 NOTE — TELEPHONE ENCOUNTER
Spoke with Linus at [facility]. They did request blood work supplies from  Medical. They will refax form for supplies for Att: Alia Sweet

## 2018-05-23 NOTE — TELEPHONE ENCOUNTER
----- Message from Blanca Roy sent at 2018  1:50 PM CDT -----  Contact: Linus/Cheikh  Stephany Skinner  MRN: [id number]  : 1935  PCP: Pipo Flowers  Home Phone      [phone]  Work Phone      Not on file.  Mobile          [phone]    MESSAGE:   Would like to speak to nurse about paperwork that was sent to our office from [facility].   He is stating that paperwork keeps getting sent back incorrectly.  He would like to speak to someone about this so that this can be resolved.  Please call.    Phone: [phone]

## 2018-05-29 ENCOUNTER — OFFICE VISIT (OUTPATIENT)
Dept: FAMILY MEDICINE | Facility: CLINIC | Age: 83
End: 2018-05-29
Payer: MEDICARE

## 2018-05-29 VITALS
RESPIRATION RATE: 18 BRPM | DIASTOLIC BLOOD PRESSURE: 64 MMHG | BODY MASS INDEX: 38.95 KG/M2 | WEIGHT: 257 LBS | SYSTOLIC BLOOD PRESSURE: 111 MMHG | HEART RATE: 74 BPM | OXYGEN SATURATION: 93 % | HEIGHT: 68 IN

## 2018-05-29 DIAGNOSIS — I48.20 CHRONIC ATRIAL FIBRILLATION: Primary | ICD-10-CM

## 2018-05-29 DIAGNOSIS — I50.32 CHRONIC DIASTOLIC CONGESTIVE HEART FAILURE: ICD-10-CM

## 2018-05-29 DIAGNOSIS — E03.4 HYPOTHYROIDISM DUE TO ACQUIRED ATROPHY OF THYROID: ICD-10-CM

## 2018-05-29 DIAGNOSIS — E78.5 HYPERLIPIDEMIA, UNSPECIFIED HYPERLIPIDEMIA TYPE: ICD-10-CM

## 2018-05-29 DIAGNOSIS — E11.59 TYPE 2 DIABETES MELLITUS WITH OTHER CIRCULATORY COMPLICATION, WITH LONG-TERM CURRENT USE OF INSULIN: ICD-10-CM

## 2018-05-29 DIAGNOSIS — I10 ESSENTIAL HYPERTENSION: ICD-10-CM

## 2018-05-29 DIAGNOSIS — G47.33 OSA (OBSTRUCTIVE SLEEP APNEA): ICD-10-CM

## 2018-05-29 DIAGNOSIS — Z79.4 TYPE 2 DIABETES MELLITUS WITH OTHER CIRCULATORY COMPLICATION, WITH LONG-TERM CURRENT USE OF INSULIN: ICD-10-CM

## 2018-05-29 PROCEDURE — 99999 PR PBB SHADOW E&M-EST. PATIENT-LVL III: CPT | Mod: PBBFAC,,, | Performed by: FAMILY MEDICINE

## 2018-05-29 PROCEDURE — 99999 PR STA SHADOW: CPT | Mod: PBBFAC,,, | Performed by: FAMILY MEDICINE

## 2018-05-29 PROCEDURE — 99213 OFFICE O/P EST LOW 20 MIN: CPT | Mod: PBBFAC | Performed by: FAMILY MEDICINE

## 2018-05-29 PROCEDURE — 99214 OFFICE O/P EST MOD 30 MIN: CPT | Mod: S$PBB | Performed by: FAMILY MEDICINE

## 2018-05-29 RX ORDER — POTASSIUM CHLORIDE 20 MEQ/1
TABLET, EXTENDED RELEASE ORAL
COMMUNITY
Start: 2018-05-10 | End: 2018-05-29 | Stop reason: SDUPTHER

## 2018-05-29 NOTE — PROGRESS NOTES
Subjective:       Patient ID: Stephany Skinner is a 83 y.o. female.    Chief Complaint: Follow-up (6 month/ 4 week follow up)    Patient here for checkup.  She was admitted to the hospital for congestive heart failure and pneumonia.   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  Currently on prednisone.  Since starting the prednisone her strength has improved remarkably well.  She now only needs a cane for ambulation.  She is currently taking pyridostigmine 60 mg daily.  She is doing better with CHF.  She walked 100 feet in the office and 02 sat stayed above 93%.  She uses O2 at home as needed but it seems be less and less..  Urinates well.  Patient has type 2 diabetes and seems be doing well.  She takes insulin for her diabetes.  He did not give her glucometer.  She takes 25 units of Lantus daily.  Patient has COPD.  She was taking neb treatments in the nursing home as needed.  They did not send her home with a nebulizer.  This seems to be in the works.  She takes Synthroid for her hypothyroidism.  She tolerates this well.  She denies having symptoms such as heat or cold intolerance.  Her weight is stable.  She denies any swelling in her thyroid.  She tolerates her blood pressure medication as well as not having side effects such as chronic cough or dizziness.    Patient is taking her statin every day for hyperlipidemia.  She is feeling well on the medication.  She denies side effects such as myalgias.  Patient has obstructive sleep apnea.  She is very compliant on her CPAP.      Review of Systems   Constitutional: Negative for activity change, chills, fatigue, fever and unexpected weight change.   HENT: Negative for sore throat and trouble swallowing.    Respiratory: Negative for cough, chest tightness and shortness of breath.    Cardiovascular: Negative for chest pain and leg swelling.   Gastrointestinal: Negative for abdominal pain.   Endocrine: Negative for cold intolerance and heat intolerance.   Genitourinary: Negative for  difficulty urinating.   Musculoskeletal: Negative for back pain and joint swelling.   Skin: Positive for wound. Negative for rash.   Neurological: Negative for numbness.   Hematological: Negative for adenopathy.   Psychiatric/Behavioral: Negative for decreased concentration.       Objective:      Vitals:    05/29/18 1329   BP: 111/64   Pulse: 74   Resp: 18     Physical Exam   Constitutional: She is oriented to person, place, and time. She appears well-developed and well-nourished.   Neck: Neck supple.   Cardiovascular: Normal rate, regular rhythm, normal heart sounds and intact distal pulses.  Exam reveals no gallop and no friction rub.    No murmur heard.  Pulmonary/Chest: Effort normal. She has wheezes. She has rales.   Neurological: She is alert and oriented to person, place, and time. A sensory deficit (left foot) is present. No cranial nerve deficit.   Psychiatric: She has a normal mood and affect. Her behavior is normal. Judgment and thought content normal.       Lab Results   Component Value Date    TSH 1.181 05/01/2018     Lab Results   Component Value Date    LDLCALC 67.6 10/11/2016     BMP  Lab Results   Component Value Date     05/01/2018    K 4.8 05/01/2018     05/01/2018    CO2 26 05/01/2018    BUN 27 (H) 05/01/2018    CREATININE 1.1 05/01/2018    CALCIUM 9.6 05/01/2018    ANIONGAP 11 05/01/2018    ESTGFRAFRICA 54 (A) 05/01/2018    EGFRNONAA 47 (A) 05/01/2018     Lab Results   Component Value Date    HGBA1C 6.0 (H) 05/01/2018     Assessment:       1. Chronic atrial fibrillation    2. Type 2 diabetes mellitus with other circulatory complication, with long-term current use of insulin    3. JOY (obstructive sleep apnea)    4. Chronic diastolic congestive heart failure    5. Essential hypertension    6. Hyperlipidemia, unspecified hyperlipidemia type    7. Hypothyroidism due to acquired atrophy of thyroid        Plan:   Stephany CARRILLO was seen today for sore on toe.    Diagnoses and all orders for this  visit:    Obstructive sleep apnea  Continue CPAP at current pressure.  Patient is compliant.    Type 2 diabetes  Continue Lantus at current dose  Check hemoglobin A1c every 3 months    Pressure ulcer of toe of left foot, stage 3  This seems to be healed    Essential hypertension/CHF  Continue amlodipine 2.5 mg, Lasix 40 mg, Toprol- mg,  Continue potassium    Hypothyroidism due to acquired atrophy of thyroid  Continue Synthroid 100 µg by mouth daily    Hyperlipidemia, unspecified hyperlipidemia type  Continue fish oil  Continue Lipitor 10 mg    Myasthenia gravis  Continue prednisone 10 mg daily  Continue Mestinon  Keep appointment with neurology    COPD  Continue DuoNeb treatments when the nebulizer on his    Congestive heart failure/atrial fibrillation  Continue Plavix and Eliquis    Return to clinic in 3 month.  I gave patient approval to start driving short distances with assistance

## 2018-06-06 DIAGNOSIS — I25.10 ASCVD (ARTERIOSCLEROTIC CARDIOVASCULAR DISEASE): ICD-10-CM

## 2018-06-06 RX ORDER — APIXABAN 2.5 MG/1
TABLET, FILM COATED ORAL
Qty: 60 TABLET | Refills: 5 | Status: SHIPPED | OUTPATIENT
Start: 2018-06-06 | End: 2018-11-27

## 2018-06-11 DIAGNOSIS — I10 ESSENTIAL HYPERTENSION: ICD-10-CM

## 2018-06-11 DIAGNOSIS — G70.00 MYASTHENIA GRAVIS: ICD-10-CM

## 2018-06-11 RX ORDER — METOPROLOL SUCCINATE 100 MG/1
TABLET, EXTENDED RELEASE ORAL
Qty: 30 TABLET | Refills: 5 | Status: SHIPPED | OUTPATIENT
Start: 2018-06-11 | End: 2018-11-27 | Stop reason: SDUPTHER

## 2018-06-11 RX ORDER — PYRIDOSTIGMINE BROMIDE 60 MG/1
TABLET ORAL
Qty: 90 TABLET | Refills: 0 | Status: SHIPPED | OUTPATIENT
Start: 2018-06-11 | End: 2018-07-18 | Stop reason: SDUPTHER

## 2018-06-20 ENCOUNTER — TELEPHONE (OUTPATIENT)
Dept: FAMILY MEDICINE | Facility: CLINIC | Age: 83
End: 2018-06-20

## 2018-06-20 NOTE — TELEPHONE ENCOUNTER
----- Message from Blanca Roy sent at 2018 10:54 AM CDT -----  Contact: Shira/Sister  Stephany Skinner  MRN: [id number]  : 1935  PCP: Pipo Flowers  Home Phone      [phone]  Work Phone      Not on file.  Mobile          [phone]    MESSAGE:   Would like to speak to someone about her CPAP machine.  It's been broken and she's having trouble getting anyone to help her.  She has called Dr. Patel and Dr. Watson's office but she is getting the run around and not getting anywhere.   Please call.    Phone: [phone]

## 2018-07-03 DIAGNOSIS — G47.33 OSA (OBSTRUCTIVE SLEEP APNEA): Primary | ICD-10-CM

## 2018-07-09 DIAGNOSIS — G70.00 MYASTHENIA GRAVIS, ADULT FORM: ICD-10-CM

## 2018-07-09 RX ORDER — PREDNISONE 10 MG/1
TABLET ORAL
Qty: 30 TABLET | Refills: 0 | Status: SHIPPED | OUTPATIENT
Start: 2018-07-09 | End: 2018-07-31 | Stop reason: SDUPTHER

## 2018-07-11 ENCOUNTER — TELEPHONE (OUTPATIENT)
Dept: NEUROLOGY | Facility: CLINIC | Age: 83
End: 2018-07-11

## 2018-07-11 ENCOUNTER — TELEPHONE (OUTPATIENT)
Dept: FAMILY MEDICINE | Facility: CLINIC | Age: 83
End: 2018-07-11

## 2018-07-11 NOTE — TELEPHONE ENCOUNTER
----- Message from Krysten Rivas MA sent at 2018  2:58 PM CDT -----  Contact: Angelica LinCnelson Donnellyne  MRN: [id number]  : 1935  PCP: Pipo Flowers  Home Phone      [phone]  Work Phone      Not on file.  Mobile          [phone]      MESSAGE: Angelica from  [facility] called and needs last office visit notes and sleep study faxed.  Phone:[phone]  Fax:[phone]

## 2018-07-12 ENCOUNTER — TELEPHONE (OUTPATIENT)
Dept: FAMILY MEDICINE | Facility: CLINIC | Age: 83
End: 2018-07-12

## 2018-07-12 DIAGNOSIS — G47.33 OSA ON CPAP: Primary | ICD-10-CM

## 2018-07-12 NOTE — TELEPHONE ENCOUNTER
Spoke with Lacey, informed her pt's sleep study results not in our system.   She will speak to pt to see if she wants to redo study

## 2018-07-12 NOTE — TELEPHONE ENCOUNTER
----- Message from Krysten Rivas MA sent at 2018  2:45 PM CDT -----  [facility]  Stephany Skinner  MRN: [id number]  : 1935  PCP: Pipo Flowers  Home Phone      [phone]  Work Phone      Not on file.  Mobile          [phone]      MESSAGE: [facility] needs to let Nurse Alia know that the patient has agreed to have another sleep study. She needs orders faxed to have it at [facility].  Phone: [phone]

## 2018-07-12 NOTE — TELEPHONE ENCOUNTER
----- Message from Jackie Rice sent at 2018  2:09 PM CDT -----  Contact: Renato Skinner  MRN: [id number]  : 1935  PCP: Pipo Flowers  Home Phone      [phone]  Work Phone      Not on file.  Mobile          [phone]      MESSAGE:   Angelica would like a note to be added to her chart that she does use her CPAP.   CPAP bro and home health agency is trying to get her a new one but needs that note.    Phone:  [phone]

## 2018-07-12 NOTE — TELEPHONE ENCOUNTER
Pt needed sleep supplies, sleep study done about 10years ago.   Pt agrees to redo sleep study, thank you

## 2018-07-12 NOTE — TELEPHONE ENCOUNTER
----- Message from Rodney Rodriguez sent at 2018  8:57 AM CDT -----  Contact: Lacey @ [employer]  Stephany Skinner  MRN: [id number]  : 1935  PCP: Pipo Flowers  Home Phone      [phone]  Work Phone      Not on file.  Mobile          [phone]      MESSAGE: has question Re: C-pap order    Call Lacey @ [phone]    PCP: Lionel

## 2018-07-13 ENCOUNTER — TELEPHONE (OUTPATIENT)
Dept: FAMILY MEDICINE | Facility: CLINIC | Age: 83
End: 2018-07-13

## 2018-07-13 NOTE — TELEPHONE ENCOUNTER
----- Message from Krysten Rivas MA sent at 2018  3:49 PM CDT -----  Contact: Shira Skinner  MRN: [id number]  : 1935  PCP: Pipo Flowers  Home Phone      [phone]  Work Phone      Not on file.  Mobile          [phone]      MESSAGE:   Pt requesting refill or new Rx.   Is this a refill or new RX:  refill  RX name and strength: Levemir Flex touch 25cc at night  Last office visit: 18  Is this a 30-day or 90-day RX:  30  Pharmacy name and location:  [facility]  Comments[facility]  [facility] today[facility]    Phone:  [phone]

## 2018-07-13 NOTE — TELEPHONE ENCOUNTER
PSG.  JOY on CPAP  -     Polysomnogram (CPAP will be added if patient meets diagnostic criteria.); Future  -     CPAP Titration (Must have dx of JOY from previous sleep study.); Future

## 2018-07-17 ENCOUNTER — PROCEDURE VISIT (OUTPATIENT)
Dept: NEUROLOGY | Facility: CLINIC | Age: 83
End: 2018-07-17
Payer: MEDICARE

## 2018-07-17 DIAGNOSIS — G70.00 MYASTHENIA GRAVIS, ADULT FORM: ICD-10-CM

## 2018-07-17 DIAGNOSIS — E11.9 TYPE 2 DIABETES MELLITUS WITHOUT COMPLICATION, WITHOUT LONG-TERM CURRENT USE OF INSULIN: ICD-10-CM

## 2018-07-17 PROCEDURE — 95937 NEUROMUSCULAR JUNCTION TEST: CPT | Mod: 26,S$PBB,, | Performed by: PSYCHIATRY & NEUROLOGY

## 2018-07-17 PROCEDURE — 95937 NEUROMUSCULAR JUNCTION TEST: CPT | Mod: PBBFAC | Performed by: PSYCHIATRY & NEUROLOGY

## 2018-07-17 RX ORDER — INSULIN DETEMIR 100 [IU]/ML
25 INJECTION, SOLUTION SUBCUTANEOUS NIGHTLY
Qty: 15 ML | OUTPATIENT
Start: 2018-07-17 | End: 2019-07-17

## 2018-07-18 ENCOUNTER — TELEPHONE (OUTPATIENT)
Dept: FAMILY MEDICINE | Facility: CLINIC | Age: 83
End: 2018-07-18

## 2018-07-18 DIAGNOSIS — G70.00 MYASTHENIA GRAVIS: ICD-10-CM

## 2018-07-18 RX ORDER — PYRIDOSTIGMINE BROMIDE 60 MG/1
TABLET ORAL
Qty: 90 TABLET | Refills: 5 | Status: SHIPPED | OUTPATIENT
Start: 2018-07-18 | End: 2019-04-15 | Stop reason: SDUPTHER

## 2018-07-18 NOTE — TELEPHONE ENCOUNTER
----- Message from Camille Montiel sent at 2018  9:03 AM CDT -----  Contact: [facility]  Stephany Skinner  MRN: [id number]  : 1935  PCP: Pipo Flowers  Home Phone      [phone]  Work Phone      Not on file.  Mobile          [phone]      MESSAGE:   Needs demographics sheet on patient for referral. Please fax.    [phone]

## 2018-07-24 ENCOUNTER — HOSPITAL ENCOUNTER (OUTPATIENT)
Dept: SLEEP MEDICINE | Facility: HOSPITAL | Age: 83
Discharge: HOME | End: 2018-07-24
Attending: FAMILY MEDICINE
Payer: MEDICARE

## 2018-07-24 DIAGNOSIS — G47.33 OBSTRUCTIVE SLEEP APNEA (ADULT) (PEDIATRIC): ICD-10-CM

## 2018-07-24 PROCEDURE — 95810 POLYSOM 6/> YRS 4/> PARAM: CPT | Mod: 26,,, | Performed by: INTERNAL MEDICINE

## 2018-07-24 PROCEDURE — 95810 POLYSOM 6/> YRS 4/> PARAM: CPT

## 2018-07-25 ENCOUNTER — TELEPHONE (OUTPATIENT)
Dept: FAMILY MEDICINE | Facility: CLINIC | Age: 83
End: 2018-07-25

## 2018-07-25 NOTE — TELEPHONE ENCOUNTER
Spoke with Linus from [employer].   Pt hit her Lt lower shin and has a skin tear. It is red and 2+ edema.  ok'd for kemal boot, and they will recheck pt on progress

## 2018-07-31 ENCOUNTER — OFFICE VISIT (OUTPATIENT)
Dept: NEUROLOGY | Facility: CLINIC | Age: 83
End: 2018-07-31
Payer: MEDICARE

## 2018-07-31 VITALS
HEIGHT: 68 IN | RESPIRATION RATE: 16 BRPM | WEIGHT: 248.56 LBS | DIASTOLIC BLOOD PRESSURE: 80 MMHG | SYSTOLIC BLOOD PRESSURE: 130 MMHG | HEART RATE: 60 BPM | BODY MASS INDEX: 37.67 KG/M2

## 2018-07-31 DIAGNOSIS — E03.4 HYPOTHYROIDISM DUE TO ACQUIRED ATROPHY OF THYROID: ICD-10-CM

## 2018-07-31 DIAGNOSIS — I48.20 CHRONIC ATRIAL FIBRILLATION: ICD-10-CM

## 2018-07-31 DIAGNOSIS — E11.59 TYPE 2 DIABETES MELLITUS WITH OTHER CIRCULATORY COMPLICATION, WITH LONG-TERM CURRENT USE OF INSULIN: ICD-10-CM

## 2018-07-31 DIAGNOSIS — E53.8 B12 DEFICIENCY: ICD-10-CM

## 2018-07-31 DIAGNOSIS — Z79.4 TYPE 2 DIABETES MELLITUS WITH OTHER CIRCULATORY COMPLICATION, WITH LONG-TERM CURRENT USE OF INSULIN: ICD-10-CM

## 2018-07-31 DIAGNOSIS — G70.00 MYASTHENIA GRAVIS, ADULT FORM: Primary | ICD-10-CM

## 2018-07-31 DIAGNOSIS — G47.33 OBSTRUCTIVE SLEEP APNEA (ADULT) (PEDIATRIC): ICD-10-CM

## 2018-07-31 DIAGNOSIS — I50.32 CHRONIC DIASTOLIC CONGESTIVE HEART FAILURE: ICD-10-CM

## 2018-07-31 DIAGNOSIS — E78.5 HYPERLIPIDEMIA, UNSPECIFIED HYPERLIPIDEMIA TYPE: ICD-10-CM

## 2018-07-31 PROCEDURE — 99214 OFFICE O/P EST MOD 30 MIN: CPT | Mod: PBBFAC | Performed by: NURSE PRACTITIONER

## 2018-07-31 PROCEDURE — 99999 PR STA SHADOW: CPT | Mod: PBBFAC,,, | Performed by: NURSE PRACTITIONER

## 2018-07-31 PROCEDURE — 99999 PR PBB SHADOW E&M-EST. PATIENT-LVL IV: CPT | Mod: PBBFAC,,, | Performed by: NURSE PRACTITIONER

## 2018-07-31 PROCEDURE — 99214 OFFICE O/P EST MOD 30 MIN: CPT | Mod: S$PBB | Performed by: NURSE PRACTITIONER

## 2018-07-31 RX ORDER — POTASSIUM CHLORIDE 20 MEQ/1
20 TABLET, EXTENDED RELEASE ORAL 2 TIMES DAILY
COMMUNITY
Start: 2018-07-09 | End: 2019-02-26 | Stop reason: SDUPTHER

## 2018-07-31 RX ORDER — PREDNISONE 10 MG/1
10 TABLET ORAL DAILY
Qty: 30 TABLET | Refills: 1 | Status: SHIPPED | OUTPATIENT
Start: 2018-07-31 | End: 2018-11-07 | Stop reason: SDUPTHER

## 2018-07-31 NOTE — PROGRESS NOTES
HPI:  Stephany Skinner is a 82 y.o. female with suspected seronegative myasthenia gravis. 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 Admission on 1/30/2018 for acute respiratory failure with hypoxia and hypercapnia, with mild ptosis and gaze restriction and hypophonic voice. Myasthenia Gravis antibodies were negative at the time of admission; however, she did respond to steroids inpatient.  PUJA during 1/2018 admit, secondary to overdiuresis. She has JOY, DM, HTN, HLD, hypothyroidism, B12 deficiency, and A-fib.     She presents today for a follow up visit. She did not complete the CBC or CMP that I ordered at her last visit, for reasons which are unclear. She had an EMG with repetitive stimulation done at [facility] in 7/2018, the results of which were inconclusive. See below.     There has been no further ptosis, or visual complaints-no diplopia. No dysphagia or SOB. She remains on daily Prednisone 10 mg, without weight gain, or increase in her blood glucose per BMP per PCP and blood glucose log from home. She also remains on Mestinon.     Minor neuropathic complaints and occasional lumbar pain.     CTS complaints rare.     Review of Systems   Constitutional: Negative for fever.   HENT: Negative for hearing loss.    Eyes: Negative for blurred vision and double vision.   Respiratory: Negative for hemoptysis.    Cardiovascular: Negative for chest pain.   Gastrointestinal: Negative for blood in stool.   Genitourinary: Negative for hematuria.   Musculoskeletal: Negative for back pain and falls.   Skin: Negative for rash.   Neurological: Negative for dizziness, sensory change, focal weakness, seizures, weakness and headaches.   Psychiatric/Behavioral: The patient does not have insomnia.      Exam:  Gen Appearance, well developed/nourished in no  apparent distress; on O2 NC  CV: 2+ distal pulses with no edema or swelling  Neuro:  MS: Awake, alert,Sustains attention. Recent/remote memory intact, Language is full to spontaneous speech/comprehension. Fund of Knowledge is full  CN: Optic discs are flat with normal vasculature, PERRL, Extraoccular movements and visual fields are full-no gaze restriction today. Normal facial sensation and strength-no ptosis, Hearing symmetric, Tongue and Palate are midline and strong. Shoulder Shrug symmetric and strong.  Motor: Normal bulk, tone, no abnormal movements. 5/5 strength bilateral upper/lower extremities with 1+ reflexes in the arms, and are less in the legs and bilateral plantar response  Sensory: symmetric to light touch, pain, temp, and vibration/proprioception except decreased sensation in the legs. Romberg negative  Cerebellar: Finger-nose,Heal-shin, Rapid alternating movements intact  Gait: arthralgic gait; ambulates with walker    Imagin/16 CT L spine: Multilevel degenerative changes of the lumbar spine without evidence for fracture or subluxation.  There is likely a component of mild neural foraminal narrowing and central canal stenosis at the L3-4 and L4-5 levels.  This could be better evaluated on a nonemergent basis with MRI.    2016 MRI L spine: Multilevel degenerative changes of the lumbar spine contributing to central canal stenosis and neural foraminal narrowing as detailed in the above report.    Edema-like signal about the posterior elements on the right at L5-S1 which can be a source of pain.    2018 EMG with repetitive stimulation at [facility] per [place]. Luis Angel Vu:   Slow repetitive stimulation (3 Htz) performed at baseline and at one minute post exercise intervals at the left ADM and left trapezius muscles revealed significant CMAP detriment at the ADM, but none at the trapezius.     Impression:   The results of the study are inconclusive. There is no definite EP evidence of a neuromuscular  junction pathology.     6/2016 EMG legs:   1. Mild Bilateral Carpal Tunnel Syndrome  2. 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 Bilateral Lumbar Radiculopathy best localizing from L4-5 on this study    Labs: 10/2016  glucose 136. Reviewed CMP, CBC TSh   SPEP normal  B12 low normal    Assessment/Plan: Stephany Skinner is a 83 y.o. female with a few months of increased falls, some lumbar radicular pain.  Exam shows possible polyneuropathy in the feet. 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 Admitted for ARF with hypercapnea, ptosis, and gaze restriction in 1/2018; response to steroids suggestive of myasthenia, though antibodies are negative, and EMG with repetitve stimulation from 7/2018 was inconclusive. PUJA during 1/2018 admit, secondary to overdiuresis. She has JOY, DM, HTN, HLD, hypothyroidism, B12 deficiency, and A-fib.     I recommend:   1. CBC and CMP, given Prednisone use, as well as B12 level, given prior deficiency.   2. Despite negative MG antibodies and inconclusive EMG with rep stim findings, her clinical picture is supportive of myasthenia gravis.   3. Continue Mestinon tid and Continue Prednisone 10 mg qd. Consider steroid sparing therapy, such as Imuran versus Cellcept. She has DM, though blood glucose logs show that BG is well controlled currently, and no weight gain with this.   4. Advised to go to ED with SOB. Advised on symptoms of myasthenic crisis.   5. No treatment needed for mild CTS symptoms-- can wear a brace if needed.  6. Continue B12 for low normal B12 levels. Her DM could be causing her neuropathy.  DM treated per PCP.   7. She has no pain for lumbar radiculopathy and has some occasional lower back pain related to stenosis which she treats with rest. She would defer surgical consult.  PT helped reduce pain greatly as well as falls prior. Fall precautions reviewed-- use walker. No pain from neuropathy.  8. Patient is taking both Plavix and Eliquis. Will clarify this with Cardiology and PCP, as this combination increases the risk of serious bleeding events. She has a history of A-fib, as well as CAD.    Will see Dr. Ortiz in August 2018

## 2018-08-02 ENCOUNTER — HOSPITAL ENCOUNTER (OUTPATIENT)
Dept: SLEEP MEDICINE | Facility: HOSPITAL | Age: 83
Discharge: HOME | End: 2018-08-02
Attending: FAMILY MEDICINE
Payer: MEDICARE

## 2018-08-02 DIAGNOSIS — G47.33 OBSTRUCTIVE SLEEP APNEA (ADULT) (PEDIATRIC): ICD-10-CM

## 2018-08-02 PROCEDURE — 95811 POLYSOM 6/>YRS CPAP 4/> PARM: CPT | Mod: 26,,, | Performed by: INTERNAL MEDICINE

## 2018-08-02 PROCEDURE — 95811 POLYSOM 6/>YRS CPAP 4/> PARM: CPT

## 2018-08-03 ENCOUNTER — LAB VISIT (OUTPATIENT)
Dept: LAB | Facility: HOSPITAL | Age: 83
End: 2018-08-03
Attending: NURSE PRACTITIONER
Payer: MEDICARE

## 2018-08-03 DIAGNOSIS — G70.00 MYASTHENIA GRAVIS, ADULT FORM: ICD-10-CM

## 2018-08-03 DIAGNOSIS — E53.8 B12 DEFICIENCY: ICD-10-CM

## 2018-08-03 LAB
ALBUMIN SERPL BCP-MCNC: 3.6 G/DL
ALP SERPL-CCNC: 40 U/L
ALT SERPL W/O P-5'-P-CCNC: 15 U/L
ANION GAP SERPL CALC-SCNC: 12 MMOL/L
AST SERPL-CCNC: 13 U/L
BASOPHILS # BLD AUTO: 0.03 K/UL
BASOPHILS NFR BLD: 0.4 %
BILIRUB SERPL-MCNC: 0.9 MG/DL
BUN SERPL-MCNC: 23 MG/DL
CALCIUM SERPL-MCNC: 9.4 MG/DL
CHLORIDE SERPL-SCNC: 105 MMOL/L
CO2 SERPL-SCNC: 25 MMOL/L
CREAT SERPL-MCNC: 0.9 MG/DL
DIFFERENTIAL METHOD: ABNORMAL
EOSINOPHIL # BLD AUTO: 0.1 K/UL
EOSINOPHIL NFR BLD: 1.2 %
ERYTHROCYTE [DISTWIDTH] IN BLOOD BY AUTOMATED COUNT: 14.6 %
EST. GFR  (AFRICAN AMERICAN): >60 ML/MIN/1.73 M^2
EST. GFR  (NON AFRICAN AMERICAN): 60 ML/MIN/1.73 M^2
GLUCOSE SERPL-MCNC: 71 MG/DL
HCT VFR BLD AUTO: 44.4 %
HGB BLD-MCNC: 14.2 G/DL
LYMPHOCYTES # BLD AUTO: 2 K/UL
LYMPHOCYTES NFR BLD: 24.4 %
MCH RBC QN AUTO: 30.4 PG
MCHC RBC AUTO-ENTMCNC: 32 G/DL
MCV RBC AUTO: 95 FL
MONOCYTES # BLD AUTO: 0.9 K/UL
MONOCYTES NFR BLD: 11.3 %
NEUTROPHILS # BLD AUTO: 5.2 K/UL
NEUTROPHILS NFR BLD: 62.7 %
PLATELET # BLD AUTO: 270 K/UL
PMV BLD AUTO: 9.3 FL
POTASSIUM SERPL-SCNC: 4.1 MMOL/L
PROT SERPL-MCNC: 6.6 G/DL
RBC # BLD AUTO: 4.67 M/UL
SODIUM SERPL-SCNC: 142 MMOL/L
VIT B12 SERPL-MCNC: 1085 PG/ML
WBC # BLD AUTO: 8.24 K/UL

## 2018-08-03 PROCEDURE — 80053 COMPREHEN METABOLIC PANEL: CPT

## 2018-08-03 PROCEDURE — 85025 COMPLETE CBC W/AUTO DIFF WBC: CPT

## 2018-08-03 PROCEDURE — 36415 COLL VENOUS BLD VENIPUNCTURE: CPT

## 2018-08-03 PROCEDURE — 82607 VITAMIN B-12: CPT

## 2018-08-20 ENCOUNTER — OFFICE VISIT (OUTPATIENT)
Dept: NEUROLOGY | Facility: CLINIC | Age: 83
End: 2018-08-20
Payer: MEDICARE

## 2018-08-20 VITALS
RESPIRATION RATE: 16 BRPM | HEART RATE: 78 BPM | HEIGHT: 67 IN | DIASTOLIC BLOOD PRESSURE: 88 MMHG | BODY MASS INDEX: 38.85 KG/M2 | WEIGHT: 247.56 LBS | SYSTOLIC BLOOD PRESSURE: 132 MMHG

## 2018-08-20 DIAGNOSIS — G56.03 BILATERAL CARPAL TUNNEL SYNDROME: ICD-10-CM

## 2018-08-20 DIAGNOSIS — G70.00 MYASTHENIA GRAVIS, ADULT FORM: Primary | ICD-10-CM

## 2018-08-20 DIAGNOSIS — E53.8 B12 DEFICIENCY: ICD-10-CM

## 2018-08-20 PROCEDURE — 99214 OFFICE O/P EST MOD 30 MIN: CPT | Mod: S$PBB | Performed by: PSYCHIATRY & NEUROLOGY

## 2018-08-20 PROCEDURE — 99214 OFFICE O/P EST MOD 30 MIN: CPT | Mod: PBBFAC | Performed by: PSYCHIATRY & NEUROLOGY

## 2018-08-20 PROCEDURE — 99999 PR STA SHADOW: CPT | Mod: PBBFAC,,, | Performed by: PSYCHIATRY & NEUROLOGY

## 2018-08-20 PROCEDURE — 99999 PR PBB SHADOW E&M-EST. PATIENT-LVL IV: CPT | Mod: PBBFAC,,, | Performed by: PSYCHIATRY & NEUROLOGY

## 2018-08-20 NOTE — PROGRESS NOTES
HPI:  Stephany Skinner is a 83 y.o. female with  increased falls, some lumbar radicular pain.  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 Admitted for ARF with hypercapnea, ptosis, and gaze restriction in 1/2018; response to steroids suggestive of myasthenia, though antibodies are negative, and EMG with repetitve stimulation from 7/2018 was inconclusive. PUJA during 1/2018 admit, secondary to overdiuresis. She has JOY, DM, HTN, HLD, hypothyroidism, B12 deficiency, and A-fib.     She recently had repeat Sleep study with PCP.   She has not had any ptosis since the last visit. Her mouth has been strong. Not SOB.  She uses O2 at night as she has been intolerant of CPAP.  Her walking has been stronger and she can walk with a walker.  Taking mestinon BID-TID depending on her memory  She is better with strength than before the hospital.  She brings a log of normal blood pressure and normal blood sugar.  She continues 10mg prednisone.  Only rare CTS symptoms and back pain is well controlled  She continues NOAC for afib only now per Dr Patel  She knows to reduce insulin if blood sugar is less than 120    Review of Systems   Constitutional: Negative for fever.   HENT: Negative for hearing loss.    Eyes: Negative for blurred vision and double vision.   Respiratory: Negative for hemoptysis.    Cardiovascular: Negative for chest pain.   Gastrointestinal: Negative for blood in stool.   Genitourinary: Negative for hematuria.   Musculoskeletal: Negative for back pain and falls.   Skin: Negative for rash.   Neurological: Negative for dizziness, sensory change, focal weakness, seizures, weakness and headaches.   Psychiatric/Behavioral: The patient does not have insomnia.      Exam:  Gen Appearance, well developed/nourished in no apparent distress; on O2  NC  CV: 2+ distal pulses with no edema or swelling  Neuro:  MS: Awake, alert,Sustains attention. Recent/remote memory intact, Language is full to spontaneous speech/comprehension. Fund of Knowledge is full  CN: Optic discs are flat with normal vasculature, PERRL, Extraoccular movements and visual fields are full-no gaze restriction today. Normal facial sensation and strength-no ptosis, Hearing symmetric, Tongue and Palate are midline and strong. Shoulder Shrug symmetric and strong.  Motor: Normal bulk, tone, no abnormal movements. 5/5 strength bilateral upper/lower extremities with 1+ reflexes in the arms, and are less in the legs and bilateral plantar response  Sensory: symmetric to light touch, pain, temp, and vibration/proprioception except decreased sensation in the legs. Romberg negative  Cerebellar: Finger-nose,Heal-shin, Rapid alternating movements intact  Gait: arthralgic gait; ambulates with walker but can walk well without walker    Imagin/16 CT L spine: Multilevel degenerative changes of the lumbar spine without evidence for fracture or subluxation.  There is likely a component of mild neural foraminal narrowing and central canal stenosis at the L3-4 and L4-5 levels.  This could be better evaluated on a nonemergent basis with MRI.    2016 MRI L spine: Multilevel degenerative changes of the lumbar spine contributing to central canal stenosis and neural foraminal narrowing as detailed in the above report.    Edema-like signal about the posterior elements on the right at L5-S1 which can be a source of pain.    2018 EMG with repetitive stimulation at [facility] [place] per Dr. Luis Angel Vu:   Slow repetitive stimulation (3 Htz) performed at baseline and at one minute post exercise intervals at the left ADM and left trapezius muscles revealed significant CMAP detriment at the ADM, but none at the trapezius.     Impression:   The results of the study are inconclusive. There is no definite EP evidence of a  neuromuscular junction pathology.     6/2016 EMG legs:   1. Mild Bilateral Carpal Tunnel Syndrome  2. 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 Bilateral Lumbar Radiculopathy best localizing from L4-5 on this study    Labs: 10/2016  glucose 136. Reviewed CMP, CBC TSh   SPEP normal  B12 low normal    8/2018 CMP, CBC and B12 reviewed    Assessment/Plan: Stephany Skinner is a 83 y.o. female with  increased falls, some lumbar radicular pain.  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 Admitted for ARF with hypercapnea, ptosis, and gaze restriction in 1/2018; response to steroids suggestive of myasthenia, though antibodies are negative, and EMG with repetitve stimulation from 7/2018 was inconclusive. PUJA during 1/2018 admit, secondary to overdiuresis. She has JOY, DM, HTN, HLD, hypothyroidism, B12 deficiency, and A-fib.     I recommend:   1. Continue periodic CBC and CMP, given Prednisone use, as well as B12 levels, given prior deficiency.   2. Despite negative MG antibodies and inconclusive EMG with rep stim findings without definitive evidence of neuromuscular junction disorer, her clinical picture was supportive of myasthenia gravis- but has recovered well now.   3. Continue Mestinon bid-tid and will try to reduce to Prednisone 5 mg qd. Raise back to 10mg daily if any ptosis, weakness of chewing or speech or any arm or leg weakness. If she needs to reamin on steroids long term, could consider steroid sparing therapy, such as Imuran versus Cellcept. She has DM, though blood glucose logs show that BG is well controlled currently, and no weight gain with this. She will follow with PCP next week to adjust insulin further on sliding scale as needed with reduction in prednisone. No insulin at night if blood sugar is below 120  until seeing PCP reviewed to further help with long term planning.   4. Advised to go to ED if SOB. Advised on symptoms of myasthenic crisis.   5. No treatment needed for mild CTS symptoms-- can wear a brace if needed.  6. Continue B12 for low normal B12 levels. Her DM could be causing her neuropathy.  DM treated per PCP.   7. She has no pain for lumbar radiculopathy and has some occasional lower back pain related to stenosis which she treats with rest. She would defer surgical consult. PT helped reduce pain greatly as well as falls prior. Fall precautions reviewed-- use walker. No pain from neuropathy.    RTC  6months

## 2018-08-22 ENCOUNTER — TELEPHONE (OUTPATIENT)
Dept: FAMILY MEDICINE | Facility: CLINIC | Age: 83
End: 2018-08-22

## 2018-08-28 ENCOUNTER — OFFICE VISIT (OUTPATIENT)
Dept: FAMILY MEDICINE | Facility: CLINIC | Age: 83
End: 2018-08-28
Payer: MEDICARE

## 2018-08-28 VITALS
BODY MASS INDEX: 38.39 KG/M2 | WEIGHT: 244.63 LBS | DIASTOLIC BLOOD PRESSURE: 64 MMHG | RESPIRATION RATE: 20 BRPM | SYSTOLIC BLOOD PRESSURE: 108 MMHG | HEART RATE: 72 BPM | HEIGHT: 67 IN

## 2018-08-28 DIAGNOSIS — E78.5 HYPERLIPIDEMIA, UNSPECIFIED HYPERLIPIDEMIA TYPE: ICD-10-CM

## 2018-08-28 DIAGNOSIS — G70.00 MYASTHENIA GRAVIS, ADULT FORM: ICD-10-CM

## 2018-08-28 DIAGNOSIS — G47.33 OSA ON CPAP: ICD-10-CM

## 2018-08-28 DIAGNOSIS — I10 ESSENTIAL HYPERTENSION: ICD-10-CM

## 2018-08-28 DIAGNOSIS — I48.20 CHRONIC ATRIAL FIBRILLATION: ICD-10-CM

## 2018-08-28 DIAGNOSIS — I87.8 VENOUS STASIS OF LOWER EXTREMITY: ICD-10-CM

## 2018-08-28 DIAGNOSIS — Z79.4 TYPE 2 DIABETES MELLITUS WITH OTHER CIRCULATORY COMPLICATION, WITH LONG-TERM CURRENT USE OF INSULIN: Primary | ICD-10-CM

## 2018-08-28 DIAGNOSIS — E11.59 TYPE 2 DIABETES MELLITUS WITH OTHER CIRCULATORY COMPLICATION, WITH LONG-TERM CURRENT USE OF INSULIN: Primary | ICD-10-CM

## 2018-08-28 DIAGNOSIS — E03.4 HYPOTHYROIDISM DUE TO ACQUIRED ATROPHY OF THYROID: ICD-10-CM

## 2018-08-28 PROCEDURE — 99999 PR STA SHADOW: CPT | Mod: PBBFAC,,, | Performed by: FAMILY MEDICINE

## 2018-08-28 PROCEDURE — 99999 PR PBB SHADOW E&M-EST. PATIENT-LVL III: CPT | Mod: PBBFAC,,, | Performed by: FAMILY MEDICINE

## 2018-08-28 PROCEDURE — 99213 OFFICE O/P EST LOW 20 MIN: CPT | Mod: PBBFAC | Performed by: FAMILY MEDICINE

## 2018-08-28 PROCEDURE — 99214 OFFICE O/P EST MOD 30 MIN: CPT | Mod: S$PBB | Performed by: FAMILY MEDICINE

## 2018-08-28 RX ORDER — AMLODIPINE BESYLATE 2.5 MG/1
2.5 TABLET ORAL DAILY
Qty: 30 TABLET | Refills: 5 | Status: SHIPPED | OUTPATIENT
Start: 2018-08-28 | End: 2019-02-21

## 2018-08-28 RX ORDER — ATORVASTATIN CALCIUM 10 MG/1
10 TABLET, FILM COATED ORAL NIGHTLY
Qty: 30 TABLET | Refills: 5 | Status: SHIPPED | OUTPATIENT
Start: 2018-08-28 | End: 2018-11-27 | Stop reason: SDUPTHER

## 2018-08-28 RX ORDER — LEVOTHYROXINE SODIUM 100 UG/1
100 TABLET ORAL DAILY
Qty: 30 TABLET | Refills: 5 | Status: SHIPPED | OUTPATIENT
Start: 2018-08-28 | End: 2018-11-27 | Stop reason: SDUPTHER

## 2018-08-28 RX ORDER — OMEGA-3-ACID ETHYL ESTERS 1 G/1
2 CAPSULE, LIQUID FILLED ORAL 2 TIMES DAILY
Qty: 120 CAPSULE | Refills: 5 | Status: SHIPPED | OUTPATIENT
Start: 2018-08-28 | End: 2018-11-27 | Stop reason: SDUPTHER

## 2018-08-28 RX ORDER — FUROSEMIDE 40 MG/1
40 TABLET ORAL DAILY
Qty: 30 TABLET | Refills: 5 | Status: SHIPPED | OUTPATIENT
Start: 2018-08-28 | End: 2018-11-27 | Stop reason: SDUPTHER

## 2018-08-28 NOTE — PROGRESS NOTES
Subjective:       Patient ID: Stephany Skinner is a 83 y.o. female.    Chief Complaint: Follow-up (3 month follow up)    Patient here for checkup.  She was admitted to the hospital for congestive heart failure and pneumonia.   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  Currently on prednisone.  Since starting the prednisone her strength has improved remarkably well.  She now only needs a cane for ambulation.  She is currently taking pyridostigmine 60 mg daily.  She is doing better with CHF.  She walked 100 feet in the office and 02 sat stayed above 93%.  She uses O2 at home as needed but it seems be less and less..  Urinates well.  Patient has type 2 diabetes and seems be doing well.  She takes insulin for her diabetes.  He did not give her glucometer.  She takes 25 units of Lantus daily.  Patient has COPD.  She was taking neb treatments in the nursing home as needed.  They did not send her home with a nebulizer.  This seems to be in the works.  She takes Synthroid for her hypothyroidism.  She tolerates this well.  She denies having symptoms such as heat or cold intolerance.  Her weight is stable.  She denies any swelling in her thyroid.  She tolerates her blood pressure medication as well as not having side effects such as chronic cough or dizziness.    Patient is taking her statin every day for hyperlipidemia.  She is feeling well on the medication.  She denies side effects such as myalgias.  Patient has obstructive sleep apnea.  She is very compliant on her CPAP.      Review of Systems   Constitutional: Negative for activity change, chills, fatigue, fever and unexpected weight change.   HENT: Negative for sore throat and trouble swallowing.    Respiratory: Negative for cough, chest tightness and shortness of breath.    Cardiovascular: Negative for chest pain and leg swelling.   Gastrointestinal: Negative for abdominal pain.   Endocrine: Negative for cold intolerance and heat intolerance.   Genitourinary: Negative for  difficulty urinating.   Musculoskeletal: Negative for back pain and joint swelling.   Skin: Positive for wound. Negative for rash.   Neurological: Negative for numbness.   Hematological: Negative for adenopathy.   Psychiatric/Behavioral: Negative for decreased concentration.       Objective:      Vitals:    08/28/18 1333   BP: 108/64   Pulse: 72   Resp: 20     Physical Exam   Constitutional: She is oriented to person, place, and time. She appears well-developed and well-nourished.   Eyes: Pupils are equal, round, and reactive to light.   Neck: Neck supple.   Cardiovascular: Normal rate, regular rhythm, normal heart sounds and intact distal pulses. Exam reveals no gallop and no friction rub.   No murmur heard.  Pulmonary/Chest: Effort normal. She has wheezes. She has rales.   Musculoskeletal: She exhibits no edema.   UNNA boot left leg    Neurological: She is alert and oriented to person, place, and time. A sensory deficit (left foot) is present. No cranial nerve deficit.   Psychiatric: She has a normal mood and affect. Her behavior is normal. Judgment and thought content normal.       Lab Results   Component Value Date    TSH 1.181 05/01/2018     Lab Results   Component Value Date    LDLCALC 67.6 10/11/2016     BMP  Lab Results   Component Value Date     08/03/2018    K 4.1 08/03/2018     08/03/2018    CO2 25 08/03/2018    BUN 23 08/03/2018    CREATININE 0.9 08/03/2018    CALCIUM 9.4 08/03/2018    ANIONGAP 12 08/03/2018    ESTGFRAFRICA >60 08/03/2018    EGFRNONAA 60 08/03/2018     Lab Results   Component Value Date    HGBA1C 6.0 (H) 05/01/2018     Assessment:       1. Type 2 diabetes mellitus with other circulatory complication, with long-term current use of insulin    2. Essential hypertension    3. Hyperlipidemia, unspecified hyperlipidemia type    4. Hypothyroidism due to acquired atrophy of thyroid    5. JOY on CPAP    6. Myasthenia gravis, adult form    7. Chronic atrial fibrillation    8. Venous  stasis of lower extremity        Plan:   Stephany CARRILLO was seen today for sore on toe.    Diagnoses and all orders for this visit:    Obstructive sleep apnea  Continue CPAP at current pressure.  Patient is compliant.    Type 2 diabetes  Continue Lantus at current dose  Check hemoglobin A1c every 3 months    Pressure ulcer of toe of left foot, stage 3  This seems to be healed    Essential hypertension/CHF  Continue amlodipine 2.5 mg, Lasix 40 mg, Toprol- mg,  Continue potassium    Hypothyroidism due to acquired atrophy of thyroid  Continue Synthroid 100 µg by mouth daily    Hyperlipidemia, unspecified hyperlipidemia type  Continue fish oil  Continue Lipitor 10 mg    Myasthenia gravis  Continue prednisone 10 mg daily  Continue Mestinon  Keep appointment with neurology    COPD  Continue DuoNeb treatments when the nebulizer on his    Congestive heart failure/atrial fibrillation  Continue Plavix and Eliquis    Type 2 diabetes mellitus with other circulatory complication, with long-term current use of insulin  -     ALT (SGPT); Future  -     Hemoglobin A1c; Future    Essential hypertension  -     amLODIPine (NORVASC) 2.5 MG tablet; Take 1 tablet (2.5 mg total) by mouth once daily.  Dispense: 30 tablet; Refill: 5  -     furosemide (LASIX) 40 MG tablet; Take 1 tablet (40 mg total) by mouth once daily.  Dispense: 30 tablet; Refill: 5    Hyperlipidemia, unspecified hyperlipidemia type  -     atorvastatin (LIPITOR) 10 MG tablet; Take 1 tablet (10 mg total) by mouth every evening.  Dispense: 30 tablet; Refill: 5  -     omega-3 acid ethyl esters (LOVAZA) 1 gram capsule; Take 2 capsules (2 g total) by mouth 2 (two) times daily.  Dispense: 120 capsule; Refill: 5  -     Basic metabolic panel; Future  -     Lipid panel; Future    Hypothyroidism due to acquired atrophy of thyroid  -     levothyroxine (SYNTHROID) 100 MCG tablet; Take 1 tablet (100 mcg total) by mouth once daily.  Dispense: 30 tablet; Refill: 5  -     TSH;  Future    JOY on CPAP    Myasthenia gravis, adult form    Chronic atrial fibrillation    Venous stasis of lower extremity     Continue treatment with home health.  Currently using an UNNA boot    Return to clinic in 3 month.  If lab work looks good I will start seeing patient every 6 months.

## 2018-08-30 ENCOUNTER — TELEPHONE (OUTPATIENT)
Dept: FAMILY MEDICINE | Facility: CLINIC | Age: 83
End: 2018-08-30

## 2018-09-20 ENCOUNTER — TELEPHONE (OUTPATIENT)
Dept: FAMILY MEDICINE | Facility: CLINIC | Age: 83
End: 2018-09-20

## 2018-09-20 NOTE — TELEPHONE ENCOUNTER
----- Message from Rodney Rodriguez sent at 2018  9:42 AM CDT -----  Contact: Patient  Stephany Skinner  MRN: [id number]  : 1935  PCP: Pipo Flowers  Home Phone      [phone]  Work Phone      Not on file.  Mobile          Not on file.      MESSAGE: requesting to speak with nurse Re: sleep apnea test    Call [phone]    PCP: Lionel

## 2018-09-20 NOTE — TELEPHONE ENCOUNTER
Had sleep apnea test a few months ago, states that her machine is acting up. She called the company and they are waiting for us to send the company her results? States she uses [facility]

## 2018-11-07 DIAGNOSIS — G70.00 MYASTHENIA GRAVIS, ADULT FORM: ICD-10-CM

## 2018-11-07 RX ORDER — PREDNISONE 10 MG/1
TABLET ORAL
Qty: 30 TABLET | Refills: 1 | Status: SHIPPED | OUTPATIENT
Start: 2018-11-07 | End: 2019-02-21

## 2018-11-20 ENCOUNTER — CLINICAL SUPPORT (OUTPATIENT)
Dept: FAMILY MEDICINE | Facility: CLINIC | Age: 83
End: 2018-11-20
Payer: MEDICARE

## 2018-11-20 DIAGNOSIS — E11.59 TYPE 2 DIABETES MELLITUS WITH OTHER CIRCULATORY COMPLICATION, WITH LONG-TERM CURRENT USE OF INSULIN: ICD-10-CM

## 2018-11-20 DIAGNOSIS — E78.5 HYPERLIPIDEMIA, UNSPECIFIED HYPERLIPIDEMIA TYPE: ICD-10-CM

## 2018-11-20 DIAGNOSIS — Z79.4 TYPE 2 DIABETES MELLITUS WITH OTHER CIRCULATORY COMPLICATION, WITH LONG-TERM CURRENT USE OF INSULIN: ICD-10-CM

## 2018-11-20 DIAGNOSIS — E03.4 HYPOTHYROIDISM DUE TO ACQUIRED ATROPHY OF THYROID: ICD-10-CM

## 2018-11-20 LAB
ALT SERPL W/O P-5'-P-CCNC: 13 U/L
ANION GAP SERPL CALC-SCNC: 9 MMOL/L
BUN SERPL-MCNC: 18 MG/DL
CALCIUM SERPL-MCNC: 9.4 MG/DL
CHLORIDE SERPL-SCNC: 105 MMOL/L
CHOLEST SERPL-MCNC: 143 MG/DL
CHOLEST/HDLC SERPL: 3 {RATIO}
CO2 SERPL-SCNC: 28 MMOL/L
CREAT SERPL-MCNC: 1 MG/DL
EST. GFR  (AFRICAN AMERICAN): >60 ML/MIN/1.73 M^2
EST. GFR  (NON AFRICAN AMERICAN): 53 ML/MIN/1.73 M^2
ESTIMATED AVG GLUCOSE: 123 MG/DL
GLUCOSE SERPL-MCNC: 97 MG/DL
HBA1C MFR BLD HPLC: 5.9 %
HDLC SERPL-MCNC: 48 MG/DL
HDLC SERPL: 33.6 %
LDLC SERPL CALC-MCNC: 70.6 MG/DL
NONHDLC SERPL-MCNC: 95 MG/DL
POTASSIUM SERPL-SCNC: 4.1 MMOL/L
SODIUM SERPL-SCNC: 142 MMOL/L
T4 FREE SERPL-MCNC: 1.22 NG/DL
TRIGL SERPL-MCNC: 122 MG/DL
TSH SERPL DL<=0.005 MIU/L-ACNC: 0.3 UIU/ML

## 2018-11-20 PROCEDURE — 84460 ALANINE AMINO (ALT) (SGPT): CPT

## 2018-11-20 PROCEDURE — 84443 ASSAY THYROID STIM HORMONE: CPT

## 2018-11-20 PROCEDURE — 83036 HEMOGLOBIN GLYCOSYLATED A1C: CPT

## 2018-11-20 PROCEDURE — 80061 LIPID PANEL: CPT

## 2018-11-20 PROCEDURE — 99999 PR PBB SHADOW E&M-EST. PATIENT-LVL I: CPT | Mod: PBBFAC,,,

## 2018-11-20 PROCEDURE — 84439 ASSAY OF FREE THYROXINE: CPT

## 2018-11-20 PROCEDURE — 99211 OFF/OP EST MAY X REQ PHY/QHP: CPT | Mod: PBBFAC

## 2018-11-20 PROCEDURE — 36415 COLL VENOUS BLD VENIPUNCTURE: CPT | Mod: PBBFAC

## 2018-11-20 PROCEDURE — 80048 BASIC METABOLIC PNL TOTAL CA: CPT

## 2018-11-20 PROCEDURE — 99999 PR STA SHADOW: CPT | Mod: PBBFAC,,, | Performed by: FAMILY MEDICINE

## 2018-11-27 ENCOUNTER — OFFICE VISIT (OUTPATIENT)
Dept: FAMILY MEDICINE | Facility: CLINIC | Age: 83
End: 2018-11-27
Payer: MEDICARE

## 2018-11-27 VITALS
HEIGHT: 67 IN | BODY MASS INDEX: 36.1 KG/M2 | DIASTOLIC BLOOD PRESSURE: 58 MMHG | RESPIRATION RATE: 18 BRPM | SYSTOLIC BLOOD PRESSURE: 90 MMHG | HEART RATE: 80 BPM | WEIGHT: 230 LBS

## 2018-11-27 DIAGNOSIS — E03.4 HYPOTHYROIDISM DUE TO ACQUIRED ATROPHY OF THYROID: ICD-10-CM

## 2018-11-27 DIAGNOSIS — I10 ESSENTIAL HYPERTENSION: ICD-10-CM

## 2018-11-27 DIAGNOSIS — E78.5 HYPERLIPIDEMIA, UNSPECIFIED HYPERLIPIDEMIA TYPE: ICD-10-CM

## 2018-11-27 DIAGNOSIS — E11.9 TYPE 2 DIABETES MELLITUS WITHOUT COMPLICATION, WITHOUT LONG-TERM CURRENT USE OF INSULIN: ICD-10-CM

## 2018-11-27 PROCEDURE — 99213 OFFICE O/P EST LOW 20 MIN: CPT | Mod: PBBFAC | Performed by: FAMILY MEDICINE

## 2018-11-27 PROCEDURE — 99999 FLU VACCINE - HIGH DOSE (65+) PRESERVATIVE FREE IM: CPT | Mod: PBBFAC,,,

## 2018-11-27 PROCEDURE — 99214 OFFICE O/P EST MOD 30 MIN: CPT | Mod: S$PBB | Performed by: FAMILY MEDICINE

## 2018-11-27 PROCEDURE — 99999 PR STA SHADOW: CPT | Mod: PBBFAC,,, | Performed by: FAMILY MEDICINE

## 2018-11-27 PROCEDURE — 99999 PR PBB SHADOW E&M-EST. PATIENT-LVL III: CPT | Mod: PBBFAC,,, | Performed by: FAMILY MEDICINE

## 2018-11-27 PROCEDURE — 90662 IIV NO PRSV INCREASED AG IM: CPT | Mod: PBBFAC

## 2018-11-27 RX ORDER — FUROSEMIDE 40 MG/1
40 TABLET ORAL DAILY
Qty: 30 TABLET | Refills: 5 | Status: SHIPPED | OUTPATIENT
Start: 2018-11-27 | End: 2019-02-26 | Stop reason: SDUPTHER

## 2018-11-27 RX ORDER — LEVOTHYROXINE SODIUM 88 UG/1
88 TABLET ORAL DAILY
Qty: 30 TABLET | Refills: 5 | Status: SHIPPED | OUTPATIENT
Start: 2018-11-27 | End: 2019-02-26 | Stop reason: SDUPTHER

## 2018-11-27 RX ORDER — METOPROLOL SUCCINATE 100 MG/1
100 TABLET, EXTENDED RELEASE ORAL DAILY
Qty: 30 TABLET | Refills: 5 | Status: SHIPPED | OUTPATIENT
Start: 2018-11-27 | End: 2019-02-26 | Stop reason: SDUPTHER

## 2018-11-27 RX ORDER — OMEGA-3-ACID ETHYL ESTERS 1 G/1
2 CAPSULE, LIQUID FILLED ORAL 2 TIMES DAILY
Qty: 120 CAPSULE | Refills: 5 | Status: SHIPPED | OUTPATIENT
Start: 2018-11-27 | End: 2019-09-04 | Stop reason: SDUPTHER

## 2018-11-27 RX ORDER — AMLODIPINE BESYLATE 2.5 MG/1
2.5 TABLET ORAL DAILY
Qty: 30 TABLET | Refills: 5 | Status: CANCELLED | OUTPATIENT
Start: 2018-11-27

## 2018-11-27 RX ORDER — ATORVASTATIN CALCIUM 10 MG/1
10 TABLET, FILM COATED ORAL NIGHTLY
Qty: 30 TABLET | Refills: 5 | Status: SHIPPED | OUTPATIENT
Start: 2018-11-27 | End: 2019-02-26 | Stop reason: SDUPTHER

## 2018-11-27 NOTE — PROGRESS NOTES
Subjective:       Patient ID: Stephany Skinner is a 83 y.o. female.    Chief Complaint: Follow-up (3 month follow up)    Patient here for checkup.  She has a history of congestive heart failure and hypertension.  She has lost over 60 lb over the past year.  Now her blood pressure is low.  She denies shortness of breath.  Has  Neuro diagnosed her with MG.  Currently on prednisone.  Since starting the prednisone her strength has improved remarkably well.  She now only needs a cane for ambulation.  She is currently taking pyridostigmine 60 mg daily.  Patient has type 2 diabetes and seems be doing well.  She stopped her insulin 5 days ago and her blood sugars look great.  Has  He did not give her glucometer.  She is scheduled to take 25 units of Lantus daily but BS have been under 120 without insulin so she stopped.  She has lost over 60 lb in the last year  Patient has COPD.  She was taking neb treatments in the nursing home as needed.  They did not send her home with a nebulizer.  This seems to be in the works.  She takes Synthroid for her hypothyroidism.  She tolerates this well.  She denies having symptoms such as heat or cold intolerance.  Her weight is stable.  She denies any swelling in her thyroid.  She tolerates her blood pressure medication as well as not having side effects such as chronic cough or dizziness.    Patient is taking her statin every day for hyperlipidemia.  She is feeling well on the medication.  She denies side effects such as myalgias.  Patient has obstructive sleep apnea.  She is very compliant on her CPAP.      Review of Systems   Constitutional: Negative for activity change, chills, fatigue, fever and unexpected weight change.   HENT: Negative for sore throat and trouble swallowing.    Respiratory: Negative for cough, chest tightness and shortness of breath.    Cardiovascular: Negative for chest pain and leg swelling.   Gastrointestinal: Negative for abdominal pain.   Endocrine: Negative for  cold intolerance and heat intolerance.   Genitourinary: Negative for difficulty urinating.   Musculoskeletal: Negative for back pain and joint swelling.   Skin: Positive for wound. Negative for rash.   Neurological: Negative for numbness.   Hematological: Negative for adenopathy.   Psychiatric/Behavioral: Negative for decreased concentration.       Objective:      Vitals:    11/27/18 1258   BP: (!) 90/58   Pulse: 80   Resp: 18     Physical Exam   Constitutional: She is oriented to person, place, and time. She appears well-developed and well-nourished.   Eyes: Pupils are equal, round, and reactive to light.   Neck: Neck supple.   Cardiovascular: Normal rate, regular rhythm, normal heart sounds and intact distal pulses. Exam reveals no gallop and no friction rub.   No murmur heard.  Pulmonary/Chest: Effort normal. She has wheezes. She has rales.   Musculoskeletal: She exhibits no edema.   UNNA boot left leg    Neurological: She is alert and oriented to person, place, and time. A sensory deficit (left foot) is present. No cranial nerve deficit.   Psychiatric: She has a normal mood and affect. Her behavior is normal. Judgment and thought content normal.       Lab Results   Component Value Date    TSH 0.298 (L) 11/20/2018     Lab Results   Component Value Date    LDLCALC 70.6 11/20/2018     BMP  Lab Results   Component Value Date     11/20/2018    K 4.1 11/20/2018     11/20/2018    CO2 28 11/20/2018    BUN 18 11/20/2018    CREATININE 1.0 11/20/2018    CALCIUM 9.4 11/20/2018    ANIONGAP 9 11/20/2018    ESTGFRAFRICA >60 11/20/2018    EGFRNONAA 53 (A) 11/20/2018     Lab Results   Component Value Date    HGBA1C 5.9 (H) 11/20/2018     Assessment:       1. Essential hypertension    2. Hyperlipidemia, unspecified hyperlipidemia type    3. Hypothyroidism due to acquired atrophy of thyroid    4. Type 2 diabetes mellitus without complication, without long-term current use of insulin        Plan:   Stephany CARRILLO was seen  today for sore on toe.    Diagnoses and all orders for this visit:    Obstructive sleep apnea  Continue CPAP at current pressure.  Patient is compliant.    Type 2 diabetes  Hold Lantus for now  Check hemoglobin A1c every 3 months    Pressure ulcer of toe of left foot, stage 3  This seems to be healed    Essential hypertension/CHF  Continue Lasix 40 mg, Toprol- mg,  Continue potassium  Stop amlodipine 2.5 mg,     Hypothyroidism due to acquired atrophy of thyroid  Continue Synthroid 100 µg by mouth daily    Hyperlipidemia, unspecified hyperlipidemia type  Continue fish oil  Continue Lipitor 10 mg    Myasthenia gravis  Continue prednisone 10 mg daily  Continue Mestinon  Keep appointment with neurology    COPD  Continue DuoNeb treatments when the nebulizer on his    Congestive heart failure/atrial fibrillation  Continue Plavix and Eliquis    Essential hypertension  -     furosemide (LASIX) 40 MG tablet; Take 1 tablet (40 mg total) by mouth once daily.  Dispense: 30 tablet; Refill: 5  -     metoprolol succinate (TOPROL-XL) 100 MG 24 hr tablet; Take 1 tablet (100 mg total) by mouth once daily.  Dispense: 30 tablet; Refill: 5    Hyperlipidemia, unspecified hyperlipidemia type  -     atorvastatin (LIPITOR) 10 MG tablet; Take 1 tablet (10 mg total) by mouth every evening.  Dispense: 30 tablet; Refill: 5  -     omega-3 acid ethyl esters (LOVAZA) 1 gram capsule; Take 2 capsules (2 g total) by mouth 2 (two) times daily.  Dispense: 120 capsule; Refill: 5  -     Lipid panel; Future    Hypothyroidism due to acquired atrophy of thyroid  -     levothyroxine (SYNTHROID) 88 MCG tablet; Take 1 tablet (88 mcg total) by mouth once daily.  Dispense: 30 tablet; Refill: 5  -     ALT (SGPT); Future  -     Basic metabolic panel; Future  -     TSH; Future  -     T4, free; Future    Type 2 diabetes mellitus without complication, without long-term current use of insulin  -     insulin detemir U-100 (LEVEMIR FLEXTOUCH) 100 unit/mL (3 mL)  SubQ InPn pen; Inject 25 Units into the skin every evening.  Dispense: 15 mL; Refill: 5  -     Hemoglobin A1c; Future    Other orders  -     Cancel: amLODIPine (NORVASC) 2.5 MG tablet; Take 1 tablet (2.5 mg total) by mouth once daily.  Dispense: 30 tablet; Refill: 5     Continue treatment with home health.     Return to clinic in 3 month.  If lab work looks good I will start seeing patient every 6 months.

## 2019-02-19 NOTE — PROGRESS NOTES
HPI:  Stephany Skinner is a 84 y.o. female with  increased falls, some lumbar radicular pain.  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 Admitted for ARF with hypercapnea, ptosis, and gaze restriction in 1/2018; response to steroids suggestive of myasthenia, though antibodies are negative, and EMG with repetitve stimulation from 7/2018 was inconclusive. PUJA during 1/2018 admit, secondary to overdiuresis. She has JOY, DM, HTN, HLD, hypothyroidism, B12 deficiency, and A-fib.       Since the last visit, the patient reduced prednisone to 5mg daily and she feels well on this dose. No weakness or double vision. She is using the walker well and staying active in her community    She was able to stop insulin and anti-HTN med due to improvement in these illness.     She had one fall on a slick floor without injury since the last visit.     CTS symptoms are not active except on rare occassions.  Neuropathy symptoms are not active  Spinal pain not active    Review of Systems   Constitutional: Negative for fever.   HENT: Negative for hearing loss.    Eyes: Negative for double vision.   Respiratory: Negative for hemoptysis.    Cardiovascular: Negative for leg swelling.   Gastrointestinal: Negative for blood in stool.   Genitourinary: Negative for hematuria.   Musculoskeletal: Negative for back pain and falls.   Skin: Negative for rash.   Neurological: Negative for focal weakness, seizures and weakness.   Psychiatric/Behavioral: The patient does not have insomnia.      Exam:  Gen Appearance, well developed/nourished in no apparent distress; on O2 NC  CV: 2+ distal pulses with no edema or swelling  Neuro:  MS: Awake, alert,Sustains attention. Recent/remote memory intact, Language is full to spontaneous speech/comprehension. Fund of Knowledge is  full  CN: Optic discs are flat with normal vasculature, PERRL, Extraoccular movements and visual fields are full-no gaze restriction today. Normal facial sensation and strength-no ptosis, Hearing symmetric, Tongue and Palate are midline and strong. Shoulder Shrug symmetric and strong.  Motor: Normal bulk, tone, no abnormal movements. 5/5 strength bilateral upper/lower extremities with 1+ reflexes in the arms, and are less in the legs and bilateral plantar response  Sensory: symmetric to light touch, pain, temp, and vibration/proprioception except decreased sensation in the legs. Romberg negative  Cerebellar: Finger-nose,Heal-shin, Rapid alternating movements intact  Gait: arthralgic gait; ambulates with walker but can walk well without walker as well    Imagin/16 CT L spine: Multilevel degenerative changes of the lumbar spine without evidence for fracture or subluxation.  There is likely a component of mild neural foraminal narrowing and central canal stenosis at the L3-4 and L4-5 levels.  This could be better evaluated on a nonemergent basis with MRI.    2016 MRI L spine: Multilevel degenerative changes of the lumbar spine contributing to central canal stenosis and neural foraminal narrowing as detailed in the above report.    Edema-like signal about the posterior elements on the right at L5-S1 which can be a source of pain.    2018 EMG with repetitive stimulation at [facility] per Dr. Luis Angel Vu:   Slow repetitive stimulation (3 Htz) performed at baseline and at one minute post exercise intervals at the left ADM and left trapezius muscles revealed significant CMAP detriment at the ADM, but none at the trapezius.     Impression:   The results of the study are inconclusive. There is no definite EP evidence of a neuromuscular junction pathology.     2016 EMG legs:   1. Mild Bilateral Carpal Tunnel Syndrome  2. 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 Bilateral Lumbar Radiculopathy best  localizing from L4-5 on this study    Labs: 10/2016  glucose 136. Reviewed CMP, CBC TSh   SPEP normal  B12 low normal    8/2018 CMP, CBC and B12 reviewed    11/2018 BMP with alt reviewed  A1C 5.9    Assessment/Plan: Stephany Skinner is a 84 y.o. female with  increased falls, some lumbar radicular pain.  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 Admitted for ARF with hypercapnea, ptosis, and gaze restriction in 1/2018; response to steroids suggestive of myasthenia, though antibodies are negative, and EMG with repetitve stimulation from 7/2018 was inconclusive. PUJA during 1/2018 admit, secondary to overdiuresis. She has JOY, DM, HTN, HLD, hypothyroidism, B12 deficiency, and A-fib.     I recommend:   1. Continue periodic CBC and CMP, given Prednisone use  2. Despite negative MG antibodies and inconclusive EMG with rep stim findings without definitive evidence of neuromuscular junction disorer, her clinical picture was supportive of myasthenia gravis- but has recovered well now.   3. Continue Mestinon bid-tid and   -she has had no worsening with reduction to  Prednisone 5 mg qd.Reduce further to 2.5mg daily.  Raise back to 5mg daily if any ptosis, weakness of chewing or speech or any arm or leg weakness. If she needs to reamin on steroids long term, could consider steroid sparing therapy, such as Imuran versus Cellcept. She has DM, though well controlled currently. She will follow with PCP for management of this  4. Advised to go to ED if SOB. Advised on symptoms of myasthenic crisis.   5. No treatment needed for mild CTS symptoms-- can wear a brace if needed.  6. Continue B12 for low normal B12 levels. Check B12 levels over time. Her DM could be causing her neuropathy.  DM treated per PCP.   7. She has no pain for lumbar radiculopathy and  has some occasional lower back pain (not currently active) related to stenosis which she treats with rest. She would defer surgical consult. PT helped reduce pain greatly as well as falls prior. Fall precautions reviewed-- use walker. No pain from neuropathy currently.    RTC  6months

## 2019-02-20 ENCOUNTER — CLINICAL SUPPORT (OUTPATIENT)
Dept: FAMILY MEDICINE | Facility: CLINIC | Age: 84
End: 2019-02-20
Payer: MEDICARE

## 2019-02-20 DIAGNOSIS — E78.5 HYPERLIPIDEMIA, UNSPECIFIED HYPERLIPIDEMIA TYPE: ICD-10-CM

## 2019-02-20 DIAGNOSIS — E03.4 HYPOTHYROIDISM DUE TO ACQUIRED ATROPHY OF THYROID: ICD-10-CM

## 2019-02-20 DIAGNOSIS — E11.9 TYPE 2 DIABETES MELLITUS WITHOUT COMPLICATION, WITHOUT LONG-TERM CURRENT USE OF INSULIN: ICD-10-CM

## 2019-02-20 LAB
ALT SERPL W/O P-5'-P-CCNC: 14 U/L
ANION GAP SERPL CALC-SCNC: 6 MMOL/L
BUN SERPL-MCNC: 20 MG/DL
CALCIUM SERPL-MCNC: 9.4 MG/DL
CHLORIDE SERPL-SCNC: 105 MMOL/L
CHOLEST SERPL-MCNC: 140 MG/DL
CHOLEST/HDLC SERPL: 2.9 {RATIO}
CO2 SERPL-SCNC: 31 MMOL/L
CREAT SERPL-MCNC: 1 MG/DL
EST. GFR  (AFRICAN AMERICAN): >60 ML/MIN/1.73 M^2
EST. GFR  (NON AFRICAN AMERICAN): 52 ML/MIN/1.73 M^2
ESTIMATED AVG GLUCOSE: 126 MG/DL
GLUCOSE SERPL-MCNC: 91 MG/DL
HBA1C MFR BLD HPLC: 6 %
HDLC SERPL-MCNC: 48 MG/DL
HDLC SERPL: 34.3 %
LDLC SERPL CALC-MCNC: 67 MG/DL
NONHDLC SERPL-MCNC: 92 MG/DL
POTASSIUM SERPL-SCNC: 4.3 MMOL/L
SODIUM SERPL-SCNC: 142 MMOL/L
T4 FREE SERPL-MCNC: 1.21 NG/DL
TRIGL SERPL-MCNC: 125 MG/DL
TSH SERPL DL<=0.005 MIU/L-ACNC: 0.41 UIU/ML

## 2019-02-20 PROCEDURE — 80048 BASIC METABOLIC PNL TOTAL CA: CPT

## 2019-02-20 PROCEDURE — 84460 ALANINE AMINO (ALT) (SGPT): CPT

## 2019-02-20 PROCEDURE — 99999 PR STA SHADOW: ICD-10-PCS | Mod: PBBFAC,,, | Performed by: FAMILY MEDICINE

## 2019-02-20 PROCEDURE — 99999 PR PBB SHADOW E&M-EST. PATIENT-LVL I: ICD-10-PCS | Mod: PBBFAC,,,

## 2019-02-20 PROCEDURE — 36415 COLL VENOUS BLD VENIPUNCTURE: CPT | Mod: PBBFAC

## 2019-02-20 PROCEDURE — 99211 OFF/OP EST MAY X REQ PHY/QHP: CPT | Mod: PBBFAC

## 2019-02-20 PROCEDURE — 84439 ASSAY OF FREE THYROXINE: CPT

## 2019-02-20 PROCEDURE — 99999 PR STA SHADOW: CPT | Mod: PBBFAC,,, | Performed by: FAMILY MEDICINE

## 2019-02-20 PROCEDURE — 83036 HEMOGLOBIN GLYCOSYLATED A1C: CPT

## 2019-02-20 PROCEDURE — 99999 PR PBB SHADOW E&M-EST. PATIENT-LVL I: CPT | Mod: PBBFAC,,,

## 2019-02-20 PROCEDURE — 84443 ASSAY THYROID STIM HORMONE: CPT

## 2019-02-20 PROCEDURE — 80061 LIPID PANEL: CPT

## 2019-02-21 ENCOUNTER — OFFICE VISIT (OUTPATIENT)
Dept: NEUROLOGY | Facility: CLINIC | Age: 84
End: 2019-02-21
Payer: MEDICARE

## 2019-02-21 VITALS
BODY MASS INDEX: 34.12 KG/M2 | DIASTOLIC BLOOD PRESSURE: 76 MMHG | RESPIRATION RATE: 20 BRPM | WEIGHT: 217.38 LBS | HEIGHT: 67 IN | SYSTOLIC BLOOD PRESSURE: 122 MMHG | HEART RATE: 60 BPM

## 2019-02-21 DIAGNOSIS — G56.03 BILATERAL CARPAL TUNNEL SYNDROME: ICD-10-CM

## 2019-02-21 DIAGNOSIS — G70.00 MYASTHENIA GRAVIS, ADULT FORM: Primary | ICD-10-CM

## 2019-02-21 DIAGNOSIS — E53.8 B12 DEFICIENCY: ICD-10-CM

## 2019-02-21 PROCEDURE — 99999 PR PBB SHADOW E&M-EST. PATIENT-LVL III: ICD-10-PCS | Mod: PBBFAC,,, | Performed by: PSYCHIATRY & NEUROLOGY

## 2019-02-21 PROCEDURE — 99213 OFFICE O/P EST LOW 20 MIN: CPT | Mod: PBBFAC | Performed by: PSYCHIATRY & NEUROLOGY

## 2019-02-21 PROCEDURE — 99214 OFFICE O/P EST MOD 30 MIN: CPT | Mod: S$PBB | Performed by: PSYCHIATRY & NEUROLOGY

## 2019-02-21 PROCEDURE — 99999 PR STA SHADOW: CPT | Mod: PBBFAC,,, | Performed by: PSYCHIATRY & NEUROLOGY

## 2019-02-21 PROCEDURE — 99999 PR PBB SHADOW E&M-EST. PATIENT-LVL III: CPT | Mod: PBBFAC,,, | Performed by: PSYCHIATRY & NEUROLOGY

## 2019-02-21 RX ORDER — PREDNISONE 2.5 MG/1
2.5 TABLET ORAL DAILY
Qty: 30 TABLET | Refills: 5 | Status: SHIPPED | OUTPATIENT
Start: 2019-02-21 | End: 2019-08-22 | Stop reason: SDUPTHER

## 2019-02-26 ENCOUNTER — TELEPHONE (OUTPATIENT)
Dept: FAMILY MEDICINE | Facility: CLINIC | Age: 84
End: 2019-02-26

## 2019-02-26 ENCOUNTER — OFFICE VISIT (OUTPATIENT)
Dept: FAMILY MEDICINE | Facility: CLINIC | Age: 84
End: 2019-02-26
Payer: MEDICARE

## 2019-02-26 VITALS
RESPIRATION RATE: 16 BRPM | HEIGHT: 67 IN | OXYGEN SATURATION: 98 % | DIASTOLIC BLOOD PRESSURE: 70 MMHG | HEART RATE: 80 BPM | WEIGHT: 218 LBS | BODY MASS INDEX: 34.21 KG/M2 | SYSTOLIC BLOOD PRESSURE: 114 MMHG

## 2019-02-26 DIAGNOSIS — G70.00 MYASTHENIA GRAVIS: ICD-10-CM

## 2019-02-26 DIAGNOSIS — E03.4 HYPOTHYROIDISM DUE TO ACQUIRED ATROPHY OF THYROID: ICD-10-CM

## 2019-02-26 DIAGNOSIS — E78.5 HYPERLIPIDEMIA, UNSPECIFIED HYPERLIPIDEMIA TYPE: ICD-10-CM

## 2019-02-26 DIAGNOSIS — E55.9 VITAMIN D DEFICIENCY DISEASE: ICD-10-CM

## 2019-02-26 DIAGNOSIS — I48.20 CHRONIC ATRIAL FIBRILLATION: ICD-10-CM

## 2019-02-26 DIAGNOSIS — Z79.4 TYPE 2 DIABETES MELLITUS WITH OTHER CIRCULATORY COMPLICATION, WITH LONG-TERM CURRENT USE OF INSULIN: Primary | ICD-10-CM

## 2019-02-26 DIAGNOSIS — E11.59 TYPE 2 DIABETES MELLITUS WITH OTHER CIRCULATORY COMPLICATION, WITH LONG-TERM CURRENT USE OF INSULIN: Primary | ICD-10-CM

## 2019-02-26 DIAGNOSIS — G47.33 OSA ON CPAP: ICD-10-CM

## 2019-02-26 DIAGNOSIS — E53.8 B12 DEFICIENCY: ICD-10-CM

## 2019-02-26 DIAGNOSIS — I10 ESSENTIAL HYPERTENSION: ICD-10-CM

## 2019-02-26 PROCEDURE — 99999 PR STA SHADOW: ICD-10-PCS | Mod: PBBFAC,,, | Performed by: FAMILY MEDICINE

## 2019-02-26 PROCEDURE — 99999 PR STA SHADOW: CPT | Mod: PBBFAC,,, | Performed by: FAMILY MEDICINE

## 2019-02-26 PROCEDURE — 99213 OFFICE O/P EST LOW 20 MIN: CPT | Mod: PBBFAC | Performed by: FAMILY MEDICINE

## 2019-02-26 PROCEDURE — 99999 PR PBB SHADOW E&M-EST. PATIENT-LVL III: CPT | Mod: PBBFAC,,, | Performed by: FAMILY MEDICINE

## 2019-02-26 PROCEDURE — 99214 OFFICE O/P EST MOD 30 MIN: CPT | Mod: S$PBB | Performed by: FAMILY MEDICINE

## 2019-02-26 RX ORDER — LEVOTHYROXINE SODIUM 88 UG/1
88 TABLET ORAL DAILY
Qty: 30 TABLET | Refills: 5 | Status: SHIPPED | OUTPATIENT
Start: 2019-02-26 | End: 2019-08-22 | Stop reason: SDUPTHER

## 2019-02-26 RX ORDER — POTASSIUM CHLORIDE 20 MEQ/1
20 TABLET, EXTENDED RELEASE ORAL 2 TIMES DAILY
Qty: 60 TABLET | Refills: 5 | Status: SHIPPED | OUTPATIENT
Start: 2019-02-26 | End: 2019-09-04 | Stop reason: SDUPTHER

## 2019-02-26 RX ORDER — METOPROLOL SUCCINATE 100 MG/1
100 TABLET, EXTENDED RELEASE ORAL DAILY
Qty: 30 TABLET | Refills: 5 | Status: SHIPPED | OUTPATIENT
Start: 2019-02-26 | End: 2019-09-04 | Stop reason: SDUPTHER

## 2019-02-26 RX ORDER — ATORVASTATIN CALCIUM 10 MG/1
10 TABLET, FILM COATED ORAL NIGHTLY
Qty: 30 TABLET | Refills: 5 | Status: SHIPPED | OUTPATIENT
Start: 2019-02-26 | End: 2019-09-01 | Stop reason: SDUPTHER

## 2019-02-26 RX ORDER — PNV NO.95/FERROUS FUM/FOLIC AC 28MG-0.8MG
100 TABLET ORAL DAILY
COMMUNITY
Start: 2019-02-26 | End: 2020-03-02 | Stop reason: SDUPTHER

## 2019-02-26 RX ORDER — FUROSEMIDE 40 MG/1
40 TABLET ORAL DAILY
Qty: 30 TABLET | Refills: 5 | Status: SHIPPED | OUTPATIENT
Start: 2019-02-26 | End: 2019-09-04 | Stop reason: SDUPTHER

## 2019-02-26 NOTE — TELEPHONE ENCOUNTER
Spoke with sister, informed her that the tsh was low, so she needs to take the synthroid 88mcq to increase her levels

## 2019-02-26 NOTE — PROGRESS NOTES
Subjective:       Patient ID: Stephany Skinenr is a 84 y.o. female.    Chief Complaint: Follow-up    Patient here for checkup.  She has a history of congestive heart failure and hypertension.  She has lost over 60 lb over the past year.  Now her blood pressure is low.  She denies shortness of breath.  Has  Neuro diagnosed her with MG.  Currently on prednisone.  Since starting the prednisone her strength has improved remarkably well.  She now only needs a cane for ambulation.  She is currently taking pyridostigmine 60 mg daily.  Patient has type 2 diabetes and seems be doing well.  She stopped her insulin and her blood sugars look great.    She has lost over 60 lb in the last year.  She continues to lose weight.  Patient has COPD.  She was taking neb treatments in the nursing home as needed.  They did not send her home with a nebulizer.  This seems to be in the works.  She also has sleep apnea and is using her CPAP at least 6 hr per night.  She takes Synthroid for her hypothyroidism.  She tolerates this well.  She denies having symptoms such as heat or cold intolerance.  Her weight is stable.  She denies any swelling in her thyroid.  She tolerates her blood pressure medication as well as not having side effects such as chronic cough or dizziness.  Sometimes her systolic blood pressures less than 100.  She is not having any symptoms from it.  Patient is taking her statin every day for hyperlipidemia.  She is feeling well on the medication.  She denies side effects such as myalgias.      Review of Systems   Constitutional: Negative for activity change, chills, fatigue, fever and unexpected weight change.   HENT: Negative for sore throat and trouble swallowing.    Respiratory: Negative for cough, chest tightness and shortness of breath.    Cardiovascular: Negative for chest pain and leg swelling.   Gastrointestinal: Negative for abdominal pain.   Endocrine: Negative for cold intolerance and heat intolerance.    Genitourinary: Negative for difficulty urinating.   Musculoskeletal: Negative for back pain and joint swelling.   Skin: Positive for wound. Negative for rash.   Neurological: Negative for numbness.   Hematological: Negative for adenopathy.   Psychiatric/Behavioral: Negative for decreased concentration.       Objective:      Vitals:    02/26/19 0835   BP: 114/70   Pulse: 80   Resp: 16     Physical Exam   Constitutional: She is oriented to person, place, and time. She appears well-developed and well-nourished.   HENT:   Head: Normocephalic and atraumatic.   Eyes: Pupils are equal, round, and reactive to light.   Neck: Neck supple.   Cardiovascular: Normal rate, regular rhythm, normal heart sounds and intact distal pulses. Exam reveals no gallop and no friction rub.   No murmur heard.  Pulmonary/Chest: Effort normal and breath sounds normal. She has no wheezes. She has no rales.   Musculoskeletal: She exhibits no edema.   Neurological: She is alert and oriented to person, place, and time. No cranial nerve deficit or sensory deficit.   Skin: No rash noted.   Psychiatric: She has a normal mood and affect. Her behavior is normal. Judgment and thought content normal.       Lab Results   Component Value Date    TSH 0.410 02/20/2019     Lab Results   Component Value Date    LDLCALC 67.0 02/20/2019     BMP  Lab Results   Component Value Date     02/20/2019    K 4.3 02/20/2019     02/20/2019    CO2 31 (H) 02/20/2019    BUN 20 02/20/2019    CREATININE 1.0 02/20/2019    CALCIUM 9.4 02/20/2019    ANIONGAP 6 (L) 02/20/2019    ESTGFRAFRICA >60 02/20/2019    EGFRNONAA 52 (A) 02/20/2019     Lab Results   Component Value Date    HGBA1C 6.0 (H) 02/20/2019     Assessment:       1. Type 2 diabetes mellitus with other circulatory complication, with long-term current use of insulin    2. B12 deficiency    3. Essential hypertension    4. Hyperlipidemia, unspecified hyperlipidemia type    5. Hypothyroidism due to acquired  atrophy of thyroid    6. Myasthenia gravis    7. JOY on CPAP    8. Chronic atrial fibrillation    9. Vitamin D deficiency disease        Plan:   Stephany CARRILLO was seen today for sore on toe.    Diagnoses and all orders for this visit:    Obstructive sleep apnea  Continue CPAP at current pressure.  Patient is compliant.    Type 2 diabetes  Hold Lantus for now  Check hemoglobin A1c every 3 months    Pressure ulcer of toe of left foot, stage 3  This seems to be healed    Essential hypertension/CHF  Continue Lasix 40 mg, Toprol- mg,  Continue potassium    Hypothyroidism due to acquired atrophy of thyroid  Continue Synthroid 88 µg by mouth daily    Hyperlipidemia, unspecified hyperlipidemia type  Continue fish oil  Continue Lipitor 10 mg    Myasthenia gravis  Continue prednisone 10 mg daily  Continue Mestinon  Keep appointment with neurology    COPD  Continue DuoNeb treatments when the nebulizer on his    Congestive heart failure/atrial fibrillation  Continue Eliquis    Type 2 diabetes mellitus with other circulatory complication, with long-term current use of insulin  -     Hemoglobin A1c; Future; Expected date: 02/26/2019    B12 deficiency  -     cyanocobalamin (VITAMIN B-12) 100 MCG tablet; Take 1 tablet (100 mcg total) by mouth once daily.  -     Vitamin B12; Future; Expected date: 07/26/2019    Essential hypertension  -     furosemide (LASIX) 40 MG tablet; Take 1 tablet (40 mg total) by mouth once daily.  Dispense: 30 tablet; Refill: 5  -     metoprolol succinate (TOPROL-XL) 100 MG 24 hr tablet; Take 1 tablet (100 mg total) by mouth once daily.  Dispense: 30 tablet; Refill: 5  -     potassium chloride SA (K-DUR,KLOR-CON) 20 MEQ tablet; Take 1 tablet (20 mEq total) by mouth 2 (two) times daily.  Dispense: 60 tablet; Refill: 5  -     Basic metabolic panel; Future; Expected date: 07/26/2019    Hyperlipidemia, unspecified hyperlipidemia type  -     atorvastatin (LIPITOR) 10 MG tablet; Take 1 tablet (10 mg total) by  mouth every evening.  Dispense: 30 tablet; Refill: 5  -     ALT (SGPT); Future; Expected date: 07/26/2019  -     Lipid panel; Future; Expected date: 07/26/2019    Hypothyroidism due to acquired atrophy of thyroid  -     levothyroxine (SYNTHROID) 88 MCG tablet; Take 1 tablet (88 mcg total) by mouth once daily.  Dispense: 30 tablet; Refill: 5  -     TSH; Future; Expected date: 07/26/2019    Myasthenia gravis    JOY on CPAP    Chronic atrial fibrillation  -     CBC auto differential; Future; Expected date: 07/26/2019    Vitamin D deficiency disease  -     Vitamin D; Future; Expected date: 07/26/2019     Continue treatment with home health.     Return to clinic in 3 month.  If lab work looks good I will start seeing patient every 6 months.

## 2019-02-26 NOTE — TELEPHONE ENCOUNTER
----- Message from Krysten Vieira sent at 2019  3:31 PM CST -----  Contact: pt  Stephany Skinner  MRN: [id number]  : 1935  PCP: Pipo Flowers  Home Phone      [phone]  Work Phone      Not on file.  Mobile          Not on file.      MESSAGE:     Pt would like to talk to  about a medication that was filled for her but she was not told she needed to be on. She does not want to take something she does not need. Please call her as she said she's called here before and never got a return call.     [phone]

## 2019-04-10 NOTE — PLAN OF CARE
Problem: Patient Care Overview  Goal: Plan of Care Review  Outcome: Ongoing (interventions implemented as appropriate)  Nutrition Goals: adequate po intake >=75% by discharge  Nutrition Goal Status: progressing towards goal  Communication of RD Recs: discussed on rounds    Nutrition Discharge Planning: Low Sodium diet with adequate intake toe meet EEN & EPN    Continuum of Care Plan Referral to Outpatient Services: home care       36.5

## 2019-04-15 DIAGNOSIS — G70.00 MYASTHENIA GRAVIS: ICD-10-CM

## 2019-04-16 RX ORDER — PYRIDOSTIGMINE BROMIDE 60 MG/1
TABLET ORAL
Qty: 90 TABLET | Refills: 5 | Status: SHIPPED | OUTPATIENT
Start: 2019-04-16 | End: 2019-07-09 | Stop reason: SDUPTHER

## 2019-07-09 DIAGNOSIS — G70.00 MYASTHENIA GRAVIS: ICD-10-CM

## 2019-07-10 RX ORDER — PYRIDOSTIGMINE BROMIDE 60 MG/1
TABLET ORAL
Qty: 90 TABLET | Refills: 5 | Status: SHIPPED | OUTPATIENT
Start: 2019-07-10 | End: 2020-03-02 | Stop reason: SDUPTHER

## 2019-08-20 ENCOUNTER — CLINICAL SUPPORT (OUTPATIENT)
Dept: FAMILY MEDICINE | Facility: CLINIC | Age: 84
End: 2019-08-20
Payer: MEDICARE

## 2019-08-20 DIAGNOSIS — E53.8 B12 DEFICIENCY: ICD-10-CM

## 2019-08-20 DIAGNOSIS — I10 ESSENTIAL HYPERTENSION: ICD-10-CM

## 2019-08-20 DIAGNOSIS — E78.5 HYPERLIPIDEMIA, UNSPECIFIED HYPERLIPIDEMIA TYPE: ICD-10-CM

## 2019-08-20 DIAGNOSIS — I48.20 CHRONIC ATRIAL FIBRILLATION: ICD-10-CM

## 2019-08-20 DIAGNOSIS — E03.4 HYPOTHYROIDISM DUE TO ACQUIRED ATROPHY OF THYROID: ICD-10-CM

## 2019-08-20 DIAGNOSIS — E55.9 VITAMIN D DEFICIENCY DISEASE: ICD-10-CM

## 2019-08-20 DIAGNOSIS — Z79.4 TYPE 2 DIABETES MELLITUS WITH OTHER CIRCULATORY COMPLICATION, WITH LONG-TERM CURRENT USE OF INSULIN: ICD-10-CM

## 2019-08-20 DIAGNOSIS — E11.59 TYPE 2 DIABETES MELLITUS WITH OTHER CIRCULATORY COMPLICATION, WITH LONG-TERM CURRENT USE OF INSULIN: ICD-10-CM

## 2019-08-20 LAB
25(OH)D3+25(OH)D2 SERPL-MCNC: 30 NG/ML (ref 30–96)
ALT SERPL W/O P-5'-P-CCNC: 13 U/L (ref 10–44)
ANION GAP SERPL CALC-SCNC: 11 MMOL/L (ref 8–16)
BASOPHILS # BLD AUTO: 0.03 K/UL (ref 0–0.2)
BASOPHILS NFR BLD: 0.7 % (ref 0–1.9)
BUN SERPL-MCNC: 23 MG/DL (ref 8–23)
CALCIUM SERPL-MCNC: 9.3 MG/DL (ref 8.7–10.5)
CHLORIDE SERPL-SCNC: 106 MMOL/L (ref 95–110)
CHOLEST SERPL-MCNC: 137 MG/DL (ref 120–199)
CHOLEST/HDLC SERPL: 3 {RATIO} (ref 2–5)
CO2 SERPL-SCNC: 27 MMOL/L (ref 23–29)
CREAT SERPL-MCNC: 1 MG/DL (ref 0.5–1.4)
DIFFERENTIAL METHOD: ABNORMAL
EOSINOPHIL # BLD AUTO: 0.1 K/UL (ref 0–0.5)
EOSINOPHIL NFR BLD: 3.2 % (ref 0–8)
ERYTHROCYTE [DISTWIDTH] IN BLOOD BY AUTOMATED COUNT: 13.3 % (ref 11.5–14.5)
EST. GFR  (AFRICAN AMERICAN): >60 ML/MIN/1.73 M^2
EST. GFR  (NON AFRICAN AMERICAN): 52 ML/MIN/1.73 M^2
GLUCOSE SERPL-MCNC: 89 MG/DL (ref 70–110)
HCT VFR BLD AUTO: 40 % (ref 37–48.5)
HDLC SERPL-MCNC: 46 MG/DL (ref 40–75)
HDLC SERPL: 33.6 % (ref 20–50)
HGB BLD-MCNC: 12.4 G/DL (ref 12–16)
IMM GRANULOCYTES # BLD AUTO: 0.02 K/UL (ref 0–0.04)
IMM GRANULOCYTES NFR BLD AUTO: 0.5 % (ref 0–0.5)
LDLC SERPL CALC-MCNC: 72 MG/DL (ref 63–159)
LYMPHOCYTES # BLD AUTO: 1.2 K/UL (ref 1–4.8)
LYMPHOCYTES NFR BLD: 27 % (ref 18–48)
MCH RBC QN AUTO: 30.7 PG (ref 27–31)
MCHC RBC AUTO-ENTMCNC: 31 G/DL (ref 32–36)
MCV RBC AUTO: 99 FL (ref 82–98)
MONOCYTES # BLD AUTO: 0.5 K/UL (ref 0.3–1)
MONOCYTES NFR BLD: 11.7 % (ref 4–15)
NEUTROPHILS # BLD AUTO: 2.5 K/UL (ref 1.8–7.7)
NEUTROPHILS NFR BLD: 56.9 % (ref 38–73)
NONHDLC SERPL-MCNC: 91 MG/DL
NRBC BLD-RTO: 0 /100 WBC
PLATELET # BLD AUTO: 210 K/UL (ref 150–350)
PMV BLD AUTO: 9.9 FL (ref 9.2–12.9)
POTASSIUM SERPL-SCNC: 4.3 MMOL/L (ref 3.5–5.1)
RBC # BLD AUTO: 4.04 M/UL (ref 4–5.4)
SODIUM SERPL-SCNC: 144 MMOL/L (ref 136–145)
TRIGL SERPL-MCNC: 95 MG/DL (ref 30–150)
TSH SERPL DL<=0.005 MIU/L-ACNC: 1.33 UIU/ML (ref 0.4–4)
WBC # BLD AUTO: 4.44 K/UL (ref 3.9–12.7)

## 2019-08-20 PROCEDURE — 84443 ASSAY THYROID STIM HORMONE: CPT

## 2019-08-20 PROCEDURE — 99999 PR STA SHADOW: CPT | Mod: PBBFAC,,, | Performed by: FAMILY MEDICINE

## 2019-08-20 PROCEDURE — 83036 HEMOGLOBIN GLYCOSYLATED A1C: CPT

## 2019-08-20 PROCEDURE — 84460 ALANINE AMINO (ALT) (SGPT): CPT

## 2019-08-20 PROCEDURE — 85025 COMPLETE CBC W/AUTO DIFF WBC: CPT

## 2019-08-20 PROCEDURE — 82607 VITAMIN B-12: CPT

## 2019-08-20 PROCEDURE — 80048 BASIC METABOLIC PNL TOTAL CA: CPT

## 2019-08-20 PROCEDURE — 99999 PR STA SHADOW: ICD-10-PCS | Mod: PBBFAC,,, | Performed by: FAMILY MEDICINE

## 2019-08-20 PROCEDURE — 80061 LIPID PANEL: CPT

## 2019-08-20 PROCEDURE — 36415 COLL VENOUS BLD VENIPUNCTURE: CPT | Mod: PBBFAC

## 2019-08-20 PROCEDURE — 82306 VITAMIN D 25 HYDROXY: CPT

## 2019-08-21 LAB
ESTIMATED AVG GLUCOSE: 120 MG/DL (ref 68–131)
HBA1C MFR BLD HPLC: 5.8 % (ref 4–5.6)
VIT B12 SERPL-MCNC: 602 PG/ML (ref 210–950)

## 2019-08-22 DIAGNOSIS — E03.4 HYPOTHYROIDISM DUE TO ACQUIRED ATROPHY OF THYROID: ICD-10-CM

## 2019-08-22 RX ORDER — PREDNISONE 5 MG/1
TABLET ORAL
Qty: 15 TABLET | Refills: 0 | OUTPATIENT
Start: 2019-08-22

## 2019-08-22 RX ORDER — LEVOTHYROXINE SODIUM 88 UG/1
TABLET ORAL
Qty: 30 TABLET | Refills: 0 | Status: SHIPPED | OUTPATIENT
Start: 2019-08-22 | End: 2019-09-04 | Stop reason: SDUPTHER

## 2019-08-22 RX ORDER — PREDNISONE 2.5 MG/1
2.5 TABLET ORAL DAILY
Qty: 30 TABLET | Refills: 5 | Status: SHIPPED | OUTPATIENT
Start: 2019-08-22 | End: 2019-08-29 | Stop reason: SDUPTHER

## 2019-08-27 ENCOUNTER — PATIENT OUTREACH (OUTPATIENT)
Dept: ADMINISTRATIVE | Facility: OTHER | Age: 84
End: 2019-08-27

## 2019-08-27 NOTE — PROGRESS NOTES
Release form sent to Dr. Le's office via [employer] to obtain Ms. Skinner's eye exam report.

## 2019-08-27 NOTE — LETTER
AUTHORIZATION FOR RELEASE OF   CONFIDENTIAL INFORMATION    Dear Dr. Krisyt Le,    We are seeing Stephany Skinner, date of birth 1935, in the clinic at [facility]. Pipo Flowers MD is the patient's PCP. Stephany Skinner has an outstanding lab/procedure at the time we reviewed her chart. In order to help keep her health information updated, she has authorized us to request the following medical record(s):                                               ( x )  EYE EXAM                 Please fax records to [facility], Jack W Heidenreich, MD, [phone]     If you have any questions, please contact Julia Lovett at [phone].           Patient Name: Stephany Skinner  : 1935  Patient Phone #: [phone]

## 2019-08-28 ENCOUNTER — TELEPHONE (OUTPATIENT)
Dept: ADMINISTRATIVE | Facility: OTHER | Age: 84
End: 2019-08-28

## 2019-08-29 ENCOUNTER — OFFICE VISIT (OUTPATIENT)
Dept: NEUROLOGY | Facility: CLINIC | Age: 84
End: 2019-08-29
Payer: MEDICARE

## 2019-08-29 VITALS
HEIGHT: 55 IN | BODY MASS INDEX: 46.53 KG/M2 | SYSTOLIC BLOOD PRESSURE: 118 MMHG | RESPIRATION RATE: 16 BRPM | DIASTOLIC BLOOD PRESSURE: 70 MMHG | HEART RATE: 56 BPM | WEIGHT: 201.06 LBS

## 2019-08-29 DIAGNOSIS — E53.8 B12 DEFICIENCY: ICD-10-CM

## 2019-08-29 DIAGNOSIS — Z79.4 TYPE 2 DIABETES MELLITUS WITH OTHER CIRCULATORY COMPLICATION, WITH LONG-TERM CURRENT USE OF INSULIN: ICD-10-CM

## 2019-08-29 DIAGNOSIS — I10 ESSENTIAL HYPERTENSION: ICD-10-CM

## 2019-08-29 DIAGNOSIS — I48.20 CHRONIC ATRIAL FIBRILLATION: ICD-10-CM

## 2019-08-29 DIAGNOSIS — E03.4 HYPOTHYROIDISM DUE TO ACQUIRED ATROPHY OF THYROID: ICD-10-CM

## 2019-08-29 DIAGNOSIS — G70.00 MYASTHENIA GRAVIS, ADULT FORM: Primary | ICD-10-CM

## 2019-08-29 DIAGNOSIS — E11.59 TYPE 2 DIABETES MELLITUS WITH OTHER CIRCULATORY COMPLICATION, WITH LONG-TERM CURRENT USE OF INSULIN: ICD-10-CM

## 2019-08-29 DIAGNOSIS — E78.5 HYPERLIPIDEMIA, UNSPECIFIED HYPERLIPIDEMIA TYPE: ICD-10-CM

## 2019-08-29 DIAGNOSIS — G47.33 OBSTRUCTIVE SLEEP APNEA (ADULT) (PEDIATRIC): ICD-10-CM

## 2019-08-29 PROBLEM — R53.81 DEBILITY: Status: RESOLVED | Noted: 2018-02-07 | Resolved: 2019-08-29

## 2019-08-29 PROCEDURE — 99999 PR PBB SHADOW E&M-EST. PATIENT-LVL III: ICD-10-PCS | Mod: PBBFAC,,, | Performed by: NURSE PRACTITIONER

## 2019-08-29 PROCEDURE — 99214 OFFICE O/P EST MOD 30 MIN: CPT | Mod: S$PBB | Performed by: NURSE PRACTITIONER

## 2019-08-29 PROCEDURE — 99213 OFFICE O/P EST LOW 20 MIN: CPT | Mod: PBBFAC | Performed by: NURSE PRACTITIONER

## 2019-08-29 PROCEDURE — 99999 PR PBB SHADOW E&M-EST. PATIENT-LVL III: CPT | Mod: PBBFAC,,, | Performed by: NURSE PRACTITIONER

## 2019-08-29 PROCEDURE — 99999 PR STA SHADOW: CPT | Mod: PBBFAC,,, | Performed by: NURSE PRACTITIONER

## 2019-08-29 RX ORDER — APIXABAN 5 MG/1
5 TABLET, FILM COATED ORAL 2 TIMES DAILY
Refills: 1 | COMMUNITY
Start: 2019-07-29

## 2019-08-29 RX ORDER — PREDNISONE 2.5 MG/1
2.5 TABLET ORAL EVERY OTHER DAY
Qty: 15 TABLET | Refills: 4 | Status: SHIPPED | OUTPATIENT
Start: 2019-08-29 | End: 2019-09-04 | Stop reason: SDUPTHER

## 2019-08-29 RX ORDER — PREDNISONE 5 MG/1
TABLET ORAL
Refills: 0 | COMMUNITY
Start: 2019-07-22 | End: 2019-08-29

## 2019-08-29 NOTE — PROGRESS NOTES
HPI:  Stephany Skinner is a 84 y.o. female with  increased falls, some lumbar radicular pain.  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 Admitted for ARF with hypercapnea, ptosis, and gaze restriction in 1/2018; response to steroids suggestive of myasthenia, though antibodies are negative, and EMG with repetitve stimulation from 7/2018 was inconclusive. PUJA during 1/2018 admit, secondary to overdiuresis. She has JOY, DM, HTN, HLD, hypothyroidism, B12 deficiency, and A-fib.     Since the last visit, the patient reduced prednisone to 2.5 mg daily and she feels well on this dose. No weakness or double vision. No SOB or generalized weakness. She is using the walker well and staying active in her community.    No falls since her last visit.     CTS symptoms are not active except on rare occassions.    Neuropathy symptoms are not active.     Spinal pain not active.     Review of Systems   Constitutional: Negative for fever.   HENT: Negative for hearing loss.    Eyes: Negative for double vision.   Respiratory: Negative for hemoptysis.    Cardiovascular: Negative for leg swelling.   Gastrointestinal: Negative for blood in stool.   Genitourinary: Negative for hematuria.   Musculoskeletal: Negative for back pain and falls.   Skin: Negative for rash.   Neurological: Negative for focal weakness, seizures and weakness.   Psychiatric/Behavioral: The patient does not have insomnia.      Exam:  Gen Appearance, well developed/nourished in no apparent distress; on O2 NC  CV: 2+ distal pulses with no edema or swelling  Neuro:  MS: Awake, alert,Sustains attention. Recent/remote memory intact, Language is full to spontaneous speech/comprehension. Fund of Knowledge is full  CN: Optic discs are flat with normal vasculature, PERRL, Extraoccular movements and  visual fields are full-no gaze restriction today. Normal facial sensation and strength-no ptosis, Hearing symmetric, Tongue and Palate are midline and strong. Shoulder Shrug symmetric and strong.  Motor: Normal bulk, tone, no abnormal movements. 5/5 strength bilateral upper/lower extremities with 1+ reflexes in the arms, and are less in the legs and bilateral plantar response  Sensory: symmetric to light touch, pain, temp, and vibration/proprioception except decreased sensation in the legs. Romberg negative  Cerebellar: Finger-nose,Heal-shin, Rapid alternating movements intact  Gait: arthralgic gait; ambulates with walker but can walk well without walker as well    Imagin/16 CT L spine: Multilevel degenerative changes of the lumbar spine without evidence for fracture or subluxation.  There is likely a component of mild neural foraminal narrowing and central canal stenosis at the L3-4 and L4-5 levels.  This could be better evaluated on a nonemergent basis with MRI.    2016 MRI L spine: Multilevel degenerative changes of the lumbar spine contributing to central canal stenosis and neural foraminal narrowing as detailed in the above report.    Edema-like signal about the posterior elements on the right at L5-S1 which can be a source of pain.    2018 EMG with repetitive stimulation at [facility] per Dr. Luis Angel Vu:   Slow repetitive stimulation (3 Htz) performed at baseline and at one minute post exercise intervals at the left ADM and left trapezius muscles revealed significant CMAP detriment at the ADM, but none at the trapezius.     Impression:   The results of the study are inconclusive. There is no definite EP evidence of a neuromuscular junction pathology.     2016 EMG legs:   1. Mild Bilateral Carpal Tunnel Syndrome  2. 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 Bilateral Lumbar Radiculopathy best localizing from L4-5 on this study    Labs: 10/2016  glucose 136. Reviewed CMP, CBC TSh    SPEP normal  B12 low normal  8/2018 CMP, CBC and B12 reviewed  11/2018 BMP with alt reviewed  8/2019 A1C 5.8, CBC and CMP unremarkable    Assessment/Plan: Stephany Skinner is a 84 y.o. female with  increased falls, some lumbar radicular pain.  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 Admitted for ARF with hypercapnea, ptosis, and gaze restriction in 1/2018; response to steroids suggestive of myasthenia, though antibodies are negative, and EMG with repetitve stimulation from 7/2018 was inconclusive. PUJA during 1/2018 admit, secondary to overdiuresis. She has JOY, DM, HTN, HLD, hypothyroidism, B12 deficiency, and A-fib.     I recommend:   1. Continue periodic CBC and CMP, given Prednisone use.  2. Despite negative MG antibodies and inconclusive EMG with rep stim findings without definitive evidence of neuromuscular junction disorer, her clinical picture was supportive of myasthenia gravis- but has recovered well now.   3. Continue Mestinon bid-tid.   4. She has had no worsening with reduction to Prednisone 2.5 mg qd. Reduce further to 2.5mg every other day for the next 4 months, and if she is stable with this reduction, I will stop her Prednisone after this. Raise back to 2.5 mg daily if any ptosis, weakness of chewing or speech or any arm or leg weakness. If she needs to remain on steroids long term, could consider steroid sparing therapy, such as Imuran versus Cellcept. She has DM, though well controlled currently. She will follow with PCP for management of this.  5. Advised to go to ED if SOB. Advised on symptoms of myasthenic crisis.   6. No treatment needed for mild CTS symptoms-- can wear a brace if needed.  7. Continue B12 for low normal B12 levels. Check B12 levels over time. Her DM could be causing her neuropathy.  DM treated  per PCP.   8. She has no pain for lumbar radiculopathy and has some occasional lower back pain (not currently active) related to stenosis which she treats with rest. She would defer surgical consult. PT helped reduce pain greatly as well as falls prior. Fall precautions reviewed-- use walker. No pain from neuropathy currently.    RTC 6 months

## 2019-09-01 DIAGNOSIS — E78.5 HYPERLIPIDEMIA, UNSPECIFIED HYPERLIPIDEMIA TYPE: ICD-10-CM

## 2019-09-01 RX ORDER — ATORVASTATIN CALCIUM 10 MG/1
TABLET, FILM COATED ORAL
Qty: 30 TABLET | Refills: 5 | Status: SHIPPED | OUTPATIENT
Start: 2019-09-01 | End: 2020-02-17

## 2019-09-04 ENCOUNTER — OFFICE VISIT (OUTPATIENT)
Dept: FAMILY MEDICINE | Facility: CLINIC | Age: 84
End: 2019-09-04
Payer: MEDICARE

## 2019-09-04 VITALS
BODY MASS INDEX: 46.52 KG/M2 | DIASTOLIC BLOOD PRESSURE: 62 MMHG | HEIGHT: 55 IN | RESPIRATION RATE: 18 BRPM | HEART RATE: 64 BPM | SYSTOLIC BLOOD PRESSURE: 112 MMHG | WEIGHT: 201 LBS

## 2019-09-04 DIAGNOSIS — G70.00 MYASTHENIA GRAVIS, ADULT FORM: ICD-10-CM

## 2019-09-04 DIAGNOSIS — Z79.4 TYPE 2 DIABETES MELLITUS WITH OTHER CIRCULATORY COMPLICATION, WITH LONG-TERM CURRENT USE OF INSULIN: Primary | ICD-10-CM

## 2019-09-04 DIAGNOSIS — E03.4 HYPOTHYROIDISM DUE TO ACQUIRED ATROPHY OF THYROID: ICD-10-CM

## 2019-09-04 DIAGNOSIS — E11.59 TYPE 2 DIABETES MELLITUS WITH OTHER CIRCULATORY COMPLICATION, WITH LONG-TERM CURRENT USE OF INSULIN: Primary | ICD-10-CM

## 2019-09-04 DIAGNOSIS — E78.5 HYPERLIPIDEMIA, UNSPECIFIED HYPERLIPIDEMIA TYPE: ICD-10-CM

## 2019-09-04 DIAGNOSIS — I10 ESSENTIAL HYPERTENSION: ICD-10-CM

## 2019-09-04 PROCEDURE — 99213 OFFICE O/P EST LOW 20 MIN: CPT | Mod: PBBFAC | Performed by: FAMILY MEDICINE

## 2019-09-04 PROCEDURE — 99214 OFFICE O/P EST MOD 30 MIN: CPT | Mod: S$PBB | Performed by: FAMILY MEDICINE

## 2019-09-04 PROCEDURE — 99999 PR STA SHADOW: CPT | Mod: PBBFAC,,, | Performed by: FAMILY MEDICINE

## 2019-09-04 PROCEDURE — 99999 PR PBB SHADOW E&M-EST. PATIENT-LVL III: CPT | Mod: PBBFAC,,, | Performed by: FAMILY MEDICINE

## 2019-09-04 PROCEDURE — 99999 PR PBB SHADOW E&M-EST. PATIENT-LVL III: ICD-10-PCS | Mod: PBBFAC,,, | Performed by: FAMILY MEDICINE

## 2019-09-04 RX ORDER — LEVOTHYROXINE SODIUM 88 UG/1
88 TABLET ORAL DAILY
Qty: 30 TABLET | Refills: 5 | Status: SHIPPED | OUTPATIENT
Start: 2019-09-04 | End: 2020-03-02 | Stop reason: SDUPTHER

## 2019-09-04 RX ORDER — POTASSIUM CHLORIDE 20 MEQ/1
20 TABLET, EXTENDED RELEASE ORAL DAILY
Qty: 30 TABLET | Refills: 5 | Status: SHIPPED | OUTPATIENT
Start: 2019-09-04 | End: 2020-03-02 | Stop reason: SDUPTHER

## 2019-09-04 RX ORDER — FUROSEMIDE 40 MG/1
40 TABLET ORAL DAILY
Qty: 20 TABLET | Refills: 5 | Status: SHIPPED | OUTPATIENT
Start: 2019-09-04 | End: 2020-03-02 | Stop reason: SDUPTHER

## 2019-09-04 RX ORDER — PREDNISONE 2.5 MG/1
2.5 TABLET ORAL EVERY OTHER DAY
Qty: 15 TABLET | Refills: 4 | Status: SHIPPED | OUTPATIENT
Start: 2019-09-04 | End: 2020-02-04

## 2019-09-04 RX ORDER — OMEGA-3-ACID ETHYL ESTERS 1 G/1
2 CAPSULE, LIQUID FILLED ORAL 2 TIMES DAILY
Qty: 120 CAPSULE | Refills: 5 | Status: SHIPPED | OUTPATIENT
Start: 2019-09-04 | End: 2020-09-04 | Stop reason: SDUPTHER

## 2019-09-04 RX ORDER — METOPROLOL SUCCINATE 100 MG/1
100 TABLET, EXTENDED RELEASE ORAL DAILY
Qty: 30 TABLET | Refills: 5 | Status: SHIPPED | OUTPATIENT
Start: 2019-09-04 | End: 2020-03-02 | Stop reason: SDUPTHER

## 2019-09-04 NOTE — PROGRESS NOTES
Subjective:       Patient ID: Stephany Skinner is a 84 y.o. female.    Chief Complaint: Follow-up (6 month check up); Diarrhea; and Mass (thinks she may have a hernia to her upper abdomen)    Patient here for checkup.  She has a history of congestive heart failure and hypertension.  She has lost over 60 lb over the past year.  Now her blood pressure is low.  She denies shortness of breath.  Has  Neuro diagnosed her with MG.  Currently on prednisone.  Since starting the prednisone her strength has improved remarkably well.  She now only needs a cane for ambulation.  She is currently taking pyridostigmine 60 mg daily.  Patient has type 2 diabetes and seems be doing well.  She stopped her insulin and her blood sugars look great.    She has lost over 60 lb in the last year.  She continues to lose weight.  Patient has COPD.  She was taking neb treatments in the nursing home as needed.  They did not send her home with a nebulizer.  This seems to be in the works.  She also has sleep apnea and is using her CPAP at least 6 hr per night.  She takes Synthroid for her hypothyroidism.  She tolerates this well.  She denies having symptoms such as heat or cold intolerance.  Her weight is stable.  She denies any swelling in her thyroid.  She tolerates her blood pressure medication as well as not having side effects such as chronic cough or dizziness.  Sometimes her systolic blood pressures less than 100.  She is not having any symptoms from it.  Patient is taking her statin every day for hyperlipidemia.  She is feeling well on the medication.  She denies side effects such as myalgias.  Since losing so much weight.  She notices he has a large incisional wall hernia on her abdomen.  This is not causing symptoms.  She does have persistent diarrhea.    Diarrhea    Pertinent negatives include no abdominal pain, chills, coughing or fever.   Mass   Pertinent negatives include no abdominal pain, chest pain, chills, coughing, fatigue, fever,  joint swelling, numbness, rash or sore throat.     Review of Systems   Constitutional: Negative for activity change, chills, fatigue, fever and unexpected weight change.   HENT: Negative for sore throat and trouble swallowing.    Respiratory: Negative for cough, chest tightness and shortness of breath.    Cardiovascular: Negative for chest pain and leg swelling.   Gastrointestinal: Positive for diarrhea. Negative for abdominal pain.   Endocrine: Negative for cold intolerance and heat intolerance.   Genitourinary: Negative for difficulty urinating.   Musculoskeletal: Negative for back pain and joint swelling.   Skin: Positive for wound. Negative for rash.   Neurological: Negative for numbness.   Hematological: Negative for adenopathy.   Psychiatric/Behavioral: Negative for decreased concentration.       Objective:      Vitals:    09/04/19 0832   BP: 112/62   Pulse: 64   Resp: 18     Physical Exam   Constitutional: She is oriented to person, place, and time. She appears well-developed and well-nourished.   HENT:   Head: Normocephalic and atraumatic.   Eyes: Pupils are equal, round, and reactive to light.   Neck: Neck supple.   Cardiovascular: Normal rate, regular rhythm, normal heart sounds and intact distal pulses. Exam reveals no gallop and no friction rub.   No murmur heard.  Pulmonary/Chest: Effort normal and breath sounds normal. She has no wheezes. She has no rales.   Abdominal: Soft. Bowel sounds are normal. A hernia (incisional wall hernia, reducible) is present.   Musculoskeletal: She exhibits no edema.   Neurological: She is alert and oriented to person, place, and time. No cranial nerve deficit or sensory deficit.   Skin: No rash noted.   Psychiatric: She has a normal mood and affect. Her behavior is normal. Judgment and thought content normal.       Lab Results   Component Value Date    TSH 1.329 08/20/2019     Lab Results   Component Value Date    LDLCALC 72.0 08/20/2019     BMP  Lab Results   Component  Value Date     08/20/2019    K 4.3 08/20/2019     08/20/2019    CO2 27 08/20/2019    BUN 23 08/20/2019    CREATININE 1.0 08/20/2019    CALCIUM 9.3 08/20/2019    ANIONGAP 11 08/20/2019    ESTGFRAFRICA >60 08/20/2019    EGFRNONAA 52 (A) 08/20/2019     Lab Results   Component Value Date    HGBA1C 5.8 (H) 08/20/2019     Assessment:       1. Type 2 diabetes mellitus with other circulatory complication, with long-term current use of insulin    2. Essential hypertension    3. Hyperlipidemia, unspecified hyperlipidemia type    4. Hypothyroidism due to acquired atrophy of thyroid    5. Myasthenia gravis, adult form        Plan:   Stephany CARRILLO was seen today for sore on toe.    Diagnoses and all orders for this visit:    Obstructive sleep apnea  Continue CPAP at current pressure.  Patient is compliant.    Type 2 diabetes  Hold Lantus for now  Check hemoglobin A1c every 3 months    Pressure ulcer of toe of left foot, stage 3  This seems to be healed    Essential hypertension/CHF  Continue Lasix 40 mg, Toprol- mg,  Continue potassium    Hypothyroidism due to acquired atrophy of thyroid  Continue Synthroid 88 µg by mouth daily    Hyperlipidemia, unspecified hyperlipidemia type  Continue fish oil  Continue Lipitor 10 mg    Myasthenia gravis  Continue prednisone 10 mg daily  Continue Mestinon  Keep appointment with neurology    COPD  Continue DuoNeb treatments when the nebulizer on his    Congestive heart failure/atrial fibrillation  Continue Eliquis    Diarrhea  Avoid fish oil for now and see if the diarrhea gets better.    Type 2 diabetes mellitus with other circulatory complication, with long-term current use of insulin  -     Hemoglobin A1c; Future; Expected date: 12/04/2019    Essential hypertension  -     potassium chloride SA (K-DUR,KLOR-CON) 20 MEQ tablet; Take 1 tablet (20 mEq total) by mouth once daily.  Dispense: 30 tablet; Refill: 5  -     metoprolol succinate (TOPROL-XL) 100 MG 24 hr tablet; Take 1 tablet  (100 mg total) by mouth once daily.  Dispense: 30 tablet; Refill: 5  -     furosemide (LASIX) 40 MG tablet; Take 1 tablet (40 mg total) by mouth Daily.  Dispense: 20 tablet; Refill: 5  -     Comprehensive metabolic panel; Future; Expected date: 02/04/2020    Hyperlipidemia, unspecified hyperlipidemia type  -     omega-3 acid ethyl esters (LOVAZA) 1 gram capsule; Take 2 capsules (2 g total) by mouth 2 (two) times daily.  Dispense: 120 capsule; Refill: 5  -     Lipid panel; Future; Expected date: 02/04/2020    Hypothyroidism due to acquired atrophy of thyroid  -     levothyroxine (SYNTHROID) 88 MCG tablet; Take 1 tablet (88 mcg total) by mouth once daily.  Dispense: 30 tablet; Refill: 5  -     TSH; Future; Expected date: 02/04/2020  -     T4, free; Future; Expected date: 02/04/2020    Myasthenia gravis, adult form  -     predniSONE (DELTASONE) 2.5 MG tablet; Take 1 tablet (2.5 mg total) by mouth every other day.  Dispense: 15 tablet; Refill: 4     Continue treatment with home health.     Return to clinic in 3 month.  If lab work looks good I will start seeing patient every 6 months.

## 2019-09-30 ENCOUNTER — EXTERNAL CHRONIC CARE MANAGEMENT (OUTPATIENT)
Dept: PRIMARY CARE CLINIC | Facility: CLINIC | Age: 84
End: 2019-09-30
Payer: MEDICARE

## 2019-09-30 PROCEDURE — 99999 PR STA SHADOW: CPT | Mod: PBBFAC,,, | Performed by: FAMILY MEDICINE

## 2019-09-30 PROCEDURE — 99999 PR STA SHADOW: ICD-10-PCS | Mod: PBBFAC,,, | Performed by: FAMILY MEDICINE

## 2019-09-30 PROCEDURE — 99490 CHRNC CARE MGMT STAFF 1ST 20: CPT | Mod: PBBFAC | Performed by: FAMILY MEDICINE

## 2019-10-31 ENCOUNTER — EXTERNAL CHRONIC CARE MANAGEMENT (OUTPATIENT)
Dept: PRIMARY CARE CLINIC | Facility: CLINIC | Age: 84
End: 2019-10-31
Payer: MEDICARE

## 2019-10-31 PROCEDURE — 99490 CHRNC CARE MGMT STAFF 1ST 20: CPT | Mod: PBBFAC | Performed by: FAMILY MEDICINE

## 2019-10-31 PROCEDURE — 99999 PR STA SHADOW: ICD-10-PCS | Mod: PBBFAC,,, | Performed by: FAMILY MEDICINE

## 2019-10-31 PROCEDURE — 99999 PR STA SHADOW: CPT | Mod: PBBFAC,,, | Performed by: FAMILY MEDICINE

## 2019-11-04 ENCOUNTER — CLINICAL SUPPORT (OUTPATIENT)
Dept: FAMILY MEDICINE | Facility: CLINIC | Age: 84
End: 2019-11-04
Payer: MEDICARE

## 2019-11-04 DIAGNOSIS — Z23 IMMUNIZATION DUE: Primary | ICD-10-CM

## 2019-11-04 PROCEDURE — 99999 FLU VACCINE - HIGH DOSE (65+) PRESERVATIVE FREE IM: CPT | Mod: PBBFAC,,,

## 2019-11-04 PROCEDURE — 99999 PNEUMOCOCCAL POLYSACCHARIDE VACCINE 23-VALENT =>2YO SQ IM: CPT | Mod: PBBFAC,,,

## 2019-11-04 PROCEDURE — 99999 PNEUMOCOCCAL POLYSACCHARIDE VACCINE 23-VALENT =>2YO SQ IM: ICD-10-PCS | Mod: PBBFAC,,,

## 2019-11-04 PROCEDURE — 90662 IIV NO PRSV INCREASED AG IM: CPT | Mod: PBBFAC

## 2019-11-04 PROCEDURE — G0009 ADMIN PNEUMOCOCCAL VACCINE: HCPCS | Mod: PBBFAC

## 2019-11-25 ENCOUNTER — OFFICE VISIT (OUTPATIENT)
Dept: FAMILY MEDICINE | Facility: CLINIC | Age: 84
End: 2019-11-25
Payer: MEDICARE

## 2019-11-25 VITALS
WEIGHT: 203.69 LBS | TEMPERATURE: 99 F | HEART RATE: 60 BPM | RESPIRATION RATE: 18 BRPM | BODY MASS INDEX: 47.14 KG/M2 | HEIGHT: 55 IN | DIASTOLIC BLOOD PRESSURE: 70 MMHG | SYSTOLIC BLOOD PRESSURE: 130 MMHG

## 2019-11-25 DIAGNOSIS — Z79.4 TYPE 2 DIABETES MELLITUS WITH OTHER CIRCULATORY COMPLICATION, WITH LONG-TERM CURRENT USE OF INSULIN: ICD-10-CM

## 2019-11-25 DIAGNOSIS — E11.59 TYPE 2 DIABETES MELLITUS WITH OTHER CIRCULATORY COMPLICATION, WITH LONG-TERM CURRENT USE OF INSULIN: ICD-10-CM

## 2019-11-25 DIAGNOSIS — E78.5 HYPERLIPIDEMIA, UNSPECIFIED HYPERLIPIDEMIA TYPE: ICD-10-CM

## 2019-11-25 DIAGNOSIS — H25.9 AGE-RELATED CATARACT OF BOTH EYES, UNSPECIFIED AGE-RELATED CATARACT TYPE: Primary | ICD-10-CM

## 2019-11-25 DIAGNOSIS — I10 ESSENTIAL HYPERTENSION: ICD-10-CM

## 2019-11-25 PROCEDURE — 99999 PR PBB SHADOW E&M-EST. PATIENT-LVL III: CPT | Mod: PBBFAC,,, | Performed by: FAMILY MEDICINE

## 2019-11-25 PROCEDURE — 99999 PR PBB SHADOW E&M-EST. PATIENT-LVL III: ICD-10-PCS | Mod: PBBFAC,,, | Performed by: FAMILY MEDICINE

## 2019-11-25 PROCEDURE — 99213 OFFICE O/P EST LOW 20 MIN: CPT | Mod: S$PBB | Performed by: FAMILY MEDICINE

## 2019-11-25 PROCEDURE — 99999 PR STA SHADOW: CPT | Mod: PBBFAC,,, | Performed by: FAMILY MEDICINE

## 2019-11-25 PROCEDURE — 99213 OFFICE O/P EST LOW 20 MIN: CPT | Mod: PBBFAC | Performed by: FAMILY MEDICINE

## 2019-11-25 RX ORDER — CIPROFLOXACIN HYDROCHLORIDE 3 MG/ML
SOLUTION/ DROPS OPHTHALMIC
Refills: 1 | COMMUNITY
Start: 2019-11-11 | End: 2020-08-04

## 2019-11-25 RX ORDER — KETOROLAC TROMETHAMINE 5 MG/ML
SOLUTION OPHTHALMIC
Refills: 1 | COMMUNITY
Start: 2019-11-11 | End: 2020-09-04

## 2019-11-25 RX ORDER — PREDNISOLONE ACETATE 10 MG/ML
SUSPENSION/ DROPS OPHTHALMIC
Refills: 1 | COMMUNITY
Start: 2019-11-21 | End: 2020-08-04

## 2019-11-25 NOTE — PROGRESS NOTES
Subjective:       Patient ID: Stephany Skinner is a 84 y.o. female.    Chief Complaint: Pre-op Exam    Needs surgical clearance for cataract procedure    Review of Systems   Constitutional: Negative for activity change, chills, fatigue, fever and unexpected weight change.   HENT: Negative for sore throat and trouble swallowing.    Eyes: Positive for visual disturbance.   Respiratory: Negative for cough, chest tightness and shortness of breath.    Cardiovascular: Negative for chest pain and leg swelling.   Gastrointestinal: Negative for abdominal pain.   Endocrine: Negative for cold intolerance and heat intolerance.   Genitourinary: Negative for difficulty urinating.   Musculoskeletal: Negative for back pain and joint swelling.   Skin: Negative for rash.   Neurological: Negative for numbness.   Hematological: Negative for adenopathy.   Psychiatric/Behavioral: Negative for decreased concentration.       Objective:      Vitals:    11/25/19 1512   BP: 130/70   Pulse: 60   Resp: 18   Temp: 98.8 °F (37.1 °C)     Physical Exam   Constitutional: She is oriented to person, place, and time. She appears well-developed and well-nourished.   HENT:   Head: Normocephalic and atraumatic.   Eyes: Pupils are equal, round, and reactive to light. EOM are normal.   Neck: Normal range of motion. Neck supple. No JVD present. No thyromegaly present.   Cardiovascular: Normal rate, regular rhythm, normal heart sounds and intact distal pulses. Exam reveals no gallop and no friction rub.   No murmur heard.  Pulmonary/Chest: Effort normal and breath sounds normal.   Abdominal: Soft. Bowel sounds are normal.   Neurological: She is alert and oriented to person, place, and time. No cranial nerve deficit.   Skin: Skin is warm.   Psychiatric: She has a normal mood and affect. Her behavior is normal. Judgment and thought content normal.       Assessment:       1. Age-related cataract of both eyes, unspecified age-related cataract type    2. Essential  hypertension    3. Hyperlipidemia, unspecified hyperlipidemia type    4. Type 2 diabetes mellitus with other circulatory complication, with long-term current use of insulin        Plan:   Stephany was seen today for pre-op exam.    Diagnoses and all orders for this visit:    Age-related cataract of both eyes, unspecified age-related cataract type    Essential hypertension    Hyperlipidemia, unspecified hyperlipidemia type    Type 2 diabetes mellitus with other circulatory complication, with long-term current use of insulin    Medically cleared for cataract surgery

## 2019-11-30 ENCOUNTER — EXTERNAL CHRONIC CARE MANAGEMENT (OUTPATIENT)
Dept: PRIMARY CARE CLINIC | Facility: CLINIC | Age: 84
End: 2019-11-30
Payer: MEDICARE

## 2019-11-30 PROCEDURE — 99999 PR STA SHADOW: CPT | Mod: PBBFAC,,, | Performed by: FAMILY MEDICINE

## 2019-11-30 PROCEDURE — 99490 CHRNC CARE MGMT STAFF 1ST 20: CPT | Mod: S$PBB | Performed by: FAMILY MEDICINE

## 2019-11-30 PROCEDURE — 99999 PR STA SHADOW: ICD-10-PCS | Mod: PBBFAC,,, | Performed by: FAMILY MEDICINE

## 2019-11-30 PROCEDURE — 99490 CHRNC CARE MGMT STAFF 1ST 20: CPT | Mod: PBBFAC | Performed by: FAMILY MEDICINE

## 2019-12-04 ENCOUNTER — CLINICAL SUPPORT (OUTPATIENT)
Dept: FAMILY MEDICINE | Facility: CLINIC | Age: 84
End: 2019-12-04
Payer: MEDICARE

## 2019-12-04 DIAGNOSIS — Z79.4 TYPE 2 DIABETES MELLITUS WITH OTHER CIRCULATORY COMPLICATION, WITH LONG-TERM CURRENT USE OF INSULIN: ICD-10-CM

## 2019-12-04 DIAGNOSIS — E11.59 TYPE 2 DIABETES MELLITUS WITH OTHER CIRCULATORY COMPLICATION, WITH LONG-TERM CURRENT USE OF INSULIN: ICD-10-CM

## 2019-12-04 PROCEDURE — 83036 HEMOGLOBIN GLYCOSYLATED A1C: CPT

## 2019-12-04 PROCEDURE — 99999 PR STA SHADOW: CPT | Mod: PBBFAC,,, | Performed by: FAMILY MEDICINE

## 2019-12-04 PROCEDURE — 99999 PR STA SHADOW: ICD-10-PCS | Mod: PBBFAC,,, | Performed by: FAMILY MEDICINE

## 2019-12-04 PROCEDURE — 36415 COLL VENOUS BLD VENIPUNCTURE: CPT | Mod: PBBFAC

## 2019-12-05 LAB
ESTIMATED AVG GLUCOSE: 114 MG/DL (ref 68–131)
HBA1C MFR BLD HPLC: 5.6 % (ref 4–5.6)

## 2019-12-09 ENCOUNTER — ANESTHESIA EVENT (OUTPATIENT)
Dept: SURGERY | Facility: HOSPITAL | Age: 84
End: 2019-12-09
Payer: MEDICARE

## 2019-12-09 ENCOUNTER — HOSPITAL ENCOUNTER (OUTPATIENT)
Dept: PREADMISSION TESTING | Facility: HOSPITAL | Age: 84
Discharge: HOME | End: 2019-12-09
Attending: OPHTHALMOLOGY
Payer: MEDICARE

## 2019-12-09 NOTE — LETTER
Cardiac Clearance Form    PLEASE PROVIDE ALL INFORMATION      Date : 2019  Dr. Patel,    Please provide us with WRITTEN Cardiac Clearance on Ms. Stephany Skinner.  : 1935.        Please send any test results.      Patient will be scheduled for a cataract extraction and Intraocular lens implantation  under MAC anesthesia scheduled for 19.    Patient states Dr. Patel is aware and ok with procedure.      Patient is taking Eliquis.    PLEASE FAX THIS CLEARANCE WITH TEST RESULTS -[phone].  Thank you for your help in this matter.    Sincerely,  Violette Garber  Phone- [phone]  Fax- [phone]

## 2019-12-09 NOTE — DISCHARGE INSTRUCTIONS
Pre Admit Instructions    Day and Date of your Surgery :   Thursday 12/12/19  Arrival time: 630am     · Call your doctor if you become ill before your surgery  · Someone will call you between 1 p.m. And 5 p.m.the workday before the procedure to give you an arrival time       - Before 7 a.m. Enter through Emergency Room  · You must have a responsible  to bring you home    Do NOT eat or drink anything   past midnight before your procedure day    Please    · Do not wear makeup, jewelry, nail polish or body piercings  · Bring containers/solution for contacts, dentures, bridges - these and hearing aids will be removed before your procedure  · Do not bring cash, jewelry or valuables the day of your procedure   · No smoking at least 24 hours before your procedure  · Wear clothing that is comfortable and easy to take off and put on  · Do NOT shave for at least 5 days before your surgery      Information About Your Procedure  We would like to welcome you to [facility].      1. Cafeteria Meals: 7am to 10am; 11am to 1:30 pm; Dinner/Supper must may be ordered between 11:00 am and 4 pm from the [facility] [place] [facility] After [employer] labs Menu. Food will be available to  between 5 pm and 6 pm. The kitchen phone extension is [phone].  2. After Checking in someone will escort you to 4th floor  3. Wear loose fitting clothes that button down the front.  The hospital will provide a gown.  4. Wash hair the night before surgery with your regular soap/shampoo.  5. [facility] is a non-smoking hospital.  NO SMOKING is allowed inside or outside of the hospital.  6. The nurse will review and follow the doctors orders.  The nurse will check vital signs, lungs and heart.  A nurse will begin putting eye drops every 10 minutes until the eyes are dilated (30 mins - 1 hr).    INFORMATION ON THE SURGERY  · The Nurse Ghotra from surgery will bring you to a holding area by wheel chair.  · In the holding area,  an IV, heart monitor, and oxygen will be started.  · Medication jelly will be put in your eye to numb your eye and then surgery will begin.  · You will be awake during the surgery, so if pain is felt, let the physician know at that time.  · After surgery let someone know if you feel any pain.  · The physician will put a clear patch over your eye to allow time to heal.  The physician will remove patch at the office the following day.  · The IV will be discontinued after the procedure.  · You will return upstairs after the surgery.   INFORMATION After the Surgery  · Notify nurse if you feel any eye pain, headache, weakness or dizziness.  · You must have a nurse present for the first time up to the bathroom.  · Instructions will be given on home care at time of discharge.  · The following activities cause increased pressure to the surgery eye.  Please do not lift, strain, bend down to tie shoelaces,  objects on the floor, or lie on the side of the surgery until the physician instructs you differently.  · Follow home care instructions recommended by:     Dr. Arellano  - GOALS FOR ONE DAY SURGERY:  Pain controlled by oral medication; tolerating liquids well; able to walk without feeling dizzy or weak; able to urinate without difficulty.  - PATIENT WILL NEED SOMEONE TO DRIVE HIM/HER HOME FOLLOWING SURGERY.  We do want your stay to be as pleasant as possible.  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

## 2019-12-09 NOTE — ANESTHESIA PREPROCEDURE EVALUATION
12/09/2019  Stephany Skinner is a 84 y.o., female.    Anesthesia Evaluation    I have reviewed the Patient Summary Reports.    I have reviewed the Nursing Notes.   I have reviewed the Medications.     Review of Systems  Social:  Non-Smoker, No Alcohol Use    Hematology/Oncology:  Hematology Normal   Oncology Normal     EENT/Dental:EENT/Dental Normal   Cardiovascular:   Exercise tolerance: good Hypertension, well controlled CHF    Pulmonary:   Sleep Apnea    Renal/:  Renal/ Normal     Hepatic/GI:   GERD    Musculoskeletal:   Arthritis     Neurological:   Neuromuscular Disease,    Endocrine:   Diabetes, well controlled Hypothyroidism    Dermatological:  Skin Normal    Psych:  Psychiatric Normal           Physical Exam  General:  Well nourished    Airway/Jaw/Neck:  Airway Findings: Mouth Opening: Normal Tongue: Normal  General Airway Assessment: Adult  Mallampati: II  TM Distance: Normal, at least 6 cm  Jaw/Neck Findings:  Neck ROM: Normal ROM      Dental:  Dental Findings: In tact        Mental Status:  Mental Status Findings:  Cooperative         Anesthesia Plan  Type of Anesthesia, risks & benefits discussed:  Anesthesia Type:  MAC  Patient's Preference:   Intra-op Monitoring Plan: standard ASA monitors  Intra-op Monitoring Plan Comments:   Post Op Pain Control Plan: multimodal analgesia  Post Op Pain Control Plan Comments:   Induction:   IV  Beta Blocker:  Patient is not currently on a Beta-Blocker (No further documentation required).       Informed Consent: Patient understands risks and agrees with Anesthesia plan.  Questions answered. Anesthesia consent signed with patient.  ASA Score: 3     Day of Surgery Review of History & Physical: I have interviewed and examined the patient. I have reviewed the patient's H&P dated: 12/12/19. There are no significant changes.  H&P update referred to the surgeon.          Ready For Surgery From Anesthesia Perspective.

## 2019-12-12 ENCOUNTER — HOSPITAL ENCOUNTER (OUTPATIENT)
Facility: HOSPITAL | Age: 84
Discharge: HOME | End: 2019-12-12
Attending: OPHTHALMOLOGY | Admitting: OPHTHALMOLOGY
Payer: MEDICARE

## 2019-12-12 ENCOUNTER — ANESTHESIA (OUTPATIENT)
Dept: SURGERY | Facility: HOSPITAL | Age: 84
End: 2019-12-12
Payer: MEDICARE

## 2019-12-12 VITALS
RESPIRATION RATE: 18 BRPM | OXYGEN SATURATION: 95 % | SYSTOLIC BLOOD PRESSURE: 153 MMHG | HEART RATE: 59 BPM | WEIGHT: 200 LBS | DIASTOLIC BLOOD PRESSURE: 85 MMHG | BODY MASS INDEX: 31.39 KG/M2 | HEIGHT: 67 IN

## 2019-12-12 DIAGNOSIS — H25.89 OTHER AGE-RELATED CATARACT OF RIGHT EYE: ICD-10-CM

## 2019-12-12 DIAGNOSIS — H26.9 CATARACT, RIGHT EYE: ICD-10-CM

## 2019-12-12 DIAGNOSIS — H25.091 OTHER AGE-RELATED INCIPIENT CATARACT OF RIGHT EYE: Primary | ICD-10-CM

## 2019-12-12 PROCEDURE — 00142 ANES PX ON EYE LENS SURGERY: CPT | Mod: QZ,P3 | Performed by: NURSE ANESTHETIST, CERTIFIED REGISTERED

## 2019-12-12 PROCEDURE — S5010 5% DEXTROSE AND 0.45% SALINE: HCPCS | Performed by: NURSE ANESTHETIST, CERTIFIED REGISTERED

## 2019-12-12 PROCEDURE — V2632 POST CHMBR INTRAOCULAR LENS: HCPCS | Performed by: OPHTHALMOLOGY

## 2019-12-12 PROCEDURE — 25000003 PHARM REV CODE 250

## 2019-12-12 PROCEDURE — 36000707: Performed by: OPHTHALMOLOGY

## 2019-12-12 PROCEDURE — 37000008 HC ANESTHESIA 1ST 15 MINUTES: Performed by: OPHTHALMOLOGY

## 2019-12-12 PROCEDURE — 37000009 HC ANESTHESIA EA ADD 15 MINS: Performed by: OPHTHALMOLOGY

## 2019-12-12 PROCEDURE — 71000033 HC RECOVERY, INTIAL HOUR: Performed by: OPHTHALMOLOGY

## 2019-12-12 PROCEDURE — 25000003 PHARM REV CODE 250: Performed by: OPHTHALMOLOGY

## 2019-12-12 PROCEDURE — 36000706: Performed by: OPHTHALMOLOGY

## 2019-12-12 PROCEDURE — 25000003 PHARM REV CODE 250: Performed by: NURSE ANESTHETIST, CERTIFIED REGISTERED

## 2019-12-12 PROCEDURE — 63600175 PHARM REV CODE 636 W HCPCS: Performed by: NURSE ANESTHETIST, CERTIFIED REGISTERED

## 2019-12-12 DEVICE — IMPLANTABLE DEVICE: Type: IMPLANTABLE DEVICE | Site: EYE | Status: FUNCTIONAL

## 2019-12-12 RX ORDER — PHENYLEPHRINE HYDROCHLORIDE 25 MG/ML
1 SOLUTION/ DROPS OPHTHALMIC
Status: COMPLETED | OUTPATIENT
Start: 2019-12-12 | End: 2019-12-12

## 2019-12-12 RX ORDER — DEXTROSE MONOHYDRATE AND SODIUM CHLORIDE 5; .45 G/100ML; G/100ML
INJECTION, SOLUTION INTRAVENOUS CONTINUOUS PRN
Status: DISCONTINUED | OUTPATIENT
Start: 2019-12-12 | End: 2019-12-12

## 2019-12-12 RX ORDER — LIDOCAINE HCL/PF 100 MG/5ML
SYRINGE (ML) INTRAVENOUS
Status: DISCONTINUED | OUTPATIENT
Start: 2019-12-12 | End: 2019-12-12

## 2019-12-12 RX ORDER — LIDOCAINE HYDROCHLORIDE 20 MG/ML
JELLY TOPICAL ONCE
Status: COMPLETED | OUTPATIENT
Start: 2019-12-12 | End: 2019-12-12

## 2019-12-12 RX ORDER — TETRACAINE HYDROCHLORIDE 5 MG/ML
1 SOLUTION OPHTHALMIC
Status: ACTIVE | OUTPATIENT
Start: 2019-12-12

## 2019-12-12 RX ORDER — PROPOFOL 10 MG/ML
INJECTION, EMULSION INTRAVENOUS
Status: DISCONTINUED | OUTPATIENT
Start: 2019-12-12 | End: 2019-12-12

## 2019-12-12 RX ORDER — CYCLOPENTOLATE HYDROCHLORIDE 10 MG/ML
1 SOLUTION/ DROPS OPHTHALMIC
Status: COMPLETED | OUTPATIENT
Start: 2019-12-12 | End: 2019-12-12

## 2019-12-12 RX ADMIN — PHENYLEPHRINE HYDROCHLORIDE 1 DROP: 25 SOLUTION/ DROPS OPHTHALMIC at 07:12

## 2019-12-12 RX ADMIN — CYCLOPENTOLATE HYDROCHLORIDE 1 DROP: 10 SOLUTION/ DROPS OPHTHALMIC at 07:12

## 2019-12-12 RX ADMIN — TETRACAINE HYDROCHLORIDE 1 DROP: 5 SOLUTION OPHTHALMIC at 08:12

## 2019-12-12 RX ADMIN — LIDOCAINE HYDROCHLORIDE 50 MG: 20 INJECTION, SOLUTION INTRAVENOUS at 08:12

## 2019-12-12 RX ADMIN — DEXTROSE AND SODIUM CHLORIDE: 5; .45 INJECTION, SOLUTION INTRAVENOUS at 08:12

## 2019-12-12 RX ADMIN — LIDOCAINE HYDROCHLORIDE 10 ML: 20 JELLY TOPICAL at 07:12

## 2019-12-12 RX ADMIN — PROPOFOL 30 MG: 10 INJECTION, EMULSION INTRAVENOUS at 08:12

## 2019-12-12 NOTE — OP NOTE
DATE OF PROCEDURE:  12/12/2019    PREOPERATIVE DIAGNOSIS:  Cataract, right eye.    POSTOPERATIVE DIAGNOSIS:  Cataract, right eye.    PROCEDURE:  Phacoemulsification of right cataract and foldable posterior chamber   lens implant, right eye.    SURGEON:  Michael Arellano M.D.    ANESTHESIA:  Topical with standby.    PROCEDURE IN DETAIL:  The patient is brought to the Operative Suite and placed   on a table in the supine position.  Prior to arrival, the right pupil had been   dilated with Derek-Synephrine and Cyclogyl.  Topical anesthesia had been achieved   using 2% Xylocaine Gel applied 15 minutes prior to arrival.  This was augmented   by more 2% Xylocaine Gel on the table.  The right hemifacial area was prepped   and draped in the usual sterile fashion.  The remainder of the procedure took   place using a Zeiss operating microscope.    A lid speculum was placed under the right lid and the lid was retracted.  The   steepest corneal axis had been previously determined and a clear corneal   incision was made at this site.  A limbal clear corneal groove set at 200   microns was made for a length of 2.75 mm.  A corneal tunnel was made into clear   cornea and the anterior chamber was entered with a keratome for a width of 2.75   mm.  The anterior chamber was formed with viscoelastic.  An anterior   capsulorrhexis was carried out with a bent 25-gauge needle.  Hydrodissection of   the lens nucleus was achieved.  The lens nucleus was removed with   phacoemulsification.  The remainder of the cortical cataract material was   aspirated with an irrigation and aspiration tip.  Viscoelastic was used to fill   the anterior chamber and capsular bag.  An intraocular lens was inserted into   the capsular bag in the usual fashion.  The viscoelastic was aspirated and the   anterior chamber was again reformed with balanced salt solution.  The corneal   wound was tested to be free of leak.  After the lid speculum was removed, the   eye was  inspected and the anterior chamber was deep and formed, and the wound   was sealed.  The patient was referred to the Recovery Room in apparent good   condition having tolerated the procedure well.      ETHEL/IN  dd: 12/12/2019 08:45:50 (CST)  td: 12/12/2019 11:40:35 (CST)  Doc ID   #[id number]  Job ID #[id number]    CC:

## 2019-12-12 NOTE — DISCHARGE SUMMARY
[facility]  Brief Operative Note     SUMMARY     Surgery Date: 12/12/2019     Surgeon(s) and Role:     * Michael Arellano MD - Primary    Assisting Surgeon: None    Pre-op Diagnosis:  Cataract, right eye [H26.9]    Post-op Diagnosis:  Post-Op Diagnosis Codes:     * Cataract, right eye [H26.9]    Procedure(s) (LRB):  PHACOEMULSIFICATION, CATARACT (Right)  EXTRACTION, CATARACT, WITH IOL INSERTION (Right)    Anesthesia: Monitor Anesthesia Care    Description of the findings of the procedure:   Cataract od  Findings/Key Components:   Cataract od  Estimated Blood Loss: * No values recorded between 12/12/2019  8:12 AM and 12/12/2019  8:39 AM *         Specimens:   Specimen (12h ago, onward)    None          Discharge Note    SUMMARY     Admit Date: 12/12/2019    Discharge Date and Time: No discharge date for patient encounter.    Hospital Course (synopsis of major diagnoses, care, treatment, and services provided during the course of the hospital stay):    Final Diagnosis: Post-Op Diagnosis Codes:     * Cataract, right eye [H26.9]    Disposition: Home or Self Care    Follow Up/Patient Instructions:     Medications:  Reconciled Home Medications:      Medication List      CHANGE how you take these medications    furosemide 40 MG tablet  Commonly known as:  LASIX  Take 1 tablet (40 mg total) by mouth Daily.  What changed:  additional instructions     potassium chloride SA 20 MEQ tablet  Commonly known as:  K-DUR,KLOR-CON  Take 1 tablet (20 mEq total) by mouth once daily.  What changed:  additional instructions     pyridostigmine 60 mg Tab  Commonly known as:  MESTINON  TAKE ONE TABLET BY MOUTH THREE TIMES A DAY  What changed:    · how much to take  · how to take this  · when to take this        CONTINUE taking these medications    atorvastatin 10 MG tablet  Commonly known as:  LIPITOR  TAKE 1 TABLET BY MOUTH ONCE DAILY IN THE EVENING     blood-glucose meter kit  1 each by Other route 2 (two) times daily.  One Touch Ultra meter, test strips, lancets, control solution     calcium carbonate-vit D3-min 600 mg calcium- 400 unit Tab  Take 2 tablets by mouth once.     ciprofloxacin HCl 0.3 % ophthalmic solution  Commonly known as:  CILOXAN  INSTILL 1 DROP 4 TIMES DAILY IN SURGERY EYE     cyanocobalamin 100 MCG tablet  Commonly known as:  VITAMIN B-12  Take 1 tablet (100 mcg total) by mouth once daily.     diphth,pertus(acell),tetanus 2.5-8-5 Lf-mcg-Lf/0.5mL Syrg injection  Commonly known as:  BOOSTRIX  Inject into the muscle.     Eliquis 5 mg Tab  Generic drug:  apixaban  Take 5 mg by mouth 2 (two) times daily.     ketorolac 0.5% 0.5 % Drop  Commonly known as:  ACULAR  INSTILL 1 DROP 4 TIMES DAILY IN SUGERY EYE     levothyroxine 88 MCG tablet  Commonly known as:  SYNTHROID  Take 1 tablet (88 mcg total) by mouth once daily.     metoprolol succinate 100 MG 24 hr tablet  Commonly known as:  TOPROL-XL  Take 1 tablet (100 mg total) by mouth once daily.     omega-3 acid ethyl esters 1 gram capsule  Commonly known as:  LOVAZA  Take 2 capsules (2 g total) by mouth 2 (two) times daily.     prednisoLONE acetate 1 % Drps  Commonly known as:  PRED FORTE  INSTILL 1 DROP IN THE SURGERY EYE 4 TIMES A DAY     predniSONE 2.5 MG tablet  Commonly known as:  DELTASONE  Take 1 tablet (2.5 mg total) by mouth every other day.          No discharge procedures on file.  Follow-up Information     Follow up In 1 day.    Why:  Post Op Follow Up

## 2019-12-12 NOTE — INTERVAL H&P NOTE
The patient has been examined and the H&P has been reviewed:        I concur with the findings and no changes have occurred since H&P was written.        Patient cleared for Anesthesia: MAC        Anesthesia/Surgery risks, benefits and alternative options discussed and understood by patient/family.      Active Hospital Problems    Diagnosis  POA    Cataract, right eye [H26.9]  Yes      Resolved Hospital Problems   No resolved problems to display.

## 2019-12-12 NOTE — ANESTHESIA POSTPROCEDURE EVALUATION
Anesthesia Post Evaluation    Patient: Stephany Skinner    Procedure(s) Performed: Procedure(s) (LRB):  PHACOEMULSIFICATION, CATARACT (Right)  EXTRACTION, CATARACT, WITH IOL INSERTION (Right)    Final Anesthesia Type: MAC    Patient location during evaluation: PACU  Patient participation: Yes- Able to Participate  Level of consciousness: awake and alert, oriented and awake  Post-procedure vital signs: reviewed and stable  Pain management: adequate  Airway patency: patent    PONV status at discharge: No PONV  Anesthetic complications: no      Cardiovascular status: blood pressure returned to baseline, hemodynamically stable and stable  Respiratory status: unassisted, spontaneous ventilation and room air  Hydration status: euvolemic  Follow-up not needed.          Vitals Value Taken Time   /65 12/12/2019  7:21 AM   Temp 36 12/12/2019  8:37 AM   Pulse 58 12/12/2019  7:17 AM   Resp 18 12/12/2019  7:17 AM   SpO2 97 % 12/12/2019  7:17 AM         No case tracking events are documented in the log.      Pain/Divine Score: No data recorded

## 2019-12-12 NOTE — DISCHARGE INSTRUCTIONS
POST OP EYES    DAY OF SURGERY    Diet:  Feel free to eat all your favorite foods. No cooking the day of surgery.    Alcohol:  Yes, you may have one (1) cocktail with dinner.    Activity:  Make today a day of leisure, watch TV, take a walk, ride in the car,  sit on the porch. Rest frequently.    You may bend a bit to brush your teeth,  the newspaper, or eat your meals, but refrain from staying bent over for a long period of  time.  No heavy lifting/straining.     You may take a bath the day of surgery- NO SHOWER.     MD will advise on when you may resume driving.    SLEEP:  Sleep on two pillows the night of surgery. You may sleep on the      NON-OPERATIVE side. Wear eye shield at night and for naps.    MEDICATIONS:   Your home medications are to be taken as instructed by Dr. Quinteros.  Please wait until after the first post op day before resuming blood thinners (Persantine, Ibuprofen, Coumadin, Aspirin, etc.)    Pain:  You may take two (2) Extra Strength Tylenol every four (4) hours.    Return to hospital:    1. Any obvious bleeding .    2. Temperature over 100.4 F    3. Pain not relieved by Tylenol.    4. Drainage or painful eye.    5. Redness or swelling around the eye      Remember to protect the eye by wearing the shield for at least one week at bedtime and by wearing your glasses or shield during the day.    Your follow up appointment is scheduled for tomorrow 12/13/19 at 8am  Please bring your discharge paperwork with you to your appointment.

## 2019-12-31 ENCOUNTER — EXTERNAL CHRONIC CARE MANAGEMENT (OUTPATIENT)
Dept: PRIMARY CARE CLINIC | Facility: CLINIC | Age: 84
End: 2019-12-31
Payer: MEDICARE

## 2019-12-31 PROCEDURE — 99999 PR STA SHADOW: CPT | Mod: PBBFAC,,, | Performed by: FAMILY MEDICINE

## 2019-12-31 PROCEDURE — 99490 CHRNC CARE MGMT STAFF 1ST 20: CPT | Mod: S$PBB | Performed by: FAMILY MEDICINE

## 2019-12-31 PROCEDURE — 99999 PR STA SHADOW: ICD-10-PCS | Mod: PBBFAC,,, | Performed by: FAMILY MEDICINE

## 2019-12-31 PROCEDURE — 99490 CHRNC CARE MGMT STAFF 1ST 20: CPT | Mod: PBBFAC | Performed by: FAMILY MEDICINE

## 2020-01-10 ENCOUNTER — TELEPHONE (OUTPATIENT)
Dept: FAMILY MEDICINE | Facility: CLINIC | Age: 85
End: 2020-01-10

## 2020-01-10 NOTE — TELEPHONE ENCOUNTER
----- Message from Livier Ramirez sent at 1/10/2020 10:26 AM CST -----  Contact: Shira/sister  Stephany Skinner  MRN: [id number]  : 1935  PCP: Pipo Flowers  Home Phone      [phone]  Work Phone      Not on file.  Mobile          Not on file.      MESSAGE:   Pt requests a sooner appointment than the  can schedule.  Does patient feel like they need to be seen today:  no  What is the nature of the appointment: eye surgery clearance  What visit type:  ep  Did you check other providers/department schedules for availability:  no  Comments:   Needing appt before   Phone: [phone]

## 2020-01-31 ENCOUNTER — EXTERNAL CHRONIC CARE MANAGEMENT (OUTPATIENT)
Dept: PRIMARY CARE CLINIC | Facility: CLINIC | Age: 85
End: 2020-01-31
Payer: MEDICARE

## 2020-01-31 PROCEDURE — 99999 PR STA SHADOW: CPT | Mod: PBBFAC,,, | Performed by: FAMILY MEDICINE

## 2020-01-31 PROCEDURE — 99490 CHRNC CARE MGMT STAFF 1ST 20: CPT | Mod: PBBFAC | Performed by: FAMILY MEDICINE

## 2020-01-31 PROCEDURE — 99999 PR STA SHADOW: ICD-10-PCS | Mod: PBBFAC,,, | Performed by: FAMILY MEDICINE

## 2020-01-31 PROCEDURE — 99490 CHRNC CARE MGMT STAFF 1ST 20: CPT | Mod: S$PBB | Performed by: FAMILY MEDICINE

## 2020-02-04 ENCOUNTER — OFFICE VISIT (OUTPATIENT)
Dept: NEUROLOGY | Facility: CLINIC | Age: 85
End: 2020-02-04
Payer: MEDICARE

## 2020-02-04 VITALS
OXYGEN SATURATION: 97 % | BODY MASS INDEX: 31.63 KG/M2 | SYSTOLIC BLOOD PRESSURE: 130 MMHG | DIASTOLIC BLOOD PRESSURE: 64 MMHG | HEART RATE: 60 BPM | HEIGHT: 67 IN | RESPIRATION RATE: 16 BRPM | WEIGHT: 201.5 LBS

## 2020-02-04 DIAGNOSIS — G70.00 MYASTHENIA GRAVIS, ADULT FORM: Primary | ICD-10-CM

## 2020-02-04 DIAGNOSIS — E53.8 B12 DEFICIENCY: ICD-10-CM

## 2020-02-04 DIAGNOSIS — G56.03 BILATERAL CARPAL TUNNEL SYNDROME: ICD-10-CM

## 2020-02-04 PROCEDURE — 99214 OFFICE O/P EST MOD 30 MIN: CPT | Mod: S$PBB | Performed by: PSYCHIATRY & NEUROLOGY

## 2020-02-04 PROCEDURE — 99999 PR PBB SHADOW E&M-EST. PATIENT-LVL III: CPT | Mod: PBBFAC,,, | Performed by: PSYCHIATRY & NEUROLOGY

## 2020-02-04 PROCEDURE — 99213 OFFICE O/P EST LOW 20 MIN: CPT | Mod: PBBFAC | Performed by: PSYCHIATRY & NEUROLOGY

## 2020-02-04 PROCEDURE — 99999 PR STA SHADOW: CPT | Mod: PBBFAC,,, | Performed by: PSYCHIATRY & NEUROLOGY

## 2020-02-04 PROCEDURE — 99999 PR PBB SHADOW E&M-EST. PATIENT-LVL III: ICD-10-PCS | Mod: PBBFAC,,, | Performed by: PSYCHIATRY & NEUROLOGY

## 2020-02-04 NOTE — PATIENT INSTRUCTIONS
Stop Prednisone    Reduce Pyridostigmine (Mestinon) to one daily for 3 weeks, and can stop after that to see if loose bowels improve.     Wear Brace nightly for hand symptoms

## 2020-02-04 NOTE — PROGRESS NOTES
HPI:  Stephany Skinner is a 85 y.o. female with  increased falls, some lumbar radicular pain.  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 Admitted for ARF with hypercapnea, ptosis, and gaze restriction in 1/2018; response to steroids suggestive of myasthenia, though antibodies are negative, and EMG with repetitve stimulation from 7/2018 was inconclusive. PUJA during 1/2018 admit, secondary to overdiuresis. She has JOY, DM, HTN, HLD, hypothyroidism, B12 deficiency, and A-fib.       Since the last visit with me, the patient has been seeing NP, Tiny Cerda, in Neurology in this clinic    Reduced prednisone to 2.5mg daily and now taking QOD and no symptoms increased.     No MG symptoms of diplopia and weakness or speech problems.   CTS symptoms are bothersome at night. Not wearing   Lumbar pain is not active  Neuropathy pain is not active  No Falls   Having some diarrhea for years and taking mestinon BID      Review of Systems   Constitutional: Negative for fever.   HENT: Negative for hearing loss.    Eyes: Negative for double vision.   Respiratory: Negative for hemoptysis.    Cardiovascular: Negative for leg swelling.   Gastrointestinal: Negative for blood in stool.   Genitourinary: Negative for hematuria.   Musculoskeletal: Negative for back pain and falls.   Skin: Negative for rash.   Neurological: Negative for focal weakness, seizures and weakness.   Psychiatric/Behavioral: The patient does not have insomnia.      Exam:  Gen Appearance, well developed/nourished in no apparent distress; on O2 NC  CV: 2+ distal pulses with no edema or swelling  Neuro:  MS: Awake, alert,Sustains attention. Recent/remote memory intact, Language is full to spontaneous speech/comprehension. Fund of Knowledge is full  CN: Optic discs are flat with normal  vasculature, PERRL, Extraoccular movements and visual fields are full-no gaze restriction today. Normal facial sensation and strength-no ptosis, Hearing symmetric, Tongue and Palate are midline and strong. Shoulder Shrug symmetric and strong.  Motor: Normal bulk, tone, no abnormal movements. 5/5 strength bilateral upper/lower extremities with 1+ reflexes in the arms, and are less in the legs and bilateral plantar response  Sensory: symmetric to light touch, pain, temp, and vibration/proprioception except decreased sensation in the legs. Romberg negative  Cerebellar: Finger-nose,Heal-shin, Rapid alternating movements intact  Gait: arthralgic gait; ambulates with walker but can walk well without walker as well    Imagin/16 CT L spine: Multilevel degenerative changes of the lumbar spine without evidence for fracture or subluxation.  There is likely a component of mild neural foraminal narrowing and central canal stenosis at the L3-4 and L4-5 levels.  This could be better evaluated on a nonemergent basis with MRI.    2016 MRI L spine: Multilevel degenerative changes of the lumbar spine contributing to central canal stenosis and neural foraminal narrowing as detailed in the above report.    Edema-like signal about the posterior elements on the right at L5-S1 which can be a source of pain.    [place] [facility] [place] Vu:   Slow repetitive stimulation (3 Htz) performed at baseline and at one minute post exercise intervals at the left ADM and left trapezius muscles revealed significant CMAP detriment at the ADM, but none at the trapezius.     Impression:   The results of the study are inconclusive. There is no definite EP evidence of a neuromuscular junction pathology.     2016 EMG legs:   1. Mild Bilateral Carpal Tunnel Syndrome  2. 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 Bilateral Lumbar Radiculopathy best localizing from L4-5 on this study    Labs:  10/2016  glucose 136. Reviewed CMP, CBC TSh   SPEP normal  B12 low normal    2019 BMP, CBC reviewed    11/2018 BMP with alt reviewed  12/2019 A1C normal      Assessment/Plan: Stephany Skinner is a 85 y.o. female with   falls, some lumbar radicular pain.  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 Admitted for ARF with hypercapnea, ptosis, and gaze restriction in 1/2018; response to steroids suggestive of myasthenia, though antibodies are negative, and EMG with repetitve stimulation from 7/2018 was inconclusive. PUJA during 1/2018 admit, secondary to overdiuresis. She has JOY, DM, HTN, HLD, hypothyroidism, B12 deficiency, and A-fib.     I recommend:   1. Despite negative MG antibodies and inconclusive EMG with rep stim findings without definitive evidence of neuromuscular junction disorer, her clinical picture was supportive of myasthenia gravis- but is currently improved   2. Reduce Mestinon to once daily (to see if this helps diarrhea complaint- reports PCP is aware of this complaint)  -she has had no worsening with reduction to  Prednisone and can't stop this medication now.  Will restart for any further symptoms as reviewed  - If she needs to remain on steroids long term, could consider steroid sparing therapy, such as Imuran versus Cellcept. She has DM, though well controlled currently. She will follow with PCP for management of this  3. Advised to go to ED if SOB. Advised on symptoms of myasthenic crisis.   4. Wear brace nightly for CTS symptoms PRN  5. Continue B12 for low normal B12 levels. Follow  B12 levels over time. Her DM could be causing her neuropathy.  DM treated per PCP.   6. She has no pain for lumbar radiculopathy and has some occasional lower back pain (not currently active) related to stenosis which she treats with  rest. She would defer surgical consult. PT helped reduce pain greatly as well as falls prior. Fall precautions reviewed-- use walker. No pain from neuropathy currently.    RTC  6months

## 2020-02-10 ENCOUNTER — OFFICE VISIT (OUTPATIENT)
Dept: FAMILY MEDICINE | Facility: CLINIC | Age: 85
End: 2020-02-10
Payer: MEDICARE

## 2020-02-10 VITALS
HEART RATE: 60 BPM | SYSTOLIC BLOOD PRESSURE: 148 MMHG | HEIGHT: 67 IN | WEIGHT: 203.5 LBS | BODY MASS INDEX: 31.94 KG/M2 | TEMPERATURE: 98 F | DIASTOLIC BLOOD PRESSURE: 84 MMHG | RESPIRATION RATE: 16 BRPM

## 2020-02-10 DIAGNOSIS — H25.9 AGE-RELATED CATARACT OF BOTH EYES, UNSPECIFIED AGE-RELATED CATARACT TYPE: ICD-10-CM

## 2020-02-10 DIAGNOSIS — Z01.818 PREOPERATIVE EVALUATION TO RULE OUT SURGICAL CONTRAINDICATION: Primary | ICD-10-CM

## 2020-02-10 PROCEDURE — 99213 OFFICE O/P EST LOW 20 MIN: CPT | Mod: S$PBB | Performed by: FAMILY MEDICINE

## 2020-02-10 PROCEDURE — 99999 PR STA SHADOW: CPT | Mod: PBBFAC,,, | Performed by: FAMILY MEDICINE

## 2020-02-10 PROCEDURE — 99999 PR PBB SHADOW E&M-EST. PATIENT-LVL III: CPT | Mod: PBBFAC,,, | Performed by: FAMILY MEDICINE

## 2020-02-10 PROCEDURE — 99999 PR PBB SHADOW E&M-EST. PATIENT-LVL III: ICD-10-PCS | Mod: PBBFAC,,, | Performed by: FAMILY MEDICINE

## 2020-02-10 PROCEDURE — 99213 OFFICE O/P EST LOW 20 MIN: CPT | Mod: PBBFAC | Performed by: FAMILY MEDICINE

## 2020-02-10 NOTE — PROGRESS NOTES
Subjective:       Patient ID: Stephany Skinner is a 85 y.o. female.    Chief Complaint: Pre-op Exam (L cataract sx 2/13/2020)    Needs surgical clearance for cataract procedure    Review of Systems   Constitutional: Negative for activity change, chills, fatigue, fever and unexpected weight change.   HENT: Negative for sore throat and trouble swallowing.    Eyes: Positive for visual disturbance.   Respiratory: Negative for cough, chest tightness and shortness of breath.    Cardiovascular: Negative for chest pain and leg swelling.   Gastrointestinal: Negative for abdominal pain.   Endocrine: Negative for cold intolerance and heat intolerance.   Genitourinary: Negative for difficulty urinating.   Musculoskeletal: Negative for back pain and joint swelling.   Skin: Negative for rash.   Neurological: Negative for numbness.   Hematological: Negative for adenopathy.   Psychiatric/Behavioral: Negative for decreased concentration.       Objective:      Vitals:    02/10/20 0924   BP: (!) 148/84   Pulse: 60   Resp: 16   Temp: 98.1 °F (36.7 °C)     Physical Exam   Constitutional: She is oriented to person, place, and time. She appears well-developed and well-nourished.   HENT:   Head: Normocephalic and atraumatic.   Eyes: Pupils are equal, round, and reactive to light. EOM are normal.   Neck: Normal range of motion. Neck supple. No JVD present. No thyromegaly present.   Cardiovascular: Normal rate, regular rhythm, normal heart sounds and intact distal pulses. Exam reveals no gallop and no friction rub.   No murmur heard.  Pulmonary/Chest: Effort normal and breath sounds normal.   Abdominal: Soft. Bowel sounds are normal.   Neurological: She is alert and oriented to person, place, and time. No cranial nerve deficit.   Skin: Skin is warm.   Psychiatric: She has a normal mood and affect. Her behavior is normal. Judgment and thought content normal.       Assessment:       1. Preoperative evaluation to rule out surgical  contraindication    2. Age-related cataract of both eyes, unspecified age-related cataract type        Plan:   Stephany was seen today for pre-op exam.    Diagnoses and all orders for this visit:    Preoperative evaluation to rule out surgical contraindication    Age-related cataract of both eyes, unspecified age-related cataract type    Medically cleared for cataract surgery

## 2020-02-11 ENCOUNTER — HOSPITAL ENCOUNTER (OUTPATIENT)
Dept: PREADMISSION TESTING | Facility: HOSPITAL | Age: 85
Discharge: HOME | End: 2020-02-11
Attending: OPHTHALMOLOGY
Payer: MEDICARE

## 2020-02-11 DIAGNOSIS — H26.9 CATARACT, LEFT EYE: ICD-10-CM

## 2020-02-11 DIAGNOSIS — H26.9 CATARACT OF LEFT EYE, UNSPECIFIED CATARACT TYPE: Primary | ICD-10-CM

## 2020-02-11 NOTE — DISCHARGE INSTRUCTIONS
Please take metoprolol morning of procedure only          Pre Admit Instructions    Day and Date of your Surgery :   Thursday 2/13/20  Arrival time: 630am    · Call your doctor if you become ill before your surgery  · Someone will call you between 1 p.m. And 5 p.m.the workday before the procedure to give you an arrival time       - Before 7 a.m. Enter through Emergency Room  · You must have a responsible  to bring you home    Do NOT eat or drink anything   past midnight before your procedure day    Please    · Do not wear makeup, jewelry, nail polish or body piercings  · Bring containers/solution for contacts, dentures, bridges - these and hearing aids will be removed before your procedure  · Do not bring cash, jewelry or valuables the day of your procedure   · No smoking at least 24 hours before your procedure  · Wear clothing that is comfortable and easy to take off and put on  · Do NOT shave for at least 5 days before your surgery      Information About Your Procedure  We would like to welcome you to [facility].      1. Cafeteria Meals: 7am to 10am; 11am to 1:30 pm; Dinner/Supper must may be ordered between 11:00 am and 4 pm from the [facility] [employer] After [employer]. Food will be available to  between 5 pm and 6 pm. The kitchen phone extension is [phone].  2. After Checking in someone will escort you to 4th floor  3. Wear loose fitting clothes that button down the front.  The hospital will provide a gown.  4. Wash hair the night before surgery with your regular soap/shampoo.  5. [facility] is a non-smoking hospital.  NO SMOKING is allowed inside or outside of the hospital.  6. The nurse will review and follow the doctors orders.  The nurse will check vital signs, lungs and heart.  A nurse will begin putting eye drops every 10 minutes until the eyes are dilated (30 mins - 1 hr).    INFORMATION ON THE SURGERY  · The Nurse Ghotra from surgery will bring you to a  holding area by wheel chair.  · In the holding area, an IV, heart monitor, and oxygen will be started.  · Medication jelly will be put in your eye to numb your eye and then surgery will begin.  · You will be awake during the surgery, so if pain is felt, let the physician know at that time.  · After surgery let someone know if you feel any pain.  · The physician will put a clear patch over your eye to allow time to heal.  The physician will remove patch at the office the following day.  · The IV will be discontinued after the procedure.  · You will return upstairs after the surgery.   INFORMATION After the Surgery  · Notify nurse if you feel any eye pain, headache, weakness or dizziness.  · You must have a nurse present for the first time up to the bathroom.  · Instructions will be given on home care at time of discharge.  · The following activities cause increased pressure to the surgery eye.  Please do not lift, strain, bend down to tie shoelaces,  objects on the floor, or lie on the side of the surgery until the physician instructs you differently.  · Follow home care instructions recommended by:     Dr. Arellano  - GOALS FOR ONE DAY SURGERY:  Pain controlled by oral medication; tolerating liquids well; able to walk without feeling dizzy or weak; able to urinate without difficulty.  - PATIENT WILL NEED SOMEONE TO DRIVE HIM/HER HOME FOLLOWING SURGERY.  We do want your stay to be as pleasant as possible.  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

## 2020-02-13 ENCOUNTER — ANESTHESIA EVENT (OUTPATIENT)
Dept: SURGERY | Facility: HOSPITAL | Age: 85
End: 2020-02-13
Payer: MEDICARE

## 2020-02-13 ENCOUNTER — ANESTHESIA (OUTPATIENT)
Dept: SURGERY | Facility: HOSPITAL | Age: 85
End: 2020-02-13
Payer: MEDICARE

## 2020-02-13 ENCOUNTER — HOSPITAL ENCOUNTER (OUTPATIENT)
Facility: HOSPITAL | Age: 85
Discharge: HOME | End: 2020-02-13
Attending: OPHTHALMOLOGY | Admitting: OPHTHALMOLOGY
Payer: MEDICARE

## 2020-02-13 VITALS
HEART RATE: 58 BPM | RESPIRATION RATE: 18 BRPM | DIASTOLIC BLOOD PRESSURE: 75 MMHG | OXYGEN SATURATION: 95 % | SYSTOLIC BLOOD PRESSURE: 174 MMHG | TEMPERATURE: 97 F

## 2020-02-13 DIAGNOSIS — H26.9 CATARACT, LEFT EYE: ICD-10-CM

## 2020-02-13 DIAGNOSIS — H26.9 CATARACT OF LEFT EYE, UNSPECIFIED CATARACT TYPE: ICD-10-CM

## 2020-02-13 PROCEDURE — 00142 ANES PX ON EYE LENS SURGERY: CPT | Mod: QZ | Performed by: NURSE ANESTHETIST, CERTIFIED REGISTERED

## 2020-02-13 PROCEDURE — 71000033 HC RECOVERY, INTIAL HOUR: Performed by: OPHTHALMOLOGY

## 2020-02-13 PROCEDURE — 36000706: Performed by: OPHTHALMOLOGY

## 2020-02-13 PROCEDURE — 36000707: Performed by: OPHTHALMOLOGY

## 2020-02-13 PROCEDURE — 25000003 PHARM REV CODE 250

## 2020-02-13 PROCEDURE — 37000008 HC ANESTHESIA 1ST 15 MINUTES: Performed by: OPHTHALMOLOGY

## 2020-02-13 PROCEDURE — 63600175 PHARM REV CODE 636 W HCPCS: Performed by: NURSE ANESTHETIST, CERTIFIED REGISTERED

## 2020-02-13 PROCEDURE — 37000009 HC ANESTHESIA EA ADD 15 MINS: Performed by: OPHTHALMOLOGY

## 2020-02-13 PROCEDURE — 25000003 PHARM REV CODE 250: Performed by: OPHTHALMOLOGY

## 2020-02-13 PROCEDURE — V2632 POST CHMBR INTRAOCULAR LENS: HCPCS | Performed by: OPHTHALMOLOGY

## 2020-02-13 DEVICE — IMPLANTABLE DEVICE: Type: IMPLANTABLE DEVICE | Site: EYE | Status: FUNCTIONAL

## 2020-02-13 RX ORDER — LIDOCAINE HCL/PF 100 MG/5ML
SYRINGE (ML) INTRAVENOUS
Status: DISCONTINUED | OUTPATIENT
Start: 2020-02-13 | End: 2020-02-13

## 2020-02-13 RX ORDER — TETRACAINE HYDROCHLORIDE 5 MG/ML
1 SOLUTION OPHTHALMIC
Status: DISCONTINUED | OUTPATIENT
Start: 2020-02-13 | End: 2020-02-13 | Stop reason: HOSPADM

## 2020-02-13 RX ORDER — LIDOCAINE HYDROCHLORIDE 20 MG/ML
JELLY TOPICAL ONCE
Status: COMPLETED | OUTPATIENT
Start: 2020-02-13 | End: 2020-02-13

## 2020-02-13 RX ORDER — SODIUM CHLORIDE, SODIUM LACTATE, POTASSIUM CHLORIDE, CALCIUM CHLORIDE 600; 310; 30; 20 MG/100ML; MG/100ML; MG/100ML; MG/100ML
INJECTION, SOLUTION INTRAVENOUS CONTINUOUS PRN
Status: DISCONTINUED | OUTPATIENT
Start: 2020-02-13 | End: 2020-02-13

## 2020-02-13 RX ORDER — PHENYLEPHRINE HYDROCHLORIDE 25 MG/ML
1 SOLUTION/ DROPS OPHTHALMIC
Status: COMPLETED | OUTPATIENT
Start: 2020-02-13 | End: 2020-02-13

## 2020-02-13 RX ORDER — PROPOFOL 10 MG/ML
INJECTION, EMULSION INTRAVENOUS
Status: DISCONTINUED | OUTPATIENT
Start: 2020-02-13 | End: 2020-02-13

## 2020-02-13 RX ORDER — CYCLOPENTOLATE HYDROCHLORIDE 10 MG/ML
1 SOLUTION/ DROPS OPHTHALMIC
Status: COMPLETED | OUTPATIENT
Start: 2020-02-13 | End: 2020-02-13

## 2020-02-13 RX ADMIN — CYCLOPENTOLATE HYDROCHLORIDE 1 DROP: 10 SOLUTION/ DROPS OPHTHALMIC at 07:02

## 2020-02-13 RX ADMIN — PHENYLEPHRINE HYDROCHLORIDE 1 DROP: 25 SOLUTION/ DROPS OPHTHALMIC at 07:02

## 2020-02-13 RX ADMIN — TETRACAINE HYDROCHLORIDE 1 DROP: 5 SOLUTION OPHTHALMIC at 07:02

## 2020-02-13 RX ADMIN — LIDOCAINE HYDROCHLORIDE 30 MG: 20 INJECTION, SOLUTION INTRAVENOUS at 08:02

## 2020-02-13 RX ADMIN — PROPOFOL 40 MG: 10 INJECTION, EMULSION INTRAVENOUS at 08:02

## 2020-02-13 RX ADMIN — SODIUM CHLORIDE, SODIUM LACTATE, POTASSIUM CHLORIDE, AND CALCIUM CHLORIDE: .6; .31; .03; .02 INJECTION, SOLUTION INTRAVENOUS at 07:02

## 2020-02-13 RX ADMIN — LIDOCAINE HYDROCHLORIDE: 20 JELLY TOPICAL at 07:02

## 2020-02-13 NOTE — INTERVAL H&P NOTE
The patient has been examined and the H&P has been reviewed:        I concur with the findings and no changes have occurred since H&P was written.        Patient cleared for Anesthesia: MAC        Anesthesia/Surgery risks, benefits and alternative options discussed and understood by patient/family.      Active Hospital Problems    Diagnosis  POA    Cataract, left eye [H26.9]  Yes      Resolved Hospital Problems   No resolved problems to display.

## 2020-02-13 NOTE — ANESTHESIA POSTPROCEDURE EVALUATION
Anesthesia Post Evaluation    Patient: Stephany Skinner    Procedure(s) Performed: Procedure(s) (LRB):  PHACOEMULSIFICATION, CATARACT (Left)  EXTRACTION, CATARACT, WITH IOL INSERTION (Left)    Final Anesthesia Type: MAC    Patient location during evaluation: PACU  Patient participation: Yes- Able to Participate  Level of consciousness: awake and alert, oriented and awake  Post-procedure vital signs: reviewed and stable  Pain management: adequate  Airway patency: patent    PONV status at discharge: No PONV  Anesthetic complications: no      Cardiovascular status: blood pressure returned to baseline, hemodynamically stable and stable  Respiratory status: unassisted, spontaneous ventilation and room air  Hydration status: euvolemic  Follow-up not needed.          Vitals Value Taken Time   /69 2/13/2020  7:52 AM   Temp 36.1 °C (97 °F) 2/13/2020  7:22 AM   Pulse 55 2/13/2020  7:22 AM   Resp 18 2/13/2020  7:22 AM   SpO2 96 % 2/13/2020  7:22 AM         No case tracking events are documented in the log.      Pain/Divine Score: No data recorded

## 2020-02-13 NOTE — DISCHARGE INSTRUCTIONS
POST OP EYES    DAY OF SURGERY    Diet:  Feel free to eat all your favorite foods. No cooking the day of surgery.    Alcohol:  Yes, you may have one (1) cocktail with dinner.    Activity:  Make today a day of leisure, watch TV, take a walk, ride in the car,  sit on the porch. Rest frequently.    You may bend a bit to brush your teeth,  the newspaper, or eat your meals, but refrain from staying bent over for a long period of  time.  No heavy lifting/straining.     You may take a bath the day of surgery- NO SHOWER.     MD will advise on when you may resume driving.    SLEEP:  Sleep on two pillows the night of surgery. You may sleep on the      NON-OPERATIVE side. Wear eye shield at night and for naps.    MEDICATIONS:   Your home medications are to be taken as instructed by Dr. Quinteros.  Please wait until after the first post op day before resuming blood thinners (Persantine, Ibuprofen, Coumadin, Aspirin, etc.)    Pain:  You may take two (2) Extra Strength Tylenol every four (4) hours.    Return to hospital:    1. Any obvious bleeding .    2. Temperature over 100.4 F    3. Pain not relieved by Tylenol.    4. Drainage or painful eye.    5. Redness or swelling around the eye      Remember to protect the eye by wearing the shield for at least one week at bedtime and by wearing your glasses or shield during the day.    Your follow up appointment is scheduled for tomorrow 2/14/20 at 8am.  Please bring your discharge paperwork with you to your appointment.

## 2020-02-13 NOTE — ANESTHESIA PREPROCEDURE EVALUATION
02/13/2020  Stephany Skinner is a 85 y.o., female.    Pre-op Assessment    I have reviewed the Patient Summary Reports.     I have reviewed the Nursing Notes.   I have reviewed the Medications.     Review of Systems  Social:  Non-Smoker, No Alcohol Use    Hematology/Oncology:  Hematology Normal   Oncology Normal     EENT/Dental:EENT/Dental Normal   Cardiovascular:   Exercise tolerance: good Hypertension, well controlled CHF    Pulmonary:   Sleep Apnea    Renal/:  Renal/ Normal     Hepatic/GI:   GERD    Musculoskeletal:   Arthritis     Neurological:   Neuromuscular Disease,    Endocrine:   Diabetes, well controlled Hypothyroidism    Dermatological:  Skin Normal    Psych:  Psychiatric Normal           Physical Exam  General:  Well nourished    Airway/Jaw/Neck:  Airway Findings: Mouth Opening: Normal Tongue: Normal  General Airway Assessment: Adult  Mallampati: II  TM Distance: Normal, at least 6 cm  Jaw/Neck Findings:  Neck ROM: Normal ROM      Dental:  Dental Findings: In tact        Mental Status:  Mental Status Findings:  Cooperative         Anesthesia Plan  Type of Anesthesia, risks & benefits discussed:  Anesthesia Type:  MAC  Patient's Preference:   Intra-op Monitoring Plan: standard ASA monitors  Intra-op Monitoring Plan Comments:   Post Op Pain Control Plan: multimodal analgesia  Post Op Pain Control Plan Comments:   Induction:   IV  Beta Blocker:  Patient is not currently on a Beta-Blocker (No further documentation required).       Informed Consent: Patient understands risks and agrees with Anesthesia plan.  Questions answered. Anesthesia consent signed with patient.  ASA Score: 3     Day of Surgery Review of History & Physical: I have interviewed and examined the patient. I have reviewed the patient's H&P dated: 2/13/20. There are no significant changes.  H&P update referred to the surgeon.          Ready For Surgery From Anesthesia Perspective.

## 2020-02-13 NOTE — OP NOTE
[facility]  Operative Note    DATE OF PROCEDURE: 2/13/2020     PREOPERATIVE DIAGNOSES:   Cataract, left eye [H26.9]    POSTOPERATIVE DIAGNOSES:   Cataract, left eye [H26.9]    PROCEDURES PERFORMED:   Procedure(s) (LRB):  PHACOEMULSIFICATION, CATARACT (Left)  EXTRACTION, CATARACT, WITH IOL INSERTION (Left)    Surgeon(s) and Role:     * Michael Arellano MD - Primary        ANESTHESIA: Monitor Anesthesia Care      DESCRIPTION OF PROCEDURE:   This is Dr. Arellano dictating a standard operative operative dictation for cataract extraction phacoemulsification of right or left eye with using foldable posterior chamber lens implant to the left or the right .  The patient was brought to the preoperative area where the right or left pupil was dilated with 2.5% phenylephrine and 1% Cyclogyl drops xylocaine gel was applied to the right or left eye for topical anesthesia the patient was then brought to the operative suite where the right or left area was prepped with Betadine and the right or left eye was draped in the usual sterile fashion    The remainder of the procedure took place using the Zeiss operating microscope    A lid  speculum was placed under the lids and the lids were retracted a 3.0 mm corneal incision was made at the limbus in the temporal position a 2.8 mm microkeratome   was used to enter the anterior chamber using a stair stepped incision a 15 blade was used to make a paracentesis in the clear cornea temporal to the corneal incision the anterior chamber was filled with Visco elastic a bent 25 gauge needle was used to create a circular capsulorrhexis the lens nucleus was hydro dissected with balanced salt solution the lens nucleus was removed with phacoemulsification using a divide and conquer technique the remaining cortical cataract material was aspirated with the irrigation aspiration tip the lens capsule was noted to be intact and the posterior capsule was clean the anterior chamber and capsular bag  was filled with Visco elastic a foldable  posterior chamber lens implant was inserted into the capsular bag in the usual fashion the Visco elastic was removed and the anterior chamber was reformed with balanced salt solution the clear corneal incision was tested to be free of leak and the lid speculum was removed after the anterior chamber was noted to be watertight and formed TobraDex ophthalmic ointment was applied and I a plastic shield was applied over the operated eye  the patient was referred to the postoperative area in good condition having tolerated the procedure well and the patient was referred to the post op area please use this as a standard dictation for [place] almost vacation of the cataract right or left eye and using a holdable posterior chamber lens implant to the left or right thank you very much      ESTIMATED BLOOD LOSS:  No blood loss    SPECIMENS:   Specimen (12h ago, onward)    None          COMPLICATIONS: No      [facility]  Discharge Note  Short Stay    Procedure(s) (LRB):  PHACOEMULSIFICATION, CATARACT (Left)  EXTRACTION, CATARACT, WITH IOL INSERTION (Left)    OUTCOME: Patient tolerated treatment/procedure well without complication and is now ready for discharge.    DISPOSITION: Home or Self Care    FINAL DIAGNOSIS:  <principal problem not specified>    FOLLOWUP: In clinic    DISCHARGE INSTRUCTIONS:  No discharge procedures on file.

## 2020-02-17 DIAGNOSIS — E78.5 HYPERLIPIDEMIA, UNSPECIFIED HYPERLIPIDEMIA TYPE: ICD-10-CM

## 2020-02-17 RX ORDER — ATORVASTATIN CALCIUM 10 MG/1
TABLET, FILM COATED ORAL
Qty: 90 TABLET | Refills: 1 | Status: SHIPPED | OUTPATIENT
Start: 2020-02-17 | End: 2020-03-02 | Stop reason: SDUPTHER

## 2020-02-24 ENCOUNTER — CLINICAL SUPPORT (OUTPATIENT)
Dept: FAMILY MEDICINE | Facility: CLINIC | Age: 85
End: 2020-02-24
Payer: MEDICARE

## 2020-02-24 DIAGNOSIS — E03.4 HYPOTHYROIDISM DUE TO ACQUIRED ATROPHY OF THYROID: ICD-10-CM

## 2020-02-24 DIAGNOSIS — I10 ESSENTIAL HYPERTENSION: ICD-10-CM

## 2020-02-24 DIAGNOSIS — E78.5 HYPERLIPIDEMIA, UNSPECIFIED HYPERLIPIDEMIA TYPE: ICD-10-CM

## 2020-02-24 LAB
ALBUMIN SERPL BCP-MCNC: 3.6 G/DL (ref 3.5–5.2)
ALP SERPL-CCNC: 50 U/L (ref 55–135)
ALT SERPL W/O P-5'-P-CCNC: 14 U/L (ref 10–44)
ANION GAP SERPL CALC-SCNC: 8 MMOL/L (ref 8–16)
AST SERPL-CCNC: 14 U/L (ref 10–40)
BILIRUB SERPL-MCNC: 0.6 MG/DL (ref 0.1–1)
BUN SERPL-MCNC: 23 MG/DL (ref 8–23)
CALCIUM SERPL-MCNC: 9.1 MG/DL (ref 8.7–10.5)
CHLORIDE SERPL-SCNC: 107 MMOL/L (ref 95–110)
CHOLEST SERPL-MCNC: 122 MG/DL (ref 120–199)
CHOLEST/HDLC SERPL: 3 {RATIO} (ref 2–5)
CO2 SERPL-SCNC: 28 MMOL/L (ref 23–29)
CREAT SERPL-MCNC: 0.9 MG/DL (ref 0.5–1.4)
EST. GFR  (AFRICAN AMERICAN): >60 ML/MIN/1.73 M^2
EST. GFR  (NON AFRICAN AMERICAN): 59 ML/MIN/1.73 M^2
GLUCOSE SERPL-MCNC: 82 MG/DL (ref 70–110)
HDLC SERPL-MCNC: 41 MG/DL (ref 40–75)
HDLC SERPL: 33.6 % (ref 20–50)
LDLC SERPL CALC-MCNC: 53.2 MG/DL (ref 63–159)
NONHDLC SERPL-MCNC: 81 MG/DL
POTASSIUM SERPL-SCNC: 4.4 MMOL/L (ref 3.5–5.1)
PROT SERPL-MCNC: 6.4 G/DL (ref 6–8.4)
SODIUM SERPL-SCNC: 143 MMOL/L (ref 136–145)
T4 FREE SERPL-MCNC: 1.05 NG/DL (ref 0.71–1.51)
TRIGL SERPL-MCNC: 139 MG/DL (ref 30–150)
TSH SERPL DL<=0.005 MIU/L-ACNC: 1.53 UIU/ML (ref 0.4–4)

## 2020-02-24 PROCEDURE — 80053 COMPREHEN METABOLIC PANEL: CPT

## 2020-02-24 PROCEDURE — 80061 LIPID PANEL: CPT

## 2020-02-24 PROCEDURE — 84439 ASSAY OF FREE THYROXINE: CPT

## 2020-02-24 PROCEDURE — 84443 ASSAY THYROID STIM HORMONE: CPT

## 2020-02-24 PROCEDURE — 99999 PR STA SHADOW: ICD-10-PCS | Mod: PBBFAC,,, | Performed by: FAMILY MEDICINE

## 2020-02-24 PROCEDURE — 99999 PR STA SHADOW: CPT | Mod: PBBFAC,,, | Performed by: FAMILY MEDICINE

## 2020-02-24 PROCEDURE — 36415 COLL VENOUS BLD VENIPUNCTURE: CPT | Mod: PBBFAC

## 2020-02-29 ENCOUNTER — EXTERNAL CHRONIC CARE MANAGEMENT (OUTPATIENT)
Dept: PRIMARY CARE CLINIC | Facility: CLINIC | Age: 85
End: 2020-02-29
Payer: MEDICARE

## 2020-02-29 PROCEDURE — 99999 PR STA SHADOW: ICD-10-PCS | Mod: PBBFAC,,, | Performed by: FAMILY MEDICINE

## 2020-02-29 PROCEDURE — 99490 CHRNC CARE MGMT STAFF 1ST 20: CPT | Mod: S$PBB | Performed by: FAMILY MEDICINE

## 2020-02-29 PROCEDURE — 99999 PR STA SHADOW: CPT | Mod: PBBFAC,,, | Performed by: FAMILY MEDICINE

## 2020-03-02 ENCOUNTER — OFFICE VISIT (OUTPATIENT)
Dept: FAMILY MEDICINE | Facility: CLINIC | Age: 85
End: 2020-03-02
Payer: MEDICARE

## 2020-03-02 VITALS
TEMPERATURE: 98 F | HEART RATE: 68 BPM | DIASTOLIC BLOOD PRESSURE: 80 MMHG | HEIGHT: 67 IN | RESPIRATION RATE: 16 BRPM | WEIGHT: 203.63 LBS | BODY MASS INDEX: 31.96 KG/M2 | SYSTOLIC BLOOD PRESSURE: 136 MMHG

## 2020-03-02 DIAGNOSIS — E11.59 TYPE 2 DIABETES MELLITUS WITH OTHER CIRCULATORY COMPLICATION, WITH LONG-TERM CURRENT USE OF INSULIN: ICD-10-CM

## 2020-03-02 DIAGNOSIS — Z79.4 TYPE 2 DIABETES MELLITUS WITH OTHER CIRCULATORY COMPLICATION, WITH LONG-TERM CURRENT USE OF INSULIN: ICD-10-CM

## 2020-03-02 DIAGNOSIS — G70.00 MYASTHENIA GRAVIS: ICD-10-CM

## 2020-03-02 DIAGNOSIS — J30.1 SEASONAL ALLERGIC RHINITIS DUE TO POLLEN: ICD-10-CM

## 2020-03-02 DIAGNOSIS — E53.8 B12 DEFICIENCY: ICD-10-CM

## 2020-03-02 DIAGNOSIS — E03.4 HYPOTHYROIDISM DUE TO ACQUIRED ATROPHY OF THYROID: ICD-10-CM

## 2020-03-02 DIAGNOSIS — E78.5 HYPERLIPIDEMIA, UNSPECIFIED HYPERLIPIDEMIA TYPE: ICD-10-CM

## 2020-03-02 DIAGNOSIS — M81.0 POST-MENOPAUSAL OSTEOPOROSIS: Primary | ICD-10-CM

## 2020-03-02 DIAGNOSIS — I10 ESSENTIAL HYPERTENSION: ICD-10-CM

## 2020-03-02 PROCEDURE — 99999 PR STA SHADOW: ICD-10-PCS | Mod: PBBFAC,,, | Performed by: FAMILY MEDICINE

## 2020-03-02 PROCEDURE — 99214 OFFICE O/P EST MOD 30 MIN: CPT | Mod: S$PBB | Performed by: FAMILY MEDICINE

## 2020-03-02 PROCEDURE — 99999 PR STA SHADOW: CPT | Mod: PBBFAC,,, | Performed by: FAMILY MEDICINE

## 2020-03-02 PROCEDURE — 99213 OFFICE O/P EST LOW 20 MIN: CPT | Mod: PBBFAC | Performed by: FAMILY MEDICINE

## 2020-03-02 PROCEDURE — 99999 PR PBB SHADOW E&M-EST. PATIENT-LVL III: CPT | Mod: PBBFAC,,, | Performed by: FAMILY MEDICINE

## 2020-03-02 RX ORDER — METOPROLOL SUCCINATE 100 MG/1
100 TABLET, EXTENDED RELEASE ORAL DAILY
Qty: 30 TABLET | Refills: 5 | Status: SHIPPED | OUTPATIENT
Start: 2020-03-02 | End: 2020-09-01 | Stop reason: SDUPTHER

## 2020-03-02 RX ORDER — ATORVASTATIN CALCIUM 10 MG/1
10 TABLET, FILM COATED ORAL NIGHTLY
Qty: 30 TABLET | Refills: 5 | Status: SHIPPED | OUTPATIENT
Start: 2020-03-02 | End: 2020-09-04 | Stop reason: SDUPTHER

## 2020-03-02 RX ORDER — POTASSIUM CHLORIDE 20 MEQ/1
TABLET, EXTENDED RELEASE ORAL
Qty: 30 TABLET | Refills: 5 | Status: SHIPPED | OUTPATIENT
Start: 2020-03-02 | End: 2020-09-04 | Stop reason: SDUPTHER

## 2020-03-02 RX ORDER — FLUTICASONE PROPIONATE 50 MCG
2 SPRAY, SUSPENSION (ML) NASAL DAILY
Qty: 18.2 ML | Refills: 5 | Status: SHIPPED | OUTPATIENT
Start: 2020-03-02 | End: 2020-04-01

## 2020-03-02 RX ORDER — PYRIDOSTIGMINE BROMIDE 60 MG/1
60 TABLET ORAL DAILY
Qty: 30 TABLET | Refills: 5 | Status: SHIPPED | OUTPATIENT
Start: 2020-03-02 | End: 2020-06-25

## 2020-03-02 RX ORDER — FUROSEMIDE 40 MG/1
40 TABLET ORAL DAILY
Qty: 30 TABLET | Refills: 5 | Status: SHIPPED | OUTPATIENT
Start: 2020-03-02 | End: 2020-09-04 | Stop reason: SDUPTHER

## 2020-03-02 RX ORDER — LEVOTHYROXINE SODIUM 88 UG/1
88 TABLET ORAL DAILY
Qty: 30 TABLET | Refills: 5 | Status: SHIPPED | OUTPATIENT
Start: 2020-03-02 | End: 2020-09-04 | Stop reason: SDUPTHER

## 2020-03-02 RX ORDER — PNV NO.95/FERROUS FUM/FOLIC AC 28MG-0.8MG
100 TABLET ORAL DAILY
COMMUNITY
Start: 2020-03-02 | End: 2021-12-15 | Stop reason: SDUPTHER

## 2020-03-02 NOTE — PROGRESS NOTES
Subjective:       Patient ID: Stephany Skinner is a 85 y.o. female.    Chief Complaint: Follow-up (6 mo) and Cough    Patient here for checkup.  She has a history of congestive heart failure and hypertension.  She has lost over 60 lb over the past year.  Now her blood pressure is low.  She denies shortness of breath.  Has  Neuro diagnosed her with MG.  Currently on prednisone.  Since starting the prednisone her strength has improved remarkably well.  She now only needs a cane for ambulation.  She is currently taking pyridostigmine 60 mg daily.  Patient has type 2 diabetes and seems be doing well.  She stopped her insulin and her blood sugars look great.    She has lost over 60 lb in the last year.  She continues to lose weight.  Patient has COPD.  She was taking neb treatments in the nursing home as needed.  They did not send her home with a nebulizer.  This seems to be in the works.  She also has sleep apnea and is using her CPAP at least 6 hr per night.  She takes Synthroid for her hypothyroidism.  She tolerates this well.  She denies having symptoms such as heat or cold intolerance.  Her weight is stable.  She denies any swelling in her thyroid.  She tolerates her blood pressure medication as well as not having side effects such as chronic cough or dizziness.  Sometimes her systolic blood pressures less than 100.  She is not having any symptoms from it.  Patient is taking her statin every day for hyperlipidemia.  She is feeling well on the medication.  She denies side effects such as myalgias.  Since losing so much weight.  She notices he has a large incisional wall hernia on her abdomen.  This is not causing symptoms.  She does have persistent diarrhea.  Patient complains of sinus congestion, bilateral ear pain, sore throat.  He's also having stuffy nose, mild cough.  Denies fever.      Diarrhea    Associated symptoms include coughing. Pertinent negatives include no abdominal pain, chills or fever.   Mass    Associated symptoms include coughing. Pertinent negatives include no abdominal pain, chest pain, chills, fatigue, fever, joint swelling, numbness, rash or sore throat.   Follow-up   Associated symptoms include coughing. Pertinent negatives include no abdominal pain, chest pain, chills, fatigue, fever, joint swelling, numbness, rash or sore throat.   Cough   Pertinent negatives include no chest pain, chills, fever, rash, sore throat or shortness of breath.     Review of Systems   Constitutional: Negative for activity change, chills, fatigue, fever and unexpected weight change.   HENT: Negative for sore throat and trouble swallowing.    Respiratory: Positive for cough. Negative for chest tightness and shortness of breath.    Cardiovascular: Negative for chest pain and leg swelling.   Gastrointestinal: Positive for diarrhea. Negative for abdominal pain.   Endocrine: Negative for cold intolerance and heat intolerance.   Genitourinary: Negative for difficulty urinating.   Musculoskeletal: Negative for back pain and joint swelling.   Skin: Positive for wound. Negative for rash.   Neurological: Negative for numbness.   Hematological: Negative for adenopathy.   Psychiatric/Behavioral: Negative for decreased concentration.       Objective:      Vitals:    03/02/20 0841   BP: 136/80   Pulse: 68   Resp: 16   Temp: 98.4 °F (36.9 °C)     Physical Exam   Constitutional: She is oriented to person, place, and time. She appears well-developed and well-nourished.   HENT:   Head: Normocephalic and atraumatic.   Eyes: Pupils are equal, round, and reactive to light.   Neck: Neck supple.   Cardiovascular: Normal rate, regular rhythm, normal heart sounds and intact distal pulses. Exam reveals no gallop and no friction rub.   No murmur heard.  Pulmonary/Chest: Effort normal and breath sounds normal. She has no wheezes. She has no rales.   Abdominal: Soft. Bowel sounds are normal. A hernia (incisional wall hernia, reducible) is present.    Musculoskeletal: She exhibits no edema.   Neurological: She is alert and oriented to person, place, and time. No cranial nerve deficit or sensory deficit.   Skin: No rash noted.   Psychiatric: She has a normal mood and affect. Her behavior is normal. Judgment and thought content normal.       Lab Results   Component Value Date    TSH 1.530 02/24/2020     Lab Results   Component Value Date    LDLCALC 53.2 (L) 02/24/2020     BMP  Lab Results   Component Value Date     02/24/2020    K 4.4 02/24/2020     02/24/2020    CO2 28 02/24/2020    BUN 23 02/24/2020    CREATININE 0.9 02/24/2020    CALCIUM 9.1 02/24/2020    ANIONGAP 8 02/24/2020    ESTGFRAFRICA >60 02/24/2020    EGFRNONAA 59 (A) 02/24/2020     Lab Results   Component Value Date    HGBA1C 5.6 12/04/2019     Lab Results   Component Value Date    WBC 4.44 08/20/2019    HGB 12.4 08/20/2019    HCT 40.0 08/20/2019    MCV 99 (H) 08/20/2019     08/20/2019         Assessment:       1. Post-menopausal osteoporosis    2. Seasonal allergic rhinitis due to pollen    3. B12 deficiency    4. Essential hypertension    5. Hypothyroidism due to acquired atrophy of thyroid    6. Myasthenia gravis    7. Type 2 diabetes mellitus with other circulatory complication, with long-term current use of insulin    8. Hyperlipidemia, unspecified hyperlipidemia type        Plan:   Stephany CARRILLO was seen today for sore on toe.    Diagnoses and all orders for this visit:    Obstructive sleep apnea  Continue CPAP at current pressure.  Patient is compliant.    Type 2 diabetes  Hold Lantus for now  Check hemoglobin A1c every 3 months    Pressure ulcer of toe of left foot, stage 3  This seems to be healed    Essential hypertension/CHF  Continue Lasix 40 mg, Toprol- mg,  Continue potassium    Hypothyroidism due to acquired atrophy of thyroid  Continue Synthroid 88 µg by mouth daily    Hyperlipidemia, unspecified hyperlipidemia type  Continue fish oil  Continue Lipitor 10  mg    Myasthenia gravis  Continue prednisone 10 mg daily  Continue Mestinon  Keep appointment with neurology    COPD  Continue DuoNeb treatments when the nebulizer on his    Congestive heart failure/atrial fibrillation  Continue Eliquis    Diarrhea  Avoid fish oil for now and see if the diarrhea gets better.    Post-menopausal osteoporosis  -     DXA Bone Density Spine And Hip; Future; Expected date: 03/02/2020  -     fluticasone propionate (FLONASE) 50 mcg/actuation nasal spray; 2 sprays (100 mcg total) by Each Nostril route once daily.  Dispense: 18.2 mL; Refill: 5    Seasonal allergic rhinitis due to pollen  -     fluticasone propionate (FLONASE) 50 mcg/actuation nasal spray; 2 sprays (100 mcg total) by Each Nostril route once daily.  Dispense: 18.2 mL; Refill: 5    B12 deficiency  -     cyanocobalamin (VITAMIN B-12) 100 MCG tablet; Take 1 tablet (100 mcg total) by mouth once daily.    Essential hypertension  -     potassium chloride SA (K-DUR,KLOR-CON) 20 MEQ tablet; 1 Tab po 3 times per week  Dispense: 30 tablet; Refill: 5  -     metoprolol succinate (TOPROL-XL) 100 MG 24 hr tablet; Take 1 tablet (100 mg total) by mouth once daily.  Dispense: 30 tablet; Refill: 5  -     furosemide (LASIX) 40 MG tablet; Take 1 tablet (40 mg total) by mouth Daily.  Dispense: 30 tablet; Refill: 5    Hypothyroidism due to acquired atrophy of thyroid  -     levothyroxine (SYNTHROID) 88 MCG tablet; Take 1 tablet (88 mcg total) by mouth once daily.  Dispense: 30 tablet; Refill: 5    Myasthenia gravis  -     pyridostigmine (MESTINON) 60 mg Tab; Take 1 tablet (60 mg total) by mouth once daily.  Dispense: 30 tablet; Refill: 5  -     CBC auto differential; Future; Expected date: 08/02/2020    Type 2 diabetes mellitus with other circulatory complication, with long-term current use of insulin  -     Comprehensive metabolic panel; Future; Expected date: 08/02/2020  -     Hemoglobin A1c; Future; Expected date: 08/02/2020    Hyperlipidemia,  unspecified hyperlipidemia type  -     Lipid panel; Future; Expected date: 08/02/2020  -     atorvastatin (LIPITOR) 10 MG tablet; Take 1 tablet (10 mg total) by mouth every evening.  Dispense: 30 tablet; Refill: 5     Continue treatment with home health.     Return to clinic in 6 month.

## 2020-03-31 ENCOUNTER — EXTERNAL CHRONIC CARE MANAGEMENT (OUTPATIENT)
Dept: PRIMARY CARE CLINIC | Facility: CLINIC | Age: 85
End: 2020-03-31
Payer: MEDICARE

## 2020-03-31 PROCEDURE — 99999 PR STA SHADOW: CPT | Mod: PBBFAC,,, | Performed by: FAMILY MEDICINE

## 2020-03-31 PROCEDURE — 99490 CHRNC CARE MGMT STAFF 1ST 20: CPT | Mod: S$PBB | Performed by: FAMILY MEDICINE

## 2020-03-31 PROCEDURE — 99999 PR STA SHADOW: ICD-10-PCS | Mod: PBBFAC,,, | Performed by: FAMILY MEDICINE

## 2020-04-30 ENCOUNTER — EXTERNAL CHRONIC CARE MANAGEMENT (OUTPATIENT)
Dept: PRIMARY CARE CLINIC | Facility: CLINIC | Age: 85
End: 2020-04-30
Payer: MEDICARE

## 2020-04-30 PROCEDURE — 99999 PR STA SHADOW: ICD-10-PCS | Mod: PBBFAC,,, | Performed by: FAMILY MEDICINE

## 2020-04-30 PROCEDURE — 99999 PR STA SHADOW: CPT | Mod: PBBFAC,,, | Performed by: FAMILY MEDICINE

## 2020-04-30 PROCEDURE — 99490 CHRNC CARE MGMT STAFF 1ST 20: CPT | Mod: S$PBB | Performed by: FAMILY MEDICINE

## 2020-05-31 ENCOUNTER — EXTERNAL CHRONIC CARE MANAGEMENT (OUTPATIENT)
Dept: PRIMARY CARE CLINIC | Facility: CLINIC | Age: 85
End: 2020-05-31
Payer: MEDICARE

## 2020-05-31 PROCEDURE — 99490 CHRNC CARE MGMT STAFF 1ST 20: CPT | Mod: S$PBB | Performed by: FAMILY MEDICINE

## 2020-05-31 PROCEDURE — 99999 PR STA SHADOW: ICD-10-PCS | Mod: PBBFAC,,, | Performed by: FAMILY MEDICINE

## 2020-05-31 PROCEDURE — 99490 CHRNC CARE MGMT STAFF 1ST 20: CPT | Mod: PBBFAC | Performed by: FAMILY MEDICINE

## 2020-05-31 PROCEDURE — 99999 PR STA SHADOW: CPT | Mod: PBBFAC,,, | Performed by: FAMILY MEDICINE

## 2020-06-19 ENCOUNTER — TELEPHONE (OUTPATIENT)
Dept: FAMILY MEDICINE | Facility: CLINIC | Age: 85
End: 2020-06-19

## 2020-06-19 NOTE — TELEPHONE ENCOUNTER
Spoke with pt--having bilateral hand cramping, numbness, tingling at night. She said that Dr Foss had dx her with corpal tunnel. Offered her an appt to discuss options, declined. She will keep her appt in August. Advised her to apply compresses, use wrist braces. She is taking Tylenol for pain.

## 2020-06-19 NOTE — TELEPHONE ENCOUNTER
MARGARETH Mckoy MD; ERNIE BOLES Staff          Previous Messages    ----- Message -----   From: Yajaira Robles   Sent: 6/18/2020   3:48 PM CDT   To: Fannie Rapp LPN     Pt Name: Stephany Skinner   Pt MRN: [id number]   Pt Dr: Pipo Flowers MD     Spoke with patient on 6/18/20. Patient is having problems with their hands at this time. Patient informed [facility] that each night when she goes to bed that she is having hand cramps that keep her from falling asleep. Patient is wanting some assistance with the cramps at this time. Please call pt at [phone].     Thanks   Yajaira Robles LPN Care-Coordinator   [facility]   [phone] Ext [phone]

## 2020-06-25 DIAGNOSIS — G70.00 MYASTHENIA GRAVIS: ICD-10-CM

## 2020-06-25 RX ORDER — PYRIDOSTIGMINE BROMIDE 60 MG/1
60 TABLET ORAL 3 TIMES DAILY
Qty: 90 TABLET | Refills: 0 | Status: SHIPPED | OUTPATIENT
Start: 2020-06-25 | End: 2021-01-20

## 2020-06-30 ENCOUNTER — EXTERNAL CHRONIC CARE MANAGEMENT (OUTPATIENT)
Dept: PRIMARY CARE CLINIC | Facility: CLINIC | Age: 85
End: 2020-06-30
Payer: MEDICARE

## 2020-06-30 PROCEDURE — 99999 PR STA SHADOW: ICD-10-PCS | Mod: PBBFAC,,, | Performed by: FAMILY MEDICINE

## 2020-06-30 PROCEDURE — 99490 CHRNC CARE MGMT STAFF 1ST 20: CPT | Mod: S$PBB | Performed by: FAMILY MEDICINE

## 2020-06-30 PROCEDURE — 99490 CHRNC CARE MGMT STAFF 1ST 20: CPT | Mod: PBBFAC | Performed by: FAMILY MEDICINE

## 2020-06-30 PROCEDURE — 99999 PR STA SHADOW: CPT | Mod: PBBFAC,,, | Performed by: FAMILY MEDICINE

## 2020-07-31 ENCOUNTER — EXTERNAL CHRONIC CARE MANAGEMENT (OUTPATIENT)
Dept: PRIMARY CARE CLINIC | Facility: CLINIC | Age: 85
End: 2020-07-31
Payer: MEDICARE

## 2020-07-31 PROCEDURE — 99490 CHRNC CARE MGMT STAFF 1ST 20: CPT | Mod: S$PBB | Performed by: FAMILY MEDICINE

## 2020-07-31 PROCEDURE — 99490 CHRNC CARE MGMT STAFF 1ST 20: CPT | Mod: PBBFAC | Performed by: FAMILY MEDICINE

## 2020-07-31 PROCEDURE — 99999 PR STA SHADOW: ICD-10-PCS | Mod: PBBFAC,,, | Performed by: FAMILY MEDICINE

## 2020-07-31 PROCEDURE — 99999 PR STA SHADOW: CPT | Mod: PBBFAC,,, | Performed by: FAMILY MEDICINE

## 2020-08-03 ENCOUNTER — PATIENT OUTREACH (OUTPATIENT)
Dept: ADMINISTRATIVE | Facility: OTHER | Age: 85
End: 2020-08-03

## 2020-08-04 ENCOUNTER — OFFICE VISIT (OUTPATIENT)
Dept: NEUROLOGY | Facility: CLINIC | Age: 85
End: 2020-08-04
Payer: MEDICARE

## 2020-08-04 VITALS
RESPIRATION RATE: 16 BRPM | HEART RATE: 56 BPM | TEMPERATURE: 98 F | HEIGHT: 67 IN | OXYGEN SATURATION: 98 % | WEIGHT: 203.5 LBS | DIASTOLIC BLOOD PRESSURE: 72 MMHG | SYSTOLIC BLOOD PRESSURE: 126 MMHG | BODY MASS INDEX: 31.94 KG/M2

## 2020-08-04 DIAGNOSIS — G70.00 MYASTHENIA GRAVIS, ADULT FORM: ICD-10-CM

## 2020-08-04 DIAGNOSIS — M79.641 BILATERAL HAND PAIN: Primary | ICD-10-CM

## 2020-08-04 DIAGNOSIS — M54.16 LUMBAR RADICULOPATHY: ICD-10-CM

## 2020-08-04 DIAGNOSIS — M79.642 BILATERAL HAND PAIN: Primary | ICD-10-CM

## 2020-08-04 DIAGNOSIS — G56.03 BILATERAL CARPAL TUNNEL SYNDROME: Primary | ICD-10-CM

## 2020-08-04 DIAGNOSIS — E53.8 B12 DEFICIENCY: ICD-10-CM

## 2020-08-04 PROCEDURE — 99999 PR PBB SHADOW E&M-EST. PATIENT-LVL IV: CPT | Mod: PBBFAC,,, | Performed by: PSYCHIATRY & NEUROLOGY

## 2020-08-04 PROCEDURE — 99999 PR STA SHADOW: CPT | Mod: PBBFAC,,, | Performed by: PSYCHIATRY & NEUROLOGY

## 2020-08-04 PROCEDURE — 99999 PR PBB SHADOW E&M-EST. PATIENT-LVL IV: ICD-10-PCS | Mod: PBBFAC,,, | Performed by: PSYCHIATRY & NEUROLOGY

## 2020-08-04 PROCEDURE — 99214 OFFICE O/P EST MOD 30 MIN: CPT | Mod: PBBFAC | Performed by: PSYCHIATRY & NEUROLOGY

## 2020-08-04 PROCEDURE — 99214 OFFICE O/P EST MOD 30 MIN: CPT | Mod: S$PBB | Performed by: PSYCHIATRY & NEUROLOGY

## 2020-08-04 RX ORDER — MELOXICAM 7.5 MG/1
7.5 TABLET ORAL DAILY PRN
Qty: 30 TABLET | Refills: 1 | Status: SHIPPED | OUTPATIENT
Start: 2020-08-04 | End: 2020-09-04

## 2020-08-04 NOTE — PROGRESS NOTES
HPI:  Stephany Skinner is a 85 y.o. female with  increased falls, some lumbar radicular pain.  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 Admitted for ARF with hypercapnea, ptosis, and gaze restriction in 1/2018; response to steroids suggestive of myasthenia, though antibodies are negative, and EMG with repetitve stimulation from 7/2018 was inconclusive. PUJA during 1/2018 admit, secondary to overdiuresis. She has JOY, DM, HTN, HLD, hypothyroidism, B12 deficiency, and A-fib.       Prednisone not used now and doing well  Reduced mestinon and diarrhea unchanged       No MG symptoms of diplopia and weakness or speech problems     CTS symptoms are noted and she uses brace with some relief but this remains painful especially at night    Neuorpathy pain is not active  Lumbar pain is increased more recently, as she states she is sitting she has had more pain. She is having same radicular pain as prior  No falls NO injury.      Review of Systems   Constitutional: Negative for fever.   HENT: Negative for hearing loss.    Eyes: Negative for double vision.   Respiratory: Negative for hemoptysis.    Cardiovascular: Negative for leg swelling.   Gastrointestinal: Negative for blood in stool.   Genitourinary: Negative for hematuria.   Musculoskeletal: Negative for back pain and falls.   Skin: Negative for rash.   Neurological: Negative for focal weakness, seizures and weakness.   Psychiatric/Behavioral: The patient does not have insomnia.      Exam:  Gen Appearance, well developed/nourished in no apparent distress; on O2 NC  CV: 2+ distal pulses with no edema or swelling  Neuro:  MS: Awake, alert,Sustains attention. Recent/remote memory intact, Language is full to spontaneous speech/comprehension. Fund of Knowledge is full  CN: Optic discs are flat with  normal vasculature, PERRL, Extraoccular movements and visual fields are full-no gaze restriction today. Normal facial sensation and strength-no ptosis, Hearing symmetric, Tongue and Palate are midline and strong. Shoulder Shrug symmetric and strong.  Motor: Normal bulk, tone, no abnormal movements. 5/5 strength bilateral upper/lower extremities with 1+ reflexes in the arms, and are less in the legs and bilateral plantar response  Sensory: symmetric to light touch, pain, temp, and vibration/proprioception except decreased sensation in the legs. Romberg negative  Cerebellar: Finger-nose,Heal-shin, Rapid alternating movements intact  Gait: arthralgic gait; ambulates with walker but can walk well without walker as well    Imagin/16 CT L spine: Multilevel degenerative changes of the lumbar spine without evidence for fracture or subluxation.  There is likely a component of mild neural foraminal narrowing and central canal stenosis at the L3-4 and L4-5 levels.  This could be better evaluated on a nonemergent basis with MRI.    2016 MRI L spine: Multilevel degenerative changes of the lumbar spine contributing to central canal stenosis and neural foraminal narrowing as detailed in the above report.    Edema-like signal about the posterior elements on the right at L5-S1 which can be a source of pain.    2018 EMG with repetitive stimulation at [facility] per Dr. Luis Angel Vu:   Slow repetitive stimulation (3 Htz) performed at baseline and at one minute post exercise intervals at the left ADM and left trapezius muscles revealed significant CMAP detriment at the ADM, but none at the trapezius.     Impression:   The results of the study are inconclusive. There is no definite EP evidence of a neuromuscular junction pathology.     2016 EMG legs:   1. Mild Bilateral Carpal Tunnel Syndrome  2. 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 Bilateral Lumbar Radiculopathy best localizing from L4-5 on this  study    Labs: 10/2016  glucose 136. Reviewed CMP, CBC TSh   SPEP normal  B12 low normal    2019 BMP, CBC reviewed    11/2018 BMP with alt reviewed  12/2019 A1C normal      Assessment/Plan: Stephany Skinner is a 85 y.o. female with   falls, some lumbar radicular pain.  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 Admitted for ARF with hypercapnea, ptosis, and gaze restriction in 1/2018; response to steroids suggestive of myasthenia, though antibodies are negative, and EMG with repetitve stimulation from 7/2018 was inconclusive. PUJA during 1/2018 admit, secondary to overdiuresis. She has JOY, DM, HTN, HLD, hypothyroidism, B12 deficiency, and A-fib.     I recommend:   1. Despite negative MG antibodies and inconclusive EMG with rep stim findings without definitive evidence of neuromuscular junction disorer, her clinical picture was supportive of myasthenia gravis- but is currently improved   2. Reduced Mestinon to once daily without any MG symptoms(but this did not help diarrhea complaint- reports PCP is aware of this complaint)  -she has had no worsening with tapering/ being off prednisone  - Will restart for any further symptoms as reviewed  - If she needs to remain on steroids long term, could consider steroid sparing therapy, such as Imuran versus Cellcept. She has DM, though well controlled currently. She will follow with PCP for management of this  3. Advised to go to ED if SOB. Advised on symptoms of myasthenic crisis.   4. Wearing brace nightly for CTS symptoms PRN but still with pain: Refer to ortho for further evaluation/ treatment  5. Continue B12 for low normal B12 levels. Follow  B12 levels over time. Her DM could be causing her neuropathy.  DM treated per PCP.   6. She has more pain from Lumbar radiculopathy lately. She would  defer surgical consult. PT helped reduce pain greatly as well as falls prior  -Would benefit from PT, but declines at this point due to pandemic. She will call for PT if desired at any point  -Try Mobic PRN for back pain. Don't use daily long term given GI, renal and CV risks  -Fall precautions reviewed prior-- using walker.  - No pain from neuropathy currently.    RTC  6months

## 2020-08-05 ENCOUNTER — HOSPITAL ENCOUNTER (OUTPATIENT)
Dept: RADIOLOGY | Facility: HOSPITAL | Age: 85
Discharge: HOME | End: 2020-08-05
Attending: PHYSICIAN ASSISTANT
Payer: MEDICARE

## 2020-08-05 ENCOUNTER — OFFICE VISIT (OUTPATIENT)
Dept: ORTHOPEDICS | Facility: CLINIC | Age: 85
End: 2020-08-05
Payer: MEDICARE

## 2020-08-05 VITALS
DIASTOLIC BLOOD PRESSURE: 78 MMHG | HEIGHT: 67 IN | BODY MASS INDEX: 32.08 KG/M2 | HEART RATE: 64 BPM | WEIGHT: 204.38 LBS | RESPIRATION RATE: 18 BRPM | TEMPERATURE: 99 F | SYSTOLIC BLOOD PRESSURE: 110 MMHG

## 2020-08-05 DIAGNOSIS — G56.01 RIGHT CARPAL TUNNEL SYNDROME: Primary | ICD-10-CM

## 2020-08-05 DIAGNOSIS — G56.03 BILATERAL CARPAL TUNNEL SYNDROME: ICD-10-CM

## 2020-08-05 DIAGNOSIS — M79.641 BILATERAL HAND PAIN: ICD-10-CM

## 2020-08-05 DIAGNOSIS — M79.642 BILATERAL HAND PAIN: ICD-10-CM

## 2020-08-05 PROCEDURE — 96372 THER/PROPH/DIAG INJ SC/IM: CPT | Performed by: PHYSICIAN ASSISTANT

## 2020-08-05 PROCEDURE — 99999 PR STA SHADOW: ICD-10-PCS | Mod: PBBFAC,,, | Performed by: PHYSICIAN ASSISTANT

## 2020-08-05 PROCEDURE — 99215 OFFICE O/P EST HI 40 MIN: CPT | Mod: PBBFAC,25 | Performed by: PHYSICIAN ASSISTANT

## 2020-08-05 PROCEDURE — 99999 PR STA SHADOW: CPT | Mod: PBBFAC,,, | Performed by: PHYSICIAN ASSISTANT

## 2020-08-05 PROCEDURE — 99203 OFFICE O/P NEW LOW 30 MIN: CPT | Mod: S$PBB | Performed by: PHYSICIAN ASSISTANT

## 2020-08-05 PROCEDURE — 73130 X-RAY EXAM OF HAND: CPT | Mod: TC,50

## 2020-08-05 PROCEDURE — 99999 PR PBB SHADOW E&M-EST. PATIENT-LVL V: CPT | Mod: PBBFAC,,, | Performed by: PHYSICIAN ASSISTANT

## 2020-08-05 RX ORDER — LIDOCAINE HYDROCHLORIDE 10 MG/ML
1 INJECTION INFILTRATION; PERINEURAL
Status: COMPLETED | OUTPATIENT
Start: 2020-08-05 | End: 2020-08-05

## 2020-08-05 RX ORDER — DEXAMETHASONE SODIUM PHOSPHATE 4 MG/ML
4 INJECTION, SOLUTION INTRA-ARTICULAR; INTRALESIONAL; INTRAMUSCULAR; INTRAVENOUS; SOFT TISSUE ONCE
Status: COMPLETED | OUTPATIENT
Start: 2020-08-05 | End: 2020-08-05

## 2020-08-05 RX ADMIN — LIDOCAINE HYDROCHLORIDE 1 ML: 10 INJECTION, SOLUTION INFILTRATION; PERINEURAL at 11:08

## 2020-08-05 RX ADMIN — DEXAMETHASONE SODIUM PHOSPHATE 4 MG: 4 INJECTION, SOLUTION INTRAMUSCULAR; INTRAVENOUS at 11:08

## 2020-08-05 NOTE — PROGRESS NOTES
Subjective:      Patient ID: Stephany Skinner is a 85 y.o. female.    Chief Complaint: Pain of the Left Hand and Pain of the Right Hand    Review of patient's allergies indicates:   Allergen Reactions    Statins-hmg-coa reductase inhibitors      Other reaction(s): Unknown      85 you F presents to clinic for evaluation and treatment of bilateral carpal tunnel syndrome.  Referred by Dr. Ortiz.  She had EMG in 2016 that showed mild bilateral carpal tunnel syndrome.  She states that for the past year she has been having pain and numbness/tingling in thumb, index, and middle fingers of right hand.  Also has wrist and hand pain at times.  Worse at night and while cooking.  Symptoms have become worse and more consistent in the past few months.  She occasionally has similar symptoms in left hand.  No neck pain or injury.  She was recently prescribed mobic as needed by Dr. Ortiz for back pain.      Review of Systems   Constitution: Negative for chills, diaphoresis and fever.   HENT: Negative for congestion, ear discharge and ear pain.    Eyes: Negative for blurred vision, discharge, double vision and pain.   Cardiovascular: Negative for chest pain, claudication and cyanosis.   Respiratory: Negative for cough, hemoptysis and shortness of breath.    Endocrine: Negative for cold intolerance and heat intolerance.   Skin: Negative for color change, dry skin, itching and rash.   Musculoskeletal: Positive for joint pain. Negative for arthritis, back pain, falls, gout, joint swelling, muscle weakness and neck pain.   Gastrointestinal: Negative for abdominal pain and change in bowel habit.   Neurological: Positive for numbness and paresthesias. Negative for brief paralysis, disturbances in coordination and dizziness.   Psychiatric/Behavioral: Negative for altered mental status and depression.         Objective:                      Right Hand/Wrist Exam     Inspection   Scars: Wrist - absent Hand -  absent  Effusion: Wrist  - absent Hand -  absent  Bruising: Wrist - absent Hand -  absent  Deformity: Wrist - deformity Hand -  deformity    Range of Motion     Wrist   Extension: normal   Flexion: normal   Pronation: normal   Supination: normal     Tests   Tinel's sign (median nerve): positive  Finkelstein's test: negative  Carpal Tunnel Compression Test: positive  Cubital Tunnel Compression Test: negative      Other     Neuorologic Exam    Median Distribution: abnormal (decreased compared to left)  Ulnar Distribution: normal  Radial Distribution: abnormal (decreased compared to left)    Comments:  No swelling  No tenderness      Left Hand/Wrist Exam     Inspection   Scars: Wrist - absent Hand -  absent  Effusion: Wrist - absent Hand -  absent  Bruising: Wrist - absent Hand -  absent  Deformity: Wrist - absent Hand -  absent    Range of Motion     Wrist   Extension: normal   Flexion: normal   Pronation: normal   Supination: normal     Tests   Tinel's sign (median nerve): negative  Finkelstein's test: negative  Carpal Tunnel Compression Test: positive      Other     Sensory Exam  Median Distribution: normal  Ulnar Distribution: normal  Radial Distribution: normal    Comments:  No swelling  No tenderness      Right Elbow Exam     Tests   Tinel's sign (cubital tunnel): negative      Left Elbow Exam     Tests   Tinel's sign (cubital tunnel): negative        Muscle Strength   Right Upper Extremity   Wrist extension: 5/5/5   Wrist flexion: 5/5/5   : 4/5/5   Left Upper Extremity  Wrist extension: 5/5/5   Wrist flexion: 5/5/5   :  5/5/5     Vascular Exam       Capillary Refill  Right Hand: normal capillary refill  Left Hand: normal capillary refill              Assessment:         Xray Bilateral Hands 8/5/20:  1. 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  2. No significant juxta cortical erosions suggestive of inflammatory arthropathy.  3. Prominent degenerative  narrowing the 1st CMC joints bilaterally.  4. Generalized skeletal osteopenia.    EMG 06/2016:  1. Mild Bilateral Carpal Tunnel Syndrome  2. 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 Bilateral Lumbar Radiculopathy best localizing from L4-5 on this study    Encounter Diagnoses   Name Primary?    Bilateral carpal tunnel syndrome     Right carpal tunnel syndrome Yes    Right carpal tunnel syndrome  -     lidocaine HCL 10 mg/ml (1%) injection 1 mL  -     dexamethasone injection 4 mg    Bilateral carpal tunnel syndrome  -     Ambulatory referral/consult to Orthopedics               Plan:         I made the decision to obtain old records of the patient including previous notes and imaging. New imaging was ordered today of the extremity or extremities evaluated. I independently reviewed and interpreted the radiographs today as well as prior imaging.    The total face-to-face encounter time with this patient was 30 minutes and greater than 50% of of the encounter time was spent counseling the patient, coordinating care, and education regarding the pathology and natural history of his diagnosis. We have discussed a variety of treatment options including medications, bracing, nerve glide exercises, injections, occupational therapy and surgery. Pt is requesting braces, nerve glide exercises, and right carpal tunnel injection today.    1. PROCEDURE:  I have explained the risks, benefits, and alternatives of the procedure in detail.  The patient voices understanding and all questions have been answered.  The patient agrees to proceed as planned. The palmaris longus tendon was identified and marked and so was the distal wrist crease After I performed a sterile prep of the skin in the normal fashion the right carpal tunnel is injected from the volar approach using a 25 gauge needle with a combination of 1cc 1% plain xylocaine and 4 mg of dexamethasone.  The patient is cautioned and immediate relief of pain is  secondary to the local anesthetic and will be temporary.  After the anesthetic wears off there may be a increase in pain that may last for a few hours or a few days and they should use ice to help alleviate this flair up of pain. Patient tolerated the procedure well. The patient has been asked to report to us any redness, swelling, inflammation, or fevers. The patient has been asked to restrict the use of the right upper extremity for the next 24 hours.       3. HEP 54608 - I instructed and demonstrated a carpal tunnel nerve glide HEP. The patient then demonstrated understanding of exercises and proper technique. This program was performed for 10 minutes.   4. Wrist braces to be worn while sleeping and as needed during the day.  16331 - I performed a custom orthotic / brace adjustment, fitting and training with the patient. The patient demonstrated understanding and proper care. This was performed for 5 minutes. Bilateral wrist braces provided to patient.  5. Ice compress to the affected area 2-3x a day for 15-20 minutes as needed for pain management.  6. RTC in 6 weeks for follow-up, sooner if needed.      Patient voices understanding of and agreement with treatment plan. All of the patient's questions were answered and the patient will contact us if she has any questions or concerns in the interim.

## 2020-08-05 NOTE — LETTER
August 5, 2020      Robby Ortiz MD  [address]  [facility] [address]           [facility]  [address] [facility] [address]  Phone: [phone]          Patient: Stephany Skinner   MR Number: [id number]   YOB: 1935   Date of Visit: 8/5/2020       Dear Dr. Robby Ortiz:    Thank you for referring Stephany Skinner to me for evaluation. Attached you will find relevant portions of my assessment and plan of care.    If you have questions, please do not hesitate to call me. I look forward to following Stephany Skinner along with you.    Sincerely,    Geena York PA-C    Enclosure  CC:  No Recipients    If you would like to receive this communication electronically, please contact [email or web address] or [phone] to request more information on [employer] access.    For providers and/or their staff who would like to refer a patient to [facility], please contact us through our one-stop-shop provider referral line, [facility] , at [phone].    If you feel you have received this communication in error or would no longer like to receive these types of communications, please e-mail [email or web address]

## 2020-08-31 ENCOUNTER — EXTERNAL CHRONIC CARE MANAGEMENT (OUTPATIENT)
Dept: PRIMARY CARE CLINIC | Facility: CLINIC | Age: 85
End: 2020-08-31
Payer: MEDICARE

## 2020-08-31 PROCEDURE — 99999 PR STA SHADOW: ICD-10-PCS | Mod: PBBFAC,,, | Performed by: FAMILY MEDICINE

## 2020-08-31 PROCEDURE — 99999 PR STA SHADOW: CPT | Mod: PBBFAC,,, | Performed by: FAMILY MEDICINE

## 2020-08-31 PROCEDURE — 99490 CHRNC CARE MGMT STAFF 1ST 20: CPT | Mod: PBBFAC | Performed by: FAMILY MEDICINE

## 2020-09-01 ENCOUNTER — CLINICAL SUPPORT (OUTPATIENT)
Dept: FAMILY MEDICINE | Facility: CLINIC | Age: 85
End: 2020-09-01
Payer: MEDICARE

## 2020-09-01 DIAGNOSIS — G70.00 MYASTHENIA GRAVIS: ICD-10-CM

## 2020-09-01 DIAGNOSIS — E78.5 HYPERLIPIDEMIA, UNSPECIFIED HYPERLIPIDEMIA TYPE: ICD-10-CM

## 2020-09-01 DIAGNOSIS — I10 ESSENTIAL HYPERTENSION: ICD-10-CM

## 2020-09-01 DIAGNOSIS — E11.59 TYPE 2 DIABETES MELLITUS WITH OTHER CIRCULATORY COMPLICATION, WITH LONG-TERM CURRENT USE OF INSULIN: ICD-10-CM

## 2020-09-01 DIAGNOSIS — Z79.4 TYPE 2 DIABETES MELLITUS WITH OTHER CIRCULATORY COMPLICATION, WITH LONG-TERM CURRENT USE OF INSULIN: ICD-10-CM

## 2020-09-01 LAB
ALBUMIN SERPL BCP-MCNC: 3.6 G/DL (ref 3.5–5.2)
ALP SERPL-CCNC: 46 U/L (ref 55–135)
ALT SERPL W/O P-5'-P-CCNC: 12 U/L (ref 10–44)
ANION GAP SERPL CALC-SCNC: 9 MMOL/L (ref 8–16)
AST SERPL-CCNC: 14 U/L (ref 10–40)
BASOPHILS # BLD AUTO: 0.04 K/UL (ref 0–0.2)
BASOPHILS NFR BLD: 0.9 % (ref 0–1.9)
BILIRUB SERPL-MCNC: 0.8 MG/DL (ref 0.1–1)
BUN SERPL-MCNC: 22 MG/DL (ref 8–23)
CALCIUM SERPL-MCNC: 9.2 MG/DL (ref 8.7–10.5)
CHLORIDE SERPL-SCNC: 107 MMOL/L (ref 95–110)
CHOLEST SERPL-MCNC: 134 MG/DL (ref 120–199)
CHOLEST/HDLC SERPL: 3 {RATIO} (ref 2–5)
CO2 SERPL-SCNC: 29 MMOL/L (ref 23–29)
CREAT SERPL-MCNC: 1 MG/DL (ref 0.5–1.4)
DIFFERENTIAL METHOD: ABNORMAL
EOSINOPHIL # BLD AUTO: 0.1 K/UL (ref 0–0.5)
EOSINOPHIL NFR BLD: 2.4 % (ref 0–8)
ERYTHROCYTE [DISTWIDTH] IN BLOOD BY AUTOMATED COUNT: 12.8 % (ref 11.5–14.5)
EST. GFR  (AFRICAN AMERICAN): 60 ML/MIN/1.73 M^2
EST. GFR  (NON AFRICAN AMERICAN): 52 ML/MIN/1.73 M^2
GLUCOSE SERPL-MCNC: 92 MG/DL (ref 70–110)
HCT VFR BLD AUTO: 38.2 % (ref 37–48.5)
HDLC SERPL-MCNC: 45 MG/DL (ref 40–75)
HDLC SERPL: 33.6 % (ref 20–50)
HGB BLD-MCNC: 12.4 G/DL (ref 12–16)
IMM GRANULOCYTES # BLD AUTO: 0.02 K/UL (ref 0–0.04)
IMM GRANULOCYTES NFR BLD AUTO: 0.4 % (ref 0–0.5)
LDLC SERPL CALC-MCNC: 70.2 MG/DL (ref 63–159)
LYMPHOCYTES # BLD AUTO: 1.2 K/UL (ref 1–4.8)
LYMPHOCYTES NFR BLD: 26.9 % (ref 18–48)
MCH RBC QN AUTO: 31.9 PG (ref 27–31)
MCHC RBC AUTO-ENTMCNC: 32.5 G/DL (ref 32–36)
MCV RBC AUTO: 98 FL (ref 82–98)
MONOCYTES # BLD AUTO: 0.5 K/UL (ref 0.3–1)
MONOCYTES NFR BLD: 10.5 % (ref 4–15)
NEUTROPHILS # BLD AUTO: 2.7 K/UL (ref 1.8–7.7)
NEUTROPHILS NFR BLD: 58.9 % (ref 38–73)
NONHDLC SERPL-MCNC: 89 MG/DL
NRBC BLD-RTO: 0 /100 WBC
PLATELET # BLD AUTO: 196 K/UL (ref 150–350)
PMV BLD AUTO: 9.8 FL (ref 9.2–12.9)
POTASSIUM SERPL-SCNC: 4.4 MMOL/L (ref 3.5–5.1)
PROT SERPL-MCNC: 6.6 G/DL (ref 6–8.4)
RBC # BLD AUTO: 3.89 M/UL (ref 4–5.4)
SODIUM SERPL-SCNC: 145 MMOL/L (ref 136–145)
TRIGL SERPL-MCNC: 94 MG/DL (ref 30–150)
WBC # BLD AUTO: 4.57 K/UL (ref 3.9–12.7)

## 2020-09-01 PROCEDURE — 83036 HEMOGLOBIN GLYCOSYLATED A1C: CPT

## 2020-09-01 PROCEDURE — 99999 PR STA SHADOW: CPT | Mod: PBBFAC,,, | Performed by: FAMILY MEDICINE

## 2020-09-01 PROCEDURE — 85025 COMPLETE CBC W/AUTO DIFF WBC: CPT

## 2020-09-01 PROCEDURE — 80061 LIPID PANEL: CPT

## 2020-09-01 PROCEDURE — 36415 COLL VENOUS BLD VENIPUNCTURE: CPT | Mod: PBBFAC

## 2020-09-01 PROCEDURE — 99999 PR STA SHADOW: ICD-10-PCS | Mod: PBBFAC,,, | Performed by: FAMILY MEDICINE

## 2020-09-01 PROCEDURE — 80053 COMPREHEN METABOLIC PANEL: CPT

## 2020-09-01 RX ORDER — METOPROLOL SUCCINATE 100 MG/1
100 TABLET, EXTENDED RELEASE ORAL DAILY
Qty: 30 TABLET | Refills: 5 | Status: SHIPPED | OUTPATIENT
Start: 2020-09-01 | End: 2020-09-04 | Stop reason: SDUPTHER

## 2020-09-02 LAB
ESTIMATED AVG GLUCOSE: 117 MG/DL (ref 68–131)
HBA1C MFR BLD HPLC: 5.7 % (ref 4–5.6)

## 2020-09-04 ENCOUNTER — OFFICE VISIT (OUTPATIENT)
Dept: FAMILY MEDICINE | Facility: CLINIC | Age: 85
End: 2020-09-04
Payer: MEDICARE

## 2020-09-04 VITALS
SYSTOLIC BLOOD PRESSURE: 138 MMHG | HEART RATE: 50 BPM | WEIGHT: 207.69 LBS | DIASTOLIC BLOOD PRESSURE: 74 MMHG | TEMPERATURE: 97 F | HEIGHT: 67 IN | BODY MASS INDEX: 32.6 KG/M2 | RESPIRATION RATE: 20 BRPM | OXYGEN SATURATION: 98 %

## 2020-09-04 DIAGNOSIS — I10 ESSENTIAL HYPERTENSION: ICD-10-CM

## 2020-09-04 DIAGNOSIS — E78.5 HYPERLIPIDEMIA, UNSPECIFIED HYPERLIPIDEMIA TYPE: Primary | ICD-10-CM

## 2020-09-04 DIAGNOSIS — M48.062 SPINAL STENOSIS OF LUMBAR REGION WITH NEUROGENIC CLAUDICATION: ICD-10-CM

## 2020-09-04 DIAGNOSIS — E03.4 HYPOTHYROIDISM DUE TO ACQUIRED ATROPHY OF THYROID: ICD-10-CM

## 2020-09-04 DIAGNOSIS — Z79.01 ANTICOAGULATED: ICD-10-CM

## 2020-09-04 DIAGNOSIS — I50.32 CHRONIC DIASTOLIC CONGESTIVE HEART FAILURE: ICD-10-CM

## 2020-09-04 PROCEDURE — 99999 PR PBB SHADOW E&M-EST. PATIENT-LVL IV: ICD-10-PCS | Mod: PBBFAC,,, | Performed by: FAMILY MEDICINE

## 2020-09-04 PROCEDURE — 99999 PR STA SHADOW: CPT | Mod: PBBFAC,,, | Performed by: FAMILY MEDICINE

## 2020-09-04 PROCEDURE — 99214 OFFICE O/P EST MOD 30 MIN: CPT | Mod: S$PBB | Performed by: FAMILY MEDICINE

## 2020-09-04 PROCEDURE — 99214 OFFICE O/P EST MOD 30 MIN: CPT | Mod: PBBFAC | Performed by: FAMILY MEDICINE

## 2020-09-04 PROCEDURE — 99999 PR PBB SHADOW E&M-EST. PATIENT-LVL IV: CPT | Mod: PBBFAC,,, | Performed by: FAMILY MEDICINE

## 2020-09-04 RX ORDER — ATORVASTATIN CALCIUM 10 MG/1
10 TABLET, FILM COATED ORAL NIGHTLY
Qty: 30 TABLET | Refills: 5 | Status: SHIPPED | OUTPATIENT
Start: 2020-09-04 | End: 2021-03-03 | Stop reason: SDUPTHER

## 2020-09-04 RX ORDER — LEVOTHYROXINE SODIUM 88 UG/1
88 TABLET ORAL DAILY
Qty: 30 TABLET | Refills: 5 | Status: SHIPPED | OUTPATIENT
Start: 2020-09-04 | End: 2021-03-03 | Stop reason: SDUPTHER

## 2020-09-04 RX ORDER — METOPROLOL SUCCINATE 100 MG/1
100 TABLET, EXTENDED RELEASE ORAL DAILY
Qty: 30 TABLET | Refills: 5 | Status: SHIPPED | OUTPATIENT
Start: 2020-09-04 | End: 2021-03-03 | Stop reason: SDUPTHER

## 2020-09-04 RX ORDER — POTASSIUM CHLORIDE 20 MEQ/1
TABLET, EXTENDED RELEASE ORAL
Qty: 30 TABLET | Refills: 5 | Status: SHIPPED | OUTPATIENT
Start: 2020-09-04 | End: 2021-03-03 | Stop reason: SDUPTHER

## 2020-09-04 RX ORDER — FUROSEMIDE 40 MG/1
40 TABLET ORAL
Qty: 30 TABLET | Refills: 5 | Status: SHIPPED | OUTPATIENT
Start: 2020-09-04 | End: 2021-03-03 | Stop reason: SDUPTHER

## 2020-09-04 RX ORDER — ICOSAPENT ETHYL 1000 MG/1
2 CAPSULE ORAL 2 TIMES DAILY
Qty: 120 CAPSULE | Refills: 5 | Status: SHIPPED | OUTPATIENT
Start: 2020-09-04 | End: 2021-03-03 | Stop reason: SDUPTHER

## 2020-09-04 NOTE — PROGRESS NOTES
Subjective:       Patient ID: Stephany Skinner is a 85 y.o. female.    Chief Complaint: Follow-up (6 mo) and Back Pain    Patient here for checkup.  Patient having more lumbar pain.  She cannot walk more than 20 feet and then pain radiates to the legs.  Needs her walker.  She has a history of congestive heart failure and hypertension.  She has lost over 60 lb over the past year.  Now her blood pressure is low.  She denies shortness of breath.  Has  Neuro diagnosed her with MG.  Currently on prednisone.  Since starting the prednisone her strength has improved remarkably well.  She now only needs a cane for ambulation.  She is currently taking pyridostigmine 60 mg daily.  Patient has type 2 diabetes and seems be doing well.  She stopped her insulin and her blood sugars look great.    She has lost over 60 lb in the last year.  She continues to lose weight.  Patient has COPD.  She was taking neb treatments in the nursing home as needed.  They did not send her home with a nebulizer.  This seems to be in the works.  She also has sleep apnea and is using her CPAP at least 6 hr per night.  She takes Synthroid for her hypothyroidism.  She tolerates this well.  She denies having symptoms such as heat or cold intolerance.  Her weight is stable.  She denies any swelling in her thyroid.  She tolerates her blood pressure medication as well as not having side effects such as chronic cough or dizziness.  Sometimes her systolic blood pressures less than 100.  She is not having any symptoms from it.  Patient is taking her statin every day for hyperlipidemia.  She is feeling well on the medication.  She denies side effects such as myalgias.      Review of Systems   Constitutional: Negative for activity change and unexpected weight change.   HENT: Negative for trouble swallowing.    Respiratory: Negative for chest tightness.    Cardiovascular: Negative for leg swelling.   Endocrine: Negative for cold intolerance and heat intolerance.    Genitourinary: Negative for difficulty urinating.   Musculoskeletal: Positive for gait problem.   Skin: Positive for wound.   Hematological: Negative for adenopathy.   Psychiatric/Behavioral: Negative for decreased concentration.       Objective:      Vitals:    09/04/20 1031   BP: 138/74   Pulse: (!) 50   Resp: 20   Temp: 97.4 °F (36.3 °C)     Physical Exam  Constitutional:       Appearance: She is well-developed.   HENT:      Head: Normocephalic and atraumatic.   Eyes:      Pupils: Pupils are equal, round, and reactive to light.   Neck:      Musculoskeletal: Neck supple.   Cardiovascular:      Rate and Rhythm: Normal rate and regular rhythm.      Pulses: Normal pulses.      Heart sounds: Normal heart sounds. No murmur. No friction rub. No gallop.    Pulmonary:      Effort: Pulmonary effort is normal.      Breath sounds: Normal breath sounds. No wheezing or rales.   Abdominal:      General: Bowel sounds are normal.      Palpations: Abdomen is soft.      Hernia: A hernia (incisional wall hernia, reducible) is present.   Musculoskeletal:      Right lower leg: Edema present.      Left lower leg: Edema present.   Skin:     Findings: No rash.   Neurological:      General: No focal deficit present.      Mental Status: She is alert and oriented to person, place, and time.      Cranial Nerves: No cranial nerve deficit.      Sensory: No sensory deficit.      Motor: Weakness present.      Gait: Gait abnormal.   Psychiatric:         Mood and Affect: Mood normal.         Behavior: Behavior normal.         Thought Content: Thought content normal.         Judgment: Judgment normal.         Lab Results   Component Value Date    TSH 1.530 02/24/2020     Lab Results   Component Value Date    LDLCALC 70.2 09/01/2020     BMP  Lab Results   Component Value Date     09/01/2020    K 4.4 09/01/2020     09/01/2020    CO2 29 09/01/2020    BUN 22 09/01/2020    CREATININE 1.0 09/01/2020    CALCIUM 9.2 09/01/2020    ANIONGAP 9  09/01/2020    ESTGFRAFRICA 60 09/01/2020    EGFRNONAA 52 (A) 09/01/2020     Lab Results   Component Value Date    HGBA1C 5.7 (H) 09/01/2020     Lab Results   Component Value Date    WBC 4.57 09/01/2020    HGB 12.4 09/01/2020    HCT 38.2 09/01/2020    MCV 98 09/01/2020     09/01/2020         Assessment:       1. Hyperlipidemia, unspecified hyperlipidemia type    2. Spinal stenosis of lumbar region with neurogenic claudication    3. Essential hypertension    4. Hypothyroidism due to acquired atrophy of thyroid    5. Chronic diastolic congestive heart failure    6. Anticoagulated        Plan:   Stephany CARRILLO was seen today for sore on toe.    Diagnoses and all orders for this visit:    Obstructive sleep apnea  Continue CPAP at current pressure.  Patient is compliant.    Type 2 diabetes  No meds for now  Check hemoglobin A1c every 6 months    Essential hypertension/CHF  Continue Lasix 40 mg, Toprol- mg,  Continue potassium    Hypothyroidism due to acquired atrophy of thyroid  Continue Synthroid 88 µg by mouth daily    Hyperlipidemia, unspecified hyperlipidemia type  Continue fish oil  Continue Lipitor 10 mg    Myasthenia gravis  Continue prednisone 10 mg daily  Continue Mestinon  Keep appointment with neurology    COPD  Continue DuoNeb treatments when the nebulizer on his    Congestive heart failure/atrial fibrillation  Continue Eliquis    Hyperlipidemia, unspecified hyperlipidemia type  -     Comprehensive metabolic panel; Future; Expected date: 02/04/2021  -     Lipid Panel; Future; Expected date: 02/04/2021  -     atorvastatin (LIPITOR) 10 MG tablet; Take 1 tablet (10 mg total) by mouth every evening.  Dispense: 30 tablet; Refill: 5  -     icosapent ethyL (VASCEPA) 1 gram Cap; Take 2 capsules by mouth 2 (two) times daily.  Dispense: 120 capsule; Refill: 5    Spinal stenosis of lumbar region with neurogenic claudication  -     Ambulatory referral/consult to Pain Clinic; Future; Expected date:  09/11/2020    Essential hypertension  -     furosemide (LASIX) 40 MG tablet; Take 1 tablet (40 mg total) by mouth 3 (three) times a week.  Dispense: 30 tablet; Refill: 5  -     metoprolol succinate (TOPROL-XL) 100 MG 24 hr tablet; Take 1 tablet (100 mg total) by mouth once daily.  Dispense: 30 tablet; Refill: 5  -     potassium chloride SA (K-DUR,KLOR-CON) 20 MEQ tablet; 1 Tab po 3 times per week  Dispense: 30 tablet; Refill: 5    Hypothyroidism due to acquired atrophy of thyroid  -     TSH; Future; Expected date: 02/04/2021  -     levothyroxine (SYNTHROID) 88 MCG tablet; Take 1 tablet (88 mcg total) by mouth once daily.  Dispense: 30 tablet; Refill: 5    Chronic diastolic congestive heart failure  -     furosemide (LASIX) 40 MG tablet; Take 1 tablet (40 mg total) by mouth 3 (three) times a week.  Dispense: 30 tablet; Refill: 5  -     metoprolol succinate (TOPROL-XL) 100 MG 24 hr tablet; Take 1 tablet (100 mg total) by mouth once daily.  Dispense: 30 tablet; Refill: 5  -     potassium chloride SA (K-DUR,KLOR-CON) 20 MEQ tablet; 1 Tab po 3 times per week  Dispense: 30 tablet; Refill: 5    Anticoagulated  -     CBC auto differential; Future; Expected date: 12/04/2020      Return to clinic in 6 month.

## 2020-09-09 ENCOUNTER — HOSPITAL ENCOUNTER (OUTPATIENT)
Dept: RADIOLOGY | Facility: HOSPITAL | Age: 85
Discharge: HOME | End: 2020-09-09
Attending: FAMILY MEDICINE
Payer: MEDICARE

## 2020-09-09 ENCOUNTER — TELEPHONE (OUTPATIENT)
Dept: FAMILY MEDICINE | Facility: CLINIC | Age: 85
End: 2020-09-09

## 2020-09-09 DIAGNOSIS — M81.0 POST-MENOPAUSAL OSTEOPOROSIS: ICD-10-CM

## 2020-09-09 PROCEDURE — 77080 DXA BONE DENSITY AXIAL: CPT | Mod: TC

## 2020-09-09 NOTE — TELEPHONE ENCOUNTER
----- Message from Jakcie Rice sent at 2020  3:32 PM CDT -----  Regarding: med clarification  Contact: Yasmeen Skinner  MRN: [id number]  : 1935  PCP: Pipo Flowers  Home Phone      [phone]  Work Phone      Not on file.  Mobile          Not on file.      MESSAGE:   Patient is confused about medications and would like to speak with someone about his med lis    Phone:  [phone]

## 2020-09-11 ENCOUNTER — PATIENT OUTREACH (OUTPATIENT)
Dept: ADMINISTRATIVE | Facility: OTHER | Age: 85
End: 2020-09-11

## 2020-09-11 NOTE — PROGRESS NOTES
Health Maintenance Due   Topic Date Due    Shingles Vaccine (1 of 2) 01/26/1986    Urine Microalbumin  04/17/2019    Influenza Vaccine (1) 08/01/2020    Eye Exam  10/10/2020     Updates were requested from care everywhere.  Chart was reviewed for overdue Proactive [facility] Encounters (FLO) topics (CRS, Breast Cancer Screening, Eye exam)  Health Maintenance has been updated.  [facility] immunization registry triggered.  Immunizations were reconciled.

## 2020-09-12 DIAGNOSIS — M81.0 AGE-RELATED OSTEOPOROSIS WITHOUT CURRENT PATHOLOGICAL FRACTURE: Primary | ICD-10-CM

## 2020-09-12 RX ORDER — ALENDRONATE SODIUM 35 MG/1
35 TABLET ORAL
Qty: 4 TABLET | Refills: 5 | Status: SHIPPED | OUTPATIENT
Start: 2020-09-12 | End: 2021-02-22

## 2020-09-16 ENCOUNTER — OFFICE VISIT (OUTPATIENT)
Dept: ORTHOPEDICS | Facility: CLINIC | Age: 85
End: 2020-09-16
Payer: MEDICARE

## 2020-09-16 VITALS
WEIGHT: 206.81 LBS | BODY MASS INDEX: 32.46 KG/M2 | HEART RATE: 72 BPM | SYSTOLIC BLOOD PRESSURE: 138 MMHG | HEIGHT: 67 IN | RESPIRATION RATE: 16 BRPM | DIASTOLIC BLOOD PRESSURE: 76 MMHG | TEMPERATURE: 98 F

## 2020-09-16 DIAGNOSIS — M79.641 BILATERAL HAND PAIN: ICD-10-CM

## 2020-09-16 DIAGNOSIS — M79.642 BILATERAL HAND PAIN: ICD-10-CM

## 2020-09-16 DIAGNOSIS — G56.03 BILATERAL CARPAL TUNNEL SYNDROME: Primary | ICD-10-CM

## 2020-09-16 PROCEDURE — 99999 PR STA SHADOW: CPT | Mod: PBBFAC,,, | Performed by: PHYSICIAN ASSISTANT

## 2020-09-16 PROCEDURE — 99214 OFFICE O/P EST MOD 30 MIN: CPT | Mod: PBBFAC | Performed by: PHYSICIAN ASSISTANT

## 2020-09-16 PROCEDURE — 99213 OFFICE O/P EST LOW 20 MIN: CPT | Mod: S$PBB | Performed by: PHYSICIAN ASSISTANT

## 2020-09-16 PROCEDURE — 99999 PR STA SHADOW: ICD-10-PCS | Mod: PBBFAC,,, | Performed by: PHYSICIAN ASSISTANT

## 2020-09-16 PROCEDURE — 99999 PR PBB SHADOW E&M-EST. PATIENT-LVL IV: CPT | Mod: PBBFAC,,, | Performed by: PHYSICIAN ASSISTANT

## 2020-09-16 RX ORDER — DICLOFENAC SODIUM 10 MG/G
2 GEL TOPICAL 2 TIMES DAILY
Qty: 1 TUBE | Refills: 0 | Status: SHIPPED | OUTPATIENT
Start: 2020-09-16 | End: 2021-08-05

## 2020-09-16 NOTE — PROGRESS NOTES
Subjective:      Patient ID: Stephany Skinner is a 85 y.o. female.    Chief Complaint: Hand Pain (bilateral hand pain and numbness )    Review of patient's allergies indicates:   Allergen Reactions    Statins-hmg-coa reductase inhibitors      Other reaction(s): Unknown      Interval:  Patient returns to clinic for follow up of bilateral carpal tunnel syndrome.  She states that injections at last visit provided good relief of symptoms for a day or two but symptoms have returned.  She is no longer taking Mobic.    HPI 8/5/20:  85 you F presents to clinic for evaluation and treatment of bilateral carpal tunnel syndrome.  Referred by Dr. Ortiz.  She had EMG in 2016 that showed mild bilateral carpal tunnel syndrome.  She states that for the past year she has been having pain and numbness/tingling in thumb, index, and middle fingers of right hand.  Also has wrist and hand pain at times.  Worse at night and while cooking.  Symptoms have become worse and more consistent in the past few months.  She occasionally has similar symptoms in left hand.  No neck pain or injury.  She was recently prescribed mobic as needed by Dr. Ortiz for back pain.    Hand Pain   Associated symptoms include numbness. Pertinent negatives include no fever or itching. There is no history of gout.       Review of Systems   Constitution: Negative for chills, diaphoresis and fever.   HENT: Negative for congestion, ear discharge and ear pain.    Eyes: Negative for blurred vision, discharge, double vision and pain.   Cardiovascular: Negative for chest pain, claudication and cyanosis.   Respiratory: Negative for cough, hemoptysis and shortness of breath.    Endocrine: Negative for cold intolerance and heat intolerance.   Skin: Negative for color change, dry skin, itching and rash.   Musculoskeletal: Positive for joint pain. Negative for arthritis, back pain, falls, gout, joint swelling, muscle weakness and neck pain.   Gastrointestinal: Negative  for abdominal pain and change in bowel habit.   Neurological: Positive for numbness and paresthesias. Negative for brief paralysis, disturbances in coordination and dizziness.   Psychiatric/Behavioral: Negative for altered mental status and depression.         Objective:          General    Constitutional: She is oriented to person, place, and time. She appears well-developed and well-nourished. No distress.   HENT:   Head: Atraumatic.   Eyes: EOM are normal. Right eye exhibits no discharge. Left eye exhibits no discharge.   Cardiovascular: Normal rate.    Pulmonary/Chest: Effort normal. No respiratory distress.   Abdominal: Soft.   Neurological: She is alert and oriented to person, place, and time.   Psychiatric: She has a normal mood and affect. Her behavior is normal.             Right Hand/Wrist Exam     Inspection   Scars: Wrist - absent Hand -  absent  Effusion: Wrist - absent Hand -  absent  Bruising: Wrist - absent Hand -  absent  Deformity: Wrist - deformity Hand -  deformity    Range of Motion     Wrist   Extension: normal   Flexion: normal   Pronation: normal   Supination: normal     Tests   Tinel's sign (median nerve): positive  Finkelstein's test: negative  Carpal Tunnel Compression Test: positive  Cubital Tunnel Compression Test: negative      Other     Neuorologic Exam    Median Distribution: abnormal (decreased compared to left)  Ulnar Distribution: normal  Radial Distribution: abnormal (decreased compared to left)    Comments:  No swelling  No tenderness      Left Hand/Wrist Exam     Inspection   Scars: Wrist - absent Hand -  absent  Effusion: Wrist - absent Hand -  absent  Bruising: Wrist - absent Hand -  absent  Deformity: Wrist - absent Hand -  absent    Range of Motion     Wrist   Extension: normal   Flexion: normal   Pronation: normal   Supination: normal     Tests   Tinel's sign (median nerve): negative  Finkelstein's test: negative  Carpal Tunnel Compression Test: positive      Other      Sensory Exam  Median Distribution: normal  Ulnar Distribution: normal  Radial Distribution: normal    Comments:  No swelling  No tenderness      Right Elbow Exam     Tests   Tinel's sign (cubital tunnel): negative      Left Elbow Exam     Tests   Tinel's sign (cubital tunnel): negative        Muscle Strength   Right Upper Extremity   Wrist extension: 5/5   Wrist flexion: 5/5   : 4/5   Left Upper Extremity  Wrist extension: 5/5   Wrist flexion: 5/5   :  5/5     Vascular Exam       Capillary Refill  Right Hand: normal capillary refill  Left Hand: normal capillary refill              Assessment:         Xray Bilateral Hands 8/5/20:  1. 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  2. No significant juxta cortical erosions suggestive of inflammatory arthropathy.  3. Prominent degenerative narrowing the 1st CMC joints bilaterally.  4. Generalized skeletal osteopenia.    EMG 06/2016:  1. Mild Bilateral Carpal Tunnel Syndrome  2. 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 Bilateral Lumbar Radiculopathy best localizing from L4-5 on this study    Encounter Diagnoses   Name Primary?    Bilateral carpal tunnel syndrome Yes    Bilateral hand pain     Bilateral carpal tunnel syndrome  -     diclofenac sodium (VOLTAREN) 1 % Gel; Apply 2 g topically 2 (two) times daily. As needed  Dispense: 1 Tube; Refill: 0    Bilateral hand pain  -     diclofenac sodium (VOLTAREN) 1 % Gel; Apply 2 g topically 2 (two) times daily. As needed  Dispense: 1 Tube; Refill: 0               Plan:         I made the decision to obtain old records of the patient including previous notes and imaging. New imaging was ordered today of the extremity or extremities evaluated. I independently reviewed and interpreted the radiographs today as well as prior imaging.    The total face-to-face encounter time with this patient was 30 minutes and greater than 50%  of of the encounter time was spent counseling the patient, coordinating care, and education regarding the pathology and natural history of his diagnosis. We have discussed a variety of treatment options including medications, bracing, nerve glide exercises, injections, occupational therapy and surgery. Patient would like to try topical antiinflammatory. She will also continue with braces and nerve glide exercises. She is not interested in surgery at this time.    1. Voltaren gel topically as prescribed as needed.  2. Continue home exercise program as demonstrated at last visit.  3. Continue to wear wrist braces while sleeping and as needed during the day.  4. Ice compress to the affected area 2-3x a day for 15-20 minutes as needed for pain management.  5. RTC as needed. Patient states that she will call clinic for appointment when needed.      Patient voices understanding of and agreement with treatment plan. All of the patient's questions were answered and the patient will contact us if she has any questions or concerns in the interim.

## 2020-09-30 ENCOUNTER — EXTERNAL CHRONIC CARE MANAGEMENT (OUTPATIENT)
Dept: PRIMARY CARE CLINIC | Facility: CLINIC | Age: 85
End: 2020-09-30
Payer: MEDICARE

## 2020-09-30 PROCEDURE — 99490 CHRNC CARE MGMT STAFF 1ST 20: CPT | Mod: PBBFAC | Performed by: FAMILY MEDICINE

## 2020-09-30 PROCEDURE — 99999 PR STA SHADOW: ICD-10-PCS | Mod: PBBFAC,,, | Performed by: FAMILY MEDICINE

## 2020-09-30 PROCEDURE — 99999 PR STA SHADOW: CPT | Mod: PBBFAC,,, | Performed by: FAMILY MEDICINE

## 2020-10-04 NOTE — PROGRESS NOTES
[facility]  New Patient H&P    Referring Provider: Pipo Flowers Md  [address] [place] [address]    Chief Complaint:   Chief Complaint   Patient presents with    Back Pain       History of Present Illness: Stephany Skinner is a 85 y.o. female referred by Dr. Pipo Flowers for LSS.      Onset: past 2-3 months, no clear inciting incident   Location: bilateral lower lumbar spine  Radiation: down the back of legs but not past the knees posteriorly  Timing: intermittent  Quality: Grabbing, Deep, Numb and Sharp  Exacerbating Factors: lifting, lying down, sitting, standing for more than 15 minutes, walking for more than 30 minutes and daily activity   Alleviating Factors: medications and sitting  Associated Symptoms: She gets numbness in her hands and feet. She feels weak in both legs. She has bladder incontinence, and that has been present for a few years, and has been evaluated by her PCP.  She has diarrhea and sometimes gets bowel incontinence. Denies night fever/night sweats, significant weight loss      Severity: Currently: 5/10   Typical Range: 0-5/10     Exacerbation: 5/10     She has bilateral carpal tunnel syndrome. She has had this for many years. Numbness localized to median nerve distribution. Worse at night time. She is using braces, but this isn't really helping. She has issues with dropping things.     She also has left knee pain that has been present off and on for years. She had bilateral knee replacement about 10-11 years ago that worked really well for her, but then the left knee started hurting again. Pain localized to the anterior knee.     Pain Disability Index  Family/Home Responsibilities:: 5  Recreation:: 5  Social Activity:: 5  Occupation:: 0  Sexual Behavior:: 0  Self Care:: 5  Life-Support Activities:: 0  Pain Disability Index (PDI): 20    Previous Interventions:  - None    Previous Therapies:  PT/OT: no   Relevant Surgery:    - Bilateral knee replacements.    Previous Medications:   - NSAIDS: Tylenol  - Muscle Relaxants:    - TCAs:   - SNRIs:   - Topicals: Voltaren gel   - Anticonvulsants:    - Opioids:     Current Pain Medications:  1. Tylenol helps     Blood Thinners: Eliquis    Full Medication List:    Current Outpatient Medications:     alendronate (FOSAMAX) 35 MG tablet, Take 1 tablet (35 mg total) by mouth every 7 days., Disp: 4 tablet, Rfl: 5    atorvastatin (LIPITOR) 10 MG tablet, Take 1 tablet (10 mg total) by mouth every evening., Disp: 30 tablet, Rfl: 5    blood-glucose meter kit, 1 each by Other route 2 (two) times daily. One Touch Ultra meter, test strips, lancets, control solution, Disp: 1 each, Rfl: 5    calcium carbonate-vit D3-min 600 mg calcium- 400 unit Tab, Take 2 tablets by mouth once., Disp: , Rfl:     cyanocobalamin (VITAMIN B-12) 100 MCG tablet, Take 1 tablet (100 mcg total) by mouth once daily., Disp: , Rfl:     diclofenac sodium (VOLTAREN) 1 % Gel, Apply 2 g topically 2 (two) times daily. As needed, Disp: 1 Tube, Rfl: 0    ELIQUIS 5 mg Tab, Take 5 mg by mouth 2 (two) times daily., Disp: , Rfl: 1    furosemide (LASIX) 40 MG tablet, Take 1 tablet (40 mg total) by mouth 3 (three) times a week., Disp: 30 tablet, Rfl: 5    icosapent ethyL (VASCEPA) 1 gram Cap, Take 2 capsules by mouth 2 (two) times daily. (Patient taking differently: Take 1 capsule by mouth once daily. ), Disp: 120 capsule, Rfl: 5    levothyroxine (SYNTHROID) 88 MCG tablet, Take 1 tablet (88 mcg total) by mouth once daily., Disp: 30 tablet, Rfl: 5    metoprolol succinate (TOPROL-XL) 100 MG 24 hr tablet, Take 1 tablet (100 mg total) by mouth once daily., Disp: 30 tablet, Rfl: 5    potassium chloride SA (K-DUR,KLOR-CON) 20 MEQ tablet, 1 Tab po 3 times per week, Disp: 30 tablet, Rfl: 5    pyridostigmine (MESTINON) 60 mg Tab, Take 1 tablet (60 mg total) by mouth 3 (three) times daily. (Patient taking differently: Take 60 mg by mouth every evening. ), Disp: 90 tablet, Rfl:  0  No current facility-administered medications for this visit.     Facility-Administered Medications Ordered in Other Visits:     tetracaine HCl (PF) 0.5 % Drop 1 drop, 1 drop, Right Eye, On Call Procedure, Michael Arellano MD, 1 drop at 12/12/19 0801     Review of Systems:  Review of Systems   Constitutional: Negative for fever and weight loss.   HENT: Negative for ear pain and tinnitus.    Eyes: Negative for pain and redness.   Respiratory: Negative for cough and shortness of breath.    Cardiovascular: Negative for chest pain and palpitations.   Gastrointestinal: Positive for diarrhea. Negative for constipation and heartburn.   Genitourinary: Negative.         (+)urinary incontinence. Denies urine retention.    Musculoskeletal: Positive for back pain and joint pain (hands). Negative for neck pain.   Skin: Negative for itching and rash.   Neurological: Positive for weakness. Negative for tingling, focal weakness and seizures.   Endo/Heme/Allergies: Negative for environmental allergies. Bruises/bleeds easily (on eliquis).   Psychiatric/Behavioral: Negative for depression. The patient has insomnia. The patient is not nervous/anxious.        Allergies:  Statins-hmg-coa reductase inhibitors     Medical History:   has a past medical history of CHF (congestive heart failure), Early stage nonexudative age-related macular degeneration of both eyes (2018), GERD (gastroesophageal reflux disease), Hyperlipidemia, and Hypertension.    Surgical History:   has a past surgical history that includes Hysterectomy; Cholecystectomy; knee replacemene; Hernia repair; Thyroidectomy; Phacoemulsification of cataract (Right, 12/12/2019); Cataract extraction w/  intraocular lens implant (Right, 12/12/2019); Phacoemulsification of cataract (Left, 2/13/2020); and Cataract extraction w/  intraocular lens implant (Left, 2/13/2020).    Family History:  family history includes Cancer in her sister.    Social History:   reports that she has never  "smoked. She has never used smokeless tobacco. She reports that she does not drink alcohol or use drugs.    Physical Exam:  BP (!) 142/82 (BP Location: Left arm, Patient Position: Sitting, BP Method: Large (Automatic))   Pulse (!) 58   Temp 98.6 °F (37 °C)   Resp 16   Ht 5' 7" (1.702 m)   Wt 95.4 kg (210 lb 3.3 oz)   SpO2 98%   BMI 32.92 kg/m²   GEN: No acute distress. Calm, comfortable  HENT: Normocephalic, atraumatic, moist mucous membranes  EYE: Anicteric sclera, non-injected.   CV: Non-diaphoretic. Regular Rate. Radial Pulses 2+.  RESP: Breathing comfortably. Chest expansion symmetric.  EXT: No clubbing, cyanosis.   SKIN: Warm, & dry to palpation. No visible rashes or lesions of exposed skin.   PSYCH: Pleasant mood and appropriate affect. Recent and remote memory intact.   GAIT: Independent, antalgic ambulation  Lumbar Spine Exam:       Inspection: No erythema, bruising.       Palpation: (+) TTP of lumbar paraspinals bilaterally       ROM: Limited in flexion, extension, lateral bending.       (+) Facet loading bilaterally      Unable to perform as patient not able to get on table: Straight Leg Raise bilaterally, TRINITY bilaterally  Hip Exam:      Inspection: No gross deformity or apparent leg length discrepancy      Palpation:  (-) TTP to bilateral greater trochanteric bursas, piriformis.       ROM:  No limitation or pain in internal rotation, external rotation b/l  Knee Exam:     Inspection:  No swelling, erythema, ecchymoses, or gross deformity.     Palpation: (+) TTP at medial joint line, pes anserine b/l      ROM: No Limitation in extension b/l. (+) Limitation in flexion to 110 degrees with pain on the left.   Hand Exam:       Inspection: Dupuytrens contracture of left ring finger area of palm. No erythema, bruising. No surgical incision.      Palpation:  No TTP of MCP, PIP, DIPs      ROM: Full active ROM in finger flexion and extension      Provocative Maneuvers:  (+) Phalen's bilaterally  (+) Tinel's " bilaterally  Neurologic Exam:     Alert. Speech is fluent and appropriate.     Strength: 4/5 in b/l hip flexion, otherwise 5/5 throughout bilateral lower extremities and b/l hand      Sensation: Abnormal to LT in bilateral median nerve distribution. Grossly intact to light touch in bilateral lower extremities     Reflexes: 2+ in b/l patella, absent in b/l achilles     Tone: No abnormality appreciated in bilateral lower extremities     No Clonus     Downgoing toes on plantar stimulation     (-) Eagle bilaterally           Imaging:  - EMG/NCS BUE and BLE 6/30/16:  Impression:  Abnormal Study secondary to the Presence of:    1. Mild Bilateral Carpal Tunnel Syndrome  2. 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 Bilateral Lumbar Radiculopathy best localizing from L4-5 on this study    - MRI Lumbar Spine 5/6/16:  The incidentally visualized soft tissues structures of the abdomen are within normal limits.  Vertebral body alignment and heights are maintained.  There is disc desiccation with disc space narrowing throughout the lumbar spine.  The spinal canal appears narrow on a congenital basis secondary to foreshortening pedicles.  The marrow signal is normal without evidence for a marrow replacement process, infection or tumor.  Endplate degenerative changes are seen at the inferior endplate of L3.  The conus terminates at L1-2.  There is also mild edema-like signal about the posterior elements in the right at L5-S1.  At L1-2, there is mild bilateral facet arthropathy without central canal stenosis or neural foraminal narrowing.  At L2-3, there is a broad-based posterior disc bulge with a superimposed left paracentral disc protrusion, ligamentum flavum hypertrophy and facet arthropathy contributing to mild to moderate central canal stenosis and mild to moderate bilateral neural foraminal narrowing.  At L3-4, there is a broad-based posterior disc bulge, ligament flavum hypertrophy and facet  arthropathy contributing to moderate severe central canal stenosis with mild left and mild to moderate right-sided neural foraminal narrowing.  At L4-5, there is a broad-based posterior disc bulge, ligamentum flavum hypertrophy and facet arthropathy contributing to severe central canal stenosis and moderate bilateral neural foraminal narrowing  At L5-S1, there is a broad-based posterior disc bulge and facet arthropathy contributing to mild bilateral neural foraminal narrowing.  No central canal stenosis  IMPRESSION:    Multilevel degenerative changes of the lumbar spine contributing to central canal stenosis and neural foraminal narrowing as detailed in the above report.  Edema-like signal about the posterior elements on the right at L5-S1 which can be a source of pain    Labs:  BMP  Lab Results   Component Value Date     09/01/2020    K 4.4 09/01/2020     09/01/2020    CO2 29 09/01/2020    BUN 22 09/01/2020    CREATININE 1.0 09/01/2020    CALCIUM 9.2 09/01/2020    ANIONGAP 9 09/01/2020    ESTGFRAFRICA 60 09/01/2020    EGFRNONAA 52 (A) 09/01/2020     Lab Results   Component Value Date    ALT 12 09/01/2020    AST 14 09/01/2020    ALKPHOS 46 (L) 09/01/2020    BILITOT 0.8 09/01/2020     Lab Results   Component Value Date     09/01/2020       Assessment:  Stephany Skinner is a 85 y.o. female with the following diagnoses based on history, exam, and imaging:    Problem List Items Addressed This Visit     None      Visit Diagnoses     Lumbar spondylosis    -  Primary    DDD (degenerative disc disease), lumbar        Spinal stenosis of lumbar region with neurogenic claudication        Carpal tunnel syndrome, bilateral              This is a pleasant 85 y.o. lady presenting with:     - Subacute/Chronic bilateral LBP: She has pain radiating down posterior thighs, but not past knee. Pain appears either due to LSS vs facetogenic pain. She also has myofascial pain on exam  - Bilateral carpal tunnel syndrome.  Also developing dupuytren's contracture  - Chronic left knee pain after total knee replacement    Treatment Plan:   - PT/OT/HEP: Refer to PT. Discussed benefits of exercise for pain.   - Procedures: Performed bilateral carpal tunnel injections today.   - Medications: No changes recommended at this time.  - Imaging: Reviewed.  Order x-rays of bilateral knees, lumbar spine.  Order MRI of lumbar spine.  - Labs: Reviewed.  Medications are appropriately dosed for current hepatorenal function.    Follow Up: RTC in 8-10 weeks    Nasra Alfonso M.D.  Interventional Pain Medicine / Physical Medicine & Rehabilitation    Disclaimer: This note was partly generated using dictation software which may occasionally result in transcription errors.

## 2020-10-05 ENCOUNTER — OFFICE VISIT (OUTPATIENT)
Dept: PAIN MEDICINE | Facility: CLINIC | Age: 85
End: 2020-10-05
Payer: MEDICARE

## 2020-10-05 VITALS
DIASTOLIC BLOOD PRESSURE: 82 MMHG | BODY MASS INDEX: 32.99 KG/M2 | WEIGHT: 210.19 LBS | SYSTOLIC BLOOD PRESSURE: 142 MMHG | TEMPERATURE: 99 F | OXYGEN SATURATION: 98 % | HEART RATE: 58 BPM | RESPIRATION RATE: 16 BRPM | HEIGHT: 67 IN

## 2020-10-05 DIAGNOSIS — G56.03 CARPAL TUNNEL SYNDROME, BILATERAL: ICD-10-CM

## 2020-10-05 DIAGNOSIS — Z96.652 CHRONIC KNEE PAIN AFTER TOTAL REPLACEMENT OF LEFT KNEE JOINT: ICD-10-CM

## 2020-10-05 DIAGNOSIS — M47.816 LUMBAR SPONDYLOSIS: Primary | ICD-10-CM

## 2020-10-05 DIAGNOSIS — M51.36 DDD (DEGENERATIVE DISC DISEASE), LUMBAR: ICD-10-CM

## 2020-10-05 DIAGNOSIS — M25.562 CHRONIC KNEE PAIN AFTER TOTAL REPLACEMENT OF LEFT KNEE JOINT: ICD-10-CM

## 2020-10-05 DIAGNOSIS — M48.062 SPINAL STENOSIS OF LUMBAR REGION WITH NEUROGENIC CLAUDICATION: ICD-10-CM

## 2020-10-05 DIAGNOSIS — G89.29 CHRONIC KNEE PAIN AFTER TOTAL REPLACEMENT OF LEFT KNEE JOINT: ICD-10-CM

## 2020-10-05 DIAGNOSIS — R29.898 BILATERAL LEG WEAKNESS: ICD-10-CM

## 2020-10-05 PROCEDURE — 99999 PR PBB SHADOW E&M-EST. PATIENT-LVL V: ICD-10-PCS | Mod: PBBFAC,,, | Performed by: PHYSICAL MEDICINE & REHABILITATION

## 2020-10-05 PROCEDURE — 99999 PR STA SHADOW: CPT | Mod: PBBFAC,,, | Performed by: PHYSICAL MEDICINE & REHABILITATION

## 2020-10-05 PROCEDURE — 99999 PR PBB SHADOW E&M-EST. PATIENT-LVL V: CPT | Mod: PBBFAC,,, | Performed by: PHYSICAL MEDICINE & REHABILITATION

## 2020-10-05 PROCEDURE — 20526 THER INJECTION CARP TUNNEL: CPT | Mod: 50,S$PBB | Performed by: PHYSICAL MEDICINE & REHABILITATION

## 2020-10-05 PROCEDURE — 99204 OFFICE O/P NEW MOD 45 MIN: CPT | Mod: S$PBB | Performed by: PHYSICAL MEDICINE & REHABILITATION

## 2020-10-05 PROCEDURE — 99999 CARPAL TUNNEL: R RADIOCARPAL, L RADIOCARPAL: CPT | Mod: S$PBB,PBBFAC,, | Performed by: PHYSICAL MEDICINE & REHABILITATION

## 2020-10-05 PROCEDURE — 99215 OFFICE O/P EST HI 40 MIN: CPT | Mod: PBBFAC,25 | Performed by: PHYSICAL MEDICINE & REHABILITATION

## 2020-10-05 RX ORDER — METHYLPREDNISOLONE ACETATE 40 MG/ML
40 INJECTION, SUSPENSION INTRA-ARTICULAR; INTRALESIONAL; INTRAMUSCULAR; SOFT TISSUE
Status: DISCONTINUED | OUTPATIENT
Start: 2020-10-05 | End: 2020-10-05 | Stop reason: HOSPADM

## 2020-10-05 RX ADMIN — METHYLPREDNISOLONE ACETATE 40 MG: 40 INJECTION, SUSPENSION INTRA-ARTICULAR; INTRALESIONAL; INTRAMUSCULAR; SOFT TISSUE at 03:10

## 2020-10-05 NOTE — PROGRESS NOTES
Onset: past 2 months   Location: lower back   Radiation: down the back of legs   Timing: intermittent  Quality: Grabbing, Deep, Numb and Sharp  Exacerbating Factors: lifting, lying down, sitting, standing for more than 15 minutes, walking for more than 30 minutes and daily activity   Alleviating Factors: medications and sitting  Associated Symptoms: denies {RED FLAGS:23662}    Severity: Currently: 5/10   Typical Range: 0-5/10     Exacerbation: 5/10

## 2020-10-05 NOTE — LETTER
October 5, 2020      Pipo Flowers MD  [address]  [facility] [address]           [facility]  [address] [place] [id number]  Phone: [phone]  Fax: [phone]          Patient: Stephany Skinner   MR Number: [id number]   YOB: 1935   Date of Visit: 10/5/2020       Dear Dr. Pipo Flowers:    Thank you for referring Stephany Skinner to me for evaluation. Attached you will find relevant portions of my assessment and plan of care.    If you have questions, please do not hesitate to call me. I look forward to following Stephany Skinner along with you.    Sincerely,    Nasra Alfonso MD    Enclosure  CC:  No Recipients    If you would like to receive this communication electronically, please contact [email or web address] or [phone] to request more information on [employer] Link access.    For providers and/or their staff who would like to refer a patient to [facility], please contact us through our one-stop-shop provider referral line, [facility], at [phone].    If you feel you have received this communication in error or would no longer like to receive these types of communications, please e-mail [email or web address]

## 2020-10-05 NOTE — PROCEDURES
Carpal Tunnel: R radiocarpal, L radiocarpal    Date/Time: 10/5/2020 3:20 PM  Performed by: Nasra Alfonso MD  Authorized by: Nasra lAfonso MD     Consent Done?:  Yes (Written)  Indications:  Pain  Site marked: the procedure site was marked    Timeout: prior to procedure the correct patient, procedure, and site was verified    Prep: patient was prepped and draped in usual sterile fashion      Local anesthesia used?: No    Location:  Wrist  Site:  R radiocarpal and L radiocarpal  Ultrasonic Guidance for Needle Placement?: No    Needle gauge: 27G.  Approach:  Volar  Medications:  40 mg methylPREDNISolone acetate 40 mg/mL; 40 mg methylPREDNISolone acetate 40 mg/mL  Patient tolerance:  Patient tolerated the procedure well with no immediate complications

## 2020-10-08 ENCOUNTER — HOSPITAL ENCOUNTER (OUTPATIENT)
Dept: RADIOLOGY | Facility: HOSPITAL | Age: 85
Discharge: HOME | End: 2020-10-08
Attending: PHYSICAL MEDICINE & REHABILITATION
Payer: MEDICARE

## 2020-10-08 DIAGNOSIS — M25.562 CHRONIC KNEE PAIN AFTER TOTAL REPLACEMENT OF LEFT KNEE JOINT: ICD-10-CM

## 2020-10-08 DIAGNOSIS — M47.816 LUMBAR SPONDYLOSIS: ICD-10-CM

## 2020-10-08 DIAGNOSIS — M51.36 DDD (DEGENERATIVE DISC DISEASE), LUMBAR: ICD-10-CM

## 2020-10-08 DIAGNOSIS — G89.29 CHRONIC KNEE PAIN AFTER TOTAL REPLACEMENT OF LEFT KNEE JOINT: ICD-10-CM

## 2020-10-08 DIAGNOSIS — M48.062 SPINAL STENOSIS OF LUMBAR REGION WITH NEUROGENIC CLAUDICATION: ICD-10-CM

## 2020-10-08 DIAGNOSIS — Z96.652 CHRONIC KNEE PAIN AFTER TOTAL REPLACEMENT OF LEFT KNEE JOINT: ICD-10-CM

## 2020-10-08 DIAGNOSIS — K86.2 CYST OF PANCREAS: Primary | ICD-10-CM

## 2020-10-08 PROCEDURE — 72148 MRI LUMBAR SPINE W/O DYE: CPT | Mod: 26,,, | Performed by: RADIOLOGY

## 2020-10-08 PROCEDURE — 72148 MRI LUMBAR SPINE W/O DYE: CPT | Mod: TC

## 2020-10-08 PROCEDURE — 73560 XR KNEE 1 OR 2 VIEW BILATERAL: ICD-10-PCS | Mod: 26,50,, | Performed by: RADIOLOGY

## 2020-10-08 PROCEDURE — 73560 X-RAY EXAM OF KNEE 1 OR 2: CPT | Mod: TC,50

## 2020-10-08 PROCEDURE — 72100 XR LUMBAR SPINE 2 OR 3 VIEWS: ICD-10-PCS | Mod: 26,,, | Performed by: RADIOLOGY

## 2020-10-08 PROCEDURE — 73560 X-RAY EXAM OF KNEE 1 OR 2: CPT | Mod: 26,50,, | Performed by: RADIOLOGY

## 2020-10-08 PROCEDURE — 72100 X-RAY EXAM L-S SPINE 2/3 VWS: CPT | Mod: 26,,, | Performed by: RADIOLOGY

## 2020-10-08 PROCEDURE — 72148 MRI LUMBAR SPINE WITHOUT CONTRAST: ICD-10-PCS | Mod: 26,,, | Performed by: RADIOLOGY

## 2020-10-08 PROCEDURE — 72100 X-RAY EXAM L-S SPINE 2/3 VWS: CPT | Mod: TC

## 2020-10-08 NOTE — PROGRESS NOTES
Please call patient to schedule CT. Suspected cystic lesion in the body of the pancreas. Will order CT abdomen, pancreas protocol and forward results to PCP, Dr. Flowers.     There is edema like signal in the pedicles b/l at L5 and in facet joints at L4-5 which are likely the cause of her back pain.

## 2020-10-13 ENCOUNTER — LAB VISIT (OUTPATIENT)
Dept: LAB | Facility: HOSPITAL | Age: 85
End: 2020-10-13
Attending: PHYSICAL MEDICINE & REHABILITATION
Payer: MEDICARE

## 2020-10-13 DIAGNOSIS — K86.2 CYST OF PANCREAS: ICD-10-CM

## 2020-10-13 LAB
CREAT SERPL-MCNC: 0.9 MG/DL (ref 0.5–1.4)
EST. GFR  (AFRICAN AMERICAN): >60 ML/MIN/1.73 M^2
EST. GFR  (NON AFRICAN AMERICAN): 59 ML/MIN/1.73 M^2

## 2020-10-13 PROCEDURE — 36415 COLL VENOUS BLD VENIPUNCTURE: CPT

## 2020-10-13 PROCEDURE — 82565 ASSAY OF CREATININE: CPT

## 2020-10-14 ENCOUNTER — HOSPITAL ENCOUNTER (OUTPATIENT)
Dept: RADIOLOGY | Facility: HOSPITAL | Age: 85
Discharge: HOME | End: 2020-10-14
Attending: PHYSICAL MEDICINE & REHABILITATION
Payer: MEDICARE

## 2020-10-14 DIAGNOSIS — K86.2 CYST OF PANCREAS: ICD-10-CM

## 2020-10-14 PROCEDURE — 74178 CT ABD&PLV WO CNTR FLWD CNTR: CPT | Mod: 26,,, | Performed by: RADIOLOGY

## 2020-10-14 PROCEDURE — 74178 CT ABDOMEN PELVIS W WO CONTRAST: ICD-10-PCS | Mod: 26,,, | Performed by: RADIOLOGY

## 2020-10-14 PROCEDURE — 25500020 PHARM REV CODE 255: Performed by: PHYSICAL MEDICINE & REHABILITATION

## 2020-10-14 PROCEDURE — 74178 CT ABD&PLV WO CNTR FLWD CNTR: CPT | Mod: TC

## 2020-10-14 RX ADMIN — IOHEXOL 100 ML: 350 INJECTION, SOLUTION INTRAVENOUS at 01:10

## 2020-10-15 ENCOUNTER — TELEPHONE (OUTPATIENT)
Dept: FAMILY MEDICINE | Facility: CLINIC | Age: 85
End: 2020-10-15

## 2020-10-15 NOTE — TELEPHONE ENCOUNTER
----- Message from Livier Ramirez sent at 10/15/2020  4:32 PM CDT -----  Contact: mahin/phill  Stephany Skinner  MRN: [id number]  : 1935  PCP: Pipo Flowers  Home Phone      [phone]  Work Phone      Not on file.  Mobile          Not on file.      MESSAGE:  Pt's sister states just talk to  regarding Ct scan would like to speak to  nurse regarding this.  Phone: [phone]

## 2020-10-15 NOTE — PROGRESS NOTES
Reviewed. I called patient with results. I offered to refer her to oncology, Dr. Eugene, in [place], but patient would rather someone closer to her home. She is going to call me back with where she would like to be referred to for further work-up. Will forward results to PCP.

## 2020-10-16 ENCOUNTER — TELEPHONE (OUTPATIENT)
Dept: FAMILY MEDICINE | Facility: CLINIC | Age: 85
End: 2020-10-16

## 2020-10-16 ENCOUNTER — TELEPHONE (OUTPATIENT)
Dept: GASTROENTEROLOGY | Facility: CLINIC | Age: 85
End: 2020-10-16

## 2020-10-16 DIAGNOSIS — K86.2 PANCREATIC CYST: Primary | ICD-10-CM

## 2020-10-16 NOTE — TELEPHONE ENCOUNTER
"----- Message from Pipo Flowers MD sent at 10/16/2020  8:14 AM CDT -----  Lets send her to Dr. Crabtree for her opinion  Dr. Foss  ----- Message -----  From: Nasra Alfonso MD  Sent: 10/14/2020   2:14 PM CDT  To: Pipo Flowers MD    Hey Dr. Foss,    I incidentally found a pancreatic cyst on an MRI for this patient.  I ordered the CT Abd pancreas protocol to further work-up and wanted to forward you the findings.    "Cystic lesion in the body of the pancreas measuring 2.7 x 1.8 x 2.2 cm.  Both benign and malignant cystic neoplasia to be considered.  No pancreatic ductal dilatation is identified.  Consider further evaluation with endoscopic ultrasound."    I was hoping you could take over the rest of the work-up for this, as we are getting out of my realm. Please let me know if you need me to order anything else.      Sincerely,  Nasra       "

## 2020-10-16 NOTE — TELEPHONE ENCOUNTER
"----- Message from Sharon Crabtree MD sent at 10/16/2020  1:29 PM CDT -----  Ok.  Please call her and see which way she would like to proceed.  ----- Message -----  From: Jaja Pedersen MA  Sent: 10/16/2020  11:13 AM CDT  To: Sharon Crabtree MD    A referral was just entered today for pancreatic cyst. No appointment.  ----- Message -----  From: Sharon Crabtree MD  Sent: 10/16/2020  10:56 AM CDT  To: Jaja Pedersen MA    See if there is a referral to me in the system?  Does pt have an appt?  She does not really need an appt, she needs an EUS.  I can just order the EUS and they will call her to explain, etc.  If she would rather come to clinic so I can explain, that is fine too.  Whichever she prefers, just let me know.  K  ----- Message -----  From: Thierry Saunders MD  Sent: 10/16/2020   8:26 AM CDT  To: Sharon Crabtree MD    Given it's size, EUS with biopsy would be appropriate. Cyst is large enough to allow for cyst fluid aspiration to help determine the nature (pre-cancerous/mucinous or benign).    ----- Message -----  From: Sharon Crabtree MD  Sent: 10/16/2020   8:22 AM CDT  To: MD Thierry Gutierrez, do you want EUS or MRI for this pt? I will order, just let me know.  Thanks, K  ----- Message -----  From: Pipo Flowers MD  Sent: 10/16/2020   8:14 AM CDT  To: Em Ramirez LPN, #    Lets send her to Dr. Crabtree for her opinion  Dr. Foss  ----- Message -----  From: Nasra Alfonso MD  Sent: 10/14/2020   2:14 PM CDT  To: MD Marla Barnard Dr.,    I incidentally found a pancreatic cyst on an MRI for this patient.  I ordered the CT Abd pancreas protocol to further work-up and wanted to forward you the findings.    "Cystic lesion in the body of the pancreas measuring 2.7 x 1.8 x 2.2 cm.  Both benign and malignant cystic neoplasia to be considered.  No pancreatic ductal dilatation is identified.  Consider further evaluation with endoscopic ultrasound."    I was hoping " you could take over the rest of the work-up for this, as we are getting out of my realm. Please let me know if you need me to order anything else.      Sincerely,  Nasra

## 2020-10-20 ENCOUNTER — TELEPHONE (OUTPATIENT)
Dept: PAIN MEDICINE | Facility: CLINIC | Age: 85
End: 2020-10-20

## 2020-10-20 ENCOUNTER — PATIENT OUTREACH (OUTPATIENT)
Dept: ADMINISTRATIVE | Facility: OTHER | Age: 85
End: 2020-10-20

## 2020-10-20 NOTE — TELEPHONE ENCOUNTER
----- Message from Sophia Khan sent at 10/20/2020 11:48 AM CDT -----  Contact: SISTER - CADEN Skinner  MRN: [id number]  : 1935  PCP: Pipo Flowers  Home Phone      [phone]  Work Phone      Not on file.  Mobile          Not on file.      MESSAGE: Caden wanted to let Dr. Alfonso know that the patient made an appointment with Dr. Crabtree for tomorrow at 2:00        Phone: [phone]

## 2020-10-20 NOTE — PROGRESS NOTES
Updates were requested from care everywhere.  Chart was reviewed for overdue Proactive [facility] Encounters (FLO) topics (CRS, Breast Cancer Screening, Eye exam)  Health Maintenance has been updated.  LINKS not responding.

## 2020-10-21 ENCOUNTER — OFFICE VISIT (OUTPATIENT)
Dept: GASTROENTEROLOGY | Facility: CLINIC | Age: 85
End: 2020-10-21
Payer: MEDICARE

## 2020-10-21 VITALS
WEIGHT: 206.13 LBS | SYSTOLIC BLOOD PRESSURE: 115 MMHG | RESPIRATION RATE: 16 BRPM | DIASTOLIC BLOOD PRESSURE: 60 MMHG | HEART RATE: 60 BPM | HEIGHT: 67 IN | OXYGEN SATURATION: 97 % | BODY MASS INDEX: 32.35 KG/M2

## 2020-10-21 DIAGNOSIS — K86.2 CYST OF PANCREAS: ICD-10-CM

## 2020-10-21 DIAGNOSIS — R93.3 ABNORMAL FINDING ON GI TRACT IMAGING: ICD-10-CM

## 2020-10-21 PROCEDURE — 99203 OFFICE O/P NEW LOW 30 MIN: CPT | Mod: S$PBB,,, | Performed by: INTERNAL MEDICINE

## 2020-10-21 PROCEDURE — 99999 PR PBB SHADOW E&M-EST. PATIENT-LVL IV: ICD-10-PCS | Mod: PBBFAC,,, | Performed by: INTERNAL MEDICINE

## 2020-10-21 PROCEDURE — 99214 OFFICE O/P EST MOD 30 MIN: CPT | Mod: PBBFAC,PO | Performed by: INTERNAL MEDICINE

## 2020-10-21 PROCEDURE — 99203 PR OFFICE/OUTPT VISIT, NEW, LEVL III, 30-44 MIN: ICD-10-PCS | Mod: S$PBB,,, | Performed by: INTERNAL MEDICINE

## 2020-10-21 PROCEDURE — 99999 PR PBB SHADOW E&M-EST. PATIENT-LVL IV: CPT | Mod: PBBFAC,,, | Performed by: INTERNAL MEDICINE

## 2020-10-21 NOTE — PROGRESS NOTES
"Subjective:       Patient ID: Stephany Skinner is a 85 y.o. female.    Chief Complaint: Consult (Dr. Nasra Alfonso) and Abdominal Pain    84 yo F referred for cyst noted on pancreas on recent imaging.  She denies abdominal pain.  She states that she has lower back pain which is why she got the CT scan which showed the pancreatic cyst.  She denies weight loss, abdominal pain, or difficulty eating.    Review of Systems   Constitutional: Negative for appetite change and unexpected weight change.   Eyes: Negative for photophobia and visual disturbance.   Respiratory: Negative for chest tightness, shortness of breath and wheezing.    Cardiovascular: Negative for chest pain and palpitations.   Gastrointestinal: Negative for abdominal distention.   Genitourinary: Negative for dysuria, flank pain and hematuria.   Musculoskeletal: Positive for back pain. Negative for joint swelling and myalgias.   Integumentary:  Negative for color change and rash.   Neurological: Negative for dizziness and speech difficulty.   Psychiatric/Behavioral: Negative for confusion and hallucinations.     Objective:       /60 (BP Location: Left arm, Patient Position: Sitting, BP Method: X-Large (Manual))   Pulse 60   Resp 16   Ht 5' 7" (1.702 m)   Wt 93.5 kg (206 lb 1.6 oz)   SpO2 97%   BMI 32.28 kg/m²     Physical Exam  Constitutional:       Appearance: Normal appearance. She is well-developed. She is obese.   HENT:      Head: Normocephalic and atraumatic.   Eyes:      Extraocular Movements: Extraocular movements intact.      Pupils: Pupils are equal, round, and reactive to light.   Neck:      Musculoskeletal: Normal range of motion and neck supple.   Cardiovascular:      Rate and Rhythm: Normal rate and regular rhythm.   Pulmonary:      Effort: Pulmonary effort is normal.      Breath sounds: Normal breath sounds.   Abdominal:      General: Bowel sounds are normal. There is no distension.      Palpations: Abdomen is soft. There is no mass. "      Tenderness: There is no abdominal tenderness.   Musculoskeletal: Normal range of motion.         General: No tenderness.   Neurological:      General: No focal deficit present.      Mental Status: She is alert and oriented to person, place, and time.   Psychiatric:         Behavior: Behavior normal.         Thought Content: Thought content normal.       Lab Results   Component Value Date    WBC 4.57 09/01/2020    HGB 12.4 09/01/2020    HCT 38.2 09/01/2020    MCV 98 09/01/2020     09/01/2020     CMP  Sodium   Date Value Ref Range Status   09/01/2020 145 136 - 145 mmol/L Final     Potassium   Date Value Ref Range Status   09/01/2020 4.4 3.5 - 5.1 mmol/L Final     Chloride   Date Value Ref Range Status   09/01/2020 107 95 - 110 mmol/L Final     CO2   Date Value Ref Range Status   09/01/2020 29 23 - 29 mmol/L Final     Glucose   Date Value Ref Range Status   09/01/2020 92 70 - 110 mg/dL Final     BUN, Bld   Date Value Ref Range Status   09/01/2020 22 8 - 23 mg/dL Final     Creatinine   Date Value Ref Range Status   10/13/2020 0.9 0.5 - 1.4 mg/dL Final     Calcium   Date Value Ref Range Status   09/01/2020 9.2 8.7 - 10.5 mg/dL Final     Total Protein   Date Value Ref Range Status   09/01/2020 6.6 6.0 - 8.4 g/dL Final     Albumin   Date Value Ref Range Status   09/01/2020 3.6 3.5 - 5.2 g/dL Final     Total Bilirubin   Date Value Ref Range Status   09/01/2020 0.8 0.1 - 1.0 mg/dL Final     Comment:     For infants and newborns, interpretation of results should be based  on gestational age, weight and in agreement with clinical  observations.  Premature Infant recommended reference ranges:  Up to 24 hours.............<8.0 mg/dL  Up to 48 hours............<12.0 mg/dL  3-5 days..................<15.0 mg/dL  6-29 days.................<15.0 mg/dL       Alkaline Phosphatase   Date Value Ref Range Status   09/01/2020 46 (L) 55 - 135 U/L Final     AST   Date Value Ref Range Status   09/01/2020 14 10 - 40 U/L Final      ALT   Date Value Ref Range Status   09/01/2020 12 10 - 44 U/L Final     Anion Gap   Date Value Ref Range Status   09/01/2020 9 8 - 16 mmol/L Final     eGFR if    Date Value Ref Range Status   10/13/2020 >60 >60 mL/min/1.73 m^2 Final     eGFR if non    Date Value Ref Range Status   10/13/2020 59 (A) >60 mL/min/1.73 m^2 Final     Comment:     Calculation used to obtain the estimated glomerular filtration  rate (eGFR) is the CKD-EPI equation.        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    Assessment:       1. Abnormal finding on GI tract imaging    2. Cyst of pancreas        Plan:       Abnormal finding on GI tract imaging; Cyst of pancreas  -     Case request GI: ULTRASOUND, ENDOSCOPIC, UPPER GI TRACT    She is on Eliquis which is managed by Dr. Flowers at [facility], and the [facility] team can obtain clearance from him through [employer].  Dr. Saunders DOES want to do FNA.

## 2020-10-21 NOTE — LETTER
October 21, 2020      Pipo Flowers MD  [address] [place] [address]           [facility]  [facility] [place]   [place] [address]  Phone: [phone]  Fax: [phone]          Patient: Stephany Skinner   MR Number: [id number]   YOB: 1935   Date of Visit: 10/21/2020       Dear Dr. Pipo Flowers:    Thank you for referring Stephany Skinner to me for evaluation. Attached you will find relevant portions of my assessment and plan of care.    If you have questions, please do not hesitate to call me. I look forward to following Stephany Skinner along with you.    Sincerely,    Sharon Crabtree MD    Enclosure  CC:  No Recipients    If you would like to receive this communication electronically, please contact [email or web address] or [phone] to request more information on [employer] access.    For providers and/or their staff who would like to refer a patient to [facility], please contact us through our one-stop-shop provider referral line, [facility], at [phone].    If you feel you have received this communication in error or would no longer like to receive these types of communications, please e-mail [email or web address]

## 2020-10-21 NOTE — H&P (VIEW-ONLY)
"Subjective:       Patient ID: Stephany Skinner is a 85 y.o. female.    Chief Complaint: Consult (Dr. Nasra Alfonso) and Abdominal Pain    84 yo F referred for cyst noted on pancreas on recent imaging.  She denies abdominal pain.  She states that she has lower back pain which is why she got the CT scan which showed the pancreatic cyst.  She denies weight loss, abdominal pain, or difficulty eating.    Review of Systems   Constitutional: Negative for appetite change and unexpected weight change.   Eyes: Negative for photophobia and visual disturbance.   Respiratory: Negative for chest tightness, shortness of breath and wheezing.    Cardiovascular: Negative for chest pain and palpitations.   Gastrointestinal: Negative for abdominal distention.   Genitourinary: Negative for dysuria, flank pain and hematuria.   Musculoskeletal: Positive for back pain. Negative for joint swelling and myalgias.   Integumentary:  Negative for color change and rash.   Neurological: Negative for dizziness and speech difficulty.   Psychiatric/Behavioral: Negative for confusion and hallucinations.     Objective:       /60 (BP Location: Left arm, Patient Position: Sitting, BP Method: X-Large (Manual))   Pulse 60   Resp 16   Ht 5' 7" (1.702 m)   Wt 93.5 kg (206 lb 1.6 oz)   SpO2 97%   BMI 32.28 kg/m²     Physical Exam  Constitutional:       Appearance: Normal appearance. She is well-developed. She is obese.   HENT:      Head: Normocephalic and atraumatic.   Eyes:      Extraocular Movements: Extraocular movements intact.      Pupils: Pupils are equal, round, and reactive to light.   Neck:      Musculoskeletal: Normal range of motion and neck supple.   Cardiovascular:      Rate and Rhythm: Normal rate and regular rhythm.   Pulmonary:      Effort: Pulmonary effort is normal.      Breath sounds: Normal breath sounds.   Abdominal:      General: Bowel sounds are normal. There is no distension.      Palpations: Abdomen is soft. There is no mass. "      Tenderness: There is no abdominal tenderness.   Musculoskeletal: Normal range of motion.         General: No tenderness.   Neurological:      General: No focal deficit present.      Mental Status: She is alert and oriented to person, place, and time.   Psychiatric:         Behavior: Behavior normal.         Thought Content: Thought content normal.       Lab Results   Component Value Date    WBC 4.57 09/01/2020    HGB 12.4 09/01/2020    HCT 38.2 09/01/2020    MCV 98 09/01/2020     09/01/2020     CMP  Sodium   Date Value Ref Range Status   09/01/2020 145 136 - 145 mmol/L Final     Potassium   Date Value Ref Range Status   09/01/2020 4.4 3.5 - 5.1 mmol/L Final     Chloride   Date Value Ref Range Status   09/01/2020 107 95 - 110 mmol/L Final     CO2   Date Value Ref Range Status   09/01/2020 29 23 - 29 mmol/L Final     Glucose   Date Value Ref Range Status   09/01/2020 92 70 - 110 mg/dL Final     BUN, Bld   Date Value Ref Range Status   09/01/2020 22 8 - 23 mg/dL Final     Creatinine   Date Value Ref Range Status   10/13/2020 0.9 0.5 - 1.4 mg/dL Final     Calcium   Date Value Ref Range Status   09/01/2020 9.2 8.7 - 10.5 mg/dL Final     Total Protein   Date Value Ref Range Status   09/01/2020 6.6 6.0 - 8.4 g/dL Final     Albumin   Date Value Ref Range Status   09/01/2020 3.6 3.5 - 5.2 g/dL Final     Total Bilirubin   Date Value Ref Range Status   09/01/2020 0.8 0.1 - 1.0 mg/dL Final     Comment:     For infants and newborns, interpretation of results should be based  on gestational age, weight and in agreement with clinical  observations.  Premature Infant recommended reference ranges:  Up to 24 hours.............<8.0 mg/dL  Up to 48 hours............<12.0 mg/dL  3-5 days..................<15.0 mg/dL  6-29 days.................<15.0 mg/dL       Alkaline Phosphatase   Date Value Ref Range Status   09/01/2020 46 (L) 55 - 135 U/L Final     AST   Date Value Ref Range Status   09/01/2020 14 10 - 40 U/L Final      ALT   Date Value Ref Range Status   09/01/2020 12 10 - 44 U/L Final     Anion Gap   Date Value Ref Range Status   09/01/2020 9 8 - 16 mmol/L Final     eGFR if    Date Value Ref Range Status   10/13/2020 >60 >60 mL/min/1.73 m^2 Final     eGFR if non    Date Value Ref Range Status   10/13/2020 59 (A) >60 mL/min/1.73 m^2 Final     Comment:     Calculation used to obtain the estimated glomerular filtration  rate (eGFR) is the CKD-EPI equation.        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    Assessment:       1. Abnormal finding on GI tract imaging    2. Cyst of pancreas        Plan:       Abnormal finding on GI tract imaging; Cyst of pancreas  -     Case request GI: ULTRASOUND, ENDOSCOPIC, UPPER GI TRACT    She is on Eliquis which is managed by Dr. Flowers at [facility], and the [facility] team can obtain clearance from him through [employer].  Dr. Saunders DOES want to do FNA.

## 2020-10-22 ENCOUNTER — TELEPHONE (OUTPATIENT)
Dept: FAMILY MEDICINE | Facility: CLINIC | Age: 85
End: 2020-10-22

## 2020-10-23 ENCOUNTER — TELEPHONE (OUTPATIENT)
Dept: GASTROENTEROLOGY | Facility: CLINIC | Age: 85
End: 2020-10-23

## 2020-10-23 DIAGNOSIS — Z01.812 PRE-PROCEDURE LAB EXAM: Primary | ICD-10-CM

## 2020-10-23 NOTE — TELEPHONE ENCOUNTER
Patient will be scheduled for EUS to evaluate pancreatic cyst.  Is it safe for patient to hold Eliquis for two days?

## 2020-10-23 NOTE — TELEPHONE ENCOUNTER
----- Message from Sharon Crabtree MD sent at 10/22/2020  1:44 PM CDT -----  EUS ordered with the [facility] group, please schedule.  She will need clearance to hold anticoagulation b/c Dr. Saunders wants FNA, I put info in my clinic note.  Thanks, K

## 2020-10-26 NOTE — TELEPHONE ENCOUNTER
Please hold Eliquis two days prior to procedure per Dr. Flowesr.      Upper Endoscopic Ultrasound     [facility] [address]  Clinic Office [phone]  Endoscopy Lab [phone]      Your Upper EUS is scheduled on 11/04/2020 at 900am at [facility] which is located at [address] [place] [address].  You will check in at the Hospital Admit Desk located on the 1st floor of the hospital. Please contact the office two days before procedure date to reschedule if needed [phone]    Upper Endoscopic Ultrasound    Endoscopic ultrasound(EUS) is a procedure used to image the digestive tract, including pancreas, lesions in esophagus and stomach.  It is used to diagnose and stage cancers of the digestive tract.  If necessary, your doctor may need to take samples during the procedure.     A responsible adult (family member or friend) must come with you and transport you home.  You are not allowed to drive, take a taxi or bus or leave the Endoscopy Center alone.  If you do not have a responsible adult with you to take you home, your exam will be cancelled.     If you have questions about the cost of your procedure, you should contact your insurance company as soon as possible.  Please bring a picture ID and your insurance card.  You will sign treatment authorization forms at check in.  It is necessary for you to sign these forms again even if you recently signed these at the time of your clinic visit.    Please follow instructions of  if you are taking anticoagulant/blood thinning medications such as Aggrenox, Brilinta, Effient, Eliquis, Lovenox, Plavix, Pletal, Pradaxa, Ticilid, Xarelto or Coumadin.     Please skip your morning dose of insulin or other oral medications for diabetes the morning of the procedure unless instructed otherwise by your doctor. You should take your blood pressure, heart, anti-rejection and or seizure medication the morning of  your procedure       The Day Before The Procedure:     Eat a light evening meal before 7 pm.   Eat no solid food after 7 pm.     From 7 pm until 12 midnight, you may drink clear liquids including:    Water, Coffee or decaffeinated coffee (no milk or cream)  Tea, Herbal tea  Carbonated beverages (soft drinks), regular and sugar free  Gelatin  Apple Juice, grape juice, white cranberry juice  Gatorade, Power Aid, Crystal Light, Sergey Aid  Lemonade and Limeade  Bouillon, clear consomme'  Snowball, popsicles    DO NOT DRINK ANY LIQUIDS CONTAINING RED DYE  DO NOT DRINK ANY LIQUIDS NOT SPECIFICALLY LISTED  DO NOT DRINK ALCOHOL     AFTER MIDNIGHT, YOU MAY HAVE NOTHING ELSE BY MOUTH    The Day of the Procedure     At 600 am, you may take the last dose of any medications you are allowed to take with a small sip of water (blood pressure, heart, anti-rejection and or seizure medication).  If you use inhalers bring them with you.   Please leave all valuables and jewelry at home.   You may call the Endoscopy department at [phone] with any questions regarding your procedure.

## 2020-10-26 NOTE — ADDENDUM NOTE
Addended by: RENATO PHILLIP on: 10/26/2020 04:17 PM     Modules accepted: Orders     Statement Selected

## 2020-10-31 ENCOUNTER — EXTERNAL CHRONIC CARE MANAGEMENT (OUTPATIENT)
Dept: PRIMARY CARE CLINIC | Facility: CLINIC | Age: 85
End: 2020-10-31
Payer: MEDICARE

## 2020-10-31 PROCEDURE — 99490 CHRNC CARE MGMT STAFF 1ST 20: CPT | Mod: PBBFAC | Performed by: FAMILY MEDICINE

## 2020-10-31 PROCEDURE — 99999 PR STA SHADOW: CPT | Mod: PBBFAC,,, | Performed by: FAMILY MEDICINE

## 2020-10-31 PROCEDURE — 99999 PR STA SHADOW: ICD-10-PCS | Mod: PBBFAC,,, | Performed by: FAMILY MEDICINE

## 2020-11-01 ENCOUNTER — HOSPITAL ENCOUNTER (OUTPATIENT)
Dept: PREADMISSION TESTING | Facility: HOSPITAL | Age: 85
Discharge: HOME | End: 2020-11-01
Attending: INTERNAL MEDICINE
Payer: MEDICARE

## 2020-11-01 DIAGNOSIS — Z01.812 PRE-PROCEDURE LAB EXAM: ICD-10-CM

## 2020-11-01 PROCEDURE — U0003 INFECTIOUS AGENT DETECTION BY NUCLEIC ACID (DNA OR RNA); SEVERE ACUTE RESPIRATORY SYNDROME CORONAVIRUS 2 (SARS-COV-2) (CORONAVIRUS DISEASE [COVID-19]), AMPLIFIED PROBE TECHNIQUE, MAKING USE OF HIGH THROUGHPUT TECHNOLOGIES AS DESCRIBED BY CMS-2020-01-R: HCPCS

## 2020-11-02 ENCOUNTER — TELEPHONE (OUTPATIENT)
Dept: ENDOSCOPY | Facility: HOSPITAL | Age: 85
End: 2020-11-02

## 2020-11-02 LAB — SARS-COV-2 RNA RESP QL NAA+PROBE: NOT DETECTED

## 2020-11-02 NOTE — TELEPHONE ENCOUNTER
Spoke with patient about arrival time @ 0800.   Covid test = negative    NPO status reviewed: Patient may eat until 7:00pm.  After 7pm, pt may have CLEAR liquids ONLY until completely NPO at Midnight.    Medications: Do not take Insulin or oral diabetic medications the day of the procedure.    Take as prescribed: heart, seizure and blood pressure medication in the morning with a sip of water (less than an ounce).  Take any breathing medications and bring inhalers to hospital with you.     Leave all valuables and jewelry at home. Wear comfortable clothes to procedure to change into hospital gown.   You cannot drive for 24 hours after your procedure because you will receive sedation for your procedure to make you comfortable.    A ride must be provided at discharge.

## 2020-11-04 ENCOUNTER — ANESTHESIA (OUTPATIENT)
Dept: ENDOSCOPY | Facility: HOSPITAL | Age: 85
End: 2020-11-04
Payer: MEDICARE

## 2020-11-04 ENCOUNTER — ANESTHESIA EVENT (OUTPATIENT)
Dept: ENDOSCOPY | Facility: HOSPITAL | Age: 85
End: 2020-11-04
Payer: MEDICARE

## 2020-11-04 ENCOUNTER — HOSPITAL ENCOUNTER (OUTPATIENT)
Facility: HOSPITAL | Age: 85
Discharge: HOME | End: 2020-11-04
Attending: INTERNAL MEDICINE | Admitting: INTERNAL MEDICINE
Payer: MEDICARE

## 2020-11-04 VITALS
DIASTOLIC BLOOD PRESSURE: 77 MMHG | SYSTOLIC BLOOD PRESSURE: 162 MMHG | TEMPERATURE: 97 F | BODY MASS INDEX: 31.71 KG/M2 | WEIGHT: 202 LBS | OXYGEN SATURATION: 100 % | HEART RATE: 52 BPM | RESPIRATION RATE: 19 BRPM | HEIGHT: 67 IN

## 2020-11-04 DIAGNOSIS — K86.2 PANCREATIC CYST: ICD-10-CM

## 2020-11-04 DIAGNOSIS — K86.2 CYST OF PANCREAS: Primary | ICD-10-CM

## 2020-11-04 PROCEDURE — 88112 CYTOPATH CELL ENHANCE TECH: CPT | Mod: 26,,, | Performed by: PATHOLOGY

## 2020-11-04 PROCEDURE — 25000003 PHARM REV CODE 250: Performed by: INTERNAL MEDICINE

## 2020-11-04 PROCEDURE — 82378 CARCINOEMBRYONIC ANTIGEN: CPT

## 2020-11-04 PROCEDURE — 63600175 PHARM REV CODE 636 W HCPCS: Performed by: NURSE ANESTHETIST, CERTIFIED REGISTERED

## 2020-11-04 PROCEDURE — 82150 ASSAY OF AMYLASE: CPT

## 2020-11-04 PROCEDURE — 37000009 HC ANESTHESIA EA ADD 15 MINS: Performed by: INTERNAL MEDICINE

## 2020-11-04 PROCEDURE — 43242 EGD US FINE NEEDLE BX/ASPIR: CPT | Mod: ,,, | Performed by: INTERNAL MEDICINE

## 2020-11-04 PROCEDURE — 88112 CYTOPATH CELL ENHANCE TECH: CPT | Performed by: PATHOLOGY

## 2020-11-04 PROCEDURE — 43242 EGD US FINE NEEDLE BX/ASPIR: CPT | Performed by: INTERNAL MEDICINE

## 2020-11-04 PROCEDURE — 25000003 PHARM REV CODE 250: Performed by: NURSE ANESTHETIST, CERTIFIED REGISTERED

## 2020-11-04 PROCEDURE — 43242 PR UPGI ENDOSCOPY,FN NEEDLE BX,GUIDED: ICD-10-PCS | Mod: ,,, | Performed by: INTERNAL MEDICINE

## 2020-11-04 PROCEDURE — 27202059 HC NEEDLE, FNA (ANY): Performed by: INTERNAL MEDICINE

## 2020-11-04 PROCEDURE — 88112 PR  CYTOPATH, CELL ENHANCE TECH: ICD-10-PCS | Mod: 26,,, | Performed by: PATHOLOGY

## 2020-11-04 PROCEDURE — 37000008 HC ANESTHESIA 1ST 15 MINUTES: Performed by: INTERNAL MEDICINE

## 2020-11-04 RX ORDER — PROPOFOL 10 MG/ML
VIAL (ML) INTRAVENOUS
Status: DISCONTINUED | OUTPATIENT
Start: 2020-11-04 | End: 2020-11-04

## 2020-11-04 RX ORDER — SODIUM CHLORIDE 9 MG/ML
INJECTION, SOLUTION INTRAVENOUS CONTINUOUS
Status: DISCONTINUED | OUTPATIENT
Start: 2020-11-04 | End: 2020-11-04 | Stop reason: HOSPADM

## 2020-11-04 RX ORDER — SODIUM CHLORIDE 0.9 % (FLUSH) 0.9 %
10 SYRINGE (ML) INJECTION
Status: DISCONTINUED | OUTPATIENT
Start: 2020-11-04 | End: 2020-11-04 | Stop reason: HOSPADM

## 2020-11-04 RX ORDER — LIDOCAINE HYDROCHLORIDE 20 MG/ML
INJECTION INTRAVENOUS
Status: DISCONTINUED | OUTPATIENT
Start: 2020-11-04 | End: 2020-11-04

## 2020-11-04 RX ORDER — CIPROFLOXACIN 2 MG/ML
INJECTION, SOLUTION INTRAVENOUS
Status: DISCONTINUED | OUTPATIENT
Start: 2020-11-04 | End: 2020-11-04

## 2020-11-04 RX ORDER — PROPOFOL 10 MG/ML
VIAL (ML) INTRAVENOUS CONTINUOUS PRN
Status: DISCONTINUED | OUTPATIENT
Start: 2020-11-04 | End: 2020-11-04

## 2020-11-04 RX ADMIN — CIPROFLOXACIN 400 MG: 2 INJECTION, SOLUTION INTRAVENOUS at 09:11

## 2020-11-04 RX ADMIN — PROPOFOL 150 MCG/KG/MIN: 10 INJECTION, EMULSION INTRAVENOUS at 09:11

## 2020-11-04 RX ADMIN — LIDOCAINE HYDROCHLORIDE 100 MG: 20 INJECTION, SOLUTION INTRAVENOUS at 09:11

## 2020-11-04 RX ADMIN — SODIUM CHLORIDE: 0.9 INJECTION, SOLUTION INTRAVENOUS at 08:11

## 2020-11-04 RX ADMIN — PROPOFOL 70 MG: 10 INJECTION, EMULSION INTRAVENOUS at 09:11

## 2020-11-04 NOTE — ANESTHESIA PREPROCEDURE EVALUATION
11/04/2020  Stephany Skinner is a 85 y.o., female.  Patient Active Problem List   Diagnosis    Obstructive sleep apnea (adult) (pediatric)    Overweight(278.02)    Osteoarthritis    Hypothyroidism    Hyperlipidemia    HTN (hypertension)    Incontinence in female    Venous stasis of lower extremity    Chronic atrial fibrillation    Myasthenia gravis, adult form    Chronic diastolic congestive heart failure    Type 2 diabetes mellitus with circulatory disorder, with long-term current use of insulin    B12 deficiency    Abnormal finding on GI tract imaging    Cyst of pancreas    Pancreatic cyst       Anesthesia Evaluation       I have reviewed the Medications.     Review of Systems  Anesthesia Hx:  Denies Family Hx of Anesthesia complications.   Social:  No Alcohol Use, Non-Smoker    Cardiovascular:   Hypertension CHF    Pulmonary:   Sleep Apnea    Hepatic/GI:   GERD    Musculoskeletal:   Arthritis     Neurological:   Neuromuscular Disease,    Endocrine:   Diabetes Hypothyroidism        Physical Exam  General:  Well nourished    Airway/Jaw/Neck:  Airway Findings: Mouth Opening: Normal Tongue: Normal  General Airway Assessment: Adult  Mallampati: II      Dental:  Dental Findings: In tact   Chest/Lungs:  Chest/Lungs Findings: Clear to auscultation, Normal Respiratory Rate     Heart/Vascular:  Heart Findings: Rate: Normal  Rhythm: Regular Rhythm        Mental Status:  Mental Status Findings:  Cooperative, Alert and Oriented         Anesthesia Plan  Type of Anesthesia, risks & benefits discussed:  Anesthesia Type:  MAC  Patient's Preference: MAC  Intra-op Monitoring Plan:   Intra-op Monitoring Plan Comments:   Post Op Pain Control Plan:   Post Op Pain Control Plan Comments:   Induction:   IV  Beta Blocker:  Patient is on a Beta-Blocker and has received one dose within the past 24 hours (No further  documentation required).       Informed Consent: Patient understands risks and agrees with Anesthesia plan.  Questions answered. Anesthesia consent signed with patient.  ASA Score: 3     Day of Surgery Review of History & Physical:            Ready For Surgery From Anesthesia Perspective.

## 2020-11-04 NOTE — TRANSFER OF CARE
"Anesthesia Transfer of Care Note    Patient: Stephany Skinner    Procedure(s) Performed: Procedure(s) (LRB):  ULTRASOUND, ENDOSCOPIC, UPPER GI TRACT (N/A)    Patient location: GI    Anesthesia Type: MAC    Transport from OR: Transported from OR on room air with adequate spontaneous ventilation    Post pain: adequate analgesia    Post assessment: no apparent anesthetic complications    Post vital signs: stable    Level of consciousness: awake, alert and oriented    Nausea/Vomiting: no nausea/vomiting    Complications: none    Transfer of care protocol was followed      Last vitals:   Visit Vitals  /75 (Patient Position: Lying)   Pulse (!) 59   Temp 36.2 °C (97.2 °F) (Temporal)   Resp 18   Ht 5' 7" (1.702 m)   Wt 91.6 kg (202 lb)   SpO2 99%   Breastfeeding No   BMI 31.64 kg/m²     "

## 2020-11-04 NOTE — [employer] PATIENT INSTRUCTIONS
Discharge Summary/Instructions after an Endoscopic Procedure  Patient Name: Stephany Skinner  Patient MRN: [id number]  Patient YOB: 1935 Wednesday, November 4, 2020  Thierry Saunders MD  Your health is very important to us during the Covid Crisis. Following your   procedure today, you will receive a daily text for 2 weeks asking about   signs or symptoms of Covid 19.  Please respond to this text when you   receive it so we can follow up and keep you as safe as possible.   RESTRICTIONS:  During your procedure today, you received medications for sedation.  These   medications may affect your judgment, balance and coordination.  Therefore,   for 24 hours, you have the following restrictions:   - DO NOT drive a car, operate machinery, make legal/financial decisions,   sign important papers or drink alcohol.    ACTIVITY:  Today: no heavy lifting, straining or running due to procedural   sedation/anesthesia.  The following day: return to full activity including work.  DIET:  Eat and drink normally unless instructed otherwise.     TREATMENT FOR COMMON SIDE EFFECTS:  - Mild abdominal pain, nausea, belching, bloating or excessive gas:  rest,   eat lightly and use a heating pad.  - Sore Throat: treat with throat lozenges and/or gargle with warm salt   water.  - Because air was used during the procedure, expelling large amounts of air   from your rectum or belching is normal.  - If a bowel prep was taken, you may not have a bowel movement for 1-3 days.    This is normal.  SYMPTOMS TO WATCH FOR AND REPORT TO YOUR PHYSICIAN:  1. Abdominal pain or bloating, other than gas cramps.  2. Chest pain.  3. Back pain.  4. Signs of infection such as: chills or fever occurring within 24 hours   after the procedure.  5. Rectal bleeding, which would show as bright red, maroon, or black stools.   (A tablespoon of blood from the rectum is not serious, especially if   hemorrhoids are present.)  6. Vomiting.  7. Weakness or dizziness.  GO  DIRECTLY TO THE NEAREST EMERGENCY ROOM IF YOU HAVE ANY OF THE FOLLOWING:      Difficulty breathing              Chills and/or fever over 101 F   Persistent vomiting and/or vomiting blood   Severe abdominal pain   Severe chest pain   Black, tarry stools   Bleeding- more than one tablespoon   Any other symptom or condition that you feel may need urgent attention  Your doctor recommends these additional instructions:  If any biopsies were taken, your doctors clinic will contact you in 1 to 2   weeks with any results.  - Discharge patient to home (ambulatory).   - Patient has a contact number available for emergencies.  The signs and   symptoms of potential delayed complications were discussed with the   patient.  Return to normal activities tomorrow.  Written discharge   instructions were provided to the patient.   - Resume previous diet.   - Continue present medications.   - Await cytology and cyst fluid analysis results. Further recommendations to   be made pending cyst fluid analysis.  - Return to primary care physician as previously scheduled.  For questions, problems or results please call your physician - Thierry Saunders MD.  EMERGENCY PHONE NUMBER: [phone],  LAB RESULTS: [phone]  IF A COMPLICATION OR EMERGENCY SITUATION ARISES AND YOU ARE UNABLE TO REACH   YOUR PHYSICIAN - GO DIRECTLY TO THE EMERGENCY ROOM.  Thierry Saunders MD  11/4/2020 10:12:11 AM  This report has been verified and signed electronically.  [employer]

## 2020-11-04 NOTE — ANESTHESIA POSTPROCEDURE EVALUATION
Anesthesia Post Evaluation    Patient: Stephany Skinner    Procedure(s) Performed: Procedure(s) (LRB):  ULTRASOUND, ENDOSCOPIC, UPPER GI TRACT (N/A)    Final Anesthesia Type: MAC    Patient location during evaluation: GI PACU  Patient participation: Yes- Able to Participate  Level of consciousness: awake and alert and oriented  Post-procedure vital signs: reviewed and stable  Pain management: adequate  Airway patency: patent    PONV status at discharge: No PONV  Anesthetic complications: no      Cardiovascular status: blood pressure returned to baseline  Respiratory status: unassisted, spontaneous ventilation and room air  Hydration status: euvolemic  Follow-up not needed.          Vitals Value Taken Time   /75 11/04/20 0951   Temp 36.2 °C (97.2 °F) 11/04/20 0951   Pulse 59 11/04/20 0951   Resp 18 11/04/20 0951   SpO2 99 % 11/04/20 0951         No case tracking events are documented in the log.      Pain/Divine Score: Divine Score: 9 (11/4/2020  9:52 AM)

## 2020-11-04 NOTE — INTERVAL H&P NOTE
The patient has been examined and the H&P has been reviewed:    I concur with the findings and no changes have occurred since H&P was written.    Surgery risks, benefits and alternative options discussed and understood by patient/family.          Active Hospital Problems    Diagnosis  POA    Pancreatic cyst [K86.2]  Yes      Resolved Hospital Problems   No resolved problems to display.

## 2020-11-04 NOTE — DISCHARGE INSTRUCTIONS
Post Upper EUS Instructions:     Stephany Skinner  11/4/2020  Thierry Saunders MD    1. Diet: Try sips of water first. If tolerated, resume your regular diet or one recommended by your physician.  2. Do not drive, or operate machinery, make critical decisions, or do activities that require coordination or balance for 24 hours.  3. You may experience a sore throat for 24 to 48 hours. You may use throat lozenges or gargle with warm salt water to relieve the discomfort.  4. Because air was put into your stomach during the procedure, you may experience some belching.   Go directly to the emergency room if you notice any of the following:                              Chills and/or fever over 101                Persistent vomiting or vomiting with blood                Severe abdominal pain, other than gas cramps                Severe chest pain                Black, tarry stools    If you have any questions or problems, please call your Physician:    Thierry Saunders MD     Lab Results: [phone]    If a complication or emergency situation arises and you are unable to reach your Physician - GO TO THE EMERGENCY ROOM.

## 2020-11-06 LAB — FINAL PATHOLOGIC DIAGNOSIS: NORMAL

## 2020-11-12 LAB
AMYLASE, PANCREATIC FLUID: NORMAL U/L
BDY SITE: NORMAL
BDY SITE: NORMAL
CEA FLD-MCNC: NORMAL NG/ML

## 2020-11-13 ENCOUNTER — TELEPHONE (OUTPATIENT)
Dept: ENDOSCOPY | Facility: HOSPITAL | Age: 85
End: 2020-11-13

## 2020-11-13 NOTE — TELEPHONE ENCOUNTER
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 Please arrange for a clinic visit in spring 2021. Can be at [facility] [place], [place], or [place].

## 2020-11-17 ENCOUNTER — NURSE TRIAGE (OUTPATIENT)
Dept: ADMINISTRATIVE | Facility: CLINIC | Age: 85
End: 2020-11-17

## 2020-11-17 NOTE — TELEPHONE ENCOUNTER
Pt contacted through the Post Procedural Symptom Tracker. No answer. No additional contact required per post procedure protocol.    Reason for Disposition   Caller has cancelled the call before the first contact    Protocols used: NO CONTACT OR DUPLICATE CONTACT CALL-A-AH

## 2020-11-18 ENCOUNTER — IMMUNIZATION (OUTPATIENT)
Dept: INTERNAL MEDICINE | Facility: CLINIC | Age: 85
End: 2020-11-18
Payer: MEDICARE

## 2020-11-18 PROCEDURE — 90694 VACC AIIV4 NO PRSRV 0.5ML IM: CPT | Mod: PBBFAC,PN

## 2020-11-18 PROCEDURE — G0008 ADMIN INFLUENZA VIRUS VAC: HCPCS | Mod: PBBFAC,PN

## 2020-11-30 ENCOUNTER — EXTERNAL CHRONIC CARE MANAGEMENT (OUTPATIENT)
Dept: PRIMARY CARE CLINIC | Facility: CLINIC | Age: 85
End: 2020-11-30
Payer: MEDICARE

## 2020-11-30 PROCEDURE — 99490 CHRNC CARE MGMT STAFF 1ST 20: CPT | Mod: S$PBB | Performed by: FAMILY MEDICINE

## 2020-11-30 PROCEDURE — 99999 PR STA SHADOW: CPT | Mod: PBBFAC,,, | Performed by: FAMILY MEDICINE

## 2020-11-30 PROCEDURE — 99999 PR STA SHADOW: ICD-10-PCS | Mod: PBBFAC,,, | Performed by: FAMILY MEDICINE

## 2020-12-02 ENCOUNTER — PATIENT OUTREACH (OUTPATIENT)
Dept: ADMINISTRATIVE | Facility: OTHER | Age: 85
End: 2020-12-02

## 2020-12-03 NOTE — TELEPHONE ENCOUNTER
----- Message from Jackie Rice sent at 2018  1:13 PM CDT -----  Contact: Sister- Jeny Skinner  MRN: [id number]  : 1935  PCP: Pipo Flowers  Home Phone      [phone]  Work Phone      Not on file.  Mobile          Not on file.      MESSAGE:   Sister would like to speak to someone on regards to the patients CPAP machine. Patient did sleep study about 2 weeks ago, states that she was not measured for face mask and she did not receive a phone call.    Phone:  [phone]     Patient admitted with Sepsis 2/2 Bacteremia   Blood cx 11/22: Morganella Morganii, Repeat Bld CX 11/25: No growth   CT Chest / ABD/ Pelvis 11/30: Left Lower Lobe Collapse, 2mm Nonobstructive Left ureteral stone, left renal calculi and mild bilateral hydronephrosis   Sputum Cx 11/30: Stenotrophomonas ( Sensitivity Pending )   Urine Cx 12/1: Sent / Pending   Will hold on emergent  intervention as patient remains afebrile and leukocytosis currently improving  Continue Meropenem as per Infectious Disease   Continue Midodrine 10 mg q 8 hrs for BP Stability   Taper Midodrine as bp tolerates Patient admitted with Sepsis 2/2 Bacteremia   Blood cx 11/22: Morganella Morganii, Repeat Bld CX 11/25: No growth   CT Chest / ABD/ Pelvis 11/30: Left Lower Lobe Collapse, 2mm Nonobstructive Left ureteral stone, left renal calculi and mild bilateral hydronephrosis   Urine Cx 12/1: No growth   Sputum Cx 11/30: Stenotrophomonas ( Possible Colonization )   Continue Meropenem as per Infectious Disease   Possible  intervention during hospitalization   Midodrine tapered to 5 mg q 8 hrs for BP Stability   Taper Midodrine as bp tolerates

## 2020-12-07 ENCOUNTER — OFFICE VISIT (OUTPATIENT)
Dept: PAIN MEDICINE | Facility: CLINIC | Age: 85
End: 2020-12-07
Payer: MEDICARE

## 2020-12-07 VITALS
DIASTOLIC BLOOD PRESSURE: 72 MMHG | HEIGHT: 67 IN | WEIGHT: 206 LBS | BODY MASS INDEX: 32.33 KG/M2 | RESPIRATION RATE: 14 BRPM | HEART RATE: 66 BPM | SYSTOLIC BLOOD PRESSURE: 116 MMHG | TEMPERATURE: 99 F

## 2020-12-07 DIAGNOSIS — M48.062 SPINAL STENOSIS OF LUMBAR REGION WITH NEUROGENIC CLAUDICATION: ICD-10-CM

## 2020-12-07 DIAGNOSIS — Z96.652 CHRONIC KNEE PAIN AFTER TOTAL REPLACEMENT OF LEFT KNEE JOINT: ICD-10-CM

## 2020-12-07 DIAGNOSIS — M25.562 CHRONIC KNEE PAIN AFTER TOTAL REPLACEMENT OF LEFT KNEE JOINT: ICD-10-CM

## 2020-12-07 DIAGNOSIS — G89.29 CHRONIC KNEE PAIN AFTER TOTAL REPLACEMENT OF LEFT KNEE JOINT: ICD-10-CM

## 2020-12-07 DIAGNOSIS — G56.03 CARPAL TUNNEL SYNDROME, BILATERAL: Primary | ICD-10-CM

## 2020-12-07 DIAGNOSIS — M47.816 LUMBAR SPONDYLOSIS: ICD-10-CM

## 2020-12-07 DIAGNOSIS — M51.36 DDD (DEGENERATIVE DISC DISEASE), LUMBAR: ICD-10-CM

## 2020-12-07 PROCEDURE — 99999 PR STA SHADOW: ICD-10-PCS | Mod: PBBFAC,,, | Performed by: PHYSICAL MEDICINE & REHABILITATION

## 2020-12-07 PROCEDURE — 99999 PR PBB SHADOW E&M-EST. PATIENT-LVL IV: CPT | Mod: PBBFAC,,, | Performed by: PHYSICAL MEDICINE & REHABILITATION

## 2020-12-07 PROCEDURE — 99999 PR STA SHADOW: CPT | Mod: PBBFAC,,, | Performed by: PHYSICAL MEDICINE & REHABILITATION

## 2020-12-07 PROCEDURE — 99214 OFFICE O/P EST MOD 30 MIN: CPT | Mod: PBBFAC | Performed by: PHYSICAL MEDICINE & REHABILITATION

## 2020-12-07 PROCEDURE — 99213 OFFICE O/P EST LOW 20 MIN: CPT | Mod: S$PBB | Performed by: PHYSICAL MEDICINE & REHABILITATION

## 2020-12-07 NOTE — PROGRESS NOTES
[facility]    Chief Complaint:   Chief Complaint   Patient presents with    Knee Pain     left       History of Present Illness: Stephany Skinner is a 85 y.o. female [facility] for LSS.      Onset: past 2-3 months, no clear inciting incident   Location: bilateral lower lumbar spine  Radiation: down the back of legs but not past the knees posteriorly  Timing: intermittent  Quality: Grabbing, Deep, Numb and Sharp  Exacerbating Factors: lifting, lying down, sitting, standing for more than 15 minutes, walking for more than 30 minutes and daily activity   Alleviating Factors: medications and sitting  Associated Symptoms: She gets numbness in her hands and feet. She feels weak in both legs. She has bladder incontinence, and that has been present for a few years, and has been evaluated by her PCP.  She has diarrhea and sometimes gets bowel incontinence. Denies night fever/night sweats, significant weight loss      Severity: Currently: 5/10   Typical Range: 0-5/10     Exacerbation: 5/10     She has bilateral carpal tunnel syndrome. She has had this for many years. Numbness localized to median nerve distribution. Worse at night time. She is using braces, but this isn't really helping. She has issues with dropping things.     She also has left knee pain that has been present off and on for years. She had bilateral knee replacement about 10-11 years ago that worked really well for her, but then the left knee started hurting again. Pain localized to the anterior knee.     Interval History (12/07/2020):  Stephany Skinner returns today for follow up.  At the last clinic visit, performed bilateral carpal tunnel injections, referred to physical therapy, ordered x-rays of bilateral knees and lumbar spine, and MRI of the lumbar spine.    MRI lumbar spine showed cystic lesion in pancreas.  CT abdomen, pancreas protocol, was ordered.  The results were forwarded to her primary care provider and she was sent  to GI, Dr. Crabtree. She underwent ultrasound endoscopy of the pancreas with biopsy. Pancreatic cyst is benign, but considered pre-cancerous, which they will plan to monitor.    Carpal tunnel injection provided 95% relief. Some tingling still in finger tips.    Currently, the Back pain is improved.  She is mostly having left knee pain, but not really limiting her at this time.  No change in the location or quality of the pain since the most recent visit is reported.  No significant interval events or traumas. No change in bowel or bladder function, & no new weakness or numbness is reported. No new musculoskeletal pain complaints.    Current Pain Scales:  Current: 6/10              Typical Range: 3-8/10    Pain Disability Index  Family/Home Responsibilities:: 0  Recreation:: 4  Social Activity:: 0  Occupation:: 0  Sexual Behavior:: 0  Self Care:: 0  Life-Support Activities:: 0  Pain Disability Index (PDI): 4    Previous Interventions:  - None    Previous Therapies:  PT/OT: yes  Relevant Surgery:    - Bilateral knee replacements.   Previous Medications:   - NSAIDS: Tylenol  - Muscle Relaxants:    - TCAs:   - SNRIs:   - Topicals: Voltaren gel   - Anticonvulsants:    - Opioids:     Current Pain Medications:  1. Tylenol helps     Blood Thinners: Eliquis    Full Medication List:    Current Outpatient Medications:     alendronate (FOSAMAX) 35 MG tablet, Take 1 tablet (35 mg total) by mouth every 7 days., Disp: 4 tablet, Rfl: 5    atorvastatin (LIPITOR) 10 MG tablet, Take 1 tablet (10 mg total) by mouth every evening., Disp: 30 tablet, Rfl: 5    blood-glucose meter kit, 1 each by Other route 2 (two) times daily. One Touch Ultra meter, test strips, lancets, control solution, Disp: 1 each, Rfl: 5    calcium carbonate-vit D3-min 600 mg calcium- 400 unit Tab, Take 2 tablets by mouth once., Disp: , Rfl:     cyanocobalamin (VITAMIN B-12) 100 MCG tablet, Take 1 tablet (100 mcg total) by mouth once daily., Disp: , Rfl:      ELIQUIS 5 mg Tab, Take 5 mg by mouth 2 (two) times daily., Disp: , Rfl: 1    furosemide (LASIX) 40 MG tablet, Take 1 tablet (40 mg total) by mouth 3 (three) times a week. (Patient taking differently: Take 40 mg by mouth once daily. Doesn't take on Sunday's), Disp: 30 tablet, Rfl: 5    icosapent ethyL (VASCEPA) 1 gram Cap, Take 2 capsules by mouth 2 (two) times daily. (Patient taking differently: Take 1 capsule by mouth once daily. ), Disp: 120 capsule, Rfl: 5    levothyroxine (SYNTHROID) 88 MCG tablet, Take 1 tablet (88 mcg total) by mouth once daily., Disp: 30 tablet, Rfl: 5    metoprolol succinate (TOPROL-XL) 100 MG 24 hr tablet, Take 1 tablet (100 mg total) by mouth once daily., Disp: 30 tablet, Rfl: 5    potassium chloride SA (K-DUR,KLOR-CON) 20 MEQ tablet, 1 Tab po 3 times per week (Patient taking differently: Doesn't take on Sunday's), Disp: 30 tablet, Rfl: 5    pyridostigmine (MESTINON) 60 mg Tab, Take 1 tablet (60 mg total) by mouth 3 (three) times daily. (Patient taking differently: Take 60 mg by mouth every evening. Once in evening), Disp: 90 tablet, Rfl: 0    diclofenac sodium (VOLTAREN) 1 % Gel, Apply 2 g topically 2 (two) times daily. As needed (Patient not taking: Reported on 12/7/2020), Disp: 1 Tube, Rfl: 0  No current facility-administered medications for this visit.     Facility-Administered Medications Ordered in Other Visits:     tetracaine HCl (PF) 0.5 % Drop 1 drop, 1 drop, Right Eye, On Call Procedure, Michael Arellano MD, 1 drop at 12/12/19 0801     Review of Systems:  Review of Systems   Constitutional: Negative for fever and weight loss.   HENT: Negative for ear pain and tinnitus.    Eyes: Negative for pain and redness.   Respiratory: Negative for cough and shortness of breath.    Cardiovascular: Negative for chest pain and palpitations.   Gastrointestinal: Positive for diarrhea. Negative for constipation and heartburn.   Genitourinary: Negative.         (+)urinary incontinence. Denies  "urine retention.    Musculoskeletal: Positive for back pain and joint pain (hands). Negative for neck pain.   Skin: Negative for itching and rash.   Neurological: Positive for weakness. Negative for tingling, focal weakness and seizures.   Endo/Heme/Allergies: Negative for environmental allergies. Bruises/bleeds easily (on eliquis).   Psychiatric/Behavioral: Negative for depression. The patient has insomnia. The patient is not nervous/anxious.        Allergies:  Statins-hmg-coa reductase inhibitors     Medical History:   has a past medical history of CHF (congestive heart failure), Early stage nonexudative age-related macular degeneration of both eyes (2018), GERD (gastroesophageal reflux disease), Hyperlipidemia, Hypertension, and JOY (obstructive sleep apnea).    Surgical History:   has a past surgical history that includes Hysterectomy; Cholecystectomy; knee replacemene; Hernia repair; Thyroidectomy; Phacoemulsification of cataract (Right, 12/12/2019); Cataract extraction w/  intraocular lens implant (Right, 12/12/2019); Phacoemulsification of cataract (Left, 2/13/2020); Cataract extraction w/  intraocular lens implant (Left, 2/13/2020); and Endoscopic ultrasound of upper gastrointestinal tract (N/A, 11/4/2020).    Family History:  family history includes Cancer in her sister.    Social History:   reports that she has never smoked. She has never used smokeless tobacco. She reports that she does not drink alcohol or use drugs.    Physical Exam:  /72   Pulse 66   Temp 98.6 °F (37 °C)   Resp 14   Ht 5' 7" (1.702 m)   Wt 93.5 kg (206 lb 0.3 oz)   BMI 32.27 kg/m²   GEN: No acute distress. Calm, comfortable  HENT: Normocephalic, atraumatic, moist mucous membranes  EYE: Anicteric sclera, non-injected.   CV: Non-diaphoretic. Regular Rate. Radial Pulses 2+.  RESP: Breathing comfortably. Chest expansion symmetric.  EXT: No clubbing, cyanosis.   SKIN: Warm, & dry to palpation. No visible rashes or lesions of " exposed skin.   PSYCH: Pleasant mood and appropriate affect. Recent and remote memory intact.   GAIT: Mod Independent with rolling walker  Neurologic Exam: Alert. Speech is fluent and appropriate.         Imaging:  - MRI Lumbar spine 10/8/20:  Suspected cystic lesion in the body of the pancreas measuring 2.2 cm.  Remaining incidentally visualized soft tissue structures of the abdomen have a normal appearance.  There is mild anterolisthesis of L4 respect to L5 by approximately 3 mm.  Vertebral body heights are maintained.  There is disc desiccation with disc space narrowing throughout the lumbar spine.  Endplate degenerative changes are noted at L3-4.  There is edema like signal in the posterior elements bilaterally at L5 and at the L4-5 facet joints which can be a source of pain.  The marrow signal is otherwise normal without evidence for a marrow replacement process, infection or tumor.  The conus terminates at L1-2.  At T12-L1, there is bilateral facet arthropathy without central canal stenosis or neural foraminal narrowing.  At L1-2, no disc herniation, central canal stenosis or neural foraminal narrowing.  At L2-3, broad-based posterior disc bulge with ligamentum flavum hypertrophy and facet arthropathy contributing to mild central canal stenosis with mild left neural foraminal narrowing.  At L3-4, broad-based posterior disc bulge with ligamentum flavum hypertrophy and facet arthropathy contributing to moderate severe central canal stenosis with moderate bilateral neural foraminal narrowing.  At L4-5, broad-based posterior disc bulge with ligamentum flavum hypertrophy and facet arthropathy contributing to severe central canal stenosis with mild moderate bilateral neural foraminal narrowing.  At L5-S1, broad-based posterior disc bulge and facet arthropathy contributing to mild right neural foraminal narrowing.  Impression:  Multilevel degenerative changes of the lumbar spine contributing to central canal stenosis  or neural foraminal narrowing as detailed in the above level by level description.  Edema like signal within the pedicles bilaterally at L5 and at the bilateral L4-5 facet joints which can be a source of pain.  Cystic lesion in the body of the pancreas.  Consider further evaluation with either CT scan of the abdomen, pancreas protocol or possibly endoscopic ultrasound as both benign and cystic neoplasia would be considered in the differential.    - X-ray knee 10/8/20:  Bilateral total knee arthroplasties are seen in satisfactory alignment without evidence for hardware complication.  No fracture or dislocations are identified.  Extensive vascular calcifications are seen    - X-ray lumbar spine 10/8/20:  The bones are osteopenic.  Vertebral body heights are maintained.  Very mild dextroscoliotic curvature of the lumbar spine.  There are multilevel degenerative changes with disc space narrowing, endplate sclerosis, marginal osteophytosis and facet arthropathy.  No fracture or subluxation.  Vascular calcifications.       - EMG/NCS BUE and BLE 6/30/16:  Impression:  Abnormal Study secondary to the Presence of:    1. Mild Bilateral Carpal Tunnel Syndrome  2. 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 Bilateral Lumbar Radiculopathy best localizing from L4-5 on this study    Labs:  BMP  Lab Results   Component Value Date     09/01/2020    K 4.4 09/01/2020     09/01/2020    CO2 29 09/01/2020    BUN 22 09/01/2020    CREATININE 0.9 10/13/2020    CALCIUM 9.2 09/01/2020    ANIONGAP 9 09/01/2020    ESTGFRAFRICA >60 10/13/2020    EGFRNONAA 59 (A) 10/13/2020     Lab Results   Component Value Date    ALT 12 09/01/2020    AST 14 09/01/2020    ALKPHOS 46 (L) 09/01/2020    BILITOT 0.8 09/01/2020     Lab Results   Component Value Date     09/01/2020       Assessment:  Stephany Skinner is a 85 y.o. female with the following diagnoses based on history, exam, and imaging:    Problem List Items Addressed  This Visit     None      Visit Diagnoses     Carpal tunnel syndrome, bilateral    -  Primary    Chronic knee pain after total replacement of left knee joint        Spinal stenosis of lumbar region with neurogenic claudication        Lumbar spondylosis        DDD (degenerative disc disease), lumbar              This is a pleasant 85 y.o. lady presenting with:     - Subacute/Chronic bilateral LBP: She has pain radiating down posterior thighs, but not past knee. Pain appears either due to LSS vs facetogenic pain. She also has myofascial pain on exam  - Bilateral carpal tunnel syndrome. Also developing dupuytren's contracture  - Chronic left knee pain after total knee replacement    Treatment Plan:   - PT/OT/HEP: Cont HEP. Discussed benefits of exercise for pain.   - Procedures: Can repeat bilateral carpal tunnel injections as needed  - Medications: No changes recommended at this time.  - Imaging: Reviewed.    - Labs: Reviewed.      Follow Up: RTC as needed    Nasra Alfonso M.D.  Interventional Pain Medicine / Physical Medicine & Rehabilitation    Disclaimer: This note was partly generated using dictation software which may occasionally result in transcription errors.

## 2020-12-31 ENCOUNTER — EXTERNAL CHRONIC CARE MANAGEMENT (OUTPATIENT)
Dept: PRIMARY CARE CLINIC | Facility: CLINIC | Age: 85
End: 2020-12-31
Payer: MEDICARE

## 2020-12-31 PROCEDURE — 99999 PR STA SHADOW: ICD-10-PCS | Mod: PBBFAC,,, | Performed by: FAMILY MEDICINE

## 2020-12-31 PROCEDURE — 99490 CHRNC CARE MGMT STAFF 1ST 20: CPT | Mod: S$PBB | Performed by: FAMILY MEDICINE

## 2020-12-31 PROCEDURE — 99999 PR STA SHADOW: CPT | Mod: PBBFAC,,, | Performed by: FAMILY MEDICINE

## 2021-01-09 ENCOUNTER — TELEPHONE (OUTPATIENT)
Dept: FAMILY MEDICINE | Facility: CLINIC | Age: 86
End: 2021-01-09

## 2021-01-09 NOTE — TELEPHONE ENCOUNTER
Yajaira BOLES Staff             Pt Name: Stephany Skinner   PT MRN: [id number]   Pt Dr: Pipo Flowers     Spoke with patient on 1/8. Patient stated that they are consistently having problems with diarrhea. Patient stated that they are unable to get to the bathroom at times. Pt stated that they would like to know if any of their medications are affecting them to have this problem. Please call patient with any assistance [phone]     Thanks   Yajaira Robles LPN Care Coordinator   [facility]   [phone] Ext [phone]

## 2021-01-31 ENCOUNTER — EXTERNAL CHRONIC CARE MANAGEMENT (OUTPATIENT)
Dept: PRIMARY CARE CLINIC | Facility: CLINIC | Age: 86
End: 2021-01-31
Payer: MEDICARE

## 2021-01-31 PROCEDURE — 99490 CHRNC CARE MGMT STAFF 1ST 20: CPT | Mod: PBBFAC,25 | Performed by: FAMILY MEDICINE

## 2021-01-31 PROCEDURE — 99999 PR STA SHADOW: CPT | Mod: PBBFAC,,, | Performed by: FAMILY MEDICINE

## 2021-01-31 PROCEDURE — 99439 CHRNC CARE MGMT STAF EA ADDL: CPT | Mod: PBBFAC,27 | Performed by: FAMILY MEDICINE

## 2021-01-31 PROCEDURE — 99999 PR STA SHADOW: ICD-10-PCS | Mod: PBBFAC,,, | Performed by: FAMILY MEDICINE

## 2021-02-02 ENCOUNTER — PATIENT OUTREACH (OUTPATIENT)
Dept: ADMINISTRATIVE | Facility: OTHER | Age: 86
End: 2021-02-02

## 2021-02-04 ENCOUNTER — OFFICE VISIT (OUTPATIENT)
Dept: NEUROLOGY | Facility: CLINIC | Age: 86
End: 2021-02-04
Payer: MEDICARE

## 2021-02-04 VITALS
HEART RATE: 62 BPM | WEIGHT: 207.69 LBS | DIASTOLIC BLOOD PRESSURE: 76 MMHG | RESPIRATION RATE: 16 BRPM | BODY MASS INDEX: 36.8 KG/M2 | HEIGHT: 63 IN | TEMPERATURE: 97 F | SYSTOLIC BLOOD PRESSURE: 168 MMHG

## 2021-02-04 DIAGNOSIS — G70.00 MYASTHENIA GRAVIS, ADULT FORM: ICD-10-CM

## 2021-02-04 DIAGNOSIS — G56.03 BILATERAL CARPAL TUNNEL SYNDROME: Primary | ICD-10-CM

## 2021-02-04 DIAGNOSIS — M54.16 LUMBAR RADICULOPATHY: ICD-10-CM

## 2021-02-04 PROCEDURE — 99999 PR PBB SHADOW E&M-EST. PATIENT-LVL IV: ICD-10-PCS | Mod: PBBFAC,,, | Performed by: PSYCHIATRY & NEUROLOGY

## 2021-02-04 PROCEDURE — 99214 OFFICE O/P EST MOD 30 MIN: CPT | Mod: PBBFAC | Performed by: PSYCHIATRY & NEUROLOGY

## 2021-02-04 PROCEDURE — 99999 PR PBB SHADOW E&M-EST. PATIENT-LVL IV: CPT | Mod: PBBFAC,,, | Performed by: PSYCHIATRY & NEUROLOGY

## 2021-02-04 PROCEDURE — 99214 OFFICE O/P EST MOD 30 MIN: CPT | Mod: S$PBB | Performed by: PSYCHIATRY & NEUROLOGY

## 2021-02-04 PROCEDURE — 99999 PR STA SHADOW: CPT | Mod: PBBFAC,,, | Performed by: PSYCHIATRY & NEUROLOGY

## 2021-02-18 ENCOUNTER — IMMUNIZATION (OUTPATIENT)
Dept: FAMILY MEDICINE | Facility: CLINIC | Age: 86
End: 2021-02-18
Payer: MEDICARE

## 2021-02-18 DIAGNOSIS — Z23 NEED FOR VACCINATION: Primary | ICD-10-CM

## 2021-02-18 PROCEDURE — 91300 COVID-19, MRNA, LNP-S, PF, 30 MCG/0.3 ML DOSE VACCINE: CPT | Mod: PBBFAC | Performed by: FAMILY MEDICINE

## 2021-02-24 ENCOUNTER — CLINICAL SUPPORT (OUTPATIENT)
Dept: FAMILY MEDICINE | Facility: CLINIC | Age: 86
End: 2021-02-24
Payer: MEDICARE

## 2021-02-24 DIAGNOSIS — E78.5 HYPERLIPIDEMIA, UNSPECIFIED HYPERLIPIDEMIA TYPE: ICD-10-CM

## 2021-02-24 DIAGNOSIS — E03.4 HYPOTHYROIDISM DUE TO ACQUIRED ATROPHY OF THYROID: ICD-10-CM

## 2021-02-24 DIAGNOSIS — Z79.01 ANTICOAGULATED: ICD-10-CM

## 2021-02-24 LAB
ALBUMIN SERPL BCP-MCNC: 3.8 G/DL (ref 3.5–5.2)
ALP SERPL-CCNC: 54 U/L (ref 55–135)
ALT SERPL W/O P-5'-P-CCNC: 15 U/L (ref 10–44)
ANION GAP SERPL CALC-SCNC: 8 MMOL/L (ref 8–16)
AST SERPL-CCNC: 16 U/L (ref 10–40)
BASOPHILS # BLD AUTO: 0.04 K/UL (ref 0–0.2)
BASOPHILS NFR BLD: 0.9 % (ref 0–1.9)
BILIRUB SERPL-MCNC: 0.8 MG/DL (ref 0.1–1)
BUN SERPL-MCNC: 24 MG/DL (ref 8–23)
CALCIUM SERPL-MCNC: 8.9 MG/DL (ref 8.7–10.5)
CHLORIDE SERPL-SCNC: 107 MMOL/L (ref 95–110)
CHOLEST SERPL-MCNC: 134 MG/DL (ref 120–199)
CHOLEST/HDLC SERPL: 2.9 {RATIO} (ref 2–5)
CO2 SERPL-SCNC: 28 MMOL/L (ref 23–29)
CREAT SERPL-MCNC: 0.9 MG/DL (ref 0.5–1.4)
DIFFERENTIAL METHOD: ABNORMAL
EOSINOPHIL # BLD AUTO: 0.1 K/UL (ref 0–0.5)
EOSINOPHIL NFR BLD: 2.1 % (ref 0–8)
ERYTHROCYTE [DISTWIDTH] IN BLOOD BY AUTOMATED COUNT: 12.3 % (ref 11.5–14.5)
EST. GFR  (AFRICAN AMERICAN): >60 ML/MIN/1.73 M^2
EST. GFR  (NON AFRICAN AMERICAN): 58 ML/MIN/1.73 M^2
GLUCOSE SERPL-MCNC: 91 MG/DL (ref 70–110)
HCT VFR BLD AUTO: 39.1 % (ref 37–48.5)
HDLC SERPL-MCNC: 47 MG/DL (ref 40–75)
HDLC SERPL: 35.1 % (ref 20–50)
HGB BLD-MCNC: 12.6 G/DL (ref 12–16)
IMM GRANULOCYTES # BLD AUTO: 0.01 K/UL (ref 0–0.04)
IMM GRANULOCYTES NFR BLD AUTO: 0.2 % (ref 0–0.5)
LDLC SERPL CALC-MCNC: 72.2 MG/DL (ref 63–159)
LYMPHOCYTES # BLD AUTO: 1.2 K/UL (ref 1–4.8)
LYMPHOCYTES NFR BLD: 25.3 % (ref 18–48)
MCH RBC QN AUTO: 31.8 PG (ref 27–31)
MCHC RBC AUTO-ENTMCNC: 32.2 G/DL (ref 32–36)
MCV RBC AUTO: 99 FL (ref 82–98)
MONOCYTES # BLD AUTO: 0.5 K/UL (ref 0.3–1)
MONOCYTES NFR BLD: 10.5 % (ref 4–15)
NEUTROPHILS # BLD AUTO: 2.8 K/UL (ref 1.8–7.7)
NEUTROPHILS NFR BLD: 61 % (ref 38–73)
NONHDLC SERPL-MCNC: 87 MG/DL
NRBC BLD-RTO: 0 /100 WBC
PLATELET # BLD AUTO: 188 K/UL (ref 150–350)
PMV BLD AUTO: 9.5 FL (ref 9.2–12.9)
POTASSIUM SERPL-SCNC: 4.2 MMOL/L (ref 3.5–5.1)
PROT SERPL-MCNC: 6.5 G/DL (ref 6–8.4)
RBC # BLD AUTO: 3.96 M/UL (ref 4–5.4)
SODIUM SERPL-SCNC: 143 MMOL/L (ref 136–145)
TRIGL SERPL-MCNC: 74 MG/DL (ref 30–150)
TSH SERPL DL<=0.005 MIU/L-ACNC: 1.13 UIU/ML (ref 0.4–4)
WBC # BLD AUTO: 4.66 K/UL (ref 3.9–12.7)

## 2021-02-24 PROCEDURE — 80061 LIPID PANEL: CPT

## 2021-02-24 PROCEDURE — 80053 COMPREHEN METABOLIC PANEL: CPT

## 2021-02-24 PROCEDURE — 84443 ASSAY THYROID STIM HORMONE: CPT

## 2021-02-24 PROCEDURE — 99999 PR STA SHADOW: CPT | Mod: PBBFAC,,, | Performed by: FAMILY MEDICINE

## 2021-02-24 PROCEDURE — 85025 COMPLETE CBC W/AUTO DIFF WBC: CPT

## 2021-02-24 PROCEDURE — 36415 COLL VENOUS BLD VENIPUNCTURE: CPT | Mod: PBBFAC

## 2021-02-24 PROCEDURE — 99999 PR STA SHADOW: ICD-10-PCS | Mod: PBBFAC,,, | Performed by: FAMILY MEDICINE

## 2021-02-28 ENCOUNTER — EXTERNAL CHRONIC CARE MANAGEMENT (OUTPATIENT)
Dept: PRIMARY CARE CLINIC | Facility: CLINIC | Age: 86
End: 2021-02-28
Payer: MEDICARE

## 2021-02-28 PROCEDURE — 99490 CHRNC CARE MGMT STAFF 1ST 20: CPT | Mod: S$PBB | Performed by: FAMILY MEDICINE

## 2021-02-28 PROCEDURE — 99999 PR STA SHADOW: ICD-10-PCS | Mod: PBBFAC,,, | Performed by: FAMILY MEDICINE

## 2021-02-28 PROCEDURE — 99999 PR STA SHADOW: CPT | Mod: PBBFAC,,, | Performed by: FAMILY MEDICINE

## 2021-03-03 ENCOUNTER — OFFICE VISIT (OUTPATIENT)
Dept: INTERNAL MEDICINE | Facility: CLINIC | Age: 86
End: 2021-03-03
Payer: MEDICARE

## 2021-03-03 VITALS
WEIGHT: 209.44 LBS | DIASTOLIC BLOOD PRESSURE: 80 MMHG | HEIGHT: 63 IN | BODY MASS INDEX: 37.11 KG/M2 | HEART RATE: 82 BPM | RESPIRATION RATE: 18 BRPM | SYSTOLIC BLOOD PRESSURE: 128 MMHG | TEMPERATURE: 98 F

## 2021-03-03 DIAGNOSIS — I48.20 CHRONIC ATRIAL FIBRILLATION: ICD-10-CM

## 2021-03-03 DIAGNOSIS — I87.8 VENOUS STASIS OF LOWER EXTREMITY: ICD-10-CM

## 2021-03-03 DIAGNOSIS — G47.33 OBSTRUCTIVE SLEEP APNEA (ADULT) (PEDIATRIC): ICD-10-CM

## 2021-03-03 DIAGNOSIS — E11.59 TYPE 2 DIABETES MELLITUS WITH OTHER CIRCULATORY COMPLICATION, WITH LONG-TERM CURRENT USE OF INSULIN: ICD-10-CM

## 2021-03-03 DIAGNOSIS — M81.0 AGE-RELATED OSTEOPOROSIS WITHOUT CURRENT PATHOLOGICAL FRACTURE: ICD-10-CM

## 2021-03-03 DIAGNOSIS — E03.4 HYPOTHYROIDISM DUE TO ACQUIRED ATROPHY OF THYROID: Primary | ICD-10-CM

## 2021-03-03 DIAGNOSIS — E78.5 HYPERLIPIDEMIA, UNSPECIFIED HYPERLIPIDEMIA TYPE: ICD-10-CM

## 2021-03-03 DIAGNOSIS — I10 ESSENTIAL HYPERTENSION: ICD-10-CM

## 2021-03-03 DIAGNOSIS — Z79.01 ANTICOAGULATED: ICD-10-CM

## 2021-03-03 DIAGNOSIS — E78.2 MIXED HYPERLIPIDEMIA: ICD-10-CM

## 2021-03-03 DIAGNOSIS — I50.32 CHRONIC DIASTOLIC CONGESTIVE HEART FAILURE: ICD-10-CM

## 2021-03-03 DIAGNOSIS — Z79.4 TYPE 2 DIABETES MELLITUS WITH OTHER CIRCULATORY COMPLICATION, WITH LONG-TERM CURRENT USE OF INSULIN: ICD-10-CM

## 2021-03-03 PROCEDURE — 99214 OFFICE O/P EST MOD 30 MIN: CPT | Mod: S$PBB,,, | Performed by: FAMILY MEDICINE

## 2021-03-03 PROCEDURE — 99214 PR OFFICE/OUTPT VISIT, EST, LEVL IV, 30-39 MIN: ICD-10-PCS | Mod: S$PBB,,, | Performed by: FAMILY MEDICINE

## 2021-03-03 PROCEDURE — 99999 PR PBB SHADOW E&M-EST. PATIENT-LVL III: CPT | Mod: PBBFAC,,, | Performed by: FAMILY MEDICINE

## 2021-03-03 PROCEDURE — 99213 OFFICE O/P EST LOW 20 MIN: CPT | Mod: PBBFAC,PN | Performed by: FAMILY MEDICINE

## 2021-03-03 PROCEDURE — 99999 PR PBB SHADOW E&M-EST. PATIENT-LVL III: ICD-10-PCS | Mod: PBBFAC,,, | Performed by: FAMILY MEDICINE

## 2021-03-03 RX ORDER — ALENDRONATE SODIUM 35 MG/1
35 TABLET ORAL
Qty: 4 TABLET | Refills: 5 | Status: SHIPPED | OUTPATIENT
Start: 2021-03-03 | End: 2021-09-10 | Stop reason: SDUPTHER

## 2021-03-03 RX ORDER — LEVOTHYROXINE SODIUM 88 UG/1
88 TABLET ORAL DAILY
Qty: 30 TABLET | Refills: 5 | Status: SHIPPED | OUTPATIENT
Start: 2021-03-03 | End: 2021-09-23

## 2021-03-03 RX ORDER — FUROSEMIDE 40 MG/1
TABLET ORAL
Qty: 30 TABLET | Refills: 5 | Status: SHIPPED | OUTPATIENT
Start: 2021-03-03 | End: 2021-12-15 | Stop reason: SDUPTHER

## 2021-03-03 RX ORDER — ATORVASTATIN CALCIUM 10 MG/1
10 TABLET, FILM COATED ORAL NIGHTLY
Qty: 30 TABLET | Refills: 5 | Status: SHIPPED | OUTPATIENT
Start: 2021-03-03 | End: 2021-12-15 | Stop reason: SDUPTHER

## 2021-03-03 RX ORDER — ICOSAPENT ETHYL 1000 MG/1
1 CAPSULE ORAL 2 TIMES DAILY
Qty: 60 CAPSULE | Refills: 5 | Status: SHIPPED | OUTPATIENT
Start: 2021-03-03 | End: 2021-03-08 | Stop reason: SDUPTHER

## 2021-03-03 RX ORDER — METOPROLOL SUCCINATE 100 MG/1
100 TABLET, EXTENDED RELEASE ORAL DAILY
Qty: 30 TABLET | Refills: 5 | Status: SHIPPED | OUTPATIENT
Start: 2021-03-03 | End: 2021-09-10 | Stop reason: SDUPTHER

## 2021-03-03 RX ORDER — POTASSIUM CHLORIDE 20 MEQ/1
TABLET, EXTENDED RELEASE ORAL
Qty: 30 TABLET | Refills: 5 | Status: SHIPPED | OUTPATIENT
Start: 2021-03-03 | End: 2021-09-10 | Stop reason: SDUPTHER

## 2021-03-11 ENCOUNTER — IMMUNIZATION (OUTPATIENT)
Dept: FAMILY MEDICINE | Facility: CLINIC | Age: 86
End: 2021-03-11
Payer: MEDICARE

## 2021-03-11 DIAGNOSIS — Z23 NEED FOR VACCINATION: Primary | ICD-10-CM

## 2021-03-11 PROCEDURE — 0002A COVID-19, MRNA, LNP-S, PF, 30 MCG/0.3 ML DOSE VACCINE: CPT | Mod: PBBFAC | Performed by: FAMILY MEDICINE

## 2021-03-11 PROCEDURE — 91300 COVID-19, MRNA, LNP-S, PF, 30 MCG/0.3 ML DOSE VACCINE: CPT | Mod: PBBFAC | Performed by: FAMILY MEDICINE

## 2021-03-31 ENCOUNTER — EXTERNAL CHRONIC CARE MANAGEMENT (OUTPATIENT)
Dept: PRIMARY CARE CLINIC | Facility: CLINIC | Age: 86
End: 2021-03-31
Payer: MEDICARE

## 2021-03-31 PROCEDURE — 99490 CHRNC CARE MGMT STAFF 1ST 20: CPT | Mod: S$PBB | Performed by: FAMILY MEDICINE

## 2021-03-31 PROCEDURE — 99999 PR STA SHADOW: ICD-10-PCS | Mod: PBBFAC,,, | Performed by: FAMILY MEDICINE

## 2021-03-31 PROCEDURE — 99999 PR STA SHADOW: CPT | Mod: PBBFAC,,, | Performed by: FAMILY MEDICINE

## 2021-04-30 ENCOUNTER — EXTERNAL CHRONIC CARE MANAGEMENT (OUTPATIENT)
Dept: PRIMARY CARE CLINIC | Facility: CLINIC | Age: 86
End: 2021-04-30
Payer: MEDICARE

## 2021-04-30 PROCEDURE — 99490 CHRNC CARE MGMT STAFF 1ST 20: CPT | Mod: PBBFAC | Performed by: FAMILY MEDICINE

## 2021-04-30 PROCEDURE — 99999 PR STA SHADOW: ICD-10-PCS | Mod: PBBFAC,,, | Performed by: FAMILY MEDICINE

## 2021-04-30 PROCEDURE — 99999 PR STA SHADOW: CPT | Mod: PBBFAC,,, | Performed by: FAMILY MEDICINE

## 2021-05-17 ENCOUNTER — TELEPHONE (OUTPATIENT)
Dept: FAMILY MEDICINE | Facility: CLINIC | Age: 86
End: 2021-05-17

## 2021-05-31 ENCOUNTER — EXTERNAL CHRONIC CARE MANAGEMENT (OUTPATIENT)
Dept: PRIMARY CARE CLINIC | Facility: CLINIC | Age: 86
End: 2021-05-31
Payer: MEDICARE

## 2021-05-31 PROCEDURE — 99999 PR STA SHADOW: ICD-10-PCS | Mod: PBBFAC,,, | Performed by: FAMILY MEDICINE

## 2021-05-31 PROCEDURE — 99999 PR STA SHADOW: CPT | Mod: PBBFAC,,, | Performed by: FAMILY MEDICINE

## 2021-05-31 PROCEDURE — 99490 CHRNC CARE MGMT STAFF 1ST 20: CPT | Mod: S$PBB | Performed by: FAMILY MEDICINE

## 2021-06-30 ENCOUNTER — EXTERNAL CHRONIC CARE MANAGEMENT (OUTPATIENT)
Dept: PRIMARY CARE CLINIC | Facility: CLINIC | Age: 86
End: 2021-06-30
Payer: MEDICARE

## 2021-06-30 PROCEDURE — 99490 CHRNC CARE MGMT STAFF 1ST 20: CPT | Mod: PBBFAC | Performed by: FAMILY MEDICINE

## 2021-06-30 PROCEDURE — 99999 PR STA SHADOW: CPT | Mod: PBBFAC,,, | Performed by: FAMILY MEDICINE

## 2021-06-30 PROCEDURE — 99999 PR STA SHADOW: ICD-10-PCS | Mod: PBBFAC,,, | Performed by: FAMILY MEDICINE

## 2021-07-30 ENCOUNTER — PATIENT OUTREACH (OUTPATIENT)
Dept: ADMINISTRATIVE | Facility: OTHER | Age: 86
End: 2021-07-30

## 2021-07-30 DIAGNOSIS — E11.9 DIABETES MELLITUS WITHOUT COMPLICATION: Primary | ICD-10-CM

## 2021-07-31 ENCOUNTER — EXTERNAL CHRONIC CARE MANAGEMENT (OUTPATIENT)
Dept: PRIMARY CARE CLINIC | Facility: CLINIC | Age: 86
End: 2021-07-31
Payer: MEDICARE

## 2021-07-31 PROCEDURE — 99999 PR STA SHADOW: ICD-10-PCS | Mod: PBBFAC,,, | Performed by: FAMILY MEDICINE

## 2021-07-31 PROCEDURE — 99490 CHRNC CARE MGMT STAFF 1ST 20: CPT | Mod: S$PBB | Performed by: FAMILY MEDICINE

## 2021-07-31 PROCEDURE — 99999 PR STA SHADOW: CPT | Mod: PBBFAC,,, | Performed by: FAMILY MEDICINE

## 2021-08-05 ENCOUNTER — OFFICE VISIT (OUTPATIENT)
Dept: NEUROLOGY | Facility: CLINIC | Age: 86
End: 2021-08-05
Payer: MEDICARE

## 2021-08-05 VITALS
RESPIRATION RATE: 14 BRPM | BODY MASS INDEX: 33.42 KG/M2 | WEIGHT: 212.94 LBS | HEART RATE: 62 BPM | HEIGHT: 67 IN | SYSTOLIC BLOOD PRESSURE: 160 MMHG | DIASTOLIC BLOOD PRESSURE: 80 MMHG

## 2021-08-05 DIAGNOSIS — G56.03 BILATERAL CARPAL TUNNEL SYNDROME: ICD-10-CM

## 2021-08-05 DIAGNOSIS — E53.8 B12 DEFICIENCY: ICD-10-CM

## 2021-08-05 DIAGNOSIS — M54.16 LUMBAR RADICULOPATHY: ICD-10-CM

## 2021-08-05 DIAGNOSIS — G70.00 MYASTHENIA GRAVIS, ADULT FORM: Primary | ICD-10-CM

## 2021-08-05 PROCEDURE — 99999 PR STA SHADOW: CPT | Mod: PBBFAC,,, | Performed by: PSYCHIATRY & NEUROLOGY

## 2021-08-05 PROCEDURE — 99214 OFFICE O/P EST MOD 30 MIN: CPT | Mod: PBBFAC | Performed by: PSYCHIATRY & NEUROLOGY

## 2021-08-05 PROCEDURE — 99999 PR PBB SHADOW E&M-EST. PATIENT-LVL IV: CPT | Mod: PBBFAC,,, | Performed by: PSYCHIATRY & NEUROLOGY

## 2021-08-05 PROCEDURE — 99213 OFFICE O/P EST LOW 20 MIN: CPT | Mod: S$PBB | Performed by: PSYCHIATRY & NEUROLOGY

## 2021-08-05 PROCEDURE — 99999 PR PBB SHADOW E&M-EST. PATIENT-LVL IV: ICD-10-PCS | Mod: PBBFAC,,, | Performed by: PSYCHIATRY & NEUROLOGY

## 2021-08-05 RX ORDER — ACETAMINOPHEN 500 MG
1000 TABLET ORAL NIGHTLY PRN
COMMUNITY

## 2021-08-25 ENCOUNTER — CLINICAL SUPPORT (OUTPATIENT)
Dept: FAMILY MEDICINE | Facility: CLINIC | Age: 86
End: 2021-08-25
Payer: MEDICARE

## 2021-08-25 DIAGNOSIS — E11.9 DIABETES MELLITUS WITHOUT COMPLICATION: ICD-10-CM

## 2021-08-25 DIAGNOSIS — E03.4 HYPOTHYROIDISM DUE TO ACQUIRED ATROPHY OF THYROID: ICD-10-CM

## 2021-08-25 DIAGNOSIS — Z79.01 ANTICOAGULATED: ICD-10-CM

## 2021-08-25 DIAGNOSIS — I10 ESSENTIAL HYPERTENSION: ICD-10-CM

## 2021-08-25 DIAGNOSIS — E78.5 HYPERLIPIDEMIA, UNSPECIFIED HYPERLIPIDEMIA TYPE: ICD-10-CM

## 2021-08-25 LAB
ALBUMIN SERPL BCP-MCNC: 3.9 G/DL (ref 3.5–5.2)
ALP SERPL-CCNC: 53 U/L (ref 55–135)
ALT SERPL W/O P-5'-P-CCNC: 19 U/L (ref 10–44)
ANION GAP SERPL CALC-SCNC: 9 MMOL/L (ref 8–16)
AST SERPL-CCNC: 17 U/L (ref 10–40)
BASOPHILS # BLD AUTO: 0.04 K/UL (ref 0–0.2)
BASOPHILS NFR BLD: 0.8 % (ref 0–1.9)
BILIRUB SERPL-MCNC: 0.8 MG/DL (ref 0.1–1)
BUN SERPL-MCNC: 30 MG/DL (ref 8–23)
CALCIUM SERPL-MCNC: 9.6 MG/DL (ref 8.7–10.5)
CHLORIDE SERPL-SCNC: 104 MMOL/L (ref 95–110)
CHOLEST SERPL-MCNC: 156 MG/DL (ref 120–199)
CHOLEST/HDLC SERPL: 3.2 {RATIO} (ref 2–5)
CO2 SERPL-SCNC: 29 MMOL/L (ref 23–29)
CREAT SERPL-MCNC: 1.1 MG/DL (ref 0.5–1.4)
DIFFERENTIAL METHOD: ABNORMAL
EOSINOPHIL # BLD AUTO: 0.1 K/UL (ref 0–0.5)
EOSINOPHIL NFR BLD: 1.9 % (ref 0–8)
ERYTHROCYTE [DISTWIDTH] IN BLOOD BY AUTOMATED COUNT: 12.9 % (ref 11.5–14.5)
EST. GFR  (AFRICAN AMERICAN): 53 ML/MIN/1.73 M^2
EST. GFR  (NON AFRICAN AMERICAN): 46 ML/MIN/1.73 M^2
ESTIMATED AVG GLUCOSE: 117 MG/DL (ref 68–131)
GLUCOSE SERPL-MCNC: 95 MG/DL (ref 70–110)
HBA1C MFR BLD: 5.7 % (ref 4–5.6)
HCT VFR BLD AUTO: 39.6 % (ref 37–48.5)
HDLC SERPL-MCNC: 49 MG/DL (ref 40–75)
HDLC SERPL: 31.4 % (ref 20–50)
HGB BLD-MCNC: 12.8 G/DL (ref 12–16)
IMM GRANULOCYTES # BLD AUTO: 0.01 K/UL (ref 0–0.04)
IMM GRANULOCYTES NFR BLD AUTO: 0.2 % (ref 0–0.5)
LDLC SERPL CALC-MCNC: 85.4 MG/DL (ref 63–159)
LYMPHOCYTES # BLD AUTO: 1.2 K/UL (ref 1–4.8)
LYMPHOCYTES NFR BLD: 25.2 % (ref 18–48)
MCH RBC QN AUTO: 31.9 PG (ref 27–31)
MCHC RBC AUTO-ENTMCNC: 32.3 G/DL (ref 32–36)
MCV RBC AUTO: 99 FL (ref 82–98)
MONOCYTES # BLD AUTO: 0.5 K/UL (ref 0.3–1)
MONOCYTES NFR BLD: 9.7 % (ref 4–15)
NEUTROPHILS # BLD AUTO: 2.9 K/UL (ref 1.8–7.7)
NEUTROPHILS NFR BLD: 62.2 % (ref 38–73)
NONHDLC SERPL-MCNC: 107 MG/DL
NRBC BLD-RTO: 0 /100 WBC
PLATELET # BLD AUTO: 208 K/UL (ref 150–450)
PMV BLD AUTO: 9.8 FL (ref 9.2–12.9)
POTASSIUM SERPL-SCNC: 4.1 MMOL/L (ref 3.5–5.1)
PROT SERPL-MCNC: 6.9 G/DL (ref 6–8.4)
RBC # BLD AUTO: 4.01 M/UL (ref 4–5.4)
SODIUM SERPL-SCNC: 142 MMOL/L (ref 136–145)
TRIGL SERPL-MCNC: 108 MG/DL (ref 30–150)
TSH SERPL DL<=0.005 MIU/L-ACNC: 1.38 UIU/ML (ref 0.4–4)
WBC # BLD AUTO: 4.73 K/UL (ref 3.9–12.7)

## 2021-08-25 PROCEDURE — 99999 PR STA SHADOW: CPT | Mod: PBBFAC,,, | Performed by: FAMILY MEDICINE

## 2021-08-25 PROCEDURE — 80061 LIPID PANEL: CPT | Performed by: FAMILY MEDICINE

## 2021-08-25 PROCEDURE — 36415 COLL VENOUS BLD VENIPUNCTURE: CPT | Mod: PBBFAC

## 2021-08-25 PROCEDURE — 85025 COMPLETE CBC W/AUTO DIFF WBC: CPT | Performed by: FAMILY MEDICINE

## 2021-08-25 PROCEDURE — 83036 HEMOGLOBIN GLYCOSYLATED A1C: CPT | Performed by: FAMILY MEDICINE

## 2021-08-25 PROCEDURE — 84443 ASSAY THYROID STIM HORMONE: CPT | Performed by: FAMILY MEDICINE

## 2021-08-25 PROCEDURE — 99999 PR STA SHADOW: ICD-10-PCS | Mod: PBBFAC,,, | Performed by: FAMILY MEDICINE

## 2021-08-25 PROCEDURE — 80053 COMPREHEN METABOLIC PANEL: CPT | Performed by: FAMILY MEDICINE

## 2021-08-31 ENCOUNTER — EXTERNAL CHRONIC CARE MANAGEMENT (OUTPATIENT)
Dept: PRIMARY CARE CLINIC | Facility: CLINIC | Age: 86
End: 2021-08-31
Payer: MEDICARE

## 2021-08-31 PROCEDURE — 99490 CHRNC CARE MGMT STAFF 1ST 20: CPT | Mod: S$PBB | Performed by: FAMILY MEDICINE

## 2021-08-31 PROCEDURE — 99999 PR STA SHADOW: ICD-10-PCS | Mod: PBBFAC,,, | Performed by: FAMILY MEDICINE

## 2021-08-31 PROCEDURE — 99999 PR STA SHADOW: CPT | Mod: PBBFAC,,, | Performed by: FAMILY MEDICINE

## 2021-09-10 ENCOUNTER — TELEPHONE (OUTPATIENT)
Dept: FAMILY MEDICINE | Facility: CLINIC | Age: 86
End: 2021-09-10

## 2021-09-10 DIAGNOSIS — I10 ESSENTIAL HYPERTENSION: ICD-10-CM

## 2021-09-10 DIAGNOSIS — I50.32 CHRONIC DIASTOLIC CONGESTIVE HEART FAILURE: ICD-10-CM

## 2021-09-10 DIAGNOSIS — E78.5 HYPERLIPIDEMIA, UNSPECIFIED HYPERLIPIDEMIA TYPE: ICD-10-CM

## 2021-09-10 DIAGNOSIS — M81.0 AGE-RELATED OSTEOPOROSIS WITHOUT CURRENT PATHOLOGICAL FRACTURE: ICD-10-CM

## 2021-09-10 RX ORDER — METOPROLOL SUCCINATE 100 MG/1
100 TABLET, EXTENDED RELEASE ORAL DAILY
Qty: 30 TABLET | Refills: 5 | Status: SHIPPED | OUTPATIENT
Start: 2021-09-10 | End: 2021-12-15 | Stop reason: SDUPTHER

## 2021-09-10 RX ORDER — ALENDRONATE SODIUM 35 MG/1
35 TABLET ORAL
Qty: 4 TABLET | Refills: 5 | Status: SHIPPED | OUTPATIENT
Start: 2021-09-10 | End: 2021-12-15 | Stop reason: SDUPTHER

## 2021-09-10 RX ORDER — POTASSIUM CHLORIDE 20 MEQ/1
TABLET, EXTENDED RELEASE ORAL
Qty: 30 TABLET | Refills: 5 | Status: SHIPPED | OUTPATIENT
Start: 2021-09-10 | End: 2021-12-15 | Stop reason: SDUPTHER

## 2021-09-10 RX ORDER — ICOSAPENT ETHYL 1000 MG/1
1 CAPSULE ORAL 2 TIMES DAILY
Qty: 60 CAPSULE | Refills: 5 | Status: SHIPPED | OUTPATIENT
Start: 2021-09-10 | End: 2021-11-07

## 2021-09-30 ENCOUNTER — EXTERNAL CHRONIC CARE MANAGEMENT (OUTPATIENT)
Dept: PRIMARY CARE CLINIC | Facility: CLINIC | Age: 86
End: 2021-09-30
Payer: MEDICARE

## 2021-09-30 PROCEDURE — 99490 CHRNC CARE MGMT STAFF 1ST 20: CPT | Mod: S$PBB | Performed by: FAMILY MEDICINE

## 2021-09-30 PROCEDURE — 99999 PR STA SHADOW: ICD-10-PCS | Mod: PBBFAC,,, | Performed by: FAMILY MEDICINE

## 2021-09-30 PROCEDURE — 99999 PR STA SHADOW: CPT | Mod: PBBFAC,,, | Performed by: FAMILY MEDICINE

## 2021-10-31 ENCOUNTER — EXTERNAL CHRONIC CARE MANAGEMENT (OUTPATIENT)
Dept: PRIMARY CARE CLINIC | Facility: CLINIC | Age: 86
End: 2021-10-31
Payer: MEDICARE

## 2021-10-31 PROCEDURE — 99999 PR STA SHADOW: CPT | Mod: PBBFAC,,, | Performed by: FAMILY MEDICINE

## 2021-10-31 PROCEDURE — 99999 PR STA SHADOW: ICD-10-PCS | Mod: PBBFAC,,, | Performed by: FAMILY MEDICINE

## 2021-10-31 PROCEDURE — 99490 CHRNC CARE MGMT STAFF 1ST 20: CPT | Mod: PBBFAC | Performed by: FAMILY MEDICINE

## 2021-11-29 ENCOUNTER — CLINICAL SUPPORT (OUTPATIENT)
Dept: FAMILY MEDICINE | Facility: CLINIC | Age: 86
End: 2021-11-29
Payer: MEDICARE

## 2021-11-29 PROCEDURE — 99999 FLU VACCINE - QUADRIVALENT - ADJUVANTED: CPT | Mod: PBBFAC,,,

## 2021-11-29 PROCEDURE — 99999 FLU VACCINE - QUADRIVALENT - ADJUVANTED: ICD-10-PCS | Mod: PBBFAC,,,

## 2021-11-29 PROCEDURE — 90694 VACC AIIV4 NO PRSRV 0.5ML IM: CPT | Mod: PBBFAC

## 2021-11-29 PROCEDURE — G0008 ADMIN INFLUENZA VIRUS VAC: HCPCS | Mod: PBBFAC

## 2021-11-30 ENCOUNTER — EXTERNAL CHRONIC CARE MANAGEMENT (OUTPATIENT)
Dept: PRIMARY CARE CLINIC | Facility: CLINIC | Age: 86
End: 2021-11-30
Payer: MEDICARE

## 2021-11-30 PROCEDURE — 99490 CHRNC CARE MGMT STAFF 1ST 20: CPT | Mod: PBBFAC | Performed by: FAMILY MEDICINE

## 2021-11-30 PROCEDURE — 99999 PR STA SHADOW: ICD-10-PCS | Mod: PBBFAC,,, | Performed by: FAMILY MEDICINE

## 2021-11-30 PROCEDURE — 99999 PR STA SHADOW: CPT | Mod: PBBFAC,,, | Performed by: FAMILY MEDICINE

## 2021-12-03 ENCOUNTER — TELEPHONE (OUTPATIENT)
Dept: FAMILY MEDICINE | Facility: CLINIC | Age: 86
End: 2021-12-03
Payer: MEDICARE

## 2021-12-03 ENCOUNTER — OFFICE VISIT (OUTPATIENT)
Dept: INTERNAL MEDICINE | Facility: CLINIC | Age: 86
End: 2021-12-03
Payer: MEDICARE

## 2021-12-03 VITALS
SYSTOLIC BLOOD PRESSURE: 116 MMHG | OXYGEN SATURATION: 96 % | HEART RATE: 60 BPM | RESPIRATION RATE: 18 BRPM | BODY MASS INDEX: 36.4 KG/M2 | WEIGHT: 231.94 LBS | HEIGHT: 67 IN | DIASTOLIC BLOOD PRESSURE: 60 MMHG

## 2021-12-03 DIAGNOSIS — L03.115 CELLULITIS OF RIGHT FOOT: ICD-10-CM

## 2021-12-03 DIAGNOSIS — L97.512 ULCER OF RIGHT FOOT WITH FAT LAYER EXPOSED: Primary | ICD-10-CM

## 2021-12-03 PROCEDURE — 99999 PR PBB SHADOW E&M-EST. PATIENT-LVL V: ICD-10-PCS | Mod: PBBFAC,,, | Performed by: FAMILY MEDICINE

## 2021-12-03 PROCEDURE — 99999 PR STA SHADOW: CPT | Mod: PBBFAC,,, | Performed by: FAMILY MEDICINE

## 2021-12-03 PROCEDURE — 99215 OFFICE O/P EST HI 40 MIN: CPT | Mod: PBBFAC | Performed by: FAMILY MEDICINE

## 2021-12-03 PROCEDURE — 99214 OFFICE O/P EST MOD 30 MIN: CPT | Mod: S$PBB | Performed by: FAMILY MEDICINE

## 2021-12-03 PROCEDURE — 99999 PR PBB SHADOW E&M-EST. PATIENT-LVL V: CPT | Mod: PBBFAC,,, | Performed by: FAMILY MEDICINE

## 2021-12-03 RX ORDER — SULFAMETHOXAZOLE AND TRIMETHOPRIM 800; 160 MG/1; MG/1
1 TABLET ORAL 2 TIMES DAILY
Qty: 20 TABLET | Refills: 0 | Status: SHIPPED | OUTPATIENT
Start: 2021-12-03 | End: 2021-12-13

## 2021-12-03 RX ORDER — AMLODIPINE BESYLATE 2.5 MG/1
2.5 TABLET ORAL DAILY
COMMUNITY
Start: 2021-10-25 | End: 2022-05-27 | Stop reason: SDUPTHER

## 2021-12-03 RX ORDER — MUPIROCIN 20 MG/G
OINTMENT TOPICAL 2 TIMES DAILY
Qty: 22 G | Refills: 3 | Status: SHIPPED | OUTPATIENT
Start: 2021-12-03

## 2021-12-06 ENCOUNTER — OFFICE VISIT (OUTPATIENT)
Dept: FAMILY MEDICINE | Facility: CLINIC | Age: 86
End: 2021-12-06
Payer: MEDICARE

## 2021-12-06 VITALS
HEIGHT: 67 IN | DIASTOLIC BLOOD PRESSURE: 70 MMHG | BODY MASS INDEX: 35.9 KG/M2 | HEART RATE: 72 BPM | WEIGHT: 228.75 LBS | SYSTOLIC BLOOD PRESSURE: 110 MMHG | RESPIRATION RATE: 20 BRPM

## 2021-12-06 DIAGNOSIS — L89.892 PRESSURE INJURY OF DORSUM OF RIGHT FOOT, STAGE 2: Primary | ICD-10-CM

## 2021-12-06 DIAGNOSIS — L03.115 CELLULITIS OF RIGHT FOOT: ICD-10-CM

## 2021-12-06 PROCEDURE — 99999 PR PBB SHADOW E&M-EST. PATIENT-LVL IV: ICD-10-PCS | Mod: PBBFAC,,, | Performed by: FAMILY MEDICINE

## 2021-12-06 PROCEDURE — 99999 PR STA SHADOW: CPT | Mod: PBBFAC,,, | Performed by: FAMILY MEDICINE

## 2021-12-06 PROCEDURE — 99214 OFFICE O/P EST MOD 30 MIN: CPT | Mod: PBBFAC | Performed by: FAMILY MEDICINE

## 2021-12-06 PROCEDURE — 99999 PR PBB SHADOW E&M-EST. PATIENT-LVL IV: CPT | Mod: PBBFAC,,, | Performed by: FAMILY MEDICINE

## 2021-12-06 PROCEDURE — 99213 OFFICE O/P EST LOW 20 MIN: CPT | Mod: S$PBB | Performed by: FAMILY MEDICINE

## 2021-12-10 ENCOUNTER — OFFICE VISIT (OUTPATIENT)
Dept: WOUND CARE | Facility: HOSPITAL | Age: 86
End: 2021-12-10
Attending: NURSE PRACTITIONER
Payer: MEDICARE

## 2021-12-10 VITALS
TEMPERATURE: 99 F | DIASTOLIC BLOOD PRESSURE: 79 MMHG | RESPIRATION RATE: 18 BRPM | SYSTOLIC BLOOD PRESSURE: 132 MMHG | HEART RATE: 62 BPM

## 2021-12-10 DIAGNOSIS — L97.812 NON-PRESSURE CHRONIC ULCER OF OTHER PART OF RIGHT LOWER LEG WITH FAT LAYER EXPOSED: ICD-10-CM

## 2021-12-10 DIAGNOSIS — I87.312 IDIOPATHIC CHRONIC VENOUS HYPERTENSION OF LEFT LOWER EXTREMITY WITH ULCER: ICD-10-CM

## 2021-12-10 DIAGNOSIS — L89.893 PRESSURE INJURY OF RIGHT FOOT, STAGE 3: ICD-10-CM

## 2021-12-10 DIAGNOSIS — L97.822 NON-PRESSURE CHRONIC ULCER OF OTHER PART OF LEFT LOWER LEG WITH FAT LAYER EXPOSED: ICD-10-CM

## 2021-12-10 DIAGNOSIS — I87.311 IDIOPATHIC CHRONIC VENOUS HYPERTENSION OF RIGHT LOWER EXTREMITY WITH ULCER: ICD-10-CM

## 2021-12-10 DIAGNOSIS — L97.929 IDIOPATHIC CHRONIC VENOUS HYPERTENSION OF LEFT LOWER EXTREMITY WITH ULCER: ICD-10-CM

## 2021-12-10 DIAGNOSIS — L97.919 IDIOPATHIC CHRONIC VENOUS HYPERTENSION OF RIGHT LOWER EXTREMITY WITH ULCER: ICD-10-CM

## 2021-12-10 PROCEDURE — 27201912 HC WOUND CARE DEBRIDEMENT SUPPLIES

## 2021-12-10 PROCEDURE — 99213 OFFICE O/P EST LOW 20 MIN: CPT | Mod: 25

## 2021-12-10 PROCEDURE — 11042 DBRDMT SUBQ TIS 1ST 20SQCM/<: CPT

## 2021-12-15 ENCOUNTER — CLINICAL SUPPORT (OUTPATIENT)
Dept: FAMILY MEDICINE | Facility: CLINIC | Age: 86
End: 2021-12-15
Payer: MEDICARE

## 2021-12-15 ENCOUNTER — OFFICE VISIT (OUTPATIENT)
Dept: FAMILY MEDICINE | Facility: CLINIC | Age: 86
End: 2021-12-15
Payer: MEDICARE

## 2021-12-15 ENCOUNTER — IMMUNIZATION (OUTPATIENT)
Dept: FAMILY MEDICINE | Facility: CLINIC | Age: 86
End: 2021-12-15
Payer: MEDICARE

## 2021-12-15 VITALS
BODY MASS INDEX: 34.67 KG/M2 | HEART RATE: 76 BPM | SYSTOLIC BLOOD PRESSURE: 124 MMHG | HEIGHT: 67 IN | WEIGHT: 220.88 LBS | DIASTOLIC BLOOD PRESSURE: 82 MMHG

## 2021-12-15 DIAGNOSIS — M81.0 AGE-RELATED OSTEOPOROSIS WITHOUT CURRENT PATHOLOGICAL FRACTURE: Primary | ICD-10-CM

## 2021-12-15 DIAGNOSIS — E53.8 B12 DEFICIENCY: ICD-10-CM

## 2021-12-15 DIAGNOSIS — I10 ESSENTIAL HYPERTENSION: ICD-10-CM

## 2021-12-15 DIAGNOSIS — Z23 NEED FOR VACCINATION: Primary | ICD-10-CM

## 2021-12-15 DIAGNOSIS — I50.32 CHRONIC DIASTOLIC CONGESTIVE HEART FAILURE: ICD-10-CM

## 2021-12-15 DIAGNOSIS — E11.9 DIABETES MELLITUS WITHOUT COMPLICATION: ICD-10-CM

## 2021-12-15 DIAGNOSIS — E03.4 HYPOTHYROIDISM DUE TO ACQUIRED ATROPHY OF THYROID: ICD-10-CM

## 2021-12-15 DIAGNOSIS — E78.5 HYPERLIPIDEMIA, UNSPECIFIED HYPERLIPIDEMIA TYPE: ICD-10-CM

## 2021-12-15 DIAGNOSIS — Z79.01 ANTICOAGULATED: ICD-10-CM

## 2021-12-15 LAB
ALBUMIN SERPL BCP-MCNC: 3.7 G/DL (ref 3.5–5.2)
ALP SERPL-CCNC: 47 U/L (ref 55–135)
ALT SERPL W/O P-5'-P-CCNC: 12 U/L (ref 10–44)
ANION GAP SERPL CALC-SCNC: 9 MMOL/L (ref 8–16)
AST SERPL-CCNC: 14 U/L (ref 10–40)
BASOPHILS # BLD AUTO: 0.06 K/UL (ref 0–0.2)
BASOPHILS NFR BLD: 1 % (ref 0–1.9)
BILIRUB SERPL-MCNC: 0.5 MG/DL (ref 0.1–1)
BUN SERPL-MCNC: 32 MG/DL (ref 8–23)
CALCIUM SERPL-MCNC: 8.9 MG/DL (ref 8.7–10.5)
CHLORIDE SERPL-SCNC: 106 MMOL/L (ref 95–110)
CHOLEST SERPL-MCNC: 175 MG/DL (ref 120–199)
CHOLEST/HDLC SERPL: 4.5 {RATIO} (ref 2–5)
CO2 SERPL-SCNC: 25 MMOL/L (ref 23–29)
CREAT SERPL-MCNC: 1.1 MG/DL (ref 0.5–1.4)
DIFFERENTIAL METHOD: ABNORMAL
EOSINOPHIL # BLD AUTO: 0.1 K/UL (ref 0–0.5)
EOSINOPHIL NFR BLD: 2.3 % (ref 0–8)
ERYTHROCYTE [DISTWIDTH] IN BLOOD BY AUTOMATED COUNT: 13 % (ref 11.5–14.5)
EST. GFR  (AFRICAN AMERICAN): 53 ML/MIN/1.73 M^2
EST. GFR  (NON AFRICAN AMERICAN): 46 ML/MIN/1.73 M^2
ESTIMATED AVG GLUCOSE: 123 MG/DL (ref 68–131)
GLUCOSE SERPL-MCNC: 91 MG/DL (ref 70–110)
HBA1C MFR BLD: 5.9 % (ref 4–5.6)
HCT VFR BLD AUTO: 37.7 % (ref 37–48.5)
HDLC SERPL-MCNC: 39 MG/DL (ref 40–75)
HDLC SERPL: 22.3 % (ref 20–50)
HGB BLD-MCNC: 12.3 G/DL (ref 12–16)
IMM GRANULOCYTES # BLD AUTO: 0.02 K/UL (ref 0–0.04)
IMM GRANULOCYTES NFR BLD AUTO: 0.3 % (ref 0–0.5)
LDLC SERPL CALC-MCNC: 113.6 MG/DL (ref 63–159)
LYMPHOCYTES # BLD AUTO: 1.7 K/UL (ref 1–4.8)
LYMPHOCYTES NFR BLD: 28.3 % (ref 18–48)
MCH RBC QN AUTO: 32.6 PG (ref 27–31)
MCHC RBC AUTO-ENTMCNC: 32.6 G/DL (ref 32–36)
MCV RBC AUTO: 100 FL (ref 82–98)
MONOCYTES # BLD AUTO: 0.6 K/UL (ref 0.3–1)
MONOCYTES NFR BLD: 9.8 % (ref 4–15)
NEUTROPHILS # BLD AUTO: 3.5 K/UL (ref 1.8–7.7)
NEUTROPHILS NFR BLD: 58.3 % (ref 38–73)
NONHDLC SERPL-MCNC: 136 MG/DL
NRBC BLD-RTO: 0 /100 WBC
PLATELET # BLD AUTO: 265 K/UL (ref 150–450)
PMV BLD AUTO: 9.1 FL (ref 9.2–12.9)
POTASSIUM SERPL-SCNC: 5.2 MMOL/L (ref 3.5–5.1)
PROT SERPL-MCNC: 7.1 G/DL (ref 6–8.4)
RBC # BLD AUTO: 3.77 M/UL (ref 4–5.4)
SODIUM SERPL-SCNC: 140 MMOL/L (ref 136–145)
T4 FREE SERPL-MCNC: 1.1 NG/DL (ref 0.71–1.51)
TRIGL SERPL-MCNC: 112 MG/DL (ref 30–150)
TSH SERPL DL<=0.005 MIU/L-ACNC: 1.6 UIU/ML (ref 0.4–4)
WBC # BLD AUTO: 6.04 K/UL (ref 3.9–12.7)

## 2021-12-15 PROCEDURE — 85025 COMPLETE CBC W/AUTO DIFF WBC: CPT | Performed by: FAMILY MEDICINE

## 2021-12-15 PROCEDURE — 99999 COVID-19, MRNA, LNP-S, PF, 100 MCG/0.25 ML DOSE VACCINE (MODERNA BOOSTER): CPT | Mod: PBBFAC,,,

## 2021-12-15 PROCEDURE — 84443 ASSAY THYROID STIM HORMONE: CPT | Performed by: FAMILY MEDICINE

## 2021-12-15 PROCEDURE — 99999 PR PBB SHADOW E&M-EST. PATIENT-LVL III: CPT | Mod: PBBFAC,,, | Performed by: FAMILY MEDICINE

## 2021-12-15 PROCEDURE — 84439 ASSAY OF FREE THYROXINE: CPT | Performed by: FAMILY MEDICINE

## 2021-12-15 PROCEDURE — 99999 COVID-19, MRNA, LNP-S, PF, 100 MCG/0.25 ML DOSE VACCINE (MODERNA BOOSTER): ICD-10-PCS | Mod: PBBFAC,,,

## 2021-12-15 PROCEDURE — 80061 LIPID PANEL: CPT | Performed by: FAMILY MEDICINE

## 2021-12-15 PROCEDURE — 99214 OFFICE O/P EST MOD 30 MIN: CPT | Mod: S$PBB | Performed by: FAMILY MEDICINE

## 2021-12-15 PROCEDURE — 36415 COLL VENOUS BLD VENIPUNCTURE: CPT | Mod: PBBFAC

## 2021-12-15 PROCEDURE — 0064A COVID-19, MRNA, LNP-S, PF, 100 MCG/0.25 ML DOSE VACCINE (MODERNA BOOSTER): CPT | Mod: PBBFAC

## 2021-12-15 PROCEDURE — 99999 PR STA SHADOW: CPT | Mod: PBBFAC,,, | Performed by: FAMILY MEDICINE

## 2021-12-15 PROCEDURE — 99999 PR STA SHADOW: ICD-10-PCS | Mod: PBBFAC,,, | Performed by: FAMILY MEDICINE

## 2021-12-15 PROCEDURE — 83036 HEMOGLOBIN GLYCOSYLATED A1C: CPT | Performed by: FAMILY MEDICINE

## 2021-12-15 PROCEDURE — 99213 OFFICE O/P EST LOW 20 MIN: CPT | Mod: PBBFAC | Performed by: FAMILY MEDICINE

## 2021-12-15 PROCEDURE — 80053 COMPREHEN METABOLIC PANEL: CPT | Performed by: FAMILY MEDICINE

## 2021-12-15 RX ORDER — ICOSAPENT ETHYL 1000 MG/1
1 CAPSULE ORAL 2 TIMES DAILY
Qty: 60 CAPSULE | Refills: 5 | Status: SHIPPED | OUTPATIENT
Start: 2021-12-15 | End: 2022-05-27

## 2021-12-15 RX ORDER — ATORVASTATIN CALCIUM 10 MG/1
10 TABLET, FILM COATED ORAL NIGHTLY
Qty: 30 TABLET | Refills: 5 | Status: SHIPPED | OUTPATIENT
Start: 2021-12-15 | End: 2022-05-27 | Stop reason: SDUPTHER

## 2021-12-15 RX ORDER — FUROSEMIDE 40 MG/1
40 TABLET ORAL DAILY
Qty: 30 TABLET | Refills: 5 | Status: SHIPPED | OUTPATIENT
Start: 2021-12-15 | End: 2022-05-27 | Stop reason: SDUPTHER

## 2021-12-15 RX ORDER — LEVOTHYROXINE SODIUM 88 UG/1
88 TABLET ORAL DAILY
Qty: 30 TABLET | Refills: 5 | Status: SHIPPED | OUTPATIENT
Start: 2021-12-15 | End: 2022-05-27 | Stop reason: SDUPTHER

## 2021-12-15 RX ORDER — PNV NO.95/FERROUS FUM/FOLIC AC 28MG-0.8MG
100 TABLET ORAL DAILY
COMMUNITY
Start: 2021-12-15 | End: 2023-03-08

## 2021-12-15 RX ORDER — ALENDRONATE SODIUM 35 MG/1
35 TABLET ORAL
Qty: 4 TABLET | Refills: 5 | Status: SHIPPED | OUTPATIENT
Start: 2021-12-15 | End: 2022-02-28

## 2021-12-15 RX ORDER — METOPROLOL SUCCINATE 100 MG/1
100 TABLET, EXTENDED RELEASE ORAL DAILY
Qty: 30 TABLET | Refills: 5 | Status: SHIPPED | OUTPATIENT
Start: 2021-12-15 | End: 2022-05-27 | Stop reason: SDUPTHER

## 2021-12-15 RX ORDER — POTASSIUM CHLORIDE 20 MEQ/1
TABLET, EXTENDED RELEASE ORAL
Qty: 30 TABLET | Refills: 5 | Status: SHIPPED | OUTPATIENT
Start: 2021-12-15 | End: 2022-05-27 | Stop reason: SDUPTHER

## 2021-12-15 RX ORDER — ATORVASTATIN CALCIUM 10 MG/1
10 TABLET, FILM COATED ORAL NIGHTLY
Qty: 30 TABLET | Refills: 5 | Status: SHIPPED | OUTPATIENT
Start: 2021-12-15 | End: 2021-12-15 | Stop reason: SDUPTHER

## 2021-12-17 ENCOUNTER — OFFICE VISIT (OUTPATIENT)
Dept: WOUND CARE | Facility: HOSPITAL | Age: 86
End: 2021-12-17
Attending: NURSE PRACTITIONER
Payer: MEDICARE

## 2021-12-17 VITALS
RESPIRATION RATE: 20 BRPM | TEMPERATURE: 98 F | DIASTOLIC BLOOD PRESSURE: 77 MMHG | HEART RATE: 86 BPM | SYSTOLIC BLOOD PRESSURE: 113 MMHG

## 2021-12-17 DIAGNOSIS — L97.919 IDIOPATHIC CHRONIC VENOUS HYPERTENSION OF RIGHT LOWER EXTREMITY WITH ULCER: Primary | ICD-10-CM

## 2021-12-17 DIAGNOSIS — L97.822 NON-PRESSURE CHRONIC ULCER OF OTHER PART OF LEFT LOWER LEG WITH FAT LAYER EXPOSED: ICD-10-CM

## 2021-12-17 DIAGNOSIS — I87.311 IDIOPATHIC CHRONIC VENOUS HYPERTENSION OF RIGHT LOWER EXTREMITY WITH ULCER: Primary | ICD-10-CM

## 2021-12-17 DIAGNOSIS — I87.312 IDIOPATHIC CHRONIC VENOUS HYPERTENSION OF LEFT LOWER EXTREMITY WITH ULCER: ICD-10-CM

## 2021-12-17 DIAGNOSIS — L97.812 NON-PRESSURE CHRONIC ULCER OF OTHER PART OF RIGHT LOWER LEG WITH FAT LAYER EXPOSED: ICD-10-CM

## 2021-12-17 DIAGNOSIS — L97.929 IDIOPATHIC CHRONIC VENOUS HYPERTENSION OF LEFT LOWER EXTREMITY WITH ULCER: ICD-10-CM

## 2021-12-17 PROCEDURE — 11042 DBRDMT SUBQ TIS 1ST 20SQCM/<: CPT

## 2021-12-17 PROCEDURE — 27201912 HC WOUND CARE DEBRIDEMENT SUPPLIES

## 2022-01-07 ENCOUNTER — OFFICE VISIT (OUTPATIENT)
Dept: WOUND CARE | Facility: HOSPITAL | Age: 87
End: 2022-01-07
Attending: NURSE PRACTITIONER
Payer: MEDICARE

## 2022-01-07 VITALS
DIASTOLIC BLOOD PRESSURE: 72 MMHG | SYSTOLIC BLOOD PRESSURE: 137 MMHG | TEMPERATURE: 97 F | RESPIRATION RATE: 18 BRPM | HEART RATE: 61 BPM

## 2022-01-07 DIAGNOSIS — I87.312 IDIOPATHIC CHRONIC VENOUS HYPERTENSION OF LEFT LOWER EXTREMITY WITH ULCER: ICD-10-CM

## 2022-01-07 DIAGNOSIS — L97.929 IDIOPATHIC CHRONIC VENOUS HYPERTENSION OF LEFT LOWER EXTREMITY WITH ULCER: ICD-10-CM

## 2022-01-07 DIAGNOSIS — L97.919 IDIOPATHIC CHRONIC VENOUS HYPERTENSION OF RIGHT LOWER EXTREMITY WITH ULCER: ICD-10-CM

## 2022-01-07 DIAGNOSIS — L97.822 NON-PRESSURE CHRONIC ULCER OF OTHER PART OF LEFT LOWER LEG WITH FAT LAYER EXPOSED: ICD-10-CM

## 2022-01-07 DIAGNOSIS — L89.893 PRESSURE INJURY OF RIGHT FOOT, STAGE 3: ICD-10-CM

## 2022-01-07 DIAGNOSIS — L97.812 NON-PRESSURE CHRONIC ULCER OF OTHER PART OF RIGHT LOWER LEG WITH FAT LAYER EXPOSED: Primary | ICD-10-CM

## 2022-01-07 DIAGNOSIS — I87.311 IDIOPATHIC CHRONIC VENOUS HYPERTENSION OF RIGHT LOWER EXTREMITY WITH ULCER: ICD-10-CM

## 2022-01-07 PROCEDURE — 97597 DBRDMT OPN WND 1ST 20 CM/<: CPT

## 2022-01-07 NOTE — PROGRESS NOTES
[facility]  Wound Care  Progress Note    Problem List Items Addressed This Visit        Derm    Idiopathic chronic venous hypertension of right lower extremity with ulcer    Overview     HPI: 86 yr old female with history of pre-diabetes, venous insufficiency, states she is on her feet a lot and developed a blister to her foot that her PCP popped, she also has venous ulcers to her legs.    Location: right plantar first met head, right medial lower leg and left anterior lower leg    Duration: foot: 12-7-21, legs: 11-30-21    Context: foot is pressure and legs are venous    Associated Signs & Symptoms: denies pain    Timing: n/a    Severity: n/a    Quality: n/a    Modifying Factors: n/a    12-17-21: here for wound care for ulcers to legs and foot         Idiopathic chronic venous hypertension of left lower extremity with ulcer    Non-pressure chronic ulcer of other part of right lower leg with fat layer exposed - Primary    Non-pressure chronic ulcer of other part of left lower leg with fat layer exposed       Other    Pressure injury of right foot, stage 3    Overview     HPI: 86 yr old female with history of pre-diabetes, venous insufficiency, states she is on her feet a lot and developed a blister to her foot that her PCP popped, she also has venous ulcers to her legs.    Location: right plantar first met head, right medial lower leg and left anterior lower leg    Duration: foot: 12-7-21, legs: 11-30-21    Context: foot is pressure and legs are venous    Associated Signs & Symptoms: denies pain    Timing: n/a    Severity: n/a    Quality: n/a    Modifying Factors: n/a    12-17-21: here for wound care for ulcers to legs and foot  1-7-22: here for wound care for ulcers to legs and foot.           Physical Exam  Vitals reviewed.   Constitutional:       Appearance: Normal appearance.   HENT:      Head: Normocephalic.   Cardiovascular:      Comments: Legs with 1+ edema  Pulmonary:      Effort:  Pulmonary effort is normal.   Musculoskeletal:         General: Normal range of motion.   Skin:     General: Skin is warm and dry.      Comments: Stage 3 pressure ulcer to right plantar 1st met head is superficial today, venous ulcers to legs are resolved today.   Neurological:      Mental Status: She is alert and oriented to person, place, and time.   Psychiatric:         Mood and Affect: Mood normal.         Behavior: Behavior normal.         Thought Content: Thought content normal.         Judgment: Judgment normal.       Will offload pressure to met head and gentian violet. Will recheck in one week, her potential to heal is good, Cut her hyperkeratotic toenails x 10.     See wound doc progress notes. Documents will be scanned.        Laina LeBoeuf [facility]

## 2022-01-14 ENCOUNTER — OFFICE VISIT (OUTPATIENT)
Dept: WOUND CARE | Facility: HOSPITAL | Age: 87
End: 2022-01-14
Attending: NURSE PRACTITIONER
Payer: MEDICARE

## 2022-01-14 VITALS
DIASTOLIC BLOOD PRESSURE: 69 MMHG | HEART RATE: 64 BPM | RESPIRATION RATE: 18 BRPM | SYSTOLIC BLOOD PRESSURE: 109 MMHG | TEMPERATURE: 97 F

## 2022-01-14 DIAGNOSIS — L89.893 PRESSURE INJURY OF RIGHT FOOT, STAGE 3: Primary | ICD-10-CM

## 2022-01-14 PROCEDURE — 99213 OFFICE O/P EST LOW 20 MIN: CPT

## 2022-01-14 NOTE — PROGRESS NOTES
[facility]  Wound Care  Progress Note    Problem List Items Addressed This Visit        Other    Pressure injury of right foot, stage 3 - Primary    Overview     HPI: 86 yr old female with history of pre-diabetes, venous insufficiency, states she is on her feet a lot and developed a blister to her foot that her PCP popped, she also has venous ulcers to her legs.    Location: right plantar first met head    Duration: foot: 12-7-21    Context: foot is pressure     Associated Signs & Symptoms: denies pain    Timing: n/a    Severity: n/a    Quality: n/a    Modifying Factors: n/a    12-17-21: here for wound care for ulcers to legs and foot  1-7-22: here for wound care for ulcers to legs and foot.  1-14-22: here for wound care for pressure ulcer to right plantar 1st head           Physical Exam  Vitals reviewed.   Constitutional:       Appearance: Normal appearance.   HENT:      Head: Normocephalic.   Pulmonary:      Effort: Pulmonary effort is normal.   Musculoskeletal:         General: Normal range of motion.   Skin:     General: Skin is warm and dry.      Comments: Pressure ulcer to right plantar foot is resolved   Neurological:      Mental Status: She is alert and oriented to person, place, and time.   Psychiatric:         Mood and Affect: Mood normal.         Behavior: Behavior normal.         Thought Content: Thought content normal.         Judgment: Judgment normal.     Ulcer is resolved today, continue to offload pressure with aperture pad, pt will come back for a surveillance visit next week. Compression socks for edema management.      See wound doc progress notes. Documents will be scanned.        Reina Davis  [facility]

## 2022-01-28 ENCOUNTER — OFFICE VISIT (OUTPATIENT)
Dept: WOUND CARE | Facility: HOSPITAL | Age: 87
End: 2022-01-28
Attending: NURSE PRACTITIONER
Payer: MEDICARE

## 2022-01-28 VITALS
SYSTOLIC BLOOD PRESSURE: 128 MMHG | HEART RATE: 75 BPM | DIASTOLIC BLOOD PRESSURE: 75 MMHG | RESPIRATION RATE: 18 BRPM | TEMPERATURE: 98 F

## 2022-01-28 DIAGNOSIS — L89.893 PRESSURE INJURY OF RIGHT FOOT, STAGE 3: Primary | ICD-10-CM

## 2022-01-28 PROCEDURE — 99212 OFFICE O/P EST SF 10 MIN: CPT

## 2022-01-28 NOTE — PROGRESS NOTES
[facility]  Wound Care  Progress Note    Problem List Items Addressed This Visit        Other    Pressure injury of right foot, stage 3 - Primary    Overview     HPI: 87 yr old female with history of pre-diabetes, venous insufficiency, states she is on her feet a lot and developed a blister to her foot that her PCP popped, she also has venous ulcers to her legs.    Location: right plantar first met head    Duration: foot: 12-7-21    Context: foot is pressure     Associated Signs & Symptoms: denies pain    Timing: n/a    Severity: n/a    Quality: n/a    Modifying Factors: n/a    12-17-21: here for wound care for ulcers to legs and foot  1-7-22: here for wound care for ulcers to legs and foot.  1-14-22: here for wound care for pressure ulcer to right plantar 1st head  1-28-22: here for surveillance visit  for pressure ulcer to right plantar 1st met head             Physical Exam  Constitutional:       Appearance: Normal appearance.   HENT:      Head: Normocephalic.   Pulmonary:      Effort: Pulmonary effort is normal.   Musculoskeletal:         General: Normal range of motion.   Skin:     General: Skin is warm and dry.      Comments: Ulcer remains healed.   Neurological:      Mental Status: She is alert and oriented to person, place, and time.   Psychiatric:         Mood and Affect: Mood normal.         Behavior: Behavior normal.         Thought Content: Thought content normal.         Judgment: Judgment normal.     Ulcer remains healed, encouraged to continue to offload pressure to area with aperture pads, inspect feet daily, call with questions or concerns.    See wound doc progress notes. Documents will be scanned.        Reina Davis  [facility]

## 2022-02-23 DIAGNOSIS — D84.9 IMMUNOSUPPRESSED STATUS: ICD-10-CM

## 2022-05-27 ENCOUNTER — OFFICE VISIT (OUTPATIENT)
Dept: INTERNAL MEDICINE | Facility: CLINIC | Age: 87
End: 2022-05-27
Payer: MEDICARE

## 2022-05-27 VITALS
SYSTOLIC BLOOD PRESSURE: 126 MMHG | RESPIRATION RATE: 16 BRPM | BODY MASS INDEX: 38.2 KG/M2 | WEIGHT: 243.38 LBS | HEART RATE: 87 BPM | DIASTOLIC BLOOD PRESSURE: 80 MMHG | HEIGHT: 67 IN

## 2022-05-27 DIAGNOSIS — E66.3 OVERWEIGHT: ICD-10-CM

## 2022-05-27 DIAGNOSIS — E03.4 HYPOTHYROIDISM DUE TO ACQUIRED ATROPHY OF THYROID: ICD-10-CM

## 2022-05-27 DIAGNOSIS — E11.59 TYPE 2 DIABETES MELLITUS WITH OTHER CIRCULATORY COMPLICATION, WITH LONG-TERM CURRENT USE OF INSULIN: ICD-10-CM

## 2022-05-27 DIAGNOSIS — I48.20 CHRONIC ATRIAL FIBRILLATION: ICD-10-CM

## 2022-05-27 DIAGNOSIS — I87.8 VENOUS STASIS OF LOWER EXTREMITY: Primary | ICD-10-CM

## 2022-05-27 DIAGNOSIS — M81.0 AGE-RELATED OSTEOPOROSIS WITHOUT CURRENT PATHOLOGICAL FRACTURE: ICD-10-CM

## 2022-05-27 DIAGNOSIS — E78.5 HYPERLIPIDEMIA, UNSPECIFIED HYPERLIPIDEMIA TYPE: ICD-10-CM

## 2022-05-27 DIAGNOSIS — I10 ESSENTIAL HYPERTENSION: ICD-10-CM

## 2022-05-27 DIAGNOSIS — Z79.4 TYPE 2 DIABETES MELLITUS WITH OTHER CIRCULATORY COMPLICATION, WITH LONG-TERM CURRENT USE OF INSULIN: ICD-10-CM

## 2022-05-27 DIAGNOSIS — I50.32 CHRONIC DIASTOLIC CONGESTIVE HEART FAILURE: ICD-10-CM

## 2022-05-27 PROCEDURE — 99214 OFFICE O/P EST MOD 30 MIN: CPT | Mod: S$PBB | Performed by: FAMILY MEDICINE

## 2022-05-27 PROCEDURE — 99999 PR STA SHADOW: CPT | Mod: PBBFAC,,, | Performed by: FAMILY MEDICINE

## 2022-05-27 PROCEDURE — 99999 PR PBB SHADOW E&M-EST. PATIENT-LVL III: CPT | Mod: PBBFAC,,, | Performed by: FAMILY MEDICINE

## 2022-05-27 PROCEDURE — 99213 OFFICE O/P EST LOW 20 MIN: CPT | Mod: PBBFAC | Performed by: FAMILY MEDICINE

## 2022-05-27 PROCEDURE — 99999 PR PBB SHADOW E&M-EST. PATIENT-LVL III: ICD-10-PCS | Mod: PBBFAC,,, | Performed by: FAMILY MEDICINE

## 2022-05-27 RX ORDER — AMLODIPINE BESYLATE 2.5 MG/1
2.5 TABLET ORAL DAILY
Qty: 30 TABLET | Refills: 5 | Status: SHIPPED | OUTPATIENT
Start: 2022-05-27 | End: 2022-10-19 | Stop reason: SDUPTHER

## 2022-05-27 RX ORDER — ALENDRONATE SODIUM 35 MG/1
TABLET ORAL
Qty: 4 TABLET | Refills: 5 | Status: SHIPPED | OUTPATIENT
Start: 2022-05-27 | End: 2022-10-19 | Stop reason: SDUPTHER

## 2022-05-27 RX ORDER — ATORVASTATIN CALCIUM 10 MG/1
10 TABLET, FILM COATED ORAL NIGHTLY
Qty: 30 TABLET | Refills: 5 | Status: SHIPPED | OUTPATIENT
Start: 2022-05-27 | End: 2022-09-21

## 2022-05-27 RX ORDER — FUROSEMIDE 40 MG/1
40 TABLET ORAL DAILY
Qty: 30 TABLET | Refills: 5 | Status: SHIPPED | OUTPATIENT
Start: 2022-05-27 | End: 2022-10-19 | Stop reason: SDUPTHER

## 2022-05-27 RX ORDER — LEVOTHYROXINE SODIUM 88 UG/1
88 TABLET ORAL DAILY
Qty: 30 TABLET | Refills: 5 | Status: SHIPPED | OUTPATIENT
Start: 2022-05-27 | End: 2022-10-19 | Stop reason: SDUPTHER

## 2022-05-27 RX ORDER — POTASSIUM CHLORIDE 20 MEQ/1
TABLET, EXTENDED RELEASE ORAL
Qty: 30 TABLET | Refills: 5 | Status: SHIPPED | OUTPATIENT
Start: 2022-05-27 | End: 2022-10-19 | Stop reason: SDUPTHER

## 2022-05-27 RX ORDER — METOPROLOL SUCCINATE 100 MG/1
100 TABLET, EXTENDED RELEASE ORAL DAILY
Qty: 30 TABLET | Refills: 5 | Status: SHIPPED | OUTPATIENT
Start: 2022-05-27 | End: 2022-10-19 | Stop reason: SDUPTHER

## 2022-05-27 NOTE — PROGRESS NOTES
Subjective:       Patient ID: Stephany Skinner is a 87 y.o. female.    Chief Complaint: Follow-up (6 month f/u )    Patient here for checkup.  She is concerned about her legs.  She has chronic lymphedema and has been bitten by sand flies and now she has some red patches.  Left leg is having a slight drainage.  Patient has arthritis.  Patient having more lumbar pain.  She cannot walk more than 20 feet and then pain radiates to the legs.  Needs her walker.  Her left shoulder is more painful and cracks when she pulls herself up to standing.  She has a history of congestive heart failure and hypertension.  She has lost over 60 lb over the past year.  Now her blood pressure is low.  She denies shortness of breath.  Has  Neuro diagnosed her with MG.  No longer taking prednisone,  pyridostigmine 60 mg daily.  Doing well.  Patient has type 2 diabetes and seems be doing well.  She stopped her insulin and her blood sugars look great.    She has lost over 60 lb in the last year.  She continues to lose weight.  Patient has COPD.  She was taking neb treatments in the nursing home as needed.  They did not send her home with a nebulizer.  This seems to be in the works.  She also has sleep apnea and is using her CPAP at least 6 hr per night.  She takes Synthroid for her hypothyroidism.  She tolerates this well.  She denies having symptoms such as heat or cold intolerance.  Her weight is stable.  She denies any swelling in her thyroid.  She tolerates her blood pressure medication as well as not having side effects such as chronic cough or dizziness.  Sometimes her systolic blood pressures less than 100.  She is not having any symptoms from it.  Patient is taking her statin every day for hyperlipidemia.  She is feeling well on the medication.  She denies side effects such as myalgias.  Going to wound care.  Getting foot ulcer treated.  Now with good support stockings and off  shoe.  Has diarrhea stools once to three times  daily    Follow-up  Associated symptoms include arthralgias. Pertinent negatives include no rash.     Review of Systems   Constitutional: Negative for activity change and unexpected weight change.   HENT: Negative for trouble swallowing.    Respiratory: Negative for chest tightness.    Cardiovascular: Positive for leg swelling.   Gastrointestinal: Positive for diarrhea.   Endocrine: Negative for cold intolerance and heat intolerance.   Genitourinary: Negative for difficulty urinating.   Musculoskeletal: Positive for arthralgias, back pain and gait problem.   Skin: Negative for rash and wound.   Hematological: Negative for adenopathy.   Psychiatric/Behavioral: Negative for decreased concentration.       Objective:      Vitals:    05/27/22 1406   BP: 126/80   Pulse: 87   Resp: 16     Physical Exam  Constitutional:       Appearance: Normal appearance. She is well-developed. She is obese.   HENT:      Head: Normocephalic and atraumatic.      Right Ear: Tympanic membrane normal.      Left Ear: Tympanic membrane normal.      Mouth/Throat:      Mouth: Mucous membranes are moist.   Eyes:      Pupils: Pupils are equal, round, and reactive to light.   Cardiovascular:      Rate and Rhythm: Normal rate and regular rhythm.      Pulses: Normal pulses.      Heart sounds: Normal heart sounds. No murmur heard.    No friction rub. No gallop.   Pulmonary:      Effort: Pulmonary effort is normal.      Breath sounds: Normal breath sounds. No wheezing or rales.   Abdominal:      General: Bowel sounds are normal.      Palpations: Abdomen is soft.      Hernia: A hernia (incisional wall hernia, reducible) is present.   Musculoskeletal:      Cervical back: Neck supple.      Right lower leg: Edema present.      Left lower leg: Edema present.      Comments: Both legs are edematous.  Skin is erythematous.  Areas of the bite martin have some per Braai.  Slight draining wound from the right leg.   Skin:     General: Skin is warm.      Findings: No  rash.   Neurological:      General: No focal deficit present.      Mental Status: She is alert and oriented to person, place, and time.      Cranial Nerves: No cranial nerve deficit.      Sensory: No sensory deficit.      Motor: Weakness present.      Gait: Gait abnormal.   Psychiatric:         Mood and Affect: Mood normal.         Behavior: Behavior normal.         Thought Content: Thought content normal.         Judgment: Judgment normal.         Lab Results   Component Value Date    TSH 1.603 12/15/2021     Lab Results   Component Value Date    LDLCALC 113.6 12/15/2021     BMP  Lab Results   Component Value Date     12/15/2021    K 5.2 (H) 12/15/2021     12/15/2021    CO2 25 12/15/2021    BUN 32 (H) 12/15/2021    CREATININE 1.1 12/15/2021    CALCIUM 8.9 12/15/2021    ANIONGAP 9 12/15/2021    ESTGFRAFRICA 53 (A) 12/15/2021    EGFRNONAA 46 (A) 12/15/2021     Lab Results   Component Value Date    HGBA1C 5.9 (H) 12/15/2021     Lab Results   Component Value Date    WBC 6.04 12/15/2021    HGB 12.3 12/15/2021    HCT 37.7 12/15/2021     (H) 12/15/2021     12/15/2021     Assessment:       1. Venous stasis of lower extremity    2. Age-related osteoporosis without current pathological fracture    3. Essential hypertension    4. Chronic diastolic congestive heart failure    5. Hyperlipidemia, unspecified hyperlipidemia type    6. Hypothyroidism due to acquired atrophy of thyroid    7. Chronic atrial fibrillation    8. Type 2 diabetes mellitus with other circulatory complication, with long-term current use of insulin    9. Overweight(278.02)        Plan:   Stephany CARRILLO was seen today for sore on toe.    Diagnoses and all orders for this visit:    Venous stasis dermatitis  Elevate legs  Were support stockings diligently  If redness and swelling worsens, patient will need Unna boots    Obstructive sleep apnea  Continue CPAP at current pressure.  Patient is compliant.    Type 2 diabetes  No meds for  now  Check hemoglobin A1c every 6 months    Essential hypertension/CHF  Continue Lasix 40 mg, Toprol- mg,  Continue potassium  Amlodipine 2.5 mg po daily    Hypothyroidism due to acquired atrophy of thyroid  Continue Synthroid 88 µg by mouth daily    Hyperlipidemia, unspecified hyperlipidemia type  Continue fish oil  Continue Lipitor 10 mg    Myasthenia gravis  No longer on prednisone, Mestinon  Keep appointment with neurology    COPD  Continue DuoNeb treatments when the nebulizer on his    Congestive heart failure/atrial fibrillation  Continue Eliquis    Hypothyroidism due to acquired atrophy of thyroid  -     levothyroxine (EUTHYROX) 88 MCG tablet; Take 1 tablet (88 mcg total) by mouth once daily.  Dispense: 30 tablet; Refill: 5  -     TSH; Future; Expected date: 05/27/2022    Age-related osteoporosis without current pathological fracture  -     alendronate (FOSAMAX) 35 MG tablet; TAKE 1 TABLET BY MOUTH ONCE EVERY 7 DAYS  Dispense: 4 tablet; Refill: 5    Essential hypertension  -     furosemide (LASIX) 40 MG tablet; Take 1 tablet (40 mg total) by mouth once daily.  Dispense: 30 tablet; Refill: 5  -     potassium chloride SA (K-DUR,KLOR-CON) 20 MEQ tablet; 1 Tab po daily except Sunday  Dispense: 30 tablet; Refill: 5  -     metoprolol succinate (TOPROL-XL) 100 MG 24 hr tablet; Take 1 tablet (100 mg total) by mouth once daily.  Dispense: 30 tablet; Refill: 5  -     amLODIPine (NORVASC) 2.5 MG tablet; Take 1 tablet (2.5 mg total) by mouth once daily.  Dispense: 30 tablet; Refill: 5  -     Comprehensive Metabolic Panel; Future; Expected date: 05/27/2022    Chronic diastolic congestive heart failure  -     furosemide (LASIX) 40 MG tablet; Take 1 tablet (40 mg total) by mouth once daily.  Dispense: 30 tablet; Refill: 5  -     potassium chloride SA (K-DUR,KLOR-CON) 20 MEQ tablet; 1 Tab po daily except Sunday  Dispense: 30 tablet; Refill: 5  -     metoprolol succinate (TOPROL-XL) 100 MG 24 hr tablet; Take 1 tablet  (100 mg total) by mouth once daily.  Dispense: 30 tablet; Refill: 5    Hyperlipidemia, unspecified hyperlipidemia type  -     atorvastatin (LIPITOR) 10 MG tablet; Take 1 tablet (10 mg total) by mouth every evening.  Dispense: 30 tablet; Refill: 5    Chronic atrial fibrillation  This is stable on current meds    Type 2 diabetes mellitus with other circulatory complication, with long-term current use of insulin  -     Hemoglobin A1C; Future; Expected date: 05/27/2022    Overweight(278.02)  Encouraged patient to decrease caloric intake    Return to clinic in 6 month.

## 2022-05-31 ENCOUNTER — EXTERNAL CHRONIC CARE MANAGEMENT (OUTPATIENT)
Dept: PRIMARY CARE CLINIC | Facility: CLINIC | Age: 87
End: 2022-05-31
Payer: MEDICARE

## 2022-05-31 PROCEDURE — 99490 CHRNC CARE MGMT STAFF 1ST 20: CPT | Mod: S$PBB | Performed by: FAMILY MEDICINE

## 2022-05-31 PROCEDURE — 99999 PR STA SHADOW: CPT | Mod: PBBFAC,,, | Performed by: FAMILY MEDICINE

## 2022-05-31 PROCEDURE — 99999 PR STA SHADOW: ICD-10-PCS | Mod: PBBFAC,,, | Performed by: FAMILY MEDICINE

## 2022-06-01 ENCOUNTER — CLINICAL SUPPORT (OUTPATIENT)
Dept: INTERNAL MEDICINE | Facility: CLINIC | Age: 87
End: 2022-06-01
Payer: MEDICARE

## 2022-06-01 DIAGNOSIS — E03.4 HYPOTHYROIDISM DUE TO ACQUIRED ATROPHY OF THYROID: ICD-10-CM

## 2022-06-01 DIAGNOSIS — Z79.4 TYPE 2 DIABETES MELLITUS WITH OTHER CIRCULATORY COMPLICATION, WITH LONG-TERM CURRENT USE OF INSULIN: ICD-10-CM

## 2022-06-01 DIAGNOSIS — E11.59 TYPE 2 DIABETES MELLITUS WITH OTHER CIRCULATORY COMPLICATION, WITH LONG-TERM CURRENT USE OF INSULIN: ICD-10-CM

## 2022-06-01 DIAGNOSIS — I10 ESSENTIAL HYPERTENSION: ICD-10-CM

## 2022-06-01 LAB
ALBUMIN SERPL BCP-MCNC: 3.7 G/DL (ref 3.5–5.2)
ALP SERPL-CCNC: 47 U/L (ref 55–135)
ALT SERPL W/O P-5'-P-CCNC: 11 U/L (ref 10–44)
ANION GAP SERPL CALC-SCNC: 11 MMOL/L (ref 8–16)
AST SERPL-CCNC: 16 U/L (ref 10–40)
BILIRUB SERPL-MCNC: 0.6 MG/DL (ref 0.1–1)
BUN SERPL-MCNC: 25 MG/DL (ref 8–23)
CALCIUM SERPL-MCNC: 9.1 MG/DL (ref 8.7–10.5)
CHLORIDE SERPL-SCNC: 105 MMOL/L (ref 95–110)
CO2 SERPL-SCNC: 23 MMOL/L (ref 23–29)
CREAT SERPL-MCNC: 1 MG/DL (ref 0.5–1.4)
EST. GFR  (AFRICAN AMERICAN): 58.9 ML/MIN/1.73 M^2
EST. GFR  (NON AFRICAN AMERICAN): 51.1 ML/MIN/1.73 M^2
ESTIMATED AVG GLUCOSE: 120 MG/DL (ref 68–131)
GLUCOSE SERPL-MCNC: 92 MG/DL (ref 70–110)
HBA1C MFR BLD: 5.8 % (ref 4–5.6)
POTASSIUM SERPL-SCNC: 4.8 MMOL/L (ref 3.5–5.1)
PROT SERPL-MCNC: 6.7 G/DL (ref 6–8.4)
SODIUM SERPL-SCNC: 139 MMOL/L (ref 136–145)
TSH SERPL DL<=0.005 MIU/L-ACNC: 2.37 UIU/ML (ref 0.4–4)

## 2022-06-01 PROCEDURE — 36415 COLL VENOUS BLD VENIPUNCTURE: CPT | Performed by: FAMILY MEDICINE

## 2022-06-01 PROCEDURE — 84443 ASSAY THYROID STIM HORMONE: CPT | Performed by: FAMILY MEDICINE

## 2022-06-01 PROCEDURE — 36415 COLL VENOUS BLD VENIPUNCTURE: CPT | Mod: PBBFAC,PN

## 2022-06-01 PROCEDURE — 80053 COMPREHEN METABOLIC PANEL: CPT | Performed by: FAMILY MEDICINE

## 2022-06-01 PROCEDURE — 83036 HEMOGLOBIN GLYCOSYLATED A1C: CPT | Performed by: FAMILY MEDICINE

## 2022-06-30 ENCOUNTER — EXTERNAL CHRONIC CARE MANAGEMENT (OUTPATIENT)
Dept: PRIMARY CARE CLINIC | Facility: CLINIC | Age: 87
End: 2022-06-30
Payer: MEDICARE

## 2022-06-30 PROCEDURE — 99490 CHRNC CARE MGMT STAFF 1ST 20: CPT | Mod: PBBFAC | Performed by: FAMILY MEDICINE

## 2022-06-30 PROCEDURE — 99999 PR STA SHADOW: CPT | Mod: PBBFAC,,, | Performed by: FAMILY MEDICINE

## 2022-06-30 PROCEDURE — 99999 PR STA SHADOW: ICD-10-PCS | Mod: PBBFAC,,, | Performed by: FAMILY MEDICINE

## 2022-07-31 ENCOUNTER — EXTERNAL CHRONIC CARE MANAGEMENT (OUTPATIENT)
Dept: PRIMARY CARE CLINIC | Facility: CLINIC | Age: 87
End: 2022-07-31
Payer: MEDICARE

## 2022-07-31 PROCEDURE — 99999 PR STA SHADOW: CPT | Mod: PBBFAC,,, | Performed by: FAMILY MEDICINE

## 2022-07-31 PROCEDURE — 99999 PR STA SHADOW: ICD-10-PCS | Mod: PBBFAC,,, | Performed by: FAMILY MEDICINE

## 2022-07-31 PROCEDURE — 99490 CHRNC CARE MGMT STAFF 1ST 20: CPT | Mod: PBBFAC | Performed by: FAMILY MEDICINE

## 2022-08-01 ENCOUNTER — TELEPHONE (OUTPATIENT)
Dept: FAMILY MEDICINE | Facility: CLINIC | Age: 87
End: 2022-08-01
Payer: MEDICARE

## 2022-08-01 NOTE — TELEPHONE ENCOUNTER
----- Message from Rodney Rodriguez sent at 2022 10:19 AM CDT -----  Contact: Sister - Shira Skinner  MRN: [id number]  : 1935  PCP: Pipo Flowers  Home Phone      [phone]  Work Phone      Not on file.  Mobile          Not on file.      MESSAGE: sore on arm (redness) - requesting appt Wed in [place]     Call Shira @ [phone]    PCP: Lionel

## 2022-08-03 ENCOUNTER — OFFICE VISIT (OUTPATIENT)
Dept: INTERNAL MEDICINE | Facility: CLINIC | Age: 87
End: 2022-08-03
Payer: MEDICARE

## 2022-08-03 VITALS
SYSTOLIC BLOOD PRESSURE: 126 MMHG | RESPIRATION RATE: 18 BRPM | HEART RATE: 61 BPM | WEIGHT: 251.88 LBS | DIASTOLIC BLOOD PRESSURE: 88 MMHG | HEIGHT: 67 IN | BODY MASS INDEX: 39.53 KG/M2 | OXYGEN SATURATION: 97 %

## 2022-08-03 DIAGNOSIS — B02.9 HERPES ZOSTER WITHOUT COMPLICATION: ICD-10-CM

## 2022-08-03 DIAGNOSIS — L30.9 ECZEMA, UNSPECIFIED TYPE: Primary | ICD-10-CM

## 2022-08-03 PROCEDURE — 99215 OFFICE O/P EST HI 40 MIN: CPT | Mod: PBBFAC,PN,25 | Performed by: FAMILY MEDICINE

## 2022-08-03 PROCEDURE — 96372 THER/PROPH/DIAG INJ SC/IM: CPT | Mod: PBBFAC,PN

## 2022-08-03 PROCEDURE — 99213 PR OFFICE/OUTPT VISIT, EST, LEVL III, 20-29 MIN: ICD-10-PCS | Mod: S$PBB,,, | Performed by: FAMILY MEDICINE

## 2022-08-03 PROCEDURE — 99213 OFFICE O/P EST LOW 20 MIN: CPT | Mod: S$PBB,,, | Performed by: FAMILY MEDICINE

## 2022-08-03 PROCEDURE — 99999 PR PBB SHADOW E&M-EST. PATIENT-LVL V: ICD-10-PCS | Mod: PBBFAC,,, | Performed by: FAMILY MEDICINE

## 2022-08-03 PROCEDURE — 99999 PR PBB SHADOW E&M-EST. PATIENT-LVL V: CPT | Mod: PBBFAC,,, | Performed by: FAMILY MEDICINE

## 2022-08-03 RX ORDER — GABAPENTIN 100 MG/1
100 CAPSULE ORAL 3 TIMES DAILY
Qty: 90 CAPSULE | Refills: 2 | Status: SHIPPED | OUTPATIENT
Start: 2022-08-03 | End: 2022-11-28

## 2022-08-03 RX ORDER — NYSTATIN AND TRIAMCINOLONE ACETONIDE 100000; 1 [USP'U]/G; MG/G
CREAM TOPICAL 4 TIMES DAILY
Qty: 60 G | Refills: 5 | Status: SHIPPED | OUTPATIENT
Start: 2022-08-03 | End: 2023-03-02 | Stop reason: SDUPTHER

## 2022-08-03 RX ORDER — BETAMETHASONE SODIUM PHOSPHATE AND BETAMETHASONE ACETATE 3; 3 MG/ML; MG/ML
6 INJECTION, SUSPENSION INTRA-ARTICULAR; INTRALESIONAL; INTRAMUSCULAR; SOFT TISSUE
Status: COMPLETED | OUTPATIENT
Start: 2022-08-03 | End: 2022-08-03

## 2022-08-03 RX ORDER — VALSARTAN 320 MG/1
320 TABLET ORAL DAILY
COMMUNITY
Start: 2022-06-10 | End: 2023-08-11

## 2022-08-03 RX ADMIN — BETAMETHASONE ACETATE AND BETAMETHASONE SODIUM PHOSPHATE 6 MG: 3; 3 INJECTION, SUSPENSION INTRA-ARTICULAR; INTRALESIONAL; INTRAMUSCULAR; SOFT TISSUE at 03:08

## 2022-08-03 NOTE — PROGRESS NOTES
Subjective:       Patient ID: Stephany Skinner is a 87 y.o. female.    Chief Complaint: Rash (On L. Arm and now on her neck/hairline - x 1 month PS:0 with redness and itching )    Rash  This is a new problem. The current episode started 1 to 4 weeks ago. The problem has been gradually worsening since onset. The affected locations include the left arm and neck. Past treatments include anti-itch cream and antibiotic cream. The treatment provided no relief.     Review of Systems   Skin: Positive for rash.       Objective:      Vitals:    08/03/22 1438   BP: 126/88   Pulse: 61   Resp: 18     Physical Exam  Constitutional:       Appearance: She is well-developed.   HENT:      Head: Normocephalic and atraumatic.   Eyes:      Pupils: Pupils are equal, round, and reactive to light.   Neck:      Thyroid: No thyromegaly.   Cardiovascular:      Rate and Rhythm: Normal rate and regular rhythm.      Heart sounds: Normal heart sounds. No murmur heard.    No friction rub. No gallop.   Pulmonary:      Effort: Pulmonary effort is normal.      Breath sounds: Normal breath sounds.   Musculoskeletal:         General: No tenderness. Normal range of motion.      Cervical back: Normal range of motion and neck supple.   Skin:     Comments: Papular rash on abdomen, under breast, arms consistant with poison ivy.               Assessment:       1. Eczema, unspecified type    2. Herpes zoster without complication        Plan:   1. Eczema, unspecified type  -     Ambulatory referral/consult to Dermatology  -     nystatin-triamcinolone (MYCOLOG II) cream  -     betamethasone acetate-betamethasone sodium phosphate injection 6 mg    2. Herpes zoster without complication  -     Ambulatory referral/consult to Dermatology  -     nystatin-triamcinolone (MYCOLOG II) cream  -     betamethasone acetate-betamethasone sodium phosphate injection 6 mg  -     gabapentin (NEURONTIN) 100 MG capsule

## 2022-08-31 ENCOUNTER — EXTERNAL CHRONIC CARE MANAGEMENT (OUTPATIENT)
Dept: PRIMARY CARE CLINIC | Facility: CLINIC | Age: 87
End: 2022-08-31
Payer: MEDICARE

## 2022-08-31 PROCEDURE — 99999 PR STA SHADOW: CPT | Mod: PBBFAC,,, | Performed by: FAMILY MEDICINE

## 2022-08-31 PROCEDURE — 99490 CHRNC CARE MGMT STAFF 1ST 20: CPT | Mod: PBBFAC,25 | Performed by: FAMILY MEDICINE

## 2022-08-31 PROCEDURE — 99439 CHRNC CARE MGMT STAF EA ADDL: CPT | Mod: PBBFAC | Performed by: FAMILY MEDICINE

## 2022-08-31 PROCEDURE — 99999 PR STA SHADOW: ICD-10-PCS | Mod: PBBFAC,,, | Performed by: FAMILY MEDICINE

## 2022-09-30 ENCOUNTER — EXTERNAL CHRONIC CARE MANAGEMENT (OUTPATIENT)
Dept: PRIMARY CARE CLINIC | Facility: CLINIC | Age: 87
End: 2022-09-30
Payer: MEDICARE

## 2022-09-30 PROCEDURE — 99999 PR STA SHADOW: CPT | Mod: PBBFAC,,, | Performed by: FAMILY MEDICINE

## 2022-09-30 PROCEDURE — 99999 PR STA SHADOW: ICD-10-PCS | Mod: PBBFAC,,, | Performed by: FAMILY MEDICINE

## 2022-09-30 PROCEDURE — 99490 CHRNC CARE MGMT STAFF 1ST 20: CPT | Mod: PBBFAC | Performed by: FAMILY MEDICINE

## 2022-10-17 ENCOUNTER — TELEPHONE (OUTPATIENT)
Dept: FAMILY MEDICINE | Facility: CLINIC | Age: 87
End: 2022-10-17
Payer: MEDICARE

## 2022-10-17 NOTE — TELEPHONE ENCOUNTER
----- Message from Rodney Rodriguez sent at 10/17/2022 10:48 AM CDT -----  Contact: SIster - Shira Skinner  MRN: [id number]  : 1935  PCP: Pipo Flowers  Home Phone      [phone]  Work Phone      Not on file.  Mobile          Not on file.      MESSAGE: infection - requesting to speak with nurse    Call Shira @ [phone]    PCP: Lionel

## 2022-10-19 ENCOUNTER — OFFICE VISIT (OUTPATIENT)
Dept: INTERNAL MEDICINE | Facility: CLINIC | Age: 87
End: 2022-10-19
Payer: MEDICARE

## 2022-10-19 VITALS
SYSTOLIC BLOOD PRESSURE: 124 MMHG | DIASTOLIC BLOOD PRESSURE: 84 MMHG | WEIGHT: 240.63 LBS | BODY MASS INDEX: 37.77 KG/M2 | OXYGEN SATURATION: 97 % | RESPIRATION RATE: 18 BRPM | HEART RATE: 70 BPM | HEIGHT: 67 IN

## 2022-10-19 DIAGNOSIS — E78.5 HYPERLIPIDEMIA, UNSPECIFIED HYPERLIPIDEMIA TYPE: ICD-10-CM

## 2022-10-19 DIAGNOSIS — M81.0 AGE-RELATED OSTEOPOROSIS WITHOUT CURRENT PATHOLOGICAL FRACTURE: ICD-10-CM

## 2022-10-19 DIAGNOSIS — E11.59 TYPE 2 DIABETES MELLITUS WITH OTHER CIRCULATORY COMPLICATION, WITH LONG-TERM CURRENT USE OF INSULIN: ICD-10-CM

## 2022-10-19 DIAGNOSIS — I50.32 CHRONIC DIASTOLIC CONGESTIVE HEART FAILURE: ICD-10-CM

## 2022-10-19 DIAGNOSIS — I10 ESSENTIAL HYPERTENSION: Primary | ICD-10-CM

## 2022-10-19 DIAGNOSIS — Z79.4 TYPE 2 DIABETES MELLITUS WITH OTHER CIRCULATORY COMPLICATION, WITH LONG-TERM CURRENT USE OF INSULIN: ICD-10-CM

## 2022-10-19 DIAGNOSIS — B37.2 INTERTRIGINOUS CANDIDIASIS: ICD-10-CM

## 2022-10-19 DIAGNOSIS — G70.00 MYASTHENIA GRAVIS, ADULT FORM: ICD-10-CM

## 2022-10-19 DIAGNOSIS — E03.4 HYPOTHYROIDISM DUE TO ACQUIRED ATROPHY OF THYROID: ICD-10-CM

## 2022-10-19 PROCEDURE — 99999 PR PBB SHADOW E&M-EST. PATIENT-LVL III: CPT | Mod: PBBFAC,,, | Performed by: FAMILY MEDICINE

## 2022-10-19 PROCEDURE — 99213 OFFICE O/P EST LOW 20 MIN: CPT | Mod: PBBFAC,PN | Performed by: FAMILY MEDICINE

## 2022-10-19 PROCEDURE — 99214 PR OFFICE/OUTPT VISIT, EST, LEVL IV, 30-39 MIN: ICD-10-PCS | Mod: S$PBB,,, | Performed by: FAMILY MEDICINE

## 2022-10-19 PROCEDURE — 99999 PR PBB SHADOW E&M-EST. PATIENT-LVL III: ICD-10-PCS | Mod: PBBFAC,,, | Performed by: FAMILY MEDICINE

## 2022-10-19 PROCEDURE — 99214 OFFICE O/P EST MOD 30 MIN: CPT | Mod: S$PBB,,, | Performed by: FAMILY MEDICINE

## 2022-10-19 RX ORDER — FUROSEMIDE 40 MG/1
40 TABLET ORAL DAILY
Qty: 30 TABLET | Refills: 5 | Status: SHIPPED | OUTPATIENT
Start: 2022-10-19 | End: 2023-04-19 | Stop reason: SDUPTHER

## 2022-10-19 RX ORDER — METOPROLOL SUCCINATE 100 MG/1
100 TABLET, EXTENDED RELEASE ORAL DAILY
Qty: 30 TABLET | Refills: 5 | Status: SHIPPED | OUTPATIENT
Start: 2022-10-19 | End: 2023-04-19 | Stop reason: SDUPTHER

## 2022-10-19 RX ORDER — ALENDRONATE SODIUM 35 MG/1
TABLET ORAL
Qty: 4 TABLET | Refills: 5 | Status: SHIPPED | OUTPATIENT
Start: 2022-10-19 | End: 2022-11-28 | Stop reason: DRUGHIGH

## 2022-10-19 RX ORDER — ATORVASTATIN CALCIUM 10 MG/1
10 TABLET, FILM COATED ORAL NIGHTLY
Qty: 90 TABLET | Refills: 0 | Status: SHIPPED | OUTPATIENT
Start: 2022-10-19 | End: 2022-11-30

## 2022-10-19 RX ORDER — FLUCONAZOLE 100 MG/1
100 TABLET ORAL DAILY
Qty: 7 TABLET | Refills: 0 | Status: SHIPPED | OUTPATIENT
Start: 2022-10-19 | End: 2022-10-26

## 2022-10-19 RX ORDER — AMLODIPINE BESYLATE 2.5 MG/1
2.5 TABLET ORAL DAILY
Qty: 30 TABLET | Refills: 5 | Status: SHIPPED | OUTPATIENT
Start: 2022-10-19 | End: 2022-11-28

## 2022-10-19 RX ORDER — POTASSIUM CHLORIDE 20 MEQ/1
TABLET, EXTENDED RELEASE ORAL
Qty: 30 TABLET | Refills: 5 | Status: SHIPPED | OUTPATIENT
Start: 2022-10-19 | End: 2023-04-19 | Stop reason: SDUPTHER

## 2022-10-19 RX ORDER — NYSTATIN 100000 [USP'U]/G
POWDER TOPICAL 4 TIMES DAILY
Qty: 60 G | Refills: 5 | Status: SHIPPED | OUTPATIENT
Start: 2022-10-19 | End: 2023-03-02 | Stop reason: SDUPTHER

## 2022-10-19 RX ORDER — LEVOTHYROXINE SODIUM 88 UG/1
88 TABLET ORAL DAILY
Qty: 30 TABLET | Refills: 5 | Status: SHIPPED | OUTPATIENT
Start: 2022-10-19 | End: 2022-11-28 | Stop reason: DRUGHIGH

## 2022-10-19 NOTE — PROGRESS NOTES
Subjective:       Patient ID: Stephany Skinner is a 87 y.o. female.    Chief Complaint: Rash (In groin area and under breasts - x few weeks PS:0)    Patient here for checkup.  She is concerned under her breast and in her groin area.  Patient has arthritis.  Patient having more lumbar pain.  She cannot walk more than 20 feet and then pain radiates to the legs.  Needs her walker.  Her left shoulder is more painful and cracks when she pulls herself up to standing.  She has a history of congestive heart failure and hypertension. She denies shortness of breath.  Has  Neuro diagnosed her with MG.  No longer taking prednisone,  pyridostigmine 60 mg daily.  Doing well.  Patient has type 2 diabetes and seems be doing well.  She stopped her insulin and her blood sugars look great.    She has lost over 60 lb in the last year.  She continues to lose weight.  Patient has COPD.  She was taking neb treatments in the nursing home as needed.  They did not send her home with a nebulizer.  She also has sleep apnea and is using her CPAP at least 6 hr per night.  She takes Synthroid for her hypothyroidism.  She tolerates this well.  She denies having symptoms such as heat or cold intolerance.  Her weight is stable.  She denies any swelling in her thyroid.  She tolerates her blood pressure medication as well as not having side effects such as chronic cough or dizziness.  Sometimes her systolic blood pressures less than 100.  She is not having any symptoms from it.  Patient is taking her statin every day for hyperlipidemia.  She is feeling well on the medication.  She denies side effects such as myalgias.  Has diarrhea stools once to three times daily    Review of Systems   Constitutional:  Negative for activity change and unexpected weight change.   HENT:  Negative for trouble swallowing.    Respiratory:  Negative for chest tightness.    Cardiovascular:  Positive for leg swelling.   Gastrointestinal:  Positive for diarrhea.   Endocrine:  Negative for cold intolerance and heat intolerance.   Genitourinary:  Negative for difficulty urinating.   Musculoskeletal:  Positive for arthralgias, back pain and gait problem.   Skin:  Positive for rash. Negative for wound.   Hematological:  Negative for adenopathy.   Psychiatric/Behavioral:  Negative for decreased concentration.      Objective:      Vitals:    10/19/22 0950   BP: 124/84   Pulse: 70   Resp: 18     Physical Exam  Constitutional:       Appearance: Normal appearance. She is well-developed. She is obese.   HENT:      Head: Normocephalic and atraumatic.      Right Ear: Tympanic membrane normal.      Left Ear: Tympanic membrane normal.      Mouth/Throat:      Mouth: Mucous membranes are moist.   Eyes:      Pupils: Pupils are equal, round, and reactive to light.   Cardiovascular:      Rate and Rhythm: Normal rate and regular rhythm.      Pulses: Normal pulses.      Heart sounds: Normal heart sounds. No murmur heard.    No friction rub. No gallop.   Pulmonary:      Effort: Pulmonary effort is normal.      Breath sounds: Normal breath sounds. No wheezing or rales.   Abdominal:      General: Bowel sounds are normal.      Palpations: Abdomen is soft.      Hernia: A hernia (incisional wall hernia, reducible) is present.   Musculoskeletal:      Cervical back: Neck supple.      Right lower leg: Edema present.      Left lower leg: Edema present.      Comments: Both legs are edematous.  Skin is erythematous.  Areas of the bite martin have some per Braai.  Slight draining wound from the right leg.   Skin:     General: Skin is warm.      Findings: No rash.   Neurological:      General: No focal deficit present.      Mental Status: She is alert and oriented to person, place, and time.      Cranial Nerves: No cranial nerve deficit.      Sensory: No sensory deficit.      Motor: Weakness present.      Gait: Gait abnormal.   Psychiatric:         Mood and Affect: Mood normal.         Behavior: Behavior normal.          Thought Content: Thought content normal.         Judgment: Judgment normal.       Lab Results   Component Value Date    TSH 2.368 06/01/2022     Lab Results   Component Value Date    LDLCALC 113.6 12/15/2021     BMP  Lab Results   Component Value Date     06/01/2022    K 4.8 06/01/2022     06/01/2022    CO2 23 06/01/2022    BUN 25 (H) 06/01/2022    CREATININE 1.0 06/01/2022    CALCIUM 9.1 06/01/2022    ANIONGAP 11 06/01/2022    ESTGFRAFRICA 58.9 (A) 06/01/2022    EGFRNONAA 51.1 (A) 06/01/2022     Lab Results   Component Value Date    HGBA1C 5.8 (H) 06/01/2022     Lab Results   Component Value Date    WBC 6.04 12/15/2021    HGB 12.3 12/15/2021    HCT 37.7 12/15/2021     (H) 12/15/2021     12/15/2021     Assessment:       1. Essential hypertension    2. Myasthenia gravis, adult form    3. Age-related osteoporosis without current pathological fracture    4. Chronic diastolic congestive heart failure    5. Hyperlipidemia, unspecified hyperlipidemia type    6. Hypothyroidism due to acquired atrophy of thyroid    7. Type 2 diabetes mellitus with other circulatory complication, with long-term current use of insulin    8. Intertriginous candidiasis        Plan:   Stephany CARRILLO was seen today for sore on toe.    Diagnoses and all orders for this visit:    Venous stasis dermatitis  Elevate legs  Were support stockings diligently  If redness and swelling worsens, patient will need Unna boots    Obstructive sleep apnea  Continue CPAP at current pressure.  Patient is compliant.    Type 2 diabetes  No meds for now  Check hemoglobin A1c every 6 months    Essential hypertension/CHF  Continue Lasix 40 mg, Toprol- mg,  Continue potassium  Amlodipine 2.5 mg po daily    Hypothyroidism due to acquired atrophy of thyroid  Continue Synthroid 88 µg by mouth daily    Hyperlipidemia, unspecified hyperlipidemia type  Continue fish oil  Continue Lipitor 10 mg    Myasthenia gravis  No longer on prednisone,  Mestinon  Keep appointment with neurology    COPD  Continue DuoNeb treatments when the nebulizer on his    Congestive heart failure/atrial fibrillation  Continue Eliquis    Hypothyroidism due to acquired atrophy of thyroid  -     levothyroxine (EUTHYROX) 88 MCG tablet; Take 1 tablet (88 mcg total) by mouth once daily.  Dispense: 30 tablet; Refill: 5  -     TSH; Future; Expected date: 05/27/2022    Age-related osteoporosis without current pathological fracture  -     alendronate (FOSAMAX) 35 MG tablet; TAKE 1 TABLET BY MOUTH ONCE EVERY 7 DAYS  Dispense: 4 tablet; Refill: 5    Essential hypertension  -     furosemide (LASIX) 40 MG tablet; Take 1 tablet (40 mg total) by mouth once daily.  Dispense: 30 tablet; Refill: 5  -     potassium chloride SA (K-DUR,KLOR-CON) 20 MEQ tablet; 1 Tab po daily except Sunday  Dispense: 30 tablet; Refill: 5  -     metoprolol succinate (TOPROL-XL) 100 MG 24 hr tablet; Take 1 tablet (100 mg total) by mouth once daily.  Dispense: 30 tablet; Refill: 5  -     amLODIPine (NORVASC) 2.5 MG tablet; Take 1 tablet (2.5 mg total) by mouth once daily.  Dispense: 30 tablet; Refill: 5  -     Comprehensive Metabolic Panel; Future; Expected date: 05/27/2022    Chronic diastolic congestive heart failure  -     furosemide (LASIX) 40 MG tablet; Take 1 tablet (40 mg total) by mouth once daily.  Dispense: 30 tablet; Refill: 5  -     potassium chloride SA (K-DUR,KLOR-CON) 20 MEQ tablet; 1 Tab po daily except Sunday  Dispense: 30 tablet; Refill: 5  -     metoprolol succinate (TOPROL-XL) 100 MG 24 hr tablet; Take 1 tablet (100 mg total) by mouth once daily.  Dispense: 30 tablet; Refill: 5    Hyperlipidemia, unspecified hyperlipidemia type  -     atorvastatin (LIPITOR) 10 MG tablet; Take 1 tablet (10 mg total) by mouth every evening.  Dispense: 30 tablet; Refill: 5    Chronic atrial fibrillation  This is stable on current meds    Type 2 diabetes mellitus with other circulatory complication, with long-term current  use of insulin  -     Hemoglobin A1C; Future; Expected date: 05/27/2022    Overweight(278.02)  Encouraged patient to decrease caloric intake    1. Essential hypertension  -     furosemide (LASIX) 40 MG tablet; Take 1 tablet (40 mg total) by mouth once daily.  Dispense: 30 tablet; Refill: 5  -     metoprolol succinate (TOPROL-XL) 100 MG 24 hr tablet; Take 1 tablet (100 mg total) by mouth once daily.  Dispense: 30 tablet; Refill: 5  -     potassium chloride SA (K-DUR,KLOR-CON) 20 MEQ tablet; 1 Tab po daily except Sunday  Dispense: 30 tablet; Refill: 5  -     amLODIPine (NORVASC) 2.5 MG tablet; Take 1 tablet (2.5 mg total) by mouth once daily.  Dispense: 30 tablet; Refill: 5    2. Myasthenia gravis, adult form  Comments:  No treatment necessary per neurology    3. Age-related osteoporosis without current pathological fracture  -     alendronate (FOSAMAX) 35 MG tablet; TAKE 1 TABLET BY MOUTH ONCE EVERY 7 DAYS  Dispense: 4 tablet; Refill: 5    4. Chronic diastolic congestive heart failure  -     furosemide (LASIX) 40 MG tablet; Take 1 tablet (40 mg total) by mouth once daily.  Dispense: 30 tablet; Refill: 5  -     metoprolol succinate (TOPROL-XL) 100 MG 24 hr tablet; Take 1 tablet (100 mg total) by mouth once daily.  Dispense: 30 tablet; Refill: 5  -     potassium chloride SA (K-DUR,KLOR-CON) 20 MEQ tablet; 1 Tab po daily except Sunday  Dispense: 30 tablet; Refill: 5    5. Hyperlipidemia, unspecified hyperlipidemia type  -     atorvastatin (LIPITOR) 10 MG tablet; Take 1 tablet (10 mg total) by mouth every evening.  Dispense: 90 tablet; Refill: 0    6. Hypothyroidism due to acquired atrophy of thyroid  -     levothyroxine (EUTHYROX) 88 MCG tablet; Take 1 tablet (88 mcg total) by mouth once daily.  Dispense: 30 tablet; Refill: 5    7. Type 2 diabetes mellitus with other circulatory complication, with long-term current use of insulin    8. Intertriginous candidiasis  -     fluconazole (DIFLUCAN) 100 MG tablet; Take 1 tablet  (100 mg total) by mouth once daily. for 7 days  Dispense: 7 tablet; Refill: 0  -     nystatin (MYCOSTATIN) powder; Apply topically 4 (four) times daily.  Dispense: 60 g; Refill: 5       Return to clinic in 6 month.

## 2022-10-31 ENCOUNTER — EXTERNAL CHRONIC CARE MANAGEMENT (OUTPATIENT)
Dept: PRIMARY CARE CLINIC | Facility: CLINIC | Age: 87
End: 2022-10-31
Payer: MEDICARE

## 2022-10-31 PROCEDURE — 99999 PR STA SHADOW: CPT | Mod: PBBFAC,,, | Performed by: FAMILY MEDICINE

## 2022-10-31 PROCEDURE — 99490 CHRNC CARE MGMT STAFF 1ST 20: CPT | Mod: PBBFAC,25 | Performed by: FAMILY MEDICINE

## 2022-10-31 PROCEDURE — 99999 PR STA SHADOW: ICD-10-PCS | Mod: PBBFAC,,, | Performed by: FAMILY MEDICINE

## 2022-10-31 PROCEDURE — 99439 CHRNC CARE MGMT STAF EA ADDL: CPT | Mod: PBBFAC | Performed by: FAMILY MEDICINE

## 2022-11-28 ENCOUNTER — OFFICE VISIT (OUTPATIENT)
Dept: NEUROLOGY | Facility: CLINIC | Age: 87
End: 2022-11-28
Payer: MEDICARE

## 2022-11-28 VITALS
RESPIRATION RATE: 16 BRPM | DIASTOLIC BLOOD PRESSURE: 84 MMHG | HEIGHT: 66 IN | SYSTOLIC BLOOD PRESSURE: 138 MMHG | WEIGHT: 248.25 LBS | HEART RATE: 64 BPM | BODY MASS INDEX: 39.9 KG/M2

## 2022-11-28 DIAGNOSIS — H54.7 UNSPECIFIED VISUAL LOSS: ICD-10-CM

## 2022-11-28 DIAGNOSIS — M54.16 LUMBAR RADICULOPATHY: ICD-10-CM

## 2022-11-28 DIAGNOSIS — H53.131 SUDDEN VISUAL LOSS, RIGHT EYE: ICD-10-CM

## 2022-11-28 DIAGNOSIS — E53.8 B12 DEFICIENCY: ICD-10-CM

## 2022-11-28 DIAGNOSIS — G56.03 BILATERAL CARPAL TUNNEL SYNDROME: ICD-10-CM

## 2022-11-28 DIAGNOSIS — H54.7 VISUAL LOSS: ICD-10-CM

## 2022-11-28 DIAGNOSIS — G70.00 MYASTHENIA GRAVIS, ADULT FORM: Primary | ICD-10-CM

## 2022-11-28 PROCEDURE — 99999 PR PBB SHADOW E&M-EST. PATIENT-LVL IV: CPT | Mod: PBBFAC,,, | Performed by: PSYCHIATRY & NEUROLOGY

## 2022-11-28 PROCEDURE — 99214 OFFICE O/P EST MOD 30 MIN: CPT | Mod: PBBFAC | Performed by: PSYCHIATRY & NEUROLOGY

## 2022-11-28 PROCEDURE — 99999 PR STA SHADOW: CPT | Mod: PBBFAC,,, | Performed by: PSYCHIATRY & NEUROLOGY

## 2022-11-28 PROCEDURE — 99999 PR STA SHADOW: ICD-10-PCS | Mod: PBBFAC,,, | Performed by: PSYCHIATRY & NEUROLOGY

## 2022-11-28 PROCEDURE — 99214 OFFICE O/P EST MOD 30 MIN: CPT | Mod: S$PBB | Performed by: PSYCHIATRY & NEUROLOGY

## 2022-11-28 RX ORDER — NITROGLYCERIN 0.4 MG/1
0.4 TABLET SUBLINGUAL EVERY 5 MIN PRN
COMMUNITY

## 2022-11-28 RX ORDER — ATORVASTATIN CALCIUM 20 MG/1
20 TABLET, FILM COATED ORAL NIGHTLY
COMMUNITY
Start: 2022-11-14

## 2022-11-28 RX ORDER — ALENDRONATE SODIUM 70 MG/1
70 TABLET ORAL
COMMUNITY
End: 2023-10-16

## 2022-11-28 NOTE — PROGRESS NOTES
"HPI:  Stephany Skinner is a 87 y.o. female with  increased falls, some lumbar radicular pain.  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 Admitted for ARF with hypercapnea, ptosis, and gaze restriction in 1/2018; response to steroids suggestive of myasthenia, though antibodies are negative, and EMG with repetitve stimulation from 7/2018 was inconclusive. PUJA during 1/2018 admit, secondary to overdiuresis. She has JOY, DM, HTN, HLD, hypothyroidism, B12 deficiency, and A-fib.     Here for yearly follow up      No hospitalizations this past year    No diplopia, no weakness, no speech or swallowing problems    She has some lower back pain for which she takes tylenol    CTS symptoms are not bothersome. Does have some numb right 3 fingers    Neuorpathy pain is not active    Still taking B12    No Falls    She started with right eye blindness 3 weeks ago and went to her eye doctor (Dr Graham) at a scheduled appointment 3 days later. She deferred going to the hospital .    Her vision was at first out at the right lower vision. She was told it could have been "blood or a TIA."    Does see some spots in her vision field but right lower eye is black and blind    She is currently not driving    She was told to see her cardiologist.     She followed up with Dr Patel who ordered a Carotid US and an echo with bubble study and she is pending fasting labs and increased her valsartan and statin doses    She has been on Eliquis for years and has skipped one dose at some point.  She is allergic to ASA. On full dose        Review of Systems   Constitutional:  Negative for fever.   HENT:  Negative for nosebleeds.    Eyes:  Positive for blurred vision. Negative for double vision.   Respiratory:  Negative for hemoptysis.    Cardiovascular:  Negative for leg " swelling.   Gastrointestinal:  Negative for blood in stool.   Genitourinary:  Negative for hematuria.   Musculoskeletal:  Negative for falls.   Skin:  Negative for itching.   Neurological:  Negative for seizures.   Endo/Heme/Allergies:  Does not bruise/bleed easily.   Psychiatric/Behavioral:  The patient does not have insomnia.        Exam:  Gen Appearance, well developed/nourished in no apparent distress  CV: 2+ distal pulses with no edema or swelling  Neuro:  MS: Awake, alert,Sustains attention. Recent/remote memory intact, Language is full to spontaneous speech/comprehension. Fund of Knowledge is full  CN: Optic discs are not viewed due to misosis,  PERRL, Extraoccular movements and visual fields are full-no gaze restriction today. Normal facial sensation and strength-no ptosis, Hearing symmetric, Tongue and Palate are midline and strong. Shoulder Shrug symmetric and strong.  Motor: Normal bulk, tone, no abnormal movements. 5/5 strength bilateral upper/lower extremities with 1+ reflexes in the arms, and are less in the legs and bilateral plantar response  Sensory: symmetric to light touch, pain, temp, and vibration/proprioception except decreased sensation in the legs. Romberg negative  Cerebellar: Finger-nose,Heal-shin, Rapid alternating movements intact  Gait: arthralgic gait; ambulates with walker but can walk well without walker as well    Imagin/16 CT L spine: Multilevel degenerative changes of the lumbar spine without evidence for fracture or subluxation.  There is likely a component of mild neural foraminal narrowing and central canal stenosis at the L3-4 and L4-5 levels.  This could be better evaluated on a nonemergent basis with MRI.    2016 MRI L spine: Multilevel degenerative changes of the lumbar spine contributing to central canal stenosis and neural foraminal narrowing as detailed in the above report.    Edema-like signal about the posterior elements on the right at L5-S1 which can be a  source of pain.    7[place] [facility] [place]:   Slow repetitive stimulation (3 Htz) performed at baseline and at one minute post exercise intervals at the left ADM and left trapezius muscles revealed significant CMAP detriment at the ADM, but none at the trapezius.     Impression:   The results of the study are inconclusive. There is no definite EP evidence of a neuromuscular junction pathology.     6/2016 EMG legs:   1. Mild Bilateral Carpal Tunnel Syndrome  2. 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 Bilateral Lumbar Radiculopathy best localizing from L4-5 on this study    Xray chest over time: no thymoma and CTA chest 2018: Bilateral groundglass opacities within the lung fields.  Minimal dependent subsegmental atelectasis.  No pleural effusion or pneumothorax.    Labs: 10/2016  glucose 136. Reviewed CMP, CBC TSh   SPEP normal  B12 low normal    2019 BMP, CBC reviewed    11/2018 BMP with alt reviewed  12/2019 A1C normal      Assessment/Plan: Stephany Skinner is a 87 y.o. female with   falls, some lumbar radicular pain.  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 Admitted for ARF with hypercapnea, ptosis, and gaze restriction in 1/2018; response to steroids suggestive of myasthenia, though antibodies are negative, and EMG with repetitve stimulation from 7/2018 was inconclusive. PUJA during 1/2018 admit, secondary to overdiuresis. She has JOY, DM, HTN, HLD, hypothyroidism, B12 deficiency, and A-fib.     I recommend:   1. Despite negative MG antibodies and inconclusive EMG with rep stim findings without definitive evidence of neuromuscular junction disorer, her clinical picture was suggestive of myasthenia gravis- but is currently improved. No active symptoms- NOT CERTAIN OF  THIS DIAGNOSIS    2. Stopped Mestinon without any diplopia or weakness. She has chronic diarrhea, unrelated to mestinon use  -she has had no worsening with tapering/ being off prednisone  - Will restart for any further symptoms   - If she needs to remain on steroids long term, could consider steroid sparing therapy, such as Imuran versus Cellcept. She has DM, though well controlled currently. She will follow with PCP for management of DM2  -Advised on symptoms of myasthenic crisis and when to report to the ER    3. Wearing brace nightly for CTS symptoms PRN but improved with CT injection per pain management prior- can see pain management back PRN    4. Continue B12 for low normal B12 levels. Follow  B12 level per orders. Her DM could be causing her neuropathy.  DM treated per PCP.     5. Saw pain management for  Lumbar radiculopathy prior. OTC topical and PT helped reduce pain greatly as well as falls prior. Can see pain management back at any point if desired.   -Fall precautions reviewed prior-- using walker.  - No pain from neuropathy currently.    6. She presents today with 3 weeks of right eye lower field blindness  -Told to see cardiology by her ophthalmologist, Dr Graham  -She followed up with Dr Patel who ordered a Carotid US and an echo with bubble study and she is pending fasting labs and he increased her valsartan and statin doses. Will follow results when able.  -MRI brain needed with MRA brain  -Visual fields are unremarkable grossly on exam today but she reports persistent symptoms  - The patient was instructed to dial 911 for any signs or symptoms of stroke such as sudden weakness, numbness, dizziness, speech, gait, or visual changes  -She has been on Eliquis for years for stroke prevention due to her afib and does recall a skipped dosing at least once recently. Has been on full dose.  She is allergic to ASA. She is compliant with daily dosing now    RTC 6 weeks

## 2022-11-30 ENCOUNTER — OFFICE VISIT (OUTPATIENT)
Dept: INTERNAL MEDICINE | Facility: CLINIC | Age: 87
End: 2022-11-30
Payer: MEDICARE

## 2022-11-30 ENCOUNTER — EXTERNAL CHRONIC CARE MANAGEMENT (OUTPATIENT)
Dept: PRIMARY CARE CLINIC | Facility: CLINIC | Age: 87
End: 2022-11-30
Payer: MEDICARE

## 2022-11-30 VITALS
HEIGHT: 66 IN | BODY MASS INDEX: 40.02 KG/M2 | HEART RATE: 87 BPM | SYSTOLIC BLOOD PRESSURE: 120 MMHG | WEIGHT: 249 LBS | RESPIRATION RATE: 18 BRPM | DIASTOLIC BLOOD PRESSURE: 62 MMHG | OXYGEN SATURATION: 97 %

## 2022-11-30 DIAGNOSIS — I10 PRIMARY HYPERTENSION: Primary | ICD-10-CM

## 2022-11-30 DIAGNOSIS — G47.33 OBSTRUCTIVE SLEEP APNEA (ADULT) (PEDIATRIC): ICD-10-CM

## 2022-11-30 DIAGNOSIS — E11.59 TYPE 2 DIABETES MELLITUS WITH OTHER CIRCULATORY COMPLICATION, WITH LONG-TERM CURRENT USE OF INSULIN: ICD-10-CM

## 2022-11-30 DIAGNOSIS — G70.00 MYASTHENIA GRAVIS, ADULT FORM: ICD-10-CM

## 2022-11-30 DIAGNOSIS — Z79.4 TYPE 2 DIABETES MELLITUS WITH OTHER CIRCULATORY COMPLICATION, WITH LONG-TERM CURRENT USE OF INSULIN: ICD-10-CM

## 2022-11-30 DIAGNOSIS — E78.2 MIXED HYPERLIPIDEMIA: ICD-10-CM

## 2022-11-30 DIAGNOSIS — E03.4 HYPOTHYROIDISM DUE TO ACQUIRED ATROPHY OF THYROID: ICD-10-CM

## 2022-11-30 PROBLEM — L89.893 PRESSURE INJURY OF RIGHT FOOT, STAGE 3: Status: RESOLVED | Noted: 2021-12-10 | Resolved: 2022-11-30

## 2022-11-30 PROCEDURE — 99999 PR STA SHADOW: ICD-10-PCS | Mod: PBBFAC,,, | Performed by: FAMILY MEDICINE

## 2022-11-30 PROCEDURE — 99214 OFFICE O/P EST MOD 30 MIN: CPT | Mod: PBBFAC,PN | Performed by: FAMILY MEDICINE

## 2022-11-30 PROCEDURE — 99490 CHRNC CARE MGMT STAFF 1ST 20: CPT | Mod: PBBFAC | Performed by: FAMILY MEDICINE

## 2022-11-30 PROCEDURE — 99999 PR PBB SHADOW E&M-EST. PATIENT-LVL IV: CPT | Mod: PBBFAC,,, | Performed by: FAMILY MEDICINE

## 2022-11-30 PROCEDURE — 99999 PR PBB SHADOW E&M-EST. PATIENT-LVL IV: ICD-10-PCS | Mod: PBBFAC,,, | Performed by: FAMILY MEDICINE

## 2022-11-30 PROCEDURE — 99214 PR OFFICE/OUTPT VISIT, EST, LEVL IV, 30-39 MIN: ICD-10-PCS | Mod: S$PBB,,, | Performed by: FAMILY MEDICINE

## 2022-11-30 PROCEDURE — 99999 PR STA SHADOW: CPT | Mod: PBBFAC,,, | Performed by: FAMILY MEDICINE

## 2022-11-30 PROCEDURE — 99214 OFFICE O/P EST MOD 30 MIN: CPT | Mod: S$PBB,,, | Performed by: FAMILY MEDICINE

## 2022-11-30 NOTE — PROGRESS NOTES
Subjective:       Patient ID: Stephany Skinner is a 87 y.o. female.    Chief Complaint: Follow-up (Pt here for 6 mth f/u and wants to consult with you about getting a MRI of the brain.)    Patient here for checkup.  She lost her vision in the right eye 2 weeks ago.  Being worked up for CVA.  Saw opth, neuro, Card.  Scheduled for BW, Echo with bubble, MRI.    Patient has arthritis.  Patient having more lumbar pain.  She cannot walk more than 20 feet and then pain radiates to the legs.  Needs her walker.  Her left shoulder is more painful and cracks when she pulls herself up to standing.  She has a history of congestive heart failure and hypertension. She denies shortness of breath.  Has  Neuro diagnosed her with MG.  No longer taking prednisone,  pyridostigmine 60 mg daily.  Doing well.  Patient has type 2 diabetes and seems be doing well.  She stopped her insulin and her blood sugars look great.    She has lost over 60 lb in the last year.  She continues to lose weight.  Patient has COPD.  She was taking neb treatments in the nursing home as needed.  They did not send her home with a nebulizer.  She also has sleep apnea and is using her CPAP at least 6 hr per night.  She takes Synthroid for her hypothyroidism.  She tolerates this well.  She denies having symptoms such as heat or cold intolerance.  Her weight is stable.  She denies any swelling in her thyroid.  She tolerates her blood pressure medication as well as not having side effects such as chronic cough or dizziness.  She is not having any symptoms from it.  Patient is taking her statin every day for hyperlipidemia.  She is feeling well on the medication.  She denies side effects such as myalgias.  Has diarrhea stools once to three times daily    Review of Systems   Constitutional:  Negative for activity change and unexpected weight change.   HENT:  Negative for trouble swallowing.    Respiratory:  Negative for chest tightness.    Cardiovascular:  Positive for  leg swelling.   Gastrointestinal:  Positive for diarrhea.   Endocrine: Negative for cold intolerance and heat intolerance.   Genitourinary:  Negative for difficulty urinating.   Musculoskeletal:  Positive for arthralgias, back pain and gait problem.   Skin:  Positive for rash. Negative for wound.   Hematological:  Negative for adenopathy.   Psychiatric/Behavioral:  Negative for decreased concentration.      Objective:      Vitals:    11/30/22 0819   BP: 120/62   Pulse: 87   Resp: 18     Physical Exam  Constitutional:       Appearance: Normal appearance. She is well-developed. She is obese.   HENT:      Head: Normocephalic and atraumatic.      Right Ear: Tympanic membrane normal.      Left Ear: Tympanic membrane normal.      Mouth/Throat:      Mouth: Mucous membranes are moist.   Eyes:      Pupils: Pupils are equal, round, and reactive to light.   Cardiovascular:      Rate and Rhythm: Normal rate and regular rhythm.      Pulses: Normal pulses.      Heart sounds: Normal heart sounds. No murmur heard.    No friction rub. No gallop.   Pulmonary:      Effort: Pulmonary effort is normal.      Breath sounds: Normal breath sounds. No wheezing or rales.   Abdominal:      General: Bowel sounds are normal.      Palpations: Abdomen is soft.      Hernia: A hernia (incisional wall hernia, reducible) is present.   Musculoskeletal:      Cervical back: Neck supple.      Right lower leg: No edema.      Left lower leg: No edema.      Comments: Wearing support stockings   Skin:     General: Skin is warm.      Findings: No rash.   Neurological:      General: No focal deficit present.      Mental Status: She is alert and oriented to person, place, and time.      Cranial Nerves: No cranial nerve deficit.      Sensory: No sensory deficit.      Motor: Weakness present.      Gait: Gait abnormal.   Psychiatric:         Mood and Affect: Mood normal.         Behavior: Behavior normal.         Thought Content: Thought content normal.          Judgment: Judgment normal.       Lab Results   Component Value Date    TSH 2.368 06/01/2022     Lab Results   Component Value Date    LDLCALC 113.6 12/15/2021     BMP  Lab Results   Component Value Date     06/01/2022    K 4.8 06/01/2022     06/01/2022    CO2 23 06/01/2022    BUN 25 (H) 06/01/2022    CREATININE 1.0 06/01/2022    CALCIUM 9.1 06/01/2022    ANIONGAP 11 06/01/2022    ESTGFRAFRICA 58.9 (A) 06/01/2022    EGFRNONAA 51.1 (A) 06/01/2022     Lab Results   Component Value Date    HGBA1C 5.8 (H) 06/01/2022     Lab Results   Component Value Date    WBC 6.04 12/15/2021    HGB 12.3 12/15/2021    HCT 37.7 12/15/2021     (H) 12/15/2021     12/15/2021     Assessment:       1. Primary hypertension    2. Mixed hyperlipidemia    3. Hypothyroidism due to acquired atrophy of thyroid    4. Obstructive sleep apnea (adult) (pediatric)    5. Type 2 diabetes mellitus with other circulatory complication, with long-term current use of insulin    6. Myasthenia gravis, adult form          Plan:   Stephany CARRILLO was seen today for sore on toe.    Diagnoses and all orders for this visit:    Venous stasis dermatitis  Elevate legs  Were support stockings diligently  If redness and swelling worsens, patient will need Unna boots    COPD  Continue DuoNeb treatments when the nebulizer on his    Congestive heart failure/atrial fibrillation  Continue Eliquis    Age-related osteoporosis without current pathological fracture  -     alendronate (FOSAMAX) 35 MG tablet; TAKE 1 TABLET BY MOUTH ONCE EVERY 7 DAYS  Dispense: 4 tablet; Refill: 5    Overweight(278.02)  Encouraged patient to decrease caloric intake    1. Primary hypertension  Assessment & Plan:  Essential hypertension/CHF  Continue Lasix 40 mg, Toprol- mg,  Continue potassium  Amlodipine 2.5 mg po daily  Valsartan 160 mg po daily  Keep appointment with Cardiology      2. Mixed hyperlipidemia  Assessment & Plan:  Continue fish oil  Continue Lipitor 20 mg      3.  Hypothyroidism due to acquired atrophy of thyroid  Assessment & Plan:  Hypothyroidism due to acquired atrophy of thyroid  -     levothyroxine (EUTHYROX) 88 MCG tablet; Take 1 tablet (88 mcg total) by mouth once daily.       4. Obstructive sleep apnea (adult) (pediatric)  Assessment & Plan:  Continue CPAP at current pressure      5. Type 2 diabetes mellitus with other circulatory complication, with long-term current use of insulin  Assessment & Plan:  Patient no longer requires medications for this.  Her last A1c was 5.8.   Check hemoglobin A1c every 12 months.      6. Myasthenia gravis, adult form  Assessment & Plan:  No longer on prednisone, Mestinon  Keep appointment with neurology           Return to clinic in 6 month.     HTN (hypertension)  Essential hypertension/CHF  Continue Lasix 40 mg, Toprol- mg,  Continue potassium  Amlodipine 2.5 mg po daily  Valsartan 160 mg po daily  Keep appointment with Cardiology    Hypothyroidism  Hypothyroidism due to acquired atrophy of thyroid  -     levothyroxine (EUTHYROX) 88 MCG tablet; Take 1 tablet (88 mcg total) by mouth once daily.     Type 2 diabetes mellitus with circulatory disorder, with long-term current use of insulin  Patient no longer requires medications for this.  Her last A1c was 5.8.   Check hemoglobin A1c every 12 months.    Hyperlipidemia  Continue fish oil  Continue Lipitor 20 mg    Myasthenia gravis, adult form  No longer on prednisone, Mestinon  Keep appointment with neurology    Obstructive sleep apnea (adult) (pediatric)  Continue CPAP at current pressure

## 2022-11-30 NOTE — ASSESSMENT & PLAN NOTE
Hypothyroidism due to acquired atrophy of thyroid  -     levothyroxine (EUTHYROX) 88 MCG tablet; Take 1 tablet (88 mcg total) by mouth once daily.

## 2022-11-30 NOTE — ASSESSMENT & PLAN NOTE
Essential hypertension/CHF  Continue Lasix 40 mg, Toprol- mg,  Continue potassium  Amlodipine 2.5 mg po daily  Valsartan 160 mg po daily  Keep appointment with Cardiology

## 2022-11-30 NOTE — ASSESSMENT & PLAN NOTE
Patient no longer requires medications for this.  Her last A1c was 5.8.   Check hemoglobin A1c every 12 months.   Prep complete for RHC. Pt has ride set up post procedure.

## 2022-12-05 ENCOUNTER — HOSPITAL ENCOUNTER (OUTPATIENT)
Dept: RADIOLOGY | Facility: HOSPITAL | Age: 87
Discharge: HOME | End: 2022-12-05
Attending: PSYCHIATRY & NEUROLOGY
Payer: MEDICARE

## 2022-12-05 ENCOUNTER — TELEPHONE (OUTPATIENT)
Dept: NEUROLOGY | Facility: CLINIC | Age: 87
End: 2022-12-05
Payer: MEDICARE

## 2022-12-05 DIAGNOSIS — H54.7 UNSPECIFIED VISUAL LOSS: ICD-10-CM

## 2022-12-05 DIAGNOSIS — H53.131 SUDDEN VISUAL LOSS, RIGHT EYE: ICD-10-CM

## 2022-12-05 NOTE — TELEPHONE ENCOUNTER
----- Message from Sophia Khan sent at 2022  8:38 AM CST -----  Contact: SISTER - CADEN Skinner  MRN: [id number]  : 1935  PCP: Pipo Flowers  Home Phone      [phone]  Work Phone      Not on file.  Mobile          Not on file.      MESSAGE: FYI:  Patient was unable to do the MRI this morning that Dr. Ortiz ordered due to coughing too much.  She will call when she is better to get it rescheduled.        Phone: [phone]

## 2022-12-07 ENCOUNTER — HOSPITAL ENCOUNTER (EMERGENCY)
Facility: HOSPITAL | Age: 87
Discharge: HOME | End: 2022-12-07
Attending: SURGERY
Payer: MEDICARE

## 2022-12-07 VITALS
RESPIRATION RATE: 20 BRPM | SYSTOLIC BLOOD PRESSURE: 103 MMHG | HEART RATE: 98 BPM | WEIGHT: 246.94 LBS | DIASTOLIC BLOOD PRESSURE: 56 MMHG | OXYGEN SATURATION: 98 % | TEMPERATURE: 98 F | BODY MASS INDEX: 39.85 KG/M2

## 2022-12-07 DIAGNOSIS — R05.9 COUGH: ICD-10-CM

## 2022-12-07 LAB
GROUP A STREP, MOLECULAR: NEGATIVE
INFLUENZA A, MOLECULAR: NEGATIVE
INFLUENZA B, MOLECULAR: NEGATIVE
SARS-COV-2 RDRP RESP QL NAA+PROBE: NEGATIVE
SPECIMEN SOURCE: NORMAL

## 2022-12-07 PROCEDURE — 87651 STREP A DNA AMP PROBE: CPT | Performed by: SURGERY

## 2022-12-07 PROCEDURE — 63600175 PHARM REV CODE 636 W HCPCS: Performed by: SURGERY

## 2022-12-07 PROCEDURE — U0002 COVID-19 LAB TEST NON-CDC: HCPCS | Performed by: SURGERY

## 2022-12-07 PROCEDURE — 87502 INFLUENZA DNA AMP PROBE: CPT | Performed by: SURGERY

## 2022-12-07 PROCEDURE — 25000242 PHARM REV CODE 250 ALT 637 W/ HCPCS: Performed by: SURGERY

## 2022-12-07 PROCEDURE — 96372 THER/PROPH/DIAG INJ SC/IM: CPT | Performed by: SURGERY

## 2022-12-07 PROCEDURE — 99284 EMERGENCY DEPT VISIT MOD MDM: CPT | Mod: 25

## 2022-12-07 PROCEDURE — 94640 AIRWAY INHALATION TREATMENT: CPT

## 2022-12-07 RX ORDER — BENZONATATE 100 MG/1
200 CAPSULE ORAL 3 TIMES DAILY PRN
Qty: 20 CAPSULE | Refills: 0 | Status: SHIPPED | OUTPATIENT
Start: 2022-12-07 | End: 2022-12-17

## 2022-12-07 RX ORDER — METHYLPREDNISOLONE SOD SUCC 125 MG
125 VIAL (EA) INJECTION
Status: COMPLETED | OUTPATIENT
Start: 2022-12-07 | End: 2022-12-07

## 2022-12-07 RX ORDER — CEFTRIAXONE 1 G/1
1 INJECTION, POWDER, FOR SOLUTION INTRAMUSCULAR; INTRAVENOUS
Status: COMPLETED | OUTPATIENT
Start: 2022-12-07 | End: 2022-12-07

## 2022-12-07 RX ORDER — ALBUTEROL SULFATE 0.83 MG/ML
2.5 SOLUTION RESPIRATORY (INHALATION) EVERY 6 HOURS PRN
Qty: 30 EACH | Refills: 0 | Status: SHIPPED | OUTPATIENT
Start: 2022-12-07 | End: 2023-01-09 | Stop reason: SDUPTHER

## 2022-12-07 RX ORDER — DOXYCYCLINE 100 MG/1
100 CAPSULE ORAL 2 TIMES DAILY
Qty: 20 CAPSULE | Refills: 0 | Status: SHIPPED | OUTPATIENT
Start: 2022-12-07 | End: 2022-12-17

## 2022-12-07 RX ORDER — ALBUTEROL SULFATE 0.83 MG/ML
10 SOLUTION RESPIRATORY (INHALATION)
Status: COMPLETED | OUTPATIENT
Start: 2022-12-07 | End: 2022-12-07

## 2022-12-07 RX ADMIN — ALBUTEROL SULFATE 10 MG: 2.5 SOLUTION RESPIRATORY (INHALATION) at 08:12

## 2022-12-07 RX ADMIN — METHYLPREDNISOLONE SODIUM SUCCINATE 125 MG: 125 INJECTION, POWDER, FOR SOLUTION INTRAMUSCULAR; INTRAVENOUS at 08:12

## 2022-12-07 RX ADMIN — CEFTRIAXONE SODIUM 1 G: 1 INJECTION, POWDER, FOR SOLUTION INTRAMUSCULAR; INTRAVENOUS at 09:12

## 2022-12-07 NOTE — ED TRIAGE NOTES
87 y.o. female presents to ER   Chief Complaint   Patient presents with    Shortness of Breath     Reports started with a cold on Monday. Reports SOB started this morning   Pt reports frequent productive cough. No acute distress noted.

## 2022-12-07 NOTE — ED PROVIDER NOTES
Encounter Date: 12/7/2022       History     Chief Complaint   Patient presents with    Shortness of Breath     Reports started with a cold on Monday. Reports SOB started this morning     87-year-old female presents with cough & congestion today  Patient felt short of breath with coughing spell this morning  No obvious signs of distress, no fever, no hypoxia noted now  No obvious signs of sputum production, dry hacking cough  Denies any COVID or flu symptoms on initial interview today    Review of patient's allergies indicates:   Allergen Reactions    Statins-hmg-coa reductase inhibitors      Other reaction(s): Unknown     Past Medical History:   Diagnosis Date    CHF (congestive heart failure)     Early stage nonexudative age-related macular degeneration of both eyes 2018    GERD (gastroesophageal reflux disease)     Hyperlipidemia     Hypertension     JOY (obstructive sleep apnea)      Past Surgical History:   Procedure Laterality Date    CATARACT EXTRACTION W/  INTRAOCULAR LENS IMPLANT Right 12/12/2019    Procedure: EXTRACTION, CATARACT, WITH IOL INSERTION;  Surgeon: Michael Arellano MD;  Location: [facility];  Service: Ophthalmology;  Laterality: Right;    CATARACT EXTRACTION W/  INTRAOCULAR LENS IMPLANT Left 2/13/2020    Procedure: EXTRACTION, CATARACT, WITH IOL INSERTION;  Surgeon: Michael Arellano MD;  Location: [facility];  Service: Ophthalmology;  Laterality: Left;    CHOLECYSTECTOMY      ENDOSCOPIC ULTRASOUND OF UPPER GASTROINTESTINAL TRACT N/A 11/4/2020    Procedure: ULTRASOUND, ENDOSCOPIC, UPPER GI TRACT;  Surgeon: Thierry Saunders MD;  Location: [facility];  Service: Endoscopy;  Laterality: N/A;  covid 11/1 [facility]    HERNIA REPAIR      HYSTERECTOMY      knee replacemene      deidra    PHACOEMULSIFICATION OF CATARACT Right 12/12/2019    Procedure: PHACOEMULSIFICATION, CATARACT;  Surgeon: Michael Arellano MD;  Location: [facility];  Service: Ophthalmology;  Laterality: Right;    PHACOEMULSIFICATION OF CATARACT Left 2/13/2020     Procedure: PHACOEMULSIFICATION, CATARACT;  Surgeon: Michael Arellano MD;  Location: [facility] OR;  Service: Ophthalmology;  Laterality: Left;    THYROIDECTOMY       Family History   Problem Relation Age of Onset    Cancer Sister      Social History     Tobacco Use    Smoking status: Never    Smokeless tobacco: Never   Substance Use Topics    Alcohol use: No    Drug use: No     Review of Systems   Constitutional: Negative.    HENT: Negative.     Eyes: Negative.    Respiratory:  Positive for cough and shortness of breath.    Cardiovascular: Negative.    Gastrointestinal: Negative.    Genitourinary: Negative.    Musculoskeletal: Negative.    Skin: Negative.    Neurological: Negative.    Psychiatric/Behavioral: Negative.       Physical Exam     Initial Vitals [12/07/22 0714]   BP Pulse Resp Temp SpO2   (!) 123/57 73 20 97.7 °F (36.5 °C) 96 %      MAP       --         Physical Exam    Nursing note and vitals reviewed.  Constitutional: Vital signs are normal. She appears well-developed and well-nourished. She is cooperative.   HENT:   Head: Normocephalic and atraumatic.   Right Ear: External ear normal.   Left Ear: External ear normal.   Nose: Nose normal.   Mouth/Throat: Oropharynx is clear and moist.   Eyes: Conjunctivae, EOM and lids are normal. Pupils are equal, round, and reactive to light.   Neck: Trachea normal and phonation normal. Neck supple. No JVD present.   Normal range of motion.   Full passive range of motion without pain.     Cardiovascular:  Normal rate, regular rhythm, S1 normal, S2 normal, normal heart sounds, intact distal pulses and normal pulses.           Pulmonary/Chest: Effort normal.   faint rhonchi at the bilateral bases with no active wheezing      Abdominal: Abdomen is soft and flat. Bowel sounds are normal.   Musculoskeletal:         General: Normal range of motion.      Cervical back: Full passive range of motion without pain, normal range of motion and neck supple.     Neurological: She is  alert and oriented to person, place, and time. She has normal strength.   Skin: Skin is warm, dry and intact. Capillary refill takes less than 2 seconds.       ED Course   Procedures  Labs Reviewed   INFLUENZA A & B BY MOLECULAR   GROUP A STREP, MOLECULAR   SARS-COV-2 RNA AMPLIFICATION, QUAL          Imaging Results              X-Ray Chest 1 View (Final result)  Result time 22 08:35:03      Final result by Lacey Donovan MD (22 08:35:03)                   Impression:      No acute abnormality.      Electronically signed by: Lacey Donovan MD  Date:    2022  Time:    08:35               Narrative:    EXAMINATION:  XR CHEST 1 VIEW    CLINICAL HISTORY:  Cough, unspecified    TECHNIQUE:  Single frontal view of the chest was performed.    COMPARISON:  2018    FINDINGS:  Chronic lung changes are seen.  No consolidation or pleural effusions.  No evident pneumothorax.    The cardiac silhouette is enlarged..  The hilar and mediastinal contours are unremarkable.Calcified atheromatous disease affects the aorta.    Bones are intact.                                       Medications   methylPREDNISolone sodium succinate injection 125 mg (125 mg Intramuscular Given 22)   albuterol nebulizer solution 10 mg (10 mg Nebulization Given 22 08)   cefTRIAXone injection 1 g (1 g Intramuscular Given 22 09)     Medical Decision Makin-year-old female presents with cough & congestion the emergency room today  (-) swabs, chest x-ray appears to be within normal limits on ER evaluation today  Will start breathing treatments at home, will start Tessalon Perles & doxycycline  I also prescribe the patient a home nebulizer for the breathing treatments on DC  Bronchitis type presentation, patient has myasthenia gravis history on interview  Will avoid steroids at this time, I also made the patient appointment for a follow-up  Will follow-up in 48 hours with PCP clinic, return with any concerns  on discharge                        Clinical Impression:   Final diagnoses:  [R05.9] Cough        ED Disposition Condition    Discharge Stable          ED Prescriptions       Medication Sig Dispense Start Date End Date Auth. Provider    doxycycline (VIBRAMYCIN) 100 MG Cap Take 1 capsule (100 mg total) by mouth 2 (two) times daily. for 10 days 20 capsule 12/7/2022 12/17/2022 Grant Raza MD    benzonatate (TESSALON) 100 MG capsule Take 2 capsules (200 mg total) by mouth 3 (three) times daily as needed for Cough. 20 capsule 12/7/2022 12/17/2022 Grant Raza MD    albuterol (PROVENTIL) 2.5 mg /3 mL (0.083 %) nebulizer solution Take 3 mLs (2.5 mg total) by nebulization every 6 (six) hours as needed for Wheezing. 30 each 12/7/2022 12/7/2023 Grant Raza MD          Follow-up Information       Follow up With Specialties Details Why Contact Info    Darion Richmond MD Family Medicine Go in 2 days 9 am [address] [place] [id number]  [phone]               Grant Raza MD  12/07/22 0985

## 2022-12-09 ENCOUNTER — OFFICE VISIT (OUTPATIENT)
Dept: FAMILY MEDICINE | Facility: CLINIC | Age: 87
End: 2022-12-09
Payer: MEDICARE

## 2022-12-09 VITALS
SYSTOLIC BLOOD PRESSURE: 132 MMHG | OXYGEN SATURATION: 98 % | BODY MASS INDEX: 39.29 KG/M2 | HEART RATE: 79 BPM | HEIGHT: 66 IN | DIASTOLIC BLOOD PRESSURE: 72 MMHG | RESPIRATION RATE: 16 BRPM | WEIGHT: 244.5 LBS

## 2022-12-09 DIAGNOSIS — J44.1 CHRONIC OBSTRUCTIVE PULMONARY DISEASE WITH ACUTE EXACERBATION: ICD-10-CM

## 2022-12-09 DIAGNOSIS — I10 ESSENTIAL HYPERTENSION: Primary | ICD-10-CM

## 2022-12-09 DIAGNOSIS — J98.4 PNEUMONITIS: ICD-10-CM

## 2022-12-09 PROCEDURE — 99999 PR PBB SHADOW E&M-EST. PATIENT-LVL III: ICD-10-PCS | Mod: PBBFAC,,, | Performed by: FAMILY MEDICINE

## 2022-12-09 PROCEDURE — 99999 PR PBB SHADOW E&M-EST. PATIENT-LVL III: CPT | Mod: PBBFAC,,, | Performed by: FAMILY MEDICINE

## 2022-12-09 PROCEDURE — 99213 OFFICE O/P EST LOW 20 MIN: CPT | Mod: S$PBB | Performed by: FAMILY MEDICINE

## 2022-12-09 PROCEDURE — 99213 OFFICE O/P EST LOW 20 MIN: CPT | Mod: PBBFAC | Performed by: FAMILY MEDICINE

## 2022-12-09 PROCEDURE — 99999 PR STA SHADOW: CPT | Mod: PBBFAC,,, | Performed by: FAMILY MEDICINE

## 2022-12-09 RX ORDER — IPRATROPIUM BROMIDE AND ALBUTEROL SULFATE 2.5; .5 MG/3ML; MG/3ML
3 SOLUTION RESPIRATORY (INHALATION) EVERY 6 HOURS PRN
Qty: 90 ML | Refills: 44 | Status: SHIPPED | OUTPATIENT
Start: 2022-12-09 | End: 2022-12-09 | Stop reason: SDUPTHER

## 2022-12-09 NOTE — PROGRESS NOTES
Subjective:       Patient ID: Stephany Skinner is a 87 y.o. female.    Chief Complaint: Hospital Follow Up (Pt here for ER f/u. Pt states she is still having difficulty breathing. )    Pt is a 87 y.o. female who presents for check up for Essential hypertension  (primary encounter diagnosis)  Pneumonitis  Chronic obstructive pulmonary disease with acute exacerbation. Doing well on current meds. Denies any side effects. Prevention is up to date.   Review of Systems   Constitutional:  Negative for appetite change, chills and fever.   HENT:  Negative for rhinorrhea, sinus pressure, sore throat and trouble swallowing.    Respiratory:  Positive for cough and shortness of breath. Negative for chest tightness and wheezing.    Cardiovascular:  Negative for chest pain and palpitations.   Gastrointestinal:  Negative for abdominal pain, blood in stool, diarrhea, nausea and vomiting.   Genitourinary:  Negative for dysuria, flank pain, hematuria, pelvic pain, urgency, vaginal bleeding, vaginal discharge and vaginal pain.   Musculoskeletal:  Negative for back pain, joint swelling and neck stiffness.   Skin:  Negative for rash.   Neurological:  Negative for dizziness, weakness, light-headedness, numbness and headaches.   Hematological:  Does not bruise/bleed easily.   Psychiatric/Behavioral:  Negative for agitation. The patient is not nervous/anxious.      Objective:      Physical Exam  Constitutional:       Appearance: She is well-developed.   HENT:      Head: Normocephalic.   Eyes:      Pupils: Pupils are equal, round, and reactive to light.   Neck:      Thyroid: No thyromegaly.   Cardiovascular:      Rate and Rhythm: Normal rate and regular rhythm.   Pulmonary:      Effort: No respiratory distress.      Breath sounds: No wheezing or rales.   Chest:      Chest wall: No tenderness.   Abdominal:      General: There is no distension.      Tenderness: There is no abdominal tenderness. There is no guarding or rebound.    Musculoskeletal:         General: No tenderness. Normal range of motion.      Cervical back: Normal range of motion and neck supple.   Lymphadenopathy:      Cervical: No cervical adenopathy.   Skin:     General: Skin is warm and dry.      Coloration: Skin is not pale.      Findings: No rash.   Neurological:      Mental Status: She is alert and oriented to person, place, and time.      Cranial Nerves: No cranial nerve deficit.      Motor: No abnormal muscle tone.      Coordination: Coordination normal.      Deep Tendon Reflexes: Reflexes are normal and symmetric. Reflexes normal.   Psychiatric:         Thought Content: Thought content normal.         Judgment: Judgment normal.       Assessment:       Encounter Diagnoses   Name Primary?    Essential hypertension Yes    Pneumonitis     Chronic obstructive pulmonary disease with acute exacerbation          Plan:   1. Essential hypertension    2. Pneumonitis    3. Chronic obstructive pulmonary disease with acute exacerbation

## 2022-12-12 ENCOUNTER — TELEPHONE (OUTPATIENT)
Dept: FAMILY MEDICINE | Facility: CLINIC | Age: 87
End: 2022-12-12
Payer: MEDICARE

## 2022-12-12 NOTE — TELEPHONE ENCOUNTER
PA SUBMITTED        ----- Message from Noemy Cutler sent at 2022  3:07 PM CST -----  Contact: Sister/KAYLA Skinner  MRN: [id number]  : 1935  PCP: Pipo Flowers  Home Phone      [phone]  Work Phone      Not on file.  Mobile          Not on file.      MESSAGE:     Pt sister called stating they never received albuterol-ipratropium (DUO-NEB) 2.5 mg-0.5 mg/3 mL nebulizer solution rx.--Please advise    [facility]      [phone]

## 2022-12-31 ENCOUNTER — EXTERNAL CHRONIC CARE MANAGEMENT (OUTPATIENT)
Dept: PRIMARY CARE CLINIC | Facility: CLINIC | Age: 87
End: 2022-12-31
Payer: MEDICARE

## 2022-12-31 PROCEDURE — 99999 PR STA SHADOW: ICD-10-PCS | Mod: PBBFAC,,, | Performed by: FAMILY MEDICINE

## 2022-12-31 PROCEDURE — 99999 PR STA SHADOW: CPT | Mod: PBBFAC,,, | Performed by: FAMILY MEDICINE

## 2022-12-31 PROCEDURE — 99490 CHRNC CARE MGMT STAFF 1ST 20: CPT | Mod: PBBFAC | Performed by: FAMILY MEDICINE

## 2022-12-31 PROCEDURE — 99439 CHRNC CARE MGMT STAF EA ADDL: CPT | Mod: S$PBB | Performed by: FAMILY MEDICINE

## 2023-01-03 ENCOUNTER — HOSPITAL ENCOUNTER (OUTPATIENT)
Dept: RADIOLOGY | Facility: HOSPITAL | Age: 88
Discharge: HOME | End: 2023-01-03
Attending: PSYCHIATRY & NEUROLOGY
Payer: MEDICARE

## 2023-01-03 PROCEDURE — 70544 MRA BRAIN WITHOUT CONTRAST: ICD-10-PCS | Mod: 26,,, | Performed by: RADIOLOGY

## 2023-01-03 PROCEDURE — 70551 MRI BRAIN STEM W/O DYE: CPT | Mod: TC

## 2023-01-03 PROCEDURE — 70551 MRI BRAIN WITHOUT CONTRAST: ICD-10-PCS | Mod: 26,,, | Performed by: RADIOLOGY

## 2023-01-03 PROCEDURE — 70551 MRI BRAIN STEM W/O DYE: CPT | Mod: 26,,, | Performed by: RADIOLOGY

## 2023-01-03 PROCEDURE — 70544 MR ANGIOGRAPHY HEAD W/O DYE: CPT | Mod: 26,,, | Performed by: RADIOLOGY

## 2023-01-03 PROCEDURE — 70544 MR ANGIOGRAPHY HEAD W/O DYE: CPT | Mod: TC,59

## 2023-01-09 ENCOUNTER — OFFICE VISIT (OUTPATIENT)
Dept: NEUROLOGY | Facility: CLINIC | Age: 88
End: 2023-01-09
Payer: MEDICARE

## 2023-01-09 VITALS
RESPIRATION RATE: 16 BRPM | DIASTOLIC BLOOD PRESSURE: 80 MMHG | HEART RATE: 64 BPM | HEIGHT: 67 IN | SYSTOLIC BLOOD PRESSURE: 138 MMHG | BODY MASS INDEX: 38.17 KG/M2 | WEIGHT: 243.19 LBS

## 2023-01-09 DIAGNOSIS — G70.00 MYASTHENIA GRAVIS, ADULT FORM: Primary | ICD-10-CM

## 2023-01-09 DIAGNOSIS — G56.03 BILATERAL CARPAL TUNNEL SYNDROME: ICD-10-CM

## 2023-01-09 DIAGNOSIS — H54.7 VISUAL LOSS: ICD-10-CM

## 2023-01-09 DIAGNOSIS — E53.8 B12 DEFICIENCY: ICD-10-CM

## 2023-01-09 DIAGNOSIS — M54.16 LUMBAR RADICULOPATHY: ICD-10-CM

## 2023-01-09 PROCEDURE — 99999 PR STA SHADOW: CPT | Mod: PBBFAC,,, | Performed by: PSYCHIATRY & NEUROLOGY

## 2023-01-09 PROCEDURE — 99214 OFFICE O/P EST MOD 30 MIN: CPT | Mod: S$PBB | Performed by: PSYCHIATRY & NEUROLOGY

## 2023-01-09 PROCEDURE — 99999 PR STA SHADOW: ICD-10-PCS | Mod: PBBFAC,,, | Performed by: PSYCHIATRY & NEUROLOGY

## 2023-01-09 PROCEDURE — 99214 OFFICE O/P EST MOD 30 MIN: CPT | Mod: PBBFAC | Performed by: PSYCHIATRY & NEUROLOGY

## 2023-01-09 PROCEDURE — 99999 PR PBB SHADOW E&M-EST. PATIENT-LVL IV: CPT | Mod: PBBFAC,,, | Performed by: PSYCHIATRY & NEUROLOGY

## 2023-01-09 RX ORDER — TOBRAMYCIN AND DEXAMETHASONE 3; 1 MG/ML; MG/ML
2 SUSPENSION/ DROPS OPHTHALMIC 2 TIMES DAILY
COMMUNITY
End: 2024-03-13

## 2023-01-09 RX ORDER — LEVOTHYROXINE SODIUM 88 UG/1
88 TABLET ORAL
COMMUNITY
End: 2023-04-19 | Stop reason: SDUPTHER

## 2023-01-09 NOTE — PROGRESS NOTES
HPI:  Stephany Skinner is a 87 y.o. female with  increased falls, some lumbar radicular pain.  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 Admitted for ARF with hypercapnea, ptosis, and gaze restriction in 1/2018; response to steroids suggestive of myasthenia, though antibodies are negative, and EMG with repetitve stimulation from 7/2018 was inconclusive. PUJA during 1/2018 admit, secondary to overdiuresis. She has JOY, DM, HTN, HLD, hypothyroidism, B12 deficiency, and A-fib.     Here for 6 weeks follow up    Right eye visual loss is not improved and she does have some spots in the visual field    She has some conjunctival injection known thought to be related to a blood vessel hemorrhage superficially     Dr Graham follow  up ongoing        No diplopia, no weakness, no speech or swallowing problems    Her lower back pain responds to  tylenol and rest and this seems to worsening and this is episodic and not radicular    CTS symptoms are noted and tolerable    Neuorpathy pain is not active    Still taking B12    No Falls      She is currently not driving      Sees Dr Patel who monitors her fasting labs        Review of Systems   Constitutional:  Negative for fever.   HENT:  Negative for nosebleeds.    Eyes:  Positive for blurred vision. Negative for double vision.   Respiratory:  Negative for hemoptysis.    Cardiovascular:  Negative for leg swelling.   Gastrointestinal:  Negative for blood in stool.   Genitourinary:  Negative for hematuria.   Musculoskeletal:  Negative for falls.   Skin:  Negative for itching.   Neurological:  Negative for seizures.   Endo/Heme/Allergies:  Does not bruise/bleed easily.   Psychiatric/Behavioral:  The patient does not have insomnia.        Exam:  Gen Appearance, well developed/nourished in no apparent  distress  CV: 2+ distal pulses with no edema or swelling  Neuro:  MS: Awake, alert,Sustains attention. Recent/remote memory intact, Language is full to spontaneous speech/comprehension. Fund of Knowledge is full  CN: Optic discs are not viewed due to misosis,  PERRL, Extraoccular movements and visual fields are full-no gaze restriction today. Normal facial sensation and strength-no ptosis, Hearing symmetric, Tongue and Palate are midline and strong. Shoulder Shrug symmetric and strong.  Motor: Normal bulk, tone, no abnormal movements. 5/5 strength bilateral upper/lower extremities with 1+ reflexes in the arms, and are less in the legs and bilateral plantar response  Sensory: symmetric to light touch, pain, temp, and vibration/proprioception except decreased sensation in the legs. Romberg negative  Cerebellar: Finger-nose,Heal-shin, Rapid alternating movements intact  Gait: arthralgic gait; ambulates with walker but can walk well without walker as well    Imagin/16 CT L spine: Multilevel degenerative changes of the lumbar spine without evidence for fracture or subluxation.  There is likely a component of mild neural foraminal narrowing and central canal stenosis at the L3-4 and L4-5 levels.  This could be better evaluated on a nonemergent basis with MRI.    2016 MRI L spine: Multilevel degenerative changes of the lumbar spine contributing to central canal stenosis and neural foraminal narrowing as detailed in the above report.    Edema-like signal about the posterior elements on the right at L5-S1 which can be a source of pain.    2018 EMG with repetitive stimulation at [facility] per Dr. Luis Angel Vu:   Slow repetitive stimulation (3 Htz) performed at baseline and at one minute post exercise intervals at the left ADM and left trapezius muscles revealed significant CMAP detriment at the ADM, but none at the trapezius.     Impression:   The results of the study are inconclusive. There is no definite EP evidence of  a neuromuscular junction pathology.     6/2016 EMG legs:   1. Mild Bilateral Carpal Tunnel Syndrome  2. 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 Bilateral Lumbar Radiculopathy best localizing from L4-5 on this study    Xray chest over time: no thymoma and CTA chest 2018: Bilateral groundglass opacities within the lung fields.  Minimal dependent subsegmental atelectasis.  No pleural effusion or pneumothorax.    2022 MRI and MRA brain: Age-appropriate generalized cerebral volume loss with moderate chronic microvascular ischemic disease.  No evidence for an acute infarction.     No significant arterial abnormalities.     Left maxillary sinus disease.         Labs: 10/2016  glucose 136. Reviewed CMP, CBC TSh   SPEP normal  B12 low normal    2019 BMP, CBC reviewed    11/2018 BMP with alt reviewed  12/2019 A1C normal    2022 B12 level 1200s      Assessment/Plan: Stephany Skinner is a 87 y.o. female with   falls, some lumbar radicular pain.  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 Admitted for ARF with hypercapnea, ptosis, and gaze restriction in 1/2018; response to steroids suggestive of myasthenia, though antibodies are negative, and EMG with repetitve stimulation from 7/2018 was inconclusive. PUJA during 1/2018 admit, secondary to overdiuresis. She has JOY, DM, HTN, HLD, hypothyroidism, B12 deficiency, and A-fib.     I recommend:   1. Despite negative MG antibodies and inconclusive EMG with rep stim findings without definitive evidence of neuromuscular junction disorer, her clinical picture was suggestive of myasthenia gravis- but is currently improved. No active symptoms- NOT CERTAIN OF THIS DIAGNOSIS    2. Stopped Mestinon without any diplopia or weakness. She has chronic diarrhea, unrelated to mestinon use  -she  has had no worsening with tapering/ being off prednisone  - Will restart for any further symptoms   - If she needs to remain on steroids long term, could consider steroid sparing therapy, such as Imuran versus Cellcept. She has DM, though well controlled currently. She will follow with PCP for management of DM2  -Advised on symptoms of myasthenic crisis and when to report to the ER    3. Wearing brace nightly for CTS symptoms PRN but improved with CT injection per pain management prior- can see pain management back PRN    4. Continue B12 for low normal B12 levels well corrected currently.  Her DM could be causing her neuropathy.  DM treated per PCP.     5. Saw pain management for  Lumbar radiculopathy prior. OTC topical and PT helped reduce pain greatly as well as falls prior. See pain management: Will arragne.   -Fall precautions reviewed prior-- using walker.  - No pain from neuropathy currently.    6. She presents right eye lower field blindness in 2022  -Told to see cardiology by her ophthalmologist, Dr Graham  -Veda try to obtain  Carotid US,  echo, and fasting labs from Dr Patel as well as notes from Dr Graham. Noting cardiology increased her valsartan and statin doses.   -1/2023 MRI brain and  MRA brain: No CVA to explain her symptoms.Retinal artery disorder?   -Visual fields are unremarkable grossly on exam today but she reports persistent symptoms  -She has been on Eliquis for years for stroke prevention due to her afib and does recall a skipped dosing at least once prior to these symptoms Has been on full dose.  She is allergic to ASA. She is compliant with daily dosing now    RTC  6 months

## 2023-01-31 ENCOUNTER — EXTERNAL CHRONIC CARE MANAGEMENT (OUTPATIENT)
Dept: PRIMARY CARE CLINIC | Facility: CLINIC | Age: 88
End: 2023-01-31
Payer: MEDICARE

## 2023-01-31 PROCEDURE — 99999 PR STA SHADOW: ICD-10-PCS | Mod: PBBFAC,,, | Performed by: FAMILY MEDICINE

## 2023-01-31 PROCEDURE — 99999 PR STA SHADOW: CPT | Mod: PBBFAC,,, | Performed by: FAMILY MEDICINE

## 2023-01-31 PROCEDURE — 99490 CHRNC CARE MGMT STAFF 1ST 20: CPT | Mod: PBBFAC | Performed by: FAMILY MEDICINE

## 2023-02-14 ENCOUNTER — OFFICE VISIT (OUTPATIENT)
Dept: PAIN MEDICINE | Facility: CLINIC | Age: 88
End: 2023-02-14
Payer: MEDICARE

## 2023-02-14 VITALS
WEIGHT: 244.25 LBS | HEIGHT: 67 IN | BODY MASS INDEX: 38.34 KG/M2 | SYSTOLIC BLOOD PRESSURE: 124 MMHG | RESPIRATION RATE: 18 BRPM | DIASTOLIC BLOOD PRESSURE: 64 MMHG

## 2023-02-14 DIAGNOSIS — R53.81 PHYSICAL DECONDITIONING: ICD-10-CM

## 2023-02-14 DIAGNOSIS — M47.816 LUMBAR SPONDYLOSIS: Primary | ICD-10-CM

## 2023-02-14 PROCEDURE — 99214 PR OFFICE/OUTPT VISIT, EST, LEVL IV, 30-39 MIN: ICD-10-PCS | Mod: S$PBB,,, | Performed by: NURSE PRACTITIONER

## 2023-02-14 PROCEDURE — 99999 PR PBB SHADOW E&M-EST. PATIENT-LVL IV: ICD-10-PCS | Mod: PBBFAC,,, | Performed by: NURSE PRACTITIONER

## 2023-02-14 PROCEDURE — 99214 OFFICE O/P EST MOD 30 MIN: CPT | Mod: S$PBB,,, | Performed by: NURSE PRACTITIONER

## 2023-02-14 PROCEDURE — 99214 OFFICE O/P EST MOD 30 MIN: CPT | Mod: PBBFAC | Performed by: NURSE PRACTITIONER

## 2023-02-14 PROCEDURE — 99999 PR PBB SHADOW E&M-EST. PATIENT-LVL IV: CPT | Mod: PBBFAC,,, | Performed by: NURSE PRACTITIONER

## 2023-02-14 NOTE — PROGRESS NOTES
[facility]  New Patient H&P    Referring Provider: No referring provider defined for this encounter.    Chief Complaint:   Chief Complaint   Patient presents with    Back Pain       History of Present Illness: Stephany Skinner is a 88 y.o. female referred by No ref. provider found for LSS.      Onset: past 2-3 months, no clear inciting incident   Location: bilateral lower lumbar spine  Radiation: down the back of legs but not past the knees posteriorly  Timing: intermittent  Quality: Grabbing, Deep, Numb and Sharp  Exacerbating Factors: lifting, lying down, sitting, standing for more than 15 minutes, walking for more than 30 minutes and daily activity   Alleviating Factors: medications and sitting  Associated Symptoms: She gets numbness in her hands and feet. She feels weak in both legs. She has bladder incontinence, and that has been present for a few years, and has been evaluated by her PCP.  She has diarrhea and sometimes gets bowel incontinence. Denies night fever/night sweats, significant weight loss      Severity: Currently: 5/10   Typical Range: 0-5/10     Exacerbation: 5/10     She has bilateral carpal tunnel syndrome. She has had this for many years. Numbness localized to median nerve distribution. Worse at night time. She is using braces, but this isn't really helping. She has issues with dropping things.     She also has left knee pain that has been present off and on for years. She had bilateral knee replacement about 10-11 years ago that worked really well for her, but then the left knee started hurting again. Pain localized to the anterior knee.     Interval History 2/14/2023:  Mrs Skinner presents for follow up delayed. She states lower back pain and at time posterior leg pain but mainly lower back pain. She denies focal a.m. stiffness or pain with valsalva. Pain primarily exertional and in evening after busy morning and afternoons. She has not had PT in some time. Rest alleviates symptoms.  She has no focal voicing of any s/s concerning for cauda equina.        Previous Interventions:  - None    Previous Therapies:  PT/OT: no   Relevant Surgery:    - Bilateral knee replacements.   Previous Medications:   - NSAIDS: Tylenol  - Muscle Relaxants:    - TCAs:   - SNRIs:   - Topicals: Voltaren gel   - Anticonvulsants:    - Opioids:     Current Pain Medications:  Tylenol helps     Blood Thinners: Eliquis    Full Medication List:    Current Outpatient Medications:     acetaminophen (TYLENOL) 500 MG tablet, Take 1,000 mg by mouth nightly as needed for Pain., Disp: , Rfl:     albuterol-ipratropium (DUO-NEB) 2.5 mg-0.5 mg/3 mL nebulizer solution, Take 3 mLs by nebulization every 6 (six) hours as needed for Wheezing. Rescue, Disp: 90 mL, Rfl: 12    alendronate (FOSAMAX) 70 MG tablet, Take 70 mg by mouth every 7 days., Disp: , Rfl:     atorvastatin (LIPITOR) 20 MG tablet, Take 20 mg by mouth every evening., Disp: , Rfl:     beta-carotene,A,-vits C,E/mins (VISION ORAL), Take 1 tablet by mouth 2 (two) times daily., Disp: , Rfl:     calcium carbonate-vit D3-min 600 mg calcium- 400 unit Tab, Take 2 tablets by mouth once daily. , Disp: , Rfl:     cyanocobalamin (VITAMIN B-12) 100 MCG tablet, Take 1 tablet (100 mcg total) by mouth once daily. (Patient taking differently: Take 1,000 mcg by mouth once daily.), Disp: , Rfl:     ELIQUIS 5 mg Tab, Take 5 mg by mouth 2 (two) times daily., Disp: , Rfl: 1    furosemide (LASIX) 40 MG tablet, Take 1 tablet (40 mg total) by mouth once daily., Disp: 30 tablet, Rfl: 5    levothyroxine (SYNTHROID) 88 MCG tablet, Take 88 mcg by mouth before breakfast., Disp: , Rfl:     metoprolol succinate (TOPROL-XL) 100 MG 24 hr tablet, Take 1 tablet (100 mg total) by mouth once daily., Disp: 30 tablet, Rfl: 5    mupirocin (BACTROBAN) 2 % ointment, Apply topically 2 (two) times daily., Disp: 22 g, Rfl: 3    nitroGLYCERIN (NITROSTAT) 0.4 MG SL tablet, Place 0.4 mg under the tongue every 5 (five)  minutes as needed for Chest pain., Disp: , Rfl:     nystatin (MYCOSTATIN) powder, Apply topically 4 (four) times daily., Disp: 60 g, Rfl: 5    nystatin-triamcinolone (MYCOLOG II) cream, Apply topically 4 (four) times daily., Disp: 60 g, Rfl: 5    potassium chloride SA (K-DUR,KLOR-CON) 20 MEQ tablet, 1 Tab po daily except Sunday (Patient taking differently: Take 20 mEq by mouth once daily. Patient takes daily), Disp: 30 tablet, Rfl: 5    valsartan (DIOVAN) 160 MG tablet, Take 160 mg by mouth once daily., Disp: , Rfl:     blood-glucose meter kit, 1 each by Other route 2 (two) times daily. One Touch Ultra meter, test strips, lancets, control solution, Disp: 1 each, Rfl: 5    tobramycin-dexAMETHasone 0.3-0.1% (TOBRADEX) 0.3-0.1 % DrpS, Place 2 drops into the right eye 2 (two) times a day., Disp: , Rfl:   No current facility-administered medications for this visit.    Facility-Administered Medications Ordered in Other Visits:     tetracaine HCl (PF) 0.5 % Drop 1 drop, 1 drop, Right Eye, On Call Procedure, Michael Arellano MD, 1 drop at 12/12/19 0801     Review of Systems:  Review of Systems   Constitutional:  Negative for fever and weight loss.   HENT:  Negative for ear pain and tinnitus.    Eyes:  Negative for pain and redness.   Respiratory:  Negative for cough and shortness of breath.    Cardiovascular:  Negative for chest pain and palpitations.   Gastrointestinal:  Positive for diarrhea. Negative for constipation and heartburn.   Genitourinary: Negative.         (+)urinary incontinence. Denies urine retention.    Musculoskeletal:  Positive for back pain and joint pain (hands). Negative for neck pain.   Skin:  Negative for itching and rash.   Neurological:  Positive for weakness. Negative for tingling, focal weakness and seizures.   Endo/Heme/Allergies:  Negative for environmental allergies. Bruises/bleeds easily (on eliquis).   Psychiatric/Behavioral:  Negative for depression. The patient has insomnia. The patient is  not nervous/anxious.        Allergies:  Statins-hmg-coa reductase inhibitors     Medical History:   has a past medical history of Bronchiectasis, uncomplicated, CHF (congestive heart failure), Early stage nonexudative age-related macular degeneration of both eyes (2018), GERD (gastroesophageal reflux disease), Hyperlipidemia, Hypertension, and JOY (obstructive sleep apnea).    Surgical History:   has a past surgical history that includes Hysterectomy; Cholecystectomy; knee replacemene; Hernia repair; Thyroidectomy; Phacoemulsification of cataract (Right, 12/12/2019); Cataract extraction w/  intraocular lens implant (Right, 12/12/2019); Phacoemulsification of cataract (Left, 2/13/2020); Cataract extraction w/  intraocular lens implant (Left, 2/13/2020); and Endoscopic ultrasound of upper gastrointestinal tract (N/A, 11/4/2020).    Family History:  family history includes Cancer in her sister.    Social History:   reports that she has never smoked. She has never used smokeless tobacco. She reports that she does not drink alcohol and does not use drugs.    GEN:  Well developed, well nourished.  No acute distress.   HEENT:  No trauma.  Mucous membranes moist.  Nares patent bilaterally.  PSYCH: Normal affect. Thought content appropriate.  CHEST:  Breathing symmetric.  No audible wheezing.  ABD: Soft, non-distended.  SKIN:  Warm, pink, dry.  No rash on exposed areas.    EXT:  No cyanosis, clubbing, or edema.  No color change or changes in nail or hair growth.  NEURO/MUSCULOSKELETAL:  Fully alert, oriented, and appropriate. Speech normal lluvia. No cranial nerve deficits.   Gait: Antalgic.   Lumbar: facet loading reproduces pain bilaterally. +slump bilaterally  Musculoskeletal: 4/5 Dorsiflexion to left otherwise 5/5 BLE stength   Neuro: no discrepancies in sensation to BLE          Imaging:  - EMG/NCS BUE and BLE 6/30/16:  Impression:  Abnormal Study secondary to the Presence of:    1. Mild Bilateral Carpal Tunnel  Syndrome  2. 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 Bilateral Lumbar Radiculopathy best localizing from L4-5 on this study    - MRI Lumbar Spine 5/6/16:  The incidentally visualized soft tissues structures of the abdomen are within normal limits.  Vertebral body alignment and heights are maintained.  There is disc desiccation with disc space narrowing throughout the lumbar spine.  The spinal canal appears narrow on a congenital basis secondary to foreshortening pedicles.  The marrow signal is normal without evidence for a marrow replacement process, infection or tumor.  Endplate degenerative changes are seen at the inferior endplate of L3.  The conus terminates at L1-2.  There is also mild edema-like signal about the posterior elements in the right at L5-S1.  At L1-2, there is mild bilateral facet arthropathy without central canal stenosis or neural foraminal narrowing.  At L2-3, there is a broad-based posterior disc bulge with a superimposed left paracentral disc protrusion, ligamentum flavum hypertrophy and facet arthropathy contributing to mild to moderate central canal stenosis and mild to moderate bilateral neural foraminal narrowing.  At L3-4, there is a broad-based posterior disc bulge, ligament flavum hypertrophy and facet arthropathy contributing to moderate severe central canal stenosis with mild left and mild to moderate right-sided neural foraminal narrowing.  At L4-5, there is a broad-based posterior disc bulge, ligamentum flavum hypertrophy and facet arthropathy contributing to severe central canal stenosis and moderate bilateral neural foraminal narrowing  At L5-S1, there is a broad-based posterior disc bulge and facet arthropathy contributing to mild bilateral neural foraminal narrowing.  No central canal stenosis  IMPRESSION:    Multilevel degenerative changes of the lumbar spine contributing to central canal stenosis and neural foraminal narrowing as detailed in the above  report.  Edema-like signal about the posterior elements on the right at L5-S1 which can be a source of pain    Labs:  BMP  Lab Results   Component Value Date     06/01/2022    K 4.8 06/01/2022     06/01/2022    CO2 23 06/01/2022    BUN 25 (H) 06/01/2022    CREATININE 1.0 06/01/2022    CALCIUM 9.1 06/01/2022    ANIONGAP 11 06/01/2022    ESTGFRAFRICA 58.9 (A) 06/01/2022    EGFRNONAA 51.1 (A) 06/01/2022     Lab Results   Component Value Date    ALT 11 06/01/2022    AST 16 06/01/2022    ALKPHOS 47 (L) 06/01/2022    BILITOT 0.6 06/01/2022     Lab Results   Component Value Date     12/15/2021       Assessment:  Stephany Skinner is a 88 y.o. female with the following diagnoses based on history, exam, and imaging:    Problem List Items Addressed This Visit    None        This is a pleasant 88 y.o. lady presenting with:     - Subacute/Chronic bilateral LBP: She has pain radiating down posterior thighs, but not past knee. Pain appears either due to LSS vs facetogenic pain. She also has myofascial pain on exam  - Bilateral carpal tunnel syndrome. Also developing dupuytren's contracture  - Chronic left knee pain after total knee replacement    Treatment Plan:   - Prior records reviewed  - Prior imaging reviewed  - will refer to formal PT for strengthening   - Consider updated imaging vs MBB/RFA interventions since pain is more back than legs at this time but symptoms may be multifactorial  - I have stressed the importance of physical activity and a home exercise plan to help with pain and improve health.  - Patient can continue with medications for now since they are providing benefits, using them appropriately, and without side effects.  - Counseled patient regarding the importance of physical therapy.  - RTC approx 6-8weeks after starting PT to re-evaluate symptoms.    RACHEL Phelps  02/14/2023    I spent a total of 30 minutes on the day of the visit.  This includes face to face time and non-face to  face time preparing to see the patient by reviewing previous labs/imaging, obtaining and/or reviewing separately obtained history, documenting clinical information in the electronic or other health record, independently interpreting results and communicating results to the patient/family/caregiver.      Disclaimer: This note was partly generated using dictation software which may occasionally result in transcription errors.

## 2023-02-28 ENCOUNTER — EXTERNAL CHRONIC CARE MANAGEMENT (OUTPATIENT)
Dept: PRIMARY CARE CLINIC | Facility: CLINIC | Age: 88
End: 2023-02-28
Payer: MEDICARE

## 2023-02-28 PROCEDURE — 99490 CHRNC CARE MGMT STAFF 1ST 20: CPT | Mod: S$PBB | Performed by: FAMILY MEDICINE

## 2023-02-28 PROCEDURE — 99439 CHRNC CARE MGMT STAF EA ADDL: CPT | Mod: PBBFAC | Performed by: FAMILY MEDICINE

## 2023-02-28 PROCEDURE — 99999 PR STA SHADOW: ICD-10-PCS | Mod: PBBFAC,,, | Performed by: FAMILY MEDICINE

## 2023-02-28 PROCEDURE — 99999 PR STA SHADOW: CPT | Mod: PBBFAC,,, | Performed by: FAMILY MEDICINE

## 2023-03-08 ENCOUNTER — TELEPHONE (OUTPATIENT)
Dept: ORTHOPEDICS | Facility: CLINIC | Age: 88
End: 2023-03-08
Payer: MEDICARE

## 2023-03-08 ENCOUNTER — OFFICE VISIT (OUTPATIENT)
Dept: INTERNAL MEDICINE | Facility: CLINIC | Age: 88
End: 2023-03-08
Payer: MEDICARE

## 2023-03-08 VITALS
HEART RATE: 70 BPM | HEIGHT: 67 IN | DIASTOLIC BLOOD PRESSURE: 60 MMHG | SYSTOLIC BLOOD PRESSURE: 130 MMHG | WEIGHT: 239.69 LBS | BODY MASS INDEX: 37.62 KG/M2 | RESPIRATION RATE: 20 BRPM

## 2023-03-08 DIAGNOSIS — E11.59 TYPE 2 DIABETES MELLITUS WITH OTHER CIRCULATORY COMPLICATION, WITH LONG-TERM CURRENT USE OF INSULIN: ICD-10-CM

## 2023-03-08 DIAGNOSIS — J44.1 CHRONIC OBSTRUCTIVE PULMONARY DISEASE WITH ACUTE EXACERBATION: ICD-10-CM

## 2023-03-08 DIAGNOSIS — E03.4 HYPOTHYROIDISM DUE TO ACQUIRED ATROPHY OF THYROID: ICD-10-CM

## 2023-03-08 DIAGNOSIS — L97.929 IDIOPATHIC CHRONIC VENOUS HYPERTENSION OF LEFT LOWER EXTREMITY WITH ULCER: ICD-10-CM

## 2023-03-08 DIAGNOSIS — B37.2 CANDIDAL DERMATITIS: ICD-10-CM

## 2023-03-08 DIAGNOSIS — E66.01 SEVERE OBESITY (BMI 35.0-39.9) WITH COMORBIDITY: ICD-10-CM

## 2023-03-08 DIAGNOSIS — M25.561 RIGHT KNEE PAIN, UNSPECIFIED CHRONICITY: Primary | ICD-10-CM

## 2023-03-08 DIAGNOSIS — S80.01XD CONTUSION OF RIGHT KNEE, SUBSEQUENT ENCOUNTER: ICD-10-CM

## 2023-03-08 DIAGNOSIS — I87.8 VENOUS STASIS OF LOWER EXTREMITY: ICD-10-CM

## 2023-03-08 DIAGNOSIS — I48.20 CHRONIC ATRIAL FIBRILLATION: ICD-10-CM

## 2023-03-08 DIAGNOSIS — Z79.4 TYPE 2 DIABETES MELLITUS WITH OTHER CIRCULATORY COMPLICATION, WITH LONG-TERM CURRENT USE OF INSULIN: ICD-10-CM

## 2023-03-08 DIAGNOSIS — R30.0 DYSURIA: ICD-10-CM

## 2023-03-08 DIAGNOSIS — N18.31 CHRONIC KIDNEY DISEASE, STAGE 3A: ICD-10-CM

## 2023-03-08 DIAGNOSIS — I87.312 IDIOPATHIC CHRONIC VENOUS HYPERTENSION OF LEFT LOWER EXTREMITY WITH ULCER: ICD-10-CM

## 2023-03-08 DIAGNOSIS — I87.1 COMPRESSION OF VEIN: ICD-10-CM

## 2023-03-08 DIAGNOSIS — N30.90 BLADDER INFECTION: ICD-10-CM

## 2023-03-08 DIAGNOSIS — I50.32 CHRONIC DIASTOLIC CONGESTIVE HEART FAILURE: ICD-10-CM

## 2023-03-08 DIAGNOSIS — S80.11XD TRAUMATIC HEMATOMA OF RIGHT LOWER LEG, SUBSEQUENT ENCOUNTER: Primary | ICD-10-CM

## 2023-03-08 LAB
BILIRUB SERPL-MCNC: NEGATIVE MG/DL
BLOOD URINE, POC: NORMAL
CLARITY, POC UA: CLEAR
COLOR, POC UA: NORMAL
GLUCOSE UR QL STRIP: NEGATIVE
KETONES UR QL STRIP: NEGATIVE
LEUKOCYTE ESTERASE URINE, POC: NORMAL
NITRITE, POC UA: NEGATIVE
PH, POC UA: 5
PROTEIN, POC: NORMAL
SPECIFIC GRAVITY, POC UA: 5
UROBILINOGEN, POC UA: NEGATIVE

## 2023-03-08 PROCEDURE — 87086 URINE CULTURE/COLONY COUNT: CPT | Performed by: FAMILY MEDICINE

## 2023-03-08 PROCEDURE — 99215 OFFICE O/P EST HI 40 MIN: CPT | Mod: PBBFAC,PN | Performed by: FAMILY MEDICINE

## 2023-03-08 PROCEDURE — 81002 URINALYSIS NONAUTO W/O SCOPE: CPT | Mod: PBBFAC,PN | Performed by: FAMILY MEDICINE

## 2023-03-08 PROCEDURE — 99999 PR PBB SHADOW E&M-EST. PATIENT-LVL V: ICD-10-PCS | Mod: PBBFAC,,, | Performed by: FAMILY MEDICINE

## 2023-03-08 PROCEDURE — 99214 PR OFFICE/OUTPT VISIT, EST, LEVL IV, 30-39 MIN: ICD-10-PCS | Mod: S$PBB,,, | Performed by: FAMILY MEDICINE

## 2023-03-08 PROCEDURE — 99214 OFFICE O/P EST MOD 30 MIN: CPT | Mod: S$PBB,,, | Performed by: FAMILY MEDICINE

## 2023-03-08 PROCEDURE — 99999 PR PBB SHADOW E&M-EST. PATIENT-LVL V: CPT | Mod: PBBFAC,,, | Performed by: FAMILY MEDICINE

## 2023-03-08 RX ORDER — CEFUROXIME AXETIL 500 MG/1
500 TABLET ORAL 2 TIMES DAILY
Qty: 14 TABLET | Refills: 0 | Status: SHIPPED | OUTPATIENT
Start: 2023-03-08 | End: 2023-05-31

## 2023-03-08 RX ORDER — FLUCONAZOLE 100 MG/1
100 TABLET ORAL DAILY
Qty: 7 TABLET | Refills: 0 | Status: SHIPPED | OUTPATIENT
Start: 2023-03-08 | End: 2023-03-15

## 2023-03-08 NOTE — PROGRESS NOTES
Subjective:       Patient ID: Stephany Skinner is a 88 y.o. female.    Chief Complaint: Urinary Tract Infection and Fall (FELL A WEEK AGO,RT LEG MORE SWOLLEN THAN LT)      Patient here for dysuria, frequency, and urgency x couple of months.   Patients also here for worsening leg swelling x 10 days after falling. Patient went to  something off the floor and fell sideways and states her R knee twisted. X-ray at urgent care shows no acute fractures. She states that Voltaren, ice, and tylenol has helped her pain.   Patient has arthritis.  Patient's lumbar pain is much improved. Needs her walker.   She has a history of congestive heart failure and hypertension. She denies shortness of breath.    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  No longer taking prednisone,  pyridostigmine 60 mg daily.  Doing well.  Patient has type 2 diabetes and seems be doing well.  She stopped her insulin and her blood sugars look great.    She has lost over 60 lb in the last year.  She continues to lose weight.  Patient has COPD.  She uses DuoNeb treatments as needed.  She also has sleep apnea and is using her CPAP at least 6 hr per night.  She takes Synthroid for her hypothyroidism.  She tolerates this well.  She denies having symptoms such as heat or cold intolerance.  Her weight is stable.  She denies any swelling in her thyroid.  She tolerates her blood pressure medication as well as not having side effects such as chronic cough or dizziness.  She is not having any symptoms from it.  Patient is taking her statin every day for hyperlipidemia.  She is feeling well on the medication.  She denies side effects such as myalgias.  Has diarrhea stools once to three times daily    Review of Systems   Constitutional:  Negative for activity change and unexpected weight change.   HENT:  Negative for trouble swallowing.    Respiratory:  Negative for chest tightness.    Cardiovascular:  Positive for leg swelling.   Gastrointestinal:  Positive for diarrhea.    Endocrine: Negative for cold intolerance and heat intolerance.   Genitourinary:  Positive for difficulty urinating, dysuria, frequency and urgency.   Musculoskeletal:  Positive for arthralgias, back pain and gait problem.   Skin:  Negative for wound.   Hematological:  Negative for adenopathy.   Psychiatric/Behavioral:  Negative for decreased concentration.      Objective:      Vitals:    03/08/23 0910   BP: 130/60   Pulse: 70   Resp: 20     Physical Exam  Constitutional:       Appearance: Normal appearance. She is well-developed. She is obese.   HENT:      Head: Normocephalic and atraumatic.      Right Ear: Tympanic membrane normal.      Left Ear: Tympanic membrane normal.      Mouth/Throat:      Mouth: Mucous membranes are moist.   Eyes:      Pupils: Pupils are equal, round, and reactive to light.   Cardiovascular:      Rate and Rhythm: Normal rate and regular rhythm.      Pulses: Normal pulses.      Heart sounds: Normal heart sounds. No murmur heard.    No friction rub. No gallop.   Pulmonary:      Effort: Pulmonary effort is normal.      Breath sounds: Normal breath sounds. No wheezing or rales.   Abdominal:      General: Bowel sounds are normal.      Palpations: Abdomen is soft.      Hernia: A hernia (incisional wall hernia, reducible) is present.   Musculoskeletal:         General: Swelling present.      Cervical back: Neck supple.      Right knee: Swelling present.      Left knee: Normal.      Right lower leg: Edema present.      Left lower leg: Normal.      Right foot: Swelling present.      Comments: Right knee and lower leg swollen with bruising and ecchymosis.   Skin:     General: Skin is warm.      Findings: No rash.   Neurological:      General: No focal deficit present.      Mental Status: She is alert and oriented to person, place, and time.      Cranial Nerves: No cranial nerve deficit.      Sensory: No sensory deficit.      Motor: Weakness present.      Gait: Gait abnormal.   Psychiatric:          Mood and Affect: Mood normal.         Behavior: Behavior normal.         Thought Content: Thought content normal.         Judgment: Judgment normal.       Lab Results   Component Value Date    TSH 2.368 06/01/2022     Lab Results   Component Value Date    LDLCALC 113.6 12/15/2021     BMP  Lab Results   Component Value Date     06/01/2022    K 4.8 06/01/2022     06/01/2022    CO2 23 06/01/2022    BUN 25 (H) 06/01/2022    CREATININE 1.0 06/01/2022    CALCIUM 9.1 06/01/2022    ANIONGAP 11 06/01/2022    ESTGFRAFRICA 58.9 (A) 06/01/2022    EGFRNONAA 51.1 (A) 06/01/2022     Lab Results   Component Value Date    HGBA1C 5.8 (H) 06/01/2022     Lab Results   Component Value Date    WBC 6.04 12/15/2021    HGB 12.3 12/15/2021    HCT 37.7 12/15/2021     (H) 12/15/2021     12/15/2021     Assessment:       1. Traumatic hematoma of right lower leg, subsequent encounter    2. Venous stasis of lower extremity    3. Compression of vein    4. Candidal dermatitis    5. Contusion of right knee, subsequent encounter    6. Bladder infection    7. Dysuria    8. Type 2 diabetes mellitus with other circulatory complication, with long-term current use of insulin    9. Severe obesity (BMI 35.0-39.9) with comorbidity    10. Chronic kidney disease, stage 3a    11. Chronic diastolic congestive heart failure    12. Chronic atrial fibrillation    13. Idiopathic chronic venous hypertension of left lower extremity with ulcer    14. Chronic obstructive pulmonary disease with acute exacerbation    15. Hypothyroidism due to acquired atrophy of thyroid          Plan:   Stephany CARRILLO was seen today for dysuria and swollen knee.    Diagnoses and all orders for this visit:    Venous stasis dermatitis  Elevate legs  Were support stockings diligently  If redness and swelling worsens, patient will need Unna boots    COPD  Continue DuoNeb treatments when the nebulizer on his    Congestive heart failure/atrial fibrillation  Continue  Eliquis    Age-related osteoporosis without current pathological fracture  -     alendronate (FOSAMAX) 35 MG tablet; TAKE 1 TABLET BY MOUTH ONCE EVERY 7 DAYS  Dispense: 4 tablet; Refill: 5    Overweight(278.02)  Encouraged patient to decrease caloric intake    1. Traumatic hematoma of right lower leg, subsequent encounter  -     US Lower Extremity Veins Right; Future; Expected date: 03/08/2023  -     Ambulatory referral/consult to Orthopedics; Future; Expected date: 03/15/2023    2. Venous stasis of lower extremity  Comments:  Support stocking when possible    3. Compression of vein  -     US Lower Extremity Veins Right; Future; Expected date: 03/08/2023    4. Candidal dermatitis  -     fluconazole (DIFLUCAN) 100 MG tablet; Take 1 tablet (100 mg total) by mouth once daily. for 7 days  Dispense: 7 tablet; Refill: 0    5. Contusion of right knee, subsequent encounter  -     Ambulatory referral/consult to Orthopedics; Future; Expected date: 03/15/2023    6. Bladder infection  -     Urine culture  -     cefUROXime (CEFTIN) 500 MG tablet; Take 1 tablet (500 mg total) by mouth 2 (two) times daily.  Dispense: 14 tablet; Refill: 0    7. Dysuria  -     POCT URINE DIPSTICK WITHOUT MICROSCOPE    8. Type 2 diabetes mellitus with other circulatory complication, with long-term current use of insulin  Assessment & Plan:  Lab Results   Component Value Date    HGBA1C 5.8 (H) 06/01/2022     Patient currently not taking any medications for this.  She is following a diet.      9. Severe obesity (BMI 35.0-39.9) with comorbidity  Assessment & Plan:  Patient is trying to lose weight.  She has lost a few lb.  She is following a healthy diet.  She has difficulty exercising because of her musculoskeletal condition.      10. Chronic kidney disease, stage 3a  Assessment & Plan:  Renal fxn stable  Avoid NSAIDS  Good BP control  Followed by nephrology  Her last GFR was 51.  This is stable.        11. Chronic diastolic congestive heart  failure  Assessment & Plan:  Patient is identified as having Diastolic (HFpEF) heart failure that is Chronic. CHF is currently controlled. Latest ECHO performed and demonstrates- No results found for this or any previous visit.  . Continue Beta Blocker, ACE/ARB, and Furosemide and monitor clinical status closely. Monitor on telemetry. Patient is off CHF pathway.  Monitor strict Is&Os and daily weights.  Place on fluid restriction of 1.5 L. Continue to stress to patient importance of self efficacy and  on diet for keep appointment with cardiology   Continue Diovan 160 mg daily   Continue Lasix 40 mg daily   Continue metoprolol 100 mg daily  Continue potassium supplement            12. Chronic atrial fibrillation  Assessment & Plan:  Keep appointment with Cardiology.  Continue current cardiac meds   Continue Eliquis 5 mg twice daily      13. Idiopathic chronic venous hypertension of left lower extremity with ulcer  Assessment & Plan:  No ulcerations present at this time.  Continue support stockings.      14. Chronic obstructive pulmonary disease with acute exacerbation  Assessment & Plan:  Continue DuoNeb treatments twice daily as needed.    Her COPD is stable.      15. Hypothyroidism due to acquired atrophy of thyroid  Assessment & Plan:  Continue Synthroid 88 mcg daily   Monitor TSH every 6 months.             Keep appt

## 2023-03-09 PROBLEM — E66.01 SEVERE OBESITY (BMI 35.0-39.9) WITH COMORBIDITY: Status: ACTIVE | Noted: 2023-03-09

## 2023-03-09 PROBLEM — N18.31 CHRONIC KIDNEY DISEASE, STAGE 3A: Status: ACTIVE | Noted: 2023-03-09

## 2023-03-09 LAB
BACTERIA UR CULT: NORMAL
BACTERIA UR CULT: NORMAL

## 2023-03-09 NOTE — ASSESSMENT & PLAN NOTE
Patient is identified as having Diastolic (HFpEF) heart failure that is Chronic. CHF is currently controlled. Latest ECHO performed and demonstrates- No results found for this or any previous visit.  . Continue Beta Blocker, ACE/ARB, and Furosemide and monitor clinical status closely. Monitor on telemetry. Patient is off CHF pathway.  Monitor strict Is&Os and daily weights.  Place on fluid restriction of 1.5 L. Continue to stress to patient importance of self efficacy and  on diet for keep appointment with cardiology   Continue Diovan 160 mg daily   Continue Lasix 40 mg daily   Continue metoprolol 100 mg daily  Continue potassium supplement

## 2023-03-09 NOTE — ASSESSMENT & PLAN NOTE
Patient is trying to lose weight.  She has lost a few lb.  She is following a healthy diet.  She has difficulty exercising because of her musculoskeletal condition.

## 2023-03-09 NOTE — ASSESSMENT & PLAN NOTE
Lab Results   Component Value Date    HGBA1C 5.8 (H) 06/01/2022     Patient currently not taking any medications for this.  She is following a diet.

## 2023-03-09 NOTE — ASSESSMENT & PLAN NOTE
Renal fxn stable  Avoid NSAIDS  Good BP control  Followed by nephrology  Her last GFR was 51.  This is stable.

## 2023-03-09 NOTE — ASSESSMENT & PLAN NOTE
Keep appointment with Cardiology.  Continue current cardiac meds   Continue Eliquis 5 mg twice daily

## 2023-03-10 ENCOUNTER — HOSPITAL ENCOUNTER (OUTPATIENT)
Dept: RADIOLOGY | Facility: HOSPITAL | Age: 88
Discharge: HOME | End: 2023-03-10
Attending: PHYSICIAN ASSISTANT
Payer: MEDICARE

## 2023-03-10 ENCOUNTER — HOSPITAL ENCOUNTER (OUTPATIENT)
Dept: RADIOLOGY | Facility: HOSPITAL | Age: 88
Discharge: HOME | End: 2023-03-10
Attending: FAMILY MEDICINE
Payer: MEDICARE

## 2023-03-10 ENCOUNTER — OFFICE VISIT (OUTPATIENT)
Dept: ORTHOPEDICS | Facility: CLINIC | Age: 88
End: 2023-03-10
Payer: MEDICARE

## 2023-03-10 ENCOUNTER — TELEPHONE (OUTPATIENT)
Dept: ORTHOPEDICS | Facility: CLINIC | Age: 88
End: 2023-03-10
Payer: MEDICARE

## 2023-03-10 VITALS
HEIGHT: 67 IN | HEART RATE: 63 BPM | SYSTOLIC BLOOD PRESSURE: 122 MMHG | WEIGHT: 239.19 LBS | BODY MASS INDEX: 37.54 KG/M2 | OXYGEN SATURATION: 81 % | DIASTOLIC BLOOD PRESSURE: 68 MMHG

## 2023-03-10 DIAGNOSIS — M25.561 RIGHT KNEE PAIN, UNSPECIFIED CHRONICITY: Primary | ICD-10-CM

## 2023-03-10 DIAGNOSIS — M25.561 ACUTE PAIN OF RIGHT KNEE: ICD-10-CM

## 2023-03-10 DIAGNOSIS — I87.1 COMPRESSION OF VEIN: ICD-10-CM

## 2023-03-10 DIAGNOSIS — S80.11XD TRAUMATIC HEMATOMA OF RIGHT LOWER LEG, SUBSEQUENT ENCOUNTER: ICD-10-CM
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
## 2023-03-10 DIAGNOSIS — M25.561 RIGHT KNEE PAIN, UNSPECIFIED CHRONICITY: ICD-10-CM

## 2023-03-10 DIAGNOSIS — S72.414A CLOSED NONDISPLACED FRACTURE OF CONDYLE OF RIGHT FEMUR, INITIAL ENCOUNTER: Primary | ICD-10-CM

## 2023-03-10 PROCEDURE — 93971 EXTREMITY STUDY: CPT | Mod: 26,RT,, | Performed by: RADIOLOGY

## 2023-03-10 PROCEDURE — 73564 X-RAY EXAM KNEE 4 OR MORE: CPT | Mod: 26,RT,, | Performed by: RADIOLOGY

## 2023-03-10 PROCEDURE — 73562 X-RAY EXAM OF KNEE 3: CPT | Mod: 26,LT,, | Performed by: RADIOLOGY

## 2023-03-10 PROCEDURE — 93971 US LOWER EXTREMITY VEINS RIGHT: ICD-10-PCS | Mod: 26,RT,, | Performed by: RADIOLOGY

## 2023-03-10 PROCEDURE — 73562 XR KNEE ORTHO RIGHT WITH FLEXION: ICD-10-PCS | Mod: 26,LT,, | Performed by: RADIOLOGY

## 2023-03-10 PROCEDURE — 73564 XR KNEE ORTHO RIGHT WITH FLEXION: ICD-10-PCS | Mod: 26,RT,, | Performed by: RADIOLOGY

## 2023-03-10 PROCEDURE — 73564 X-RAY EXAM KNEE 4 OR MORE: CPT | Mod: TC,RT

## 2023-03-10 PROCEDURE — 93971 EXTREMITY STUDY: CPT | Mod: TC,RT

## 2023-03-10 PROCEDURE — 99999 PR PBB SHADOW E&M-EST. PATIENT-LVL IV: CPT | Mod: PBBFAC,,, | Performed by: PHYSICIAN ASSISTANT

## 2023-03-10 PROCEDURE — 99999 PR STA SHADOW: CPT | Mod: PBBFAC,,, | Performed by: PHYSICIAN ASSISTANT

## 2023-03-10 PROCEDURE — 99215 OFFICE O/P EST HI 40 MIN: CPT | Mod: S$PBB | Performed by: PHYSICIAN ASSISTANT

## 2023-03-10 PROCEDURE — 99999 PR PBB SHADOW E&M-EST. PATIENT-LVL IV: ICD-10-PCS | Mod: PBBFAC,,, | Performed by: PHYSICIAN ASSISTANT

## 2023-03-10 PROCEDURE — 99214 OFFICE O/P EST MOD 30 MIN: CPT | Mod: PBBFAC,25 | Performed by: PHYSICIAN ASSISTANT

## 2023-03-10 RX ORDER — VIT A/VIT C/VIT E/ZINC/COPPER 4296-226
CAPSULE ORAL
COMMUNITY
Start: 2022-11-14

## 2023-03-10 NOTE — PROGRESS NOTES
"  Subjective:      Patient ID: Stephany Skinner is a 88 y.o. female.    Chief Complaint: Swelling, Pain, Numbness, and Injury of the Right Knee ("Walking makes pain worse"/Pt states she fell 2 weeks ago)    Review of patient's allergies indicates:   Allergen Reactions    Statins-hmg-coa reductase inhibitors      Other reaction(s): Unknown      87 yo F presents to clinic with c/o right knee pain and swelling.  Hx of right TKA in 2007.  Fell onto right knee a week ago.  She says that she was seen at urgent care after fall and was told that xrays were normal.  Today she c/o achy pain, mostly to medial knee.  Worse with bending knee and walking and improves with rest.  Reports good relief of pain with tylenol.  She did have ultrasound of leg this morning as ordered by PCP which showed no DVT.      Review of Systems   Constitutional: Negative for chills, diaphoresis and fever.   HENT:  Negative for congestion, ear discharge and ear pain.    Eyes:  Negative for blurred vision, discharge, double vision and pain.   Cardiovascular:  Negative for chest pain, claudication and cyanosis.   Respiratory:  Negative for cough, hemoptysis and shortness of breath.    Endocrine: Negative for cold intolerance and heat intolerance.   Skin:  Negative for color change, dry skin, itching and rash.   Musculoskeletal:  Positive for falls, joint pain and joint swelling. Negative for arthritis, back pain, gout, muscle weakness and neck pain.   Gastrointestinal:  Negative for abdominal pain and change in bowel habit.   Neurological:  Negative for brief paralysis, disturbances in coordination, dizziness, numbness and paresthesias.   Psychiatric/Behavioral:  Negative for altered mental status and depression.        Objective:          General    Constitutional: She is oriented to person, place, and time. She appears well-developed and well-nourished. No distress.   HENT:   Head: Atraumatic.   Eyes: EOM are normal. Right eye exhibits no discharge. " Left eye exhibits no discharge.   Cardiovascular:  Normal rate.            Pulmonary/Chest: Effort normal. No respiratory distress.   Abdominal: Soft.   Neurological: She is alert and oriented to person, place, and time.   Psychiatric: She has a normal mood and affect. Her behavior is normal.           Right Knee Exam     Inspection   Erythema: absent  Scars: present  Swelling: present  Effusion: absent  Deformity: absent  Bruising: present    Tenderness   The patient is tender to palpation of the medial joint line.    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  There is an acute vertical fracture involving the right femoral condyle best noted on the frontal image.  It is difficult to determine on the provided images if this extends to the femoral component of the patient's knee arthroplasty.      Encounter Diagnosis   Name Primary?    Closed nondisplaced fracture of condyle of right femur, initial encounter Yes    Closed nondisplaced fracture of condyle of right femur, initial encounter  -     Ambulatory referral/consult to Orthopedics  -     KNEE BRACE FOR HOME USE               Plan:         We discussed diagnosis and treatment plan.    I contacted Dr. Ware who reviewed xrays and said that pt can probably avoid surgery if fx does not displace. He recommended T scope brace (hinges can be unlocked) and use of walker, staying off of knee as much as possible. He says it needs to be x-rayed once a week for the first 3 weeks, and pt should be referred to [facility] if any  displacement occurs since he will be on leave starting next week.      T scope brace as directed.  Continue tylenol as directed as needed. Pt declines prescription for any pain medication.   Ice compress to the affected area 2-3x a day for 15-20 minutes as needed for pain management.  RTC in 1 week for follow up with repeat xrays, sooner if needed.    Patient voices understanding of and agreement with treatment plan. All of the patient's questions were answered and the patient will contact us if she has any questions or concerns in the interim.

## 2023-03-17 ENCOUNTER — HOSPITAL ENCOUNTER (OUTPATIENT)
Dept: RADIOLOGY | Facility: HOSPITAL | Age: 88
Discharge: HOME | End: 2023-03-17
Attending: PHYSICIAN ASSISTANT
Payer: MEDICARE

## 2023-03-17 ENCOUNTER — OFFICE VISIT (OUTPATIENT)
Dept: ORTHOPEDICS | Facility: CLINIC | Age: 88
End: 2023-03-17
Payer: MEDICARE

## 2023-03-17 ENCOUNTER — TELEPHONE (OUTPATIENT)
Dept: ORTHOPEDICS | Facility: CLINIC | Age: 88
End: 2023-03-17
Payer: MEDICARE

## 2023-03-17 VITALS
BODY MASS INDEX: 37.54 KG/M2 | SYSTOLIC BLOOD PRESSURE: 124 MMHG | OXYGEN SATURATION: 97 % | HEIGHT: 67 IN | DIASTOLIC BLOOD PRESSURE: 84 MMHG | HEART RATE: 79 BPM | WEIGHT: 239.19 LBS

## 2023-03-17 DIAGNOSIS — M25.561 RIGHT KNEE PAIN, UNSPECIFIED CHRONICITY: ICD-10-CM

## 2023-03-17 DIAGNOSIS — M25.561 ACUTE PAIN OF RIGHT KNEE: ICD-10-CM

## 2023-03-17 DIAGNOSIS — M25.561 RIGHT KNEE PAIN, UNSPECIFIED CHRONICITY: Primary | ICD-10-CM

## 2023-03-17 DIAGNOSIS — S72.414A CLOSED NONDISPLACED FRACTURE OF CONDYLE OF RIGHT FEMUR, INITIAL ENCOUNTER: Primary | ICD-10-CM

## 2023-03-17 PROCEDURE — 73562 XR KNEE ORTHO RIGHT WITH FLEXION: ICD-10-PCS | Mod: 26,LT,, | Performed by: RADIOLOGY

## 2023-03-17 PROCEDURE — 99999 PR PBB SHADOW E&M-EST. PATIENT-LVL IV: CPT | Mod: PBBFAC,,, | Performed by: PHYSICIAN ASSISTANT

## 2023-03-17 PROCEDURE — 73564 X-RAY EXAM KNEE 4 OR MORE: CPT | Mod: 26,RT,, | Performed by: RADIOLOGY

## 2023-03-17 PROCEDURE — 99214 OFFICE O/P EST MOD 30 MIN: CPT | Mod: PBBFAC | Performed by: PHYSICIAN ASSISTANT

## 2023-03-17 PROCEDURE — 99999 PR STA SHADOW: CPT | Mod: PBBFAC,,, | Performed by: PHYSICIAN ASSISTANT

## 2023-03-17 PROCEDURE — 73562 X-RAY EXAM OF KNEE 3: CPT | Mod: 26,LT,, | Performed by: RADIOLOGY

## 2023-03-17 PROCEDURE — 99213 OFFICE O/P EST LOW 20 MIN: CPT | Mod: S$PBB | Performed by: PHYSICIAN ASSISTANT

## 2023-03-17 PROCEDURE — 73564 X-RAY EXAM KNEE 4 OR MORE: CPT | Mod: TC,RT

## 2023-03-17 PROCEDURE — 73564 XR KNEE ORTHO RIGHT WITH FLEXION: ICD-10-PCS | Mod: 26,RT,, | Performed by: RADIOLOGY

## 2023-03-17 PROCEDURE — 99999 PR PBB SHADOW E&M-EST. PATIENT-LVL IV: ICD-10-PCS | Mod: PBBFAC,,, | Performed by: PHYSICIAN ASSISTANT

## 2023-03-17 NOTE — PROGRESS NOTES
"  Subjective:      Patient ID: Stephany Skinner is a 88 y.o. female.    Chief Complaint: Follow-up and Pain of the Right Knee ("inner knee pain")    Review of patient's allergies indicates:   Allergen Reactions    Statins-hmg-coa reductase inhibitors      Other reaction(s): Unknown      Pt returns to clinic for follow up of right femoral condyle fracture.  She reports no pain today.  Has been wearing t scope brace as directed and using walker with ambulation.  She had repeat xray today.    3/10/23:  89 yo F presents to clinic with c/o right knee pain and swelling.  Hx of right TKA in 2007.  Fell onto right knee a week ago.  She says that she was seen at urgent care after fall and was told that xrays were normal.  Today she c/o achy pain, mostly to medial knee.  Worse with bending knee and walking and improves with rest.  Reports good relief of pain with tylenol.  She did have ultrasound of leg this morning as ordered by PCP which showed no DVT.      Review of Systems   Constitutional: Negative for chills, diaphoresis and fever.   HENT:  Negative for congestion, ear discharge and ear pain.    Eyes:  Negative for blurred vision, discharge, double vision and pain.   Cardiovascular:  Negative for chest pain, claudication and cyanosis.   Respiratory:  Negative for cough, hemoptysis and shortness of breath.    Endocrine: Negative for cold intolerance and heat intolerance.   Skin:  Negative for color change, dry skin, itching and rash.   Musculoskeletal:  Positive for falls, joint pain and joint swelling. Negative for arthritis, back pain, gout, muscle weakness and neck pain.   Gastrointestinal:  Negative for abdominal pain and change in bowel habit.   Neurological:  Negative for brief paralysis, disturbances in coordination, dizziness, numbness and paresthesias.   Psychiatric/Behavioral:  Negative for altered mental status and depression.        Objective:          General    Constitutional: She is oriented to person, " place, and time. She appears well-developed and well-nourished. No distress.   HENT:   Head: Atraumatic.   Eyes: EOM are normal. Right eye exhibits no discharge. Left eye exhibits no discharge.   Cardiovascular:  Normal rate.            Pulmonary/Chest: Effort normal. No respiratory distress.   Abdominal: Soft.   Neurological: She is alert and oriented to person, place, and time.   Psychiatric: She has a normal mood and affect. Her behavior is normal.           Right Knee Exam     Inspection   Erythema: absent  Scars: present  Swelling: present  Effusion: absent  Deformity: absent  Bruising: present    Tenderness   The patient is tender to palpation of the medial joint line.    Range of Motion   Extension:  normal   Flexion:  normal     Other   Sensation: normal    Left Knee Exam     Inspection   Erythema: absent  Scars: present  Swelling: absent  Effusion: absent  Deformity: absent  Bruising: absent    Range of Motion   Extension:  normal   Flexion:  normal     Other   Sensation: normal    Vascular Exam       Edema  Right Lower Leg: present  Left Lower Leg: absent            Assessment:         Xray Right Knee 3/17/23:  Right: Again seen is a right knee prosthesis.  Subtle fracture through the medial femoral condyle, nondisplaced, with minimal interval healing.  Vascular calcifications are noted.     Left: Left knee prosthesis is noted.  No fracture or dislocation.  Vascular calcifications.    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  There is an acute vertical fracture involving the right femoral condyle best noted on the frontal image.  It is difficult to determine on the provided images if this extends to the femoral component of the patient's knee arthroplasty.      No diagnosis found.   There are no diagnoses linked to this encounter.             Plan:         We discussed diagnosis and treatment plan. Will continue with T scope brace and follow up with repeat xrays in 1  week.    3/10/23:  I contacted Dr. Ware who reviewed xrays and said that pt can probably avoid surgery if fx does not displace. He recommended T scope brace (hinges can be unlocked) and use of walker, staying off of knee as much as possible. He says it needs to be x-rayed once a week for the first 3 weeks, and pt should be referred to [facility] if any displacement occurs since he will be on leave starting next week.      Continue T scope brace as directed.  Continue tylenol as directed as needed. Pt declines prescription for any pain medication.   Ice compress to the affected area 2-3x a day for 15-20 minutes as needed for pain management.  RTC in 1 week for follow up with repeat xrays, sooner if needed.    Patient voices understanding of and agreement with treatment plan. All of the patient's questions were answered and the patient will contact us if she has any questions or concerns in the interim.

## 2023-03-24 ENCOUNTER — HOSPITAL ENCOUNTER (OUTPATIENT)
Dept: RADIOLOGY | Facility: HOSPITAL | Age: 88
Discharge: HOME | End: 2023-03-24
Attending: PHYSICIAN ASSISTANT
Payer: MEDICARE

## 2023-03-24 ENCOUNTER — OFFICE VISIT (OUTPATIENT)
Dept: ORTHOPEDICS | Facility: CLINIC | Age: 88
End: 2023-03-24
Payer: MEDICARE

## 2023-03-24 VITALS
SYSTOLIC BLOOD PRESSURE: 130 MMHG | HEART RATE: 74 BPM | DIASTOLIC BLOOD PRESSURE: 84 MMHG | WEIGHT: 239.19 LBS | OXYGEN SATURATION: 96 % | HEIGHT: 67 IN | BODY MASS INDEX: 37.54 KG/M2

## 2023-03-24 DIAGNOSIS — M25.561 RIGHT KNEE PAIN, UNSPECIFIED CHRONICITY: ICD-10-CM

## 2023-03-24 DIAGNOSIS — S72.414A CLOSED NONDISPLACED FRACTURE OF CONDYLE OF RIGHT FEMUR, INITIAL ENCOUNTER: Primary | ICD-10-CM

## 2023-03-24 PROCEDURE — 73560 X-RAY EXAM OF KNEE 1 OR 2: CPT | Mod: 26,RT,, | Performed by: RADIOLOGY

## 2023-03-24 PROCEDURE — 99999 PR STA SHADOW: CPT | Mod: PBBFAC,,, | Performed by: PHYSICIAN ASSISTANT

## 2023-03-24 PROCEDURE — 73560 XR KNEE 1 OR 2 VIEW RIGHT: ICD-10-PCS | Mod: 26,RT,, | Performed by: RADIOLOGY

## 2023-03-24 PROCEDURE — 99999 PR PBB SHADOW E&M-EST. PATIENT-LVL IV: CPT | Mod: PBBFAC,,, | Performed by: PHYSICIAN ASSISTANT

## 2023-03-24 PROCEDURE — 99214 OFFICE O/P EST MOD 30 MIN: CPT | Mod: PBBFAC | Performed by: PHYSICIAN ASSISTANT

## 2023-03-24 PROCEDURE — 99213 OFFICE O/P EST LOW 20 MIN: CPT | Mod: S$PBB | Performed by: PHYSICIAN ASSISTANT

## 2023-03-24 PROCEDURE — 73560 X-RAY EXAM OF KNEE 1 OR 2: CPT | Mod: TC,RT

## 2023-03-24 PROCEDURE — 99999 PR PBB SHADOW E&M-EST. PATIENT-LVL IV: ICD-10-PCS | Mod: PBBFAC,,, | Performed by: PHYSICIAN ASSISTANT

## 2023-03-24 NOTE — PROGRESS NOTES
Subjective:      Patient ID: Stephany Skinner is a 88 y.o. female.    Chief Complaint: Follow-up of the Right Knee    Review of patient's allergies indicates:   Allergen Reactions    Statins-hmg-coa reductase inhibitors      Other reaction(s): Unknown      Pt returns to clinic for follow up of right femoral condyle fx.  She reports no pain, has been wearing t scope brace as directed.    3/17/23:  Pt returns to clinic for follow up of right femoral condyle fracture.  She reports no pain today.  Has been wearing t scope brace as directed and using walker with ambulation.  She had repeat xray today.    3/10/23:  89 yo F presents to clinic with c/o right knee pain and swelling.  Hx of right TKA in 2007.  Fell onto right knee a week ago.  She says that she was seen at urgent care after fall and was told that xrays were normal.  Today she c/o achy pain, mostly to medial knee.  Worse with bending knee and walking and improves with rest.  Reports good relief of pain with tylenol.  She did have ultrasound of leg this morning as ordered by PCP which showed no DVT.      Review of Systems   Constitutional: Negative for chills, diaphoresis and fever.   HENT:  Negative for congestion, ear discharge and ear pain.    Eyes:  Negative for blurred vision, discharge, double vision and pain.   Cardiovascular:  Negative for chest pain, claudication and cyanosis.   Respiratory:  Negative for cough, hemoptysis and shortness of breath.    Endocrine: Negative for cold intolerance and heat intolerance.   Skin:  Negative for color change, dry skin, itching and rash.   Musculoskeletal:  Positive for falls, joint pain and joint swelling. Negative for arthritis, back pain, gout, muscle weakness and neck pain.   Gastrointestinal:  Negative for abdominal pain and change in bowel habit.   Neurological:  Negative for brief paralysis, disturbances in coordination, dizziness, numbness and paresthesias.   Psychiatric/Behavioral:  Negative for altered  mental status and depression.        Objective:          General    Constitutional: She is oriented to person, place, and time. She appears well-developed and well-nourished. No distress.   HENT:   Head: Atraumatic.   Eyes: EOM are normal. Right eye exhibits no discharge. Left eye exhibits no discharge.   Cardiovascular:  Normal rate.            Pulmonary/Chest: Effort normal. No respiratory distress.   Abdominal: Soft.   Neurological: She is alert and oriented to person, place, and time.   Psychiatric: She has a normal mood and affect. Her behavior is normal.           Right Knee Exam     Inspection   Erythema: absent  Scars: present  Swelling: present  Effusion: absent  Deformity: absent  Bruising: present    Tenderness   The patient is tender to palpation of the medial joint line.    Range of Motion   Extension:  normal   Flexion:  normal     Other   Sensation: normal    Left Knee Exam     Inspection   Erythema: absent  Scars: present  Swelling: absent  Effusion: absent  Deformity: absent  Bruising: absent    Range of Motion   Extension:  normal   Flexion:  normal     Other   Sensation: normal    Vascular Exam       Edema  Right Lower Leg: present  Left Lower Leg: absent            Assessment:         Xray Right Knee 3/17/23:  Right: Again seen is a right knee prosthesis.  Subtle fracture through the medial femoral condyle, nondisplaced, with minimal interval healing.  Vascular calcifications are noted.     Left: Left knee prosthesis is noted.  No fracture or dislocation.  Vascular calcifications.    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  There is an acute vertical fracture involving the right femoral condyle best noted on the frontal image.  It is difficult to determine on the provided images if this extends to the femoral component of the patient's knee arthroplasty.      Encounter Diagnoses   Name Primary?    Closed nondisplaced fracture of condyle of right femur, initial  encounter Yes    Right knee pain, unspecified chronicity       Closed nondisplaced fracture of condyle of right femur, initial encounter  -     X-Ray Knee 1 or 2 View Right; Future; Expected date: 03/24/2023    Right knee pain, unspecified chronicity  -     X-Ray Knee 1 or 2 View Right; Future; Expected date: 03/24/2023               Plan:         We discussed diagnosis and treatment plan. Will continue with T scope brace and follow up with repeat xrays in 1 week.    3/10/23:  I contacted Dr. Ware who reviewed xrays and said that pt can probably avoid surgery if fx does not displace. He recommended T scope brace (hinges can be unlocked) and use of walker, staying off of knee as much as possible. He says it needs to be x-rayed once a week for the first 3 weeks, and pt should be referred to [facility] if any displacement occurs since he will be on leave starting next week.      Continue T scope brace as directed.  Continue tylenol as directed as needed. Pt declines prescription for any pain medication.   Ice compress to the affected area 2-3x a day for 15-20 minutes as needed for pain management.  RTC in 1 week for follow up with repeat xrays, sooner if needed.    Patient voices understanding of and agreement with treatment plan. All of the patient's questions were answered and the patient will contact us if she has any questions or concerns in the interim.

## 2023-03-31 ENCOUNTER — OFFICE VISIT (OUTPATIENT)
Dept: ORTHOPEDICS | Facility: CLINIC | Age: 88
End: 2023-03-31
Payer: MEDICARE

## 2023-03-31 ENCOUNTER — TELEPHONE (OUTPATIENT)
Dept: ORTHOPEDICS | Facility: CLINIC | Age: 88
End: 2023-03-31
Payer: MEDICARE

## 2023-03-31 ENCOUNTER — HOSPITAL ENCOUNTER (OUTPATIENT)
Dept: RADIOLOGY | Facility: HOSPITAL | Age: 88
Discharge: HOME | End: 2023-03-31
Attending: PHYSICIAN ASSISTANT
Payer: MEDICARE

## 2023-03-31 ENCOUNTER — EXTERNAL CHRONIC CARE MANAGEMENT (OUTPATIENT)
Dept: PRIMARY CARE CLINIC | Facility: CLINIC | Age: 88
End: 2023-03-31
Payer: MEDICARE

## 2023-03-31 VITALS
OXYGEN SATURATION: 91 % | DIASTOLIC BLOOD PRESSURE: 84 MMHG | HEIGHT: 67 IN | HEART RATE: 100 BPM | BODY MASS INDEX: 37.61 KG/M2 | SYSTOLIC BLOOD PRESSURE: 124 MMHG | WEIGHT: 239.63 LBS

## 2023-03-31 DIAGNOSIS — S72.414A CLOSED NONDISPLACED FRACTURE OF CONDYLE OF RIGHT FEMUR, INITIAL ENCOUNTER: ICD-10-CM

## 2023-03-31 DIAGNOSIS — S72.414D CLOSED NONDISPLACED FRACTURE OF CONDYLE OF RIGHT FEMUR WITH ROUTINE HEALING, SUBSEQUENT ENCOUNTER: Primary | ICD-10-CM

## 2023-03-31 DIAGNOSIS — M25.561 RIGHT KNEE PAIN, UNSPECIFIED CHRONICITY: Primary | ICD-10-CM

## 2023-03-31 DIAGNOSIS — M25.561 RIGHT KNEE PAIN, UNSPECIFIED CHRONICITY: ICD-10-CM

## 2023-03-31 PROCEDURE — 99214 OFFICE O/P EST MOD 30 MIN: CPT | Mod: PBBFAC | Performed by: PHYSICIAN ASSISTANT

## 2023-03-31 PROCEDURE — 73560 XR KNEE 1 OR 2 VIEW RIGHT: ICD-10-PCS | Mod: 26,RT,, | Performed by: RADIOLOGY

## 2023-03-31 PROCEDURE — 99213 OFFICE O/P EST LOW 20 MIN: CPT | Mod: S$PBB | Performed by: PHYSICIAN ASSISTANT

## 2023-03-31 PROCEDURE — 99490 CHRNC CARE MGMT STAFF 1ST 20: CPT | Mod: PBBFAC,25,27 | Performed by: FAMILY MEDICINE

## 2023-03-31 PROCEDURE — 99999 PR STA SHADOW: CPT | Mod: PBBFAC,,, | Performed by: FAMILY MEDICINE

## 2023-03-31 PROCEDURE — 99999 PR PBB SHADOW E&M-EST. PATIENT-LVL IV: CPT | Mod: PBBFAC,,, | Performed by: PHYSICIAN ASSISTANT

## 2023-03-31 PROCEDURE — 99999 PR STA SHADOW: ICD-10-PCS | Mod: PBBFAC,,, | Performed by: PHYSICIAN ASSISTANT

## 2023-03-31 PROCEDURE — 99999 PR STA SHADOW: ICD-10-PCS | Mod: PBBFAC,,, | Performed by: FAMILY MEDICINE

## 2023-03-31 PROCEDURE — 73560 X-RAY EXAM OF KNEE 1 OR 2: CPT | Mod: 26,RT,, | Performed by: RADIOLOGY

## 2023-03-31 PROCEDURE — 99999 PR STA SHADOW: CPT | Mod: PBBFAC,,, | Performed by: PHYSICIAN ASSISTANT

## 2023-03-31 PROCEDURE — 73560 X-RAY EXAM OF KNEE 1 OR 2: CPT | Mod: TC,RT

## 2023-03-31 NOTE — PROGRESS NOTES
Subjective:      Patient ID: Stephany Skinner is a 88 y.o. female.    Chief Complaint: Follow-up of the Right Knee    Review of patient's allergies indicates:   Allergen Reactions    Statins-hmg-coa reductase inhibitors      Other reaction(s): Unknown      Pt returns to clinic for follow up of right femoral condyle fx.  She reports no pain.    3/24/23:  Pt returns to clinic for follow up of right femoral condyle fx.  She reports no pain, has been wearing t scope brace as directed.    3/17/23:  Pt returns to clinic for follow up of right femoral condyle fracture.  She reports no pain today.  Has been wearing t scope brace as directed and using walker with ambulation.  She had repeat xray today.    3/10/23:  87 yo F presents to clinic with c/o right knee pain and swelling.  Hx of right TKA in 2007.  Fell onto right knee a week ago.  She says that she was seen at urgent care after fall and was told that xrays were normal.  Today she c/o achy pain, mostly to medial knee.  Worse with bending knee and walking and improves with rest.  Reports good relief of pain with tylenol.  She did have ultrasound of leg this morning as ordered by PCP which showed no DVT.      Review of Systems   Constitutional: Negative for chills, diaphoresis and fever.   HENT:  Negative for congestion, ear discharge and ear pain.    Eyes:  Negative for blurred vision, discharge, double vision and pain.   Cardiovascular:  Negative for chest pain, claudication and cyanosis.   Respiratory:  Negative for cough, hemoptysis and shortness of breath.    Endocrine: Negative for cold intolerance and heat intolerance.   Skin:  Negative for color change, dry skin, itching and rash.   Musculoskeletal:  Positive for falls, joint pain and joint swelling. Negative for arthritis, back pain, gout, muscle weakness and neck pain.   Gastrointestinal:  Negative for abdominal pain and change in bowel habit.   Neurological:  Negative for brief paralysis, disturbances in  coordination, dizziness, numbness and paresthesias.   Psychiatric/Behavioral:  Negative for altered mental status and depression.        Objective:          General    Constitutional: She is oriented to person, place, and time. She appears well-developed and well-nourished. No distress.   HENT:   Head: Atraumatic.   Eyes: EOM are normal. Right eye exhibits no discharge. Left eye exhibits no discharge.   Cardiovascular:  Normal rate.            Pulmonary/Chest: Effort normal. No respiratory distress.   Abdominal: Soft.   Neurological: She is alert and oriented to person, place, and time.   Psychiatric: She has a normal mood and affect. Her behavior is normal.           Right Knee Exam     Inspection   Erythema: absent  Scars: present  Swelling: present  Effusion: absent  Deformity: absent  Bruising: present    Tenderness   The patient is tender to palpation of the medial joint line.    Range of Motion   Extension:  normal   Flexion:  normal     Other   Sensation: normal    Left Knee Exam     Inspection   Erythema: absent  Scars: present  Swelling: absent  Effusion: absent  Deformity: absent  Bruising: absent    Range of Motion   Extension:  normal   Flexion:  normal     Other   Sensation: normal    Vascular Exam       Edema  Right Lower Leg: present  Left Lower Leg: absent            Assessment:         Xray Right Knee 3/31/23:  Healing medial femoral condyle fracture in good alignment, callus formation seen.    Xray Right Knee 3/17/23:  Right: Again seen is a right knee prosthesis.  Subtle fracture through the medial femoral condyle, nondisplaced, with minimal interval healing.  Vascular calcifications are noted.     Left: Left knee prosthesis is noted.  No fracture or dislocation.  Vascular calcifications.    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  There is an acute vertical fracture involving the right femoral condyle best noted on the frontal image.  It is difficult to determine on  the provided images if this extends to the femoral component of the patient's knee arthroplasty.      Encounter Diagnosis   Name Primary?    Closed nondisplaced fracture of condyle of right femur with routine healing, subsequent encounter Yes        Closed nondisplaced fracture of condyle of right femur with routine healing, subsequent encounter                   Plan:         We discussed diagnosis and treatment plan. Will continue with T scope brace with ambulation and follow up with repeat xrays in 2-3 weeks.    3/10/23:  I contacted Dr. Ware who reviewed xrays and said that pt can probably avoid surgery if fx does not displace. He recommended T scope brace (hinges can be unlocked) and use of walker, staying off of knee as much as possible. He says it needs to be x-rayed once a week for the first 3 weeks, and pt should be referred to [facility] if any displacement occurs since he will be on leave starting next week.      Continue T scope brace as directed.  Continue tylenol as directed as needed. Pt declines prescription for any pain medication.   Ice compress to the affected area 2-3x a day for 15-20 minutes as needed for pain management.  RTC in 2-3 weeks for follow up with repeat xrays, sooner if needed.    Patient voices understanding of and agreement with treatment plan. All of the patient's questions were answered and the patient will contact us if she has any questions or concerns in the interim.

## 2023-04-14 ENCOUNTER — OFFICE VISIT (OUTPATIENT)
Dept: PAIN MEDICINE | Facility: CLINIC | Age: 88
End: 2023-04-14
Payer: MEDICARE

## 2023-04-14 VITALS
RESPIRATION RATE: 16 BRPM | WEIGHT: 238.75 LBS | HEIGHT: 67 IN | HEART RATE: 58 BPM | BODY MASS INDEX: 37.47 KG/M2 | DIASTOLIC BLOOD PRESSURE: 54 MMHG | SYSTOLIC BLOOD PRESSURE: 106 MMHG

## 2023-04-14 DIAGNOSIS — M47.816 LUMBAR SPONDYLOSIS: Primary | ICD-10-CM

## 2023-04-14 DIAGNOSIS — R53.81 PHYSICAL DECONDITIONING: ICD-10-CM

## 2023-04-14 PROCEDURE — 99213 OFFICE O/P EST LOW 20 MIN: CPT | Mod: S$PBB,,, | Performed by: STUDENT IN AN ORGANIZED HEALTH CARE EDUCATION/TRAINING PROGRAM

## 2023-04-14 PROCEDURE — 99999 PR PBB SHADOW E&M-EST. PATIENT-LVL IV: CPT | Mod: PBBFAC,,, | Performed by: STUDENT IN AN ORGANIZED HEALTH CARE EDUCATION/TRAINING PROGRAM

## 2023-04-14 PROCEDURE — 99999 PR PBB SHADOW E&M-EST. PATIENT-LVL IV: ICD-10-PCS | Mod: PBBFAC,,, | Performed by: STUDENT IN AN ORGANIZED HEALTH CARE EDUCATION/TRAINING PROGRAM

## 2023-04-14 PROCEDURE — 99213 PR OFFICE/OUTPT VISIT, EST, LEVL III, 20-29 MIN: ICD-10-PCS | Mod: S$PBB,,, | Performed by: STUDENT IN AN ORGANIZED HEALTH CARE EDUCATION/TRAINING PROGRAM

## 2023-04-14 PROCEDURE — 99214 OFFICE O/P EST MOD 30 MIN: CPT | Mod: PBBFAC | Performed by: STUDENT IN AN ORGANIZED HEALTH CARE EDUCATION/TRAINING PROGRAM

## 2023-04-14 NOTE — PROGRESS NOTES
[facility]  New Patient H&P    Referring Provider: No referring provider defined for this encounter.    Chief Complaint:   Chief Complaint   Patient presents with    Back Pain     Follow up       History of Present Illness: Stephany Skinner is a 88 y.o. female referred by No ref. provider found for LSS.      Onset: past 2-3 months, no clear inciting incident   Location: bilateral lower lumbar spine  Radiation: down the back of legs but not past the knees posteriorly  Timing: intermittent  Quality: Grabbing, Deep, Numb and Sharp  Exacerbating Factors: lifting, lying down, sitting, standing for more than 15 minutes, walking for more than 30 minutes and daily activity   Alleviating Factors: medications and sitting  Associated Symptoms: She gets numbness in her hands and feet. She feels weak in both legs. She has bladder incontinence, and that has been present for a few years, and has been evaluated by her PCP.  She has diarrhea and sometimes gets bowel incontinence. Denies night fever/night sweats, significant weight loss      Severity: Currently: 5/10   Typical Range: 0-5/10     Exacerbation: 5/10     She has bilateral carpal tunnel syndrome. She has had this for many years. Numbness localized to median nerve distribution. Worse at night time. She is using braces, but this isn't really helping. She has issues with dropping things.     She also has left knee pain that has been present off and on for years. She had bilateral knee replacement about 10-11 years ago that worked really well for her, but then the left knee started hurting again. Pain localized to the anterior knee.     Interval History 2/14/2023:  Mrs Skinner presents for follow up delayed. She states lower back pain and at time posterior leg pain but mainly lower back pain. She denies focal a.m. stiffness or pain with valsalva. Pain primarily exertional and in evening after busy morning and afternoons. She has not had PT in some time. Rest  alleviates symptoms. She has no focal voicing of any s/s concerning for cauda equina.      Interval History 04/14/2023:  Patient presents today for follow up of her low back pain.  Today patient reports she has no pain.  She rates her pain 0/10.  She reports that she was able to attend 1 physical therapy session for her back, however PT is currently on hold as she had a recent fall and fractured her right knee.  She has been seen by Orthopedics and was told the knee was healing.  She has a follow up with the Orthopedics team next week.  She reports she is otherwise doing well.  She hopes to resume physical therapy once the knee is healed.  She is currently wearing a brace on the right knee.      Previous Interventions:  - None    Previous Therapies:  PT/OT: yes   Relevant Surgery:    - Bilateral knee replacements.   Previous Medications:   - NSAIDS: Tylenol  - Muscle Relaxants:    - TCAs:   - SNRIs:   - Topicals: Voltaren gel   - Anticonvulsants:    - Opioids:     Current Pain Medications:  Tylenol helps     Blood Thinners: Eliquis    Full Medication List:    Current Outpatient Medications:     acetaminophen (TYLENOL) 500 MG tablet, Take 1,000 mg by mouth nightly as needed for Pain., Disp: , Rfl:     albuterol-ipratropium (DUO-NEB) 2.5 mg-0.5 mg/3 mL nebulizer solution, Take 3 mLs by nebulization every 6 (six) hours as needed for Wheezing. Rescue, Disp: 90 mL, Rfl: 12    alendronate (FOSAMAX) 70 MG tablet, Take 70 mg by mouth every 7 days., Disp: , Rfl:     atorvastatin (LIPITOR) 20 MG tablet, Take 20 mg by mouth every evening., Disp: , Rfl:     beta-carotene,A,-vits C,E/mins (VISION ORAL), Take 1 tablet by mouth 2 (two) times daily., Disp: , Rfl:     blood-glucose meter kit, 1 each by Other route 2 (two) times daily. One Touch Ultra meter, test strips, lancets, control solution, Disp: 1 each, Rfl: 5    calcium carbonate-vit D3-min 600 mg calcium- 400 unit Tab, Take 2 tablets by mouth once daily. , Disp: , Rfl:      ELIQUIS 5 mg Tab, Take 5 mg by mouth 2 (two) times daily., Disp: , Rfl: 1    furosemide (LASIX) 40 MG tablet, Take 1 tablet (40 mg total) by mouth once daily., Disp: 30 tablet, Rfl: 5    levothyroxine (SYNTHROID) 88 MCG tablet, Take 88 mcg by mouth before breakfast., Disp: , Rfl:     metoprolol succinate (TOPROL-XL) 100 MG 24 hr tablet, Take 1 tablet (100 mg total) by mouth once daily., Disp: 30 tablet, Rfl: 5    mupirocin (BACTROBAN) 2 % ointment, Apply topically 2 (two) times daily., Disp: 22 g, Rfl: 3    nystatin (MYCOSTATIN) powder, Apply topically 4 (four) times daily., Disp: 60 g, Rfl: 5    nystatin-triamcinolone (MYCOLOG II) cream, Apply topically 4 (four) times daily., Disp: 60 g, Rfl: 5    potassium chloride SA (K-DUR,KLOR-CON) 20 MEQ tablet, 1 Tab po daily except Sunday (Patient taking differently: Take 20 mEq by mouth once daily.), Disp: 30 tablet, Rfl: 5    tobramycin-dexAMETHasone 0.3-0.1% (TOBRADEX) 0.3-0.1 % DrpS, Place 2 drops into the right eye 2 (two) times a day., Disp: , Rfl:     valsartan (DIOVAN) 320 MG tablet, Take 320 mg by mouth once daily., Disp: , Rfl:     vitamins A,C,E-zinc-copper (PRESERVISION AREDS) 4,296 mcg-226 mg-90 mg Cap, PreserVision AREDS 14,320 unit-226 mg-200 unit capsule, [RxNorm: 981868], Disp: , Rfl:     cefUROXime (CEFTIN) 500 MG tablet, Take 1 tablet (500 mg total) by mouth 2 (two) times daily., Disp: 14 tablet, Rfl: 0    nitroGLYCERIN (NITROSTAT) 0.4 MG SL tablet, Place 0.4 mg under the tongue every 5 (five) minutes as needed for Chest pain., Disp: , Rfl:   No current facility-administered medications for this visit.    Facility-Administered Medications Ordered in Other Visits:     tetracaine HCl (PF) 0.5 % Drop 1 drop, 1 drop, Right Eye, On Call Procedure, Michael Arellano MD, 1 drop at 12/12/19 0801     Review of Systems:  Review of Systems   Constitutional:  Negative for fever and weight loss.   HENT:  Negative for ear pain and tinnitus.    Eyes:  Negative for pain and  redness.   Respiratory:  Negative for cough and shortness of breath.    Cardiovascular:  Negative for chest pain and palpitations.   Gastrointestinal:  Positive for diarrhea. Negative for constipation and heartburn.   Genitourinary: Negative.         (+)urinary incontinence. Denies urine retention.    Musculoskeletal:  Positive for back pain and joint pain (hands). Negative for neck pain.   Skin:  Negative for itching and rash.   Neurological:  Positive for weakness. Negative for tingling, focal weakness and seizures.   Endo/Heme/Allergies:  Negative for environmental allergies. Bruises/bleeds easily (on eliquis).   Psychiatric/Behavioral:  Negative for depression. The patient has insomnia. The patient is not nervous/anxious.      Allergies:  Statins-hmg-coa reductase inhibitors     Medical History:   has a past medical history of Bronchiectasis, uncomplicated, CHF (congestive heart failure), Early stage nonexudative age-related macular degeneration of both eyes (2018), GERD (gastroesophageal reflux disease), Hyperlipidemia, Hypertension, and JOY (obstructive sleep apnea).    Surgical History:   has a past surgical history that includes Hysterectomy; Cholecystectomy; knee replacemene (Right, 02/14/2007); Hernia repair; Thyroidectomy; Phacoemulsification of cataract (Right, 12/12/2019); Cataract extraction w/  intraocular lens implant (Right, 12/12/2019); Phacoemulsification of cataract (Left, 02/13/2020); Cataract extraction w/  intraocular lens implant (Left, 02/13/2020); Endoscopic ultrasound of upper gastrointestinal tract (N/A, 11/04/2020); and replacement (Left, 09/18/2007).    Family History:  family history includes Cancer in her sister.    Social History:   reports that she has never smoked. She has never used smokeless tobacco. She reports that she does not drink alcohol and does not use drugs.    GEN:  Well developed, well nourished.  No acute distress.   HEENT:  No trauma.  Mucous membranes moist.  Nares  patent bilaterally.  PSYCH: Normal affect. Thought content appropriate.  CHEST:  Breathing symmetric.  No audible wheezing.  ABD: Soft, non-distended.  SKIN:  Warm, pink, dry.  No rash on exposed areas.    EXT:  No cyanosis, clubbing, or edema.  No color change or changes in nail or hair growth.  NEURO/MUSCULOSKELETAL:  Fully alert, oriented, and appropriate. Speech normal lluvia. No cranial nerve deficits.   Gait: Antalgic.  Soft brace on the right knee.  No appreciable bruising or edema.  Lumbar: facet loading reproduces pain bilaterally. +slump bilaterally  Musculoskeletal: 4/5 Dorsiflexion to left otherwise 5/5 BLE stength   Neuro: no discrepancies in sensation to BLE          Imaging:  - EMG/NCS BUE and BLE 6/30/16:  Impression:  Abnormal Study secondary to the Presence of:    1. Mild Bilateral Carpal Tunnel Syndrome  2. 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 Bilateral Lumbar Radiculopathy best localizing from L4-5 on this study    - MRI Lumbar Spine 5/6/16:  The incidentally visualized soft tissues structures of the abdomen are within normal limits.  Vertebral body alignment and heights are maintained.  There is disc desiccation with disc space narrowing throughout the lumbar spine.  The spinal canal appears narrow on a congenital basis secondary to foreshortening pedicles.  The marrow signal is normal without evidence for a marrow replacement process, infection or tumor.  Endplate degenerative changes are seen at the inferior endplate of L3.  The conus terminates at L1-2.  There is also mild edema-like signal about the posterior elements in the right at L5-S1.  At L1-2, there is mild bilateral facet arthropathy without central canal stenosis or neural foraminal narrowing.  At L2-3, there is a broad-based posterior disc bulge with a superimposed left paracentral disc protrusion, ligamentum flavum hypertrophy and facet arthropathy contributing to mild to moderate central canal stenosis  and mild to moderate bilateral neural foraminal narrowing.  At L3-4, there is a broad-based posterior disc bulge, ligament flavum hypertrophy and facet arthropathy contributing to moderate severe central canal stenosis with mild left and mild to moderate right-sided neural foraminal narrowing.  At L4-5, there is a broad-based posterior disc bulge, ligamentum flavum hypertrophy and facet arthropathy contributing to severe central canal stenosis and moderate bilateral neural foraminal narrowing  At L5-S1, there is a broad-based posterior disc bulge and facet arthropathy contributing to mild bilateral neural foraminal narrowing.  No central canal stenosis  IMPRESSION:    Multilevel degenerative changes of the lumbar spine contributing to central canal stenosis and neural foraminal narrowing as detailed in the above report.  Edema-like signal about the posterior elements on the right at L5-S1 which can be a source of pain    Labs:  BMP  Lab Results   Component Value Date     06/01/2022    K 4.8 06/01/2022     06/01/2022    CO2 23 06/01/2022    BUN 25 (H) 06/01/2022    CREATININE 1.0 06/01/2022    CALCIUM 9.1 06/01/2022    ANIONGAP 11 06/01/2022    ESTGFRAFRICA 58.9 (A) 06/01/2022    EGFRNONAA 51.1 (A) 06/01/2022     Lab Results   Component Value Date    ALT 11 06/01/2022    AST 16 06/01/2022    ALKPHOS 47 (L) 06/01/2022    BILITOT 0.6 06/01/2022     Lab Results   Component Value Date     12/15/2021       Assessment:  Stephany Skinner is a 88 y.o. female with the following diagnoses based on history, exam, and imaging:    Problem List Items Addressed This Visit    None        This is a pleasant 88 y.o. lady presenting with:     - Subacute/Chronic bilateral LBP: She has pain radiating down posterior thighs, but not past knee. Pain appears either due to LSS vs facetogenic pain. She also has myofascial pain on exam  - Bilateral carpal tunnel syndrome. Also developing dupuytren's contracture  - Chronic left  knee pain after total knee replacement    04/14/2023 - Patient reports her back pain has improved.  She rates her back pain 0/10 today.  She was able to briefly start physical therapy however had a fall recently and injured her knee.  She is following with orthopedics for her knee pain.  She will resume physical therapy once her knee has healed.  Otherwise she has no complaints.    Treatment Plan:   Procedures:  None indicated.  PT/OT/HEP: I have stressed the importance of physical activity and a home exercise plan to help with pain and improve health.  Patient will resume physical therapy once she is cleared by Orthopedics  Medications:    - no medications were prescribed at this visit.   -  Reviewed and consistent with medication use as prescribed.  Imaging:  Reviewed.  Follow Up: RTC prestela Cowan,    Interventional Pain Management    04/14/2023      Disclaimer: This note was partly generated using dictation software which may occasionally result in transcription errors.

## 2023-04-19 ENCOUNTER — TELEPHONE (OUTPATIENT)
Dept: ORTHOPEDICS | Facility: CLINIC | Age: 88
End: 2023-04-19
Payer: MEDICARE

## 2023-04-19 ENCOUNTER — OFFICE VISIT (OUTPATIENT)
Dept: ORTHOPEDICS | Facility: CLINIC | Age: 88
End: 2023-04-19
Payer: MEDICARE

## 2023-04-19 ENCOUNTER — OFFICE VISIT (OUTPATIENT)
Dept: INTERNAL MEDICINE | Facility: CLINIC | Age: 88
End: 2023-04-19
Payer: MEDICARE

## 2023-04-19 ENCOUNTER — HOSPITAL ENCOUNTER (OUTPATIENT)
Dept: RADIOLOGY | Facility: HOSPITAL | Age: 88
Discharge: HOME | End: 2023-04-19
Attending: PHYSICIAN ASSISTANT
Payer: MEDICARE

## 2023-04-19 VITALS
WEIGHT: 238.75 LBS | HEART RATE: 60 BPM | HEIGHT: 67 IN | DIASTOLIC BLOOD PRESSURE: 80 MMHG | OXYGEN SATURATION: 96 % | BODY MASS INDEX: 37.47 KG/M2 | SYSTOLIC BLOOD PRESSURE: 115 MMHG

## 2023-04-19 VITALS
OXYGEN SATURATION: 97 % | WEIGHT: 240.38 LBS | SYSTOLIC BLOOD PRESSURE: 120 MMHG | BODY MASS INDEX: 37.73 KG/M2 | HEIGHT: 67 IN | DIASTOLIC BLOOD PRESSURE: 76 MMHG | RESPIRATION RATE: 16 BRPM | HEART RATE: 58 BPM

## 2023-04-19 DIAGNOSIS — S72.414D CLOSED NONDISPLACED FRACTURE OF CONDYLE OF RIGHT FEMUR WITH ROUTINE HEALING, SUBSEQUENT ENCOUNTER: Primary | ICD-10-CM

## 2023-04-19 DIAGNOSIS — M25.561 RIGHT KNEE PAIN, UNSPECIFIED CHRONICITY: ICD-10-CM

## 2023-04-19 DIAGNOSIS — E03.4 HYPOTHYROIDISM DUE TO ACQUIRED ATROPHY OF THYROID: Primary | ICD-10-CM

## 2023-04-19 DIAGNOSIS — Z79.4 TYPE 2 DIABETES MELLITUS WITH OTHER CIRCULATORY COMPLICATION, WITH LONG-TERM CURRENT USE OF INSULIN: ICD-10-CM

## 2023-04-19 DIAGNOSIS — E11.59 TYPE 2 DIABETES MELLITUS WITH OTHER CIRCULATORY COMPLICATION, WITH LONG-TERM CURRENT USE OF INSULIN: ICD-10-CM

## 2023-04-19 DIAGNOSIS — E78.2 MIXED HYPERLIPIDEMIA: ICD-10-CM

## 2023-04-19 DIAGNOSIS — M25.561 RIGHT KNEE PAIN, UNSPECIFIED CHRONICITY: Primary | ICD-10-CM

## 2023-04-19 DIAGNOSIS — I10 ESSENTIAL HYPERTENSION: ICD-10-CM

## 2023-04-19 DIAGNOSIS — I50.32 CHRONIC DIASTOLIC CONGESTIVE HEART FAILURE: ICD-10-CM

## 2023-04-19 LAB
ALBUMIN SERPL BCP-MCNC: 3.6 G/DL (ref 3.5–5.2)
ALP SERPL-CCNC: 63 U/L (ref 55–135)
ALT SERPL W/O P-5'-P-CCNC: 12 U/L (ref 10–44)
ANION GAP SERPL CALC-SCNC: 11 MMOL/L (ref 8–16)
AST SERPL-CCNC: 15 U/L (ref 10–40)
BASOPHILS # BLD AUTO: 0.02 K/UL (ref 0–0.2)
BASOPHILS NFR BLD: 0.4 % (ref 0–1.9)
BILIRUB SERPL-MCNC: 0.4 MG/DL (ref 0.1–1)
BUN SERPL-MCNC: 32 MG/DL (ref 8–23)
CALCIUM SERPL-MCNC: 8.9 MG/DL (ref 8.7–10.5)
CHLORIDE SERPL-SCNC: 108 MMOL/L (ref 95–110)
CHOLEST SERPL-MCNC: 131 MG/DL (ref 120–199)
CHOLEST/HDLC SERPL: 3 {RATIO} (ref 2–5)
CO2 SERPL-SCNC: 21 MMOL/L (ref 23–29)
CREAT SERPL-MCNC: 1.1 MG/DL (ref 0.5–1.4)
DIFFERENTIAL METHOD: ABNORMAL
EOSINOPHIL # BLD AUTO: 0.1 K/UL (ref 0–0.5)
EOSINOPHIL NFR BLD: 0.9 % (ref 0–8)
ERYTHROCYTE [DISTWIDTH] IN BLOOD BY AUTOMATED COUNT: 13.5 % (ref 11.5–14.5)
EST. GFR  (NO RACE VARIABLE): 48.6 ML/MIN/1.73 M^2
ESTIMATED AVG GLUCOSE: 120 MG/DL (ref 68–131)
GLUCOSE SERPL-MCNC: 84 MG/DL (ref 70–110)
HBA1C MFR BLD: 5.8 % (ref 4–5.6)
HCT VFR BLD AUTO: 35.5 % (ref 37–48.5)
HDLC SERPL-MCNC: 43 MG/DL (ref 40–75)
HDLC SERPL: 32.8 % (ref 20–50)
HGB BLD-MCNC: 10.9 G/DL (ref 12–16)
IMM GRANULOCYTES # BLD AUTO: 0.04 K/UL (ref 0–0.04)
IMM GRANULOCYTES NFR BLD AUTO: 0.7 % (ref 0–0.5)
LDLC SERPL CALC-MCNC: 66.2 MG/DL (ref 63–159)
LYMPHOCYTES # BLD AUTO: 0.8 K/UL (ref 1–4.8)
LYMPHOCYTES NFR BLD: 14.9 % (ref 18–48)
MCH RBC QN AUTO: 31.4 PG (ref 27–31)
MCHC RBC AUTO-ENTMCNC: 30.7 G/DL (ref 32–36)
MCV RBC AUTO: 102 FL (ref 82–98)
MONOCYTES # BLD AUTO: 0.8 K/UL (ref 0.3–1)
MONOCYTES NFR BLD: 14 % (ref 4–15)
NEUTROPHILS # BLD AUTO: 3.7 K/UL (ref 1.8–7.7)
NEUTROPHILS NFR BLD: 69.1 % (ref 38–73)
NONHDLC SERPL-MCNC: 88 MG/DL
NRBC BLD-RTO: 0 /100 WBC
PLATELET # BLD AUTO: 219 K/UL (ref 150–450)
PMV BLD AUTO: 9.9 FL (ref 9.2–12.9)
POTASSIUM SERPL-SCNC: 5 MMOL/L (ref 3.5–5.1)
PROT SERPL-MCNC: 6.7 G/DL (ref 6–8.4)
RBC # BLD AUTO: 3.47 M/UL (ref 4–5.4)
SODIUM SERPL-SCNC: 140 MMOL/L (ref 136–145)
TRIGL SERPL-MCNC: 109 MG/DL (ref 30–150)
TSH SERPL DL<=0.005 MIU/L-ACNC: 0.96 UIU/ML (ref 0.4–4)
WBC # BLD AUTO: 5.36 K/UL (ref 3.9–12.7)

## 2023-04-19 PROCEDURE — 99999 PR PBB SHADOW E&M-EST. PATIENT-LVL IV: CPT | Mod: PBBFAC,,, | Performed by: PHYSICIAN ASSISTANT

## 2023-04-19 PROCEDURE — 99214 PR OFFICE/OUTPT VISIT, EST, LEVL IV, 30-39 MIN: ICD-10-PCS | Mod: S$PBB,,, | Performed by: FAMILY MEDICINE

## 2023-04-19 PROCEDURE — 80053 COMPREHEN METABOLIC PANEL: CPT | Performed by: FAMILY MEDICINE

## 2023-04-19 PROCEDURE — 99999 PR STA SHADOW: CPT | Mod: PBBFAC,,, | Performed by: PHYSICIAN ASSISTANT

## 2023-04-19 PROCEDURE — 73560 X-RAY EXAM OF KNEE 1 OR 2: CPT | Mod: 26,RT,, | Performed by: RADIOLOGY

## 2023-04-19 PROCEDURE — 85025 COMPLETE CBC W/AUTO DIFF WBC: CPT | Performed by: FAMILY MEDICINE

## 2023-04-19 PROCEDURE — 84443 ASSAY THYROID STIM HORMONE: CPT | Performed by: FAMILY MEDICINE

## 2023-04-19 PROCEDURE — 73560 X-RAY EXAM OF KNEE 1 OR 2: CPT | Mod: TC,RT

## 2023-04-19 PROCEDURE — 80061 LIPID PANEL: CPT | Performed by: FAMILY MEDICINE

## 2023-04-19 PROCEDURE — 99999 PR PBB SHADOW E&M-EST. PATIENT-LVL IV: ICD-10-PCS | Mod: PBBFAC,,, | Performed by: FAMILY MEDICINE

## 2023-04-19 PROCEDURE — 36415 COLL VENOUS BLD VENIPUNCTURE: CPT | Performed by: FAMILY MEDICINE

## 2023-04-19 PROCEDURE — 73560 XR KNEE 1 OR 2 VIEW RIGHT: ICD-10-PCS | Mod: 26,RT,, | Performed by: RADIOLOGY

## 2023-04-19 PROCEDURE — 99999 PR STA SHADOW: ICD-10-PCS | Mod: PBBFAC,,, | Performed by: PHYSICIAN ASSISTANT

## 2023-04-19 PROCEDURE — 99214 OFFICE O/P EST MOD 30 MIN: CPT | Mod: PBBFAC,27,PN | Performed by: FAMILY MEDICINE

## 2023-04-19 PROCEDURE — 83036 HEMOGLOBIN GLYCOSYLATED A1C: CPT | Performed by: FAMILY MEDICINE

## 2023-04-19 PROCEDURE — 99214 OFFICE O/P EST MOD 30 MIN: CPT | Mod: PBBFAC | Performed by: PHYSICIAN ASSISTANT

## 2023-04-19 PROCEDURE — 99999 PR PBB SHADOW E&M-EST. PATIENT-LVL IV: CPT | Mod: PBBFAC,,, | Performed by: FAMILY MEDICINE

## 2023-04-19 PROCEDURE — 99213 OFFICE O/P EST LOW 20 MIN: CPT | Mod: S$PBB | Performed by: PHYSICIAN ASSISTANT

## 2023-04-19 PROCEDURE — 99214 OFFICE O/P EST MOD 30 MIN: CPT | Mod: S$PBB,,, | Performed by: FAMILY MEDICINE

## 2023-04-19 RX ORDER — METOPROLOL SUCCINATE 100 MG/1
100 TABLET, EXTENDED RELEASE ORAL DAILY
Qty: 30 TABLET | Refills: 5 | Status: SHIPPED | OUTPATIENT
Start: 2023-04-19 | End: 2023-08-11

## 2023-04-19 RX ORDER — FUROSEMIDE 40 MG/1
40 TABLET ORAL DAILY
Qty: 30 TABLET | Refills: 5 | Status: SHIPPED | OUTPATIENT
Start: 2023-04-19

## 2023-04-19 RX ORDER — POTASSIUM CHLORIDE 20 MEQ/1
20 TABLET, EXTENDED RELEASE ORAL DAILY
Qty: 30 TABLET | Refills: 5 | Status: SHIPPED | OUTPATIENT
Start: 2023-04-19

## 2023-04-19 RX ORDER — LEVOTHYROXINE SODIUM 88 UG/1
88 TABLET ORAL
Qty: 30 TABLET | Refills: 5 | Status: SHIPPED | OUTPATIENT
Start: 2023-04-19 | End: 2023-12-29

## 2023-04-19 NOTE — PROGRESS NOTES
Subjective:       Patient ID: Stephany Skinner is a 88 y.o. female.    Chief Complaint: Follow-up (Pt here for 6 mth f/u and wants to consult with you about getting a MRI of the brain.)    Patient here for checkup.  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  She was worked up for CVA.   Saw opth, neuro, Card.  She had BW, Echo with bubble, MRI.  All unremarkable  Patient has arthritis.  Patient having more lumbar pain.  She cannot walk more than 20 feet and then pain radiates to the legs.  Needs her walker.  Her left shoulder is more painful and cracks when she pulls herself up to standing.  She has a history of congestive heart failure and hypertension. She denies shortness of breath.     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  No longer taking prednisone,  pyridostigmine 60 mg daily.  Doing well.  Patient has type 2 diabetes and seems be doing well.  She stopped her insulin and her blood sugars look great.    She has lost over 60 lb in the last year.  She continues to lose weight.  She is not on any medication for this.  Patient has COPD.  She was taking neb treatments in the nursing home as needed.  They did not send her home with a nebulizer.  She also has sleep apnea and is using her CPAP at least 6 hr per night.  She takes Synthroid for her hypothyroidism.  She tolerates this well.  She denies having symptoms such as heat or cold intolerance.  Her weight is stable.  She denies any swelling in her thyroid.  She tolerates her blood pressure medication as well as not having side effects such as chronic cough or dizziness.  She is not having any symptoms from it.  Patient is taking her statin every day for hyperlipidemia.  She is feeling well on the medication.  She denies side effects such as myalgias.  Has diarrhea stools once to three times daily    Review of Systems   Constitutional:  Negative for activity change and unexpected weight change.   HENT:  Negative for trouble swallowing.    Respiratory:   Negative for chest tightness.    Cardiovascular:  Positive for leg swelling.   Gastrointestinal:  Positive for diarrhea.   Endocrine: Negative for cold intolerance and heat intolerance.   Genitourinary:  Negative for difficulty urinating.   Musculoskeletal:  Positive for arthralgias, back pain and gait problem.   Skin:  Positive for rash. Negative for wound.   Hematological:  Negative for adenopathy.   Psychiatric/Behavioral:  Negative for decreased concentration.      Objective:      Vitals:    11/30/22 0819   BP: 120/62   Pulse: 87   Resp: 18     Physical Exam  Constitutional:       Appearance: Normal appearance. She is well-developed. She is obese.   HENT:      Head: Normocephalic and atraumatic.      Right Ear: Tympanic membrane normal.      Left Ear: Tympanic membrane normal.      Mouth/Throat:      Mouth: Mucous membranes are moist.   Eyes:      Pupils: Pupils are equal, round, and reactive to light.   Cardiovascular:      Rate and Rhythm: Normal rate and regular rhythm.      Pulses: Normal pulses.      Heart sounds: Normal heart sounds. No murmur heard.    No friction rub. No gallop.   Pulmonary:      Effort: Pulmonary effort is normal.      Breath sounds: Normal breath sounds. No wheezing or rales.   Abdominal:      General: Bowel sounds are normal.      Palpations: Abdomen is soft.      Hernia: A hernia (incisional wall hernia, reducible) is present.   Musculoskeletal:      Cervical back: Neck supple.      Right lower leg: No edema.      Left lower leg: No edema.      Comments: Wearing support stockings   Skin:     General: Skin is warm.      Findings: No rash.   Neurological:      General: No focal deficit present.      Mental Status: She is alert and oriented to person, place, and time.      Cranial Nerves: No cranial nerve deficit.      Sensory: No sensory deficit.      Motor: Weakness present.      Gait: Gait abnormal.   Psychiatric:         Mood and Affect: Mood normal.         Behavior: Behavior  normal.         Thought Content: Thought content normal.         Judgment: Judgment normal.       Lab Results   Component Value Date    TSH 2.368 06/01/2022     Lab Results   Component Value Date    LDLCALC 113.6 12/15/2021     BMP  Lab Results   Component Value Date     06/01/2022    K 4.8 06/01/2022     06/01/2022    CO2 23 06/01/2022    BUN 25 (H) 06/01/2022    CREATININE 1.0 06/01/2022    CALCIUM 9.1 06/01/2022    ANIONGAP 11 06/01/2022    ESTGFRAFRICA 58.9 (A) 06/01/2022    EGFRNONAA 51.1 (A) 06/01/2022     Lab Results   Component Value Date    HGBA1C 5.8 (H) 06/01/2022     Lab Results   Component Value Date    WBC 6.04 12/15/2021    HGB 12.3 12/15/2021    HCT 37.7 12/15/2021     (H) 12/15/2021     12/15/2021     Assessment:       1. Primary hypertension    2. Mixed hyperlipidemia    3. Hypothyroidism due to acquired atrophy of thyroid    4. Obstructive sleep apnea (adult) (pediatric)    5. Type 2 diabetes mellitus with other circulatory complication, with long-term current use of insulin    6. Myasthenia gravis, adult form          Plan:   Stephany CARRILLO was seen today for sore on toe.    Diagnoses and all orders for this visit:    Venous stasis dermatitis  Elevate legs  Were support stockings diligently  If redness and swelling worsens, patient will need Unna boots    COPD  Continue DuoNeb treatments when the nebulizer on his    Congestive heart failure/atrial fibrillation  Continue Eliquis    Age-related osteoporosis without current pathological fracture  -     alendronate (FOSAMAX) 35 MG tablet; TAKE 1 TABLET BY MOUTH ONCE EVERY 7 DAYS  Dispense: 4 tablet; Refill: 5    Overweight(278.02)  Encouraged patient to decrease caloric intake    1. Hypothyroidism due to acquired atrophy of thyroid  -     levothyroxine (SYNTHROID) 88 MCG tablet; Take 1 tablet (88 mcg total) by mouth before breakfast.  Dispense: 30 tablet; Refill: 5  -     TSH; Future; Expected date: 04/19/2023  -     Lipid Panel;  Future; Expected date: 04/19/2023    2. Chronic diastolic congestive heart failure  Assessment & Plan:  Patient is identified as having Diastolic (HFpEF) heart failure that is Chronic. CHF is currently controlled. Latest ECHO performed and demonstrates- No results found for this or any previous visit.   . Continue Beta Blocker, ACE/ARB and Furosemide and monitor clinical status closely. Monitor on telemetry. Patient is on CHF pathway.  Monitor strict Is&Os and daily weights.  Place on fluid restriction of 2 L. Continue to stress to patient importance of self efficacy and  on diet for CHF.       Orders:  -     furosemide (LASIX) 40 MG tablet; Take 1 tablet (40 mg total) by mouth once daily.  Dispense: 30 tablet; Refill: 5  -     metoprolol succinate (TOPROL-XL) 100 MG 24 hr tablet; Take 1 tablet (100 mg total) by mouth once daily.  Dispense: 30 tablet; Refill: 5  -     potassium chloride SA (K-DUR,KLOR-CON) 20 MEQ tablet; Take 1 tablet (20 mEq total) by mouth once daily.  Dispense: 30 tablet; Refill: 5    3. Essential hypertension  -     furosemide (LASIX) 40 MG tablet; Take 1 tablet (40 mg total) by mouth once daily.  Dispense: 30 tablet; Refill: 5  -     metoprolol succinate (TOPROL-XL) 100 MG 24 hr tablet; Take 1 tablet (100 mg total) by mouth once daily.  Dispense: 30 tablet; Refill: 5  -     potassium chloride SA (K-DUR,KLOR-CON) 20 MEQ tablet; Take 1 tablet (20 mEq total) by mouth once daily.  Dispense: 30 tablet; Refill: 5  -     Comprehensive Metabolic Panel; Future; Expected date: 04/19/2023    4. Mixed hyperlipidemia    5. Type 2 diabetes mellitus with other circulatory complication, with long-term current use of insulin  -     Hemoglobin A1C; Future; Expected date: 04/19/2023           Return to clinic in 6 month.     HTN (hypertension)  Essential hypertension/CHF  Continue Lasix 40 mg, Toprol- mg,  Continue potassium  Amlodipine 2.5 mg po daily  Valsartan 320 mg po daily  Keep appointment  with Cardiology    Hypothyroidism  Hypothyroidism due to acquired atrophy of thyroid  -     levothyroxine (EUTHYROX) 88 MCG tablet; Take 1 tablet (88 mcg total) by mouth once daily.     Type 2 diabetes mellitus with circulatory disorder, with long-term current use of insulin  Patient no longer requires medications for this.  Her last A1c was 5.8.   Check hemoglobin A1c every 12 months.    Hyperlipidemia  Continue fish oil  Continue Lipitor 20 mg    Myasthenia gravis, adult form  No longer on prednisone, Mestinon  Keep appointment with neurology    Obstructive sleep apnea (adult) (pediatric)  Continue CPAP at current pressure    RTC in 6 months

## 2023-04-19 NOTE — ASSESSMENT & PLAN NOTE
Patient is identified as having Diastolic (HFpEF) heart failure that is Chronic. CHF is currently controlled. Latest ECHO performed and demonstrates- No results found for this or any previous visit.   . Continue Beta Blocker, ACE/ARB and Furosemide and monitor clinical status closely. Monitor on telemetry. Patient is on CHF pathway.  Monitor strict Is&Os and daily weights.  Place on fluid restriction of 2 L. Continue to stress to patient importance of self efficacy and  on diet for CHF.

## 2023-04-19 NOTE — PROGRESS NOTES
Subjective:      Patient ID: Stephany Skinner is a 88 y.o. female.    Chief Complaint: Follow-up of the Right Knee    Review of patient's allergies indicates:   Allergen Reactions    Statins-hmg-coa reductase inhibitors      Other reaction(s): Unknown      Pt returns to clinic for follow up of right femoral condyle fx.  She is no longer wearing any brace.  No complaints today.    3/31/23:  Pt returns to clinic for follow up of right femoral condyle fx.  She reports no pain.    3/24/23:  Pt returns to clinic for follow up of right femoral condyle fx.  She reports no pain, has been wearing t scope brace as directed.    3/17/23:  Pt returns to clinic for follow up of right femoral condyle fracture.  She reports no pain today.  Has been wearing t scope brace as directed and using walker with ambulation.  She had repeat xray today.    3/10/23:  87 yo F presents to clinic with c/o right knee pain and swelling.  Hx of right TKA in 2007.  Fell onto right knee a week ago.  She says that she was seen at urgent care after fall and was told that xrays were normal.  Today she c/o achy pain, mostly to medial knee.  Worse with bending knee and walking and improves with rest.  Reports good relief of pain with tylenol.  She did have ultrasound of leg this morning as ordered by PCP which showed no DVT.      Review of Systems   Constitutional: Negative for chills, diaphoresis and fever.   HENT:  Negative for congestion, ear discharge and ear pain.    Eyes:  Negative for blurred vision, discharge, double vision and pain.   Cardiovascular:  Negative for chest pain, claudication and cyanosis.   Respiratory:  Negative for cough, hemoptysis and shortness of breath.    Endocrine: Negative for cold intolerance and heat intolerance.   Skin:  Negative for color change, dry skin, itching and rash.   Musculoskeletal:  Positive for falls, joint pain and joint swelling. Negative for arthritis, back pain, gout, muscle weakness and neck pain.    Gastrointestinal:  Negative for abdominal pain and change in bowel habit.   Neurological:  Negative for brief paralysis, disturbances in coordination, dizziness, numbness and paresthesias.   Psychiatric/Behavioral:  Negative for altered mental status and depression.        Objective:          General    Constitutional: She is oriented to person, place, and time. She appears well-developed and well-nourished. No distress.   HENT:   Head: Atraumatic.   Eyes: EOM are normal. Right eye exhibits no discharge. Left eye exhibits no discharge.   Cardiovascular:  Normal rate.            Pulmonary/Chest: Effort normal. No respiratory distress.   Abdominal: Soft.   Neurological: She is alert and oriented to person, place, and time.   Psychiatric: She has a normal mood and affect. Her behavior is normal.           Right Knee Exam     Inspection   Erythema: absent  Scars: present  Swelling: absent  Effusion: absent  Deformity: absent  Bruising: absent    Tenderness   The patient is experiencing no tenderness.     Range of Motion   Extension:  normal   Flexion:  normal     Other   Sensation: normal    Left Knee Exam     Inspection   Erythema: absent  Scars: present  Swelling: absent  Effusion: absent  Deformity: absent  Bruising: absent    Tenderness   The patient is experiencing no tenderness.     Range of Motion   Extension:  normal   Flexion:  normal     Other   Sensation: normal    Vascular Exam       Edema  Right Lower Leg: absent  Left Lower Leg: absent            Assessment:         Xray Right Knee 4/19/23:  Right total knee arthroplasty remain satisfactorily position.  The previously described fracture through the medial femoral condyle has undergone healing with callus formation noted.  Only minimal fracture lucency along the cranial margin of the medial femoral condyle.  No new fracture or dislocation.  No joint effusion.  Vascular calcifications.    Xray Right Knee 3/31/23:  Healing medial femoral condyle fracture in  good alignment, callus formation seen.    Xray Right Knee 3/17/23:  Right: Again seen is a right knee prosthesis.  Subtle fracture through the medial femoral condyle, nondisplaced, with minimal interval healing.  Vascular calcifications are noted.     Left: Left knee prosthesis is noted.  No fracture or dislocation.  Vascular calcifications.    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  There is an acute vertical fracture involving the right femoral condyle best noted on the frontal image.  It is difficult to determine on the provided images if this extends to the femoral component of the patient's knee arthroplasty.      Encounter Diagnosis   Name Primary?    Closed nondisplaced fracture of condyle of right femur with routine healing, subsequent encounter Yes          Closed nondisplaced fracture of condyle of right femur with routine healing, subsequent encounter                     Plan:         We discussed diagnosis and treatment plan. We will follow up in 1 month with repeat xrays.    3/10/23:  I contacted Dr. Ware who reviewed xrays and said that pt can probably avoid surgery if fx does not displace. He recommended T scope brace (hinges can be unlocked) and use of walker, staying off of knee as much as possible. He says it needs to be x-rayed once a week for the first 3 weeks, and pt should be referred to [facility] if any displacement occurs since he will be on leave starting next week.      Continue tylenol as directed as needed. Pt declines prescription for any pain medication.   Ice compress to the affected area 2-3x a day for 15-20 minutes as needed for pain management.  RTC in 1 month with repeat xrays, sooner if needed.    Patient voices understanding of and agreement with treatment plan. All of the patient's questions were answered and the patient will contact us if she has any questions or concerns in the interim.

## 2023-04-30 ENCOUNTER — EXTERNAL CHRONIC CARE MANAGEMENT (OUTPATIENT)
Dept: PRIMARY CARE CLINIC | Facility: CLINIC | Age: 88
End: 2023-04-30
Payer: MEDICARE

## 2023-04-30 PROCEDURE — 99439 CHRNC CARE MGMT STAF EA ADDL: CPT | Mod: PBBFAC | Performed by: FAMILY MEDICINE

## 2023-04-30 PROCEDURE — 99999 PR STA SHADOW: CPT | Mod: PBBFAC,,, | Performed by: FAMILY MEDICINE

## 2023-04-30 PROCEDURE — 99490 CHRNC CARE MGMT STAFF 1ST 20: CPT | Mod: PBBFAC | Performed by: FAMILY MEDICINE

## 2023-04-30 PROCEDURE — 99999 PR STA SHADOW: ICD-10-PCS | Mod: PBBFAC,,, | Performed by: FAMILY MEDICINE

## 2023-05-08 DIAGNOSIS — M81.0 AGE-RELATED OSTEOPOROSIS WITHOUT CURRENT PATHOLOGICAL FRACTURE: ICD-10-CM

## 2023-05-08 RX ORDER — ALENDRONATE SODIUM 35 MG/1
70 TABLET ORAL WEEKLY
Qty: 4 TABLET | Refills: 5 | Status: SHIPPED | OUTPATIENT
Start: 2023-05-08 | End: 2023-07-17 | Stop reason: DRUGHIGH

## 2023-05-08 NOTE — TELEPHONE ENCOUNTER
LOV  04/19/2023    patient requesting refill for alendronate (FOSAMAX) 70 MG tablet        RX pending   pharmacy confirmed  please advise

## 2023-05-10 LAB
LEFT EYE DM RETINOPATHY: NEGATIVE
RIGHT EYE DM RETINOPATHY: NEGATIVE

## 2023-05-12 ENCOUNTER — OFFICE VISIT (OUTPATIENT)
Dept: INTERNAL MEDICINE | Facility: CLINIC | Age: 88
End: 2023-05-12
Payer: MEDICARE

## 2023-05-12 VITALS
WEIGHT: 241.69 LBS | BODY MASS INDEX: 37.93 KG/M2 | DIASTOLIC BLOOD PRESSURE: 68 MMHG | HEART RATE: 76 BPM | SYSTOLIC BLOOD PRESSURE: 134 MMHG | RESPIRATION RATE: 16 BRPM | HEIGHT: 67 IN

## 2023-05-12 DIAGNOSIS — I10 ESSENTIAL HYPERTENSION: ICD-10-CM

## 2023-05-12 DIAGNOSIS — B30.9 VIRAL CONJUNCTIVITIS OF BOTH EYES: ICD-10-CM

## 2023-05-12 DIAGNOSIS — Z79.4 TYPE 2 DIABETES MELLITUS WITH OTHER CIRCULATORY COMPLICATION, WITH LONG-TERM CURRENT USE OF INSULIN: ICD-10-CM

## 2023-05-12 DIAGNOSIS — H10.9 CONJUNCTIVITIS OF BOTH EYES, UNSPECIFIED CONJUNCTIVITIS TYPE: Primary | ICD-10-CM

## 2023-05-12 DIAGNOSIS — E11.59 TYPE 2 DIABETES MELLITUS WITH OTHER CIRCULATORY COMPLICATION, WITH LONG-TERM CURRENT USE OF INSULIN: ICD-10-CM

## 2023-05-12 PROCEDURE — 99999 PR PBB SHADOW E&M-EST. PATIENT-LVL IV: CPT | Mod: PBBFAC,,, | Performed by: FAMILY MEDICINE

## 2023-05-12 PROCEDURE — 99213 OFFICE O/P EST LOW 20 MIN: CPT | Mod: S$PBB,,, | Performed by: FAMILY MEDICINE

## 2023-05-12 PROCEDURE — 99999 PR PBB SHADOW E&M-EST. PATIENT-LVL IV: ICD-10-PCS | Mod: PBBFAC,,, | Performed by: FAMILY MEDICINE

## 2023-05-12 PROCEDURE — 99213 PR OFFICE/OUTPT VISIT, EST, LEVL III, 20-29 MIN: ICD-10-PCS | Mod: S$PBB,,, | Performed by: FAMILY MEDICINE

## 2023-05-12 PROCEDURE — 99214 OFFICE O/P EST MOD 30 MIN: CPT | Mod: PBBFAC,PN | Performed by: FAMILY MEDICINE

## 2023-05-12 RX ORDER — TOBRAMYCIN AND DEXAMETHASONE 3; 1 MG/ML; MG/ML
1 SUSPENSION/ DROPS OPHTHALMIC 3 TIMES DAILY
Qty: 5 ML | Refills: 1 | Status: SHIPPED | OUTPATIENT
Start: 2023-05-12 | End: 2023-05-15

## 2023-05-12 NOTE — PROGRESS NOTES
Subjective:       Patient ID: Stephany Skinner is a 88 y.o. female.    Chief Complaint: Conjunctivitis (Pt states  she poss has pink eye )    Pt is a 88 y.o. female who presents for check up for Conjunctivitis of both eyes, unspecified conjunctivitis type  (primary encounter diagnosis)  Essential hypertension  Type 2 diabetes mellitus with other circulatory complication, with long-term current use of insulin. Doing well on current meds. Denies any side effects. Prevention is up to date.   Review of Systems   Eyes:  Positive for discharge, redness and itching.     Objective:      Physical Exam  Constitutional:       Appearance: She is well-developed.   HENT:      Head: Normocephalic.      Comments: L pre-auricular node  Eyes:      General:         Right eye: Discharge present.         Left eye: Discharge present.     Pupils: Pupils are equal, round, and reactive to light.   Neck:      Thyroid: No thyromegaly.   Cardiovascular:      Rate and Rhythm: Normal rate and regular rhythm.   Pulmonary:      Effort: No respiratory distress.      Breath sounds: No wheezing or rales.   Chest:      Chest wall: No tenderness.   Abdominal:      General: There is no distension.      Tenderness: There is no abdominal tenderness. There is no guarding or rebound.   Musculoskeletal:         General: No tenderness. Normal range of motion.      Cervical back: Normal range of motion and neck supple.   Lymphadenopathy:      Cervical: No cervical adenopathy.   Skin:     General: Skin is warm and dry.      Coloration: Skin is not pale.      Findings: No rash.   Neurological:      Mental Status: She is alert and oriented to person, place, and time.      Cranial Nerves: No cranial nerve deficit.      Motor: No abnormal muscle tone.      Coordination: Coordination normal.      Deep Tendon Reflexes: Reflexes are normal and symmetric. Reflexes normal.   Psychiatric:         Thought Content: Thought content normal.         Judgment: Judgment normal.        Assessment:       Encounter Diagnoses   Name Primary?    Conjunctivitis of both eyes, unspecified conjunctivitis type Yes    Essential hypertension     Type 2 diabetes mellitus with other circulatory complication, with long-term current use of insulin          Plan:   1. Conjunctivitis of both eyes, unspecified conjunctivitis type    2. Essential hypertension    3. Type 2 diabetes mellitus with other circulatory complication, with long-term current use of insulin

## 2023-05-12 NOTE — TELEPHONE ENCOUNTER
LOV 04/19/2023    patient requesting refill for tobramycin-dexAMETHasone 0.3-0.1% (TOBRADEX) 0.3-0.1 % DrpS        RX pending   pharmacy confirmed  please advise

## 2023-05-15 RX ORDER — TOBRAMYCIN AND DEXAMETHASONE 3; 1 MG/ML; MG/ML
SUSPENSION/ DROPS OPHTHALMIC
Qty: 5 ML | Refills: 1 | Status: SHIPPED | OUTPATIENT
Start: 2023-05-15 | End: 2023-05-31

## 2023-05-15 NOTE — PATIENT INSTRUCTIONS
"Please speak with your primary care doctor and Cardiology to determine if you need to be taking two different "blood thinners". You are taking both Plavix and Eliquis.   " Why Was The Change Made?: Please Select the Appropriate Response

## 2023-05-26 ENCOUNTER — OFFICE VISIT (OUTPATIENT)
Dept: ORTHOPEDICS | Facility: CLINIC | Age: 88
End: 2023-05-26
Payer: MEDICARE

## 2023-05-26 ENCOUNTER — HOSPITAL ENCOUNTER (OUTPATIENT)
Dept: RADIOLOGY | Facility: HOSPITAL | Age: 88
Discharge: HOME | End: 2023-05-26
Attending: PHYSICIAN ASSISTANT
Payer: MEDICARE

## 2023-05-26 VITALS
HEART RATE: 78 BPM | SYSTOLIC BLOOD PRESSURE: 136 MMHG | DIASTOLIC BLOOD PRESSURE: 72 MMHG | OXYGEN SATURATION: 97 % | WEIGHT: 246.5 LBS | BODY MASS INDEX: 38.69 KG/M2 | HEIGHT: 67 IN

## 2023-05-26 DIAGNOSIS — M25.561 RIGHT KNEE PAIN, UNSPECIFIED CHRONICITY: ICD-10-CM

## 2023-05-26 DIAGNOSIS — S72.414D CLOSED NONDISPLACED FRACTURE OF CONDYLE OF RIGHT FEMUR WITH ROUTINE HEALING, SUBSEQUENT ENCOUNTER: Primary | ICD-10-CM

## 2023-05-26 PROCEDURE — 99213 OFFICE O/P EST LOW 20 MIN: CPT | Mod: S$PBB | Performed by: PHYSICIAN ASSISTANT

## 2023-05-26 PROCEDURE — 99999 PR PBB SHADOW E&M-EST. PATIENT-LVL IV: CPT | Mod: PBBFAC,,, | Performed by: PHYSICIAN ASSISTANT

## 2023-05-26 PROCEDURE — 73560 X-RAY EXAM OF KNEE 1 OR 2: CPT | Mod: TC,RT

## 2023-05-26 PROCEDURE — 99999 PR STA SHADOW: CPT | Mod: PBBFAC,,, | Performed by: PHYSICIAN ASSISTANT

## 2023-05-26 PROCEDURE — 99214 OFFICE O/P EST MOD 30 MIN: CPT | Mod: PBBFAC | Performed by: PHYSICIAN ASSISTANT

## 2023-05-26 PROCEDURE — 99999 PR PBB SHADOW E&M-EST. PATIENT-LVL IV: ICD-10-PCS | Mod: PBBFAC,,, | Performed by: PHYSICIAN ASSISTANT

## 2023-05-26 NOTE — PROGRESS NOTES
"  Subjective:      Patient ID: Stephany Skinner is a 88 y.o. female.    Chief Complaint: Pain of the Right Knee and Pain and Swelling of the Left Knee ("can hardly walk")    Review of patient's allergies indicates:   Allergen Reactions    Statins-hmg-coa reductase inhibitors      Other reaction(s): Unknown      Pt returns to clinic for follow up of right femoral condyle fx.  She reports no pain.  No complaints today.  Says that her knee is back to normal.    4/19/23:  Pt returns to clinic for follow up of right femoral condyle fx.  She is no longer wearing any brace.  No complaints today.    3/31/23:  Pt returns to clinic for follow up of right femoral condyle fx.  She reports no pain.    3/24/23:  Pt returns to clinic for follow up of right femoral condyle fx.  She reports no pain, has been wearing t scope brace as directed.    3/17/23:  Pt returns to clinic for follow up of right femoral condyle fracture.  She reports no pain today.  Has been wearing t scope brace as directed and using walker with ambulation.  She had repeat xray today.    3/10/23:  87 yo F presents to clinic with c/o right knee pain and swelling.  Hx of right TKA in 2007.  Fell onto right knee a week ago.  She says that she was seen at urgent care after fall and was told that xrays were normal.  Today she c/o achy pain, mostly to medial knee.  Worse with bending knee and walking and improves with rest.  Reports good relief of pain with tylenol.  She did have ultrasound of leg this morning as ordered by PCP which showed no DVT.      Review of Systems   Constitutional: Negative for chills, diaphoresis and fever.   HENT:  Negative for congestion, ear discharge and ear pain.    Eyes:  Negative for blurred vision, discharge, double vision and pain.   Cardiovascular:  Negative for chest pain, claudication and cyanosis.   Respiratory:  Negative for cough, hemoptysis and shortness of breath.    Endocrine: Negative for cold intolerance and heat " intolerance.   Skin:  Negative for color change, dry skin, itching and rash.   Musculoskeletal:  Positive for falls, joint pain and joint swelling. Negative for arthritis, back pain, gout, muscle weakness and neck pain.   Gastrointestinal:  Negative for abdominal pain and change in bowel habit.   Neurological:  Negative for brief paralysis, disturbances in coordination, dizziness, numbness and paresthesias.   Psychiatric/Behavioral:  Negative for altered mental status and depression.        Objective:          General    Constitutional: She is oriented to person, place, and time. She appears well-developed and well-nourished. No distress.   HENT:   Head: Atraumatic.   Eyes: EOM are normal. Right eye exhibits no discharge. Left eye exhibits no discharge.   Cardiovascular:  Normal rate.            Pulmonary/Chest: Effort normal. No respiratory distress.   Abdominal: Soft.   Neurological: She is alert and oriented to person, place, and time.   Psychiatric: She has a normal mood and affect. Her behavior is normal.           Right Knee Exam     Inspection   Erythema: absent  Scars: present  Swelling: absent  Effusion: absent  Deformity: absent  Bruising: absent    Tenderness   The patient is experiencing no tenderness.     Range of Motion   Extension:  normal   Flexion:  normal     Other   Sensation: normal    Left Knee Exam     Inspection   Erythema: absent  Scars: present  Swelling: absent  Effusion: absent  Deformity: absent  Bruising: absent    Tenderness   The patient is experiencing no tenderness.     Range of Motion   Extension:  normal   Flexion:  normal     Other   Sensation: normal    Vascular Exam       Edema  Right Lower Leg: absent  Left Lower Leg: absent            Assessment:         Xray Right Knee 5/26/23:  Right knee arthroplasty device in place without evidence of complication.    Xray Right Knee 4/19/23:  Right total knee arthroplasty remain satisfactorily position.  The previously described fracture  through the medial femoral condyle has undergone healing with callus formation noted.  Only minimal fracture lucency along the cranial margin of the medial femoral condyle.  No new fracture or dislocation.  No joint effusion.  Vascular calcifications.    Xray Right Knee 3/31/23:  Healing medial femoral condyle fracture in good alignment, callus formation seen.    Xray Right Knee 3/17/23:  Right: Again seen is a right knee prosthesis.  Subtle fracture through the medial femoral condyle, nondisplaced, with minimal interval healing.  Vascular calcifications are noted.     Left: Left knee prosthesis is noted.  No fracture or dislocation.  Vascular calcifications.    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  There is an acute vertical fracture involving the right femoral condyle best noted on the frontal image.  It is difficult to determine on the provided images if this extends to the femoral component of the patient's knee arthroplasty.      Encounter Diagnosis   Name Primary?    Closed nondisplaced fracture of condyle of right femur with routine healing, subsequent encounter Yes            Closed nondisplaced fracture of condyle of right femur with routine healing, subsequent encounter                       Plan:         We discussed diagnosis and treatment plan. Pt has no complaints today, pain is completely improved.    3/10/23:  I contacted Dr. Ware who reviewed xrays and said that pt can probably avoid surgery if fx does not displace. He recommended T scope brace (hinges can be unlocked) and use of walker, staying off of knee as much as possible. He says it needs to be x-rayed once a week for the first 3 weeks, and pt should be referred to main [place] if any displacement occurs since he will be on leave starting next week.    RTC as needed.    Patient voices understanding of and agreement with treatment plan. All of the patient's questions were answered and the patient will contact us  if she has any questions or concerns in the interim.

## 2023-05-31 ENCOUNTER — OFFICE VISIT (OUTPATIENT)
Dept: INTERNAL MEDICINE | Facility: CLINIC | Age: 88
End: 2023-05-31
Payer: MEDICARE

## 2023-05-31 ENCOUNTER — EXTERNAL CHRONIC CARE MANAGEMENT (OUTPATIENT)
Dept: PRIMARY CARE CLINIC | Facility: CLINIC | Age: 88
End: 2023-05-31
Payer: MEDICARE

## 2023-05-31 VITALS
OXYGEN SATURATION: 97 % | BODY MASS INDEX: 38.72 KG/M2 | DIASTOLIC BLOOD PRESSURE: 58 MMHG | RESPIRATION RATE: 16 BRPM | HEART RATE: 62 BPM | SYSTOLIC BLOOD PRESSURE: 104 MMHG | WEIGHT: 246.69 LBS | HEIGHT: 67 IN

## 2023-05-31 DIAGNOSIS — I50.32 CHRONIC DIASTOLIC CONGESTIVE HEART FAILURE: ICD-10-CM

## 2023-05-31 DIAGNOSIS — I10 PRIMARY HYPERTENSION: Primary | ICD-10-CM

## 2023-05-31 DIAGNOSIS — G47.33 OBSTRUCTIVE SLEEP APNEA (ADULT) (PEDIATRIC): ICD-10-CM

## 2023-05-31 DIAGNOSIS — I48.20 CHRONIC ATRIAL FIBRILLATION: ICD-10-CM

## 2023-05-31 DIAGNOSIS — E11.59 TYPE 2 DIABETES MELLITUS WITH OTHER CIRCULATORY COMPLICATION, WITH LONG-TERM CURRENT USE OF INSULIN: ICD-10-CM

## 2023-05-31 DIAGNOSIS — N18.31 CHRONIC KIDNEY DISEASE, STAGE 3A: ICD-10-CM

## 2023-05-31 DIAGNOSIS — Z79.4 TYPE 2 DIABETES MELLITUS WITH OTHER CIRCULATORY COMPLICATION, WITH LONG-TERM CURRENT USE OF INSULIN: ICD-10-CM

## 2023-05-31 DIAGNOSIS — E03.4 HYPOTHYROIDISM DUE TO ACQUIRED ATROPHY OF THYROID: ICD-10-CM

## 2023-05-31 DIAGNOSIS — G70.00 MYASTHENIA GRAVIS, ADULT FORM: ICD-10-CM

## 2023-05-31 PROCEDURE — 99999 PR PBB SHADOW E&M-EST. PATIENT-LVL V: ICD-10-PCS | Mod: PBBFAC,,, | Performed by: FAMILY MEDICINE

## 2023-05-31 PROCEDURE — 99490 CHRNC CARE MGMT STAFF 1ST 20: CPT | Mod: PBBFAC | Performed by: FAMILY MEDICINE

## 2023-05-31 PROCEDURE — 99215 OFFICE O/P EST HI 40 MIN: CPT | Mod: PBBFAC,PN,25 | Performed by: FAMILY MEDICINE

## 2023-05-31 PROCEDURE — 99999 PR STA SHADOW: CPT | Mod: PBBFAC,,, | Performed by: FAMILY MEDICINE

## 2023-05-31 PROCEDURE — 99999 PR PBB SHADOW E&M-EST. PATIENT-LVL V: CPT | Mod: PBBFAC,,, | Performed by: FAMILY MEDICINE

## 2023-05-31 PROCEDURE — 99214 PR OFFICE/OUTPT VISIT, EST, LEVL IV, 30-39 MIN: ICD-10-PCS | Mod: S$PBB,,, | Performed by: FAMILY MEDICINE

## 2023-05-31 PROCEDURE — 99214 OFFICE O/P EST MOD 30 MIN: CPT | Mod: S$PBB,,, | Performed by: FAMILY MEDICINE

## 2023-05-31 PROCEDURE — 99999 PR STA SHADOW: ICD-10-PCS | Mod: PBBFAC,,, | Performed by: FAMILY MEDICINE

## 2023-05-31 NOTE — PROGRESS NOTES
Subjective:       Patient ID: Stephany Skinner is a 88 y.o. female.    Chief Complaint: Follow-up (Pt here for 6 mth f/u and wants to consult with you about getting a MRI of the brain.)    Patient here for checkup.  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  She was worked up for CVA.   Saw opth, neuro, Card.  She had BW, Echo with bubble, MRI.  All unremarkable  Patient has arthritis.  Patient having more lumbar pain.  She cannot walk more than 20 feet and then pain radiates to the legs.  Needs her walker.  Her left shoulder is more painful and cracks when she pulls herself up to standing.  She has a history of congestive heart failure and hypertension. She denies shortness of breath.     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  No longer taking prednisone,  pyridostigmine 60 mg daily.  Doing well.  Patient has type 2 diabetes and seems be doing well.  She stopped her insulin and her blood sugars look great.    She has lost over 60 lb in the last year.  She continues to lose weight.  She is not on any medication for this.  Patient has COPD.  She takes neb treatments as needed.    She has sleep apnea and is using her CPAP at least 6 hr per night.  She takes Synthroid for her hypothyroidism.  She tolerates this well.  She denies having symptoms such as heat or cold intolerance.  Her weight is stable.  She denies any swelling in her thyroid.  She tolerates her blood pressure medication as well as not having side effects such as chronic cough or dizziness.  She is not having any symptoms from it.  Patient is taking her statin every day for hyperlipidemia.  She is feeling well on the medication.  She denies side effects such as myalgias.    Review of Systems   Constitutional:  Negative for activity change and unexpected weight change.   HENT:  Negative for trouble swallowing.    Respiratory:  Negative for chest tightness.    Cardiovascular:  Positive for leg swelling.   Gastrointestinal:  Positive for diarrhea.    Endocrine: Negative for cold intolerance and heat intolerance.   Genitourinary:  Negative for difficulty urinating.   Musculoskeletal:  Positive for arthralgias, back pain and gait problem.   Skin:  Positive for rash. Negative for wound.   Hematological:  Negative for adenopathy.   Psychiatric/Behavioral:  Negative for decreased concentration.      Objective:      Vitals:    11/30/22 0819   BP: 120/62   Pulse: 87   Resp: 18     Physical Exam  Constitutional:       Appearance: Normal appearance. She is well-developed. She is obese.   HENT:      Head: Normocephalic and atraumatic.      Right Ear: Tympanic membrane normal.      Left Ear: Tympanic membrane normal.      Mouth/Throat:      Mouth: Mucous membranes are moist.   Eyes:      Pupils: Pupils are equal, round, and reactive to light.   Cardiovascular:      Rate and Rhythm: Normal rate and regular rhythm.      Pulses: Normal pulses.      Heart sounds: Normal heart sounds. No murmur heard.    No friction rub. No gallop.   Pulmonary:      Effort: Pulmonary effort is normal.      Breath sounds: Normal breath sounds. No wheezing or rales.   Abdominal:      General: Bowel sounds are normal.      Palpations: Abdomen is soft.      Hernia: A hernia (incisional wall hernia, reducible) is present.   Musculoskeletal:      Cervical back: Neck supple.      Right lower leg: No edema.      Left lower leg: No edema.      Comments: Wearing support stockings   Skin:     General: Skin is warm.      Findings: No rash.   Neurological:      General: No focal deficit present.      Mental Status: She is alert and oriented to person, place, and time.      Cranial Nerves: No cranial nerve deficit.      Sensory: No sensory deficit.      Motor: Weakness present.      Gait: Gait abnormal.   Psychiatric:         Mood and Affect: Mood normal.         Behavior: Behavior normal.         Thought Content: Thought content normal.         Judgment: Judgment normal.       Lab Results   Component  Value Date    TSH 2.368 06/01/2022     Lab Results   Component Value Date    LDLCALC 113.6 12/15/2021     BMP  Lab Results   Component Value Date     06/01/2022    K 4.8 06/01/2022     06/01/2022    CO2 23 06/01/2022    BUN 25 (H) 06/01/2022    CREATININE 1.0 06/01/2022    CALCIUM 9.1 06/01/2022    ANIONGAP 11 06/01/2022    ESTGFRAFRICA 58.9 (A) 06/01/2022    EGFRNONAA 51.1 (A) 06/01/2022     Lab Results   Component Value Date    HGBA1C 5.8 (H) 06/01/2022     Lab Results   Component Value Date    WBC 6.04 12/15/2021    HGB 12.3 12/15/2021    HCT 37.7 12/15/2021     (H) 12/15/2021     12/15/2021     Assessment:       1. Primary hypertension    2. Mixed hyperlipidemia    3. Hypothyroidism due to acquired atrophy of thyroid    4. Obstructive sleep apnea (adult) (pediatric)    5. Type 2 diabetes mellitus with other circulatory complication, with long-term current use of insulin    6. Myasthenia gravis, adult form    7.      Paroxysmal atrial fibrillation      Plan:   Stephany CARRILLO was seen today for sore on toe.    Diagnoses and all orders for this visit:    Venous stasis dermatitis  Elevate legs  Were support stockings diligently  If redness and swelling worsens, patient will need Unna boots    COPD  Continue DuoNeb treatments when the nebulizer on his    Congestive heart failure/atrial fibrillation  Continue Eliquis    Age-related osteoporosis without current pathological fracture  -     alendronate (FOSAMAX) 35 MG tablet; TAKE 1 TABLET BY MOUTH ONCE EVERY 7 DAYS  Dispense: 4 tablet; Refill: 5    Overweight(278.02)  Encouraged patient to decrease caloric intake    1. Primary hypertension  Overview:  Two gram sodium diet.    Weight loss discussed.    Try to walk 2 miles per day.    Avoid smoking.    Current medications will be:  Valsartan 320 mg daily   Lasix 40 mg daily   Metoprolol 100 mg daily   Potassium 20 mEq daily    Orders:  -     Comprehensive Metabolic Panel; Future; Expected date:  08/31/2023    2. Myasthenia gravis, adult form    3. Hypothyroidism due to acquired atrophy of thyroid  Overview:  Patient takes Synthroid 88 mcg for her hypothyroidism.  Adjust Synthroid based on TSH resutls.  Check TSH every 6 months.    Orders:  -     Lipid Panel; Future; Expected date: 08/31/2023  -     TSH; Future; Expected date: 08/31/2023    4. Chronic kidney disease, stage 3a  Overview:  Renal fxn stable  Avoid NSAIDS  Good BP control      Orders:  -     CBC Auto Differential; Future; Expected date: 08/31/2023    5. Chronic diastolic congestive heart failure  Overview:  Two gram sodium diet.    Weight loss discussed.    Try to walk 2 miles per day.    Avoid smoking.    Current medications will be:  Valsartan 320 mg daily   Lasix 40 mg daily   Metoprolol 100 mg daily   Potassium 20 mEq daily      6. Obstructive sleep apnea (adult) (pediatric)  Overview:  Continue home CPAP at current pressure      7. Chronic atrial fibrillation  Overview:  Continue Eliquis 5 mg daily      8. Type 2 diabetes mellitus with other circulatory complication, with long-term current use of insulin  Overview:  Patient no longer requires medications for this.  Her last A1c was 5.8.   Check hemoglobin A1c every 12 months.    Orders:  -     Hemoglobin A1C; Future; Expected date: 08/31/2023         Essential hypertension/CHF  Continue Lasix 40 mg, Toprol- mg,  Continue potassium  Valsartan 320 mg po daily  Keep appointment with Cardiology    Hypothyroidism due to acquired atrophy of thyroid  -     levothyroxine (EUTHYROX) 88 MCG tablet; Take 1 tablet (88 mcg total) by mouth once daily.     Type 2 diabetes mellitus with circulatory disorder, with long-term current use of insulin  Patient no longer requires medications for this.  Her last A1c was 5.8.   Check hemoglobin A1c every 12 months.    Hyperlipidemia  Continue fish oil  Continue Lipitor 20 mg    Myasthenia gravis, adult form  No longer on prednisone, Mestinon  Keep appointment  with neurology    RTC in 6 months

## 2023-06-01 PROBLEM — I10 ESSENTIAL HYPERTENSION: Status: RESOLVED | Noted: 2022-12-09 | Resolved: 2023-06-01

## 2023-06-09 LAB
LEFT EYE DM RETINOPATHY: NEGATIVE
RIGHT EYE DM RETINOPATHY: NEGATIVE

## 2023-06-30 ENCOUNTER — EXTERNAL CHRONIC CARE MANAGEMENT (OUTPATIENT)
Dept: PRIMARY CARE CLINIC | Facility: CLINIC | Age: 88
End: 2023-06-30
Payer: MEDICARE

## 2023-06-30 PROCEDURE — 99999 PR STA SHADOW: CPT | Mod: PBBFAC,,, | Performed by: FAMILY MEDICINE

## 2023-06-30 PROCEDURE — 99999 PR STA SHADOW: ICD-10-PCS | Mod: PBBFAC,,, | Performed by: FAMILY MEDICINE

## 2023-06-30 PROCEDURE — 99490 CHRNC CARE MGMT STAFF 1ST 20: CPT | Mod: S$PBB | Performed by: FAMILY MEDICINE

## 2023-07-03 DIAGNOSIS — B02.9 HERPES ZOSTER WITHOUT COMPLICATION: ICD-10-CM

## 2023-07-03 DIAGNOSIS — L30.9 ECZEMA, UNSPECIFIED TYPE: ICD-10-CM

## 2023-07-03 DIAGNOSIS — B37.2 INTERTRIGINOUS CANDIDIASIS: ICD-10-CM

## 2023-07-03 RX ORDER — NYSTATIN AND TRIAMCINOLONE ACETONIDE 100000; 1 [USP'U]/G; MG/G
CREAM TOPICAL
Qty: 60 G | Refills: 0 | Status: SHIPPED | OUTPATIENT
Start: 2023-07-03 | End: 2023-08-01

## 2023-07-03 RX ORDER — NYSTATIN 100000 [USP'U]/G
POWDER TOPICAL 4 TIMES DAILY
Qty: 60 G | Refills: 5 | Status: SHIPPED | OUTPATIENT
Start: 2023-07-03 | End: 2023-11-30 | Stop reason: SDUPTHER

## 2023-07-03 NOTE — TELEPHONE ENCOUNTER
----- Message from Rodney Alvarez sent at 7/3/2023  8:26 AM CDT -----  Contact: self  Stephany Skinner  MRN: [id number]  : 1935  PCP: Pipo Flowers  Home Phone      [phone]  Work Phone      Not on file.  Mobile          Not on file.      MESSAGE:   Pt requesting refill or new Rx.   Is this a refill or new RX:  refill  RX name and strength: nystatin (MYCOSTATIN) powder  nystatin-triamcinolone (MYCOLOG II) cream  Last office visit: 2023  Is this a 30-day or 90-day RX:    Pharmacy name and location:  [facility] ([place])  Comments:      Phone:  [phone]

## 2023-07-03 NOTE — TELEPHONE ENCOUNTER
LOV 05/31/2023    patient requesting refill for   nystatin-triamcinolone (MYCOLOG II) cream 60 g           RX pending   pharmacy confirmed  please advise

## 2023-07-17 ENCOUNTER — OFFICE VISIT (OUTPATIENT)
Dept: NEUROLOGY | Facility: CLINIC | Age: 88
End: 2023-07-17
Payer: MEDICARE

## 2023-07-17 ENCOUNTER — HOSPITAL ENCOUNTER (OUTPATIENT)
Dept: RADIOLOGY | Facility: HOSPITAL | Age: 88
Discharge: HOME | End: 2023-07-17
Attending: PSYCHIATRY & NEUROLOGY
Payer: MEDICARE

## 2023-07-17 VITALS
HEART RATE: 66 BPM | SYSTOLIC BLOOD PRESSURE: 162 MMHG | HEIGHT: 67 IN | BODY MASS INDEX: 38.34 KG/M2 | WEIGHT: 244.25 LBS | RESPIRATION RATE: 18 BRPM | DIASTOLIC BLOOD PRESSURE: 82 MMHG

## 2023-07-17 DIAGNOSIS — G89.29 CHRONIC PAIN OF LEFT KNEE: Primary | ICD-10-CM

## 2023-07-17 DIAGNOSIS — M25.562 CHRONIC PAIN OF LEFT KNEE: ICD-10-CM

## 2023-07-17 DIAGNOSIS — G89.29 CHRONIC PAIN OF LEFT KNEE: ICD-10-CM

## 2023-07-17 DIAGNOSIS — H54.7 VISUAL LOSS: ICD-10-CM

## 2023-07-17 DIAGNOSIS — M54.16 LUMBAR RADICULOPATHY: ICD-10-CM

## 2023-07-17 DIAGNOSIS — G56.03 BILATERAL CARPAL TUNNEL SYNDROME: ICD-10-CM

## 2023-07-17 DIAGNOSIS — M25.562 CHRONIC PAIN OF LEFT KNEE: Primary | ICD-10-CM

## 2023-07-17 DIAGNOSIS — E53.8 B12 DEFICIENCY: ICD-10-CM

## 2023-07-17 PROCEDURE — 73560 X-RAY EXAM OF KNEE 1 OR 2: CPT | Mod: TC,LT

## 2023-07-17 PROCEDURE — 73560 X-RAY EXAM OF KNEE 1 OR 2: CPT | Mod: 26,LT,, | Performed by: RADIOLOGY

## 2023-07-17 PROCEDURE — 99999 PR STA SHADOW: ICD-10-PCS | Mod: PBBFAC,,, | Performed by: PSYCHIATRY & NEUROLOGY

## 2023-07-17 PROCEDURE — 99214 OFFICE O/P EST MOD 30 MIN: CPT | Mod: S$PBB | Performed by: PSYCHIATRY & NEUROLOGY

## 2023-07-17 PROCEDURE — 73560 XR KNEE 1 OR 2 VIEW LEFT: ICD-10-PCS | Mod: 26,LT,, | Performed by: RADIOLOGY

## 2023-07-17 PROCEDURE — 99999 PR PBB SHADOW E&M-EST. PATIENT-LVL V: CPT | Mod: PBBFAC,,, | Performed by: PSYCHIATRY & NEUROLOGY

## 2023-07-17 PROCEDURE — 99215 OFFICE O/P EST HI 40 MIN: CPT | Mod: PBBFAC | Performed by: PSYCHIATRY & NEUROLOGY

## 2023-07-17 PROCEDURE — 99999 PR STA SHADOW: CPT | Mod: PBBFAC,,, | Performed by: PSYCHIATRY & NEUROLOGY

## 2023-07-17 NOTE — PROGRESS NOTES
HPI:  Stephany Skinner is a 88 y.o. female with  increased falls, some lumbar radicular pain.  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 Admitted for ARF with hypercapnea, ptosis, and gaze restriction in 1/2018; response to steroids suggestive of myasthenia, though antibodies are negative, and EMG with repetitve stimulation from 7/2018 was inconclusive. PUJA during 1/2018 admit, secondary to overdiuresis. She has JOY, DM, HTN, HLD, hypothyroidism, B12 deficiency, and A-fib.     Here for follow      States she feels pain in the left knee pain which radiates above and below the knee joint with standing and forces her to sit again    In her right leg- She has a known fracture evaluated with ortho/ no surgery recommended    This left knee started hurting more after. This feel aching worsening over the past 2 months. Does use OTC gel which helps. Not sure if there was injury on this knee or not/ could have injured this knee with prior fall       Right eye vision never improved and prior ophthalmology notes shows branch retinal artery occlusion    No longer driving per Dr Graham      No diplopia, no generalized weakness, no speech or swallowing problems    Her lower back pain is episodic and not radicular    CTS symptoms are noted and tolerable/ responded to injection prior    Neuorpathy pain is not active    Still taking B12    Fully compliant with NOAC      Sees Dr Patel who monitors her fasting labs        Review of Systems   Constitutional:  Negative for fever.   HENT:  Negative for nosebleeds.    Eyes:  Positive for blurred vision. Negative for double vision.   Respiratory:  Negative for hemoptysis.    Cardiovascular:  Negative for leg swelling.   Gastrointestinal:  Negative for blood in stool.   Genitourinary:  Negative for hematuria.    Musculoskeletal:  Positive for joint pain. Negative for falls.   Skin:  Negative for itching.   Neurological:  Negative for loss of consciousness.   Endo/Heme/Allergies:  Does not bruise/bleed easily.   Psychiatric/Behavioral:  The patient does not have insomnia.        Exam:  Gen Appearance, well developed/nourished in no apparent distress  CV: 2+ distal pulses with no edema or swelling  Neuro:  MS: Awake, alert,Sustains attention. Recent/remote memory intact, Language is full to spontaneous speech/comprehension. Fund of Knowledge is full  CN: Optic discs are not viewed due to misosis,  PERRL, Extraoccular movements and visual fields are full-no gaze restriction today. Normal facial sensation and strength-no ptosis, Hearing symmetric, Tongue and Palate are midline and strong. Shoulder Shrug symmetric and strong.  Motor: Normal bulk, tone, no abnormal movements. 5/5 strength bilateral upper/lower extremities with 1+ reflexes in the arms, and are less in the legs and bilateral plantar response  Sensory: symmetric to light touch, pain, temp, and vibration/proprioception except decreased sensation in the legs. Romberg negative  Cerebellar: Finger-nose,Heal-shin, Rapid alternating movements intact  Gait: arthralgic gait; ambulates with walker but can walk well without walker as well    Imagin/16 CT L spine: Multilevel degenerative changes of the lumbar spine without evidence for fracture or subluxation.  There is likely a component of mild neural foraminal narrowing and central canal stenosis at the L3-4 and L4-5 levels.  This could be better evaluated on a nonemergent basis with MRI.    2016 MRI L spine: Multilevel degenerative changes of the lumbar spine contributing to central canal stenosis and neural foraminal narrowing as detailed in the above report.    Edema-like signal about the posterior elements on the right at L5-S1 which can be a source of pain.    2018 EMG with repetitive stimulation at [facility] per  Dr. Luis Angel Vu:   Slow repetitive stimulation (3 Htz) performed at baseline and at one minute post exercise intervals at the left ADM and left trapezius muscles revealed significant CMAP detriment at the ADM, but none at the trapezius.     Impression:   The results of the study are inconclusive. There is no definite EP evidence of a neuromuscular junction pathology.     6/2016 EMG legs:   1. Mild Bilateral Carpal Tunnel Syndrome  2. 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 Bilateral Lumbar Radiculopathy best localizing from L4-5 on this study    Xray chest over time: no thymoma and CTA chest 2018: Bilateral groundglass opacities within the lung fields.  Minimal dependent subsegmental atelectasis.  No pleural effusion or pneumothorax.    2022 MRI and MRA brain: Age-appropriate generalized cerebral volume loss with moderate chronic microvascular ischemic disease.  No evidence for an acute infarction.     No significant arterial abnormalities.     Left maxillary sinus disease.         Labs: 10/2016  glucose 136. Reviewed CMP, CBC TSh   SPEP normal  B12 low normal    2019 BMP, CBC reviewed    11/2018 BMP with alt reviewed  12/2019 A1C normal    2022 B12 level 1200s      Assessment/Plan: Stephany Skinner is a 88 y.o. female with   falls, some lumbar radicular pain.  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 Admitted for ARF with hypercapnea, ptosis, and gaze restriction in 1/2018; response to steroids suggestive of myasthenia, though antibodies are negative, and EMG with repetitve stimulation from 7/2018 was inconclusive. PUJA during 1/2018 admit, secondary to overdiuresis. She has JOY, DM, HTN, HLD, hypothyroidism, B12 deficiency, and A-fib.     I recommend:   1. Despite negative MG antibodies and inconclusive EMG  with rep stim findings without definitive evidence of neuromuscular junction disorer, her clinical picture was suggestive of myasthenia gravis- but is currently improved. No active symptoms- NOT CERTAIN OF THIS DIAGNOSIS    2. Stopped Mestinon without any diplopia or weakness. She has chronic diarrhea, unrelated to mestinon use  -she has had no worsening with tapering/ being off prednisone  - Will restart for any further symptoms   - If she needs to remain on steroids long term, could consider steroid sparing therapy, such as Imuran versus Cellcept. She has DM, though well controlled currently. She will follow with PCP for management of DM2  -Advised on symptoms of myasthenic crisis and when to report to the ER    3. Wearing brace nightly for CTS symptoms PRN but improved with CT injection per pain management prior- can see pain management back PRN    4. Continue B12 for low normal B12 levels well corrected currently.  Her DM could be causing her neuropathy.  DM treated per PCP.     5. Saw pain management for  Lumbar radiculopathy prior. OTC topical and PT helped reduce pain greatly as well as falls prior. See pain management: Damon gonzales.   -Fall precautions reviewed prior-- using walker.  - No pain from neuropathy currently.    6. She presented with right eye lower field blindness in 2022. Per Gladys this was a branch retinal artery occlusion. She no longer drives  -Told to see cardiology by her ophthalmologist, Dr Graham  -She had unremarkable  Carotid US, and echo  from Dr Patel in 2022. Noting cardiology increased her valsartan and statin doses.   -1/2023 MRI brain and  MRA brain: No CVA to explain her symptoms.Retinal artery disorder?  -She has been on Eliquis for years for stroke prevention due to her afib and does recall a skipped dosing at least once prior to these symptoms Has been on full dose.  She is allergic to ASA. She is compliant with daily dosing now      7. Left  knee pain for at least 2 months  (could have been injured in a prior fall hurting her right knee, she is not sure)  -Xray the left knee   -Refer back to ortho    RTC  6 months

## 2023-07-18 ENCOUNTER — LAB VISIT (OUTPATIENT)
Dept: LAB | Facility: HOSPITAL | Age: 88
End: 2023-07-18
Attending: PHYSICIAN ASSISTANT
Payer: MEDICARE

## 2023-07-18 ENCOUNTER — OFFICE VISIT (OUTPATIENT)
Dept: ORTHOPEDICS | Facility: CLINIC | Age: 88
End: 2023-07-18
Payer: MEDICARE

## 2023-07-18 VITALS
HEART RATE: 67 BPM | OXYGEN SATURATION: 96 % | BODY MASS INDEX: 38.3 KG/M2 | WEIGHT: 244 LBS | SYSTOLIC BLOOD PRESSURE: 152 MMHG | DIASTOLIC BLOOD PRESSURE: 84 MMHG | RESPIRATION RATE: 20 BRPM | HEIGHT: 67 IN

## 2023-07-18 DIAGNOSIS — Z96.652 HISTORY OF LEFT KNEE REPLACEMENT: ICD-10-CM

## 2023-07-18 DIAGNOSIS — M25.562 ACUTE PAIN OF LEFT KNEE: Primary | ICD-10-CM

## 2023-07-18 LAB
BASOPHILS # BLD AUTO: 0.03 K/UL (ref 0–0.2)
BASOPHILS NFR BLD: 0.3 % (ref 0–1.9)
CRP SERPL-MCNC: 2.6 MG/L (ref 0–8.2)
DIFFERENTIAL METHOD: ABNORMAL
EOSINOPHIL # BLD AUTO: 0.1 K/UL (ref 0–0.5)
EOSINOPHIL NFR BLD: 0.9 % (ref 0–8)
ERYTHROCYTE [DISTWIDTH] IN BLOOD BY AUTOMATED COUNT: 13.4 % (ref 11.5–14.5)
ERYTHROCYTE [SEDIMENTATION RATE] IN BLOOD BY WESTERGREN METHOD: 30 MM/HR (ref 0–20)
HCT VFR BLD AUTO: 34.5 % (ref 37–48.5)
HGB BLD-MCNC: 11 G/DL (ref 12–16)
IMM GRANULOCYTES # BLD AUTO: 0.03 K/UL (ref 0–0.04)
IMM GRANULOCYTES NFR BLD AUTO: 0.3 % (ref 0–0.5)
LYMPHOCYTES # BLD AUTO: 1.2 K/UL (ref 1–4.8)
LYMPHOCYTES NFR BLD: 12.9 % (ref 18–48)
MCH RBC QN AUTO: 31.8 PG (ref 27–31)
MCHC RBC AUTO-ENTMCNC: 31.9 G/DL (ref 32–36)
MCV RBC AUTO: 100 FL (ref 82–98)
MONOCYTES # BLD AUTO: 0.9 K/UL (ref 0.3–1)
MONOCYTES NFR BLD: 9.4 % (ref 4–15)
NEUTROPHILS # BLD AUTO: 6.9 K/UL (ref 1.8–7.7)
NEUTROPHILS NFR BLD: 76.2 % (ref 38–73)
NRBC BLD-RTO: 0 /100 WBC
PLATELET # BLD AUTO: 193 K/UL (ref 150–450)
PMV BLD AUTO: 8.9 FL (ref 9.2–12.9)
RBC # BLD AUTO: 3.46 M/UL (ref 4–5.4)
WBC # BLD AUTO: 9.02 K/UL (ref 3.9–12.7)

## 2023-07-18 PROCEDURE — 85025 COMPLETE CBC W/AUTO DIFF WBC: CPT | Performed by: PHYSICIAN ASSISTANT

## 2023-07-18 PROCEDURE — 99999 PR STA SHADOW: CPT | Mod: PBBFAC,,, | Performed by: PHYSICIAN ASSISTANT

## 2023-07-18 PROCEDURE — 85651 RBC SED RATE NONAUTOMATED: CPT | Performed by: PHYSICIAN ASSISTANT

## 2023-07-18 PROCEDURE — 99214 OFFICE O/P EST MOD 30 MIN: CPT | Mod: S$PBB | Performed by: PHYSICIAN ASSISTANT

## 2023-07-18 PROCEDURE — 36415 COLL VENOUS BLD VENIPUNCTURE: CPT | Performed by: PHYSICIAN ASSISTANT

## 2023-07-18 PROCEDURE — 99999 PR PBB SHADOW E&M-EST. PATIENT-LVL V: CPT | Mod: PBBFAC,,, | Performed by: PHYSICIAN ASSISTANT

## 2023-07-18 PROCEDURE — 99215 OFFICE O/P EST HI 40 MIN: CPT | Mod: PBBFAC | Performed by: PHYSICIAN ASSISTANT

## 2023-07-18 PROCEDURE — 86140 C-REACTIVE PROTEIN: CPT | Performed by: PHYSICIAN ASSISTANT

## 2023-07-18 PROCEDURE — 99999 PR STA SHADOW: ICD-10-PCS | Mod: PBBFAC,,, | Performed by: PHYSICIAN ASSISTANT

## 2023-07-18 NOTE — PROGRESS NOTES
Subjective:      Patient ID: Stephany Skinner is a 88 y.o. female.    Chief Complaint: Pain of the Left Knee    Review of patient's allergies indicates:   Allergen Reactions    Statins-hmg-coa reductase inhibitors      Other reaction(s): Unknown      87 yo F presents to clinic with c/o left knee pain x few weeks.  No specific injury or trauma but she did notice the pain after recovering from right femoral condyle fx, thinks that pain may be due to compensating for injured right leg.  History of left TKA years ago, no problems since surgery until started with pain a few weeks ago.  No swelling, redness, warmth to knee.      Review of Systems   Constitutional: Negative for chills, diaphoresis and fever.   HENT:  Negative for congestion, ear discharge and ear pain.    Eyes:  Negative for blurred vision, discharge, double vision and pain.   Cardiovascular:  Negative for chest pain, claudication and cyanosis.   Respiratory:  Negative for cough, hemoptysis and shortness of breath.    Endocrine: Negative for cold intolerance and heat intolerance.   Skin:  Negative for color change, dry skin, itching and rash.   Musculoskeletal:  Positive for joint pain. Negative for arthritis, back pain, falls, gout, joint swelling, muscle weakness and neck pain.   Gastrointestinal:  Negative for abdominal pain and change in bowel habit.   Neurological:  Negative for brief paralysis, disturbances in coordination, dizziness, numbness and paresthesias.   Psychiatric/Behavioral:  Negative for altered mental status and depression.        Objective:          General    Constitutional: She is oriented to person, place, and time. She appears well-developed and well-nourished. No distress.   HENT:   Head: Atraumatic.   Eyes: EOM are normal. Right eye exhibits no discharge. Left eye exhibits no discharge.   Cardiovascular:  Normal rate.            Pulmonary/Chest: Effort normal. No respiratory distress.   Abdominal: Soft.   Neurological: She is  alert and oriented to person, place, and time.   Psychiatric: She has a normal mood and affect. Her behavior is normal.           Right Knee Exam     Inspection   Erythema: absent  Scars: present  Swelling: absent  Effusion: absent  Deformity: absent  Bruising: absent    Tenderness   The patient is experiencing no tenderness.     Range of Motion   Extension:  normal   Flexion:  abnormal     Tests   Ligament Examination   MCL - Valgus: normal (0 to 2mm)  LCL - Varus: normal    Other   Sensation: normal    Left Knee Exam     Inspection   Erythema: absent  Scars: present  Swelling: absent  Effusion: absent  Deformity: absent  Bruising: absent    Tenderness   The patient is experiencing no tenderness.     Range of Motion   Extension:  normal   Flexion:  abnormal     Tests   Stability   MCL - Valgus: normal (0 to 2mm)  LCL - Varus: normal (0 to 2mm)    Other   Sensation: normal    Vascular Exam       Edema  Right Lower Leg: absent  Left Lower Leg: absent            Assessment:         Xray Left Knee 7/17/23:  Postoperative changes of a total knee arthroplasty.  No hardware complication is seen.  No fracture or dislocation.     Extensive vascular calcifications.      Encounter Diagnoses   Name Primary?    Chronic pain of left knee     History of left knee replacement Yes    History of left knee replacement  -     CBC Auto Differential; Future; Expected date: 07/18/2023  -     Sedimentation rate; Future; Expected date: 07/18/2023  -     C-REACTIVE PROTEIN; Future; Expected date: 07/18/2023    Chronic pain of left knee  -     Ambulatory referral/consult to Orthopedics               Plan:         I made the decision to obtain old records of the patient including previous notes and imaging.     The total face-to-face encounter time with this patient was 30 minutes and greater than 50% of of the encounter time was spent counseling the patient, coordinating care, and education regarding the diagnosis. Pt would like to proceed  with PT at this time. We will also check labs due to knee pain with history of knee replacement.    1. Check CBC. ESR, CRP today.  2. Ambulatory referral to physical therapy for patellofemoral strengthening and conditioning.  3. Ice compress to the affected area 2-3x a day for 15-20 minutes as needed for pain management.  4. RTC in 6 weeks for follow-up, sooner if needed.      Patient voices understanding of and agreement with treatment plan. All of the patient's questions were answered and the patient will contact us if she has any questions or concerns in the interim.

## 2023-07-25 ENCOUNTER — PATIENT OUTREACH (OUTPATIENT)
Dept: ADMINISTRATIVE | Facility: OTHER | Age: 88
End: 2023-07-25
Payer: MEDICARE

## 2023-07-25 VITALS — DIASTOLIC BLOOD PRESSURE: 90 MMHG | OXYGEN SATURATION: 100 % | HEART RATE: 57 BPM | SYSTOLIC BLOOD PRESSURE: 164 MMHG

## 2023-07-31 ENCOUNTER — EXTERNAL CHRONIC CARE MANAGEMENT (OUTPATIENT)
Dept: PRIMARY CARE CLINIC | Facility: CLINIC | Age: 88
End: 2023-07-31
Payer: MEDICARE

## 2023-07-31 PROCEDURE — 99999 PR STA SHADOW: CPT | Mod: PBBFAC,,, | Performed by: FAMILY MEDICINE

## 2023-07-31 PROCEDURE — 99490 CHRNC CARE MGMT STAFF 1ST 20: CPT | Mod: S$PBB | Performed by: FAMILY MEDICINE

## 2023-07-31 PROCEDURE — 99999 PR STA SHADOW: ICD-10-PCS | Mod: PBBFAC,,, | Performed by: FAMILY MEDICINE

## 2023-08-11 ENCOUNTER — OFFICE VISIT (OUTPATIENT)
Dept: INTERNAL MEDICINE | Facility: CLINIC | Age: 88
End: 2023-08-11
Payer: MEDICARE

## 2023-08-11 ENCOUNTER — TELEPHONE (OUTPATIENT)
Dept: INTERNAL MEDICINE | Facility: CLINIC | Age: 88
End: 2023-08-11
Payer: MEDICARE

## 2023-08-11 VITALS
RESPIRATION RATE: 16 BRPM | BODY MASS INDEX: 38.54 KG/M2 | HEIGHT: 67 IN | SYSTOLIC BLOOD PRESSURE: 122 MMHG | WEIGHT: 245.56 LBS | DIASTOLIC BLOOD PRESSURE: 62 MMHG | HEART RATE: 66 BPM | OXYGEN SATURATION: 99 %

## 2023-08-11 DIAGNOSIS — S09.8XXA BLUNT HEAD TRAUMA, INITIAL ENCOUNTER: Primary | ICD-10-CM

## 2023-08-11 DIAGNOSIS — Z79.01 ANTICOAGULATED: ICD-10-CM

## 2023-08-11 DIAGNOSIS — I50.32 CHRONIC DIASTOLIC CONGESTIVE HEART FAILURE: ICD-10-CM
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
## 2023-08-11 DIAGNOSIS — I10 PRIMARY HYPERTENSION: ICD-10-CM

## 2023-08-11 DIAGNOSIS — I10 ESSENTIAL HYPERTENSION: ICD-10-CM

## 2023-08-11 PROCEDURE — 99999 PR PBB SHADOW E&M-EST. PATIENT-LVL V: CPT | Mod: PBBFAC,,, | Performed by: FAMILY MEDICINE

## 2023-08-11 PROCEDURE — 99999 PR STA SHADOW: CPT | Mod: PBBFAC,,, | Performed by: FAMILY MEDICINE

## 2023-08-11 PROCEDURE — 99215 OFFICE O/P EST HI 40 MIN: CPT | Mod: PBBFAC | Performed by: FAMILY MEDICINE

## 2023-08-11 PROCEDURE — 99214 OFFICE O/P EST MOD 30 MIN: CPT | Mod: S$PBB | Performed by: FAMILY MEDICINE

## 2023-08-11 PROCEDURE — 99999 PR PBB SHADOW E&M-EST. PATIENT-LVL V: ICD-10-PCS | Mod: PBBFAC,,, | Performed by: FAMILY MEDICINE

## 2023-08-11 RX ORDER — METOPROLOL SUCCINATE 50 MG/1
50 TABLET, EXTENDED RELEASE ORAL DAILY
Qty: 30 TABLET | Refills: 5 | Status: SHIPPED | OUTPATIENT
Start: 2023-08-11 | End: 2024-01-29 | Stop reason: DRUGHIGH

## 2023-08-11 RX ORDER — VALSARTAN 160 MG/1
160 TABLET ORAL DAILY
Qty: 30 TABLET | Refills: 5 | Status: SHIPPED | OUTPATIENT
Start: 2023-08-11 | End: 2023-11-29 | Stop reason: SDUPTHER

## 2023-08-11 NOTE — PROGRESS NOTES
Subjective:       Patient ID: Stephany Skinner is a 88 y.o. female.    Chief Complaint: Fall (Pt here from a fall yesterday 08/10/2023. Pt states she has been dizzy for about 2 months and yesterday she thinks she blacked out. Pt was home alone when she fell and on floor for 30 mins before help came. ), Mass (Pt states she has a bump in head from the fall. ), and Shortness of Breath (Pt states concern for shortness of breath. )    Patient was trying to walk in her house and felt lightheaded and thinks she may have blacked out.  She fell on her buttock and fell backwards striking her head on the floor.  This happened yesterday.  She usually has to walk with a walker.      Review of Systems   Constitutional:  Negative for activity change, chills, fatigue, fever and unexpected weight change.   HENT:  Negative for sore throat and trouble swallowing.    Respiratory:  Negative for cough, chest tightness and shortness of breath.    Cardiovascular:  Negative for chest pain and leg swelling.   Gastrointestinal:  Negative for abdominal pain.   Endocrine: Negative for cold intolerance and heat intolerance.   Genitourinary:  Negative for difficulty urinating.   Musculoskeletal:  Negative for back pain and joint swelling.   Skin:  Positive for wound. Negative for rash.   Neurological:  Positive for dizziness, weakness and headaches. Negative for numbness.   Hematological:  Negative for adenopathy.   Psychiatric/Behavioral:  Negative for decreased concentration.        Objective:      Vitals:    08/11/23 1401   BP: 122/62   Pulse: 66   Resp: 16     Physical Exam  Constitutional:       Appearance: Normal appearance. She is well-developed. She is obese.   HENT:      Head: Normocephalic and atraumatic.      Mouth/Throat:      Mouth: Mucous membranes are moist.   Eyes:      Extraocular Movements: Extraocular movements intact.      Conjunctiva/sclera: Conjunctivae normal.      Pupils: Pupils are equal, round, and reactive to light.    Cardiovascular:      Rate and Rhythm: Normal rate and regular rhythm.      Heart sounds: Normal heart sounds. No murmur heard.  Pulmonary:      Effort: Pulmonary effort is normal.      Breath sounds: Normal breath sounds.   Musculoskeletal:      Right lower leg: No edema.      Left lower leg: No edema.   Neurological:      General: No focal deficit present.      Mental Status: She is alert and oriented to person, place, and time. Mental status is at baseline.      Cranial Nerves: No cranial nerve deficit.         Assessment:       1. Blunt head trauma, initial encounter    2. Fall, initial encounter    3. Anticoagulated    4. Primary hypertension    5. Chronic diastolic congestive heart failure    6. Essential hypertension        Plan:   1. Blunt head trauma, initial encounter  -     CT Head Without Contrast; Future; Expected date: 08/11/2023    2. Fall, initial encounter  Comments:  Probably from low blood pressure.  I will cut the Lopressor and valsartan dose in half    3. Anticoagulated  -     CBC Auto Differential; Future; Expected date: 08/11/2023    4. Primary hypertension  Overview:  Two gram sodium diet.    Weight loss discussed.    Try to walk 2 miles per day.    Avoid smoking.    Current medications will be:  Lower Valsartan 320 mg to 160 mg daily   Lasix 40 mg daily   Lower Metoprolol 100 mg to 50 mg daily   Potassium 20 mEq daily      5. Chronic diastolic congestive heart failure  Overview:  Two gram sodium diet.    Weight loss discussed.    Try to walk 2 miles per day.    Avoid smoking.    Current medications will be:  Lower Valsartan 320 mg to 160 mg daily   Lasix 40 mg daily   Lower Metoprolol 100 mg to 50 mg daily   Potassium 20 mEq daily    Orders:  -     metoprolol succinate (TOPROL-XL) 50 MG 24 hr tablet; Take 1 tablet (50 mg total) by mouth once daily.  Dispense: 30 tablet; Refill: 5  -     valsartan (DIOVAN) 160 MG tablet; Take 1 tablet (160 mg total) by mouth once daily.  Dispense: 30 tablet;  Refill: 5    6. Essential hypertension  -     metoprolol succinate (TOPROL-XL) 50 MG 24 hr tablet; Take 1 tablet (50 mg total) by mouth once daily.  Dispense: 30 tablet; Refill: 5  -     valsartan (DIOVAN) 160 MG tablet; Take 1 tablet (160 mg total) by mouth once daily.  Dispense: 30 tablet; Refill: 5  -     COMPREHENSIVE METABOLIC PANEL; Future; Expected date: 08/11/2023       Recheck in 3 weeks

## 2023-08-11 NOTE — TELEPHONE ENCOUNTER
----- Message from Rodney Rodriguez sent at 2023 11:32 AM CDT -----  Contact: Sister - Wandy Skinner  MRN: [id number]  : 1935  PCP: Pipo Flowers  Home Phone      [phone]  Work Phone      Not on file.  Mobile          Not on file.      MESSAGE: fell yesterday & hit her head - some dizziness, not feeling right today -- PT instructed her to see PCP    Call Wandy @ [phone]    PCP: Lionel

## 2023-08-15 ENCOUNTER — HOSPITAL ENCOUNTER (OUTPATIENT)
Dept: RADIOLOGY | Facility: HOSPITAL | Age: 88
Discharge: HOME | End: 2023-08-15
Attending: FAMILY MEDICINE
Payer: MEDICARE

## 2023-08-15 DIAGNOSIS — S09.8XXA BLUNT HEAD TRAUMA, INITIAL ENCOUNTER: ICD-10-CM

## 2023-08-15 PROCEDURE — 70450 CT HEAD/BRAIN W/O DYE: CPT | Mod: TC

## 2023-08-15 PROCEDURE — 70450 CT HEAD WITHOUT CONTRAST: ICD-10-PCS | Mod: 26,,, | Performed by: RADIOLOGY

## 2023-08-15 PROCEDURE — 70450 CT HEAD/BRAIN W/O DYE: CPT | Mod: 26,,, | Performed by: RADIOLOGY

## 2023-08-16 ENCOUNTER — PES CALL (OUTPATIENT)
Dept: ADMINISTRATIVE | Facility: CLINIC | Age: 88
End: 2023-08-16
Payer: MEDICARE

## 2023-08-22 ENCOUNTER — PATIENT OUTREACH (OUTPATIENT)
Dept: ADMINISTRATIVE | Facility: OTHER | Age: 88
End: 2023-08-22
Payer: MEDICARE

## 2023-08-22 VITALS — OXYGEN SATURATION: 96 % | DIASTOLIC BLOOD PRESSURE: 60 MMHG | HEART RATE: 83 BPM | SYSTOLIC BLOOD PRESSURE: 120 MMHG

## 2023-08-25 ENCOUNTER — OFFICE VISIT (OUTPATIENT)
Dept: INTERNAL MEDICINE | Facility: CLINIC | Age: 88
End: 2023-08-25
Payer: MEDICARE

## 2023-08-25 VITALS
SYSTOLIC BLOOD PRESSURE: 126 MMHG | OXYGEN SATURATION: 95 % | HEART RATE: 66 BPM | BODY MASS INDEX: 41.25 KG/M2 | RESPIRATION RATE: 18 BRPM | WEIGHT: 241.63 LBS | DIASTOLIC BLOOD PRESSURE: 80 MMHG | HEIGHT: 64 IN

## 2023-08-25 DIAGNOSIS — B37.2 CANDIDIASIS, INTERTRIGO: Primary | ICD-10-CM

## 2023-08-25 DIAGNOSIS — L03.90 CELLULITIS, UNSPECIFIED CELLULITIS SITE: ICD-10-CM

## 2023-08-25 PROCEDURE — 99999 PR PBB SHADOW E&M-EST. PATIENT-LVL IV: ICD-10-PCS | Mod: PBBFAC,,, | Performed by: NURSE PRACTITIONER

## 2023-08-25 PROCEDURE — 99214 OFFICE O/P EST MOD 30 MIN: CPT | Mod: PBBFAC,PN | Performed by: NURSE PRACTITIONER

## 2023-08-25 PROCEDURE — 99213 PR OFFICE/OUTPT VISIT, EST, LEVL III, 20-29 MIN: ICD-10-PCS | Mod: S$PBB,,, | Performed by: NURSE PRACTITIONER

## 2023-08-25 PROCEDURE — 99213 OFFICE O/P EST LOW 20 MIN: CPT | Mod: S$PBB,,, | Performed by: NURSE PRACTITIONER

## 2023-08-25 PROCEDURE — 99999 PR PBB SHADOW E&M-EST. PATIENT-LVL IV: CPT | Mod: PBBFAC,,, | Performed by: NURSE PRACTITIONER

## 2023-08-25 RX ORDER — NYSTATIN 100000 [USP'U]/G
POWDER TOPICAL 4 TIMES DAILY
Qty: 120 G | Refills: 2 | Status: SHIPPED | OUTPATIENT
Start: 2023-08-25 | End: 2024-03-13 | Stop reason: SDUPTHER

## 2023-08-25 RX ORDER — CEPHALEXIN 500 MG/1
500 CAPSULE ORAL EVERY 12 HOURS
Qty: 14 CAPSULE | Refills: 0 | Status: SHIPPED | OUTPATIENT
Start: 2023-08-25 | End: 2023-09-01

## 2023-08-28 NOTE — PROGRESS NOTES
Subjective:       Patient ID: Stephany Skinner is a 88 y.o. female.    Chief Complaint: Rash (In groin On L. Side- x few months comes and goes )    Patient is known, to me and presents with   Chief Complaint   Patient presents with    Rash     In groin On L. Side- x few months comes and goes    .  Denies chest pain and shortness of breath.  Patient presents with worsening rash to left abdominal fold into her groin region. She has a large abdominal fold that stays moist. Trying to use creams with no relief. Now with some discomfort and drainage. States that the drainage does have an odor  Rash      Review of Systems   Constitutional: Negative.    Respiratory: Negative.     Cardiovascular: Negative.    Skin:  Positive for rash.   Hematological: Negative.        Objective:      Physical Exam  Constitutional:       General: She is not in acute distress.     Appearance: Normal appearance. She is obese. She is not ill-appearing, toxic-appearing or diaphoretic.   Cardiovascular:      Rate and Rhythm: Normal rate and regular rhythm.      Heart sounds: Normal heart sounds. No murmur heard.  Pulmonary:      Effort: Pulmonary effort is normal. No respiratory distress.      Breath sounds: Normal breath sounds. No stridor. No wheezing, rhonchi or rales.   Chest:      Chest wall: No tenderness.   Skin:     General: Skin is warm and dry.      Capillary Refill: Capillary refill takes less than 2 seconds.      Coloration: Skin is not jaundiced or pale.      Findings: Erythema and rash present. No bruising or lesion. Rash is macular and papular.             Comments: Area of erythema and some maceration noted to left groin region and abdominal fold. Moist in appearance   Neurological:      Mental Status: She is alert.         Assessment:       1. Candidiasis, intertrigo    2. Cellulitis, unspecified cellulitis site        Plan:   1. Candidiasis, intertrigo  -     nystatin (MYCOSTATIN) powder; Apply topically 4 (four) times daily.   "Dispense: 120 g; Refill: 2  -     cephALEXin (KEFLEX) 500 MG capsule; Take 1 capsule (500 mg total) by mouth every 12 (twelve) hours. for 7 days  Dispense: 14 capsule; Refill: 0    2. Cellulitis, unspecified cellulitis site  -     cephALEXin (KEFLEX) 500 MG capsule; Take 1 capsule (500 mg total) by mouth every 12 (twelve) hours. for 7 days  Dispense: 14 capsule; Refill: 0       "This note will not be shared with the patient."  Use domboro cleanser to keep area dry and clean  Use non scented soap  Rtc in one week to re evaluate  "

## 2023-08-30 ENCOUNTER — OFFICE VISIT (OUTPATIENT)
Dept: ORTHOPEDICS | Facility: CLINIC | Age: 88
End: 2023-08-30
Payer: MEDICARE

## 2023-08-30 VITALS
BODY MASS INDEX: 41.43 KG/M2 | DIASTOLIC BLOOD PRESSURE: 62 MMHG | OXYGEN SATURATION: 97 % | HEIGHT: 64 IN | WEIGHT: 242.69 LBS | RESPIRATION RATE: 17 BRPM | SYSTOLIC BLOOD PRESSURE: 122 MMHG | HEART RATE: 69 BPM

## 2023-08-30 DIAGNOSIS — M25.562 ACUTE PAIN OF LEFT KNEE: ICD-10-CM

## 2023-08-30 DIAGNOSIS — Z96.652 HISTORY OF LEFT KNEE REPLACEMENT: Primary | ICD-10-CM

## 2023-08-30 PROCEDURE — 99215 OFFICE O/P EST HI 40 MIN: CPT | Mod: PBBFAC | Performed by: PHYSICIAN ASSISTANT

## 2023-08-30 PROCEDURE — 99213 OFFICE O/P EST LOW 20 MIN: CPT | Mod: S$PBB | Performed by: PHYSICIAN ASSISTANT

## 2023-08-30 PROCEDURE — 99999 PR STA SHADOW: CPT | Mod: PBBFAC,,, | Performed by: PHYSICIAN ASSISTANT

## 2023-08-30 PROCEDURE — 99999 PR STA SHADOW: ICD-10-PCS | Mod: PBBFAC,,, | Performed by: PHYSICIAN ASSISTANT

## 2023-08-30 PROCEDURE — 99999 PR PBB SHADOW E&M-EST. PATIENT-LVL V: CPT | Mod: PBBFAC,,, | Performed by: PHYSICIAN ASSISTANT

## 2023-08-31 ENCOUNTER — EXTERNAL CHRONIC CARE MANAGEMENT (OUTPATIENT)
Dept: PRIMARY CARE CLINIC | Facility: CLINIC | Age: 88
End: 2023-08-31
Payer: MEDICARE

## 2023-08-31 ENCOUNTER — OFFICE VISIT (OUTPATIENT)
Dept: INTERNAL MEDICINE | Facility: CLINIC | Age: 88
End: 2023-08-31
Payer: MEDICARE

## 2023-08-31 VITALS
SYSTOLIC BLOOD PRESSURE: 126 MMHG | RESPIRATION RATE: 18 BRPM | HEIGHT: 67 IN | WEIGHT: 242.5 LBS | DIASTOLIC BLOOD PRESSURE: 80 MMHG | OXYGEN SATURATION: 95 % | BODY MASS INDEX: 38.06 KG/M2 | HEART RATE: 90 BPM

## 2023-08-31 DIAGNOSIS — L03.818 CELLULITIS OF OTHER SPECIFIED SITE: ICD-10-CM

## 2023-08-31 DIAGNOSIS — B37.2 CANDIDIASIS, INTERTRIGO: Primary | ICD-10-CM

## 2023-08-31 PROBLEM — L03.90 CELLULITIS: Status: ACTIVE | Noted: 2023-08-31

## 2023-08-31 PROCEDURE — 99490 CHRNC CARE MGMT STAFF 1ST 20: CPT | Mod: PBBFAC | Performed by: FAMILY MEDICINE

## 2023-08-31 PROCEDURE — 99215 OFFICE O/P EST HI 40 MIN: CPT | Mod: PBBFAC,PN | Performed by: NURSE PRACTITIONER

## 2023-08-31 PROCEDURE — 99999 PR PBB SHADOW E&M-EST. PATIENT-LVL V: CPT | Mod: PBBFAC,,, | Performed by: NURSE PRACTITIONER

## 2023-08-31 PROCEDURE — 99999 PR PBB SHADOW E&M-EST. PATIENT-LVL V: ICD-10-PCS | Mod: PBBFAC,,, | Performed by: NURSE PRACTITIONER

## 2023-08-31 PROCEDURE — 99999 PR STA SHADOW: CPT | Mod: PBBFAC,,, | Performed by: FAMILY MEDICINE

## 2023-08-31 PROCEDURE — 99439 CHRNC CARE MGMT STAF EA ADDL: CPT | Mod: S$PBB | Performed by: FAMILY MEDICINE

## 2023-08-31 PROCEDURE — 99213 PR OFFICE/OUTPT VISIT, EST, LEVL III, 20-29 MIN: ICD-10-PCS | Mod: S$PBB,,, | Performed by: NURSE PRACTITIONER

## 2023-08-31 PROCEDURE — 99999 PR STA SHADOW: ICD-10-PCS | Mod: PBBFAC,,, | Performed by: FAMILY MEDICINE

## 2023-08-31 PROCEDURE — 99213 OFFICE O/P EST LOW 20 MIN: CPT | Mod: S$PBB,,, | Performed by: NURSE PRACTITIONER

## 2023-08-31 NOTE — PROGRESS NOTES
"Subjective:       Patient ID: Stephany Skinner is a 88 y.o. female.    Chief Complaint: Follow-up (X 6 days - rash )    Patient is known, to me and presents with   Chief Complaint   Patient presents with    Follow-up     X 6 days - rash    .  Denies chest pain and shortness of breath.  Patient presents for follow up intertrigo for the past week and is much improved. She is almost done her antibx. Using nystatin powder which is much better for her than the cream. No drainage. Feeling much better    Follow-up      Review of Systems   Constitutional: Negative.    Respiratory: Negative.     Cardiovascular: Negative.    Skin: Negative.    Hematological: Negative.        Objective:      Physical Exam  Constitutional:       General: She is not in acute distress.     Appearance: Normal appearance. She is obese. She is not ill-appearing, toxic-appearing or diaphoretic.   Cardiovascular:      Rate and Rhythm: Normal rate and regular rhythm.      Heart sounds: Normal heart sounds. No murmur heard.  Pulmonary:      Effort: Pulmonary effort is normal. No respiratory distress.      Breath sounds: Normal breath sounds. No stridor. No wheezing, rhonchi or rales.   Chest:      Chest wall: No tenderness.   Skin:     General: Skin is warm and dry.      Capillary Refill: Capillary refill takes less than 2 seconds.      Coloration: Skin is not jaundiced or pale.      Findings: No bruising, erythema, lesion or rash.      Comments: Intertrigo healed to groin region. No drainage.    Neurological:      Mental Status: She is alert.         Assessment:       1. Candidiasis, intertrigo    2. Cellulitis of other specified site        Plan:   1. Candidiasis, intertrigo    2. Cellulitis of other specified site       "This note will not be shared with the patient."  Continue using the powder and domboro wash  Finish antibx   Rtc as scheduled  "

## 2023-09-08 ENCOUNTER — OFFICE VISIT (OUTPATIENT)
Dept: INTERNAL MEDICINE | Facility: CLINIC | Age: 88
End: 2023-09-08
Payer: MEDICARE

## 2023-09-08 VITALS
HEART RATE: 43 BPM | WEIGHT: 245.56 LBS | OXYGEN SATURATION: 93 % | DIASTOLIC BLOOD PRESSURE: 62 MMHG | RESPIRATION RATE: 16 BRPM | HEIGHT: 67 IN | BODY MASS INDEX: 38.54 KG/M2 | SYSTOLIC BLOOD PRESSURE: 118 MMHG

## 2023-09-08 DIAGNOSIS — S09.8XXA BLUNT HEAD TRAUMA, INITIAL ENCOUNTER: ICD-10-CM

## 2023-09-08 DIAGNOSIS — I10 PRIMARY HYPERTENSION: Primary | ICD-10-CM
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
## 2023-09-08 PROCEDURE — 99999 PR PBB SHADOW E&M-EST. PATIENT-LVL IV: ICD-10-PCS | Mod: PBBFAC,,, | Performed by: FAMILY MEDICINE

## 2023-09-08 PROCEDURE — 99999 PR PBB SHADOW E&M-EST. PATIENT-LVL IV: CPT | Mod: PBBFAC,,, | Performed by: FAMILY MEDICINE

## 2023-09-08 PROCEDURE — 99999 PR STA SHADOW: CPT | Mod: PBBFAC,,, | Performed by: FAMILY MEDICINE

## 2023-09-08 PROCEDURE — 99213 OFFICE O/P EST LOW 20 MIN: CPT | Mod: S$PBB | Performed by: FAMILY MEDICINE

## 2023-09-08 PROCEDURE — 99214 OFFICE O/P EST MOD 30 MIN: CPT | Mod: PBBFAC | Performed by: FAMILY MEDICINE

## 2023-09-08 NOTE — PROGRESS NOTES
Subjective:       Patient ID: Stephany Skinner is a 88 y.o. female.    Chief Complaint: Follow-up (Pt here for 3 wk f/u. ), Hip Pain, and Knee Pain (Pt states concern for knee pain that's going up to her hip. )    Patient here for follow-up for recent fall.  Patient was trying to walk in her house and felt lightheaded and thinks she may have blacked out.  She fell on her buttock and fell backwards striking her head on the floor.  I ordered CT scan of the brain last week and this was unremarkable.  Her headache has resolved.  She is now ambulating with a walker all the time.      Review of Systems   Constitutional:  Negative for activity change, chills, fatigue, fever and unexpected weight change.   HENT:  Negative for sore throat and trouble swallowing.    Respiratory:  Negative for cough, chest tightness and shortness of breath.    Cardiovascular:  Negative for chest pain and leg swelling.   Gastrointestinal:  Negative for abdominal pain.   Endocrine: Negative for cold intolerance and heat intolerance.   Genitourinary:  Negative for difficulty urinating.   Musculoskeletal:  Negative for back pain and joint swelling.   Skin:  Positive for wound. Negative for rash.   Neurological:  Positive for dizziness, weakness and headaches. Negative for numbness.   Hematological:  Negative for adenopathy.   Psychiatric/Behavioral:  Negative for decreased concentration.        Objective:      Vitals:    09/08/23 1249   BP: 118/62   Pulse: (!) 43   Resp: 16     Physical Exam  Constitutional:       Appearance: Normal appearance. She is well-developed. She is obese.   HENT:      Head: Normocephalic and atraumatic.      Mouth/Throat:      Mouth: Mucous membranes are moist.   Eyes:      Extraocular Movements: Extraocular movements intact.      Conjunctiva/sclera: Conjunctivae normal.      Pupils: Pupils are equal, round, and reactive to light.   Cardiovascular:      Rate and Rhythm: Normal rate and regular rhythm.      Heart sounds:  Normal heart sounds. No murmur heard.  Pulmonary:      Effort: Pulmonary effort is normal.      Breath sounds: Normal breath sounds.   Musculoskeletal:      Right lower leg: No edema.      Left lower leg: No edema.   Neurological:      General: No focal deficit present.      Mental Status: She is alert and oriented to person, place, and time. Mental status is at baseline.      Cranial Nerves: No cranial nerve deficit.         Assessment:       1. Primary hypertension    2. Blunt head trauma, initial encounter    3. Fall, subsequent encounter          Plan:   1. Primary hypertension  Overview:  Two gram sodium diet.    Weight loss discussed.    Try to walk 2 miles per day.    Avoid smoking.    Current medications will be:  Lower Valsartan 320 mg to 160 mg daily   Lasix 40 mg daily   Lower Metoprolol 100 mg to 50 mg daily   Potassium 20 mEq daily      2. Blunt head trauma, initial encounter  Comments:  Headache has resolved.  CT unremarkable    3. Fall, subsequent encounter  Comments:  Encouraged patient to always ambulate with her walker.  She agrees.

## 2023-09-15 ENCOUNTER — TELEPHONE (OUTPATIENT)
Dept: ORTHOPEDICS | Facility: CLINIC | Age: 88
End: 2023-09-15
Payer: MEDICARE

## 2023-09-15 NOTE — TELEPHONE ENCOUNTER
----- Message from Sophia Khan sent at 9/15/2023  9:18 AM CDT -----  Contact: SISTER - CADEN Skinner  MRN: [id number]  : 1935  PCP: Pipo Flowers  Home Phone      [phone]  Work Phone      Not on file.  Mobile          [phone]      MESSAGE: Sister states that they have not received a call from the surgeon that [facility] is referring her to.        Phone: [phone]

## 2023-09-15 NOTE — TELEPHONE ENCOUNTER
Called Ms Silva back Ms Hammond's sister. I informed her  we would refax over the referral and I gave her the number to the surgeron we referred her to for surgery. The number was [phone]. The sister voiced understanding and thanked me for the follow up.

## 2023-09-30 ENCOUNTER — EXTERNAL CHRONIC CARE MANAGEMENT (OUTPATIENT)
Dept: PRIMARY CARE CLINIC | Facility: CLINIC | Age: 88
End: 2023-09-30
Payer: MEDICARE

## 2023-09-30 PROCEDURE — 99999 PR STA SHADOW: ICD-10-PCS | Mod: PBBFAC,,, | Performed by: FAMILY MEDICINE

## 2023-09-30 PROCEDURE — 99999 PR STA SHADOW: CPT | Mod: PBBFAC,,, | Performed by: FAMILY MEDICINE

## 2023-09-30 PROCEDURE — 99490 CHRNC CARE MGMT STAFF 1ST 20: CPT | Mod: S$PBB | Performed by: FAMILY MEDICINE

## 2023-09-30 PROCEDURE — 99439 CHRNC CARE MGMT STAF EA ADDL: CPT | Mod: PBBFAC | Performed by: FAMILY MEDICINE

## 2023-10-12 DIAGNOSIS — L30.9 ECZEMA, UNSPECIFIED TYPE: ICD-10-CM

## 2023-10-12 DIAGNOSIS — B02.9 HERPES ZOSTER WITHOUT COMPLICATION: ICD-10-CM

## 2023-10-12 RX ORDER — NYSTATIN AND TRIAMCINOLONE ACETONIDE 100000; 1 [USP'U]/G; MG/G
CREAM TOPICAL
Qty: 60 G | Refills: 0 | Status: SHIPPED | OUTPATIENT
Start: 2023-10-12 | End: 2023-11-30 | Stop reason: SDUPTHER

## 2023-10-12 NOTE — TELEPHONE ENCOUNTER
LOV 09/08/2023    patient requesting refill for     nystatin-triamcinolone (MYCOLOG II) cream 60 g         RX pending   pharmacy confirmed  please advise

## 2023-10-16 RX ORDER — ALENDRONATE SODIUM 70 MG/1
70 TABLET ORAL
Qty: 12 TABLET | Refills: 0 | Status: SHIPPED | OUTPATIENT
Start: 2023-10-16 | End: 2024-01-08

## 2023-10-16 NOTE — TELEPHONE ENCOUNTER
LOV 09/08/2023    patient requesting refill for alendronate (FOSAMAX) 70 MG tablet        RX pending   pharmacy confirmed  please advise

## 2023-10-25 ENCOUNTER — TELEPHONE (OUTPATIENT)
Dept: INTERNAL MEDICINE | Facility: CLINIC | Age: 88
End: 2023-10-25
Payer: MEDICARE

## 2023-10-25 ENCOUNTER — OFFICE VISIT (OUTPATIENT)
Dept: INTERNAL MEDICINE | Facility: CLINIC | Age: 88
End: 2023-10-25
Payer: MEDICARE

## 2023-10-25 VITALS
SYSTOLIC BLOOD PRESSURE: 138 MMHG | WEIGHT: 240.94 LBS | HEART RATE: 74 BPM | DIASTOLIC BLOOD PRESSURE: 74 MMHG | OXYGEN SATURATION: 98 % | HEIGHT: 67 IN | BODY MASS INDEX: 37.82 KG/M2 | RESPIRATION RATE: 16 BRPM

## 2023-10-25 DIAGNOSIS — L03.031 CELLULITIS OF TOE OF RIGHT FOOT: ICD-10-CM

## 2023-10-25 DIAGNOSIS — J06.9 UPPER RESPIRATORY TRACT INFECTION, UNSPECIFIED TYPE: ICD-10-CM

## 2023-10-25 DIAGNOSIS — L97.519: Primary | ICD-10-CM

## 2023-10-25 DIAGNOSIS — L84 PRE-ULCERATIVE CORN OR CALLOUS: ICD-10-CM

## 2023-10-25 LAB
CTP QC/QA: YES
CTP QC/QA: YES
POC MOLECULAR INFLUENZA A AGN: NEGATIVE
POC MOLECULAR INFLUENZA B AGN: NEGATIVE
SARS-COV-2 AG RESP QL IA.RAPID: NEGATIVE

## 2023-10-25 PROCEDURE — 99999 PR PBB SHADOW E&M-EST. PATIENT-LVL V: CPT | Mod: PBBFAC,,, | Performed by: FAMILY MEDICINE

## 2023-10-25 PROCEDURE — 99214 OFFICE O/P EST MOD 30 MIN: CPT | Mod: S$PBB,,, | Performed by: FAMILY MEDICINE

## 2023-10-25 PROCEDURE — 99999PBSHW POCT INFLUENZA A/B MOLECULAR: Mod: PBBFAC,,,

## 2023-10-25 PROCEDURE — 87811 SARS-COV-2 COVID19 W/OPTIC: CPT | Mod: PBBFAC,PN | Performed by: FAMILY MEDICINE

## 2023-10-25 PROCEDURE — 99999 PR PBB SHADOW E&M-EST. PATIENT-LVL V: ICD-10-PCS | Mod: PBBFAC,,, | Performed by: FAMILY MEDICINE

## 2023-10-25 PROCEDURE — 99999PBSHW POCT INFLUENZA A/B MOLECULAR: ICD-10-PCS | Mod: PBBFAC,,,

## 2023-10-25 PROCEDURE — 87502 INFLUENZA DNA AMP PROBE: CPT | Mod: PBBFAC,PN | Performed by: FAMILY MEDICINE

## 2023-10-25 PROCEDURE — 99215 OFFICE O/P EST HI 40 MIN: CPT | Mod: PBBFAC,PN | Performed by: FAMILY MEDICINE

## 2023-10-25 PROCEDURE — 99999PBSHW SARS CORONAVIRUS 2 ANTIGEN POCT, MANUAL READ: Mod: PBBFAC,,,

## 2023-10-25 PROCEDURE — 99214 PR OFFICE/OUTPT VISIT, EST, LEVL IV, 30-39 MIN: ICD-10-PCS | Mod: S$PBB,,, | Performed by: FAMILY MEDICINE

## 2023-10-25 RX ORDER — DOXYCYCLINE HYCLATE 100 MG
100 TABLET ORAL EVERY 12 HOURS
Qty: 20 TABLET | Refills: 0 | Status: SHIPPED | OUTPATIENT
Start: 2023-10-25 | End: 2023-11-04

## 2023-10-25 RX ORDER — CETIRIZINE HYDROCHLORIDE 10 MG/1
10 TABLET ORAL DAILY
Qty: 30 TABLET | Refills: 5 | Status: SHIPPED | OUTPATIENT
Start: 2023-10-25 | End: 2024-03-13

## 2023-10-25 NOTE — TELEPHONE ENCOUNTER
----- Message from Agatha Singleton sent at 10/24/2023  3:53 PM CDT -----  Contact: Shira    ----- Message -----  From: Agatha Singleton  Sent: 10/24/2023   3:42 PM CDT  To: Em Ramirez LPN    Stephany Rizviaune  MRN: [id number]  : 1935  PCP: Pipo Flowers  Home Phone      [phone]  Work Phone      Not on file.  [employer]          [phone]      MESSAGE: Shira called to see if she could get an appt for Stephany Skinner tomorrow. Pt is running fever and coughing. Please advise       [phone]

## 2023-10-25 NOTE — PROGRESS NOTES
Subjective:       Patient ID: Stephany Skinner is a 88 y.o. female.    Chief Complaint: Cough, Nasal Congestion (Pt here for cough and congestion X 1 day. ), and Toe Pain (Pt states concern for pain in 3rd toe on right foot, toe is red and blistered. )    Patient with a 2 day history of coughing, congestion, body aches.  Viral infection has been running through the family.  She is also having her right middle toe is red      Review of Systems   Constitutional:  Negative for activity change, chills, fatigue, fever and unexpected weight change.   HENT:  Positive for rhinorrhea and sinus pressure. Negative for sore throat and trouble swallowing.    Respiratory:  Positive for cough. Negative for chest tightness and shortness of breath.    Cardiovascular:  Negative for chest pain and leg swelling.   Gastrointestinal:  Negative for abdominal pain.   Endocrine: Negative for cold intolerance and heat intolerance.   Genitourinary:  Negative for difficulty urinating.   Musculoskeletal:  Negative for back pain and joint swelling.   Skin:  Negative for rash.   Neurological:  Negative for numbness.   Hematological:  Negative for adenopathy.   Psychiatric/Behavioral:  Negative for decreased concentration.    All other systems reviewed and are negative.      Objective:      Vitals:    10/25/23 0942   BP: 138/74   Pulse: 74   Resp: 16     Physical Exam  Constitutional:       Appearance: Normal appearance.   HENT:      Right Ear: Tympanic membrane normal.      Left Ear: Tympanic membrane normal.      Nose: Congestion and rhinorrhea present.   Cardiovascular:      Rate and Rhythm: Normal rate and regular rhythm.      Heart sounds: No murmur heard.  Pulmonary:      Effort: Pulmonary effort is normal.      Breath sounds: Normal breath sounds.   Musculoskeletal:      Comments: Right middle toe is erythematous and tender.  She is got thick calluses distally.  The toenail is mildly avulsed.   Neurological:      Mental Status: She is alert.          Assessment:       1. Skin ulcer of third toe, right, with unspecified severity    2. Upper respiratory tract infection, unspecified type    3. Cellulitis of toe of right foot    4. Pre-ulcerative corn or callous        Plan:   1. Skin ulcer of third toe, right, with unspecified severity  -     Ambulatory referral/consult to Podiatry; Future; Expected date: 11/01/2023  -     doxycycline (VIBRA-TABS) 100 MG tablet; Take 1 tablet (100 mg total) by mouth every 12 (twelve) hours. for 10 days  Dispense: 20 tablet; Refill: 0    2. Upper respiratory tract infection, unspecified type  -     POCT Influenza A/B Molecular  -     SARS Coronavirus 2 Antigen, POCT Manual Read  -     cetirizine (ZYRTEC) 10 MG tablet; Take 1 tablet (10 mg total) by mouth once daily.  Dispense: 30 tablet; Refill: 5    3. Cellulitis of toe of right foot  Comments:  Patient started on doxycycline.  Soak foot twice daily.    4. Pre-ulcerative corn or callous  Comments:  Refer to podiatry for debridement.       Return to clinic as needed

## 2023-10-31 ENCOUNTER — EXTERNAL CHRONIC CARE MANAGEMENT (OUTPATIENT)
Dept: PRIMARY CARE CLINIC | Facility: CLINIC | Age: 88
End: 2023-10-31
Payer: MEDICARE

## 2023-10-31 PROCEDURE — 99999 PR STA SHADOW: CPT | Mod: PBBFAC,,, | Performed by: FAMILY MEDICINE

## 2023-10-31 PROCEDURE — 99490 CHRNC CARE MGMT STAFF 1ST 20: CPT | Mod: PBBFAC | Performed by: FAMILY MEDICINE

## 2023-10-31 PROCEDURE — 99999 PR STA SHADOW: ICD-10-PCS | Mod: PBBFAC,,, | Performed by: FAMILY MEDICINE

## 2023-11-13 NOTE — ASSESSMENT & PLAN NOTE
Initially tx for fluid overload after missed lasix rx;   2-D Echocardiogram for evaluation of left ventricle systolic function or any valvular heart disease-EF 55-60%, stage 1 diastolic dysfunction, no significant valvular pathology.  2-2-18 when she demonstrated volume depletion/weight loss with subsequent worsening renal function.   Diuretics discontinued due to acute renal insuff.  Cr was trending up, required IV fluids; creat now improving. Off IVF  Cardiology  Dr. Patel following     This is improved and stable  Daily weights.  Monitor  I/Os   Na restriction <2g/d.  Renal function at baseline, resumed lasix 20mg po BID      88

## 2023-11-29 ENCOUNTER — HOSPITAL ENCOUNTER (OUTPATIENT)
Dept: PULMONOLOGY | Facility: HOSPITAL | Age: 88
Discharge: HOME | End: 2023-11-29
Attending: FAMILY MEDICINE
Payer: MEDICARE

## 2023-11-29 ENCOUNTER — OFFICE VISIT (OUTPATIENT)
Dept: INTERNAL MEDICINE | Facility: CLINIC | Age: 88
End: 2023-11-29
Payer: MEDICARE

## 2023-11-29 VITALS
OXYGEN SATURATION: 98 % | HEART RATE: 80 BPM | HEIGHT: 67 IN | DIASTOLIC BLOOD PRESSURE: 40 MMHG | BODY MASS INDEX: 37.3 KG/M2 | RESPIRATION RATE: 16 BRPM | SYSTOLIC BLOOD PRESSURE: 95 MMHG | WEIGHT: 237.63 LBS

## 2023-11-29 DIAGNOSIS — I50.32 CHRONIC DIASTOLIC CONGESTIVE HEART FAILURE: ICD-10-CM

## 2023-11-29 DIAGNOSIS — R06.09 EXERTIONAL DYSPNEA: ICD-10-CM

## 2023-11-29 DIAGNOSIS — I48.20 CHRONIC ATRIAL FIBRILLATION: ICD-10-CM

## 2023-11-29 DIAGNOSIS — E11.59 TYPE 2 DIABETES MELLITUS WITH OTHER CIRCULATORY COMPLICATION, WITH LONG-TERM CURRENT USE OF INSULIN: ICD-10-CM

## 2023-11-29 DIAGNOSIS — Z23 IMMUNIZATION DUE: Primary | ICD-10-CM

## 2023-11-29 DIAGNOSIS — Z79.4 TYPE 2 DIABETES MELLITUS WITH OTHER CIRCULATORY COMPLICATION, WITH LONG-TERM CURRENT USE OF INSULIN: ICD-10-CM

## 2023-11-29 DIAGNOSIS — I10 ESSENTIAL HYPERTENSION: ICD-10-CM

## 2023-11-29 DIAGNOSIS — I10 PRIMARY HYPERTENSION: ICD-10-CM

## 2023-11-29 DIAGNOSIS — E03.4 HYPOTHYROIDISM DUE TO ACQUIRED ATROPHY OF THYROID: ICD-10-CM

## 2023-11-29 DIAGNOSIS — G47.33 OBSTRUCTIVE SLEEP APNEA (ADULT) (PEDIATRIC): ICD-10-CM

## 2023-11-29 PROCEDURE — 99900035 HC TECH TIME PER 15 MIN (STAT)

## 2023-11-29 PROCEDURE — 99215 PR OFFICE/OUTPT VISIT, EST, LEVL V, 40-54 MIN: ICD-10-PCS | Mod: S$PBB,,, | Performed by: FAMILY MEDICINE

## 2023-11-29 PROCEDURE — 99999 PR PBB SHADOW E&M-EST. PATIENT-LVL V: CPT | Mod: PBBFAC,,, | Performed by: FAMILY MEDICINE

## 2023-11-29 PROCEDURE — 90662 IIV NO PRSV INCREASED AG IM: CPT | Mod: PBBFAC,PN

## 2023-11-29 PROCEDURE — 99999PBSHW FLU VACCINE - HIGH DOSE (65+) PRESERVATIVE FREE IM: Mod: PBBFAC,,,

## 2023-11-29 PROCEDURE — 99900031 HC PATIENT EDUCATION (STAT)

## 2023-11-29 PROCEDURE — 99999 PR PBB SHADOW E&M-EST. PATIENT-LVL V: ICD-10-PCS | Mod: PBBFAC,,, | Performed by: FAMILY MEDICINE

## 2023-11-29 PROCEDURE — 99215 OFFICE O/P EST HI 40 MIN: CPT | Mod: S$PBB,,, | Performed by: FAMILY MEDICINE

## 2023-11-29 PROCEDURE — 99215 OFFICE O/P EST HI 40 MIN: CPT | Mod: PBBFAC,PN | Performed by: FAMILY MEDICINE

## 2023-11-29 PROCEDURE — 99999PBSHW FLU VACCINE - HIGH DOSE (65+) PRESERVATIVE FREE IM: ICD-10-PCS | Mod: PBBFAC,,,

## 2023-11-29 RX ORDER — MELOXICAM 7.5 MG/1
7.5 TABLET ORAL
COMMUNITY
Start: 2023-09-20 | End: 2024-02-28

## 2023-11-29 RX ORDER — VALSARTAN 80 MG/1
80 TABLET ORAL DAILY
Qty: 30 TABLET | Refills: 5 | Status: SHIPPED | OUTPATIENT
Start: 2023-11-29 | End: 2023-12-18

## 2023-11-29 NOTE — RESPIRATORY THERAPY
Home Oxygen Evaluation    Date Performed: 2023    1) Patient's Home O2 Sat on room air, while at rest: 95%        If O2 sats on room air at rest are 88% or below, patient qualifies. No additional testing needed. Document N/A in steps 2 and 3. If 89% or above, complete steps 2.      2) Patient's O2 Sat on room air while exercisin%        If O2 sats on room air while exercising remain 89% or above patient does not qualify, no further testing needed Document N/A in step 3. If O2 sats on room air while exercising are 88% or below, continue to step 3.      3) Patient's O2 Sat while exercising on O2: NA at NA LPM         (Must show improvement from #2 for patients to qualify)    If O2 sats improve on oxygen, patient qualifies for portable oxygen. If not, the patient does not qualify.

## 2023-11-29 NOTE — PROGRESS NOTES
Subjective:       Patient ID: Stephany Skinner is a 88 y.o. female.    Chief Complaint: Follow-up (Pt here for 6 mth f/u and wants to consult with you about getting a MRI of the brain.)    Patient here for checkup.  Has dyspnea with exertion that is worsening.  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  She was worked up for CVA.   Saw opth, neuro, Card.  She had BW, Echo with bubble, MRI.  All unremarkable  Patient has arthritis.  Patient having more lumbar pain.  She cannot walk more than 20 feet and then pain radiates to the legs.  Needs her walker.  Her left shoulder is more painful and cracks when she pulls herself up to standing.  She has a history of congestive heart failure and hypertension. She denies shortness of breath.     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  No longer taking prednisone,  pyridostigmine 60 mg daily.  Doing well.  Patient has type 2 diabetes and seems be doing well.  She stopped her insulin and her blood sugars look great.    She has lost over 60 lb in the last year.  She continues to lose weight.  She is not on any medication for this.  Patient has COPD.  She takes neb treatments as needed.    She has sleep apnea and is using her CPAP at least 6 hr per night.  She takes Synthroid for her hypothyroidism.  She tolerates this well.  She denies having symptoms such as heat or cold intolerance.  Her weight is stable.  She denies any swelling in her thyroid.  She tolerates her blood pressure medication as well as not having side effects such as chronic cough or dizziness.  She is not having any symptoms from it.  Patient is taking her statin every day for hyperlipidemia.  She is feeling well on the medication.  She denies side effects such as myalgias.      Review of Systems   Constitutional:  Negative for activity change and unexpected weight change.   HENT:  Negative for trouble swallowing.    Respiratory:  Negative for chest tightness.    Cardiovascular:  Positive for leg swelling.    Gastrointestinal:  Positive for diarrhea.   Endocrine: Negative for cold intolerance and heat intolerance.   Genitourinary:  Negative for difficulty urinating.   Musculoskeletal:  Positive for arthralgias, back pain and gait problem.   Skin:  Positive for rash. Negative for wound.   Hematological:  Negative for adenopathy.   Psychiatric/Behavioral:  Negative for decreased concentration.        Objective:      Vitals:    11/30/22 0819   BP: 120/62   Pulse: 87   Resp: 18     Physical Exam  Constitutional:       Appearance: Normal appearance. She is well-developed. She is obese.   HENT:      Head: Normocephalic and atraumatic.      Right Ear: Tympanic membrane normal.      Left Ear: Tympanic membrane normal.      Mouth/Throat:      Mouth: Mucous membranes are moist.   Eyes:      Pupils: Pupils are equal, round, and reactive to light.   Cardiovascular:      Rate and Rhythm: Normal rate and regular rhythm.      Pulses: Normal pulses.      Heart sounds: Normal heart sounds. No murmur heard.     No friction rub. No gallop.   Pulmonary:      Effort: Pulmonary effort is normal.      Breath sounds: Normal breath sounds. No wheezing or rales.   Abdominal:      General: Bowel sounds are normal.      Palpations: Abdomen is soft.      Hernia: A hernia (incisional wall hernia, reducible) is present.   Musculoskeletal:      Cervical back: Neck supple.      Right lower leg: No edema.      Left lower leg: No edema.      Comments: Wearing support stockings   Skin:     General: Skin is warm.      Findings: No rash.   Neurological:      General: No focal deficit present.      Mental Status: She is alert and oriented to person, place, and time.      Cranial Nerves: No cranial nerve deficit.      Sensory: No sensory deficit.      Motor: Weakness present.      Gait: Gait abnormal.   Psychiatric:         Mood and Affect: Mood normal.         Behavior: Behavior normal.         Thought Content: Thought content normal.         Judgment:  Judgment normal.         Lab Results   Component Value Date    TSH 2.368 06/01/2022     Lab Results   Component Value Date    LDLCALC 113.6 12/15/2021     BMP  Lab Results   Component Value Date     06/01/2022    K 4.8 06/01/2022     06/01/2022    CO2 23 06/01/2022    BUN 25 (H) 06/01/2022    CREATININE 1.0 06/01/2022    CALCIUM 9.1 06/01/2022    ANIONGAP 11 06/01/2022    ESTGFRAFRICA 58.9 (A) 06/01/2022    EGFRNONAA 51.1 (A) 06/01/2022     Lab Results   Component Value Date    HGBA1C 5.8 (H) 06/01/2022     Lab Results   Component Value Date    WBC 6.04 12/15/2021    HGB 12.3 12/15/2021    HCT 37.7 12/15/2021     (H) 12/15/2021     12/15/2021     Assessment:       1. Primary hypertension    2. Mixed hyperlipidemia    3. Hypothyroidism due to acquired atrophy of thyroid    4. Obstructive sleep apnea (adult) (pediatric)    5. Type 2 diabetes mellitus with other circulatory complication, with long-term current use of insulin    6. Myasthenia gravis, adult form    7.      Paroxysmal atrial fibrillation      Plan:   Stephany CARRILLO was seen today for sore on toe.    Diagnoses and all orders for this visit:    Venous stasis dermatitis  Elevate legs  Were support stockings diligently  If redness and swelling worsens, patient will need Unna boots    COPD  Continue DuoNeb treatments when the nebulizer on his    Congestive heart failure/atrial fibrillation  Continue Eliquis    Age-related osteoporosis without current pathological fracture  -     alendronate (FOSAMAX) 35 MG tablet; TAKE 1 TABLET BY MOUTH ONCE EVERY 7 DAYS  Dispense: 4 tablet; Refill: 5    Overweight(278.02)  Encouraged patient to decrease caloric intake    1. Immunization due  -     Influenza - High Dose (65+) (PF) (IM)    2. Chronic diastolic congestive heart failure  Overview:  Two gram sodium diet.    Weight loss discussed.    Try to walk 2 miles per day.    Avoid smoking.    Current medications will be:  Lower Valsartan 320 mg to 160 mg  daily   Lasix 40 mg daily   Lower Metoprolol 100 mg to 50 mg daily   Potassium 20 mEq daily    Orders:  -     valsartan (DIOVAN) 80 MG tablet; Take 1 tablet (80 mg total) by mouth once daily.  Dispense: 30 tablet; Refill: 5  -     OXYGEN FOR HOME USE  -     Six Minute Walk Test to qualify for Home Oxygen; Future    3. Essential hypertension  -     valsartan (DIOVAN) 80 MG tablet; Take 1 tablet (80 mg total) by mouth once daily.  Dispense: 30 tablet; Refill: 5    4. Chronic atrial fibrillation  Overview:  Continue Eliquis 5 mg daily    Orders:  -     CBC Auto Differential; Future; Expected date: 11/29/2023    5. Primary hypertension  Overview:  Two gram sodium diet.    Weight loss discussed.    Try to walk 2 miles per day.    Avoid smoking.    Current medications will be:  Lower Valsartan 320 mg to 160 mg daily   Lasix 40 mg daily   Lower Metoprolol 100 mg to 50 mg daily   Potassium 20 mEq daily      6. Obstructive sleep apnea (adult) (pediatric)  Overview:  Continue home CPAP at current pressure.  Use oxygen at night.      7. Hypothyroidism due to acquired atrophy of thyroid  Overview:  Patient takes Synthroid 88 mcg for her hypothyroidism.  Adjust Synthroid based on TSH resutls.  Check TSH every 6 months.    Orders:  -     TSH; Future; Expected date: 11/29/2023    8. Type 2 diabetes mellitus with other circulatory complication, with long-term current use of insulin  Overview:  Patient no longer requires medications for this.  Her last A1c was 5.8.   Check hemoglobin A1c every 12 months.    Orders:  -     Comprehensive Metabolic Panel; Future; Expected date: 11/29/2023  -     Lipid Panel; Future; Expected date: 11/29/2023  -     Hemoglobin A1C; Future; Expected date: 11/29/2023    9. Exertional dyspnea  Overview:  During six minute walk her O2 sat dropped to 85%.  At rest it is 98%.  Ambulating with O2 at 2 l NC it stayed above 90%.    Orders:  -     OXYGEN FOR HOME USE  -     Six Minute Walk Test to qualify for  Home Oxygen; Future         Essential hypertension/CHF  Continue Lasix 40 mg, Toprol- mg,  Continue potassium  Valsartan 320 mg po daily  Keep appointment with Cardiology    Hypothyroidism due to acquired atrophy of thyroid  -     levothyroxine (EUTHYROX) 88 MCG tablet; Take 1 tablet (88 mcg total) by mouth once daily.     Type 2 diabetes mellitus with circulatory disorder, with long-term current use of insulin  Patient no longer requires medications for this.  Her last A1c was 5.8.   Check hemoglobin A1c every 12 months.    Hyperlipidemia  Continue fish oil  Continue Lipitor 20 mg    Myasthenia gravis, adult form  No longer on prednisone, Mestinon  Keep appointment with neurology    RTC in 6 months

## 2023-11-30 ENCOUNTER — EXTERNAL CHRONIC CARE MANAGEMENT (OUTPATIENT)
Dept: PRIMARY CARE CLINIC | Facility: CLINIC | Age: 88
End: 2023-11-30
Payer: MEDICARE

## 2023-11-30 DIAGNOSIS — B37.2 INTERTRIGINOUS CANDIDIASIS: ICD-10-CM

## 2023-11-30 DIAGNOSIS — B02.9 HERPES ZOSTER WITHOUT COMPLICATION: ICD-10-CM

## 2023-11-30 DIAGNOSIS — L30.9 ECZEMA, UNSPECIFIED TYPE: ICD-10-CM

## 2023-11-30 PROCEDURE — 99439 CHRNC CARE MGMT STAF EA ADDL: CPT | Mod: S$PBB | Performed by: FAMILY MEDICINE

## 2023-11-30 PROCEDURE — 99490 CHRNC CARE MGMT STAFF 1ST 20: CPT | Mod: PBBFAC,25 | Performed by: FAMILY MEDICINE

## 2023-11-30 PROCEDURE — 99999 PR STA SHADOW: ICD-10-PCS | Mod: PBBFAC,,, | Performed by: FAMILY MEDICINE

## 2023-11-30 PROCEDURE — 99999 PR STA SHADOW: CPT | Mod: PBBFAC,,, | Performed by: FAMILY MEDICINE

## 2023-11-30 NOTE — TELEPHONE ENCOUNTER
LOV 11/29/2023    patient requesting refill for    Disp Refills Start End SUKHI   nystatin (MYCOSTATIN) powder 120 g          Disp Refills Start End SUKHI   nystatin-triamcinolone (MYCOLOG II) cream 60 g               RX pending   pharmacy confirmed  please advise     
17-Apr-2023

## 2023-12-01 RX ORDER — NYSTATIN 100000 [USP'U]/G
POWDER TOPICAL 4 TIMES DAILY
Qty: 60 G | Refills: 5 | Status: SHIPPED | OUTPATIENT
Start: 2023-12-01 | End: 2024-04-02 | Stop reason: SDUPTHER

## 2023-12-01 RX ORDER — NYSTATIN AND TRIAMCINOLONE ACETONIDE 100000; 1 [USP'U]/G; MG/G
CREAM TOPICAL
Qty: 60 G | Refills: 0 | Status: SHIPPED | OUTPATIENT
Start: 2023-12-01 | End: 2024-02-14

## 2023-12-14 ENCOUNTER — PATIENT OUTREACH (OUTPATIENT)
Dept: ADMINISTRATIVE | Facility: OTHER | Age: 88
End: 2023-12-14
Payer: MEDICARE

## 2023-12-15 ENCOUNTER — TELEPHONE (OUTPATIENT)
Dept: INTERNAL MEDICINE | Facility: CLINIC | Age: 88
End: 2023-12-15

## 2023-12-15 DIAGNOSIS — I50.32 CHRONIC DIASTOLIC CONGESTIVE HEART FAILURE: ICD-10-CM

## 2023-12-15 DIAGNOSIS — I10 ESSENTIAL HYPERTENSION: ICD-10-CM

## 2023-12-15 NOTE — TELEPHONE ENCOUNTER
Christina Arnett Pipo BOLES Staff  Phone Number: [phone]     MRN: [id number]    Pt: Stephany Skinner    Phone: [phone]    During a monthly health call today, patient's sister stated that patient's BP was 89/49 HR 56 yesterday. Today patients BP was 101/56. Per sister patients BP is taken after medication. No c/o headaches, dizziness, vision changes, SOB or chest pain. Fluids (water) encouraged as tolerated and elevating BLE. Please advise.    Thank you,  Christina ZULUAGA, [facility] Care Coordinator  [phone] ext. [phone]    I may be contacted per Epic messages or at the number listed if any questions/ concerns.

## 2023-12-18 RX ORDER — VALSARTAN 40 MG/1
40 TABLET ORAL DAILY
Qty: 30 TABLET | Refills: 5 | Status: SHIPPED | OUTPATIENT
Start: 2023-12-18 | End: 2024-01-29 | Stop reason: DRUGHIGH

## 2023-12-18 NOTE — TELEPHONE ENCOUNTER
Diagnoses and all orders for this visit:    Chronic diastolic congestive heart failure  -     valsartan (DIOVAN) 40 MG tablet; Take 1 tablet (40 mg total) by mouth once daily.    Essential hypertension  -     valsartan (DIOVAN) 40 MG tablet; Take 1 tablet (40 mg total) by mouth once daily.

## 2023-12-19 VITALS — SYSTOLIC BLOOD PRESSURE: 156 MMHG | DIASTOLIC BLOOD PRESSURE: 82 MMHG | HEART RATE: 69 BPM | OXYGEN SATURATION: 100 %

## 2023-12-21 ENCOUNTER — PATIENT OUTREACH (OUTPATIENT)
Dept: ADMINISTRATIVE | Facility: HOSPITAL | Age: 88
End: 2023-12-21
Payer: MEDICARE

## 2023-12-21 DIAGNOSIS — Z79.4 TYPE 2 DIABETES MELLITUS WITH OTHER CIRCULATORY COMPLICATION, WITH LONG-TERM CURRENT USE OF INSULIN: Primary | ICD-10-CM

## 2023-12-21 DIAGNOSIS — E11.59 TYPE 2 DIABETES MELLITUS WITH OTHER CIRCULATORY COMPLICATION, WITH LONG-TERM CURRENT USE OF INSULIN: Primary | ICD-10-CM

## 2023-12-21 NOTE — PROGRESS NOTES
[place] eAWV Gap Report.  Chart reviewed, immunization record updated.  No new results noted on [facility] or [facility] web site.  Care Everywhere updated.   Patient care coordination note  Upcoming PCP visit updated.  Next PCP visit 2/21/2024.  LINDA sent to Dr. Carmine Graham for diabetic eye exam, provider added to patient care team.  Uploaded eye exam from media collected on 5/10/2023, updated to .  Urine Micro albumin order placed.  Attempted to contact patient to discuss scheduling eAWV and lab visit to collect urine micro albumin, unable to contact. (Subscriber you have dialed is not in service)

## 2023-12-21 NOTE — LETTER
AUTHORIZATION FOR RELEASE OF   CONFIDENTIAL INFORMATION    Dear Dr. Carmine Graham,    We are seeing Stephany Skinner, date of birth 1935, in the clinic at [facility]. Pipo Flowers MD is the patient's PCP. Stephany Skinner has an outstanding lab/procedure at the time we reviewed her chart. In order to help keep her health information updated, she has authorized us to request the following medical record(s):              ( X )  EYE EXAM   (Diabetic Eye Exam)              Please fax records to [facility], Heidenreich, Jack W., MD Laura Rogers, LPN  Clinical Care Coordinator  [facility]  Phone: [phone]  Fax: [phone]          Patient Name: Stephany Skinner  : 1935  Patient Phone #: [phone]

## 2023-12-29 DIAGNOSIS — E03.4 HYPOTHYROIDISM DUE TO ACQUIRED ATROPHY OF THYROID: ICD-10-CM

## 2023-12-29 RX ORDER — LEVOTHYROXINE SODIUM 88 UG/1
88 TABLET ORAL
Qty: 90 TABLET | Refills: 3 | Status: SHIPPED | OUTPATIENT
Start: 2023-12-29

## 2023-12-30 NOTE — TELEPHONE ENCOUNTER
Care Due:                  Date            Visit Type   Department     Provider  --------------------------------------------------------------------------------                                EP -                              PRIMARY      [facility] ZORA Sams  Last Visit: 11-      [facility])   [facility]                              EP -                              PRIMARY      [facility] ZORA Sams  Next Visit: 02-      [facility] ([facility]                                                            Last  Test          Frequency    Reason                     Performed    Due Date  --------------------------------------------------------------------------------    Mg Level....  12 months..  alendronate..............  Not Found    Overdue    Phosphate...  12 months..  alendronate..............  Not Found    Overdue    Vitamin D...  12 months..  alendronate..............  Not Found    Overdue    [employer] Embedded Care Due Messages. Reference number: [id number].   12/29/2023 10:41:41 PM CST

## 2023-12-30 NOTE — TELEPHONE ENCOUNTER
Refill Decision Note   Stephany Skinner  is requesting a refill authorization.  Brief Assessment and Rationale for Refill:  Approve     Medication Therapy Plan:         Comments:     Note composed:11:42 PM 12/29/2023

## 2023-12-31 ENCOUNTER — EXTERNAL CHRONIC CARE MANAGEMENT (OUTPATIENT)
Dept: PRIMARY CARE CLINIC | Facility: CLINIC | Age: 88
End: 2023-12-31
Payer: MEDICARE

## 2023-12-31 PROCEDURE — 99490 CHRNC CARE MGMT STAFF 1ST 20: CPT | Mod: S$PBB | Performed by: FAMILY MEDICINE

## 2023-12-31 PROCEDURE — 99999 PR STA SHADOW: CPT | Mod: PBBFAC,,, | Performed by: FAMILY MEDICINE

## 2023-12-31 PROCEDURE — 99439 CHRNC CARE MGMT STAF EA ADDL: CPT | Mod: S$PBB | Performed by: FAMILY MEDICINE

## 2024-01-08 RX ORDER — ALENDRONATE SODIUM 70 MG/1
70 TABLET ORAL
Qty: 12 TABLET | Refills: 0 | Status: SHIPPED | OUTPATIENT
Start: 2024-01-08

## 2024-01-26 ENCOUNTER — LAB VISIT (OUTPATIENT)
Dept: LAB | Facility: HOSPITAL | Age: 89
End: 2024-01-26
Attending: INTERNAL MEDICINE
Payer: MEDICARE

## 2024-01-26 DIAGNOSIS — E03.9 HYPOTHYROIDISM: ICD-10-CM

## 2024-01-26 DIAGNOSIS — I48.20 CHRONIC ATRIAL FIBRILLATION: ICD-10-CM

## 2024-01-26 DIAGNOSIS — R06.02 SOB (SHORTNESS OF BREATH): Primary | ICD-10-CM

## 2024-01-26 DIAGNOSIS — R06.02 SOB (SHORTNESS OF BREATH): ICD-10-CM

## 2024-01-26 DIAGNOSIS — I10 ESSENTIAL HYPERTENSION: ICD-10-CM

## 2024-01-26 DIAGNOSIS — E66.9 OBESITY, CLASS II, BMI 35-39.9: ICD-10-CM

## 2024-01-26 LAB
ALBUMIN SERPL BCP-MCNC: 3.7 G/DL (ref 3.5–5.2)
ALP SERPL-CCNC: 49 U/L (ref 55–135)
ALT SERPL W/O P-5'-P-CCNC: 13 U/L (ref 10–44)
ANION GAP SERPL CALC-SCNC: 11 MMOL/L (ref 8–16)
AST SERPL-CCNC: 17 U/L (ref 10–40)
BILIRUB DIRECT SERPL-MCNC: 0.2 MG/DL (ref 0.1–0.3)
BILIRUB SERPL-MCNC: 0.4 MG/DL (ref 0.1–1)
BNP SERPL-MCNC: 100 PG/ML (ref 0–99)
BUN SERPL-MCNC: 47 MG/DL (ref 8–23)
CALCIUM SERPL-MCNC: 9.2 MG/DL (ref 8.7–10.5)
CHLORIDE SERPL-SCNC: 108 MMOL/L (ref 95–110)
CO2 SERPL-SCNC: 22 MMOL/L (ref 23–29)
CREAT SERPL-MCNC: 2.1 MG/DL (ref 0.5–1.4)
ERYTHROCYTE [DISTWIDTH] IN BLOOD BY AUTOMATED COUNT: 14.3 % (ref 11.5–14.5)
EST. GFR  (NO RACE VARIABLE): 22 ML/MIN/1.73 M^2
GLUCOSE SERPL-MCNC: 105 MG/DL (ref 70–110)
HCT VFR BLD AUTO: 35.2 % (ref 37–48.5)
HGB BLD-MCNC: 11.3 G/DL (ref 12–16)
MAGNESIUM SERPL-MCNC: 2 MG/DL (ref 1.6–2.6)
MCH RBC QN AUTO: 31.7 PG (ref 27–31)
MCHC RBC AUTO-ENTMCNC: 32.1 G/DL (ref 32–36)
MCV RBC AUTO: 99 FL (ref 82–98)
PLATELET # BLD AUTO: 200 K/UL (ref 150–450)
PMV BLD AUTO: 9.2 FL (ref 9.2–12.9)
POTASSIUM SERPL-SCNC: 4.1 MMOL/L (ref 3.5–5.1)
PROT SERPL-MCNC: 7 G/DL (ref 6–8.4)
RBC # BLD AUTO: 3.57 M/UL (ref 4–5.4)
SODIUM SERPL-SCNC: 141 MMOL/L (ref 136–145)
T3FREE SERPL-MCNC: 1.9 PG/ML (ref 2.3–4.2)
T4 FREE SERPL-MCNC: 1.12 NG/DL (ref 0.71–1.51)
TSH SERPL DL<=0.005 MIU/L-ACNC: 2.83 UIU/ML (ref 0.4–4)
WBC # BLD AUTO: 8.06 K/UL (ref 3.9–12.7)

## 2024-01-26 PROCEDURE — 84481 FREE ASSAY (FT-3): CPT | Performed by: INTERNAL MEDICINE

## 2024-01-26 PROCEDURE — 80076 HEPATIC FUNCTION PANEL: CPT | Performed by: INTERNAL MEDICINE

## 2024-01-26 PROCEDURE — 36415 COLL VENOUS BLD VENIPUNCTURE: CPT | Performed by: INTERNAL MEDICINE

## 2024-01-26 PROCEDURE — 83880 ASSAY OF NATRIURETIC PEPTIDE: CPT | Performed by: INTERNAL MEDICINE

## 2024-01-26 PROCEDURE — 80048 BASIC METABOLIC PNL TOTAL CA: CPT | Performed by: INTERNAL MEDICINE

## 2024-01-26 PROCEDURE — 83735 ASSAY OF MAGNESIUM: CPT | Performed by: INTERNAL MEDICINE

## 2024-01-26 PROCEDURE — 84443 ASSAY THYROID STIM HORMONE: CPT | Performed by: INTERNAL MEDICINE

## 2024-01-26 PROCEDURE — 85027 COMPLETE CBC AUTOMATED: CPT | Performed by: INTERNAL MEDICINE

## 2024-01-26 PROCEDURE — 84439 ASSAY OF FREE THYROXINE: CPT | Performed by: INTERNAL MEDICINE

## 2024-01-29 ENCOUNTER — HOSPITAL ENCOUNTER (OUTPATIENT)
Dept: RADIOLOGY | Facility: HOSPITAL | Age: 89
Discharge: HOME | End: 2024-01-29
Attending: INTERNAL MEDICINE
Payer: MEDICARE

## 2024-01-29 ENCOUNTER — OFFICE VISIT (OUTPATIENT)
Dept: NEUROLOGY | Facility: CLINIC | Age: 89
End: 2024-01-29
Payer: MEDICARE

## 2024-01-29 VITALS
DIASTOLIC BLOOD PRESSURE: 84 MMHG | SYSTOLIC BLOOD PRESSURE: 144 MMHG | HEART RATE: 75 BPM | RESPIRATION RATE: 16 BRPM | HEIGHT: 63 IN | WEIGHT: 239 LBS | BODY MASS INDEX: 42.35 KG/M2

## 2024-01-29 DIAGNOSIS — R06.02 SOB (SHORTNESS OF BREATH): ICD-10-CM

## 2024-01-29 DIAGNOSIS — E53.8 B12 DEFICIENCY: ICD-10-CM

## 2024-01-29 DIAGNOSIS — G70.00 MYASTHENIA GRAVIS, ADULT FORM: Primary | ICD-10-CM

## 2024-01-29 DIAGNOSIS — M54.16 LUMBAR RADICULOPATHY: ICD-10-CM

## 2024-01-29 DIAGNOSIS — G56.03 BILATERAL CARPAL TUNNEL SYNDROME: ICD-10-CM

## 2024-01-29 DIAGNOSIS — H54.7 VISUAL LOSS: ICD-10-CM

## 2024-01-29 PROCEDURE — 71046 X-RAY EXAM CHEST 2 VIEWS: CPT | Mod: TC

## 2024-01-29 PROCEDURE — 99999 PR STA SHADOW: CPT | Mod: PBBFAC,,, | Performed by: PSYCHIATRY & NEUROLOGY

## 2024-01-29 PROCEDURE — 99999 PR PBB SHADOW E&M-EST. PATIENT-LVL IV: CPT | Mod: PBBFAC,,, | Performed by: PSYCHIATRY & NEUROLOGY

## 2024-01-29 PROCEDURE — 99214 OFFICE O/P EST MOD 30 MIN: CPT | Mod: S$PBB | Performed by: PSYCHIATRY & NEUROLOGY

## 2024-01-29 PROCEDURE — 71046 X-RAY EXAM CHEST 2 VIEWS: CPT | Mod: 26,,, | Performed by: RADIOLOGY

## 2024-01-29 PROCEDURE — 99214 OFFICE O/P EST MOD 30 MIN: CPT | Mod: PBBFAC,25 | Performed by: PSYCHIATRY & NEUROLOGY

## 2024-01-29 RX ORDER — METOPROLOL SUCCINATE 25 MG/1
25 TABLET, EXTENDED RELEASE ORAL DAILY
COMMUNITY
Start: 2024-01-26

## 2024-01-29 RX ORDER — VALSARTAN 80 MG/1
160 TABLET ORAL DAILY
COMMUNITY
Start: 2024-01-26 | End: 2024-02-28

## 2024-01-29 NOTE — PROGRESS NOTES
HPI:  Stephany Skinner is a 89 y.o. female with  increased falls, some lumbar radicular pain.  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 Admitted for ARF with hypercapnea, ptosis, and gaze restriction in 1/2018; response to steroids suggestive of myasthenia, though antibodies are negative, and EMG with repetitve stimulation from 7/2018 was inconclusive. PUJA during 1/2018 admit, secondary to overdiuresis. She has JOY, DM, HTN, HLD, hypothyroidism, B12 deficiency, and A-fib.     Here for 6 months follow up    After the last visit, she saw ortho PA at [facility] who referred her to joint replacement surgeon regarding her left knee pain      She then saw Dr Bar who referred her to pain management at [facility] and she was treated  with an injection in the lower back which eventually improved her pain. She has  follow up with pain management next    Feels she is tolerating pain and deferred another injection    She takes tylenol PRN      Chronic Lower back pain is only active with prolonged bending positions/ respond to rest      Neuropathy pain is noted sometimes. She has numbness    She is following with Podiatry for toe ulcer    Continues B12      No further falls    Has blurred vision from macular degeneration     No diplopia, ptosis, no generalized weakness, no speech or swallowing problems    Right eye vision never improved and prior ophthalmology notes shows branch retinal artery occlusion    No longer driving per Dr Graham    CTS symptoms are noted and bothersome with numbness, pain and drop    Fully compliant with NOAC daily      Sees Dr Patel who monitors her fasting labs along with her PCP        Review of Systems   Constitutional:  Negative for fever.   HENT:  Negative for nosebleeds.    Eyes:  Positive for blurred vision. Negative for  double vision.   Respiratory:  Negative for hemoptysis.    Cardiovascular:  Negative for leg swelling.   Gastrointestinal:  Negative for blood in stool.   Genitourinary:  Negative for hematuria.   Musculoskeletal:  Positive for joint pain. Negative for falls.   Skin:  Negative for itching.   Neurological:  Negative for loss of consciousness.   Endo/Heme/Allergies:  Does not bruise/bleed easily.   Psychiatric/Behavioral:  The patient does not have insomnia.          Exam:  Gen Appearance, well developed/nourished in no apparent distress  CV: 2+ distal pulses with no edema or swelling  Neuro:  MS: Awake, alert,Sustains attention. Recent/remote memory intact, Language is full to spontaneous speech/comprehension. Fund of Knowledge is full  CN: Optic discs are not viewed due to misosis,  PERRL, Extraoccular movements and visual fields are full-no gaze restriction today. Normal facial sensation and strength-no ptosis, Hearing symmetric, Tongue and Palate are midline and strong. Shoulder Shrug symmetric and strong.  Motor: Normal bulk, tone, no abnormal movements. 5/5 strength bilateral upper/lower extremities with 1+ reflexes in the arms, and are less in the legs and no clonus  Sensory: symmetric to light touch, pain, temp, and vibration/proprioception except decreased sensation in the legs. Romberg negative  Cerebellar: Finger-nose,Heal-shin, Rapid alternating movements intact  Gait: arthralgic gait; ambulates with walker but can walk well without walker as well    Imagin/16 CT L spine: Multilevel degenerative changes of the lumbar spine without evidence for fracture or subluxation.  There is likely a component of mild neural foraminal narrowing and central canal stenosis at the L3-4 and L4-5 levels.  This could be better evaluated on a nonemergent basis with MRI.    2016 MRI L spine: Multilevel degenerative changes of the lumbar spine contributing to central canal stenosis and neural foraminal narrowing as  detailed in the above report.    Edema-like signal about the posterior elements on the right at L5-S1 which can be a source of pain.    7/2018 EMG with repetitive stimulation at [facility] per Dr. Luis Angel Vu:   Slow repetitive stimulation (3 Htz) performed at baseline and at one minute post exercise intervals at the left ADM and left trapezius muscles revealed significant CMAP detriment at the ADM, but none at the trapezius.     Impression:   The results of the study are inconclusive. There is no definite EP evidence of a neuromuscular junction pathology.     6/2016 EMG legs:   1. Mild Bilateral Carpal Tunnel Syndrome  2. 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 Bilateral Lumbar Radiculopathy best localizing from L4-5 on this study    Xray chest over time: no thymoma and CTA chest 2018: Bilateral groundglass opacities within the lung fields.  Minimal dependent subsegmental atelectasis.  No pleural effusion or pneumothorax.    2022 MRI and MRA brain: Age-appropriate generalized cerebral volume loss with moderate chronic microvascular ischemic disease.  No evidence for an acute infarction.     No significant arterial abnormalities.     Left maxillary sinus disease.    7/2023 Left knee xray: Postoperative changes of a total knee arthroplasty.  No hardware complication is seen.  No fracture or dislocation.     Extensive vascular calcifications.     8/2023 CT head:Moderate brain atrophy with deep white matter ischemic changes.  Otherwise negative head CT.     Labs: 10/2016  glucose 136. Reviewed CMP, CBC TSh   SPEP normal  B12 low normal    2019 BMP, CBC reviewed    11/2018 BMP with alt reviewed  12/2019 A1C normal    2022 B12 level 1200s    2023 A1C less than 7      Assessment/Plan: Stephany Skinner is a 89 y.o. female with   falls, some lumbar radicular pain.  MRI L spine 2016 showed severe lumbar stenosis and EMG 2016 showed  Mild Bilateral Carpal Tunnel Syndrome, Sensory and Motor Axonal neuropathy in  the feet and hands, and Bilateral Lumbar Radiculopathy best localizing from L4-5. Admitted for ARF with hypercapnea, ptosis, and gaze restriction in 1/2018; response to steroids suggestive of myasthenia, though antibodies are negative, and EMG with repetitve stimulation from 7/2018 was inconclusive. PUJA during 1/2018 admit, secondary to overdiuresis. She has JOY, DM, HTN, HLD, hypothyroidism, B12 deficiency, and A-fib.     I recommend:   1. Despite negative MG antibodies and inconclusive EMG with rep stim findings without definitive evidence of neuromuscular junction disorer, her clinical picture was suggestive of possible myasthenia gravis- but is currently improved long term. No active symptoms- NOT CERTAIN OF THIS DIAGNOSIS    2. Stopped Mestinon without any diplopia or weakness. She has chronic diarrhea, unrelated to mestinon use  -she has had no worsening with tapering/ being off prednisone  - Will restart for any further symptoms   - If she needs to remain on steroids long term, could consider steroid sparing therapy, such as Imuran versus Cellcept. She has DM, though well controlled currently. She will follow with PCP for management of DM2  -Advised on symptoms of myasthenic crisis and when to report to the ER prior    3. Wear brace nightly for CTS symptoms PRN  but note she improved with CT injection per pain management prior- can see pain management back PRN    4. Continue B12 for low normal B12 levels well corrected in 2022.  Her DM could be causing her neuropathy.  DM treated per PCP.     5. Saw pain management for  Lumbar radiculopathy prior. OTC topical and PT helped reduce pain greatly as well as falls prior. See pain management PRN  -Fall precautions reviewed prior-- using walker.  - No pain from neuropathy currently.  -Left  knee pain noted in 2023. Referred to ortho/ joint replacement surgeon, Dr Bar per local ortho PA who thought this was mediated from the lumbar spine. She was referred to pain  management at [facility] and received a back injection which helped and she will see pain management pending    6. She presented with right eye lower field blindness in 2022. Per Graham this was a branch retinal artery occlusion. She no longer drives  -She had unremarkable  Carotid US, and echo  from Dr Patel in 2022. Noting cardiology increased her valsartan and statin doses.   -1/2023 MRI brain and  MRA brain: No CVA to explain her symptoms.  -She has been on Eliquis for years for stroke prevention due to her afib and did recall a skipped dosing at least once prior to these symptoms Has been on full dose.  She is allergic to ASA. She is compliant with daily dosing now      RTC 1 year

## 2024-01-31 ENCOUNTER — EXTERNAL CHRONIC CARE MANAGEMENT (OUTPATIENT)
Dept: PRIMARY CARE CLINIC | Facility: CLINIC | Age: 89
End: 2024-01-31
Payer: MEDICARE

## 2024-01-31 PROCEDURE — 99999 PR STA SHADOW: CPT | Mod: PBBFAC,,, | Performed by: FAMILY MEDICINE

## 2024-01-31 PROCEDURE — 99490 CHRNC CARE MGMT STAFF 1ST 20: CPT | Mod: S$PBB | Performed by: FAMILY MEDICINE

## 2024-02-05 LAB
CHOLEST SERPL-MSCNC: 109 MG/DL (ref 0–200)
CHOLEST/HDLC SERPL: 2.8 {RATIO}
HDLC SERPL-MCNC: 39 MG/DL (ref 35–70)
LDL CHOLESTEROL DIRECT: 61 MG/DL
NON HDL CHOL. (LDL+VLDL): 70
TRIGL SERPL-MCNC: 100 MG/DL (ref 40–160)
VLDL CHOLESTEROL: 20 MG/DL

## 2024-02-12 DIAGNOSIS — L30.9 ECZEMA, UNSPECIFIED TYPE: ICD-10-CM

## 2024-02-12 DIAGNOSIS — B02.9 HERPES ZOSTER WITHOUT COMPLICATION: ICD-10-CM

## 2024-02-14 LAB — EGFR: 42.4

## 2024-02-14 RX ORDER — NYSTATIN AND TRIAMCINOLONE ACETONIDE 100000; 1 [USP'U]/G; MG/G
CREAM TOPICAL
Qty: 60 G | Refills: 5 | Status: SHIPPED | OUTPATIENT
Start: 2024-02-14 | End: 2024-04-02 | Stop reason: SDUPTHER

## 2024-02-28 ENCOUNTER — OFFICE VISIT (OUTPATIENT)
Dept: INTERNAL MEDICINE | Facility: CLINIC | Age: 89
End: 2024-02-28
Payer: MEDICARE

## 2024-02-28 VITALS
SYSTOLIC BLOOD PRESSURE: 120 MMHG | BODY MASS INDEX: 41.79 KG/M2 | HEART RATE: 68 BPM | RESPIRATION RATE: 17 BRPM | DIASTOLIC BLOOD PRESSURE: 74 MMHG | WEIGHT: 235.88 LBS | HEIGHT: 63 IN

## 2024-02-28 DIAGNOSIS — Z79.4 TYPE 2 DIABETES MELLITUS WITH OTHER CIRCULATORY COMPLICATION, WITH LONG-TERM CURRENT USE OF INSULIN: ICD-10-CM

## 2024-02-28 DIAGNOSIS — E11.59 TYPE 2 DIABETES MELLITUS WITH OTHER CIRCULATORY COMPLICATION, WITH LONG-TERM CURRENT USE OF INSULIN: ICD-10-CM

## 2024-02-28 DIAGNOSIS — I50.32 CHRONIC DIASTOLIC CONGESTIVE HEART FAILURE: ICD-10-CM

## 2024-02-28 DIAGNOSIS — I48.20 CHRONIC ATRIAL FIBRILLATION: ICD-10-CM

## 2024-02-28 DIAGNOSIS — I87.8 VENOUS STASIS OF LOWER EXTREMITY: ICD-10-CM

## 2024-02-28 DIAGNOSIS — M46.1 SACROILIITIS, NOT ELSEWHERE CLASSIFIED: ICD-10-CM

## 2024-02-28 DIAGNOSIS — J96.11 CHRONIC RESPIRATORY FAILURE WITH HYPOXIA: Primary | ICD-10-CM

## 2024-02-28 DIAGNOSIS — J44.1 CHRONIC OBSTRUCTIVE PULMONARY DISEASE WITH ACUTE EXACERBATION: ICD-10-CM

## 2024-02-28 DIAGNOSIS — I10 PRIMARY HYPERTENSION: ICD-10-CM

## 2024-02-28 DIAGNOSIS — E66.01 SEVERE OBESITY (BMI 35.0-39.9) WITH COMORBIDITY: ICD-10-CM

## 2024-02-28 DIAGNOSIS — N18.4 CHRONIC KIDNEY DISEASE (CKD), STAGE IV (SEVERE): ICD-10-CM

## 2024-02-28 PROBLEM — L89.892: Status: RESOLVED | Noted: 2017-12-01 | Resolved: 2024-02-28

## 2024-02-28 PROBLEM — L97.822 NON-PRESSURE CHRONIC ULCER OF OTHER PART OF LEFT LOWER LEG WITH FAT LAYER EXPOSED: Status: RESOLVED | Noted: 2021-12-10 | Resolved: 2024-02-28

## 2024-02-28 PROBLEM — L97.929 IDIOPATHIC CHRONIC VENOUS HYPERTENSION OF LEFT LOWER EXTREMITY WITH ULCER: Status: RESOLVED | Noted: 2021-12-10 | Resolved: 2024-02-28

## 2024-02-28 PROBLEM — I87.311 IDIOPATHIC CHRONIC VENOUS HYPERTENSION OF RIGHT LOWER EXTREMITY WITH ULCER: Status: RESOLVED | Noted: 2021-12-10 | Resolved: 2024-02-28

## 2024-02-28 PROBLEM — L97.919 IDIOPATHIC CHRONIC VENOUS HYPERTENSION OF RIGHT LOWER EXTREMITY WITH ULCER: Status: RESOLVED | Noted: 2021-12-10 | Resolved: 2024-02-28

## 2024-02-28 PROBLEM — I87.312 IDIOPATHIC CHRONIC VENOUS HYPERTENSION OF LEFT LOWER EXTREMITY WITH ULCER: Status: RESOLVED | Noted: 2021-12-10 | Resolved: 2024-02-28

## 2024-02-28 PROBLEM — J98.4 PNEUMONITIS: Status: RESOLVED | Noted: 2022-12-09 | Resolved: 2024-02-28

## 2024-02-28 PROBLEM — L97.812 NON-PRESSURE CHRONIC ULCER OF OTHER PART OF RIGHT LOWER LEG WITH FAT LAYER EXPOSED: Status: RESOLVED | Noted: 2021-12-10 | Resolved: 2024-02-28

## 2024-02-28 PROCEDURE — 99215 OFFICE O/P EST HI 40 MIN: CPT | Mod: PBBFAC,PN | Performed by: FAMILY MEDICINE

## 2024-02-28 PROCEDURE — 99214 OFFICE O/P EST MOD 30 MIN: CPT | Mod: S$PBB,,, | Performed by: FAMILY MEDICINE

## 2024-02-28 PROCEDURE — 99999 PR PBB SHADOW E&M-EST. PATIENT-LVL V: CPT | Mod: PBBFAC,,, | Performed by: FAMILY MEDICINE

## 2024-02-28 RX ORDER — VALSARTAN 80 MG/1
80 TABLET ORAL DAILY
COMMUNITY
Start: 2024-02-07 | End: 2024-05-22 | Stop reason: DRUGHIGH

## 2024-02-28 RX ORDER — SPIRONOLACTONE 25 MG/1
25 TABLET ORAL EVERY MORNING
COMMUNITY
Start: 2024-02-07

## 2024-02-28 NOTE — PROGRESS NOTES
89 y/o female presents at her 3 month f/u. Pt recently saw Dr. Patel for SOB, and it was found Pt had fluid around her lungs. Pt was prescribed a fluid pill to take daily. Pt states she is feeling good today, but she gets tired when she does too much. Pt reports no problems with her breathing since beginning the fluid pills. Pt reports intermittent diarrhea everyday 4 times a day x 2-3 years. Pt reports problems with CPAP machine mask and has not been sleeping well. Pt states her back pain is much better since receiving a steroid injection a month ago. Pt states she cannot feel the bottoms of both of her feet. Pt also complains that when she is wearing her compression stockings, her legs have been turning blue Subjective     Patient ID: Stephany Skinner is a 89 y.o. female.    Chief Complaint: Follow-up (3 mo)    Follow-up  Associated symptoms include diaphoresis, fatigue, nausea and numbness. Pertinent negatives include no chills, fever or vomiting.     Review of Systems   Constitutional:  Positive for diaphoresis and fatigue. Negative for activity change, appetite change, chills, fever and unexpected weight change.   Gastrointestinal:  Positive for diarrhea and nausea. Negative for vomiting.   Neurological:  Positive for numbness.        Pt states she cannot feel the bottoms of her feet bilaterally. Bilateral hands are tingly. Neither are in pain.    Psychiatric/Behavioral:  Positive for confusion and sleep disturbance.         States she cannot hear well all the time and gets confused. CPAP machine mask is very uncomfortable so she has not been sleeping well.           Objective     Physical Exam  Constitutional:       Appearance: Normal appearance.   HENT:      Head: Normocephalic and atraumatic.      Right Ear: Tympanic membrane, ear canal and external ear normal.      Left Ear: Tympanic membrane, ear canal and external ear normal. There is impacted cerumen.      Mouth/Throat:      Mouth: Mucous membranes are  moist.   Cardiovascular:      Rate and Rhythm: Normal rate and regular rhythm.      Pulses: Normal pulses.   Pulmonary:      Effort: Pulmonary effort is normal.      Breath sounds: Normal breath sounds.   Abdominal:      General: Abdomen is flat. Bowel sounds are normal.      Palpations: Abdomen is soft.      Tenderness: There is abdominal tenderness.      Hernia: A hernia is present.      Comments: Pt states she has a hernia on her left side   Skin:     General: Skin is warm.   Neurological:      General: No focal deficit present.      Mental Status: She is alert and oriented to person, place, and time.   Psychiatric:         Mood and Affect: Mood normal.         Behavior: Behavior normal.         Thought Content: Thought content normal.         Judgment: Judgment normal.       BMP  Lab Results   Component Value Date     01/26/2024    K 4.1 01/26/2024     01/26/2024    CO2 22 (L) 01/26/2024    BUN 47 (H) 01/26/2024    CREATININE 2.1 (H) 01/26/2024    CALCIUM 9.2 01/26/2024    ANIONGAP 11 01/26/2024    EGFRNORACEVR 22 (A) 01/26/2024         Lab Results   Component Value Date    HGBA1C 5.8 (H) 11/29/2023     Lab Results   Component Value Date    LDLCALC 68.2 11/29/2023     Lab Results   Component Value Date    WBC 8.06 01/26/2024    HGB 11.3 (L) 01/26/2024    HCT 35.2 (L) 01/26/2024    MCV 99 (H) 01/26/2024     01/26/2024        Assessment and Plan     1. Chronic respiratory failure with hypoxia  Overview:  Doing much better.  Use home oxygen p.r.n. dyspneic.      2. Chronic kidney disease (CKD), stage IV (severe)  Overview:  Avoid NSAIDS  Good BP control  Monitor renal function closely      3. Sacroiliitis, not elsewhere classified  Overview:  Take Tylenol for pain.  Use walker or cane for ambulation.      4. Chronic obstructive pulmonary disease with acute exacerbation  Overview:  Continue DuoNeb treatments twice daily as needed.    Her COPD is stable.         5. Type 2 diabetes mellitus with  other circulatory complication, with long-term current use of insulin  Overview:  Patient no longer requires medications for this.  Her last A1c was 5.8.   Check hemoglobin A1c every 12 months.      6. Severe obesity (BMI 35.0-39.9) with comorbidity  Overview:  Patient is trying to lose weight.  She has lost a few lb.  She is following a healthy diet.  She has difficulty exercising because of her musculoskeletal condition.         7. Chronic diastolic congestive heart failure  Overview:  Two gram sodium diet.    Weight loss discussed.    Try to walk 2 miles per day.    Avoid smoking.    Current medications will be:  Lower Valsartan 320 mg to 160 mg daily   Lasix 40 mg daily   Lower Metoprolol 100 mg to 50 mg daily   Potassium 20 mEq daily      8. Chronic atrial fibrillation  Overview:  Continue Eliquis 5 mg daily      9. Primary hypertension  Overview:  Two gram sodium diet.    Weight loss discussed.    Try to walk 2 miles per day.    Avoid smoking.    Current medications will be:  Valsartan 160 mg daily   Lasix 40 mg daily   Lower Metoprolol 100 mg to 50 mg daily   Potassium 20 mEq daily      10. Venous stasis of lower extremity  Overview:  Continue support stockings.  Place every morning and remove at night.             Follow up in about 3 months (around 5/28/2024).

## 2024-02-29 ENCOUNTER — EXTERNAL CHRONIC CARE MANAGEMENT (OUTPATIENT)
Dept: PRIMARY CARE CLINIC | Facility: CLINIC | Age: 89
End: 2024-02-29
Payer: MEDICARE

## 2024-02-29 PROCEDURE — 99490 CHRNC CARE MGMT STAFF 1ST 20: CPT | Mod: PBBFAC | Performed by: FAMILY MEDICINE

## 2024-02-29 PROCEDURE — 99999 PR STA SHADOW: CPT | Mod: PBBFAC,,, | Performed by: FAMILY MEDICINE

## 2024-03-13 ENCOUNTER — OFFICE VISIT (OUTPATIENT)
Dept: INTERNAL MEDICINE | Facility: CLINIC | Age: 89
End: 2024-03-13
Payer: MEDICARE

## 2024-03-13 VITALS
HEIGHT: 63 IN | DIASTOLIC BLOOD PRESSURE: 68 MMHG | SYSTOLIC BLOOD PRESSURE: 112 MMHG | OXYGEN SATURATION: 96 % | WEIGHT: 232.38 LBS | RESPIRATION RATE: 20 BRPM | BODY MASS INDEX: 41.18 KG/M2 | HEART RATE: 60 BPM

## 2024-03-13 DIAGNOSIS — H35.3131 EARLY STAGE NONEXUDATIVE AGE-RELATED MACULAR DEGENERATION OF BOTH EYES: ICD-10-CM

## 2024-03-13 DIAGNOSIS — I50.32 CHRONIC DIASTOLIC CONGESTIVE HEART FAILURE: ICD-10-CM

## 2024-03-13 DIAGNOSIS — R26.9 ABNORMALITY OF GAIT AND MOBILITY: ICD-10-CM

## 2024-03-13 DIAGNOSIS — I73.9 PERIPHERAL VASCULAR DISEASE, UNSPECIFIED: ICD-10-CM

## 2024-03-13 DIAGNOSIS — G70.00 MYASTHENIA GRAVIS, ADULT FORM: ICD-10-CM

## 2024-03-13 DIAGNOSIS — D69.2 SENILE PURPURA: ICD-10-CM

## 2024-03-13 DIAGNOSIS — E11.40 TYPE 2 DIABETES MELLITUS WITH DIABETIC NEUROPATHY, WITHOUT LONG-TERM CURRENT USE OF INSULIN: ICD-10-CM

## 2024-03-13 DIAGNOSIS — M06.9 RHEUMATOID ARTHRITIS, INVOLVING UNSPECIFIED SITE, UNSPECIFIED WHETHER RHEUMATOID FACTOR PRESENT: ICD-10-CM

## 2024-03-13 DIAGNOSIS — M85.80 OSTEOPENIA, UNSPECIFIED LOCATION: ICD-10-CM

## 2024-03-13 DIAGNOSIS — G62.9 POLYNEUROPATHY: ICD-10-CM

## 2024-03-13 DIAGNOSIS — I70.0 AORTIC ATHEROSCLEROSIS: ICD-10-CM

## 2024-03-13 DIAGNOSIS — N18.31 STAGE 3A CHRONIC KIDNEY DISEASE (CKD): ICD-10-CM

## 2024-03-13 DIAGNOSIS — Z99.81 DEPENDENCE ON SUPPLEMENTAL OXYGEN: ICD-10-CM

## 2024-03-13 DIAGNOSIS — E11.22 TYPE 2 DIABETES MELLITUS WITH STAGE 3B CHRONIC KIDNEY DISEASE, WITHOUT LONG-TERM CURRENT USE OF INSULIN: ICD-10-CM

## 2024-03-13 DIAGNOSIS — I48.20 CHRONIC ATRIAL FIBRILLATION: ICD-10-CM

## 2024-03-13 DIAGNOSIS — J96.11 CHRONIC RESPIRATORY FAILURE WITH HYPOXIA: ICD-10-CM

## 2024-03-13 DIAGNOSIS — Z99.89 DEPENDENCE ON OTHER ENABLING MACHINES AND DEVICES: Primary | ICD-10-CM

## 2024-03-13 DIAGNOSIS — N18.32 TYPE 2 DIABETES MELLITUS WITH STAGE 3B CHRONIC KIDNEY DISEASE, WITHOUT LONG-TERM CURRENT USE OF INSULIN: ICD-10-CM

## 2024-03-13 DIAGNOSIS — Z00.00 ENCOUNTER FOR PREVENTIVE HEALTH EXAMINATION: ICD-10-CM

## 2024-03-13 DIAGNOSIS — G47.33 OBSTRUCTIVE SLEEP APNEA (ADULT) (PEDIATRIC): ICD-10-CM

## 2024-03-13 PROCEDURE — 99999 PR STA SHADOW: CPT | Mod: PBBFAC,,, | Performed by: NURSE PRACTITIONER

## 2024-03-13 PROCEDURE — G0439 PPPS, SUBSEQ VISIT: HCPCS | Performed by: NURSE PRACTITIONER

## 2024-03-13 PROCEDURE — 99215 OFFICE O/P EST HI 40 MIN: CPT | Mod: PBBFAC | Performed by: NURSE PRACTITIONER

## 2024-03-13 PROCEDURE — 99999 PR PBB SHADOW E&M-EST. PATIENT-LVL V: CPT | Mod: PBBFAC,,, | Performed by: NURSE PRACTITIONER

## 2024-03-13 RX ORDER — LANOLIN ALCOHOL/MO/W.PET/CERES
100 CREAM (GRAM) TOPICAL DAILY
COMMUNITY

## 2024-03-13 RX ORDER — ISOSORBIDE MONONITRATE 60 MG/1
60 TABLET, EXTENDED RELEASE ORAL DAILY
COMMUNITY

## 2024-03-13 RX ORDER — ALBUTEROL SULFATE 90 UG/1
AEROSOL, METERED RESPIRATORY (INHALATION)
COMMUNITY
Start: 2024-01-26

## 2024-03-13 NOTE — PATIENT INSTRUCTIONS
Counseling and Referral of Other Preventative  (Italic type indicates deductible and co-insurance are waived)    Patient Name: Stephany Skinner  Today's Date: 3/13/2024    Health Maintenance       Date Due Completion Date    Diabetes Urine Screening Never done ---    Shingles Vaccine (1 of 2) Never done ---    RSV Vaccine (Age 60+ and Pregnant patients) (1 - 1-dose 60+ series) Never done ---    COVID-19 Vaccine (5 - 2023-24 season) 09/01/2023 12/15/2021    Eye Exam 06/09/2024 6/9/2023    Hemoglobin A1c 05/29/2024 11/29/2023    Lipid Panel 11/29/2024 11/29/2023    TETANUS VACCINE 12/04/2029 12/4/2019        No orders of the defined types were placed in this encounter.    The following information is provided to all patients.  This information is to help you find resources for any of the problems found today that may be affecting your health:                  Living healthy guide: www.[facility].[place].[facility]      Understanding Diabetes: www.diabetes.org      Eating healthy: www.cdc.gov/healthyweight      CDC home safety checklist: www.cdc.gov/steadi/patient.html      Agency on Aging: www.goea.[place].gov      Alcoholics anonymous (AA): www.aa.org      Physical Activity: www.[place].nih.gov/kc5zlaj      Tobacco use: www.quitwithusla.org

## 2024-03-13 NOTE — PROGRESS NOTES
"  Stephany Skinner presented for a  Medicare AWV and comprehensive Health Risk Assessment today. The following components were reviewed and updated:    Medical history  Family History  Social history  Allergies and Current Medications  Health Risk Assessment  Health Maintenance  Care Team         ** See Completed Assessments for Annual Wellness Visit within the encounter summary.**         The following assessments were completed:  Living Situation  CAGE  Depression Screening  Timed Get Up and Go  Whisper Test  Cognitive Function Screening  Nutrition Screening  ADL Screening  PAQ Screening      Opioid documentation:      Patient does not have a current opioid prescription.      >> she has not filled anything in last year gabapentin last filled 2022  Vitals:    03/13/24 1548   BP: 112/68   Pulse: 60   Resp: 20   SpO2: 96%   Weight: 105.4 kg (232 lb 5.8 oz)   Height: 5' 3" (1.6 m)     Body mass index is 41.16 kg/m².  Physical Exam  Vitals and nursing note reviewed. Exam conducted with a chaperone present.   Constitutional:       Appearance: She is well-developed. She is obese.   HENT:      Head: Normocephalic and atraumatic.      Right Ear: External ear normal.      Left Ear: External ear normal.      Nose: Nose normal.   Eyes:      Conjunctiva/sclera: Conjunctivae normal.      Pupils: Pupils are equal, round, and reactive to light.   Cardiovascular:      Rate and Rhythm: Normal rate. Rhythm irregular.      Heart sounds: Normal heart sounds.   Pulmonary:      Effort: Pulmonary effort is normal.      Breath sounds: Normal breath sounds.   Abdominal:      General: Bowel sounds are normal.      Palpations: Abdomen is soft.   Musculoskeletal:         General: Swelling (kat hose in use) present. Normal range of motion.      Cervical back: Normal range of motion and neck supple.   Skin:     General: Skin is warm and dry.      Capillary Refill: Capillary refill takes less than 2 seconds.      Findings: Bruising present. "   Neurological:      Mental Status: She is alert and oriented to person, place, and time.   Psychiatric:         Behavior: Behavior normal.         Thought Content: Thought content normal.         Judgment: Judgment normal.               Diagnoses and health risks identified today and associated recommendations/orders:    1. Encounter for preventive health examination  She had labs with Dr Patel that are in epic per Dr Patel but I also had Dr Patel send me his recent labs 2/2024   Mammo she defers and colonoscopy aged out of routine screens  Shingrix and rsv rx given     2. Dependence on other enabling machines and devices  Pt must use w/c or rollator for back issues, neuropathy in legs and OA of knees that cause it to be difficult to ambulate and dangerous to ambulate with out assistance     3. Dependence on supplemental oxygen  She has home O2 she uses in her cpap at [facility]- folows with Dr Watson pulmonology    4. Abnormality of gait and mobility  Pt must use w/c or rollator for back issues, neuropathy in legs and OA of knees that cause it to be difficult to ambulate and dangerous to ambulate with out assistance    5. Senile purpura  Seen on exam  On [facility]   Lab Results   Component Value Date    WBC 8.06 01/26/2024    HGB 11.3 (L) 01/26/2024    HCT 35.2 (L) 01/26/2024    MCV 99 (H) 01/26/2024     01/26/2024           6. Type 2 diabetes mellitus with diabetic neuropathy, without long-term current use of insulin  Lab Results   Component Value Date    HGBA1C 5.8 (H) 11/29/2023     Well controlled with diet  Neuropathy to bilateral feet no pain just numbness     7. Type 2 diabetes mellitus with stage 3b chronic kidney disease, without long-term current use of insulin  Well controlled DM with diet  Kidneys stable repeat bmp from [facility] 2/15 GFR 42  Pt must use w/c or rollator for back issues, neuropathy in legs and OA of knees that cause it to be difficult to ambulate and dangerous to ambulate with out assistance    8.  Chronic atrial fibrillation  Follows with [facility] Dr Patel on eliquis and metoprolol    9. Stage 3a chronic kidney disease (CKD)3  Pt must use w/c or rollator for back issues, neuropathy in legs and OA of knees that cause it to be difficult to ambulate and dangerous to ambulate with out assistance ahs been in stage 3 atleast the last 4 years from what I can see     10. Obstructive sleep apnea (adult) (pediatric)  Has a cpap- unable to use bedcause needs new mask- will be delivered in April 11. Chronic diastolic congestive heart failure  Note don Dr Ortiz notes. EF preserved 60%  On lasix, aldactone, imdur, BB and ARB    12. Chronic respiratory failure with hypoxia  Follows with Dr Watson pulmonology  On home O2 3L at [facility]   Has nebulizer for use as needed     13. Early stage nonexudative age-related macular degeneration of both eyes  Follows with opthamolgy  No longer drives due to this dx     14. Myasthenia gravis, adult form  Follows with Dr Ortiz neurology     15. Polyneuropathy  Numbness only-   EMG 2016  Sensory and Motor Axonal neuropathy in the feet and hands   Bilateral Lumbar Radiculopathy best localizing from L4-5 on this study    16. Rheumatoid arthritis, involving unspecified site, unspecified whether rheumatoid factor present  Report that Dr Laureano dx that she is not on any rx meds just tylenol as needed     17. Peripheral vascular disease, unspecified  Noted in Dr Patel notes- using compression hose and pt report that since new fluid pill she now has ankles...    18. Aortic atherosclerosis  AA CXR 1/2024 Calcified atheromatous disease affects the aorta   On statin    19. Osteopenia   DEXA 2020>    Osteopenia.  The probability of a major osteoporotic fracture is 19.5% within the next 10 years.  The probability of a hip fracture is 6.4% within the next 10 years.  On fosamax     Provided Stephany with a 5-10 year written screening schedule and personal prevention plan. Recommendations were developed using  the USPSTF age appropriate recommendations. Education, counseling, and referrals were provided as needed. After Visit Summary printed and given to patient which includes a list of additional screenings\tests needed.    No follow-ups on file.    Nadine Morelos, CINTHYA      I offered to discuss advanced care planning, including how to pick a person who would make decisions for you if you were unable to make them for yourself, called a health care power of , and what kind of decisions you might make such as use of life sustaining treatments such as ventilators and tube feeding when faced with a life limiting illness recorded on a living will that they will need to know. (How you want to be cared for as you near the end of your natural life)     X  Patient has advanced directives written and agrees to provide copies to the institution. Joao her son is her POA and her sister Ethyl is her second. She will bring in that paperwork. Discussed LW and she will discuss that with her sister

## 2024-03-26 ENCOUNTER — PATIENT OUTREACH (OUTPATIENT)
Dept: ADMINISTRATIVE | Facility: HOSPITAL | Age: 89
End: 2024-03-26
Payer: MEDICARE

## 2024-03-26 NOTE — PROGRESS NOTES
Chart reviewed, immunization record updated.  Care Everywhere updated.   Patient care coordination note updated.   Upcoming PCP visit updated.  Next PCP visit 05/22/2024.  LOV with PCP 02/28/2024.  Received external Lipid Panel collected/ completed on 02/05/2024, updated to .

## 2024-03-31 ENCOUNTER — EXTERNAL CHRONIC CARE MANAGEMENT (OUTPATIENT)
Dept: PRIMARY CARE CLINIC | Facility: CLINIC | Age: 89
End: 2024-03-31
Payer: MEDICARE

## 2024-03-31 PROCEDURE — 99490 CHRNC CARE MGMT STAFF 1ST 20: CPT | Mod: S$PBB | Performed by: FAMILY MEDICINE

## 2024-03-31 PROCEDURE — 99999 PR STA SHADOW: CPT | Mod: PBBFAC,,, | Performed by: FAMILY MEDICINE

## 2024-04-01 RX ORDER — ALENDRONATE SODIUM 70 MG/1
70 TABLET ORAL
Qty: 12 TABLET | Refills: 0 | Status: SHIPPED | OUTPATIENT
Start: 2024-04-01 | End: 2024-05-22 | Stop reason: SDUPTHER

## 2024-04-02 ENCOUNTER — TELEPHONE (OUTPATIENT)
Dept: INTERNAL MEDICINE | Facility: CLINIC | Age: 89
End: 2024-04-02
Payer: MEDICARE

## 2024-04-02 DIAGNOSIS — L30.9 ECZEMA, UNSPECIFIED TYPE: ICD-10-CM

## 2024-04-02 DIAGNOSIS — B02.9 HERPES ZOSTER WITHOUT COMPLICATION: ICD-10-CM

## 2024-04-02 DIAGNOSIS — B37.2 INTERTRIGINOUS CANDIDIASIS: ICD-10-CM

## 2024-04-02 RX ORDER — CEPHALEXIN 500 MG/1
500 CAPSULE ORAL EVERY 12 HOURS
Qty: 14 CAPSULE | Refills: 0 | Status: SHIPPED | OUTPATIENT
Start: 2024-04-02 | End: 2024-04-09

## 2024-04-02 RX ORDER — NYSTATIN AND TRIAMCINOLONE ACETONIDE 100000; 1 [USP'U]/G; MG/G
CREAM TOPICAL
Qty: 60 G | Refills: 5 | Status: SHIPPED | OUTPATIENT
Start: 2024-04-02

## 2024-04-02 RX ORDER — NYSTATIN 100000 [USP'U]/G
POWDER TOPICAL 4 TIMES DAILY
Qty: 60 G | Refills: 5 | Status: SHIPPED | OUTPATIENT
Start: 2024-04-02

## 2024-04-02 NOTE — TELEPHONE ENCOUNTER
----- Message from Vicki Coker MA sent at 2024  9:17 AM CDT -----    ----- Message -----  From: Nancy Mcmullen MA  Sent: 2024   9:05 AM CDT  To: Telly MORGAN Staff    Stephany Skinner  MRN: [id number]  : 1935  PCP: Pipo Flowers  Home Phone      [phone]  Work Phone      Not on file.  Mobile          [phone]      MESSAGE:     Patient is c/o the same rash that you treated her for back in August.  You had given her a powder and antibiotics.  She has this same rash again,  She is requesting an appt or can you send in the samed meds as before?    Please Advise:  [phone]   Send to [facility] ([place])

## 2024-04-23 ENCOUNTER — PATIENT OUTREACH (OUTPATIENT)
Dept: ADMINISTRATIVE | Facility: OTHER | Age: 89
End: 2024-04-23
Payer: MEDICARE

## 2024-04-23 VITALS — DIASTOLIC BLOOD PRESSURE: 86 MMHG | OXYGEN SATURATION: 99 % | SYSTOLIC BLOOD PRESSURE: 158 MMHG | HEART RATE: 64 BPM

## 2024-04-30 ENCOUNTER — EXTERNAL CHRONIC CARE MANAGEMENT (OUTPATIENT)
Dept: PRIMARY CARE CLINIC | Facility: CLINIC | Age: 89
End: 2024-04-30
Payer: MEDICARE

## 2024-04-30 PROCEDURE — 99999 PR STA SHADOW: CPT | Mod: PBBFAC,,, | Performed by: FAMILY MEDICINE

## 2024-04-30 PROCEDURE — 99490 CHRNC CARE MGMT STAFF 1ST 20: CPT | Mod: S$PBB | Performed by: FAMILY MEDICINE

## 2024-05-22 ENCOUNTER — OFFICE VISIT (OUTPATIENT)
Dept: INTERNAL MEDICINE | Facility: CLINIC | Age: 89
End: 2024-05-22
Payer: MEDICARE

## 2024-05-22 VITALS
BODY MASS INDEX: 41.25 KG/M2 | HEART RATE: 56 BPM | SYSTOLIC BLOOD PRESSURE: 116 MMHG | WEIGHT: 232.81 LBS | RESPIRATION RATE: 20 BRPM | DIASTOLIC BLOOD PRESSURE: 74 MMHG | HEIGHT: 63 IN

## 2024-05-22 DIAGNOSIS — E03.4 HYPOTHYROIDISM DUE TO ACQUIRED ATROPHY OF THYROID: ICD-10-CM

## 2024-05-22 DIAGNOSIS — G70.00 MYASTHENIA GRAVIS, ADULT FORM: ICD-10-CM

## 2024-05-22 DIAGNOSIS — K52.9 CHRONIC DIARRHEA: Primary | ICD-10-CM

## 2024-05-22 DIAGNOSIS — I10 ESSENTIAL HYPERTENSION: ICD-10-CM

## 2024-05-22 DIAGNOSIS — N18.31 STAGE 3A CHRONIC KIDNEY DISEASE (CKD): ICD-10-CM

## 2024-05-22 DIAGNOSIS — M85.80 OSTEOPENIA, UNSPECIFIED LOCATION: ICD-10-CM

## 2024-05-22 DIAGNOSIS — Z79.4 TYPE 2 DIABETES MELLITUS WITH OTHER CIRCULATORY COMPLICATION, WITH LONG-TERM CURRENT USE OF INSULIN: ICD-10-CM

## 2024-05-22 DIAGNOSIS — I48.20 CHRONIC ATRIAL FIBRILLATION: ICD-10-CM

## 2024-05-22 DIAGNOSIS — E11.59 TYPE 2 DIABETES MELLITUS WITH OTHER CIRCULATORY COMPLICATION, WITH LONG-TERM CURRENT USE OF INSULIN: ICD-10-CM

## 2024-05-22 DIAGNOSIS — E78.2 MIXED HYPERLIPIDEMIA: ICD-10-CM

## 2024-05-22 PROCEDURE — 99999 PR PBB SHADOW E&M-EST. PATIENT-LVL IV: CPT | Mod: PBBFAC,,, | Performed by: FAMILY MEDICINE

## 2024-05-22 PROCEDURE — 99214 OFFICE O/P EST MOD 30 MIN: CPT | Mod: PBBFAC,PN | Performed by: FAMILY MEDICINE

## 2024-05-22 PROCEDURE — 99214 OFFICE O/P EST MOD 30 MIN: CPT | Mod: S$PBB,,, | Performed by: FAMILY MEDICINE

## 2024-05-22 RX ORDER — LEVOTHYROXINE SODIUM 88 UG/1
88 TABLET ORAL
Qty: 90 TABLET | Refills: 1 | Status: SHIPPED | OUTPATIENT
Start: 2024-05-22

## 2024-05-22 RX ORDER — MONTELUKAST SODIUM 4 MG/1
1 TABLET, CHEWABLE ORAL 2 TIMES DAILY
Qty: 60 TABLET | Refills: 5 | Status: SHIPPED | OUTPATIENT
Start: 2024-05-22

## 2024-05-22 RX ORDER — LOPERAMIDE HCL 2 MG
2 TABLET ORAL 4 TIMES DAILY PRN
COMMUNITY

## 2024-05-22 RX ORDER — ALENDRONATE SODIUM 70 MG/1
70 TABLET ORAL
Qty: 12 TABLET | Refills: 1 | Status: SHIPPED | OUTPATIENT
Start: 2024-05-22

## 2024-05-22 NOTE — PROGRESS NOTES
Subjective     Patient ID: Stephany Skinner is a 89 y.o. female.    Chief Complaint: Follow-up (Pt here for checkup. Pt states off valsartan per dr cardenas and feeling much better.)    88 y/o female presents at her 3 month f/u.  Pt states she is feeling good today, but she gets tired when she does too much. Pt reports no problems with her breathing since beginning the fluid pills. Pt reports intermittent diarrhea everyday 4 times a day x 2-3 years.   Pt reports problems with CPAP machine mask and has not been sleeping well.   Pt states her back pain is much better since receiving a steroid injection a month ago.Pt states she cannot feel the bottoms of both of her feet. Pt also complains that when she is wearing her compression stockings, her legs have been turning blue       Review of Systems   Constitutional:  Positive for diaphoresis and fatigue. Negative for activity change, appetite change, chills, fever and unexpected weight change.   Gastrointestinal:  Positive for diarrhea and nausea. Negative for vomiting.   Neurological:  Positive for numbness.        Pt states she cannot feel the bottoms of her feet bilaterally. Bilateral hands are tingly. Neither are in pain.    Psychiatric/Behavioral:  Positive for confusion and sleep disturbance.         States she cannot hear well all the time and gets confused. CPAP machine mask is very uncomfortable so she has not been sleeping well.           Objective     Physical Exam  Constitutional:       Appearance: Normal appearance.   HENT:      Head: Normocephalic and atraumatic.      Right Ear: Tympanic membrane, ear canal and external ear normal.      Left Ear: Tympanic membrane, ear canal and external ear normal. There is impacted cerumen.      Mouth/Throat:      Mouth: Mucous membranes are moist.   Cardiovascular:      Rate and Rhythm: Normal rate and regular rhythm.      Pulses: Normal pulses.   Pulmonary:      Effort: Pulmonary effort is normal.      Breath sounds: Normal  breath sounds.   Abdominal:      General: Abdomen is flat. Bowel sounds are normal.      Palpations: Abdomen is soft.      Tenderness: There is abdominal tenderness.      Hernia: A hernia is present.      Comments: Pt states she has a hernia on her left side   Skin:     General: Skin is warm.   Neurological:      General: No focal deficit present.      Mental Status: She is alert and oriented to person, place, and time.   Psychiatric:         Mood and Affect: Mood normal.         Behavior: Behavior normal.         Thought Content: Thought content normal.         Judgment: Judgment normal.       BMP  Lab Results   Component Value Date     01/26/2024    K 4.1 01/26/2024     01/26/2024    CO2 22 (L) 01/26/2024    BUN 47 (H) 01/26/2024    CREATININE 2.1 (H) 01/26/2024    CALCIUM 9.2 01/26/2024    ANIONGAP 11 01/26/2024    EGFRNORACEVR 42.4 02/14/2024         Lab Results   Component Value Date    HGBA1C 5.8 (H) 11/29/2023     Lab Results   Component Value Date    LDLCALC 68.2 11/29/2023     Lab Results   Component Value Date    WBC 8.06 01/26/2024    HGB 11.3 (L) 01/26/2024    HCT 35.2 (L) 01/26/2024    MCV 99 (H) 01/26/2024     01/26/2024        Assessment and Plan     1. Chronic diarrhea  -     colestipoL (COLESTID) 1 gram Tab; Take 1 tablet (1 g total) by mouth 2 (two) times daily. For cholesterol and diarrhea  Dispense: 60 tablet; Refill: 5    2. Hypothyroidism due to acquired atrophy of thyroid  Overview:  Patient takes Synthroid 88 mcg for her hypothyroidism.  Adjust Synthroid based on TSH resutls.  Check TSH every 6 months.    Orders:  -     levothyroxine (SYNTHROID) 88 MCG tablet; Take 1 tablet (88 mcg total) by mouth before breakfast.  Dispense: 90 tablet; Refill: 1    3. Osteopenia, unspecified location  -     alendronate (FOSAMAX) 70 MG tablet; Take 1 tablet (70 mg total) by mouth every 7 days.  Dispense: 12 tablet; Refill: 1    4. Chronic atrial fibrillation  Overview:  Continue Eliquis 5  mg daily  Keep appointment with Cardiology      5. Essential hypertension  Overview:  Two gram sodium diet.    Weight loss discussed.    Try to walk 2 miles per day.    Current medications will be:  Lasix 40 mg daily   Potassium 20 mEq daily   Imdur 60 mg daily  Metoprolol 25 mg daily   Spironolactone 25 mg daily        6. Mixed hyperlipidemia  Overview:  Continue fish oil  Continue Lipitor 20 mg      7. Stage 3a chronic kidney disease (CKD)  Overview:  Avoid NSAIDS  Good BP control  Monitor renal function closely      8. Type 2 diabetes mellitus with other circulatory complication, with long-term current use of insulin  Overview:  Patient no longer requires medications for this.  Her last A1c was 5.8.   Check hemoglobin A1c every 12 months.      9. Myasthenia gravis, adult form  Overview:  No longer on prednisone, Mestinon  Keep appointment with neurology             Follow up in about 6 months (around 11/22/2024).

## 2024-05-31 ENCOUNTER — EXTERNAL CHRONIC CARE MANAGEMENT (OUTPATIENT)
Dept: PRIMARY CARE CLINIC | Facility: CLINIC | Age: 89
End: 2024-05-31
Payer: MEDICARE

## 2024-05-31 PROCEDURE — 99999 PR STA SHADOW: CPT | Mod: PBBFAC,,, | Performed by: FAMILY MEDICINE

## 2024-05-31 PROCEDURE — 99490 CHRNC CARE MGMT STAFF 1ST 20: CPT | Mod: PBBFAC | Performed by: FAMILY MEDICINE

## 2024-06-03 PROBLEM — J96.11 CHRONIC RESPIRATORY FAILURE WITH HYPOXIA: Status: RESOLVED | Noted: 2024-02-28 | Resolved: 2024-06-03

## 2024-06-10 NOTE — ASSESSMENT & PLAN NOTE
Insurance will not approve injectafer. Need new therapy plan for venofer, infed, or ferriec. Please place new orders so that we can obtain auth for infusions. Thank you   afib with controlled rate secondary to underlying copd n

## 2024-06-24 ENCOUNTER — OFFICE VISIT (OUTPATIENT)
Dept: FAMILY MEDICINE | Facility: CLINIC | Age: 89
End: 2024-06-24
Payer: MEDICARE

## 2024-06-24 VITALS
BODY MASS INDEX: 41.12 KG/M2 | SYSTOLIC BLOOD PRESSURE: 130 MMHG | DIASTOLIC BLOOD PRESSURE: 76 MMHG | RESPIRATION RATE: 18 BRPM | WEIGHT: 232.06 LBS | HEART RATE: 67 BPM | OXYGEN SATURATION: 97 % | HEIGHT: 63 IN

## 2024-06-24 DIAGNOSIS — L97.211 VENOUS STASIS ULCER OF RIGHT CALF LIMITED TO BREAKDOWN OF SKIN WITH VARICOSE VEINS: ICD-10-CM

## 2024-06-24 DIAGNOSIS — L03.115 CELLULITIS OF RIGHT LOWER EXTREMITY: ICD-10-CM

## 2024-06-24 DIAGNOSIS — I83.012 VENOUS STASIS ULCER OF RIGHT CALF LIMITED TO BREAKDOWN OF SKIN WITH VARICOSE VEINS: ICD-10-CM

## 2024-06-24 DIAGNOSIS — I87.8 VENOUS STASIS OF LOWER EXTREMITY: Primary | ICD-10-CM

## 2024-06-24 PROCEDURE — 99215 OFFICE O/P EST HI 40 MIN: CPT | Mod: PBBFAC | Performed by: FAMILY MEDICINE

## 2024-06-24 PROCEDURE — 99999 PR PBB SHADOW E&M-EST. PATIENT-LVL V: CPT | Mod: PBBFAC,,, | Performed by: FAMILY MEDICINE

## 2024-06-24 PROCEDURE — 29580 STRAPPING UNNA BOOT: CPT | Mod: PBBFAC | Performed by: FAMILY MEDICINE

## 2024-06-24 PROCEDURE — 99214 OFFICE O/P EST MOD 30 MIN: CPT | Mod: S$PBB | Performed by: FAMILY MEDICINE

## 2024-06-24 PROCEDURE — 99999 PR STA SHADOW: CPT | Mod: PBBFAC,,, | Performed by: FAMILY MEDICINE

## 2024-06-24 RX ORDER — LOSARTAN POTASSIUM 25 MG/1
25 TABLET ORAL
COMMUNITY
Start: 2024-06-10

## 2024-06-24 RX ORDER — DOXYCYCLINE HYCLATE 100 MG
100 TABLET ORAL EVERY 12 HOURS
Qty: 20 TABLET | Refills: 0 | Status: SHIPPED | OUTPATIENT
Start: 2024-06-24 | End: 2024-07-04

## 2024-06-24 NOTE — PROGRESS NOTES
Subjective:       Patient ID: Stephany Skinner is a 89 y.o. female.    Chief Complaint: Sore (Pt is coming in for a sore on her left leg for about 2 weeks.)    89-year-old with venous stasis disease now has an open wound in her left lower leg laterally.  It is little tender and red      Review of Systems   Constitutional:  Negative for activity change, chills, fatigue, fever and unexpected weight change.   HENT:  Negative for sore throat and trouble swallowing.    Respiratory:  Negative for cough, chest tightness and shortness of breath.    Cardiovascular:  Positive for leg swelling. Negative for chest pain.   Gastrointestinal:  Negative for abdominal pain.   Endocrine: Negative for cold intolerance and heat intolerance.   Genitourinary:  Negative for difficulty urinating.   Musculoskeletal:  Negative for back pain and joint swelling.   Skin:  Positive for rash and wound.   Neurological:  Negative for numbness.   Hematological:  Negative for adenopathy.   Psychiatric/Behavioral:  Negative for decreased concentration.        Objective:      Vitals:    06/24/24 1057   BP: 130/76   Pulse: 67   Resp: 18     Physical Exam  Constitutional:       Appearance: Normal appearance. She is well-developed. She is obese.   Cardiovascular:      Rate and Rhythm: Normal rate and regular rhythm.      Heart sounds: Normal heart sounds. No murmur heard.  Pulmonary:      Effort: Pulmonary effort is normal.      Breath sounds: Normal breath sounds.   Musculoskeletal:      Right lower leg: Edema present.      Left lower leg: Edema present.   Skin:     Findings: Erythema and rash present.   Neurological:      Mental Status: She is alert.           Left outer leg with a 2 x 4 cm open wound with subcu fat exposed.  This was debrided.  Unna boot applied.    Assessment:       1. Venous stasis of lower extremity    2. Venous stasis ulcer of right calf limited to breakdown of skin with varicose veins    3. Cellulitis of right lower extremity         Plan:   1. Venous stasis of lower extremity  Overview:  Continue support stockings.  Place every morning and remove at night.    Orders:  -     Ambulatory referral/consult to Wound Clinic; Future; Expected date: 07/01/2024  -     doxycycline (VIBRA-TABS) 100 MG tablet; Take 1 tablet (100 mg total) by mouth every 12 (twelve) hours. for 10 days  Dispense: 20 tablet; Refill: 0    2. Venous stasis ulcer of right calf limited to breakdown of skin with varicose veins  -     Ambulatory referral/consult to Wound Clinic; Future; Expected date: 07/01/2024  -     doxycycline (VIBRA-TABS) 100 MG tablet; Take 1 tablet (100 mg total) by mouth every 12 (twelve) hours. for 10 days  Dispense: 20 tablet; Refill: 0    3. Cellulitis of right lower extremity  -     doxycycline (VIBRA-TABS) 100 MG tablet; Take 1 tablet (100 mg total) by mouth every 12 (twelve) hours. for 10 days  Dispense: 20 tablet; Refill: 0       Return to clinic in 1 week heel to check the balloon and removed the Unna boot

## 2024-06-30 ENCOUNTER — EXTERNAL CHRONIC CARE MANAGEMENT (OUTPATIENT)
Dept: PRIMARY CARE CLINIC | Facility: CLINIC | Age: 89
End: 2024-06-30
Payer: MEDICARE

## 2024-06-30 PROCEDURE — 99490 CHRNC CARE MGMT STAFF 1ST 20: CPT | Mod: S$PBB | Performed by: FAMILY MEDICINE

## 2024-06-30 PROCEDURE — 99999 PR STA SHADOW: CPT | Mod: PBBFAC,,, | Performed by: FAMILY MEDICINE

## 2024-07-01 ENCOUNTER — OFFICE VISIT (OUTPATIENT)
Dept: FAMILY MEDICINE | Facility: CLINIC | Age: 89
End: 2024-07-01
Payer: MEDICARE

## 2024-07-01 VITALS
WEIGHT: 234.69 LBS | OXYGEN SATURATION: 97 % | HEIGHT: 63 IN | BODY MASS INDEX: 41.58 KG/M2 | DIASTOLIC BLOOD PRESSURE: 74 MMHG | RESPIRATION RATE: 16 BRPM | HEART RATE: 58 BPM | SYSTOLIC BLOOD PRESSURE: 124 MMHG

## 2024-07-01 DIAGNOSIS — L03.115 CELLULITIS OF RIGHT LOWER EXTREMITY: ICD-10-CM

## 2024-07-01 DIAGNOSIS — I83.012 VENOUS STASIS ULCER OF RIGHT CALF LIMITED TO BREAKDOWN OF SKIN WITH VARICOSE VEINS: ICD-10-CM

## 2024-07-01 DIAGNOSIS — K52.9 CHRONIC DIARRHEA: ICD-10-CM

## 2024-07-01 DIAGNOSIS — I87.8 VENOUS STASIS OF LOWER EXTREMITY: Primary | ICD-10-CM

## 2024-07-01 DIAGNOSIS — L97.211 VENOUS STASIS ULCER OF RIGHT CALF LIMITED TO BREAKDOWN OF SKIN WITH VARICOSE VEINS: ICD-10-CM

## 2024-07-01 PROCEDURE — 99213 OFFICE O/P EST LOW 20 MIN: CPT | Mod: S$PBB | Performed by: FAMILY MEDICINE

## 2024-07-01 PROCEDURE — 99999 PR PBB SHADOW E&M-EST. PATIENT-LVL V: CPT | Mod: PBBFAC,,, | Performed by: FAMILY MEDICINE

## 2024-07-01 PROCEDURE — 29580 STRAPPING UNNA BOOT: CPT | Mod: S$PBB | Performed by: FAMILY MEDICINE

## 2024-07-01 PROCEDURE — 99999 PR STA SHADOW: CPT | Mod: PBBFAC,,, | Performed by: FAMILY MEDICINE

## 2024-07-01 PROCEDURE — 99215 OFFICE O/P EST HI 40 MIN: CPT | Mod: PBBFAC | Performed by: FAMILY MEDICINE

## 2024-07-01 RX ORDER — MONTELUKAST SODIUM 4 MG/1
3 TABLET, CHEWABLE ORAL 2 TIMES DAILY
Qty: 120 TABLET | Refills: 5 | Status: SHIPPED | OUTPATIENT
Start: 2024-07-01

## 2024-07-01 NOTE — PROGRESS NOTES
Subjective:       Patient ID: Stephany Skinner is a 89 y.o. female.    Chief Complaint: Follow-up (Patient is here today for a 1 week follow up visit. )    89-year-old with venous stasis disease now has an open wound in her left lower leg laterally.  I wrapped it in an Unna boot last week.  She is feeling better.  The wound is 50% better.      Review of Systems   Constitutional:  Negative for activity change, chills, fatigue, fever and unexpected weight change.   HENT:  Negative for sore throat and trouble swallowing.    Respiratory:  Negative for cough, chest tightness and shortness of breath.    Cardiovascular:  Positive for leg swelling. Negative for chest pain.   Gastrointestinal:  Negative for abdominal pain.   Endocrine: Negative for cold intolerance and heat intolerance.   Genitourinary:  Negative for difficulty urinating.   Musculoskeletal:  Negative for back pain and joint swelling.   Skin:  Positive for rash and wound.   Neurological:  Negative for numbness.   Hematological:  Negative for adenopathy.   Psychiatric/Behavioral:  Negative for decreased concentration.        Objective:      Vitals:    07/01/24 0955   BP: 124/74   Pulse: (!) 58   Resp: 16     Physical Exam  Constitutional:       Appearance: Normal appearance. She is well-developed. She is obese.   Cardiovascular:      Rate and Rhythm: Normal rate and regular rhythm.      Heart sounds: Normal heart sounds. No murmur heard.  Pulmonary:      Effort: Pulmonary effort is normal.      Breath sounds: Normal breath sounds.   Musculoskeletal:      Right lower leg: Edema present.      Left lower leg: Edema present.   Skin:     Findings: Erythema and rash present.   Neurological:      Mental Status: She is alert.             6/25-Left outer leg with a 2 x 4 cm open wound with subcu fat exposed.  This was debrided.  Unna boot applied.      7/1/24 - better    Assessment:       1. Venous stasis of lower extremity    2. Venous stasis ulcer of right calf  limited to breakdown of skin with varicose veins    3. Cellulitis of right lower extremity          Plan:   1. Venous stasis of lower extremity  Overview:  Continue support stockings.  Place every morning and remove at night.      2. Venous stasis ulcer of right calf limited to breakdown of skin with varicose veins    3. Cellulitis of right lower extremity    UNNA wrap re-applied.     F/U with wound care 1 week

## 2024-07-08 ENCOUNTER — OFFICE VISIT (OUTPATIENT)
Dept: WOUND CARE | Facility: HOSPITAL | Age: 89
End: 2024-07-08
Attending: SURGERY
Payer: MEDICARE

## 2024-07-08 VITALS
TEMPERATURE: 98 F | HEART RATE: 74 BPM | SYSTOLIC BLOOD PRESSURE: 165 MMHG | RESPIRATION RATE: 18 BRPM | DIASTOLIC BLOOD PRESSURE: 78 MMHG

## 2024-07-08 DIAGNOSIS — I87.312 CHRONIC VENOUS HYPERTENSION (IDIOPATHIC) WITH ULCER OF LEFT LOWER EXTREMITY (CODE): ICD-10-CM

## 2024-07-08 DIAGNOSIS — L97.222: Primary | ICD-10-CM

## 2024-07-08 PROBLEM — I83.012 VENOUS STASIS ULCER OF RIGHT CALF LIMITED TO BREAKDOWN OF SKIN WITH VARICOSE VEINS: Status: RESOLVED | Noted: 2024-06-24 | Resolved: 2024-07-08

## 2024-07-08 PROBLEM — L97.211 VENOUS STASIS ULCER OF RIGHT CALF LIMITED TO BREAKDOWN OF SKIN WITH VARICOSE VEINS: Status: RESOLVED | Noted: 2024-06-24 | Resolved: 2024-07-08

## 2024-07-08 PROCEDURE — 99214 OFFICE O/P EST MOD 30 MIN: CPT | Mod: 25

## 2024-07-08 PROCEDURE — 99499 UNLISTED E&M SERVICE: CPT | Mod: ,,, | Performed by: SURGERY

## 2024-07-08 PROCEDURE — 11042 DBRDMT SUBQ TIS 1ST 20SQCM/<: CPT

## 2024-07-08 NOTE — PROGRESS NOTES
[facility]  Wound Care  History and Physical    Problem List Items Addressed This Visit          Orthopedic    Non-pressure chronic ulcer of calf, left, with fat layer exposed - Primary    Overview     89-year-old female presented to the wound clinic on 07/08/2024 with a history of chronic venous hypertension developed a wound of the left lateral lower leg proximally 4 weeks ago.  The wound has enlarged.  She was evaluated by her primary physician who did apply a cream.  She was referred here for further management.  She denies any pain.  She has not had any fever or chills.  She has had moderate drainage.         Current Assessment & Plan     Excisional debridement performed of necrotic dermis and subcutaneous tissue.  Begin Mesalt.    Begin 2 layer compression therapy with home health Monday Wednesday Friday.  Leg elevation.            Other    Chronic venous hypertension (idiopathic) with ulcer of left lower extremity (CODE)    Current Assessment & Plan     2 layer compression therapy.    Leg elevation.              History:    Past Medical History:   Diagnosis Date    Anemia     Arthritis     Atrial fibrillation     Back pain     Bronchiectasis, uncomplicated     CHF (congestive heart failure)     Disorder of kidney and ureter     Early stage nonexudative age-related macular degeneration of both eyes 2018    GERD (gastroesophageal reflux disease)     Hyperlipidemia     Hypertension     Hypothyroidism     Obesity     JOY (obstructive sleep apnea)     Osteoporosis     Polyneuropathy     Pressure injury of dorsum of right foot, stage 2 12/01/2017    Rheumatoid arthritis     Urinary incontinence     Venous stasis ulcer of right calf limited to breakdown of skin with varicose veins 06/24/2024       Past Surgical History:   Procedure Laterality Date    CATARACT EXTRACTION W/  INTRAOCULAR LENS IMPLANT Right 12/12/2019    Procedure: EXTRACTION, CATARACT, WITH IOL INSERTION;   Surgeon: Michael Arellano MD;  Location: [facility];  Service: Ophthalmology;  Laterality: Right;    CATARACT EXTRACTION W/  INTRAOCULAR LENS IMPLANT Left 02/13/2020    Procedure: EXTRACTION, CATARACT, WITH IOL INSERTION;  Surgeon: Michael Arellano MD;  Location: [facility];  Service: Ophthalmology;  Laterality: Left;    CHOLECYSTECTOMY      ENDOSCOPIC ULTRASOUND OF UPPER GASTROINTESTINAL TRACT N/A 11/04/2020    Procedure: ULTRASOUND, ENDOSCOPIC, UPPER GI TRACT;  Surgeon: Thierry Saunders MD;  Location: [facility];  Service: Endoscopy;  Laterality: N/A;  covid 11/1 [facility]    HERNIA REPAIR      HYSTERECTOMY      knee replacemene Right 02/14/2007    deidra    PHACOEMULSIFICATION OF CATARACT Right 12/12/2019    Procedure: PHACOEMULSIFICATION, CATARACT;  Surgeon: Michael Arellano MD;  Location: [facility];  Service: Ophthalmology;  Laterality: Right;    PHACOEMULSIFICATION OF CATARACT Left 02/13/2020    Procedure: PHACOEMULSIFICATION, CATARACT;  Surgeon: Michael Arellano MD;  Location: [facility];  Service: Ophthalmology;  Laterality: Left;    REPLACEMENT Left 09/18/2007    knee    THYROIDECTOMY         Family History   Problem Relation Name Age of Onset    Cancer Sister 3         reports that she has never smoked. She has never been exposed to tobacco smoke. She has never used smokeless tobacco. She reports that she does not drink alcohol and does not use drugs.    has a current medication list which includes the following prescription(s): acetaminophen, albuterol, alendronate, atorvastatin, beta-carotene(a)-vits c,e/mins, calcium carbonate-vit d3-min, colestipol, cyanocobalamin, eliquis, furosemide, isosorbide mononitrate, levothyroxine, loperamide, losartan, metoprolol succinate, nitroglycerin, nystatin, nystatin-triamcinolone, potassium chloride sa, spironolactone, preservision areds, and [DISCONTINUED] omega-3 acid ethyl esters, and the following Facility-Administered Medications: tetracaine hcl  "(pf).    Allergies:  Statins-hmg-coa reductase inhibitors    Review of Systems:  Review of Systems   Constitutional:  Negative for chills, fever, malaise/fatigue and weight loss.   Respiratory:  Negative for cough and hemoptysis.    Cardiovascular:  Positive for leg swelling. Negative for orthopnea.       There were no vitals filed for this visit.      BMI:  There is no height or weight on file to calculate BMI.    Physical Exam:  Physical Exam  Constitutional:       General: She is not in acute distress.     Appearance: She is obese. She is not ill-appearing.   Cardiovascular:      Rate and Rhythm: Normal rate and regular rhythm.      Comments: Patient has bounding dorsalis pedis and posterior tibial pulses.  Pulmonary:      Effort: No respiratory distress.   Musculoskeletal:         General: Normal range of motion.      Right lower leg: Edema (1 to 2+) present.      Left lower leg: Edema (1+) present.      Comments: See wound progress note for detailed wound description.     Skin:     Capillary Refill: Capillary refill takes less than 2 seconds.   Neurological:      Mental Status: She is alert and oriented to person, place, and time.   Psychiatric:         Thought Content: Thought content normal.         Judgment: Judgment normal.     A1C:  No results for input(s): "HGBA1C" in the last 2160 hours.  BMP:  No results for input(s): "GLU", "NA", "K", "CL", "CO2", "BUN", "CREATININE", "CALCIUM", "MG" in the last 2160 hours.   CBC:  No results for input(s): "WBC", "RBC", "HGB", "HCT", "PLT", "MCV", "MCH", "MCHC" in the last 2160 hours.  CMP:  No results for input(s): "GLU", "CALCIUM", "ALBUMIN", "PROT", "NA", "K", "CO2", "CL", "BUN", "ALKPHOS", "ALT", "AST", "BILITOT" in the last 2160 hours.    Invalid input(s): "CREATININ"  PREALBUMIN:  No results for input(s): "PREALBUMIN" in the last 2160 hours.  WOUND CULTURES:  No results for input(s): "LABAERO" in the last 2160 hours.        Plan:  See Wound Docs note for plan and " follow up.        Rodney Nino  [facility]

## 2024-07-08 NOTE — ASSESSMENT & PLAN NOTE
Excisional debridement performed of necrotic dermis and subcutaneous tissue.  Begin Mesalt.    Begin 2 layer compression therapy with home health Monday Wednesday Friday.  Leg elevation.

## 2024-07-12 ENCOUNTER — LAB VISIT (OUTPATIENT)
Dept: LAB | Facility: HOSPITAL | Age: 89
End: 2024-07-12
Attending: INTERNAL MEDICINE
Payer: MEDICARE

## 2024-07-12 DIAGNOSIS — S81.802A WOUND OF LEFT LEG: Primary | ICD-10-CM

## 2024-07-12 LAB
ANION GAP SERPL CALC-SCNC: 8 MMOL/L (ref 8–16)
BUN SERPL-MCNC: 27 MG/DL (ref 8–23)
CALCIUM SERPL-MCNC: 8.7 MG/DL (ref 8.7–10.5)
CHLORIDE SERPL-SCNC: 112 MMOL/L (ref 95–110)
CO2 SERPL-SCNC: 20 MMOL/L (ref 23–29)
CREAT SERPL-MCNC: 1.1 MG/DL (ref 0.5–1.4)
EST. GFR  (NO RACE VARIABLE): 48 ML/MIN/1.73 M^2
GLUCOSE SERPL-MCNC: 78 MG/DL (ref 70–110)
POTASSIUM SERPL-SCNC: 4.2 MMOL/L (ref 3.5–5.1)
SODIUM SERPL-SCNC: 140 MMOL/L (ref 136–145)

## 2024-07-12 PROCEDURE — 36415 COLL VENOUS BLD VENIPUNCTURE: CPT

## 2024-07-12 PROCEDURE — 80048 BASIC METABOLIC PNL TOTAL CA: CPT

## 2024-07-15 ENCOUNTER — OFFICE VISIT (OUTPATIENT)
Dept: WOUND CARE | Facility: HOSPITAL | Age: 89
End: 2024-07-15
Attending: SURGERY
Payer: MEDICARE

## 2024-07-15 VITALS
HEART RATE: 74 BPM | RESPIRATION RATE: 18 BRPM | SYSTOLIC BLOOD PRESSURE: 157 MMHG | TEMPERATURE: 98 F | DIASTOLIC BLOOD PRESSURE: 63 MMHG

## 2024-07-15 DIAGNOSIS — L97.222: Primary | ICD-10-CM

## 2024-07-15 DIAGNOSIS — L97.519 DIABETIC ULCER OF RIGHT GREAT TOE: ICD-10-CM

## 2024-07-15 DIAGNOSIS — E11.621 DIABETIC ULCER OF RIGHT GREAT TOE: ICD-10-CM

## 2024-07-15 PROCEDURE — 99499 UNLISTED E&M SERVICE: CPT | Mod: ,,, | Performed by: SURGERY

## 2024-07-15 PROCEDURE — 99213 OFFICE O/P EST LOW 20 MIN: CPT

## 2024-07-15 PROCEDURE — 11042 DBRDMT SUBQ TIS 1ST 20SQCM/<: CPT

## 2024-07-15 NOTE — PROGRESS NOTES
[facility]  Wound Care  Progress Note    Problem List Items Addressed This Visit       Non-pressure chronic ulcer of calf, left, with fat layer exposed - Primary    Overview     89-year-old female presented to the wound clinic on 07/08/2024 with a history of chronic venous hypertension developed a wound of the left lateral lower leg proximally 4 weeks ago.  The wound has enlarged.  She was evaluated by her primary physician who did apply a cream.  She was referred here for further management.  She denies any pain.  She has not had any fever or chills.  She has had moderate drainage.         Current Assessment & Plan     Wound fairly clean.  Minimal slough present.  No sign of infection.  Sharp debridement is needed.  Continue Mesalt.  Continue compression         Diabetic ulcer of right great toe    Overview     Patient with new wound today.  Patient with history of neuropathy.  Wound is superficial with no sign of infection.  No erythema or drainage         Current Assessment & Plan     Wound debrided today.  Will implement collagen dressing.            See wound doc progress notes. Documents will be scanned.        Td Pascal  [facility]

## 2024-07-15 NOTE — ASSESSMENT & PLAN NOTE
Wound fairly clean.  Minimal slough present.  No sign of infection.  Sharp debridement is needed.  Continue Mesalt.  Continue compression

## 2024-07-26 ENCOUNTER — HOSPITAL ENCOUNTER (EMERGENCY)
Facility: HOSPITAL | Age: 89
Discharge: HOME | End: 2024-07-26
Attending: EMERGENCY MEDICINE
Payer: MEDICARE

## 2024-07-26 VITALS
BODY MASS INDEX: 40.61 KG/M2 | SYSTOLIC BLOOD PRESSURE: 164 MMHG | TEMPERATURE: 98 F | OXYGEN SATURATION: 98 % | DIASTOLIC BLOOD PRESSURE: 75 MMHG | RESPIRATION RATE: 22 BRPM | HEART RATE: 58 BPM | WEIGHT: 229.25 LBS

## 2024-07-26 DIAGNOSIS — I50.9 CHRONIC CONGESTIVE HEART FAILURE, UNSPECIFIED HEART FAILURE TYPE: Primary | ICD-10-CM

## 2024-07-26 DIAGNOSIS — R63.5 WEIGHT GAIN WITH EDEMA: ICD-10-CM

## 2024-07-26 DIAGNOSIS — R60.9 WEIGHT GAIN WITH EDEMA: ICD-10-CM

## 2024-07-26 LAB
ALBUMIN SERPL BCP-MCNC: 3.8 G/DL (ref 3.5–5.2)
ALP SERPL-CCNC: 55 U/L (ref 55–135)
ALT SERPL W/O P-5'-P-CCNC: 8 U/L (ref 10–44)
ANION GAP SERPL CALC-SCNC: 12 MMOL/L (ref 8–16)
AST SERPL-CCNC: 8 U/L (ref 10–40)
BASOPHILS # BLD AUTO: 0.04 K/UL (ref 0–0.2)
BASOPHILS NFR BLD: 0.6 % (ref 0–1.9)
BILIRUB SERPL-MCNC: 0.4 MG/DL (ref 0.1–1)
BNP SERPL-MCNC: 147 PG/ML (ref 0–99)
BUN SERPL-MCNC: 32 MG/DL (ref 8–23)
CALCIUM SERPL-MCNC: 9.2 MG/DL (ref 8.7–10.5)
CHLORIDE SERPL-SCNC: 115 MMOL/L (ref 95–110)
CO2 SERPL-SCNC: 15 MMOL/L (ref 23–29)
CREAT SERPL-MCNC: 1.2 MG/DL (ref 0.5–1.4)
DIFFERENTIAL METHOD BLD: ABNORMAL
EOSINOPHIL # BLD AUTO: 0.1 K/UL (ref 0–0.5)
EOSINOPHIL NFR BLD: 0.9 % (ref 0–8)
ERYTHROCYTE [DISTWIDTH] IN BLOOD BY AUTOMATED COUNT: 13.6 % (ref 11.5–14.5)
EST. GFR  (NO RACE VARIABLE): 44 ML/MIN/1.73 M^2
GLUCOSE SERPL-MCNC: 93 MG/DL (ref 70–110)
HCT VFR BLD AUTO: 36.9 % (ref 37–48.5)
HGB BLD-MCNC: 11.9 G/DL (ref 12–16)
IMM GRANULOCYTES # BLD AUTO: 0.04 K/UL (ref 0–0.04)
IMM GRANULOCYTES NFR BLD AUTO: 0.6 % (ref 0–0.5)
LYMPHOCYTES # BLD AUTO: 1 K/UL (ref 1–4.8)
LYMPHOCYTES NFR BLD: 16.1 % (ref 18–48)
MCH RBC QN AUTO: 31.8 PG (ref 27–31)
MCHC RBC AUTO-ENTMCNC: 32.2 G/DL (ref 32–36)
MCV RBC AUTO: 99 FL (ref 82–98)
MONOCYTES # BLD AUTO: 0.7 K/UL (ref 0.3–1)
MONOCYTES NFR BLD: 10.6 % (ref 4–15)
NEUTROPHILS # BLD AUTO: 4.5 K/UL (ref 1.8–7.7)
NEUTROPHILS NFR BLD: 71.2 % (ref 38–73)
NRBC BLD-RTO: 0 /100 WBC
OHS QRS DURATION: 90 MS
OHS QTC CALCULATION: 428 MS
PLATELET # BLD AUTO: 220 K/UL (ref 150–450)
PMV BLD AUTO: 8.9 FL (ref 9.2–12.9)
POTASSIUM SERPL-SCNC: 4.2 MMOL/L (ref 3.5–5.1)
PROT SERPL-MCNC: 6.9 G/DL (ref 6–8.4)
RBC # BLD AUTO: 3.74 M/UL (ref 4–5.4)
SODIUM SERPL-SCNC: 142 MMOL/L (ref 136–145)
TROPONIN I SERPL DL<=0.01 NG/ML-MCNC: <0.006 NG/ML (ref 0–0.03)
WBC # BLD AUTO: 6.32 K/UL (ref 3.9–12.7)

## 2024-07-26 PROCEDURE — 84484 ASSAY OF TROPONIN QUANT: CPT

## 2024-07-26 PROCEDURE — 93005 ELECTROCARDIOGRAM TRACING: CPT

## 2024-07-26 PROCEDURE — 36415 COLL VENOUS BLD VENIPUNCTURE: CPT

## 2024-07-26 PROCEDURE — 83880 ASSAY OF NATRIURETIC PEPTIDE: CPT

## 2024-07-26 PROCEDURE — 85025 COMPLETE CBC W/AUTO DIFF WBC: CPT

## 2024-07-26 PROCEDURE — 93010 ELECTROCARDIOGRAM REPORT: CPT | Mod: ,,, | Performed by: INTERNAL MEDICINE

## 2024-07-26 PROCEDURE — 99285 EMERGENCY DEPT VISIT HI MDM: CPT | Mod: 25

## 2024-07-26 PROCEDURE — 80053 COMPREHEN METABOLIC PANEL: CPT

## 2024-07-26 NOTE — ED PROVIDER NOTES
Encounter Date: 7/26/2024       History     Chief Complaint   Patient presents with    fluid retention     Pt reports 9lb weight gain in 1 week. Home health sent for eval. Poss fluid retention.     This note is dictated on M*Modal word recognition program.  There are word recognition mistakes and grammatical errors that are occasionally missed on review.     Stephany Skinner is a 89 y.o. female past medical history of anemia, atrial fibrillation, CHF, CKD, GERD presents to ER today with complaints of being sent to ER by home health nurse.  Patient reports that her pulse at home with home health nurses 48 and home health nurse was concerned.  Patient also reports that home health nurse reported to her that she gained 9 lb in the last week is concerned for fluid volume overload.  Patient reports she has chronic knee pain with walking otherwise feels okay denies shortness of breath denies chest pain.  Patient also endorses she has some chronic wounds that she is seeing wound care for.          Review of patient's allergies indicates:   Allergen Reactions    Statins-hmg-coa reductase inhibitors      Other reaction(s): Unknown     Past Medical History:   Diagnosis Date    Anemia     Arthritis     Atrial fibrillation     Back pain     Bronchiectasis, uncomplicated     CHF (congestive heart failure)     Disorder of kidney and ureter     Early stage nonexudative age-related macular degeneration of both eyes 2018    GERD (gastroesophageal reflux disease)     Hyperlipidemia     Hypertension     Hypothyroidism     Obesity     JOY (obstructive sleep apnea)     Osteoporosis     Polyneuropathy     Pressure injury of dorsum of right foot, stage 2 12/01/2017    Rheumatoid arthritis     Urinary incontinence     Venous stasis ulcer of right calf limited to breakdown of skin with varicose veins 06/24/2024     Past Surgical History:   Procedure Laterality Date    CATARACT EXTRACTION W/  INTRAOCULAR LENS IMPLANT Right 12/12/2019     Procedure: EXTRACTION, CATARACT, WITH IOL INSERTION;  Surgeon: Michael Arellano MD;  Location: [facility];  Service: Ophthalmology;  Laterality: Right;    CATARACT EXTRACTION W/  INTRAOCULAR LENS IMPLANT Left 02/13/2020    Procedure: EXTRACTION, CATARACT, WITH IOL INSERTION;  Surgeon: Michael Arellano MD;  Location: [facility];  Service: Ophthalmology;  Laterality: Left;    CHOLECYSTECTOMY      ENDOSCOPIC ULTRASOUND OF UPPER GASTROINTESTINAL TRACT N/A 11/04/2020    Procedure: ULTRASOUND, ENDOSCOPIC, UPPER GI TRACT;  Surgeon: Thierry Saunders MD;  Location: [facility];  Service: Endoscopy;  Laterality: N/A;  covid 11/1 [facility]    HERNIA REPAIR      HYSTERECTOMY      knee replacemene Right 02/14/2007    deidra    PHACOEMULSIFICATION OF CATARACT Right 12/12/2019    Procedure: PHACOEMULSIFICATION, CATARACT;  Surgeon: Michael Arellano MD;  Location: [facility];  Service: Ophthalmology;  Laterality: Right;    PHACOEMULSIFICATION OF CATARACT Left 02/13/2020    Procedure: PHACOEMULSIFICATION, CATARACT;  Surgeon: Michael Arellano MD;  Location: [facility];  Service: Ophthalmology;  Laterality: Left;    REPLACEMENT Left 09/18/2007    knee    THYROIDECTOMY       Family History   Problem Relation Name Age of Onset    Cancer Sister 3      Social History     Tobacco Use    Smoking status: Never     Passive exposure: Never    Smokeless tobacco: Never   Substance Use Topics    Alcohol use: Never    Drug use: Never     Review of Systems   Musculoskeletal:  Positive for arthralgias.   All other systems reviewed and are negative.      Physical Exam     Initial Vitals [07/26/24 1120]   BP Pulse Resp Temp SpO2   (!) 153/74 73 18 97.8 °F (36.6 °C) 98 %      MAP       --         Physical Exam    Constitutional: She appears well-developed and well-nourished.   HENT:   Head: Normocephalic and atraumatic.   Eyes: EOM are normal. Pupils are equal, round, and reactive to light.   Neck: Neck supple.   Normal range of motion.  Cardiovascular:  Normal rate.            Pulmonary/Chest: Breath sounds normal. No respiratory distress. She has no wheezes. She has no rhonchi. She has no rales.   Abdominal: Abdomen is soft.   Musculoskeletal:      Cervical back: Normal range of motion and neck supple.     Neurological: She is alert and oriented to person, place, and time. GCS score is 15. GCS eye subscore is 4. GCS verbal subscore is 5. GCS motor subscore is 6.   Skin: Capillary refill takes less than 2 seconds.   Patient currently has Unna wrap to left lower extremity.  Patient has bilateral Yuri hose in place providing compression to bilateral lower extremities   Psychiatric: She has a normal mood and affect.         ED Course   Procedures  Labs Reviewed   CBC W/ AUTO DIFFERENTIAL - Abnormal       Result Value    WBC 6.32      RBC 3.74 (*)     Hemoglobin 11.9 (*)     Hematocrit 36.9 (*)     MCV 99 (*)     MCH 31.8 (*)     MCHC 32.2      RDW 13.6      Platelets 220      MPV 8.9 (*)     Immature Granulocytes 0.6 (*)     Gran # (ANC) 4.5      Immature Grans (Abs) 0.04      Lymph # 1.0      Mono # 0.7      Eos # 0.1      Baso # 0.04      nRBC 0      Gran % 71.2      Lymph % 16.1 (*)     Mono % 10.6      Eosinophil % 0.9      Basophil % 0.6      Differential Method Automated     COMPREHENSIVE METABOLIC PANEL - Abnormal    Sodium 142      Potassium 4.2      Chloride 115 (*)     CO2 15 (*)     Glucose 93      BUN 32 (*)     Creatinine 1.2      Calcium 9.2      Total Protein 6.9      Albumin 3.8      Total Bilirubin 0.4      Alkaline Phosphatase 55      AST 8 (*)     ALT 8 (*)     eGFR 44 (*)     Anion Gap 12     B-TYPE NATRIURETIC PEPTIDE - Abnormal     (*)    TROPONIN I    Troponin I <0.006       EKG Readings: (Independently Interpreted)   Initial Reading: No STEMI. Rhythm: Atrial Fibrillation. Heart Rate: 60. Clinical Impression: Atrial Fibrillation   Patient has a history of atrial fibrillation.     ECG Results              EKG 12-lead (Final result)        Collection Time Result  Time QRS Duration OHS QTC Calculation    07/26/24 11:35:51 07/26/24 12:24:28 90 428                     Final result by Interface, Lab In [facility] (07/26/24 12:24:36)                   Narrative:    Test Reason : R60.9    Vent. Rate : 060 BPM     Atrial Rate : 357 BPM     P-R Int : 000 ms          QRS Dur : 090 ms      QT Int : 428 ms       P-R-T Axes : 000 022 055 degrees     QTc Int : 428 ms    Atrial fibrillation  Abnormal ECG  When compared with ECG of 31-JAN-2018 09:15,  Vent. rate has decreased BY  31 BPM  Confirmed by Bebe Khan MD ([id number]) on 7/26/2024 12:24:25 PM    Referred By: [email or web address]   SELF           Confirmed By:Bebe Khan MD                                  Imaging Results              X-Ray Chest 1 View (Final result)  Result time 07/26/24 12:11:10      Final result by Mani Goins MD (07/26/24 12:11:10)                   Impression:      Mild cardiomegaly.  No acute process.      Electronically signed by: Mani Goins MD  Date:    07/26/2024  Time:    12:11               Narrative:    EXAMINATION:  XR CHEST 1 VIEW    CLINICAL HISTORY:  Abnormal weight gain    TECHNIQUE:  Single frontal view of the chest was performed.    COMPARISON:  01/29/2024    FINDINGS:  The heart is mildly enlarged.  The lungs are well expanded without consolidation, edema, pleural effusion.  There is severe left shoulder degenerative change.                                       Medications - No data to display  Medical Decision Making  Differential diagnosis includes fluid volume overload, bradycardia, atrial fibrillation, chronic wounds    Patient's symptoms are consistent with underlying CHF and history of atrial fibrillation.  Chest x-ray was clear for pulmonary edema or pleural effusions.  Lung sounds are clear to auscultation no crackles.  Patient has bilateral Yuri hose in place providing compression to bilateral lower extremities.  Patient has a history of anemia. NO active bleeding reported today.  CBC  stable  CMP reveals no acute metabolic derangement no electrolyte disturbance.  Patient's EKG showed atrial fibrillation.  Patient is currently on Eliquis    Patient instructed to continue current home medication therapy.  Patient was asymptomatic from CHF exacerbation.  Physical exam/x-ray/laboratory findings does not reveal or support fluid volume overload.  Patient stable at time of discharge in no acute distress.  No life-threatening illnesses were found during ER visit today.  Patient was instructed to follow-up with PCP or other recommended specialist within the next 48-72 hours.  Patient was instructed to return to ER immediately for any worsening or concerning symptoms.  All discharge instructions discussed with patient, and patient agrees to comply with discharge instructions given today.     Amount and/or Complexity of Data Reviewed  Labs: ordered.  Radiology: ordered.                                      Clinical Impression:  Final diagnoses:  [R63.5, R60.9] Weight gain with edema  [I50.9] Chronic congestive heart failure, unspecified heart failure type (Primary)          ED Disposition Condition    Discharge Stable          ED Prescriptions    None       Follow-up Information    None          Grant Goodman NP  07/26/24 6820

## 2024-07-29 ENCOUNTER — OFFICE VISIT (OUTPATIENT)
Dept: WOUND CARE | Facility: HOSPITAL | Age: 89
End: 2024-07-29
Attending: SURGERY
Payer: MEDICARE

## 2024-07-29 ENCOUNTER — PATIENT OUTREACH (OUTPATIENT)
Dept: EMERGENCY MEDICINE | Facility: HOSPITAL | Age: 89
End: 2024-07-29
Payer: MEDICARE

## 2024-07-29 VITALS — DIASTOLIC BLOOD PRESSURE: 68 MMHG | HEART RATE: 78 BPM | SYSTOLIC BLOOD PRESSURE: 155 MMHG

## 2024-07-29 DIAGNOSIS — I87.312 CHRONIC VENOUS HYPERTENSION (IDIOPATHIC) WITH ULCER OF LEFT LOWER EXTREMITY (CODE): ICD-10-CM

## 2024-07-29 DIAGNOSIS — L97.222: Primary | ICD-10-CM

## 2024-07-29 DIAGNOSIS — E11.621 DIABETIC ULCER OF RIGHT GREAT TOE: ICD-10-CM

## 2024-07-29 DIAGNOSIS — L97.519 DIABETIC ULCER OF RIGHT GREAT TOE: ICD-10-CM

## 2024-07-29 PROCEDURE — 11042 DBRDMT SUBQ TIS 1ST 20SQCM/<: CPT

## 2024-07-29 PROCEDURE — 99499 UNLISTED E&M SERVICE: CPT | Mod: ,,, | Performed by: SURGERY

## 2024-07-29 NOTE — PROGRESS NOTES
Patient declined ED navigation assessment and denied any needs at this time. Patient declined assistance making a follow-up appointment with her PCP at this time.     Kiki Salgado  ED Navigator  [phone]

## 2024-07-29 NOTE — PROGRESS NOTES
[facility]  Wound Care  Progress Note    Problem List Items Addressed This Visit       Non-pressure chronic ulcer of calf, left, with fat layer exposed - Primary    Overview     89-year-old female presented to the wound clinic on 07/08/2024 with a history of chronic venous hypertension developed a wound of the left lateral lower leg proximally 4 weeks ago.  The wound has enlarged.  She was evaluated by her primary physician who did apply a cream.  She was referred here for further management.  She denies any pain.  She has not had any fever or chills.  She has had moderate drainage.         Current Assessment & Plan     Edema better controlled.  Wound remains clean and granulating with minimal slough.  Continue debridement is needed.  Continue Mesalt.  Continue compression         Diabetic ulcer of right great toe    Overview     Patient with new wound today.  Patient with history of neuropathy.  Wound is superficial with no sign of infection.  No erythema or drainage         Current Assessment & Plan     Wound clean.  Some surrounding callus formation with undermining.  No sign of infection.  Sharp debridement is needed.  Continue collagen            See wound doc progress notes. Documents will be scanned.        Td Pascal  [facility]

## 2024-07-29 NOTE — ASSESSMENT & PLAN NOTE
Edema better controlled.  Wound remains clean and granulating with minimal slough.  Continue debridement is needed.  Continue Mesalt.  Continue compression

## 2024-07-29 NOTE — ASSESSMENT & PLAN NOTE
Wound clean.  Some surrounding callus formation with undermining.  No sign of infection.  Sharp debridement is needed.  Continue collagen

## 2024-07-31 ENCOUNTER — EXTERNAL CHRONIC CARE MANAGEMENT (OUTPATIENT)
Dept: PRIMARY CARE CLINIC | Facility: CLINIC | Age: 89
End: 2024-07-31
Payer: MEDICARE

## 2024-07-31 PROCEDURE — 99999 PR STA SHADOW: CPT | Mod: PBBFAC,,, | Performed by: FAMILY MEDICINE

## 2024-07-31 PROCEDURE — 99490 CHRNC CARE MGMT STAFF 1ST 20: CPT | Mod: S$PBB | Performed by: FAMILY MEDICINE

## 2024-08-12 ENCOUNTER — OFFICE VISIT (OUTPATIENT)
Dept: WOUND CARE | Facility: HOSPITAL | Age: 89
End: 2024-08-12
Attending: SURGERY
Payer: MEDICARE

## 2024-08-12 ENCOUNTER — TELEPHONE (OUTPATIENT)
Dept: INTERNAL MEDICINE | Facility: CLINIC | Age: 89
End: 2024-08-12
Payer: MEDICARE

## 2024-08-12 VITALS — SYSTOLIC BLOOD PRESSURE: 150 MMHG | HEART RATE: 67 BPM | DIASTOLIC BLOOD PRESSURE: 70 MMHG

## 2024-08-12 DIAGNOSIS — E11.621 DIABETIC ULCER OF RIGHT GREAT TOE: Primary | ICD-10-CM

## 2024-08-12 DIAGNOSIS — L97.222: ICD-10-CM

## 2024-08-12 DIAGNOSIS — L97.519 DIABETIC ULCER OF RIGHT GREAT TOE: Primary | ICD-10-CM

## 2024-08-12 DIAGNOSIS — L30.4 INTERTRIGO: Primary | ICD-10-CM

## 2024-08-12 PROCEDURE — 99499 UNLISTED E&M SERVICE: CPT | Mod: ,,, | Performed by: SURGERY

## 2024-08-12 PROCEDURE — 11042 DBRDMT SUBQ TIS 1ST 20SQCM/<: CPT

## 2024-08-12 RX ORDER — CEPHALEXIN 500 MG/1
500 CAPSULE ORAL 4 TIMES DAILY
Qty: 28 CAPSULE | Refills: 0 | Status: SHIPPED | OUTPATIENT
Start: 2024-08-12

## 2024-08-12 NOTE — PROGRESS NOTES
[facility]  Wound Care  Progress Note    Problem List Items Addressed This Visit       Non-pressure chronic ulcer of calf, left, with fat layer exposed    Overview     89-year-old female presented to the wound clinic on 07/08/2024 with a history of chronic venous hypertension developed a wound of the left lateral lower leg proximally 4 weeks ago.  The wound has enlarged.  She was evaluated by her primary physician who did apply a cream.  She was referred here for further management.  She denies any pain.  She has not had any fever or chills.  She has had moderate drainage.         Current Assessment & Plan     Edema better controlled. Wound remains clean and granulating with minimal slough. Continue debridement is needed. Continue Mesalt. Continue compression          Diabetic ulcer of right great toe - Primary    Overview     Patient with new wound today.  Patient with history of neuropathy.  Wound is superficial with no sign of infection.  No erythema or drainage         Current Assessment & Plan     Wound clean. Some surrounding callus formation with undermining. No sign of infection. Sharp debridement is needed. Continue collagen             See wound doc progress notes. Documents will be scanned.        Latrell Salazar Jr  [facility]

## 2024-08-12 NOTE — TELEPHONE ENCOUNTER
Diagnoses and all orders for this visit:    Intertrigo  -     cephALEXin (KEFLEX) 500 MG capsule; Take 1 capsule (500 mg total) by mouth 4 (four) times daily.

## 2024-08-12 NOTE — TELEPHONE ENCOUNTER
----- Message from Nancy Mcmullen MA sent at 2024  9:16 AM CDT -----  Stephany Skinner  MRN: [id number]  : 1935  PCP: Pipo Flowers  Home Phone      [phone]  Work Phone      Not on file.  Mobile          [phone]      MESSAGE:     Patient c./o Candidiasis Intertrigo, same like she had last year,    She has refills on the cream and powder but wants the antibiotics as well.  She was give Keflex which helped clear it up. Can this be sent in for her ?    Send to [facility] ([place])

## 2024-08-19 ENCOUNTER — OFFICE VISIT (OUTPATIENT)
Dept: WOUND CARE | Facility: HOSPITAL | Age: 89
End: 2024-08-19
Attending: SURGERY
Payer: MEDICARE

## 2024-08-19 VITALS — HEART RATE: 88 BPM | SYSTOLIC BLOOD PRESSURE: 150 MMHG | DIASTOLIC BLOOD PRESSURE: 72 MMHG

## 2024-08-19 DIAGNOSIS — L97.519 DIABETIC ULCER OF RIGHT GREAT TOE: ICD-10-CM

## 2024-08-19 DIAGNOSIS — E11.621 DIABETIC ULCER OF RIGHT GREAT TOE: ICD-10-CM

## 2024-08-19 DIAGNOSIS — L97.222: Primary | ICD-10-CM

## 2024-08-19 PROCEDURE — 11042 DBRDMT SUBQ TIS 1ST 20SQCM/<: CPT

## 2024-08-19 PROCEDURE — 99499 UNLISTED E&M SERVICE: CPT | Mod: ,,, | Performed by: SURGERY

## 2024-08-19 NOTE — PROGRESS NOTES
[facility]  Wound Care  Progress Note    Problem List Items Addressed This Visit       Non-pressure chronic ulcer of calf, left, with fat layer exposed - Primary    Overview     89-year-old female presented to the wound clinic on 07/08/2024 with a history of chronic venous hypertension developed a wound of the left lateral lower leg proximally 4 weeks ago.  The wound has enlarged.  She was evaluated by her primary physician who did apply a cream.  She was referred here for further management.  She denies any pain.  She has not had any fever or chills.  She has had moderate drainage.         Current Assessment & Plan     Wound remains clean.  Minimal slough present.  Moderate granulation tissue.  Edema better controlled.  Continued sharp debridement is needed.  Continue Mesalt dressing.  Continue 2 layer compression         Diabetic ulcer of right great toe    Overview     Patient with new wound today.  Patient with history of neuropathy.  Wound is superficial with no sign of infection.  No erythema or drainage         Current Assessment & Plan     Wound is clean and granulating.  Decreased callus formation.  No sign of infection.  Continued sharp debridement and offloading.  Worsening edema right lower extremity.  Will start compression            See wound doc progress notes. Documents will be scanned.        Td Pascal  [facility]

## 2024-08-19 NOTE — ASSESSMENT & PLAN NOTE
Wound is clean and granulating.  Decreased callus formation.  No sign of infection.  Continued sharp debridement and offloading.  Worsening edema right lower extremity.  Will start compression

## 2024-08-19 NOTE — ASSESSMENT & PLAN NOTE
Wound remains clean.  Minimal slough present.  Moderate granulation tissue.  Edema better controlled.  Continued sharp debridement is needed.  Continue Mesalt dressing.  Continue 2 layer compression

## 2024-08-23 ENCOUNTER — TELEPHONE (OUTPATIENT)
Dept: INTERNAL MEDICINE | Facility: CLINIC | Age: 89
End: 2024-08-23
Payer: MEDICARE

## 2024-08-23 NOTE — TELEPHONE ENCOUNTER
----- Message from Julio Cesar Leyva sent at 2024  1:23 PM CDT -----  Contact: Arleen Preciadocira Donnellyne  MRN: [id number]  : 1935  PCP: Pipo Flowers  Home Phone      [phone]  Work Phone      Not on file.  Mobile          [phone]      MESSAGE:   Patient pulse is     [phone]

## 2024-08-26 ENCOUNTER — OFFICE VISIT (OUTPATIENT)
Dept: WOUND CARE | Facility: HOSPITAL | Age: 89
End: 2024-08-26
Attending: SURGERY
Payer: MEDICARE

## 2024-08-26 VITALS — DIASTOLIC BLOOD PRESSURE: 68 MMHG | HEART RATE: 70 BPM | SYSTOLIC BLOOD PRESSURE: 144 MMHG

## 2024-08-26 DIAGNOSIS — I87.8 VENOUS STASIS OF LOWER EXTREMITY: Primary | ICD-10-CM

## 2024-08-26 DIAGNOSIS — L97.519 DIABETIC ULCER OF RIGHT GREAT TOE: ICD-10-CM

## 2024-08-26 DIAGNOSIS — L97.222: ICD-10-CM

## 2024-08-26 DIAGNOSIS — E11.621 DIABETIC ULCER OF RIGHT GREAT TOE: ICD-10-CM

## 2024-08-26 PROCEDURE — 11042 DBRDMT SUBQ TIS 1ST 20SQCM/<: CPT

## 2024-08-26 PROCEDURE — 99499 UNLISTED E&M SERVICE: CPT | Mod: ,,, | Performed by: SURGERY

## 2024-08-26 NOTE — PROGRESS NOTES
[facility]  Wound Care  Progress Note    Problem List Items Addressed This Visit       Venous stasis of lower extremity - Primary    Overview     Continue support stockings.  Place every morning and remove at night.         Current Assessment & Plan     Continue compression         Non-pressure chronic ulcer of calf, left, with fat layer exposed    Overview     89-year-old female presented to the wound clinic on 07/08/2024 with a history of chronic venous hypertension developed a wound of the left lateral lower leg proximally 4 weeks ago.  The wound has enlarged.  She was evaluated by her primary physician who did apply a cream.  She was referred here for further management.  She denies any pain.  She has not had any fever or chills.  She has had moderate drainage.         Current Assessment & Plan     Wound remains clean. Minimal slough present. Moderate granulation tissue. Edema better controlled. Continued sharp debridement is needed. Continue Mesalt dressing. Continue 2 layer compression          Diabetic ulcer of right great toe    Overview     Patient with new wound today.  Patient with history of neuropathy.  Wound is superficial with no sign of infection.  No erythema or drainage         Current Assessment & Plan     Wound with some maceration.  Decreased callus formation.  No sign of infection.  No bone or tendon involvement.  Continued sharp debridement and offloading.  Mesalt.               See wound doc progress notes. Documents will be scanned.        Latrell Salazar Jr  [facility]

## 2024-08-26 NOTE — ASSESSMENT & PLAN NOTE
Wound with some maceration.  Decreased callus formation.  No sign of infection.  No bone or tendon involvement.  Continued sharp debridement and offloading.  Mesalt.

## 2024-08-31 ENCOUNTER — EXTERNAL CHRONIC CARE MANAGEMENT (OUTPATIENT)
Dept: PRIMARY CARE CLINIC | Facility: CLINIC | Age: 89
End: 2024-08-31
Payer: MEDICARE

## 2024-08-31 PROCEDURE — 99490 CHRNC CARE MGMT STAFF 1ST 20: CPT | Mod: S$PBB | Performed by: FAMILY MEDICINE

## 2024-08-31 PROCEDURE — 99999 PR STA SHADOW: CPT | Mod: PBBFAC,,, | Performed by: FAMILY MEDICINE

## 2024-09-09 ENCOUNTER — OFFICE VISIT (OUTPATIENT)
Dept: WOUND CARE | Facility: HOSPITAL | Age: 89
End: 2024-09-09
Attending: SURGERY
Payer: MEDICARE

## 2024-09-09 VITALS — SYSTOLIC BLOOD PRESSURE: 140 MMHG | DIASTOLIC BLOOD PRESSURE: 60 MMHG | HEART RATE: 67 BPM

## 2024-09-09 DIAGNOSIS — M20.31 HALLUX HAMMERTOE, RIGHT: ICD-10-CM

## 2024-09-09 DIAGNOSIS — L97.519 DIABETIC ULCER OF RIGHT GREAT TOE: Primary | ICD-10-CM

## 2024-09-09 DIAGNOSIS — L97.222: ICD-10-CM

## 2024-09-09 DIAGNOSIS — E11.621 DIABETIC ULCER OF RIGHT GREAT TOE: Primary | ICD-10-CM

## 2024-09-09 DIAGNOSIS — L97.512 ULCER OF TOE OF RIGHT FOOT, WITH FAT LAYER EXPOSED: ICD-10-CM

## 2024-09-09 DIAGNOSIS — I87.312 CHRONIC VENOUS HYPERTENSION (IDIOPATHIC) WITH ULCER OF LEFT LOWER EXTREMITY (CODE): ICD-10-CM

## 2024-09-09 PROCEDURE — 11042 DBRDMT SUBQ TIS 1ST 20SQCM/<: CPT

## 2024-09-09 PROCEDURE — 99499 UNLISTED E&M SERVICE: CPT | Mod: ,,, | Performed by: SURGERY

## 2024-09-09 NOTE — ASSESSMENT & PLAN NOTE
Wound slightly improved.    Excisional debridement performed to remove nonviable slough and rolling edges.  Patient appears to have somewhat of a hammertoe deformity.    Utilize hammertoe pad for offloading.  Begin silver alginate.

## 2024-09-09 NOTE — ASSESSMENT & PLAN NOTE
Wound slightly improved.    Continue debridement to remove nonviable slough and maintain active phase wound healing.    Begin silver alginate.

## 2024-09-09 NOTE — PROGRESS NOTES
[facility]  Wound Care  Progress Note    Problem List Items Addressed This Visit          Endocrine    Diabetic ulcer of right great toe - Primary    Overview     Patient with new wound 7/15/2024 .  Patient with history of neuropathy.  Wound is superficial with no sign of infection.  No erythema or drainage         Current Assessment & Plan     Wound slightly improved.    Excisional debridement performed to remove nonviable slough and rolling edges.  Patient appears to have somewhat of a hammertoe deformity.    Utilize hammertoe pad for offloading.  Begin silver alginate.            Orthopedic    Non-pressure chronic ulcer of calf, left, with fat layer exposed    Overview     89-year-old female presented to the wound clinic on 07/08/2024 with a history of chronic venous hypertension developed a wound of the left lateral lower leg proximally 4 weeks ago.  The wound has enlarged.  She was evaluated by her primary physician who did apply a cream.  She was referred here for further management.  She denies any pain.  She has not had any fever or chills.  She has had moderate drainage.  As of 09/09/2024, the wound is healed         Current Assessment & Plan     Wound is healed.    Vaseline to the wound daily for 4 weeks.         Ulcer of great toe of right foot, with fat layer exposed    Overview     Patient with diabetic ulcer of the right great toe.  She has slight hammertoe deformity.         Current Assessment & Plan     Wound slightly improved.    Continue debridement to remove nonviable slough and maintain active phase wound healing.    Begin silver alginate.         Hallux hammertoe, right    Overview     Patient with slight hammertoe deformity of the right great toe.    Patient has had hammertoe deformities of toes 2 and 3 previously surgically corrected.         Current Assessment & Plan     Begin use of hammertoe pad            Other    Chronic venous hypertension (idiopathic) with ulcer of  left lower extremity (CODE)    Current Assessment & Plan     .  Two layer wraps.    Begin compression stockings.            See wound doc progress notes. Documents will be scanned.        Rodney STEWART Nino  [facility]

## 2024-09-15 DIAGNOSIS — B37.2 INTERTRIGINOUS CANDIDIASIS: ICD-10-CM

## 2024-09-16 ENCOUNTER — OFFICE VISIT (OUTPATIENT)
Dept: WOUND CARE | Facility: HOSPITAL | Age: 89
End: 2024-09-16
Attending: SURGERY
Payer: MEDICARE

## 2024-09-16 VITALS — DIASTOLIC BLOOD PRESSURE: 68 MMHG | HEART RATE: 68 BPM | SYSTOLIC BLOOD PRESSURE: 142 MMHG

## 2024-09-16 DIAGNOSIS — L97.512 ULCER OF TOE OF RIGHT FOOT, WITH FAT LAYER EXPOSED: Primary | ICD-10-CM

## 2024-09-16 DIAGNOSIS — L97.519 DIABETIC ULCER OF RIGHT GREAT TOE: ICD-10-CM

## 2024-09-16 DIAGNOSIS — E11.621 DIABETIC ULCER OF RIGHT GREAT TOE: ICD-10-CM

## 2024-09-16 PROCEDURE — 11042 DBRDMT SUBQ TIS 1ST 20SQCM/<: CPT

## 2024-09-16 PROCEDURE — 99499 UNLISTED E&M SERVICE: CPT | Mod: ,,, | Performed by: SURGERY

## 2024-09-16 RX ORDER — NYSTATIN AND TRIAMCINOLONE ACETONIDE 100000; 1 [USP'U]/G; MG/G
CREAM TOPICAL
Qty: 60 G | Refills: 0 | Status: SHIPPED | OUTPATIENT
Start: 2024-09-16

## 2024-09-16 NOTE — TELEPHONE ENCOUNTER
LOV:  07/01/2024  Pt requesting refill of:   nystatin-triamcinolone (MYCOLOG II) cream   Medication pended     Please advise

## 2024-09-16 NOTE — ASSESSMENT & PLAN NOTE
Wound slightly improved.    Continue debridement to remove nonviable slough and maintain active phase wound healing.    Continue silver alginate.

## 2024-09-16 NOTE — PROGRESS NOTES
[facility]  Wound Care  Progress Note    Problem List Items Addressed This Visit       Ulcer of great toe of right foot, with fat layer exposed - Primary    Overview     Patient with diabetic ulcer of the right great toe.  She has slight hammertoe deformity.         Current Assessment & Plan     Wound slightly improved.    Continue debridement to remove nonviable slough and maintain active phase wound healing.    Continue silver alginate.            See wound doc progress notes. Documents will be scanned.        Latrell Salazar Jr  [facility]

## 2024-09-23 ENCOUNTER — OFFICE VISIT (OUTPATIENT)
Dept: WOUND CARE | Facility: HOSPITAL | Age: 89
End: 2024-09-23
Attending: SURGERY
Payer: MEDICARE

## 2024-09-23 DIAGNOSIS — L97.519 DIABETIC ULCER OF RIGHT GREAT TOE: Primary | ICD-10-CM

## 2024-09-23 DIAGNOSIS — E11.621 DIABETIC ULCER OF RIGHT GREAT TOE: Primary | ICD-10-CM

## 2024-09-23 PROCEDURE — 11042 DBRDMT SUBQ TIS 1ST 20SQCM/<: CPT

## 2024-09-23 PROCEDURE — 99499 UNLISTED E&M SERVICE: CPT | Mod: ,,, | Performed by: SURGERY

## 2024-09-23 NOTE — ASSESSMENT & PLAN NOTE
Wound improved.    Excisional debridement performed to remove nonviable slough and surrounding callus.  Continue offloading with hammertoe pad.

## 2024-09-23 NOTE — PROGRESS NOTES
[facility]  Wound Care  Progress Note    Problem List Items Addressed This Visit          Endocrine    Diabetic ulcer of right great toe - Primary    Overview     Patient with new wound 7/15/2024 .  Patient with history of neuropathy.  Wound is superficial with no sign of infection.  No erythema or drainage         Current Assessment & Plan     Wound improved.    Excisional debridement performed to remove nonviable slough and surrounding callus.  Continue offloading with hammertoe pad.            See wound doc progress notes. Documents will be scanned.        Rodney Nino  [facility]

## 2024-09-26 VITALS
TEMPERATURE: 98 F | HEART RATE: 82 BPM | DIASTOLIC BLOOD PRESSURE: 78 MMHG | RESPIRATION RATE: 18 BRPM | SYSTOLIC BLOOD PRESSURE: 137 MMHG

## 2024-09-30 ENCOUNTER — OFFICE VISIT (OUTPATIENT)
Dept: WOUND CARE | Facility: HOSPITAL | Age: 89
End: 2024-09-30
Attending: SURGERY
Payer: MEDICARE

## 2024-09-30 ENCOUNTER — EXTERNAL CHRONIC CARE MANAGEMENT (OUTPATIENT)
Dept: PRIMARY CARE CLINIC | Facility: CLINIC | Age: 89
End: 2024-09-30
Payer: MEDICARE

## 2024-09-30 VITALS
DIASTOLIC BLOOD PRESSURE: 77 MMHG | TEMPERATURE: 99 F | HEART RATE: 85 BPM | RESPIRATION RATE: 18 BRPM | SYSTOLIC BLOOD PRESSURE: 131 MMHG

## 2024-09-30 DIAGNOSIS — E11.621 DIABETIC ULCER OF RIGHT GREAT TOE: Primary | ICD-10-CM

## 2024-09-30 DIAGNOSIS — L97.519 DIABETIC ULCER OF RIGHT GREAT TOE: Primary | ICD-10-CM

## 2024-09-30 PROCEDURE — 99999 PR STA SHADOW: CPT | Mod: PBBFAC,,, | Performed by: FAMILY MEDICINE

## 2024-09-30 PROCEDURE — 11042 DBRDMT SUBQ TIS 1ST 20SQCM/<: CPT

## 2024-09-30 PROCEDURE — 99499 UNLISTED E&M SERVICE: CPT | Mod: ,,, | Performed by: SURGERY

## 2024-09-30 PROCEDURE — 99490 CHRNC CARE MGMT STAFF 1ST 20: CPT | Mod: S$PBB | Performed by: FAMILY MEDICINE

## 2024-09-30 NOTE — PROGRESS NOTES
[facility]  Wound Care  Progress Note    Problem List Items Addressed This Visit       Diabetic ulcer of right great toe - Primary    Overview     Patient with new wound 7/15/2024 .  Patient with history of neuropathy.  Wound is superficial with no sign of infection.  No erythema or drainage         Current Assessment & Plan     Wound improved.    Excisional debridement performed to remove nonviable slough and surrounding callus.  Continue silver alginate.  Continue offloading with hammertoe pad.            See wound doc progress notes. Documents will be scanned.        Latrell Salazar Jr  [facility]

## 2024-09-30 NOTE — ASSESSMENT & PLAN NOTE
Wound improved.    Excisional debridement performed to remove nonviable slough and surrounding callus.  Continue silver alginate.  Continue offloading with hammertoe pad.

## 2024-10-04 ENCOUNTER — EXTERNAL HOME HEALTH (OUTPATIENT)
Dept: HOME HEALTH SERVICES | Facility: HOSPITAL | Age: 89
End: 2024-10-04
Payer: MEDICARE

## 2024-10-07 ENCOUNTER — OFFICE VISIT (OUTPATIENT)
Dept: WOUND CARE | Facility: HOSPITAL | Age: 89
End: 2024-10-07
Attending: SURGERY
Payer: MEDICARE

## 2024-10-07 VITALS — HEART RATE: 76 BPM | DIASTOLIC BLOOD PRESSURE: 78 MMHG | SYSTOLIC BLOOD PRESSURE: 130 MMHG

## 2024-10-07 DIAGNOSIS — L97.929 IDIOPATHIC CHRONIC VENOUS HYPERTENSION OF LEFT LOWER EXTREMITY WITH ULCER: ICD-10-CM

## 2024-10-07 DIAGNOSIS — L97.822 NON-PRESSURE CHRONIC ULCER OF OTHER PART OF LEFT LOWER LEG WITH FAT LAYER EXPOSED: ICD-10-CM

## 2024-10-07 DIAGNOSIS — I87.312 IDIOPATHIC CHRONIC VENOUS HYPERTENSION OF LEFT LOWER EXTREMITY WITH ULCER: ICD-10-CM

## 2024-10-07 DIAGNOSIS — L97.519 DIABETIC ULCER OF RIGHT GREAT TOE: Primary | ICD-10-CM

## 2024-10-07 DIAGNOSIS — E11.621 DIABETIC ULCER OF RIGHT GREAT TOE: Primary | ICD-10-CM

## 2024-10-07 DIAGNOSIS — I83.022 VENOUS STASIS ULCER OF LEFT CALF LIMITED TO BREAKDOWN OF SKIN WITH VARICOSE VEINS: ICD-10-CM

## 2024-10-07 DIAGNOSIS — L97.221 VENOUS STASIS ULCER OF LEFT CALF LIMITED TO BREAKDOWN OF SKIN WITH VARICOSE VEINS: ICD-10-CM

## 2024-10-07 PROCEDURE — 11042 DBRDMT SUBQ TIS 1ST 20SQCM/<: CPT

## 2024-10-07 PROCEDURE — 99499 UNLISTED E&M SERVICE: CPT | Mod: ,,, | Performed by: SURGERY

## 2024-10-07 NOTE — PROGRESS NOTES
[facility]  Wound Care  Progress Note    Problem List Items Addressed This Visit       Diabetic ulcer of right great toe - Primary    Overview     Patient with new wound 7/15/2024 .  Patient with history of neuropathy.  Wound is superficial with no sign of infection.  No erythema or drainage         Current Assessment & Plan     Wound macerated.  Debridement performed.  Exposed tendon at the base of the wound.  No sign of infection.  Continue silver dressing.  Continue offloading.         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  Patient with chronic lower extremity edema.  Patient has been wearing compression stockings.  No need for debridement today.  Will cover with Mepilex Ag pad and continue compression therapy.  Patient encouraged to elevate.            See wound doc progress notes. Documents will be scanned.        Td Pascal  [facility]

## 2024-10-13 DIAGNOSIS — B37.2 INTERTRIGINOUS CANDIDIASIS: ICD-10-CM

## 2024-10-14 ENCOUNTER — OFFICE VISIT (OUTPATIENT)
Dept: WOUND CARE | Facility: HOSPITAL | Age: 89
End: 2024-10-14
Attending: SURGERY
Payer: MEDICARE

## 2024-10-14 VITALS
SYSTOLIC BLOOD PRESSURE: 127 MMHG | DIASTOLIC BLOOD PRESSURE: 74 MMHG | TEMPERATURE: 98 F | RESPIRATION RATE: 18 BRPM | HEART RATE: 79 BPM

## 2024-10-14 DIAGNOSIS — L97.222: ICD-10-CM

## 2024-10-14 DIAGNOSIS — L97.522 ULCER OF TOE OF LEFT FOOT, WITH FAT LAYER EXPOSED: ICD-10-CM

## 2024-10-14 DIAGNOSIS — E11.621 DIABETIC ULCER OF TOE OF RIGHT FOOT ASSOCIATED WITH TYPE 2 DIABETES MELLITUS, WITH NECROSIS OF MUSCLE: Primary | ICD-10-CM

## 2024-10-14 DIAGNOSIS — L97.513 DIABETIC ULCER OF TOE OF RIGHT FOOT ASSOCIATED WITH TYPE 2 DIABETES MELLITUS, WITH NECROSIS OF MUSCLE: Primary | ICD-10-CM

## 2024-10-14 DIAGNOSIS — L97.221 VENOUS STASIS ULCER OF LEFT CALF LIMITED TO BREAKDOWN OF SKIN WITH VARICOSE VEINS: ICD-10-CM

## 2024-10-14 DIAGNOSIS — I87.312 CHRONIC VENOUS HYPERTENSION (IDIOPATHIC) WITH ULCER OF LEFT LOWER EXTREMITY (CODE): ICD-10-CM

## 2024-10-14 DIAGNOSIS — I83.022 VENOUS STASIS ULCER OF LEFT CALF LIMITED TO BREAKDOWN OF SKIN WITH VARICOSE VEINS: ICD-10-CM

## 2024-10-14 DIAGNOSIS — M20.31 HALLUX HAMMERTOE, RIGHT: ICD-10-CM

## 2024-10-14 PROBLEM — L97.512 ULCER OF TOE OF RIGHT FOOT, WITH FAT LAYER EXPOSED: Status: RESOLVED | Noted: 2024-09-09 | Resolved: 2024-10-14

## 2024-10-14 PROCEDURE — 11043 DBRDMT MUSC&/FSCA 1ST 20/<: CPT

## 2024-10-14 PROCEDURE — 99499 UNLISTED E&M SERVICE: CPT | Mod: ,,, | Performed by: SURGERY

## 2024-10-14 PROCEDURE — 11042 DBRDMT SUBQ TIS 1ST 20SQCM/<: CPT

## 2024-10-14 PROCEDURE — 99213 OFFICE O/P EST LOW 20 MIN: CPT | Mod: 25

## 2024-10-14 RX ORDER — NYSTATIN AND TRIAMCINOLONE ACETONIDE 100000; 1 [USP'U]/G; MG/G
CREAM TOPICAL
Qty: 60 G | Refills: 0 | Status: SHIPPED | OUTPATIENT
Start: 2024-10-14 | End: 2024-10-15

## 2024-10-14 NOTE — ASSESSMENT & PLAN NOTE
Wound appears worse.  Wound is deeper with nonviable fascia and base of the wound.    Excisional debridement performed of nonviable fascia.    The patient indicates she has been walking at home with socks and no shoes.  She has not obtained hammertoe pads because of an issue with [employer].    Patient will obtain hammertoe pads from another vendor.    Patient provided with a Darco shoe for the right foot the provide better offloading.

## 2024-10-14 NOTE — ASSESSMENT & PLAN NOTE
Excisional debridement performed nonviable callus and subcutaneous tissue.    Begin offloading with hammertoe pad.

## 2024-10-14 NOTE — ASSESSMENT & PLAN NOTE
Patient provided with Darco shoe.    She has been encouraged to utilize hammertoe pads.    Patient instructed to wear shoe at all times.

## 2024-10-14 NOTE — ASSESSMENT & PLAN NOTE
Excisional debridement performed nonviable slough into the subcutaneous tissue.  Continue compression therapy.

## 2024-10-14 NOTE — PROGRESS NOTES
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  Patient with history of neuropathy.  Wound is superficial with no sign of infection.  No erythema or drainage         Current Assessment & Plan     Wound appears worse.  Wound is deeper with nonviable fascia and base of the wound.    Excisional debridement performed of nonviable fascia.    The patient indicates she has been walking at home with socks and no shoes.  She has not obtained hammertoe pads because of an issue with [employer].    Patient will obtain hammertoe pads from another vendor.    Patient provided with a Darco shoe for the right foot the provide better offloading.            Orthopedic    Non-pressure chronic ulcer of calf, left, with fat layer exposed    Overview     89-year-old female presented to the wound clinic on 07/08/2024 with a history of chronic venous hypertension developed a wound of the left lateral lower leg proximally 4 weeks ago.  The wound has enlarged.  She was evaluated by her primary physician who did apply a cream.  She was referred here for further management.  She denies any pain.  She has not had any fever or chills.  She has had moderate drainage.  As of 09/09/2024, the wound is healed         Hallux hammertoe, right    Overview     Patient with slight hammertoe deformity of the right great toe.    Patient has had hammertoe deformities of toes 2 and 3 previously surgically corrected.         Current Assessment & Plan     Patient provided with Darco shoe.    She has been encouraged to utilize hammertoe pads.    Patient instructed to wear shoe at all times.         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  Patient with chronic lower extremity edema.  Patient has been wearing compression stockings.  No need for debridement today.  Will cover with Mepilex Ag pad and continue compression therapy.  Patient encouraged to elevate.         Current Assessment & Plan     Excisional debridement performed nonviable slough into the subcutaneous tissue.  Continue compression therapy.         Ulcer of great toe of left foot, with fat layer exposed    Overview     Patient presented to wound clinic on 10/14/2024 with a new wound on the left great toe.  There was callus with underlying wound and fluid.  Callus was removed and subcutaneous tissue was exposed.  The base was pink.  Patient is walking on her toe tip without shoes.  She was instructed to obtain a hammertoe pad and utilize she comes in all times.         Current Assessment & Plan     Excisional debridement performed nonviable callus and subcutaneous tissue.    Begin offloading with hammertoe pad.            Other    Chronic venous hypertension (idiopathic) with ulcer of left lower extremity (CODE)       See wound doc progress notes. Documents will be scanned.        Rodney STEWART Hebert [facility]

## 2024-10-15 DIAGNOSIS — B37.2 INTERTRIGINOUS CANDIDIASIS: ICD-10-CM

## 2024-10-15 RX ORDER — NYSTATIN AND TRIAMCINOLONE ACETONIDE 100000; 1 [USP'U]/G; MG/G
CREAM TOPICAL
Qty: 60 G | Refills: 0 | Status: SHIPPED | OUTPATIENT
Start: 2024-10-15

## 2024-10-15 NOTE — TELEPHONE ENCOUNTER
LOV: 07/01/2024    Patient requesting refill of: nystatin-triamcinolone (MYCOLOG II) cream     Medication pended    Please advise

## 2024-10-18 ENCOUNTER — OFFICE VISIT (OUTPATIENT)
Dept: INTERNAL MEDICINE | Facility: CLINIC | Age: 89
End: 2024-10-18
Payer: MEDICARE

## 2024-10-18 VITALS
DIASTOLIC BLOOD PRESSURE: 84 MMHG | HEART RATE: 87 BPM | HEIGHT: 67 IN | SYSTOLIC BLOOD PRESSURE: 132 MMHG | RESPIRATION RATE: 20 BRPM | BODY MASS INDEX: 35.98 KG/M2 | WEIGHT: 229.25 LBS | OXYGEN SATURATION: 95 %

## 2024-10-18 DIAGNOSIS — Z23 NEED FOR VACCINATION: ICD-10-CM

## 2024-10-18 DIAGNOSIS — B37.2 CANDIDAL INTERTRIGO: Primary | ICD-10-CM

## 2024-10-18 PROCEDURE — 90653 IIV ADJUVANT VACCINE IM: CPT | Mod: PBBFAC,PN

## 2024-10-18 PROCEDURE — 99999PBSHW FLU VACCINE - ADJUVANTED: Mod: PBBFAC,,,

## 2024-10-18 PROCEDURE — 99213 OFFICE O/P EST LOW 20 MIN: CPT | Mod: S$PBB,,, | Performed by: NURSE PRACTITIONER

## 2024-10-18 PROCEDURE — G0008 ADMIN INFLUENZA VIRUS VAC: HCPCS | Mod: PBBFAC,PN

## 2024-10-18 PROCEDURE — 99215 OFFICE O/P EST HI 40 MIN: CPT | Mod: PBBFAC,PN | Performed by: NURSE PRACTITIONER

## 2024-10-18 PROCEDURE — 99999 PR PBB SHADOW E&M-EST. PATIENT-LVL V: CPT | Mod: PBBFAC,,, | Performed by: NURSE PRACTITIONER

## 2024-10-18 RX ORDER — CEPHALEXIN 500 MG/1
500 CAPSULE ORAL EVERY 12 HOURS
Qty: 14 CAPSULE | Refills: 0 | Status: SHIPPED | OUTPATIENT
Start: 2024-10-18 | End: 2024-10-25

## 2024-10-18 NOTE — PROGRESS NOTES
"Subjective:       Patient ID: Stephany Skinner is a 89 y.o. female.    Chief Complaint: Rash (Ongoing- x comes and goes )    Patient is known, to me and presents with   Chief Complaint   Patient presents with    Rash     Ongoing- x comes and goes    .  Denies chest pain and shortness of breath.  Patient presents with intertrigo and thinks she needs more antibx. States that she would like to see derm. She uses the topicals and also powder with some relief but keeps coming back.        Rash      Review of Systems   Constitutional: Negative.    Respiratory: Negative.     Cardiovascular: Negative.    Skin:  Positive for rash.       Objective:      Physical Exam  Constitutional:       General: She is not in acute distress.     Appearance: Normal appearance. She is obese. She is not ill-appearing, toxic-appearing or diaphoretic.   Cardiovascular:      Rate and Rhythm: Normal rate and regular rhythm.      Heart sounds: Normal heart sounds. No murmur heard.  Pulmonary:      Effort: Pulmonary effort is normal. No respiratory distress.      Breath sounds: Normal breath sounds. No stridor. No wheezing, rhonchi or rales.   Chest:      Chest wall: No tenderness.   Skin:     General: Skin is warm and dry.      Capillary Refill: Capillary refill takes less than 2 seconds.      Coloration: Skin is not jaundiced or pale.      Findings: Erythema and rash present. No bruising or lesion.             Comments: Intertrigo to abdominal folds and groin. Along buttocks also, macerated looking with serous drainage noted. No odor   Neurological:      Mental Status: She is alert.         Assessment:       1. Candidal intertrigo        Plan:   1. Candidal intertrigo  -     cephALEXin (KEFLEX) 500 MG capsule; Take 1 capsule (500 mg total) by mouth every 12 (twelve) hours. for 7 days  Dispense: 14 capsule; Refill: 0  -     Ambulatory referral/consult to Dermatology; Future; Expected date: 10/25/2024       "This note will not be shared with the " "patient."    Refer to derm    Continue to use topicals    Rtc as scheduled   "

## 2024-10-21 ENCOUNTER — DOCUMENT SCAN (OUTPATIENT)
Dept: HOME HEALTH SERVICES | Facility: HOSPITAL | Age: 89
End: 2024-10-21
Payer: MEDICARE

## 2024-10-21 ENCOUNTER — OFFICE VISIT (OUTPATIENT)
Dept: WOUND CARE | Facility: HOSPITAL | Age: 89
End: 2024-10-21
Attending: SURGERY
Payer: MEDICARE

## 2024-10-21 VITALS
SYSTOLIC BLOOD PRESSURE: 127 MMHG | HEART RATE: 83 BPM | RESPIRATION RATE: 18 BRPM | TEMPERATURE: 98 F | DIASTOLIC BLOOD PRESSURE: 78 MMHG

## 2024-10-21 DIAGNOSIS — E11.621 DIABETIC ULCER OF TOE OF RIGHT FOOT ASSOCIATED WITH TYPE 2 DIABETES MELLITUS, WITH NECROSIS OF MUSCLE: Primary | ICD-10-CM

## 2024-10-21 DIAGNOSIS — L97.522 ULCER OF TOE OF LEFT FOOT, WITH FAT LAYER EXPOSED: ICD-10-CM

## 2024-10-21 DIAGNOSIS — I87.312 CHRONIC VENOUS HYPERTENSION (IDIOPATHIC) WITH ULCER OF LEFT LOWER EXTREMITY (CODE): ICD-10-CM

## 2024-10-21 DIAGNOSIS — L97.513 DIABETIC ULCER OF TOE OF RIGHT FOOT ASSOCIATED WITH TYPE 2 DIABETES MELLITUS, WITH NECROSIS OF MUSCLE: Primary | ICD-10-CM

## 2024-10-21 PROCEDURE — 99499 UNLISTED E&M SERVICE: CPT | Mod: ,,, | Performed by: SURGERY

## 2024-10-21 PROCEDURE — 11042 DBRDMT SUBQ TIS 1ST 20SQCM/<: CPT

## 2024-10-21 NOTE — ASSESSMENT & PLAN NOTE
Wound with moderate slough present.  No sign of infection.  Debridement performed today.  Patient has obtained offloading shoe.  Continue debridement is needed.  Continue offloading

## 2024-10-21 NOTE — ASSESSMENT & PLAN NOTE
Wound macerated.  Skin removed to reveal a much larger wound of the left great toe.  Moderate slough present.  Continue silver dressing continue offloading

## 2024-10-21 NOTE — PROGRESS NOTES
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  Patient with history of neuropathy.  Wound is superficial with no sign of infection.  No erythema or drainage         Current Assessment & Plan     Wound with moderate slough present.  No sign of infection.  Debridement performed today.  Patient has obtained offloading shoe.  Continue debridement is needed.  Continue offloading         Ulcer of great toe of left foot, with fat layer exposed    Overview     Patient presented to wound clinic on 10/14/2024 with a new wound on the left great toe.  There was callus with underlying wound and fluid.  Callus was removed and subcutaneous tissue was exposed.  The base was pink.  Patient is walking on her toe tip without shoes.  She was instructed to obtain a hammertoe pad and utilize she comes in all times.         Current Assessment & Plan     Wound macerated.  Skin removed to reveal a much larger wound of the left great toe.  Moderate slough present.  Continue silver dressing continue offloading            See wound doc progress notes. Documents will be scanned.        Td Pascal  [facility]

## 2024-10-25 ENCOUNTER — LAB VISIT (OUTPATIENT)
Dept: LAB | Facility: HOSPITAL | Age: 89
End: 2024-10-25
Attending: INTERNAL MEDICINE
Payer: MEDICARE

## 2024-10-25 DIAGNOSIS — E11.22 TYPE 2 DIABETES MELLITUS WITH END-STAGE RENAL DISEASE: Primary | ICD-10-CM

## 2024-10-25 DIAGNOSIS — N18.6 TYPE 2 DIABETES MELLITUS WITH END-STAGE RENAL DISEASE: Primary | ICD-10-CM

## 2024-10-25 DIAGNOSIS — I50.32 CHRONIC DIASTOLIC HEART FAILURE: ICD-10-CM

## 2024-10-25 LAB
ALBUMIN SERPL BCP-MCNC: 3.6 G/DL (ref 3.5–5.2)
ALBUMIN SERPL BCP-MCNC: 3.6 G/DL (ref 3.5–5.2)
ALP SERPL-CCNC: 58 U/L (ref 40–150)
ALP SERPL-CCNC: 58 U/L (ref 40–150)
ALT SERPL W/O P-5'-P-CCNC: 10 U/L (ref 10–44)
ALT SERPL W/O P-5'-P-CCNC: 10 U/L (ref 10–44)
ANION GAP SERPL CALC-SCNC: 11 MMOL/L (ref 8–16)
AST SERPL-CCNC: 11 U/L (ref 10–40)
AST SERPL-CCNC: 11 U/L (ref 10–40)
BASOPHILS # BLD AUTO: 0.03 K/UL (ref 0–0.2)
BASOPHILS NFR BLD: 0.5 % (ref 0–1.9)
BILIRUB DIRECT SERPL-MCNC: 0.3 MG/DL (ref 0.1–0.3)
BILIRUB SERPL-MCNC: 0.6 MG/DL (ref 0.1–1)
BILIRUB SERPL-MCNC: 0.6 MG/DL (ref 0.1–1)
BNP SERPL-MCNC: 142 PG/ML (ref 0–99)
BUN SERPL-MCNC: 25 MG/DL (ref 8–23)
CALCIUM SERPL-MCNC: 9.1 MG/DL (ref 8.7–10.5)
CHLORIDE SERPL-SCNC: 113 MMOL/L (ref 95–110)
CHOLEST SERPL-MCNC: 121 MG/DL (ref 120–199)
CHOLEST/HDLC SERPL: 3 {RATIO} (ref 2–5)
CO2 SERPL-SCNC: 17 MMOL/L (ref 23–29)
CREAT SERPL-MCNC: 1 MG/DL (ref 0.5–1.4)
DIFFERENTIAL METHOD BLD: ABNORMAL
EOSINOPHIL # BLD AUTO: 0.1 K/UL (ref 0–0.5)
EOSINOPHIL NFR BLD: 1.4 % (ref 0–8)
ERYTHROCYTE [DISTWIDTH] IN BLOOD BY AUTOMATED COUNT: 13.7 % (ref 11.5–14.5)
EST. GFR  (NO RACE VARIABLE): 54 ML/MIN/1.73 M^2
GLUCOSE SERPL-MCNC: 82 MG/DL (ref 70–110)
HCT VFR BLD AUTO: 35.7 % (ref 37–48.5)
HDLC SERPL-MCNC: 40 MG/DL (ref 40–75)
HDLC SERPL: 33.1 % (ref 20–50)
HGB BLD-MCNC: 11.5 G/DL (ref 12–16)
IMM GRANULOCYTES # BLD AUTO: 0.03 K/UL (ref 0–0.04)
IMM GRANULOCYTES NFR BLD AUTO: 0.5 % (ref 0–0.5)
LDLC SERPL CALC-MCNC: 58.2 MG/DL (ref 63–159)
LYMPHOCYTES # BLD AUTO: 0.8 K/UL (ref 1–4.8)
LYMPHOCYTES NFR BLD: 13.8 % (ref 18–48)
MCH RBC QN AUTO: 32.9 PG (ref 27–31)
MCHC RBC AUTO-ENTMCNC: 32.2 G/DL (ref 32–36)
MCV RBC AUTO: 102 FL (ref 82–98)
MONOCYTES # BLD AUTO: 0.6 K/UL (ref 0.3–1)
MONOCYTES NFR BLD: 10.6 % (ref 4–15)
NEUTROPHILS # BLD AUTO: 4.1 K/UL (ref 1.8–7.7)
NEUTROPHILS NFR BLD: 73.2 % (ref 38–73)
NONHDLC SERPL-MCNC: 81 MG/DL
NRBC BLD-RTO: 0 /100 WBC
PLATELET # BLD AUTO: 231 K/UL (ref 150–450)
PMV BLD AUTO: 8.6 FL (ref 9.2–12.9)
POTASSIUM SERPL-SCNC: 4.3 MMOL/L (ref 3.5–5.1)
PROT SERPL-MCNC: 7 G/DL (ref 6–8.4)
PROT SERPL-MCNC: 7 G/DL (ref 6–8.4)
RBC # BLD AUTO: 3.5 M/UL (ref 4–5.4)
SODIUM SERPL-SCNC: 141 MMOL/L (ref 136–145)
T4 FREE SERPL-MCNC: 1.09 NG/DL (ref 0.71–1.51)
TRIGL SERPL-MCNC: 114 MG/DL (ref 30–150)
TSH SERPL DL<=0.005 MIU/L-ACNC: 1.66 UIU/ML (ref 0.4–4)
WBC # BLD AUTO: 5.57 K/UL (ref 3.9–12.7)

## 2024-10-25 PROCEDURE — 84443 ASSAY THYROID STIM HORMONE: CPT | Performed by: INTERNAL MEDICINE

## 2024-10-25 PROCEDURE — 84439 ASSAY OF FREE THYROXINE: CPT | Performed by: INTERNAL MEDICINE

## 2024-10-25 PROCEDURE — 83880 ASSAY OF NATRIURETIC PEPTIDE: CPT | Performed by: INTERNAL MEDICINE

## 2024-10-25 PROCEDURE — 80061 LIPID PANEL: CPT | Performed by: INTERNAL MEDICINE

## 2024-10-25 PROCEDURE — 85025 COMPLETE CBC W/AUTO DIFF WBC: CPT | Performed by: INTERNAL MEDICINE

## 2024-10-25 PROCEDURE — 80053 COMPREHEN METABOLIC PANEL: CPT | Performed by: INTERNAL MEDICINE

## 2024-10-28 ENCOUNTER — OFFICE VISIT (OUTPATIENT)
Dept: WOUND CARE | Facility: HOSPITAL | Age: 89
End: 2024-10-28
Attending: SURGERY
Payer: MEDICARE

## 2024-10-28 VITALS — SYSTOLIC BLOOD PRESSURE: 128 MMHG | HEART RATE: 80 BPM | DIASTOLIC BLOOD PRESSURE: 76 MMHG

## 2024-10-28 DIAGNOSIS — E11.621 DIABETIC ULCER OF TOE OF RIGHT FOOT ASSOCIATED WITH TYPE 2 DIABETES MELLITUS, WITH NECROSIS OF MUSCLE: Primary | ICD-10-CM

## 2024-10-28 DIAGNOSIS — L97.522 ULCER OF TOE OF LEFT FOOT, WITH FAT LAYER EXPOSED: ICD-10-CM

## 2024-10-28 DIAGNOSIS — L97.513 DIABETIC ULCER OF TOE OF RIGHT FOOT ASSOCIATED WITH TYPE 2 DIABETES MELLITUS, WITH NECROSIS OF MUSCLE: Primary | ICD-10-CM

## 2024-10-28 PROCEDURE — 99499 UNLISTED E&M SERVICE: CPT | Mod: ,,, | Performed by: SURGERY

## 2024-10-28 PROCEDURE — 11042 DBRDMT SUBQ TIS 1ST 20SQCM/<: CPT

## 2024-10-31 ENCOUNTER — EXTERNAL CHRONIC CARE MANAGEMENT (OUTPATIENT)
Dept: PRIMARY CARE CLINIC | Facility: CLINIC | Age: 89
End: 2024-10-31
Payer: MEDICARE

## 2024-10-31 PROCEDURE — 99999 PR STA SHADOW: CPT | Mod: PBBFAC,,, | Performed by: FAMILY MEDICINE

## 2024-10-31 PROCEDURE — 99490 CHRNC CARE MGMT STAFF 1ST 20: CPT | Mod: PBBFAC | Performed by: FAMILY MEDICINE

## 2024-11-01 ENCOUNTER — HOSPITAL ENCOUNTER (EMERGENCY)
Facility: HOSPITAL | Age: 89
Discharge: ANOTHER HOSPITAL | End: 2024-11-02
Attending: SURGERY
Payer: MEDICARE

## 2024-11-01 DIAGNOSIS — I73.9 PERIPHERAL VASCULAR DISEASE: ICD-10-CM

## 2024-11-01 DIAGNOSIS — M86.9 OSTEOMYELITIS OF GREAT TOE OF RIGHT FOOT: Primary | ICD-10-CM

## 2024-11-01 DIAGNOSIS — M79.675 GREAT TOE PAIN, LEFT: ICD-10-CM

## 2024-11-01 DIAGNOSIS — L81.9 DISCOLORATION OF SKIN OF TOE: ICD-10-CM

## 2024-11-01 LAB
ALBUMIN SERPL BCP-MCNC: 3.4 G/DL (ref 3.5–5.2)
ALP SERPL-CCNC: 61 U/L (ref 40–150)
ALT SERPL W/O P-5'-P-CCNC: 10 U/L (ref 10–44)
ANION GAP SERPL CALC-SCNC: 12 MMOL/L (ref 8–16)
AST SERPL-CCNC: 12 U/L (ref 10–40)
BASOPHILS # BLD AUTO: 0.02 K/UL (ref 0–0.2)
BASOPHILS NFR BLD: 0.3 % (ref 0–1.9)
BILIRUB SERPL-MCNC: 0.3 MG/DL (ref 0.1–1)
BUN SERPL-MCNC: 29 MG/DL (ref 8–23)
CALCIUM SERPL-MCNC: 9 MG/DL (ref 8.7–10.5)
CHLORIDE SERPL-SCNC: 113 MMOL/L (ref 95–110)
CO2 SERPL-SCNC: 18 MMOL/L (ref 23–29)
CREAT SERPL-MCNC: 1.3 MG/DL (ref 0.5–1.4)
DIFFERENTIAL METHOD BLD: ABNORMAL
EOSINOPHIL # BLD AUTO: 0.2 K/UL (ref 0–0.5)
EOSINOPHIL NFR BLD: 2.1 % (ref 0–8)
ERYTHROCYTE [DISTWIDTH] IN BLOOD BY AUTOMATED COUNT: 13.2 % (ref 11.5–14.5)
ERYTHROCYTE [SEDIMENTATION RATE] IN BLOOD BY WESTERGREN METHOD: 56 MM/HR (ref 0–20)
EST. GFR  (NO RACE VARIABLE): 40 ML/MIN/1.73 M^2
GLUCOSE SERPL-MCNC: 142 MG/DL (ref 70–110)
HCT VFR BLD AUTO: 36.1 % (ref 37–48.5)
HGB BLD-MCNC: 11.7 G/DL (ref 12–16)
IMM GRANULOCYTES # BLD AUTO: 0.05 K/UL (ref 0–0.04)
IMM GRANULOCYTES NFR BLD AUTO: 0.7 % (ref 0–0.5)
LACTATE SERPL-SCNC: 1.4 MMOL/L (ref 0.5–2.2)
LYMPHOCYTES # BLD AUTO: 1 K/UL (ref 1–4.8)
LYMPHOCYTES NFR BLD: 13.9 % (ref 18–48)
MCH RBC QN AUTO: 32.8 PG (ref 27–31)
MCHC RBC AUTO-ENTMCNC: 32.4 G/DL (ref 32–36)
MCV RBC AUTO: 101 FL (ref 82–98)
MONOCYTES # BLD AUTO: 0.7 K/UL (ref 0.3–1)
MONOCYTES NFR BLD: 10.1 % (ref 4–15)
NEUTROPHILS # BLD AUTO: 5.3 K/UL (ref 1.8–7.7)
NEUTROPHILS NFR BLD: 72.9 % (ref 38–73)
NRBC BLD-RTO: 0 /100 WBC
PLATELET # BLD AUTO: 245 K/UL (ref 150–450)
PMV BLD AUTO: 8.4 FL (ref 9.2–12.9)
POTASSIUM SERPL-SCNC: 4.3 MMOL/L (ref 3.5–5.1)
PROT SERPL-MCNC: 7.1 G/DL (ref 6–8.4)
RBC # BLD AUTO: 3.57 M/UL (ref 4–5.4)
SODIUM SERPL-SCNC: 143 MMOL/L (ref 136–145)
WBC # BLD AUTO: 7.26 K/UL (ref 3.9–12.7)

## 2024-11-01 PROCEDURE — 99285 EMERGENCY DEPT VISIT HI MDM: CPT | Mod: 25

## 2024-11-01 PROCEDURE — 96366 THER/PROPH/DIAG IV INF ADDON: CPT

## 2024-11-01 PROCEDURE — 85651 RBC SED RATE NONAUTOMATED: CPT

## 2024-11-01 PROCEDURE — 96365 THER/PROPH/DIAG IV INF INIT: CPT

## 2024-11-01 PROCEDURE — 85025 COMPLETE CBC W/AUTO DIFF WBC: CPT

## 2024-11-01 PROCEDURE — 63600175 PHARM REV CODE 636 W HCPCS: Performed by: SURGERY

## 2024-11-01 PROCEDURE — 80053 COMPREHEN METABOLIC PANEL: CPT

## 2024-11-01 PROCEDURE — 96367 TX/PROPH/DG ADDL SEQ IV INF: CPT

## 2024-11-01 PROCEDURE — 83605 ASSAY OF LACTIC ACID: CPT

## 2024-11-01 PROCEDURE — 87040 BLOOD CULTURE FOR BACTERIA: CPT | Mod: 59

## 2024-11-01 PROCEDURE — 25000003 PHARM REV CODE 250: Performed by: SURGERY

## 2024-11-01 PROCEDURE — 25000003 PHARM REV CODE 250

## 2024-11-01 PROCEDURE — 63600175 PHARM REV CODE 636 W HCPCS

## 2024-11-01 RX ADMIN — DEXTROSE MONOHYDRATE 1250 MG: 50 INJECTION, SOLUTION INTRAVENOUS at 07:11

## 2024-11-01 RX ADMIN — DEXTROSE MONOHYDRATE 1250 MG: 50 INJECTION, SOLUTION INTRAVENOUS at 09:11

## 2024-11-01 RX ADMIN — PIPERACILLIN AND TAZOBACTAM 4.5 G: 4; .5 INJECTION, POWDER, LYOPHILIZED, FOR SOLUTION INTRAVENOUS; PARENTERAL at 05:11

## 2024-11-01 NOTE — ED NOTES
Patient has ulcer to her left great toe, she reports that she has been seeing wound care for this

## 2024-11-01 NOTE — ED PROVIDER NOTES
Encounter Date: 11/1/2024       History     Chief Complaint   Patient presents with    Discoloration to Toe     This note is dictated on M*Modal word recognition program.  There are word recognition mistakes and grammatical errors that are occasionally missed on review.     Stephany Skinner is a 89 y.o. female presents to ER today with complaints of left great toe swelling, redness increased size of ulcer to left great toe.  Patient reports she has been following up with  [facility] over the last month.  Was seen by wound care provider jose this past Monday was told her toe was looking okay however after wound care visit her toe started to swell become more painful and looks infected according to patient.  Patient denies any fevers.  Patient reports she has neuropathy of both feet and hands.  Patient reports she was a borderline diabetic.  Last A1c 5.8 within epic system from 11/2023.    The history is provided by the patient.     Review of patient's allergies indicates:   Allergen Reactions    Statins-hmg-coa reductase inhibitors      Other reaction(s): Unknown     Past Medical History:   Diagnosis Date    Anemia     Arthritis     Atrial fibrillation     Back pain     Bronchiectasis, uncomplicated     CHF (congestive heart failure)     Disorder of kidney and ureter     Early stage nonexudative age-related macular degeneration of both eyes 2018    GERD (gastroesophageal reflux disease)     Hyperlipidemia     Hypertension     Hypothyroidism     Obesity     JOY (obstructive sleep apnea)     Osteoporosis     Polyneuropathy     Pressure injury of dorsum of right foot, stage 2 12/01/2017    Rheumatoid arthritis     Urinary incontinence     Venous stasis ulcer of right calf limited to breakdown of skin with varicose veins 06/24/2024     Past Surgical History:   Procedure Laterality Date    CATARACT EXTRACTION W/  INTRAOCULAR LENS IMPLANT Right 12/12/2019    Procedure: EXTRACTION, CATARACT, WITH IOL INSERTION;   Surgeon: Michael Arellano MD;  Location: [facility];  Service: Ophthalmology;  Laterality: Right;    CATARACT EXTRACTION W/  INTRAOCULAR LENS IMPLANT Left 02/13/2020    Procedure: EXTRACTION, CATARACT, WITH IOL INSERTION;  Surgeon: Michael Arellano MD;  Location: [facility];  Service: Ophthalmology;  Laterality: Left;    CHOLECYSTECTOMY      ENDOSCOPIC ULTRASOUND OF UPPER GASTROINTESTINAL TRACT N/A 11/04/2020    Procedure: ULTRASOUND, ENDOSCOPIC, UPPER GI TRACT;  Surgeon: Thierry Saunders MD;  Location: [facility];  Service: Endoscopy;  Laterality: N/A;  covid 11/1 [facility]    HERNIA REPAIR      HYSTERECTOMY      knee replacemene Right 02/14/2007    deidra    PHACOEMULSIFICATION OF CATARACT Right 12/12/2019    Procedure: PHACOEMULSIFICATION, CATARACT;  Surgeon: Michael Arellano MD;  Location: [facility];  Service: Ophthalmology;  Laterality: Right;    PHACOEMULSIFICATION OF CATARACT Left 02/13/2020    Procedure: PHACOEMULSIFICATION, CATARACT;  Surgeon: Michael Arellano MD;  Location: [facility];  Service: Ophthalmology;  Laterality: Left;    REPLACEMENT Left 09/18/2007    knee    THYROIDECTOMY       Family History   Problem Relation Name Age of Onset    Cancer Sister 3      Social History     Tobacco Use    Smoking status: Never     Passive exposure: Never    Smokeless tobacco: Never   Substance Use Topics    Alcohol use: Never    Drug use: Never     Review of Systems   Constitutional: Negative.    HENT: Negative.     Eyes: Negative.    Respiratory: Negative.     Cardiovascular: Negative.    Gastrointestinal: Negative.    Genitourinary: Negative.    Musculoskeletal:  Positive for arthralgias, joint swelling and myalgias.   Skin:  Positive for color change and wound.   Neurological: Negative.    Psychiatric/Behavioral: Negative.     All other systems reviewed and are negative.      Physical Exam     Initial Vitals [11/01/24 1512]   BP Pulse Resp Temp SpO2   116/70 66 20 98 °F (36.7 °C) 98 %      MAP       --         Physical Exam    Nursing  note and vitals reviewed.  Constitutional: Vital signs are normal. She appears well-developed and well-nourished. She is not diaphoretic. She is cooperative.   HENT:   Head: Normocephalic and atraumatic.   Eyes: Conjunctivae, EOM and lids are normal. Pupils are equal, round, and reactive to light.   Neck: Trachea normal and phonation normal. Neck supple. No JVD present.   Normal range of motion.   Full passive range of motion without pain.     Cardiovascular:  Normal rate, regular rhythm, S1 normal, S2 normal, normal heart sounds, intact distal pulses and normal pulses.           Pulmonary/Chest: Effort normal and breath sounds normal. No respiratory distress.   Abdominal: Abdomen is soft and flat. Bowel sounds are normal. She exhibits no distension.   Musculoskeletal:         General: Tenderness and edema present. Normal range of motion.      Cervical back: Full passive range of motion without pain, normal range of motion and neck supple.      Comments: Patient has swelling and tenderness to left great toe.  There is also erythema that is currently blanchable.  Redness and swelling has spread to other toes of left foot however capillary refill intact at this time less than 3 seconds.  Erythema is currently blanchable to left foot.     Neurological: She is alert and oriented to person, place, and time. She has normal strength. GCS score is 15. GCS eye subscore is 4. GCS verbal subscore is 5. GCS motor subscore is 6.   Skin: Skin is warm, dry and intact. Capillary refill takes less than 2 seconds. There is erythema.   Psychiatric: She has a normal mood and affect. Her behavior is normal. Thought content normal.                   ED Course   Critical Care    Date/Time: 11/1/2024 5:46 PM    Performed by: Grant Raza MD  Authorized by: Grant Raza MD  Direct patient critical care time: 25 minutes  Additional history critical care time: 5 minutes  Ordering / reviewing critical care time: 5  minutes  Documentation critical care time: 5 minutes  Consulting other physicians critical care time: 5 minutes  Consult with family critical care time: 5 minutes  Total critical care time (exclusive of procedural time) : 50 minutes  Critical care was necessary to treat or prevent imminent or life-threatening deterioration of the following conditions: toxidrome.  Critical care was time spent personally by me on the following activities: blood draw for specimens, discussions with consultants, discussions with primary provider, interpretation of cardiac output measurements, evaluation of patient's response to treatment, examination of patient, obtaining history from patient or surrogate, ordering and performing treatments and interventions, ordering and review of laboratory studies, ordering and review of radiographic studies, re-evaluation of patient's condition and review of old charts.        Labs Reviewed   CBC W/ AUTO DIFFERENTIAL - Abnormal       Result Value    WBC 7.26      RBC 3.57 (*)     Hemoglobin 11.7 (*)     Hematocrit 36.1 (*)      (*)     MCH 32.8 (*)     MCHC 32.4      RDW 13.2      Platelets 245      MPV 8.4 (*)     Immature Granulocytes 0.7 (*)     Gran # (ANC) 5.3      Immature Grans (Abs) 0.05 (*)     Lymph # 1.0      Mono # 0.7      Eos # 0.2      Baso # 0.02      nRBC 0      Gran % 72.9      Lymph % 13.9 (*)     Mono % 10.1      Eosinophil % 2.1      Basophil % 0.3      Differential Method Automated     COMPREHENSIVE METABOLIC PANEL - Abnormal    Sodium 143      Potassium 4.3      Chloride 113 (*)     CO2 18 (*)     Glucose 142 (*)     BUN 29 (*)     Creatinine 1.3      Calcium 9.0      Total Protein 7.1      Albumin 3.4 (*)     Total Bilirubin 0.3      Alkaline Phosphatase 61      AST 12      ALT 10      eGFR 40 (*)     Anion Gap 12     SEDIMENTATION RATE - Abnormal    Sed Rate 56 (*)    CULTURE, BLOOD   CULTURE, BLOOD   LACTIC ACID, PLASMA    Lactate (Lactic Acid) 1.4            Imaging  Results              X-Ray Foot Complete Left (Final result)  Result time 11/01/24 17:17:49      Final result by Eran Calderón MD (11/01/24 17:17:49)                   Impression:      As above      Electronically signed by: Bhaskar Calderón MD  Date:    11/01/2024  Time:    17:17               Narrative:    EXAMINATION:  XR FOOT COMPLETE 3 VIEW LEFT    CLINICAL HISTORY:  .  Pain in left toe(s)    TECHNIQUE:  AP, lateral and oblique views of the left foot were performed.    COMPARISON:  None    FINDINGS:  There is a large plantar calcaneal spur.  There are hammertoe deformities present.  There is degenerative change of the interphalangeal joints of the left forefoot.  The there is bunion formation at the great toe metatarsal head.  No radiographically evident acute fracture or osseous destructive process.  There is soft tissue swelling overlying the dorsum and medial border of the left forefoot.                                       US Lower Extremity Arteries Left (Final result)  Result time 11/01/24 17:14:39      Final result by Jackelyn Rodriguez MD (11/01/24 17:14:39)                   Impression:      Elevated velocity in the peroneal artery suggesting 50-75% diameter stenosis.  No elevated peak systolic velocities more proximally suggesting hemodynamically significant narrowing but with monophasic waveform in the common femoral artery suggesting significant inflow stenosis and consider further evaluation with CTA      Electronically signed by: Jackelyn Rodriguez MD  Date:    11/01/2024  Time:    17:14               Narrative:    EXAMINATION:  US LOWER EXTREMITY ARTERIES LEFT    CLINICAL HISTORY:  Disorder of pigmentation, unspecified    TECHNIQUE:  Ultrasound evaluation of the left lower extremity    COMPARISON:  None    FINDINGS:  Is monophasic waveform in the left common femoral artery.  No elevated peak systolic velocities suggesting hemodynamically significant narrowing.  Waveform in the superficial  femoral artery is triphasic proximally and more distally waveform appears predominantly monophasic.  Mildly elevated peak systolic velocity in the peroneal artery of 220 centimeters/second suggesting 50-75% diameter stenosis with no elevated velocities more proximally suggesting hemodynamically significant stenosis.                                       Medications   vancomycin - pharmacy to dose (has no administration in time range)   piperacillin-tazobactam (ZOSYN) 4.5 g in D5W 100 mL IVPB (MB+) (has no administration in time range)     Medical Decision Making  89-year-old female presents with a cellulitis of the right toe, exposed bone  Patient was toe is got significantly erythematous over last 48 hours now  Differential includes cellulitis, abscess, osteomyelitis, wet gangrene, PVD    Problems Addressed:  Discoloration of skin of toe: complicated acute illness or injury  Great toe pain, left: complicated acute illness or injury  Osteomyelitis of great toe of right foot: complicated acute illness or injury  Peripheral vascular disease: complicated acute illness or injury    Amount and/or Complexity of Data Reviewed  Labs: ordered. Decision-making details documented in ED Course.  Radiology: ordered and independent interpretation performed.    ED Management & Risks of Complication, Morbidity, & Mortality:  Patient with a normal white count with a significantly cellulitic right greater  Elevated ESR, x-ray of the right toe shows no obvious overt osteomyelitis  However patient has diminished to nearly absent dorsalis pedis pulse  Arterial ultrasound of the right lower extremity showed significant PVD  Specifically the patient has monophasic flow through the common femoral  Patient was not a diabetic raises the question of why infection occurred  IV vancomycin & Zosyn given, transfer to [facility] for an evaluation    Clinical Impression:  Final diagnoses:  [M79.675] Great toe pain, left  [L81.9] Discoloration of  skin of toe  [M86.9] Osteomyelitis of great toe of right foot (Primary)  [I73.9] Peripheral vascular disease          ED Disposition Condition    Transfer to Another Facility Stable                Grant Raza MD  11/01/24 [id number]

## 2024-11-02 ENCOUNTER — HOSPITAL ENCOUNTER (INPATIENT)
Facility: HOSPITAL | Age: 89
LOS: 11 days | Discharge: NURSING, REHAB OR LONG-TERM CARE | DRG: 617 | End: 2024-11-13
Attending: STUDENT IN AN ORGANIZED HEALTH CARE EDUCATION/TRAINING PROGRAM | Admitting: STUDENT IN AN ORGANIZED HEALTH CARE EDUCATION/TRAINING PROGRAM
Payer: MEDICARE

## 2024-11-02 VITALS
DIASTOLIC BLOOD PRESSURE: 68 MMHG | SYSTOLIC BLOOD PRESSURE: 156 MMHG | HEART RATE: 59 BPM | HEIGHT: 67 IN | RESPIRATION RATE: 19 BRPM | BODY MASS INDEX: 36.73 KG/M2 | TEMPERATURE: 98 F | WEIGHT: 234 LBS | OXYGEN SATURATION: 98 %

## 2024-11-02 DIAGNOSIS — R07.9 CHEST PAIN: ICD-10-CM

## 2024-11-02 DIAGNOSIS — I73.9 PAD (PERIPHERAL ARTERY DISEASE): ICD-10-CM

## 2024-11-02 DIAGNOSIS — L97.522 ULCER OF TOE OF LEFT FOOT, WITH FAT LAYER EXPOSED: Primary | ICD-10-CM

## 2024-11-02 DIAGNOSIS — M85.80 OSTEOPENIA, UNSPECIFIED LOCATION: ICD-10-CM

## 2024-11-02 DIAGNOSIS — M86.9 TOE OSTEOMYELITIS: ICD-10-CM

## 2024-11-02 DIAGNOSIS — L30.8 DERMATITIS ASSOCIATED WITH MOISTURE: ICD-10-CM

## 2024-11-02 DIAGNOSIS — M86.9 OSTEOMYELITIS OF GREAT TOE OF LEFT FOOT: ICD-10-CM

## 2024-11-02 PROBLEM — J44.9 COPD (CHRONIC OBSTRUCTIVE PULMONARY DISEASE): Status: ACTIVE | Noted: 2022-12-09

## 2024-11-02 PROBLEM — L03.032 CELLULITIS OF TOE OF LEFT FOOT: Status: ACTIVE | Noted: 2024-11-02

## 2024-11-02 LAB
ABO + RH BLD: NORMAL
ALBUMIN SERPL BCP-MCNC: 3.3 G/DL (ref 3.5–5.2)
ALP SERPL-CCNC: 57 U/L (ref 40–150)
ALT SERPL W/O P-5'-P-CCNC: 8 U/L (ref 10–44)
ANION GAP SERPL CALC-SCNC: 9 MMOL/L (ref 8–16)
AST SERPL-CCNC: 10 U/L (ref 10–40)
BASOPHILS # BLD AUTO: 0.03 K/UL (ref 0–0.2)
BASOPHILS NFR BLD: 0.3 % (ref 0–1.9)
BILIRUB SERPL-MCNC: 0.6 MG/DL (ref 0.1–1)
BLD GP AB SCN CELLS X3 SERPL QL: NORMAL
BUN SERPL-MCNC: 21 MG/DL (ref 8–23)
CALCIUM SERPL-MCNC: 8.7 MG/DL (ref 8.7–10.5)
CHLORIDE SERPL-SCNC: 115 MMOL/L (ref 95–110)
CO2 SERPL-SCNC: 15 MMOL/L (ref 23–29)
CREAT SERPL-MCNC: 0.9 MG/DL (ref 0.5–1.4)
DIFFERENTIAL METHOD BLD: ABNORMAL
EOSINOPHIL # BLD AUTO: 0.1 K/UL (ref 0–0.5)
EOSINOPHIL NFR BLD: 1.5 % (ref 0–8)
ERYTHROCYTE [DISTWIDTH] IN BLOOD BY AUTOMATED COUNT: 13.1 % (ref 11.5–14.5)
EST. GFR  (NO RACE VARIABLE): >60 ML/MIN/1.73 M^2
GLUCOSE SERPL-MCNC: 103 MG/DL (ref 70–110)
GLUCOSE SERPL-MCNC: 120 MG/DL (ref 70–110)
HCT VFR BLD AUTO: 39.1 % (ref 37–48.5)
HGB BLD-MCNC: 12.3 G/DL (ref 12–16)
IMM GRANULOCYTES # BLD AUTO: 0.05 K/UL (ref 0–0.04)
IMM GRANULOCYTES NFR BLD AUTO: 0.6 % (ref 0–0.5)
INR PPP: 1 (ref 0.8–1.2)
LYMPHOCYTES # BLD AUTO: 1.1 K/UL (ref 1–4.8)
LYMPHOCYTES NFR BLD: 12.6 % (ref 18–48)
MAGNESIUM SERPL-MCNC: 1.9 MG/DL (ref 1.6–2.6)
MCH RBC QN AUTO: 31.8 PG (ref 27–31)
MCHC RBC AUTO-ENTMCNC: 31.5 G/DL (ref 32–36)
MCV RBC AUTO: 101 FL (ref 82–98)
MONOCYTES # BLD AUTO: 0.9 K/UL (ref 0.3–1)
MONOCYTES NFR BLD: 9.9 % (ref 4–15)
NEUTROPHILS # BLD AUTO: 6.4 K/UL (ref 1.8–7.7)
NEUTROPHILS NFR BLD: 75.1 % (ref 38–73)
NRBC BLD-RTO: 0 /100 WBC
PHOSPHATE SERPL-MCNC: 2.8 MG/DL (ref 2.7–4.5)
PLATELET # BLD AUTO: 251 K/UL (ref 150–450)
PMV BLD AUTO: 8.5 FL (ref 9.2–12.9)
POCT GLUCOSE: 124 MG/DL (ref 70–110)
POTASSIUM SERPL-SCNC: 4 MMOL/L (ref 3.5–5.1)
PROT SERPL-MCNC: 7.1 G/DL (ref 6–8.4)
PROTHROMBIN TIME: 11.3 SEC (ref 9–12.5)
RBC # BLD AUTO: 3.87 M/UL (ref 4–5.4)
SODIUM SERPL-SCNC: 139 MMOL/L (ref 136–145)
SPECIMEN OUTDATE: NORMAL
WBC # BLD AUTO: 8.58 K/UL (ref 3.9–12.7)

## 2024-11-02 PROCEDURE — 85025 COMPLETE CBC W/AUTO DIFF WBC: CPT | Performed by: STUDENT IN AN ORGANIZED HEALTH CARE EDUCATION/TRAINING PROGRAM

## 2024-11-02 PROCEDURE — 80053 COMPREHEN METABOLIC PANEL: CPT | Performed by: STUDENT IN AN ORGANIZED HEALTH CARE EDUCATION/TRAINING PROGRAM

## 2024-11-02 PROCEDURE — 83735 ASSAY OF MAGNESIUM: CPT | Performed by: STUDENT IN AN ORGANIZED HEALTH CARE EDUCATION/TRAINING PROGRAM

## 2024-11-02 PROCEDURE — 11000001 HC ACUTE MED/SURG PRIVATE ROOM

## 2024-11-02 PROCEDURE — 85610 PROTHROMBIN TIME: CPT | Performed by: STUDENT IN AN ORGANIZED HEALTH CARE EDUCATION/TRAINING PROGRAM

## 2024-11-02 PROCEDURE — 36415 COLL VENOUS BLD VENIPUNCTURE: CPT | Performed by: STUDENT IN AN ORGANIZED HEALTH CARE EDUCATION/TRAINING PROGRAM

## 2024-11-02 PROCEDURE — 87102 FUNGUS ISOLATION CULTURE: CPT

## 2024-11-02 PROCEDURE — 86900 BLOOD TYPING SEROLOGIC ABO: CPT | Performed by: STUDENT IN AN ORGANIZED HEALTH CARE EDUCATION/TRAINING PROGRAM

## 2024-11-02 PROCEDURE — 87075 CULTR BACTERIA EXCEPT BLOOD: CPT

## 2024-11-02 PROCEDURE — 99223 1ST HOSP IP/OBS HIGH 75: CPT | Mod: ,,, | Performed by: PODIATRIST

## 2024-11-02 PROCEDURE — 25000003 PHARM REV CODE 250: Performed by: STUDENT IN AN ORGANIZED HEALTH CARE EDUCATION/TRAINING PROGRAM

## 2024-11-02 PROCEDURE — 87449 NOS EACH ORGANISM AG IA: CPT | Performed by: STUDENT IN AN ORGANIZED HEALTH CARE EDUCATION/TRAINING PROGRAM

## 2024-11-02 PROCEDURE — 82947 ASSAY GLUCOSE BLOOD QUANT: CPT | Performed by: STUDENT IN AN ORGANIZED HEALTH CARE EDUCATION/TRAINING PROGRAM

## 2024-11-02 PROCEDURE — 87077 CULTURE AEROBIC IDENTIFY: CPT

## 2024-11-02 PROCEDURE — 84100 ASSAY OF PHOSPHORUS: CPT | Performed by: STUDENT IN AN ORGANIZED HEALTH CARE EDUCATION/TRAINING PROGRAM

## 2024-11-02 PROCEDURE — 87070 CULTURE OTHR SPECIMN AEROBIC: CPT

## 2024-11-02 PROCEDURE — 27000207 HC ISOLATION

## 2024-11-02 PROCEDURE — 25500020 PHARM REV CODE 255: Performed by: STUDENT IN AN ORGANIZED HEALTH CARE EDUCATION/TRAINING PROGRAM

## 2024-11-02 PROCEDURE — 63600175 PHARM REV CODE 636 W HCPCS: Performed by: STUDENT IN AN ORGANIZED HEALTH CARE EDUCATION/TRAINING PROGRAM

## 2024-11-02 PROCEDURE — 87186 SC STD MICRODIL/AGAR DIL: CPT

## 2024-11-02 RX ORDER — IPRATROPIUM BROMIDE AND ALBUTEROL SULFATE 2.5; .5 MG/3ML; MG/3ML
3 SOLUTION RESPIRATORY (INHALATION) EVERY 6 HOURS PRN
Status: DISCONTINUED | OUTPATIENT
Start: 2024-11-02 | End: 2024-11-13 | Stop reason: HOSPADM

## 2024-11-02 RX ORDER — LEVOTHYROXINE SODIUM 88 UG/1
88 TABLET ORAL
Status: DISCONTINUED | OUTPATIENT
Start: 2024-11-02 | End: 2024-11-13 | Stop reason: HOSPADM

## 2024-11-02 RX ORDER — IBUPROFEN 200 MG
24 TABLET ORAL
Status: DISCONTINUED | OUTPATIENT
Start: 2024-11-02 | End: 2024-11-13 | Stop reason: HOSPADM

## 2024-11-02 RX ORDER — SODIUM,POTASSIUM PHOSPHATES 280-250MG
2 POWDER IN PACKET (EA) ORAL
Status: DISCONTINUED | OUTPATIENT
Start: 2024-11-02 | End: 2024-11-13 | Stop reason: HOSPADM

## 2024-11-02 RX ORDER — ATORVASTATIN CALCIUM 10 MG/1
20 TABLET, FILM COATED ORAL NIGHTLY
Status: DISCONTINUED | OUTPATIENT
Start: 2024-11-02 | End: 2024-11-02

## 2024-11-02 RX ORDER — MONTELUKAST SODIUM 4 MG/1
3 TABLET, CHEWABLE ORAL 2 TIMES DAILY
Status: DISCONTINUED | OUTPATIENT
Start: 2024-11-02 | End: 2024-11-13 | Stop reason: HOSPADM

## 2024-11-02 RX ORDER — BISACODYL 10 MG/1
10 SUPPOSITORY RECTAL DAILY PRN
Status: DISCONTINUED | OUTPATIENT
Start: 2024-11-02 | End: 2024-11-13 | Stop reason: HOSPADM

## 2024-11-02 RX ORDER — SPIRONOLACTONE 25 MG/1
25 TABLET ORAL EVERY MORNING
Status: DISCONTINUED | OUTPATIENT
Start: 2024-11-02 | End: 2024-11-03

## 2024-11-02 RX ORDER — LOSARTAN POTASSIUM 25 MG/1
25 TABLET ORAL 2 TIMES DAILY
Status: DISCONTINUED | OUTPATIENT
Start: 2024-11-02 | End: 2024-11-03

## 2024-11-02 RX ORDER — PROCHLORPERAZINE EDISYLATE 5 MG/ML
5 INJECTION INTRAMUSCULAR; INTRAVENOUS EVERY 6 HOURS PRN
Status: DISCONTINUED | OUTPATIENT
Start: 2024-11-02 | End: 2024-11-13 | Stop reason: HOSPADM

## 2024-11-02 RX ORDER — IBUPROFEN 200 MG
16 TABLET ORAL
Status: DISCONTINUED | OUTPATIENT
Start: 2024-11-02 | End: 2024-11-13 | Stop reason: HOSPADM

## 2024-11-02 RX ORDER — INSULIN ASPART 100 [IU]/ML
0-10 INJECTION, SOLUTION INTRAVENOUS; SUBCUTANEOUS
Status: DISCONTINUED | OUTPATIENT
Start: 2024-11-02 | End: 2024-11-13 | Stop reason: HOSPADM

## 2024-11-02 RX ORDER — ACETAMINOPHEN 325 MG/1
650 TABLET ORAL EVERY 4 HOURS PRN
Status: DISCONTINUED | OUTPATIENT
Start: 2024-11-02 | End: 2024-11-13 | Stop reason: HOSPADM

## 2024-11-02 RX ORDER — ACETAMINOPHEN 325 MG/1
650 TABLET ORAL EVERY 8 HOURS PRN
Status: DISCONTINUED | OUTPATIENT
Start: 2024-11-02 | End: 2024-11-13 | Stop reason: HOSPADM

## 2024-11-02 RX ORDER — SODIUM CHLORIDE 0.9 % (FLUSH) 0.9 %
10 SYRINGE (ML) INJECTION EVERY 12 HOURS PRN
Status: DISCONTINUED | OUTPATIENT
Start: 2024-11-02 | End: 2024-11-13 | Stop reason: HOSPADM

## 2024-11-02 RX ORDER — AMOXICILLIN 250 MG
1 CAPSULE ORAL DAILY PRN
Status: DISCONTINUED | OUTPATIENT
Start: 2024-11-02 | End: 2024-11-13 | Stop reason: HOSPADM

## 2024-11-02 RX ORDER — FUROSEMIDE 40 MG/1
40 TABLET ORAL DAILY
Status: DISCONTINUED | OUTPATIENT
Start: 2024-11-02 | End: 2024-11-13 | Stop reason: HOSPADM

## 2024-11-02 RX ORDER — ALUMINUM HYDROXIDE, MAGNESIUM HYDROXIDE, AND SIMETHICONE 1200; 120; 1200 MG/30ML; MG/30ML; MG/30ML
30 SUSPENSION ORAL 4 TIMES DAILY PRN
Status: DISCONTINUED | OUTPATIENT
Start: 2024-11-02 | End: 2024-11-13 | Stop reason: HOSPADM

## 2024-11-02 RX ORDER — TALC
6 POWDER (GRAM) TOPICAL NIGHTLY PRN
Status: DISCONTINUED | OUTPATIENT
Start: 2024-11-02 | End: 2024-11-13 | Stop reason: HOSPADM

## 2024-11-02 RX ORDER — NALOXONE HCL 0.4 MG/ML
0.02 VIAL (ML) INJECTION
Status: DISCONTINUED | OUTPATIENT
Start: 2024-11-02 | End: 2024-11-13 | Stop reason: HOSPADM

## 2024-11-02 RX ORDER — DICLOFENAC SODIUM 10 MG/G
2 GEL TOPICAL DAILY
Status: DISCONTINUED | OUTPATIENT
Start: 2024-11-03 | End: 2024-11-10

## 2024-11-02 RX ORDER — SIMETHICONE 80 MG
1 TABLET,CHEWABLE ORAL 4 TIMES DAILY PRN
Status: DISCONTINUED | OUTPATIENT
Start: 2024-11-02 | End: 2024-11-13 | Stop reason: HOSPADM

## 2024-11-02 RX ORDER — GLUCAGON 1 MG
1 KIT INJECTION
Status: DISCONTINUED | OUTPATIENT
Start: 2024-11-02 | End: 2024-11-13 | Stop reason: HOSPADM

## 2024-11-02 RX ORDER — SODIUM CHLORIDE 9 MG/ML
INJECTION, SOLUTION INTRAVENOUS CONTINUOUS
Status: ACTIVE | OUTPATIENT
Start: 2024-11-02 | End: 2024-11-03

## 2024-11-02 RX ORDER — ONDANSETRON HYDROCHLORIDE 2 MG/ML
4 INJECTION, SOLUTION INTRAVENOUS EVERY 8 HOURS PRN
Status: DISCONTINUED | OUTPATIENT
Start: 2024-11-02 | End: 2024-11-13 | Stop reason: HOSPADM

## 2024-11-02 RX ORDER — MICONAZOLE NITRATE 2 G/100G
POWDER TOPICAL 2 TIMES DAILY
Status: DISCONTINUED | OUTPATIENT
Start: 2024-11-02 | End: 2024-11-13 | Stop reason: HOSPADM

## 2024-11-02 RX ORDER — METOPROLOL SUCCINATE 25 MG/1
25 TABLET, EXTENDED RELEASE ORAL DAILY
Status: DISCONTINUED | OUTPATIENT
Start: 2024-11-02 | End: 2024-11-13 | Stop reason: HOSPADM

## 2024-11-02 RX ADMIN — SPIRONOLACTONE 25 MG: 25 TABLET ORAL at 06:11

## 2024-11-02 RX ADMIN — MELATONIN TAB 3 MG 6 MG: 3 TAB at 08:11

## 2024-11-02 RX ADMIN — SODIUM CHLORIDE: 9 INJECTION, SOLUTION INTRAVENOUS at 07:11

## 2024-11-02 RX ADMIN — COLESTIPOL HYDROCHLORIDE 3 G: 1 TABLET, FILM COATED ORAL at 08:11

## 2024-11-02 RX ADMIN — SODIUM CHLORIDE: 9 INJECTION, SOLUTION INTRAVENOUS at 02:11

## 2024-11-02 RX ADMIN — ATORVASTATIN CALCIUM 20 MG: 10 TABLET, FILM COATED ORAL at 05:11

## 2024-11-02 RX ADMIN — ACETAMINOPHEN 650 MG: 325 TABLET ORAL at 08:11

## 2024-11-02 RX ADMIN — IOHEXOL 125 ML: 350 INJECTION, SOLUTION INTRAVENOUS at 10:11

## 2024-11-02 RX ADMIN — COLESTIPOL HYDROCHLORIDE 3 G: 1 TABLET, FILM COATED ORAL at 09:11

## 2024-11-02 RX ADMIN — APIXABAN 5 MG: 5 TABLET, FILM COATED ORAL at 08:11

## 2024-11-02 RX ADMIN — METOPROLOL SUCCINATE 25 MG: 25 TABLET, EXTENDED RELEASE ORAL at 09:11

## 2024-11-02 RX ADMIN — MICONAZOLE NITRATE: 20 POWDER TOPICAL at 09:11

## 2024-11-02 RX ADMIN — LOSARTAN POTASSIUM 25 MG: 25 TABLET, FILM COATED ORAL at 08:11

## 2024-11-02 RX ADMIN — FUROSEMIDE 40 MG: 40 TABLET ORAL at 09:11

## 2024-11-02 RX ADMIN — MICONAZOLE NITRATE: 20 POWDER TOPICAL at 05:11

## 2024-11-02 RX ADMIN — PIPERACILLIN SODIUM AND TAZOBACTAM SODIUM 4.5 G: 4; .5 INJECTION, POWDER, LYOPHILIZED, FOR SOLUTION INTRAVENOUS at 09:11

## 2024-11-02 RX ADMIN — APIXABAN 5 MG: 5 TABLET, FILM COATED ORAL at 09:11

## 2024-11-02 RX ADMIN — LOSARTAN POTASSIUM 25 MG: 25 TABLET, FILM COATED ORAL at 09:11

## 2024-11-02 RX ADMIN — PIPERACILLIN SODIUM AND TAZOBACTAM SODIUM 4.5 G: 4; .5 INJECTION, POWDER, LYOPHILIZED, FOR SOLUTION INTRAVENOUS at 05:11

## 2024-11-02 RX ADMIN — LEVOTHYROXINE SODIUM 88 MCG: 0.09 TABLET ORAL at 06:11

## 2024-11-02 RX ADMIN — PIPERACILLIN SODIUM AND TAZOBACTAM SODIUM 4.5 G: 4; .5 INJECTION, POWDER, LYOPHILIZED, FOR SOLUTION INTRAVENOUS at 12:11

## 2024-11-02 NOTE — NURSING
"Pt states "I have been having diarrhea for months pt had diarrhea BM stool collected for cdiff pt placed on special contact isolation.  "

## 2024-11-02 NOTE — ASSESSMENT & PLAN NOTE
Concern for cellulitis/possible underlying osteomyelitis     Plan:   Continue vancomycin/zosyn   Follow up on cultures   Consult podiatry   Consult vascular surgery   MRI left foot ordered

## 2024-11-02 NOTE — ASSESSMENT & PLAN NOTE
Patients blood pressure range in the last 24 hours was: BP  Min: 116/70  Max: 163/73.The patient's inpatient anti-hypertensive regimen is listed below:  Current Antihypertensives  furosemide tablet 40 mg, Daily, Oral  losartan tablet 25 mg, 2 times daily, Oral  metoprolol succinate (TOPROL-XL) 24 hr tablet 25 mg, Daily, Oral  spironolactone tablet 25 mg, Every morning, Oral    Plan  - BP is controlled, no changes needed to their regimen

## 2024-11-02 NOTE — SUBJECTIVE & OBJECTIVE
Past Medical History:   Diagnosis Date    Anemia     Arthritis     Atrial fibrillation     Back pain     Bronchiectasis, uncomplicated     CHF (congestive heart failure)     Disorder of kidney and ureter     Early stage nonexudative age-related macular degeneration of both eyes 2018    GERD (gastroesophageal reflux disease)     Hyperlipidemia     Hypertension     Hypothyroidism     Obesity     JOY (obstructive sleep apnea)     Osteoporosis     Polyneuropathy     Pressure injury of dorsum of right foot, stage 2 12/01/2017    Rheumatoid arthritis     Urinary incontinence     Venous stasis ulcer of right calf limited to breakdown of skin with varicose veins 06/24/2024       Past Surgical History:   Procedure Laterality Date    CATARACT EXTRACTION W/  INTRAOCULAR LENS IMPLANT Right 12/12/2019    Procedure: EXTRACTION, CATARACT, WITH IOL INSERTION;  Surgeon: Michael Arellano MD;  Location: [facility];  Service: Ophthalmology;  Laterality: Right;    CATARACT EXTRACTION W/  INTRAOCULAR LENS IMPLANT Left 02/13/2020    Procedure: EXTRACTION, CATARACT, WITH IOL INSERTION;  Surgeon: Michael Arellano MD;  Location: [facility];  Service: Ophthalmology;  Laterality: Left;    CHOLECYSTECTOMY      ENDOSCOPIC ULTRASOUND OF UPPER GASTROINTESTINAL TRACT N/A 11/04/2020    Procedure: ULTRASOUND, ENDOSCOPIC, UPPER GI TRACT;  Surgeon: Thierry Saunders MD;  Location: [facility];  Service: Endoscopy;  Laterality: N/A;  covid 11/1 [facility]    HERNIA REPAIR      HYSTERECTOMY      knee replacemene Right 02/14/2007    deidra    PHACOEMULSIFICATION OF CATARACT Right 12/12/2019    Procedure: PHACOEMULSIFICATION, CATARACT;  Surgeon: Michael Arellano MD;  Location: [facility];  Service: Ophthalmology;  Laterality: Right;    PHACOEMULSIFICATION OF CATARACT Left 02/13/2020    Procedure: PHACOEMULSIFICATION, CATARACT;  Surgeon: Michael Arellano MD;  Location: [facility];  Service: Ophthalmology;  Laterality: Left;    REPLACEMENT Left 09/18/2007    knee    THYROIDECTOMY          Review of patient's allergies indicates:   Allergen Reactions    Statins-hmg-coa reductase inhibitors      Other reaction(s): Unknown       Current Facility-Administered Medications on File Prior to Encounter   Medication    [COMPLETED] piperacillin-tazobactam (ZOSYN) 4.5 g in D5W 100 mL IVPB (MB+)    tetracaine HCl (PF) 0.5 % Drop 1 drop    [COMPLETED] vancomycin 1,250 mg in D5W 250 mL IVPB (admixture device)    Followed by    [COMPLETED] vancomycin 1,250 mg in D5W 250 mL IVPB (admixture device)    [DISCONTINUED] vancomycin - pharmacy to dose    [DISCONTINUED] vancomycin 1,250 mg in D5W 250 mL IVPB (admixture device)     Current Outpatient Medications on File Prior to Encounter   Medication Sig    acetaminophen (TYLENOL) 500 MG tablet Take 1,000 mg by mouth nightly as needed for Pain.    albuterol (PROVENTIL/VENTOLIN HFA) 90 mcg/actuation inhaler     alendronate (FOSAMAX) 70 MG tablet Take 1 tablet (70 mg total) by mouth every 7 days.    atorvastatin (LIPITOR) 20 MG tablet Take 20 mg by mouth every evening.    beta-carotene,A,-vits C,E/mins (VISION ORAL) Take 1 tablet by mouth 2 (two) times daily.    calcium carbonate-vit D3-min 600 mg calcium- 400 unit Tab Take 2 tablets by mouth once daily.     colestipoL (COLESTID) 1 gram Tab Take 3 tablets (3 g total) by mouth 2 (two) times daily. For cholesterol and diarrhea    cyanocobalamin (VITAMIN B-12) 1000 MCG tablet Take 100 mcg by mouth once daily.    ELIQUIS 5 mg Tab Take 5 mg by mouth 2 (two) times daily.    furosemide (LASIX) 40 MG tablet Take 1 tablet (40 mg total) by mouth once daily.    isosorbide mononitrate (IMDUR) 60 MG 24 hr tablet Take 60 mg by mouth once daily.    levothyroxine (SYNTHROID) 88 MCG tablet Take 1 tablet (88 mcg total) by mouth before breakfast.    loperamide (IMODIUM A-D) 2 mg Tab Take 2 mg by mouth 4 (four) times daily as needed.    losartan (COZAAR) 25 MG tablet Take 25 mg by mouth 2 (two) times a day.    metoprolol succinate  (TOPROL-XL) 25 MG 24 hr tablet Take 25 mg by mouth once daily.    nitroGLYCERIN (NITROSTAT) 0.4 MG SL tablet Place 0.4 mg under the tongue every 5 (five) minutes as needed for Chest pain.    nystatin (MYCOSTATIN) powder Apply topically 4 (four) times daily.    nystatin-triamcinolone (MYCOLOG II) cream APPLY  CREAM TOPICALLY 4 TIMES DAILY    potassium chloride SA (K-DUR,KLOR-CON) 20 MEQ tablet Take 1 tablet (20 mEq total) by mouth once daily.    spironolactone (ALDACTONE) 25 MG tablet Take 25 mg by mouth every morning.    vitamins A,C,E-zinc-copper (PRESERVISION AREDS) 4,296 mcg-226 mg-90 mg Cap PreserVision AREDS 14,320 unit-226 mg-200 unit capsule, [RxNorm: 677162]    [DISCONTINUED] omega-3 acid ethyl esters (LOVAZA) 1 gram capsule Take 2 capsules (2 g total) by mouth 2 (two) times daily.     Family History       Problem Relation (Age of Onset)    Cancer Sister          Tobacco Use    Smoking status: Never     Passive exposure: Never    Smokeless tobacco: Never   Substance and Sexual Activity    Alcohol use: Never    Drug use: Never    Sexual activity: Never     Review of Systems   Respiratory: Negative.     Cardiovascular: Negative.    Gastrointestinal: Negative.    Genitourinary: Negative.    Musculoskeletal: Negative.    Skin:  Positive for wound.   Neurological: Negative.      Objective:     Vital Signs (Most Recent):    Vital Signs (24h Range):  Temp:  [97.7 °F (36.5 °C)-98 °F (36.7 °C)] 97.7 °F (36.5 °C)  Pulse:  [51-66] 59  Resp:  [18-20] 19  SpO2:  [92 %-100 %] 98 %  BP: (116-163)/(60-98) 156/68     Weight: 103.2 kg (227 lb 8.2 oz)  Body mass index is 35.63 kg/m².     Physical Exam  Vitals and nursing note reviewed.   Constitutional:       General: She is not in acute distress.     Appearance: She is obese. She is not ill-appearing.   HENT:      Mouth/Throat:      Mouth: Mucous membranes are moist.   Cardiovascular:      Rate and Rhythm: Normal rate.   Pulmonary:      Effort: Pulmonary effort is normal.    Abdominal:      General: Abdomen is flat.      Comments: Intertrigo noted in abdominal folds/underneath breast    Skin:     General: Skin is warm.   Neurological:      General: No focal deficit present.      Mental Status: She is alert.     Left medial leg ulcer -             Left 1st great toe:                      Significant Labs: All pertinent labs within the past 24 hours have been reviewed.    Significant Imaging: I have reviewed all pertinent imaging results/findings within the past 24 hours.

## 2024-11-02 NOTE — PROVIDER TRANSFER
Outside Transfer Acceptance Note / Regional Referral Center    Referring facility: [facility]   Referring provider: MIRELLA MEREDITH  Accepting facility: [facility].   Accepting provider: ALEX MASON  Admitting provider: TBYOUSIF  Reason for transfer:  [facility]   Transfer diagnosis: Toe osteomyelitis, PAD  Transfer specialty requested: General Surgery  Transfer specialty notified: No  Transfer level: NUMBER 1-5: 2  Bed type requested: Med-surg  Isolation status: No active isolations   Admission class or status: IP- Inpatient      Narrative     Stephany Skinner is a 89 y.o. female with PMH of HTN, HLD, DM2, hypothyroidism, Afib on eliquis, CAD, HFpEF, PAD who presented to [facility] on 11/01/24 with complaints of left great toe swelling, redness, increased size of ulcer to left great toe.  Patient reports she has been following up with  [facility] over the last month.  Was seen by wound care provider this past Monday was told her toe was looking okay however after wound care visit her toe started to swell become more painful and looks infected according to patient.  Patient denies any fevers.  Patient reports she has neuropathy of both feet and hands.  Patient reports she was a borderline diabetic.  Last A1c 5.8 within epic system from 11/2023.    In the ER, patient is afebrile and vitals stable.     Labs mostly unremarkable including WBC 7.26, ESR 56, lactic acid 1.4, normal renal and liver function. Blood cultures sent. Results of X-ray of left foot and US LLE arteries are as below. Patient received empiric dose of vancomycin and zosyn in ED.                    X-ray left foot: There is a large plantar calcaneal spur. There are hammertoe deformities present. There is degenerative change of the interphalangeal joints of the left forefoot. The there is bunion formation at the great toe metatarsal head. No radiographically evident acute fracture or osseous destructive process. There is  soft tissue swelling overlying the dorsum and medial border of the left forefoot.     US left lower extremity arteries:  Elevated velocity in the peroneal artery suggesting 50-75% diameter stenosis.  No elevated peak systolic velocities more proximally suggesting hemodynamically significant narrowing but with monophasic waveform in the common femoral artery suggesting significant inflow stenosis and consider further evaluation with CTA        Medications   vancomycin - pharmacy to dose (has no administration in time range)   piperacillin-tazobactam (ZOSYN) 4.5 g in D5W 100 mL IVPB (MB+) (has no administration in time range)        Objective     Vitals: Temp: 97.7 °F (36.5 °C) (11/01/24 1956)  Pulse: (!) 58 (11/01/24 1945)  Resp: 19 (11/01/24 1945)  BP: 125/70 (11/01/24 1945)  SpO2: (!) 92 % (11/01/24 1945)  Recent Labs: All pertinent labs within the past 24 hours have been reviewed.  Recent Lab Results         11/01/24  1548        Albumin 3.4       ALP 61       ALT 10       Anion Gap 12       AST 12       Baso # 0.02       Basophil % 0.3       BILIRUBIN TOTAL 0.3  Comment: For infants and newborns, interpretation of results should be based  on gestational age, weight and in agreement with clinical  observations.    Premature Infant recommended reference ranges:  Up to 24 hours.............<8.0 mg/dL  Up to 48 hours............<12.0 mg/dL  3-5 days..................<15.0 mg/dL  6-29 days.................<15.0 mg/dL         BUN 29       Calcium 9.0       Chloride 113       CO2 18       Creatinine 1.3       Differential Method Automated       eGFR 40       Eos # 0.2       Eos % 2.1       Glucose 142       Gran # (ANC) 5.3       Gran % 72.9       Hematocrit 36.1       Hemoglobin 11.7       Immature Grans (Abs) 0.05  Comment: Mild elevation in immature granulocytes is non specific and   can be seen in a variety of conditions including stress response,   acute inflammation, trauma and pregnancy. Correlation with other    laboratory and clinical findings is essential.         Immature Granulocytes 0.7       Lactic Acid Level 1.4  Comment: Falsely low lactic acid results can be found in samples   containing >=13.0 mg/dL total bilirubin and/or >=3.5 mg/dL   direct bilirubin.         Lymph # 1.0       Lymph % 13.9       MCH 32.8       MCHC 32.4              Mono # 0.7       Mono % 10.1       MPV 8.4       nRBC 0       Platelet Count 245       Potassium 4.3       PROTEIN TOTAL 7.1       RBC 3.57       RDW 13.2       Sed Rate 56       Sodium 143       WBC 7.26             Recent imaging: reviewed    Airway:     Vent settings:         IV access:        Peripheral IV - Single Lumen 11/01/24 1600 20 G Left Antecubital (Active)   Site Assessment Intact;Dry;No redness;No swelling;Clean 11/01/24 1600   Extremity Assessment Distal to IV No warmth;No swelling;No redness;No abnormal discoloration 11/01/24 1600   Line Status Blood return noted;Saline locked 11/01/24 1600   Dressing Status Clean;Dry;Intact 11/01/24 1600   Dressing Intervention First dressing 11/01/24 1600     Infusions:   Allergies:   Review of patient's allergies indicates:   Allergen Reactions    Statins-hmg-coa reductase inhibitors      Other reaction(s): Unknown      NPO: No    Anticoagulation:   Anticoagulants       None             Instructions    -Admit to hospital medicine.   -consult general surgery for wound debridement   -consider vascular surgery evaluation.    [facility]  Admit to Hospital Medicine  Upon patient arrival to floor, please contact Hospital Medicine on call.      Pallavi Goode DO.  -[facility] [place].

## 2024-11-02 NOTE — ASSESSMENT & PLAN NOTE
Creatine stable for now. BMP reviewed- noted Estimated Creatinine Clearance: 36.9 mL/min (based on SCr of 1.3 mg/dL). according to latest data. Based on current GFR, CKD stage is stage 3 - GFR 30-59.  Monitor UOP and serial BMP and adjust therapy as needed. Renally dose meds. Avoid nephrotoxic medications and procedures.

## 2024-11-02 NOTE — NURSING
Patient does not have medication list. Patient states sister has list. Patient does not know medication as well. Sister to bring list in am or staff to call sister in am to verify.

## 2024-11-02 NOTE — PLAN OF CARE
Problem: Adult Inpatient Plan of Care  Goal: Plan of Care Review  Outcome: Progressing  Flowsheets (Taken 11/2/2024 1753)  Plan of Care Reviewed With: patient  Goal: Patient-Specific Goal (Individualized)  Outcome: Progressing  Goal: Absence of Hospital-Acquired Illness or Injury  Outcome: Progressing  Intervention: Identify and Manage Fall Risk  Flowsheets (Taken 11/2/2024 1753)  Safety Promotion/Fall Prevention:   assistive device/personal item within reach   bed alarm set   Fall Risk reviewed with patient/family   high risk medications identified   instructed to call staff for mobility   nonskid shoes/socks when out of bed   medications reviewed   room near unit station   side rails raised x 2  Intervention: Prevent Skin Injury  Flowsheets (Taken 11/2/2024 1753)  Body Position:   position changed independently   weight shifting  Device Skin Pressure Protection:   adhesive use limited   absorbent pad utilized/changed   tubing/devices free from skin contact  Intervention: Prevent and Manage VTE (Venous Thromboembolism) Risk  Flowsheets (Taken 11/2/2024 1753)  VTE Prevention/Management:   ambulation promoted   bleeding risk assessed  Intervention: Prevent Infection  Flowsheets (Taken 11/2/2024 1753)  Infection Prevention: hand hygiene promoted  Goal: Optimal Comfort and Wellbeing  Outcome: Progressing  Goal: Readiness for Transition of Care  Outcome: Progressing     Problem: Diabetes Comorbidity  Goal: Blood Glucose Level Within Targeted Range  Outcome: Progressing     Problem: Infection  Goal: Absence of Infection Signs and Symptoms  Outcome: Progressing  Intervention: Prevent or Manage Infection  Flowsheets (Taken 11/2/2024 1753)  Infection Management: aseptic technique maintained     Problem: Wound  Goal: Optimal Coping  Outcome: Progressing  Goal: Optimal Functional Ability  Outcome: Progressing  Goal: Absence of Infection Signs and Symptoms  Outcome: Progressing  Intervention: Prevent or Manage  Infection  Flowsheets (Taken 11/2/2024 1753)  Infection Management: aseptic technique maintained  Goal: Improved Oral Intake  Outcome: Progressing  Goal: Optimal Pain Control and Function  Outcome: Progressing  Goal: Skin Health and Integrity  Outcome: Progressing  Intervention: Optimize Skin Protection  Flowsheets (Taken 11/2/2024 1753)  Pressure Reduction Techniques:   frequent weight shift encouraged   weight shift assistance provided   pressure points protected  Activity Management:   Ankle pumps - L1   Arm raise - L1   Straight leg raise - L1   Rolling - L1  Head of Bed (HOB) Positioning: HOB at 30-45 degrees  Goal: Optimal Wound Healing  Outcome: Progressing     Problem: Skin Injury Risk Increased  Goal: Skin Health and Integrity  Outcome: Progressing  Intervention: Optimize Skin Protection  Flowsheets (Taken 11/2/2024 1753)  Pressure Reduction Techniques:   frequent weight shift encouraged   weight shift assistance provided   pressure points protected  Activity Management:   Ankle pumps - L1   Arm raise - L1   Straight leg raise - L1   Rolling - L1  Head of Bed (HOB) Positioning: HOB at 30-45 degrees   Pt alert able to make needs known,maría meds well,IV abx remains in progress,no s/s adverse reaction noted,reposition self q 2hrs,no c/o pain at this time,POC explained,remains free from falls and pressure injuries,safety maintained,continue monitoring.

## 2024-11-02 NOTE — PROGRESS NOTES
"Pharmacokinetic Initial Assessment: IV Vancomycin    Assessment/Plan:    Initiate intravenous vancomycin with loading dose of 2500 mg once followed by a maintenance dose of vancomycin 1250mg IV every 24 hours  Desired empiric serum trough concentration is 10 to 15 mcg/mL  Draw vancomycin trough level 60 min prior to third dose on 11/3 at approximately 2000  Pharmacy will continue to follow and monitor vancomycin.      Please contact pharmacy with any questions regarding this assessment.     Thank you for the consult,   Edith Pettyer       Patient brief summary:  Stephany Skinner is a 89 y.o. female initiated on antimicrobial therapy with IV Vancomycin for treatment of suspected skin & soft tissue infection    Drug Allergies:   Review of patient's allergies indicates:   Allergen Reactions    Statins-hmg-coa reductase inhibitors      Other reaction(s): Unknown       Actual Body Weight:   106.1kg    Renal Function:   Estimated Creatinine Clearance: 37.5 mL/min (based on SCr of 1.3 mg/dL).,     Dialysis Method (if applicable):  N/A    CBC (last 72 hours):  Recent Labs   Lab Result Units 11/01/24  1548   WBC K/uL 7.26   Hemoglobin g/dL 11.7*   Hematocrit % 36.1*   Platelets K/uL 245   Gran % % 72.9   Lymph % % 13.9*   Mono % % 10.1   Eosinophil % % 2.1   Basophil % % 0.3   Differential Method  Automated       Metabolic Panel (last 72 hours):  Recent Labs   Lab Result Units 11/01/24  1548   Sodium mmol/L 143   Potassium mmol/L 4.3   Chloride mmol/L 113*   CO2 mmol/L 18*   Glucose mg/dL 142*   BUN mg/dL 29*   Creatinine mg/dL 1.3   Albumin g/dL 3.4*   Total Bilirubin mg/dL 0.3   Alkaline Phosphatase U/L 61   AST U/L 12   ALT U/L 10       Drug levels (last 3 results):  No results for input(s): "VANCOMYCINRA", "VANCORANDOM", "VANCOMYCINPE", "VANCOPEAK", "VANCOMYCINTR", "VANCOTROUGH" in the last 72 hours.    Microbiologic Results:  Microbiology Results (last 7 days)       Procedure Component Value Units Date/Time    Blood " Culture #2 **CANNOT BE ORDERED STAT** [[id number]] Collected: 11/01/24 1552    Order Status: Sent Specimen: Blood from Peripheral, Forearm, Left Updated: 11/01/24 2104    Blood Culture #1 **CANNOT BE ORDERED STAT** [[id number]] Collected: 11/01/24 1550    Order Status: Sent Specimen: Blood from Peripheral, Antecubital, Left Updated: 11/01/24 2104

## 2024-11-02 NOTE — PROGRESS NOTES
"Pharmacokinetic Initial Assessment: IV Vancomycin    Assessment/Plan:    Initiate intravenous vancomycin with loading dose of 2500 mg once followed by a maintenance dose of vancomycin 1250mg IV every 24 hours  Desired empiric serum trough concentration is 10 to 20 mcg/mL  Draw vancomycin trough level 60 min prior to third dose on 11/3 at approximately 2000  Pharmacy will continue to follow and monitor vancomycin.      Please contact pharmacy at extension [phone] with any questions regarding this assessment.     Thank you for the consult,   Mike Sauceda       Patient brief summary:  Stephany Skinner is a 89 y.o. female initiated on antimicrobial therapy with IV Vancomycin for treatment of suspected skin & soft tissue infection    Drug Allergies:   Review of patient's allergies indicates:   Allergen Reactions    Statins-hmg-coa reductase inhibitors      Other reaction(s): Unknown       Actual Body Weight:   103.2 kg    Renal Function:   Estimated Creatinine Clearance: 36.9 mL/min (based on SCr of 1.3 mg/dL).,     Dialysis Method (if applicable):  N/A    CBC (last 72 hours):  Recent Labs   Lab Result Units 11/01/24  1548   WBC K/uL 7.26   Hemoglobin g/dL 11.7*   Hematocrit % 36.1*   Platelets K/uL 245   Gran % % 72.9   Lymph % % 13.9*   Mono % % 10.1   Eosinophil % % 2.1   Basophil % % 0.3   Differential Method  Automated       Metabolic Panel (last 72 hours):  Recent Labs   Lab Result Units 11/01/24  1548   Sodium mmol/L 143   Potassium mmol/L 4.3   Chloride mmol/L 113*   CO2 mmol/L 18*   Glucose mg/dL 142*   BUN mg/dL 29*   Creatinine mg/dL 1.3   Albumin g/dL 3.4*   Total Bilirubin mg/dL 0.3   Alkaline Phosphatase U/L 61   AST U/L 12   ALT U/L 10       Drug levels (last 3 results):  No results for input(s): "VANCOMYCINRA", "VANCORANDOM", "VANCOMYCINPE", "VANCOPEAK", "VANCOMYCINTR", "VANCOTROUGH" in the last 72 hours.    Microbiologic Results:  Microbiology Results (last 7 days)       ** No results found for the " last 168 hours. **

## 2024-11-02 NOTE — ASSESSMENT & PLAN NOTE
[id number] Score: 4. The patients heart rate in the last 24 hours is as follows:  Pulse  Min: 51  Max: 66     Antiarrhythmics  metoprolol succinate (TOPROL-XL) 24 hr tablet 25 mg, Daily, Oral    Anticoagulants  apixaban tablet 5 mg, 2 times daily, Oral    Plan  - Replete lytes with a goal of K>4, Mg >2  - Patient is anticoagulated, see medications listed above.  - Patient's afib is currently controlled

## 2024-11-02 NOTE — NURSING
[facility] [place]  Nurses Note -- 4 Eyes      11/2/2024       Skin assessed on: Q Shift      [x] No Pressure Injuries Present    []Prevention Measures Documented    [x] Yes LDA  for Pressure Injury Previously documented   Diabetic foot wounds   [] Yes New Pressure Injury Discovered   [] LDA for New Pressure Injury Added      Attending RN:  Terra Milligan LPN     Second RN:  ROSA ISELA Majano

## 2024-11-02 NOTE — NURSING
[facility] [place]  Nurses Note -- 4 Eyes      11/2/2024       Skin assessed on: Admit      [] No Pressure Injuries Present    []Prevention Measures Documented    [] Yes LDA  for Pressure Injury Previously documented     [x] Yes New Pressure Injury Discovered   [x] LDA for New Pressure Injury Added      Attending RN:  Vannessa Way RN     Second RN:  Melecio Phelps

## 2024-11-02 NOTE — NURSING
Patient returned to unit via Stretcher.  Required assistance x2  for transfer back to bed.  NAD noted.

## 2024-11-02 NOTE — CARE UPDATE
I have seen this patient, reviewed the history and physical, assessment and plan. I have personally interviewed and examined the patient at bedside and agree with the findings with the following comments/updates:    #Infection of toe  MRI did show signs of early osteomyelitis distal phalanx LEFT great toe  Pending Podiatry and VascSx eval/recs  Continuing V+Z for now                Rodney Aguilera MD        Medical Director        Section Head of Hospital Medicine        Board-Certified Internal Medicine Attending

## 2024-11-02 NOTE — NURSING
Patient arrived to floor via stretcher per ems from [facility]. Patient transferred by staff from stretcher to bed. Patient oriented to floor and room at this time. Basic setup placed at bedside. Vital signs are stable. Admit assessment completed. Skin assessment completed. Patient alert and oriented x3. Patient unable to answer the year and who is current president. Respirations even and unlabored. No distress noted. Skin warm and dry. Excoriation noted to skin folds and sacrum. Two wounds noted to right buttocks as well. Mepilex foam dressing applied. Md to place order for powder for skin folds. Wounds to bilateral great toe. Dressing intact. Wound to left leg. Dressing intact. Patient incontinent of urine. Diaper changed and purewick placed to help keep patient dry. Patient has no complaints at this time. Bed in lowest position. Call bell within reach. Bed alarms audible. Instructed to call for any needs.

## 2024-11-02 NOTE — PLAN OF CARE
Case Management Assessment     PCP: Pipo Flowers MD  Pharmacy: [facility] [place]     Patient Arrived From: Home  Existing Help at Home: Shira Montiel (sister) [phone]    Barriers to Discharge: pending surgery consult     Discharge Plan:    A. Home w/ family & [facility]    B. Possible SNF, pending surgery consult     SW met with patient and family at the bedside to conduct an assessment. Patient lives home with sister and has adequate family support. Family and friends provide transportation to [facility] and agreed to discharge transportation. Family shared patient's son would like to be apart of discharge planning. Jerrell Skinner . Patient has history at [facility] and is open to returning if needed.  No need or concerns were reported at this time.        11/02/24 1331   Discharge Assessment   Assessment Type Discharge Planning Assessment   Confirmed/corrected address, phone number and insurance Yes   Confirmed Demographics Correct on Facesheet   Source of Information patient;family   Communicated MADELYN with patient/caregiver Date not available/Unable to determine   People in Home other relative(s)  (sister)   Do you expect to return to your current living situation?   (pending possible surgery)   Do you have help at home or someone to help you manage your care at home? Yes   Who are your caregiver(s) and their phone number(s)? Shira Montiel [phone]   Prior to hospitilization cognitive status: Alert/Oriented   Current cognitive status: Alert/Oriented   Home Accessibility wheelchair accessible   Home Layout Able to live on 1st floor   Equipment Currently Used at Home rollator;oxygen;CPAP;commode   Patient currently being followed by outpatient case management? No   Do you currently have service(s) that help you manage your care at home? Yes   Name and Contact number of agency [facility]   Is the pt/caregiver preference to resume services with current agency Yes   Do you  take prescription medications? Yes   Do you have prescription coverage? Yes   How do you get to doctors appointments? family or friend will provide   Are you on dialysis? No   Discharge Plan A Home with family;Home Health   Discharge Plan B Skilled Nursing Facility   DME Needed Upon Discharge  wound care supplies   Discharge Plan discussed with: Patient;Sibling   Transition of Care Barriers None

## 2024-11-02 NOTE — H&P
[facility] Medicine  History & Physical    Patient Name: Stephany Skinner  MRN: [id number]  Patient Class: IP- Inpatient  Admission Date: 11/2/2024  Attending Physician: Rodney Aguilear MD   Primary Care Provider: Pipo Flowers MD         Patient information was obtained from patient and ER records.     Subjective:     Principal Problem:Cellulitis of toe of left foot    Chief Complaint: No chief complaint on file.       HPI: This is an 89-year-old female with a past medical history of AFib (on Eliquis), HFpEF (EF: 50%), COPD, hypertension, type 2 diabetes, hyperlipidemia, CKD 3, rheumatoid arthritis, myasthenia gravis, obesity, who presents with a foot wound.      Patient presents for evaluation of worsening left 1st toe swelling and redness.  She developed this about 3 weeks prior to presentation, and has been following up with wound care.  She noted that her toe looked more swollen, and was more painful on the day of presentation. Her wound care nurse advised her to present to the ED for further evaluation. Patient denies fevers, chills,  but endorses drainage from the toe.     In the ED, the patient was hemodynamically stable.  Labs were remarkable for macrocytic anemia (11.2), elevated ESR (66), negative lactic acid (1.4).  Left foot x-ray showed degenerative changes with soft tissue swelling overlying the dorsum and medial border of the left forefoot.  Left lower extremity arterial ultrasound showed findings suggestive of 50-75% of peroneal artery stenosis, and inflow stenosis in the common femoral artery.  Patient was given vancomycin, Zosyn.  She was admitted for further management.    Past Medical History:   Diagnosis Date    Anemia     Arthritis     Atrial fibrillation     Back pain     Bronchiectasis, uncomplicated     CHF (congestive heart failure)     Disorder of kidney and ureter     Early stage nonexudative age-related macular degeneration of both eyes 2018    GERD  (gastroesophageal reflux disease)     Hyperlipidemia     Hypertension     Hypothyroidism     Obesity     JOY (obstructive sleep apnea)     Osteoporosis     Polyneuropathy     Pressure injury of dorsum of right foot, stage 2 12/01/2017    Rheumatoid arthritis     Urinary incontinence     Venous stasis ulcer of right calf limited to breakdown of skin with varicose veins 06/24/2024       Past Surgical History:   Procedure Laterality Date    CATARACT EXTRACTION W/  INTRAOCULAR LENS IMPLANT Right 12/12/2019    Procedure: EXTRACTION, CATARACT, WITH IOL INSERTION;  Surgeon: Michael Arellano MD;  Location: [facility];  Service: Ophthalmology;  Laterality: Right;    CATARACT EXTRACTION W/  INTRAOCULAR LENS IMPLANT Left 02/13/2020    Procedure: EXTRACTION, CATARACT, WITH IOL INSERTION;  Surgeon: Michael Arellano MD;  Location: [facility] OR;  Service: Ophthalmology;  Laterality: Left;    CHOLECYSTECTOMY      ENDOSCOPIC ULTRASOUND OF UPPER GASTROINTESTINAL TRACT N/A 11/04/2020    Procedure: ULTRASOUND, ENDOSCOPIC, UPPER GI TRACT;  Surgeon: Thierry Saunders MD;  Location: [facility] ENDO;  Service: Endoscopy;  Laterality: N/A;  covid 11/1 [facility]    HERNIA REPAIR      HYSTERECTOMY      knee replacemene Right 02/14/2007    deidra    PHACOEMULSIFICATION OF CATARACT Right 12/12/2019    Procedure: PHACOEMULSIFICATION, CATARACT;  Surgeon: Michael Arellano MD;  Location: [facility];  Service: Ophthalmology;  Laterality: Right;    PHACOEMULSIFICATION OF CATARACT Left 02/13/2020    Procedure: PHACOEMULSIFICATION, CATARACT;  Surgeon: Michael Arellano MD;  Location: [facility];  Service: Ophthalmology;  Laterality: Left;    REPLACEMENT Left 09/18/2007    knee    THYROIDECTOMY         Review of patient's allergies indicates:   Allergen Reactions    Statins-hmg-coa reductase inhibitors      Other reaction(s): Unknown       Current Facility-Administered Medications on File Prior to Encounter   Medication    [COMPLETED] piperacillin-tazobactam (ZOSYN) 4.5 g in D5W 100 mL IVPB  (MB+)    tetracaine HCl (PF) 0.5 % Drop 1 drop    [COMPLETED] vancomycin 1,250 mg in D5W 250 mL IVPB (admixture device)    Followed by    [COMPLETED] vancomycin 1,250 mg in D5W 250 mL IVPB (admixture device)    [DISCONTINUED] vancomycin - pharmacy to dose    [DISCONTINUED] vancomycin 1,250 mg in D5W 250 mL IVPB (admixture device)     Current Outpatient Medications on File Prior to Encounter   Medication Sig    acetaminophen (TYLENOL) 500 MG tablet Take 1,000 mg by mouth nightly as needed for Pain.    albuterol (PROVENTIL/VENTOLIN HFA) 90 mcg/actuation inhaler     alendronate (FOSAMAX) 70 MG tablet Take 1 tablet (70 mg total) by mouth every 7 days.    atorvastatin (LIPITOR) 20 MG tablet Take 20 mg by mouth every evening.    beta-carotene,A,-vits C,E/mins (VISION ORAL) Take 1 tablet by mouth 2 (two) times daily.    calcium carbonate-vit D3-min 600 mg calcium- 400 unit Tab Take 2 tablets by mouth once daily.     colestipoL (COLESTID) 1 gram Tab Take 3 tablets (3 g total) by mouth 2 (two) times daily. For cholesterol and diarrhea    cyanocobalamin (VITAMIN B-12) 1000 MCG tablet Take 100 mcg by mouth once daily.    ELIQUIS 5 mg Tab Take 5 mg by mouth 2 (two) times daily.    furosemide (LASIX) 40 MG tablet Take 1 tablet (40 mg total) by mouth once daily.    isosorbide mononitrate (IMDUR) 60 MG 24 hr tablet Take 60 mg by mouth once daily.    levothyroxine (SYNTHROID) 88 MCG tablet Take 1 tablet (88 mcg total) by mouth before breakfast.    loperamide (IMODIUM A-D) 2 mg Tab Take 2 mg by mouth 4 (four) times daily as needed.    losartan (COZAAR) 25 MG tablet Take 25 mg by mouth 2 (two) times a day.    metoprolol succinate (TOPROL-XL) 25 MG 24 hr tablet Take 25 mg by mouth once daily.    nitroGLYCERIN (NITROSTAT) 0.4 MG SL tablet Place 0.4 mg under the tongue every 5 (five) minutes as needed for Chest pain.    nystatin (MYCOSTATIN) powder Apply topically 4 (four) times daily.    nystatin-triamcinolone (MYCOLOG II) cream APPLY   CREAM TOPICALLY 4 TIMES DAILY    potassium chloride SA (K-DUR,KLOR-CON) 20 MEQ tablet Take 1 tablet (20 mEq total) by mouth once daily.    spironolactone (ALDACTONE) 25 MG tablet Take 25 mg by mouth every morning.    vitamins A,C,E-zinc-copper (PRESERVISION AREDS) 4,296 mcg-226 mg-90 mg Cap PreserVision AREDS 14,320 unit-226 mg-200 unit capsule, [RxNorm: 916373]    [DISCONTINUED] omega-3 acid ethyl esters (LOVAZA) 1 gram capsule Take 2 capsules (2 g total) by mouth 2 (two) times daily.     Family History       Problem Relation (Age of Onset)    Cancer Sister          Tobacco Use    Smoking status: Never     Passive exposure: Never    Smokeless tobacco: Never   Substance and Sexual Activity    Alcohol use: Never    Drug use: Never    Sexual activity: Never     Review of Systems   Respiratory: Negative.     Cardiovascular: Negative.    Gastrointestinal: Negative.    Genitourinary: Negative.    Musculoskeletal: Negative.    Skin:  Positive for wound.   Neurological: Negative.      Objective:     Vital Signs (Most Recent):    Vital Signs (24h Range):  Temp:  [97.7 °F (36.5 °C)-98 °F (36.7 °C)] 97.7 °F (36.5 °C)  Pulse:  [51-66] 59  Resp:  [18-20] 19  SpO2:  [92 %-100 %] 98 %  BP: (116-163)/(60-98) 156/68     Weight: 103.2 kg (227 lb 8.2 oz)  Body mass index is 35.63 kg/m².     Physical Exam  Vitals and nursing note reviewed.   Constitutional:       General: She is not in acute distress.     Appearance: She is obese. She is not ill-appearing.   HENT:      Mouth/Throat:      Mouth: Mucous membranes are moist.   Cardiovascular:      Rate and Rhythm: Normal rate.   Pulmonary:      Effort: Pulmonary effort is normal.   Abdominal:      General: Abdomen is flat.      Comments: Intertrigo noted in abdominal folds/underneath breast    Skin:     General: Skin is warm.   Neurological:      General: No focal deficit present.      Mental Status: She is alert.     Left medial leg ulcer -             Left 1st great toe:                       Significant Labs: All pertinent labs within the past 24 hours have been reviewed.    Significant Imaging: I have reviewed all pertinent imaging results/findings within the past 24 hours.  Assessment/Plan:     * Cellulitis of toe of left foot  Concern for cellulitis/possible underlying osteomyelitis     Plan:   Continue vancomycin/zosyn   Follow up on cultures   Consult podiatry   Consult vascular surgery   MRI left foot ordered     Stage 3a chronic kidney disease (CKD)  Creatine stable for now. BMP reviewed- noted Estimated Creatinine Clearance: 36.9 mL/min (based on SCr of 1.3 mg/dL). according to latest data. Based on current GFR, CKD stage is stage 3 - GFR 30-59.  Monitor UOP and serial BMP and adjust therapy as needed. Renally dose meds. Avoid nephrotoxic medications and procedures.    Candidiasis, intertrigo  Miconazole BID       COPD (chronic obstructive pulmonary disease)  No acute issues. DuoNebs PRN     Essential hypertension  Patients blood pressure range in the last 24 hours was: BP  Min: 116/70  Max: 163/73.The patient's inpatient anti-hypertensive regimen is listed below:  Current Antihypertensives  furosemide tablet 40 mg, Daily, Oral  losartan tablet 25 mg, 2 times daily, Oral  metoprolol succinate (TOPROL-XL) 24 hr tablet 25 mg, Daily, Oral  spironolactone tablet 25 mg, Every morning, Oral    Plan  - BP is controlled, no changes needed to their regimen    Type 2 diabetes mellitus with circulatory disorder, without long-term current use of insulin  Lab Results   Component Value Date    HGBA1C 5.8 (H) 11/29/2023     Glycemic protocol   MDSS      Chronic diastolic congestive heart failure  Continue home medications     Myasthenia gravis, adult form  Not on any medications. Monitor   Follow up with neurology       Chronic atrial fibrillation  [id number] Score: 4. The patients heart rate in the last 24 hours is as follows:  Pulse  Min: 51  Max: 66     Antiarrhythmics  metoprolol  succinate (TOPROL-XL) 24 hr tablet 25 mg, Daily, Oral    Anticoagulants  apixaban tablet 5 mg, 2 times daily, Oral    Plan  - Replete lytes with a goal of K>4, Mg >2  - Patient is anticoagulated, see medications listed above.  - Patient's afib is currently controlled        Hyperlipidemia  Continue statin       Hypothyroidism  Continue synthroid         VTE Risk Mitigation (From admission, onward)           Ordered     apixaban tablet 5 mg  2 times daily         11/02/24 0317     IP VTE HIGH RISK PATIENT  Once         11/02/24 0252     Place sequential compression device  Until discontinued         11/02/24 0252                         Shane Mancuso MD  Department of Hospital Medicine  [facility] [place] [facility] Surg

## 2024-11-02 NOTE — PLAN OF CARE
Problem: Adult Inpatient Plan of Care  Goal: Plan of Care Review  Outcome: Progressing  Flowsheets (Taken 11/2/2024 0614)  Plan of Care Reviewed With: patient  Goal: Optimal Comfort and Wellbeing  Outcome: Progressing  Intervention: Monitor Pain and Promote Comfort  Flowsheets (Taken 11/2/2024 0614)  Pain Management Interventions:   pillow support provided   quiet environment facilitated     Problem: Diabetes Comorbidity  Goal: Blood Glucose Level Within Targeted Range  Outcome: Progressing  Intervention: Monitor and Manage Glycemia  Flowsheets (Taken 11/2/2024 0614)  Glycemic Management: blood glucose monitored

## 2024-11-02 NOTE — HPI
This is an 89-year-old female with a past medical history of AFib (on Eliquis), HFpEF (EF: 50%), COPD, hypertension, type 2 diabetes, hyperlipidemia, CKD 3, rheumatoid arthritis, myasthenia gravis, obesity, who presents with a foot wound.      Patient presents for evaluation of worsening left 1st toe swelling and redness.  She developed this about 3 weeks prior to presentation, and has been following up with wound care.  She noted that her toe looked more swollen, and was more painful on the day of presentation. Her wound care nurse advised her to present to the ED for further evaluation. Patient denies fevers, chills,  but endorses drainage from the toe.     In the ED, the patient was hemodynamically stable.  Labs were remarkable for macrocytic anemia (11.2), elevated ESR (66), negative lactic acid (1.4).  Left foot x-ray showed degenerative changes with soft tissue swelling overlying the dorsum and medial border of the left forefoot.  Left lower extremity arterial ultrasound showed findings suggestive of 50-75% of peroneal artery stenosis, and inflow stenosis in the common femoral artery.  Patient was given vancomycin, Zosyn.  She was admitted for further management.

## 2024-11-03 PROBLEM — R19.7 DIARRHEA: Status: ACTIVE | Noted: 2024-11-03

## 2024-11-03 LAB
APTT PPP: 28.5 SEC (ref 21–32)
APTT PPP: 29.1 SEC (ref 21–32)
APTT PPP: 50.2 SEC (ref 21–32)
BASOPHILS # BLD AUTO: 0.03 K/UL (ref 0–0.2)
BASOPHILS NFR BLD: 0.5 % (ref 0–1.9)
C DIFF GDH STL QL: NEGATIVE
C DIFF TOX A+B STL QL IA: NEGATIVE
DIFFERENTIAL METHOD BLD: ABNORMAL
EOSINOPHIL # BLD AUTO: 0.2 K/UL (ref 0–0.5)
EOSINOPHIL NFR BLD: 2.4 % (ref 0–8)
ERYTHROCYTE [DISTWIDTH] IN BLOOD BY AUTOMATED COUNT: 13.2 % (ref 11.5–14.5)
FOLATE SERPL-MCNC: 7.9 NG/ML (ref 4–24)
HCT VFR BLD AUTO: 37.7 % (ref 37–48.5)
HGB BLD-MCNC: 11.7 G/DL (ref 12–16)
IMM GRANULOCYTES # BLD AUTO: 0.03 K/UL (ref 0–0.04)
IMM GRANULOCYTES NFR BLD AUTO: 0.5 % (ref 0–0.5)
INR PPP: 1 (ref 0.8–1.2)
LYMPHOCYTES # BLD AUTO: 0.9 K/UL (ref 1–4.8)
LYMPHOCYTES NFR BLD: 14.4 % (ref 18–48)
MCH RBC QN AUTO: 32.9 PG (ref 27–31)
MCHC RBC AUTO-ENTMCNC: 31 G/DL (ref 32–36)
MCV RBC AUTO: 106 FL (ref 82–98)
MONOCYTES # BLD AUTO: 0.7 K/UL (ref 0.3–1)
MONOCYTES NFR BLD: 10.8 % (ref 4–15)
NEUTROPHILS # BLD AUTO: 4.4 K/UL (ref 1.8–7.7)
NEUTROPHILS NFR BLD: 71.4 % (ref 38–73)
NRBC BLD-RTO: 0 /100 WBC
PLATELET # BLD AUTO: 202 K/UL (ref 150–450)
PMV BLD AUTO: 9.3 FL (ref 9.2–12.9)
POCT GLUCOSE: 120 MG/DL (ref 70–110)
POCT GLUCOSE: 130 MG/DL (ref 70–110)
POCT GLUCOSE: 175 MG/DL (ref 70–110)
POCT GLUCOSE: 89 MG/DL (ref 70–110)
PROTHROMBIN TIME: 11.4 SEC (ref 9–12.5)
RBC # BLD AUTO: 3.56 M/UL (ref 4–5.4)
VIT B12 SERPL-MCNC: 1000 PG/ML (ref 210–950)
WBC # BLD AUTO: 6.19 K/UL (ref 3.9–12.7)

## 2024-11-03 PROCEDURE — 82746 ASSAY OF FOLIC ACID SERUM: CPT | Performed by: STUDENT IN AN ORGANIZED HEALTH CARE EDUCATION/TRAINING PROGRAM

## 2024-11-03 PROCEDURE — 85025 COMPLETE CBC W/AUTO DIFF WBC: CPT | Performed by: STUDENT IN AN ORGANIZED HEALTH CARE EDUCATION/TRAINING PROGRAM

## 2024-11-03 PROCEDURE — 25000003 PHARM REV CODE 250: Performed by: STUDENT IN AN ORGANIZED HEALTH CARE EDUCATION/TRAINING PROGRAM

## 2024-11-03 PROCEDURE — 85730 THROMBOPLASTIN TIME PARTIAL: CPT | Performed by: STUDENT IN AN ORGANIZED HEALTH CARE EDUCATION/TRAINING PROGRAM

## 2024-11-03 PROCEDURE — 63600175 PHARM REV CODE 636 W HCPCS: Performed by: STUDENT IN AN ORGANIZED HEALTH CARE EDUCATION/TRAINING PROGRAM

## 2024-11-03 PROCEDURE — 94760 N-INVAS EAR/PLS OXIMETRY 1: CPT

## 2024-11-03 PROCEDURE — 82607 VITAMIN B-12: CPT | Performed by: STUDENT IN AN ORGANIZED HEALTH CARE EDUCATION/TRAINING PROGRAM

## 2024-11-03 PROCEDURE — 99900035 HC TECH TIME PER 15 MIN (STAT)

## 2024-11-03 PROCEDURE — 11000001 HC ACUTE MED/SURG PRIVATE ROOM

## 2024-11-03 PROCEDURE — 27000207 HC ISOLATION

## 2024-11-03 PROCEDURE — 85610 PROTHROMBIN TIME: CPT | Performed by: STUDENT IN AN ORGANIZED HEALTH CARE EDUCATION/TRAINING PROGRAM

## 2024-11-03 PROCEDURE — 36410 VNPNXR 3YR/> PHY/QHP DX/THER: CPT

## 2024-11-03 PROCEDURE — 36415 COLL VENOUS BLD VENIPUNCTURE: CPT | Performed by: STUDENT IN AN ORGANIZED HEALTH CARE EDUCATION/TRAINING PROGRAM

## 2024-11-03 PROCEDURE — C1751 CATH, INF, PER/CENT/MIDLINE: HCPCS

## 2024-11-03 PROCEDURE — 94761 N-INVAS EAR/PLS OXIMETRY MLT: CPT

## 2024-11-03 RX ORDER — HEPARIN SODIUM,PORCINE/D5W 25000/250
0-40 INTRAVENOUS SOLUTION INTRAVENOUS CONTINUOUS
Status: DISCONTINUED | OUTPATIENT
Start: 2024-11-03 | End: 2024-11-06

## 2024-11-03 RX ORDER — DIPHENHYDRAMINE HYDROCHLORIDE 50 MG/ML
25 INJECTION INTRAMUSCULAR; INTRAVENOUS ONCE
Status: COMPLETED | OUTPATIENT
Start: 2024-11-03 | End: 2024-11-03

## 2024-11-03 RX ORDER — CEFTRIAXONE 2 G/1
2 INJECTION, POWDER, FOR SOLUTION INTRAMUSCULAR; INTRAVENOUS
Status: DISCONTINUED | OUTPATIENT
Start: 2024-11-03 | End: 2024-11-03

## 2024-11-03 RX ORDER — SODIUM CHLORIDE 0.9 % (FLUSH) 0.9 %
10 SYRINGE (ML) INJECTION EVERY 12 HOURS PRN
Status: DISCONTINUED | OUTPATIENT
Start: 2024-11-03 | End: 2024-11-13 | Stop reason: HOSPADM

## 2024-11-03 RX ADMIN — COLESTIPOL HYDROCHLORIDE 3 G: 1 TABLET, FILM COATED ORAL at 09:11

## 2024-11-03 RX ADMIN — CEFTRIAXONE SODIUM 2 G: 2 INJECTION, POWDER, FOR SOLUTION INTRAMUSCULAR; INTRAVENOUS at 11:11

## 2024-11-03 RX ADMIN — DIPHENHYDRAMINE HYDROCHLORIDE 25 MG: 50 INJECTION INTRAMUSCULAR; INTRAVENOUS at 03:11

## 2024-11-03 RX ADMIN — MICONAZOLE NITRATE: 20 POWDER TOPICAL at 09:11

## 2024-11-03 RX ADMIN — SPIRONOLACTONE 25 MG: 25 TABLET ORAL at 06:11

## 2024-11-03 RX ADMIN — FUROSEMIDE 40 MG: 40 TABLET ORAL at 09:11

## 2024-11-03 RX ADMIN — MELATONIN TAB 3 MG 6 MG: 3 TAB at 09:11

## 2024-11-03 RX ADMIN — HEPARIN SODIUM 12 UNITS/KG/HR: 10000 INJECTION, SOLUTION INTRAVENOUS at 09:11

## 2024-11-03 RX ADMIN — MICONAZOLE NITRATE: 20 POWDER TOPICAL at 12:11

## 2024-11-03 RX ADMIN — PIPERACILLIN SODIUM AND TAZOBACTAM SODIUM 4.5 G: 4; .5 INJECTION, POWDER, LYOPHILIZED, FOR SOLUTION INTRAVENOUS at 03:11

## 2024-11-03 RX ADMIN — LEVOTHYROXINE SODIUM 88 MCG: 0.09 TABLET ORAL at 06:11

## 2024-11-03 RX ADMIN — VANCOMYCIN HYDROCHLORIDE 1250 MG: 1.25 INJECTION, POWDER, LYOPHILIZED, FOR SOLUTION INTRAVENOUS at 01:11

## 2024-11-03 RX ADMIN — DICLOFENAC SODIUM 2 G: 10 GEL TOPICAL at 09:11

## 2024-11-03 NOTE — HPI
"89-year-old female with a past medical history of AFib (on Eliquis), HFpEF (EF: 50%), COPD, hypertension, type 2 diabetes, hyperlipidemia, CKD 3, rheumatoid arthritis, myasthenia gravis, obesity, who presents with a foot wound. Patient presents for evaluation of worsening left 1st toe swelling and redness.  She developed this about 3 weeks prior to presentation, and has been following up with wound care.  She noted that her toe looked more swollen, and was more painful on the day of presentation. Her wound care nurse advised her to present to the ED for further evaluation. Patient denies fevers, chills,  but endorses drainage from the toe. In the ED, the patient was hemodynamically stable.  Labs were remarkable for macrocytic anemia (11.2), elevated ESR (66), negative lactic acid (1.4).  Left foot x-ray showed degenerative changes with soft tissue swelling overlying the dorsum and medial border of the left forefoot. Left lower extremity arterial ultrasound showed findings suggestive of 50-75% of peroneal artery stenosis, and inflow stenosis in the common femoral artery. Patient was given vancomycin, Zosyn.     Podiatry consulted for "toe cellulitis/possible osteo". VSS, AF, WBL WNL, pt denies f/c/n/v/sob or any other pedal complaints. Pt followed by wound care outpatient, not followed by podiatry. Previous flexor tenotomy R 1st digit, current hallux malleus L 1st digit. MRI suspicious for L 1st distal and proximal phalanx OM, no MPJ involvement. Arterial US significant stenosis, VS consulted, pending recs. Wound cx pending results. Amendable to amputation.  "

## 2024-11-03 NOTE — ASSESSMENT & PLAN NOTE
Acute OM with cellulitis L 1st digit 2/2 diabetic wound. Xray and MRI suspicious for early, acute OM L 1st distal and proximal phalanx, without involvement of MPJ. High index of suspicion for active infection on physical exam. Wound cx pending. LLE arterial US significant stenosis, VS consult recs pending.     - vascular surgery consulted and following, pending recs   - likely recommend amputation of L 1st digit after clearance from vascular  - deep wound cx obtained, results pending  - conservative and surgical treatment options discussed at length   - patient amendable to amputation of L 1st digit  - L 1st digit wound dressed with betadine soaked gauze, dry gauze, kerlix, no compression   - wound care orders placed for similar dressings, daily   - wound care orders for other noted BLE wounds placed as well  - abx per primary / ID  - WBAT, emphasis to heel touch L foot  - podiatry will follow

## 2024-11-03 NOTE — SUBJECTIVE & OBJECTIVE
Scheduled Meds:   apixaban  5 mg Oral BID    colestipoL  3 g Oral BID    [START ON 11/3/2024] diclofenac sodium  2 g Topical (Top) Daily    furosemide  40 mg Oral Daily    levothyroxine  88 mcg Oral Before breakfast    losartan  25 mg Oral BID    metoprolol succinate  25 mg Oral Daily    miconazole NITRATE 2 %   Topical (Top) BID    piperacillin-tazobactam (Zosyn) IV (PEDS and ADULTS) (extended infusion is not appropriate)  4.5 g Intravenous Q8H    spironolactone  25 mg Oral QAM    vancomycin (VANCOCIN) IV (PEDS and ADULTS)  1,250 mg Intravenous Q24H     Continuous Infusions:   0.9% NaCl   Intravenous Continuous 50 mL/hr at 11/02/24 2014 Rate Verify at 11/02/24 2014     PRN Meds:  Current Facility-Administered Medications:     acetaminophen, 650 mg, Oral, Q8H PRN    acetaminophen, 650 mg, Oral, Q4H PRN    albuterol-ipratropium, 3 mL, Nebulization, Q6H PRN    aluminum-magnesium hydroxide-simethicone, 30 mL, Oral, QID PRN    bisacodyL, 10 mg, Rectal, Daily PRN    glucagon (human recombinant), 1 mg, Intramuscular, PRN    glucose, 16 g, Oral, PRN    glucose, 24 g, Oral, PRN    insulin aspart U-100, 0-10 Units, Subcutaneous, QID (AC + HS) PRN    melatonin, 6 mg, Oral, Nightly PRN    naloxone, 0.02 mg, Intravenous, PRN    ondansetron, 4 mg, Intravenous, Q8H PRN    potassium bicarbonate, 35 mEq, Oral, PRN    potassium bicarbonate, 50 mEq, Oral, PRN    potassium bicarbonate, 60 mEq, Oral, PRN    potassium, sodium phosphates, 2 packet, Oral, PRN    potassium, sodium phosphates, 2 packet, Oral, PRN    potassium, sodium phosphates, 2 packet, Oral, PRN    prochlorperazine, 5 mg, Intravenous, Q6H PRN    senna-docusate 8.6-50 mg, 1 tablet, Oral, Daily PRN    simethicone, 1 tablet, Oral, QID PRN    sodium chloride 0.9%, 10 mL, Intravenous, Q12H PRN    Pharmacy to dose Vancomycin consult, , , Once **AND** vancomycin - pharmacy to dose, , Intravenous, pharmacy to manage frequency    Review of patient's allergies indicates:    Allergen Reactions    Statins-hmg-coa reductase inhibitors      Other reaction(s): Unknown        Past Medical History:   Diagnosis Date    Anemia     Arthritis     Atrial fibrillation     Back pain     Bronchiectasis, uncomplicated     CHF (congestive heart failure)     Disorder of kidney and ureter     Early stage nonexudative age-related macular degeneration of both eyes 2018    GERD (gastroesophageal reflux disease)     Hyperlipidemia     Hypertension     Hypothyroidism     Obesity     JOY (obstructive sleep apnea)     Osteoporosis     Polyneuropathy     Pressure injury of dorsum of right foot, stage 2 12/01/2017    Rheumatoid arthritis     Urinary incontinence     Venous stasis ulcer of right calf limited to breakdown of skin with varicose veins 06/24/2024     Past Surgical History:   Procedure Laterality Date    CATARACT EXTRACTION W/  INTRAOCULAR LENS IMPLANT Right 12/12/2019    Procedure: EXTRACTION, CATARACT, WITH IOL INSERTION;  Surgeon: Michael Arellano MD;  Location: [facility];  Service: Ophthalmology;  Laterality: Right;    CATARACT EXTRACTION W/  INTRAOCULAR LENS IMPLANT Left 02/13/2020    Procedure: EXTRACTION, CATARACT, WITH IOL INSERTION;  Surgeon: Michael Arellano MD;  Location: [facility];  Service: Ophthalmology;  Laterality: Left;    CHOLECYSTECTOMY      ENDOSCOPIC ULTRASOUND OF UPPER GASTROINTESTINAL TRACT N/A 11/04/2020    Procedure: ULTRASOUND, ENDOSCOPIC, UPPER GI TRACT;  Surgeon: Thierry Saunders MD;  Location: [facility] ENDO;  Service: Endoscopy;  Laterality: N/A;  covid 11/1 [facility]    HERNIA REPAIR      HYSTERECTOMY      knee replacemene Right 02/14/2007    deidra    PHACOEMULSIFICATION OF CATARACT Right 12/12/2019    Procedure: PHACOEMULSIFICATION, CATARACT;  Surgeon: Michael Arellano MD;  Location: [facility];  Service: Ophthalmology;  Laterality: Right;    PHACOEMULSIFICATION OF CATARACT Left 02/13/2020    Procedure: PHACOEMULSIFICATION, CATARACT;  Surgeon: Michael Arellano MD;  Location: [facility];  Service:  Ophthalmology;  Laterality: Left;    REPLACEMENT Left 09/18/2007    knee    THYROIDECTOMY         Family History       Problem Relation (Age of Onset)    Cancer Sister          Tobacco Use    Smoking status: Never     Passive exposure: Never    Smokeless tobacco: Never   Substance and Sexual Activity    Alcohol use: Never    Drug use: Never    Sexual activity: Never     Review of Systems   Constitutional: Negative.    Respiratory: Negative.     Cardiovascular: Negative.    Skin:  Positive for color change and wound.     Objective:     Vital Signs (Most Recent):  Temp: 97.9 °F (36.6 °C) (11/02/24 1941)  Pulse: 67 (11/02/24 1941)  Resp: 18 (11/02/24 1941)  BP: 134/60 (11/02/24 1941)  SpO2: 98 % (11/02/24 1941) Vital Signs (24h Range):  Temp:  [97.8 °F (36.6 °C)-98.5 °F (36.9 °C)] 97.9 °F (36.6 °C)  Pulse:  [55-73] 67  Resp:  [16-20] 18  SpO2:  [94 %-100 %] 98 %  BP: (134-166)/(60-98) 134/60     Weight: 103.2 kg (227 lb 8.2 oz)  Body mass index is 35.63 kg/m².    Foot Exam    General  Orientation: alert and oriented to person, place, and time   Affect: appropriate       Right Foot/Ankle     Inspection and Palpation  Swelling: none   Skin Exam: ulcer; no drainage and no cellulitis     Neurovascular  Dorsalis pedis: 1+  Posterior tibial: absent  Saphenous nerve sensation: diminished  Tibial nerve sensation: diminished  Superficial peroneal nerve sensation: diminished  Deep peroneal nerve sensation: diminished  Sural nerve sensation: diminished    Comments  R distal plantar 1st digit wound appears closed, dry, stable.    Left Foot/Ankle      Inspection and Palpation  Tenderness: great toe interphalangeal joint   Swelling: great toe interphalangeal joint   Claw toes: first toe  Skin Exam: drainage, cellulitis, ulcer and erythema;     Neurovascular  Dorsalis pedis: 1+  Posterior tibial: absent  Saphenous nerve sensation: diminished  Tibial nerve sensation: diminished  Superficial peroneal nerve sensation: diminished  Deep  peroneal nerve sensation: diminished  Sural nerve sensation: diminished    Comments  L distal plantar 1st digit wound moderate serosanguinous drainage, necrotic and sloughing base, positive probe to bone, malodor. No fluctuance or crepitus, mild pain on palpation.   L medial leg wound moderate serosanguinous drainage, fibro granular base, mildly macerated edges. No fluctuance, crepitus, or pain on palpation.                Laboratory:  All pertinent labs reviewed within the last 24 hours.    Diagnostic Results:  I have reviewed all pertinent imaging results/findings within the past 24 hours.

## 2024-11-03 NOTE — PLAN OF CARE
Problem: Adult Inpatient Plan of Care  Goal: Plan of Care Review  Outcome: Progressing  Flowsheets (Taken 11/3/2024 0343)  Plan of Care Reviewed With: patient  Goal: Patient-Specific Goal (Individualized)  Outcome: Progressing  Goal: Absence of Hospital-Acquired Illness or Injury  Outcome: Progressing  Intervention: Identify and Manage Fall Risk  Flowsheets (Taken 11/3/2024 0343)  Safety Promotion/Fall Prevention:   assistive device/personal item within reach   commode/urinal/bedpan at bedside   instructed to call staff for mobility   lighting adjusted   nonskid shoes/socks when out of bed   room near unit station   side rails raised x 2   Supervised toileting - stay within arms reach  Intervention: Prevent Skin Injury  Flowsheets (Taken 11/3/2024 0343)  Body Position:   position changed independently   weight shifting  Skin Protection: silicone foam dressing in place  Device Skin Pressure Protection:   absorbent pad utilized/changed   positioning supports utilized   tubing/devices free from skin contact  Intervention: Prevent and Manage VTE (Venous Thromboembolism) Risk  Flowsheets (Taken 11/3/2024 0343)  VTE Prevention/Management:   ambulation promoted   dorsiflexion/plantar flexion performed  Intervention: Prevent Infection  Flowsheets (Taken 11/3/2024 0343)  Infection Prevention:   hand hygiene promoted   rest/sleep promoted   single patient room provided  Goal: Optimal Comfort and Wellbeing  Outcome: Progressing  Intervention: Monitor Pain and Promote Comfort  Flowsheets (Taken 11/3/2024 0343)  Pain Management Interventions:   pillow support provided   medication offered  Intervention: Provide Person-Centered Care  Flowsheets (Taken 11/3/2024 0343)  Trust Relationship/Rapport: care explained  Goal: Readiness for Transition of Care  Outcome: Progressing     Problem: Diabetes Comorbidity  Goal: Blood Glucose Level Within Targeted Range  Outcome: Progressing  Intervention: Monitor and Manage  Glycemia  Flowsheets (Taken 11/3/2024 0343)  Glycemic Management: blood glucose monitored     Problem: Infection  Goal: Absence of Infection Signs and Symptoms  Outcome: Progressing  Intervention: Prevent or Manage Infection  Flowsheets (Taken 11/3/2024 0343)  Isolation Precautions: precautions maintained     Problem: Wound  Goal: Optimal Coping  Outcome: Progressing  Goal: Optimal Functional Ability  Outcome: Progressing  Goal: Absence of Infection Signs and Symptoms  Outcome: Progressing  Goal: Improved Oral Intake  Outcome: Progressing  Goal: Optimal Pain Control and Function  Outcome: Progressing  Goal: Skin Health and Integrity  Outcome: Progressing  Goal: Optimal Wound Healing  Outcome: Progressing  Intervention: Promote Wound Healing  Flowsheets (Taken 11/3/2024 0343)  Sleep/Rest Enhancement:   awakenings minimized   regular sleep/rest pattern promoted   room darkened     Problem: Skin Injury Risk Increased  Goal: Skin Health and Integrity  Outcome: Progressing  Intervention: Optimize Skin Protection  Flowsheets (Taken 11/3/2024 0343)  Pressure Reduction Techniques:   frequent weight shift encouraged   positioned off wounds   pressure points protected   weight shift assistance provided  Skin Protection: silicone foam dressing in place  Activity Management:   Ambulated to bathroom - L4   Arm raise - L1   Rolling - L1   Walk with assistive devise and /or staff member - L3  Head of Bed (HOB) Positioning: HOB elevated

## 2024-11-03 NOTE — HOSPITAL COURSE
Pt admitted for infection of toe; MRI did show signs of early osteomyelitis distal phalanx LEFT great toe. Pending Podiatry and VascSx eval/recs. Continuing V+Z for now. GNR in deep wound culture. Got contrast, to preserve renal function, will reduce from V+Z to V+ceftriaxone (non-diabetic, pseudomonas less likely, however switch to cefepime if results). Will switch from Apixaban to heparin gtt in preparation of procedures. C diff negative. Diarrhea improved through the admission.     Unlikely need for revascularization per VascSx. Patient NPO for Podiatry to proceed with procedure. Polymicrobial infection of toe with MRSA, Pseudomonas and Enterobacter. Now s/p amputation- ID consult for Abx LOT and d/c recs. plan for 6 weeks of IV Zosyn due to culture from proximal margins growing Pseudomonas; PICC line placed. Tolerating abx well. Continue wound care. OP fu with ID and podiatry and vascular surgery.

## 2024-11-03 NOTE — ASSESSMENT & PLAN NOTE
PUJA is likely due to pre-renal azotemia due to infection.   Baseline creatinine is  .09 . Most recent creatinine and eGFR are listed below.    Recent Labs     11/01/24  1548 11/02/24  0505   CREATININE 1.3 0.9   EGFRNORACEVR 40* >60      Plan  - PUJA is improving  - Avoid nephrotoxins and renally dose meds for GFR listed above  - Monitor urine output, serial BMP, and adjust therapy as needed  - Cr 1.3 on admit, now 0.9 after fluids. RESOLVED

## 2024-11-03 NOTE — PROGRESS NOTES
[facility] Medicine  Progress Note    Patient Name: Stephany Skinner  MRN: [id number]  Patient Class: IP- Inpatient   Admission Date: 11/2/2024  Length of Stay: 1 days  Attending Physician: Rodney Aguilera MD  Primary Care Provider: Pipo Flowers MD        Subjective:     Principal Problem:Osteomyelitis of great toe of left foot        HPI:  This is an 89-year-old female with a past medical history of AFib (on Eliquis), HFpEF (EF: 50%), COPD, hypertension, type 2 diabetes, hyperlipidemia, CKD 3, rheumatoid arthritis, myasthenia gravis, obesity, who presents with a foot wound.      Patient presents for evaluation of worsening left 1st toe swelling and redness.  She developed this about 3 weeks prior to presentation, and has been following up with wound care.  She noted that her toe looked more swollen, and was more painful on the day of presentation. Her wound care nurse advised her to present to the ED for further evaluation. Patient denies fevers, chills,  but endorses drainage from the toe.     In the ED, the patient was hemodynamically stable.  Labs were remarkable for macrocytic anemia (11.2), elevated ESR (66), negative lactic acid (1.4).  Left foot x-ray showed degenerative changes with soft tissue swelling overlying the dorsum and medial border of the left forefoot.  Left lower extremity arterial ultrasound showed findings suggestive of 50-75% of peroneal artery stenosis, and inflow stenosis in the common femoral artery.  Patient was given vancomycin, Zosyn.  She was admitted for further management.    Overview/Hospital Course:  #Infection of toe; MRI did show signs of early osteomyelitis distal phalanx LEFT great toe. Pending Podiatry and VascSx eval/recs. Continuing V+Z for now. GNR in deep wound culture. Got contrast, to preserve renal function, will reduce from V+Z to V+ceftriaxone (non-diabetic, pseudomonas less likely, however switch to cefepime if results). Will switch from  Apixaban to heparin gtt in preparation of procedures. Made NPO for Monday, in case. Reports diarrhea for weeks, will check cdiff and chronic stool studies.       Interval History:  NAEON.  No new issues.   CC- foot pain  All questions answered and updates on care given.       ROS:  General: Negative for fevers   Cardiac: Negative for chest pain   Pulmonary: Negative for wheezing  GI: Negative for abdominal distention   +wound     Vitals:    11/03/24 0434 11/03/24 0611 11/03/24 0715 11/03/24 0728   BP: 139/66 139/66 126/61    BP Location: Left arm      Patient Position: Lying  Lying    Pulse: 60  (!) 50 (!) 57   Resp: 20  18    Temp: 97.7 °F (36.5 °C)  98 °F (36.7 °C)    TempSrc: Oral  Oral    SpO2: 99%  (!) 94%    Weight:       Height:              Body mass index is 35.05 kg/m².      PHYSICAL EXAM:  GENERAL APPEARANCE: alert and cooperative.     HEAD: NC/AT  CARDIAC: There is no cyanosis or pallor.   LUNGS: No apparent wheezing or stridor.  ABDOMEN: Non-distended. No guarding.  MSK: No joint erythema or tenderness.   EXTREMITIES: No significant new deformity or new joint abnormality.   NEUROLOGICAL: CN II-XII grossly intact.   SKIN: No lesions or eruptions.+wound Left 1 toe  PSYCHIATRIC: No tangential speech. No Hyperactive features.        Recent Results (from the past 24 hours)   Aerobic culture    Collection Time: 11/02/24  2:30 PM    Specimen: Toe, Left Foot; Wound   Result Value Ref Range    Aerobic Bacterial Culture (A)      GRAM NEGATIVE APARNA  Moderate  Identification and susceptibility pending     Glucose, Random    Collection Time: 11/02/24  4:41 PM   Result Value Ref Range    Glucose 120 (H) 70 - 110 mg/dL   POCT glucose    Collection Time: 11/02/24  7:38 PM   Result Value Ref Range    POCT Glucose 124 (H) 70 - 110 mg/dL   POCT glucose    Collection Time: 11/03/24  7:14 AM   Result Value Ref Range    POCT Glucose 89 70 - 110 mg/dL       Microbiology Results (last 7 days)       Procedure Component Value  Units Date/Time    Stool culture [[id number]]     Order Status: No result Specimen: Stool     Aerobic culture [[id number]]  (Abnormal) Collected: 11/02/24 1430    Order Status: Completed Specimen: Wound from Toe, Left Foot Updated: 11/03/24 0738     Aerobic Bacterial Culture GRAM NEGATIVE APARNA  Moderate  Identification and susceptibility pending      Clostridium difficile EIA [[id number]] Collected: 11/02/24 1536    Order Status: Sent Specimen: Stool Updated: 11/02/24 1556    Culture, Anaerobe [[id number]] Collected: 11/02/24 1330    Order Status: Sent Specimen: Wound from Toe, Left Foot Updated: 11/02/24 1540    Fungus culture [[id number]] Collected: 11/02/24 1330    Order Status: Sent Specimen: Wound from Toe, Left Foot Updated: 11/02/24 1539                        Assessment/Plan:      * Osteomyelitis of great toe of left foot  Acute OM  MRI did show signs of early osteomyelitis distal phalanx LEFT great toe.   Pending Podiatry and VascSx eval/recs.   Continuing V+Z for now.   GNR in deep wound culture.   Got contrast, to preserve renal function, will reduce from V+Z to V+ceftriaxone (non-diabetic, pseudomonas less likely, however switch to cefepime if results).       Chronic atrial fibrillation  [id number] Score: 4. The patients heart rate in the last 24 hours is as follows:  Pulse  Min: 50  Max: 73     Antiarrhythmics  metoprolol succinate (TOPROL-XL) 24 hr tablet 25 mg, Daily, Oral    Anticoagulants ; Will switch from Apixaban to heparin gtt in preparation of procedures. Made NPO for Monday, in case.        Plan  - Replete lytes with a goal of K>4, Mg >2  - Patient is anticoagulated, see medications listed above.  - Patient's afib is currently controlled        Diarrhea  Chronic given >3w, however no workup done  will check cdiff and chronic stool studies      Cellulitis of toe of left foot  Concern for cellulitis/possible underlying osteomyelitis     Plan:   Continue vancomycin/zosyn   Follow up on cultures    Consult podiatry   Consult vascular surgery   MRI left foot ordered     Stage 3a chronic kidney disease (CKD)  Creatine stable for now. BMP reviewed- noted Estimated Creatinine Clearance: 36.9 mL/min (based on SCr of 1.3 mg/dL). according to latest data. Based on current GFR, CKD stage is stage 3 - GFR 30-59.  Monitor UOP and serial BMP and adjust therapy as needed. Renally dose meds. Avoid nephrotoxic medications and procedures.    Candidiasis, intertrigo  Miconazole BID       COPD (chronic obstructive pulmonary disease)  No acute issues. DuoNebs PRN     Essential hypertension  Patients blood pressure range in the last 24 hours was: BP  Min: 116/70  Max: 163/73.The patient's inpatient anti-hypertensive regimen is listed below:  Current Antihypertensives  furosemide tablet 40 mg, Daily, Oral  losartan tablet 25 mg, 2 times daily, Oral  metoprolol succinate (TOPROL-XL) 24 hr tablet 25 mg, Daily, Oral  spironolactone tablet 25 mg, Every morning, Oral    Plan  - BP is controlled, no changes needed to their regimen    Type 2 diabetes mellitus with circulatory disorder, without long-term current use of insulin  Lab Results   Component Value Date    HGBA1C 5.8 (H) 11/29/2023     Glycemic protocol   MDSS      Chronic diastolic congestive heart failure  Continue home medications     PUJA (acute kidney injury)  PUJA is likely due to pre-renal azotemia due to infection.   Baseline creatinine is  .09 . Most recent creatinine and eGFR are listed below.    Recent Labs     11/01/24  1548 11/02/24  0505   CREATININE 1.3 0.9   EGFRNORACEVR 40* >60      Plan  - PUJA is improving  - Avoid nephrotoxins and renally dose meds for GFR listed above  - Monitor urine output, serial BMP, and adjust therapy as needed  - Cr 1.3 on admit, now 0.9 after fluids. RESOLVED    Myasthenia gravis, adult form  Not on any medications. Monitor   Follow up with neurology       Hyperlipidemia  Continue statin       Hypothyroidism  Continue synthroid          VTE Risk Mitigation (From admission, onward)           Ordered     apixaban tablet 5 mg  2 times daily         11/02/24 0317     IP VTE HIGH RISK PATIENT  Once         11/02/24 0252     Place sequential compression device  Until discontinued         11/02/24 0252                    Discharge Planning   MADELYN:      Code Status: Full Code   Is the patient medically ready for discharge?:     Reason for patient still in hospital (select all that apply): Patient trending condition and Treatment  Discharge Plan A: Home with family, Home Health                  Rodney Aguilera MD  Department of Hospital Medicine   [facility]

## 2024-11-03 NOTE — PLAN OF CARE
Problem: Adult Inpatient Plan of Care  Goal: Plan of Care Review  Outcome: Progressing  Flowsheets (Taken 11/3/2024 1258)  Plan of Care Reviewed With: patient  Goal: Patient-Specific Goal (Individualized)  Outcome: Progressing  Goal: Absence of Hospital-Acquired Illness or Injury  Outcome: Progressing  Intervention: Identify and Manage Fall Risk  Flowsheets (Taken 11/3/2024 1258)  Safety Promotion/Fall Prevention:   assistive device/personal item within reach   bed alarm set   Fall Risk reviewed with patient/family   high risk medications identified   medications reviewed   instructed to call staff for mobility   nonskid shoes/socks when out of bed   side rails raised x 2   room near unit station  Intervention: Prevent Skin Injury  Flowsheets (Taken 11/3/2024 1258)  Body Position:   position changed independently   weight shifting  Device Skin Pressure Protection:   absorbent pad utilized/changed   tubing/devices free from skin contact   adhesive use limited  Intervention: Prevent and Manage VTE (Venous Thromboembolism) Risk  Flowsheets (Taken 11/3/2024 1258)  VTE Prevention/Management:   ambulation promoted   bleeding risk assessed  Intervention: Prevent Infection  Flowsheets (Taken 11/3/2024 1258)  Infection Prevention: hand hygiene promoted  Goal: Optimal Comfort and Wellbeing  Outcome: Progressing  Goal: Readiness for Transition of Care  Outcome: Progressing     Problem: Diabetes Comorbidity  Goal: Blood Glucose Level Within Targeted Range  Outcome: Progressing     Problem: Infection  Goal: Absence of Infection Signs and Symptoms  Outcome: Progressing  Intervention: Prevent or Manage Infection  Flowsheets (Taken 11/3/2024 1258)  Infection Management: aseptic technique maintained     Problem: Wound  Goal: Optimal Coping  Outcome: Progressing  Goal: Optimal Functional Ability  Outcome: Progressing  Goal: Absence of Infection Signs and Symptoms  Outcome: Progressing  Intervention: Prevent or Manage  Infection  Flowsheets (Taken 11/3/2024 1258)  Infection Management: aseptic technique maintained  Goal: Improved Oral Intake  Outcome: Progressing  Goal: Optimal Pain Control and Function  Outcome: Progressing  Goal: Skin Health and Integrity  Outcome: Progressing  Intervention: Optimize Skin Protection  Flowsheets (Taken 11/3/2024 1258)  Pressure Reduction Techniques:   frequent weight shift encouraged   pressure points protected   weight shift assistance provided  Activity Management:   Ankle pumps - L1   Rolling - L1   Arm raise - L1   Straight leg raise - L1  Head of Bed (HOB) Positioning: HOB at 30-45 degrees  Goal: Optimal Wound Healing  Outcome: Progressing     Problem: Skin Injury Risk Increased  Goal: Skin Health and Integrity  Outcome: Progressing  Intervention: Optimize Skin Protection  Flowsheets (Taken 11/3/2024 1258)  Pressure Reduction Techniques:   frequent weight shift encouraged   pressure points protected   weight shift assistance provided  Activity Management:   Ankle pumps - L1   Rolling - L1   Arm raise - L1   Straight leg raise - L1  Head of Bed (HOB) Positioning: HOB at 30-45 degrees     Problem: Acute Kidney Injury/Impairment  Goal: Fluid and Electrolyte Balance  Outcome: Progressing  Goal: Improved Oral Intake  Outcome: Progressing  Goal: Effective Renal Function  Outcome: Progressing   Pt alert able to make needs known,maría meds well,IV abx remains in progress,no s/s adverse reaction noted,reposition self q 2hrs,pain controlled by prn pain medication,POC explained,remains free from falls and pressure injuries,safety maintained,continue monitoring.

## 2024-11-03 NOTE — NURSING
[facility], [facility] [place]  Nurses Note -- 4 Eyes      11/3/2024       Skin assessed on: Q Shift      [] No Pressure Injuries Present    [x]Prevention Measures Documented    [x] Yes LDA  for Pressure Injury Previously documented     [] Yes New Pressure Injury Discovered   [] LDA for New Pressure Injury Added      Attending RN:  Terra Milligan LPN     Second RN:  ROSA ISELA Horton

## 2024-11-03 NOTE — NURSING
[facility], [facility]  Nurses Note -- 4 Eyes      11/2/2024       Skin assessed on: Q Shift      [] No Pressure Injuries Present    [x]Prevention Measures Documented    [x] Yes LDA  for Pressure Injury Previously documented     [] Yes New Pressure Injury Discovered   [] LDA for New Pressure Injury Added      Attending RN:  Sol Perry, RN     Second RN:  Terra Milligan LPN

## 2024-11-03 NOTE — CONSULTS
"[facility]  Podiatry  Consult Note    Patient Name: Stephany Skinner  MRN: [id number]  Admission Date: 11/2/2024  Hospital Length of Stay: 0 days  Attending Physician: Rodney Aguilera MD  Primary Care Provider: Pipo Flowers MD     Inpatient consult to Podiatry  Consult performed by: Get Green MD  Consult ordered by: Shane Mancuso MD  Reason for consult: see below        Subjective:     History of Present Illness:  89-year-old female with a past medical history of AFib (on Eliquis), HFpEF (EF: 50%), COPD, hypertension, type 2 diabetes, hyperlipidemia, CKD 3, rheumatoid arthritis, myasthenia gravis, obesity, who presents with a foot wound. Patient presents for evaluation of worsening left 1st toe swelling and redness.  She developed this about 3 weeks prior to presentation, and has been following up with wound care.  She noted that her toe looked more swollen, and was more painful on the day of presentation. Her wound care nurse advised her to present to the ED for further evaluation. Patient denies fevers, chills,  but endorses drainage from the toe. In the ED, the patient was hemodynamically stable.  Labs were remarkable for macrocytic anemia (11.2), elevated ESR (66), negative lactic acid (1.4).  Left foot x-ray showed degenerative changes with soft tissue swelling overlying the dorsum and medial border of the left forefoot. Left lower extremity arterial ultrasound showed findings suggestive of 50-75% of peroneal artery stenosis, and inflow stenosis in the common femoral artery. Patient was given vancomycin, Zosyn.     Podiatry consulted for "toe cellulitis/possible osteo". VSS, AF, WBL WNL, pt denies f/c/n/v/sob or any other pedal complaints. Pt followed by wound care outpatient, not followed by podiatry. Previous flexor tenotomy R 1st digit, current hallux malleus L 1st digit. MRI suspicious for L 1st distal and proximal phalanx OM, no MPJ involvement. Arterial US significant stenosis, VS " consulted, pending recs. Wound cx pending results. Amendable to amputation.    Scheduled Meds:   apixaban  5 mg Oral BID    colestipoL  3 g Oral BID    [START ON 11/3/2024] diclofenac sodium  2 g Topical (Top) Daily    furosemide  40 mg Oral Daily    levothyroxine  88 mcg Oral Before breakfast    losartan  25 mg Oral BID    metoprolol succinate  25 mg Oral Daily    miconazole NITRATE 2 %   Topical (Top) BID    piperacillin-tazobactam (Zosyn) IV (PEDS and ADULTS) (extended infusion is not appropriate)  4.5 g Intravenous Q8H    spironolactone  25 mg Oral QAM    vancomycin (VANCOCIN) IV (PEDS and ADULTS)  1,250 mg Intravenous Q24H     Continuous Infusions:   0.9% NaCl   Intravenous Continuous 50 mL/hr at 11/02/24 2014 Rate Verify at 11/02/24 2014     PRN Meds:  Current Facility-Administered Medications:     acetaminophen, 650 mg, Oral, Q8H PRN    acetaminophen, 650 mg, Oral, Q4H PRN    albuterol-ipratropium, 3 mL, Nebulization, Q6H PRN    aluminum-magnesium hydroxide-simethicone, 30 mL, Oral, QID PRN    bisacodyL, 10 mg, Rectal, Daily PRN    glucagon (human recombinant), 1 mg, Intramuscular, PRN    glucose, 16 g, Oral, PRN    glucose, 24 g, Oral, PRN    insulin aspart U-100, 0-10 Units, Subcutaneous, QID (AC + HS) PRN    melatonin, 6 mg, Oral, Nightly PRN    naloxone, 0.02 mg, Intravenous, PRN    ondansetron, 4 mg, Intravenous, Q8H PRN    potassium bicarbonate, 35 mEq, Oral, PRN    potassium bicarbonate, 50 mEq, Oral, PRN    potassium bicarbonate, 60 mEq, Oral, PRN    potassium, sodium phosphates, 2 packet, Oral, PRN    potassium, sodium phosphates, 2 packet, Oral, PRN    potassium, sodium phosphates, 2 packet, Oral, PRN    prochlorperazine, 5 mg, Intravenous, Q6H PRN    senna-docusate 8.6-50 mg, 1 tablet, Oral, Daily PRN    simethicone, 1 tablet, Oral, QID PRN    sodium chloride 0.9%, 10 mL, Intravenous, Q12H PRN    Pharmacy to dose Vancomycin consult, , , Once **AND** vancomycin - pharmacy to dose, , Intravenous,  pharmacy to manage frequency    Review of patient's allergies indicates:   Allergen Reactions    Statins-hmg-coa reductase inhibitors      Other reaction(s): Unknown        Past Medical History:   Diagnosis Date    Anemia     Arthritis     Atrial fibrillation     Back pain     Bronchiectasis, uncomplicated     CHF (congestive heart failure)     Disorder of kidney and ureter     Early stage nonexudative age-related macular degeneration of both eyes 2018    GERD (gastroesophageal reflux disease)     Hyperlipidemia     Hypertension     Hypothyroidism     Obesity     JOY (obstructive sleep apnea)     Osteoporosis     Polyneuropathy     Pressure injury of dorsum of right foot, stage 2 12/01/2017    Rheumatoid arthritis     Urinary incontinence     Venous stasis ulcer of right calf limited to breakdown of skin with varicose veins 06/24/2024     Past Surgical History:   Procedure Laterality Date    CATARACT EXTRACTION W/  INTRAOCULAR LENS IMPLANT Right 12/12/2019    Procedure: EXTRACTION, CATARACT, WITH IOL INSERTION;  Surgeon: Michael Arellano MD;  Location: [facility];  Service: Ophthalmology;  Laterality: Right;    CATARACT EXTRACTION W/  INTRAOCULAR LENS IMPLANT Left 02/13/2020    Procedure: EXTRACTION, CATARACT, WITH IOL INSERTION;  Surgeon: Michael Arellano MD;  Location: [facility] OR;  Service: Ophthalmology;  Laterality: Left;    CHOLECYSTECTOMY      ENDOSCOPIC ULTRASOUND OF UPPER GASTROINTESTINAL TRACT N/A 11/04/2020    Procedure: ULTRASOUND, ENDOSCOPIC, UPPER GI TRACT;  Surgeon: Thierry Saunders MD;  Location: [facility] ENDO;  Service: Endoscopy;  Laterality: N/A;  covid 11/1 [facility]    HERNIA REPAIR      HYSTERECTOMY      knee replacemene Right 02/14/2007    deidra    PHACOEMULSIFICATION OF CATARACT Right 12/12/2019    Procedure: PHACOEMULSIFICATION, CATARACT;  Surgeon: Michael Arellano MD;  Location: [facility];  Service: Ophthalmology;  Laterality: Right;    PHACOEMULSIFICATION OF CATARACT Left 02/13/2020    Procedure:  PHACOEMULSIFICATION, CATARACT;  Surgeon: Michael Arellano MD;  Location: [facility];  Service: Ophthalmology;  Laterality: Left;    REPLACEMENT Left 09/18/2007    knee    THYROIDECTOMY         Family History       Problem Relation (Age of Onset)    Cancer Sister          Tobacco Use    Smoking status: Never     Passive exposure: Never    Smokeless tobacco: Never   Substance and Sexual Activity    Alcohol use: Never    Drug use: Never    Sexual activity: Never     Review of Systems   Constitutional: Negative.    Respiratory: Negative.     Cardiovascular: Negative.    Skin:  Positive for color change and wound.     Objective:     Vital Signs (Most Recent):  Temp: 97.9 °F (36.6 °C) (11/02/24 1941)  Pulse: 67 (11/02/24 1941)  Resp: 18 (11/02/24 1941)  BP: 134/60 (11/02/24 1941)  SpO2: 98 % (11/02/24 1941) Vital Signs (24h Range):  Temp:  [97.8 °F (36.6 °C)-98.5 °F (36.9 °C)] 97.9 °F (36.6 °C)  Pulse:  [55-73] 67  Resp:  [16-20] 18  SpO2:  [94 %-100 %] 98 %  BP: (134-166)/(60-98) 134/60     Weight: 103.2 kg (227 lb 8.2 oz)  Body mass index is 35.63 kg/m².    Foot Exam    General  Orientation: alert and oriented to person, place, and time   Affect: appropriate       Right Foot/Ankle     Inspection and Palpation  Swelling: none   Skin Exam: ulcer; no drainage and no cellulitis     Neurovascular  Dorsalis pedis: 1+  Posterior tibial: absent  Saphenous nerve sensation: diminished  Tibial nerve sensation: diminished  Superficial peroneal nerve sensation: diminished  Deep peroneal nerve sensation: diminished  Sural nerve sensation: diminished    Comments  R distal plantar 1st digit wound appears closed, dry, stable.    Left Foot/Ankle      Inspection and Palpation  Tenderness: great toe interphalangeal joint   Swelling: great toe interphalangeal joint   Claw toes: first toe  Skin Exam: drainage, cellulitis, ulcer and erythema;     Neurovascular  Dorsalis pedis: 1+  Posterior tibial: absent  Saphenous nerve sensation:  diminished  Tibial nerve sensation: diminished  Superficial peroneal nerve sensation: diminished  Deep peroneal nerve sensation: diminished  Sural nerve sensation: diminished    Comments  L distal plantar 1st digit wound moderate serosanguinous drainage, necrotic and sloughing base, positive probe to bone, malodor. No fluctuance or crepitus, mild pain on palpation.   L medial leg wound moderate serosanguinous drainage, fibro granular base, mildly macerated edges. No fluctuance, crepitus, or pain on palpation.                Laboratory:  All pertinent labs reviewed within the last 24 hours.    Diagnostic Results:  I have reviewed all pertinent imaging results/findings within the past 24 hours.    Assessment/Plan:     ID  Osteomyelitis of great toe of left foot  Acute OM with cellulitis L 1st digit 2/2 diabetic wound. Xray and MRI suspicious for early, acute OM L 1st distal and proximal phalanx, without involvement of MPJ. High index of suspicion for active infection on physical exam. Wound cx pending. LLE arterial US significant stenosis, VS consult recs pending.     - vascular surgery consulted and following, pending recs   - likely recommend amputation of L 1st digit after clearance from vascular  - deep wound cx obtained, results pending  - conservative and surgical treatment options discussed at length   - patient amendable to amputation of L 1st digit  - L 1st digit wound dressed with betadine soaked gauze, dry gauze, kerlix, no compression   - wound care orders placed for similar dressings, daily   - wound care orders for other noted BLE wounds placed as well  - abx per primary / ID  - WBAT, emphasis to heel touch L foot  - podiatry will follow        Thank you for your consult. I will follow-up with patient. Please contact us if you have any additional questions.    Get Green MD  Podiatry  [facility] [place] - Med Surg

## 2024-11-03 NOTE — ASSESSMENT & PLAN NOTE
[id number] Score: 4. The patients heart rate in the last 24 hours is as follows:  Pulse  Min: 50  Max: 73     Antiarrhythmics  metoprolol succinate (TOPROL-XL) 24 hr tablet 25 mg, Daily, Oral    Anticoagulants ; Will switch from Apixaban to heparin gtt in preparation of procedures. Made NPO for Monday, in case.        Plan  - Replete lytes with a goal of K>4, Mg >2  - Patient is anticoagulated, see medications listed above.  - Patient's afib is currently controlled

## 2024-11-03 NOTE — ASSESSMENT & PLAN NOTE
Acute OM  MRI did show signs of early osteomyelitis distal phalanx LEFT great toe.   Pending Podiatry and VascSx eval/recs.   Continuing V+Z for now.   GNR in deep wound culture.   Got contrast, to preserve renal function, will reduce from V+Z to V+ceftriaxone (non-diabetic, pseudomonas less likely, however switch to cefepime if results).

## 2024-11-04 LAB
ALBUMIN SERPL BCP-MCNC: 2.7 G/DL (ref 3.5–5.2)
ALP SERPL-CCNC: 43 U/L (ref 40–150)
ALT SERPL W/O P-5'-P-CCNC: 8 U/L (ref 10–44)
ANION GAP SERPL CALC-SCNC: 9 MMOL/L (ref 8–16)
APTT PPP: 45 SEC (ref 21–32)
AST SERPL-CCNC: 13 U/L (ref 10–40)
BASOPHILS # BLD AUTO: 0.04 K/UL (ref 0–0.2)
BASOPHILS NFR BLD: 0.7 % (ref 0–1.9)
BILIRUB SERPL-MCNC: 0.2 MG/DL (ref 0.1–1)
BUN SERPL-MCNC: 25 MG/DL (ref 8–23)
CALCIUM SERPL-MCNC: 8.1 MG/DL (ref 8.7–10.5)
CHLORIDE SERPL-SCNC: 115 MMOL/L (ref 95–110)
CO2 SERPL-SCNC: 16 MMOL/L (ref 23–29)
CREAT SERPL-MCNC: 1.1 MG/DL (ref 0.5–1.4)
DIFFERENTIAL METHOD BLD: ABNORMAL
EOSINOPHIL # BLD AUTO: 0.2 K/UL (ref 0–0.5)
EOSINOPHIL NFR BLD: 3.7 % (ref 0–8)
ERYTHROCYTE [DISTWIDTH] IN BLOOD BY AUTOMATED COUNT: 13.2 % (ref 11.5–14.5)
EST. GFR  (NO RACE VARIABLE): 48 ML/MIN/1.73 M^2
GLUCOSE SERPL-MCNC: 99 MG/DL (ref 70–110)
HCT VFR BLD AUTO: 35 % (ref 37–48.5)
HGB BLD-MCNC: 11.1 G/DL (ref 12–16)
IMM GRANULOCYTES # BLD AUTO: 0.03 K/UL (ref 0–0.04)
IMM GRANULOCYTES NFR BLD AUTO: 0.5 % (ref 0–0.5)
LYMPHOCYTES # BLD AUTO: 0.9 K/UL (ref 1–4.8)
LYMPHOCYTES NFR BLD: 14.5 % (ref 18–48)
MAGNESIUM SERPL-MCNC: 1.8 MG/DL (ref 1.6–2.6)
MCH RBC QN AUTO: 32.9 PG (ref 27–31)
MCHC RBC AUTO-ENTMCNC: 31.7 G/DL (ref 32–36)
MCV RBC AUTO: 104 FL (ref 82–98)
MONOCYTES # BLD AUTO: 0.7 K/UL (ref 0.3–1)
MONOCYTES NFR BLD: 11.5 % (ref 4–15)
NEUTROPHILS # BLD AUTO: 4.3 K/UL (ref 1.8–7.7)
NEUTROPHILS NFR BLD: 69.1 % (ref 38–73)
NRBC BLD-RTO: 0 /100 WBC
PHOSPHATE SERPL-MCNC: 3.1 MG/DL (ref 2.7–4.5)
PLATELET # BLD AUTO: 232 K/UL (ref 150–450)
PMV BLD AUTO: 8.5 FL (ref 9.2–12.9)
POCT GLUCOSE: 132 MG/DL (ref 70–110)
POCT GLUCOSE: 85 MG/DL (ref 70–110)
POCT GLUCOSE: 92 MG/DL (ref 70–110)
POCT GLUCOSE: 99 MG/DL (ref 70–110)
POTASSIUM SERPL-SCNC: 3.8 MMOL/L (ref 3.5–5.1)
PROT SERPL-MCNC: 6 G/DL (ref 6–8.4)
RBC # BLD AUTO: 3.37 M/UL (ref 4–5.4)
SODIUM SERPL-SCNC: 140 MMOL/L (ref 136–145)
VANCOMYCIN TROUGH SERPL-MCNC: 16.1 UG/ML (ref 10–22)
WBC # BLD AUTO: 6.15 K/UL (ref 3.9–12.7)

## 2024-11-04 PROCEDURE — 99223 1ST HOSP IP/OBS HIGH 75: CPT | Mod: ,,, | Performed by: SURGERY

## 2024-11-04 PROCEDURE — 83735 ASSAY OF MAGNESIUM: CPT | Performed by: STUDENT IN AN ORGANIZED HEALTH CARE EDUCATION/TRAINING PROGRAM

## 2024-11-04 PROCEDURE — 80053 COMPREHEN METABOLIC PANEL: CPT | Performed by: STUDENT IN AN ORGANIZED HEALTH CARE EDUCATION/TRAINING PROGRAM

## 2024-11-04 PROCEDURE — 25000003 PHARM REV CODE 250: Performed by: STUDENT IN AN ORGANIZED HEALTH CARE EDUCATION/TRAINING PROGRAM

## 2024-11-04 PROCEDURE — 87329 GIARDIA AG IA: CPT | Performed by: STUDENT IN AN ORGANIZED HEALTH CARE EDUCATION/TRAINING PROGRAM

## 2024-11-04 PROCEDURE — 99900035 HC TECH TIME PER 15 MIN (STAT)

## 2024-11-04 PROCEDURE — 89055 LEUKOCYTE ASSESSMENT FECAL: CPT | Performed by: STUDENT IN AN ORGANIZED HEALTH CARE EDUCATION/TRAINING PROGRAM

## 2024-11-04 PROCEDURE — 83993 ASSAY FOR CALPROTECTIN FECAL: CPT | Performed by: STUDENT IN AN ORGANIZED HEALTH CARE EDUCATION/TRAINING PROGRAM

## 2024-11-04 PROCEDURE — 27000207 HC ISOLATION

## 2024-11-04 PROCEDURE — 94760 N-INVAS EAR/PLS OXIMETRY 1: CPT

## 2024-11-04 PROCEDURE — 85730 THROMBOPLASTIN TIME PARTIAL: CPT | Performed by: STUDENT IN AN ORGANIZED HEALTH CARE EDUCATION/TRAINING PROGRAM

## 2024-11-04 PROCEDURE — 87177 OVA AND PARASITES SMEARS: CPT | Performed by: STUDENT IN AN ORGANIZED HEALTH CARE EDUCATION/TRAINING PROGRAM

## 2024-11-04 PROCEDURE — 82653 EL-1 FECAL QUANTITATIVE: CPT | Performed by: STUDENT IN AN ORGANIZED HEALTH CARE EDUCATION/TRAINING PROGRAM

## 2024-11-04 PROCEDURE — 87425 ROTAVIRUS AG IA: CPT | Performed by: STUDENT IN AN ORGANIZED HEALTH CARE EDUCATION/TRAINING PROGRAM

## 2024-11-04 PROCEDURE — 85025 COMPLETE CBC W/AUTO DIFF WBC: CPT | Performed by: STUDENT IN AN ORGANIZED HEALTH CARE EDUCATION/TRAINING PROGRAM

## 2024-11-04 PROCEDURE — 36415 COLL VENOUS BLD VENIPUNCTURE: CPT | Performed by: STUDENT IN AN ORGANIZED HEALTH CARE EDUCATION/TRAINING PROGRAM

## 2024-11-04 PROCEDURE — 82705 FATS/LIPIDS FECES QUAL: CPT | Performed by: STUDENT IN AN ORGANIZED HEALTH CARE EDUCATION/TRAINING PROGRAM

## 2024-11-04 PROCEDURE — 80202 ASSAY OF VANCOMYCIN: CPT | Performed by: STUDENT IN AN ORGANIZED HEALTH CARE EDUCATION/TRAINING PROGRAM

## 2024-11-04 PROCEDURE — 84100 ASSAY OF PHOSPHORUS: CPT | Performed by: STUDENT IN AN ORGANIZED HEALTH CARE EDUCATION/TRAINING PROGRAM

## 2024-11-04 PROCEDURE — 82272 OCCULT BLD FECES 1-3 TESTS: CPT | Performed by: STUDENT IN AN ORGANIZED HEALTH CARE EDUCATION/TRAINING PROGRAM

## 2024-11-04 PROCEDURE — 63600175 PHARM REV CODE 636 W HCPCS: Performed by: STUDENT IN AN ORGANIZED HEALTH CARE EDUCATION/TRAINING PROGRAM

## 2024-11-04 PROCEDURE — 11000001 HC ACUTE MED/SURG PRIVATE ROOM

## 2024-11-04 RX ADMIN — ACETAMINOPHEN 650 MG: 325 TABLET ORAL at 07:11

## 2024-11-04 RX ADMIN — VANCOMYCIN HYDROCHLORIDE 1250 MG: 1.25 INJECTION, POWDER, LYOPHILIZED, FOR SOLUTION INTRAVENOUS at 03:11

## 2024-11-04 RX ADMIN — DICLOFENAC SODIUM 2 G: 10 GEL TOPICAL at 09:11

## 2024-11-04 RX ADMIN — MICONAZOLE NITRATE: 20 POWDER TOPICAL at 09:11

## 2024-11-04 RX ADMIN — HEPARIN SODIUM 12 UNITS/KG/HR: 10000 INJECTION, SOLUTION INTRAVENOUS at 06:11

## 2024-11-04 RX ADMIN — ACETAMINOPHEN 650 MG: 325 TABLET ORAL at 09:11

## 2024-11-04 RX ADMIN — COLESTIPOL HYDROCHLORIDE 3 G: 1 TABLET, FILM COATED ORAL at 08:11

## 2024-11-04 RX ADMIN — PIPERACILLIN SODIUM AND TAZOBACTAM SODIUM 4.5 G: 4; .5 INJECTION, POWDER, FOR SOLUTION INTRAVENOUS at 08:11

## 2024-11-04 RX ADMIN — PIPERACILLIN SODIUM AND TAZOBACTAM SODIUM 4.5 G: 4; .5 INJECTION, POWDER, FOR SOLUTION INTRAVENOUS at 12:11

## 2024-11-04 RX ADMIN — LEVOTHYROXINE SODIUM 88 MCG: 0.09 TABLET ORAL at 05:11

## 2024-11-04 RX ADMIN — MICONAZOLE NITRATE: 20 POWDER TOPICAL at 08:11

## 2024-11-04 NOTE — NURSING
aPTT is 45, therapeutic and no changes to current rate per Heparin protocol. aPTT lab will be daily as this is the second consecutive therapeutic reading.

## 2024-11-04 NOTE — NURSING
[facility]  Nurses Note -- 4 Eyes      11/4/2024       Skin assessed on: Q Shift      [] No Pressure Injuries Present    []Prevention Measures Documented    [x] Yes LDA  for Pressure Injury Previously documented     [] Yes New Pressure Injury Discovered   [] LDA for New Pressure Injury Added      Attending RN:  Taylor Felix LPN     Second RN:  ROSA ISELA Horton

## 2024-11-04 NOTE — PLAN OF CARE
Problem: Adult Inpatient Plan of Care  Goal: Plan of Care Review  Outcome: Progressing  Flowsheets (Taken 11/4/2024 0406)  Plan of Care Reviewed With: patient  Goal: Patient-Specific Goal (Individualized)  Outcome: Progressing  Goal: Absence of Hospital-Acquired Illness or Injury  Outcome: Progressing  Intervention: Identify and Manage Fall Risk  Flowsheets (Taken 11/4/2024 0406)  Safety Promotion/Fall Prevention:   assistive device/personal item within reach   bed alarm set   instructed to call staff for mobility   lighting adjusted   nonskid shoes/socks when out of bed   room near unit station   side rails raised x 3   bedside commode chair   commode/urinal/bedpan at bedside  Intervention: Prevent Skin Injury  Flowsheets (Taken 11/4/2024 0406)  Body Position: position changed independently  Skin Protection: silicone foam dressing in place  Device Skin Pressure Protection:   absorbent pad utilized/changed   positioning supports utilized   pressure points protected  Intervention: Prevent and Manage VTE (Venous Thromboembolism) Risk  Flowsheets (Taken 11/4/2024 0406)  VTE Prevention/Management:   ambulation promoted   bleeding precautions maintained   bleeding risk assessed   dorsiflexion/plantar flexion performed  Intervention: Prevent Infection  Flowsheets (Taken 11/4/2024 0406)  Infection Prevention:   hand hygiene promoted   rest/sleep promoted   single patient room provided  Goal: Optimal Comfort and Wellbeing  Outcome: Progressing  Intervention: Monitor Pain and Promote Comfort  Flowsheets (Taken 11/4/2024 0406)  Pain Management Interventions: pillow support provided  Intervention: Provide Person-Centered Care  Flowsheets (Taken 11/4/2024 0406)  Trust Relationship/Rapport: care explained  Goal: Readiness for Transition of Care  Outcome: Progressing     Problem: Diabetes Comorbidity  Goal: Blood Glucose Level Within Targeted Range  Outcome: Progressing  Intervention: Monitor and Manage Glycemia  Flowsheets  (Taken 11/4/2024 0406)  Glycemic Management: blood glucose monitored     Problem: Infection  Goal: Absence of Infection Signs and Symptoms  Outcome: Progressing     Problem: Wound  Goal: Optimal Coping  Outcome: Progressing  Intervention: Support Patient and Family Response  Flowsheets (Taken 11/4/2024 0406)  Supportive Measures: active listening utilized  Goal: Optimal Functional Ability  Outcome: Progressing  Goal: Absence of Infection Signs and Symptoms  Outcome: Progressing  Goal: Improved Oral Intake  Outcome: Progressing  Goal: Optimal Pain Control and Function  Outcome: Progressing  Intervention: Prevent or Manage Pain  Flowsheets (Taken 11/4/2024 0406)  Pain Management Interventions: pillow support provided  Goal: Skin Health and Integrity  Outcome: Progressing  Goal: Optimal Wound Healing  Outcome: Progressing     Problem: Skin Injury Risk Increased  Goal: Skin Health and Integrity  Outcome: Progressing  Intervention: Optimize Skin Protection  Flowsheets (Taken 11/4/2024 0406)  Pressure Reduction Techniques:   frequent weight shift encouraged   heels elevated off bed   pressure points protected   weight shift assistance provided  Pressure Reduction Devices:   positioning supports utilized   pressure-redistributing mattress utilized   foam padding utilized  Skin Protection: silicone foam dressing in place  Activity Management:   Arm raise - L1   Leg kicks - L2   Rolling - L1   Up to bedside commode - L3   Walk with assistive devise and /or staff member - L3  Head of Bed (HOB) Positioning: HOB elevated     Problem: Acute Kidney Injury/Impairment  Goal: Fluid and Electrolyte Balance  Outcome: Progressing  Goal: Improved Oral Intake  Outcome: Progressing  Goal: Effective Renal Function  Outcome: Progressing  Intervention: Monitor and Support Renal Function  Flowsheets (Taken 11/4/2024 0406)  Medication Review/Management: medications reviewed

## 2024-11-04 NOTE — ASSESSMENT & PLAN NOTE
[id number] Score: 4. The patients heart rate in the last 24 hours is as follows:  Pulse  Min: 52  Max: 70     Antiarrhythmics  metoprolol succinate (TOPROL-XL) 24 hr tablet 25 mg, Daily, Oral    Anticoagulants ; Will switch from Apixaban to heparin gtt in preparation of procedures.   heparin 25,000 units in dextrose 5% 250 mL (100 units/mL) infusion LOW INTENSITY nomogram - [facility], Continuous, Intravenous  heparin 25,000 units in dextrose 5% (100 units/ml) IV bolus from bag LOW INTENSITY nomogram - [facility], As needed (PRN), Intravenous  heparin 25,000 units in dextrose 5% (100 units/ml) IV bolus from bag LOW INTENSITY nomogram - [facility], As needed (PRN), Intravenous    Plan  - Replete lytes with a goal of K>4, Mg >2  - Patient is anticoagulated, see medications listed above.  - Patient's afib is currently controlled

## 2024-11-04 NOTE — NURSING
[facility]  Nurses Note -- 4 Eyes      11/3/2024       Skin assessed on: Q Shift      [] No Pressure Injuries Present    [x]Prevention Measures Documented    [x] Yes LDA  for Pressure Injury Previously documented     [] Yes New Pressure Injury Discovered   [] LDA for New Pressure Injury Added      Attending RN:  Sol Perry, RN     Second RN:  Terra Milligan LPN

## 2024-11-04 NOTE — ASSESSMENT & PLAN NOTE
PUJA is likely due to pre-renal azotemia due to infection.   Baseline creatinine is  .09 . Most recent creatinine and eGFR are listed below.    Recent Labs     11/01/24  1548 11/02/24  0505 11/04/24  0215   CREATININE 1.3 0.9 1.1   EGFRNORACEVR 40* >60 48*        Plan  - PUJA is improving  - Avoid nephrotoxins and renally dose meds for GFR listed above  - Monitor urine output, serial BMP, and adjust therapy as needed  - Cr 1.3 on admit, now 0.9 after fluids. RESOLVED

## 2024-11-04 NOTE — ASSESSMENT & PLAN NOTE
Acute OM  MRI did show signs of early osteomyelitis distal phalanx LEFT great toe.   Pending Podiatry and VascSx eval/recs.   Continuing V+Z for now.   MRSA and enterobacter  Got contrast, to preserve renal function, will reduce from V+Z to V+ceftriaxone (non-diabetic, pseudomonas less likely, however switch to cefepime if results).

## 2024-11-04 NOTE — NURSING
Patient complaining of a headache with pain 5/10. When reassessed, patient stated pain was 0/10. Cardiac diet ordered. Did wound care per wound care orders. Bed in lowest position, wheels locked, call bell in reach, side rails up x2.

## 2024-11-04 NOTE — PROGRESS NOTES
Pharmacokinetic Assessment Follow Up: IV Vancomycin    Vancomycin serum concentration assessment(s):    The trough level was drawn correctly and can be used to guide therapy at this time. The measurement is within the desired definitive target range of 10 to 20 mcg/mL.    Vancomycin Regimen Plan:    Continue regimen to Vancomycin 1250 mg IV every 24 hours with next serum trough concentration measured at 0100 prior to third dose on 11/7    Drug levels (last 3 results):  Recent Labs   Lab Result Units 11/04/24  0215   Vancomycin-Trough ug/mL 16.1       Pharmacy will continue to follow and monitor vancomycin.    Please contact pharmacy at extension [phone] for questions regarding this assessment.    Thank you for the consult,   Mike Sauceda       Patient brief summary:  Stephany Skinner is a 89 y.o. female initiated on antimicrobial therapy with IV Vancomycin for treatment of skin & soft tissue infection    Drug Allergies:   Review of patient's allergies indicates:   Allergen Reactions    Cephalosporins Hives     Itchiness and hives immediately after IV Ceftriaxone push    Statins-hmg-coa reductase inhibitors      Other reaction(s): Unknown       Actual Body Weight:   101.5 kg    Renal Function:   Estimated Creatinine Clearance: 43.3 mL/min (based on SCr of 1.1 mg/dL).,     Dialysis Method (if applicable):  N/A    CBC (last 72 hours):  Recent Labs   Lab Result Units 11/01/24  1548 11/02/24  0505 11/03/24  0831 11/04/24  0215   WBC K/uL 7.26 8.58 6.19 6.15   Hemoglobin g/dL 11.7* 12.3 11.7* 11.1*   Hematocrit % 36.1* 39.1 37.7 35.0*   Platelets K/uL 245 251 202 232   Gran % % 72.9 75.1* 71.4 69.1   Lymph % % 13.9* 12.6* 14.4* 14.5*   Mono % % 10.1 9.9 10.8 11.5   Eosinophil % % 2.1 1.5 2.4 3.7   Basophil % % 0.3 0.3 0.5 0.7   Differential Method  Automated Automated Automated Automated       Metabolic Panel (last 72 hours):  Recent Labs   Lab Result Units 11/01/24  1548 11/02/24  0505 11/02/24  1641 11/04/24  0215    Sodium mmol/L 143 139  --  140   Potassium mmol/L 4.3 4.0  --  3.8   Chloride mmol/L 113* 115*  --  115*   CO2 mmol/L 18* 15*  --  16*   Glucose mg/dL 142* 103 120* 99   BUN mg/dL 29* 21  --  25*   Creatinine mg/dL 1.3 0.9  --  1.1   Albumin g/dL 3.4* 3.3*  --  2.7*   Total Bilirubin mg/dL 0.3 0.6  --  0.2   Alkaline Phosphatase U/L 61 57  --  43   AST U/L 12 10  --  13   ALT U/L 10 8*  --  8*   Magnesium mg/dL  --  1.9  --  1.8   Phosphorus mg/dL  --  2.8  --  3.1       Vancomycin Administrations:  vancomycin given in the last 96 hours                     vancomycin 1,250 mg in D5W 250 mL IVPB (admixture device) (mg) 1,250 mg New Bag 11/04/24 0316     1,250 mg New Bag 11/03/24 0142    vancomycin 1,250 mg in D5W 250 mL IVPB (admixture device) (mg) 1,250 mg New Bag 11/01/24 2101    vancomycin 1,250 mg in D5W 250 mL IVPB (admixture device) (mg) 1,250 mg New Bag 11/01/24 1916                    Microbiologic Results:  Microbiology Results (last 7 days)       Procedure Component Value Units Date/Time    Clostridium difficile EIA [[id number]] Collected: 11/02/24 1636    Order Status: Completed Specimen: Stool Updated: 11/03/24 1202     C. diff Antigen Negative     C difficile Toxins A+B, EIA Negative     Comment: Testing not recommended for children <24 months old.       Narrative:      1 - 3 Days: If liquid stool unable to be collected in  community onset window (hospital day 1 - 3), the order will  be discontinued automatically. Do not send formed stool for  testing    Stool culture [[id number]]     Order Status: No result Specimen: Stool     Aerobic culture [[id number]]  (Abnormal) Collected: 11/02/24 1430    Order Status: Completed Specimen: Wound from Toe, Left Foot Updated: 11/03/24 0738     Aerobic Bacterial Culture GRAM NEGATIVE APARNA  Moderate  Identification and susceptibility pending      Culture, Anaerobe [[id number]] Collected: 11/02/24 1330    Order Status: Sent Specimen: Wound from Toe, Left Foot Updated:  11/02/24 1540    Fungus culture [[id number]] Collected: 11/02/24 1330    Order Status: Sent Specimen: Wound from Toe, Left Foot Updated: 11/02/24 2438

## 2024-11-04 NOTE — ASSESSMENT & PLAN NOTE
Concern for cellulitis/possible underlying osteomyelitis     Plan:   Continue vancomycin/zosyn   Follow up on cultures - growing MRSA and enterobacter  Consult podiatry   Consult vascular surgery   MRI left foot ordered - early osteomyelitis noted  - Possibly amputation vs IV antibiotics

## 2024-11-04 NOTE — PLAN OF CARE
Problem: Adult Inpatient Plan of Care  Goal: Plan of Care Review  Outcome: Progressing  Goal: Patient-Specific Goal (Individualized)  Outcome: Progressing  Goal: Absence of Hospital-Acquired Illness or Injury  Outcome: Progressing  Goal: Optimal Comfort and Wellbeing  Outcome: Progressing  Goal: Readiness for Transition of Care  Outcome: Progressing     Problem: Diabetes Comorbidity  Goal: Blood Glucose Level Within Targeted Range  Outcome: Progressing     Problem: Infection  Goal: Absence of Infection Signs and Symptoms  Outcome: Progressing     Problem: Wound  Goal: Optimal Coping  Outcome: Progressing  Goal: Optimal Functional Ability  Outcome: Progressing  Goal: Absence of Infection Signs and Symptoms  Outcome: Progressing  Goal: Improved Oral Intake  Outcome: Progressing  Goal: Optimal Pain Control and Function  Outcome: Progressing  Goal: Skin Health and Integrity  Outcome: Progressing  Goal: Optimal Wound Healing  Outcome: Progressing     Problem: Skin Injury Risk Increased  Goal: Skin Health and Integrity  Outcome: Progressing     Problem: Acute Kidney Injury/Impairment  Goal: Fluid and Electrolyte Balance  Outcome: Progressing  Goal: Improved Oral Intake  Outcome: Progressing  Goal: Effective Renal Function  Outcome: Progressing

## 2024-11-04 NOTE — PROGRESS NOTES
[facility] Medicine  Progress Note    Patient Name: Stephany Skinner  MRN: [id number]  Patient Class: IP- Inpatient   Admission Date: 11/2/2024  Length of Stay: 2 days  Attending Physician: Robby Camacho MD  Primary Care Provider: Pipo Flowers MD        Subjective:     Principal Problem:Osteomyelitis of great toe of left foot        HPI:  This is an 89-year-old female with a past medical history of AFib (on Eliquis), HFpEF (EF: 50%), COPD, hypertension, type 2 diabetes, hyperlipidemia, CKD 3, rheumatoid arthritis, myasthenia gravis, obesity, who presents with a foot wound.      Patient presents for evaluation of worsening left 1st toe swelling and redness.  She developed this about 3 weeks prior to presentation, and has been following up with wound care.  She noted that her toe looked more swollen, and was more painful on the day of presentation. Her wound care nurse advised her to present to the ED for further evaluation. Patient denies fevers, chills,  but endorses drainage from the toe.     In the ED, the patient was hemodynamically stable.  Labs were remarkable for macrocytic anemia (11.2), elevated ESR (66), negative lactic acid (1.4).  Left foot x-ray showed degenerative changes with soft tissue swelling overlying the dorsum and medial border of the left forefoot.  Left lower extremity arterial ultrasound showed findings suggestive of 50-75% of peroneal artery stenosis, and inflow stenosis in the common femoral artery.  Patient was given vancomycin, Zosyn.  She was admitted for further management.    Overview/Hospital Course:  #Infection of toe; MRI did show signs of early osteomyelitis distal phalanx LEFT great toe. Pending Podiatry and VascSx eval/recs. Continuing V+Z for now. GNR in deep wound culture. Got contrast, to preserve renal function, will reduce from V+Z to V+ceftriaxone (non-diabetic, pseudomonas less likely, however switch to cefepime if results). Will switch from  Apixaban to heparin gtt in preparation of procedures. C diff negative.      Interval History: c diff negative. Feeling okay today, pening evaluation by Vascular and then will discuss with Podiatry timing of procedure -- patient seems to be leaning towards amputation of toe.    Review of Systems  Objective:     Vital Signs (Most Recent):  Temp: 98.4 °F (36.9 °C) (11/04/24 1149)  Pulse: 70 (11/04/24 1149)  Resp: 20 (11/04/24 1149)  BP: (!) 146/76 (11/04/24 1149)  SpO2: 97 % (11/04/24 1149) Vital Signs (24h Range):  Temp:  [97.7 °F (36.5 °C)-98.7 °F (37.1 °C)] 98.4 °F (36.9 °C)  Pulse:  [52-70] 70  Resp:  [18-20] 20  SpO2:  [95 %-99 %] 97 %  BP: (106-158)/(58-88) 146/76     Weight: 103.5 kg (228 lb 2.8 oz)  Body mass index is 35.74 kg/m².    Intake/Output Summary (Last 24 hours) at 11/4/2024 1321  Last data filed at 11/3/2024 2124  Gross per 24 hour   Intake 340 ml   Output 400 ml   Net -60 ml         Physical Exam  Vitals and nursing note reviewed.   Constitutional:       General: She is not in acute distress.     Appearance: She is obese. She is not ill-appearing.   HENT:      Mouth/Throat:      Mouth: Mucous membranes are moist.   Cardiovascular:      Rate and Rhythm: Normal rate.   Pulmonary:      Effort: Pulmonary effort is normal.   Abdominal:      General: Abdomen is flat.      Comments: Intertrigo noted in abdominal folds/underneath breast    Musculoskeletal:      Comments: Left toe dressing intact, right toe painted with betadine   Skin:     General: Skin is warm.   Neurological:      General: No focal deficit present.      Mental Status: She is alert.             Significant Labs: All pertinent labs within the past 24 hours have been reviewed.    Significant Imaging: I have reviewed all pertinent imaging results/findings within the past 24 hours.    Assessment/Plan:      * Osteomyelitis of great toe of left foot  Acute OM  MRI did show signs of early osteomyelitis distal phalanx LEFT great toe.   Pending  Podiatry and VascSx eval/recs.   Continuing V+Z for now.   MRSA and enterobacter  Got contrast, to preserve renal function, will reduce from V+Z to V+ceftriaxone (non-diabetic, pseudomonas less likely, however switch to cefepime if results).       Diarrhea  Chronic given >3w, however no workup done  will check cdiff and chronic stool studies  C diff negative        Cellulitis of toe of left foot  Concern for cellulitis/possible underlying osteomyelitis     Plan:   Continue vancomycin/zosyn   Follow up on cultures - growing MRSA and enterobacter  Consult podiatry   Consult vascular surgery   MRI left foot ordered - early osteomyelitis noted  - Possibly amputation vs IV antibiotics     Stage 3a chronic kidney disease (CKD)  Creatine stable for now. BMP reviewed- noted Estimated Creatinine Clearance: 36.9 mL/min (based on SCr of 1.3 mg/dL). according to latest data. Based on current GFR, CKD stage is stage 3 - GFR 30-59.  Monitor UOP and serial BMP and adjust therapy as needed. Renally dose meds. Avoid nephrotoxic medications and procedures.    Candidiasis, intertrigo  Miconazole BID       COPD (chronic obstructive pulmonary disease)  No acute issues. DuoNebs PRN     Essential hypertension  Patients blood pressure range in the last 24 hours was: BP  Min: 116/70  Max: 163/73.The patient's inpatient anti-hypertensive regimen is listed below:  Current Antihypertensives  furosemide tablet 40 mg, Daily, Oral  losartan tablet 25 mg, 2 times daily, Oral  metoprolol succinate (TOPROL-XL) 24 hr tablet 25 mg, Daily, Oral  spironolactone tablet 25 mg, Every morning, Oral    Plan  - BP is controlled, no changes needed to their regimen    Type 2 diabetes mellitus with circulatory disorder, without long-term current use of insulin  Lab Results   Component Value Date    HGBA1C 5.8 (H) 11/29/2023     Glycemic protocol   MDSS      Chronic diastolic congestive heart failure  Continue home medications     PUJA (acute kidney injury)  PUJA  is likely due to pre-renal azotemia due to infection.   Baseline creatinine is  .09 . Most recent creatinine and eGFR are listed below.    Recent Labs     11/01/24  1548 11/02/24  0505 11/04/24  0215   CREATININE 1.3 0.9 1.1   EGFRNORACEVR 40* >60 48*        Plan  - PUJA is improving  - Avoid nephrotoxins and renally dose meds for GFR listed above  - Monitor urine output, serial BMP, and adjust therapy as needed  - Cr 1.3 on admit, now 0.9 after fluids. RESOLVED    Myasthenia gravis, adult form  Not on any medications. Monitor   Follow up with neurology       Chronic atrial fibrillation  [id number] Score: 4. The patients heart rate in the last 24 hours is as follows:  Pulse  Min: 52  Max: 70     Antiarrhythmics  metoprolol succinate (TOPROL-XL) 24 hr tablet 25 mg, Daily, Oral    Anticoagulants ; Will switch from Apixaban to heparin gtt in preparation of procedures.   heparin 25,000 units in dextrose 5% 250 mL (100 units/mL) infusion LOW INTENSITY nomogram - [facility], Continuous, Intravenous  heparin 25,000 units in dextrose 5% (100 units/ml) IV bolus from bag LOW INTENSITY nomogram - [facility], As needed (PRN), Intravenous  heparin 25,000 units in dextrose 5% (100 units/ml) IV bolus from bag LOW INTENSITY nomogram - [facility], As needed (PRN), Intravenous    Plan  - Replete lytes with a goal of K>4, Mg >2  - Patient is anticoagulated, see medications listed above.  - Patient's afib is currently controlled        Hyperlipidemia  Continue statin       Hypothyroidism  Continue synthroid         VTE Risk Mitigation (From admission, onward)           Ordered     heparin 25,000 units in dextrose 5% (100 units/ml) IV bolus from bag LOW INTENSITY nomogram - [facility]  As needed (PRN)        Question:  Heparin Infusion Adjustment (DO NOT MODIFY ANSWER)  Answer:  [email or web address]\epic\Images\Pharmacy\HeparinInfusions\heparin LOW INTENSITY nomogram for [facility] [id number].pdf    11/03/24 0823     heparin 25,000 units in dextrose 5% (100 units/ml) IV bolus from  bag LOW INTENSITY nomogram - [facility]  As needed (PRN)        Question:  Heparin Infusion Adjustment (DO NOT MODIFY ANSWER)  Answer:  [email or web address]\epic\Images\Pharmacy\HeparinInfusions\heparin LOW INTENSITY nomogram for [facility] [id number].pdf    11/03/24 0823     heparin 25,000 units in dextrose 5% 250 mL (100 units/mL) infusion LOW INTENSITY nomogram - [facility]  Continuous        Question:  Begin at (units/kg/hr)  Answer:  12    11/03/24 0823     IP VTE HIGH RISK PATIENT  Once         11/02/24 0252     Place sequential compression device  Until discontinued         11/02/24 0252                    Discharge Planning   MADELYN:      Code Status: Full Code   Is the patient medically ready for discharge?:     Reason for patient still in hospital (select all that apply): Treatment  Discharge Plan A: Home with family, Home Health                  Robby Camacho MD  Department of Hospital Medicine   [facility]

## 2024-11-04 NOTE — ASSESSMENT & PLAN NOTE
Chronic given >3w, however no workup done  will check cdiff and chronic stool studies  C diff negative

## 2024-11-04 NOTE — CONSULTS
[facility]  Vascular Surgery  Consult Note    Inpatient consult to Vascular Surgery  Consult performed by: Hood Williamson MD  Consult ordered by: Shane Mancuso MD  Reason for consult: Left great toe ulcer        Subjective:     Chief Complaint/Reason for Admission:  Left great toe ulcer with osteomyelitis    History of Present Illness:  89-year-old female with a history of multiple comorbidities including CHF and neuropathy presenting with left great toe ulcer found to have osteomyelitis.  Patient reports that she had gone to wound care and they cut her left great toe this is at time it has gotten worse with the past week or so.  She reports that she was unable to feel due to her decreased sensation/neuropathy in her feet.  She also reports developing a wound on the medial aspect of her left leg above her ankle.  She reports chronic swelling in her lower extremities.    History of AFib on NOAC.  Has been converted to heparin drip while in-house in case needed for procedure    Medications Prior to Admission   Medication Sig Dispense Refill Last Dose/Taking    acetaminophen (TYLENOL) 500 MG tablet Take 1,000 mg by mouth nightly as needed for Pain.       albuterol (PROVENTIL/VENTOLIN HFA) 90 mcg/actuation inhaler        alendronate (FOSAMAX) 70 MG tablet Take 1 tablet (70 mg total) by mouth every 7 days. 12 tablet 1     atorvastatin (LIPITOR) 20 MG tablet Take 20 mg by mouth every evening.       beta-carotene,A,-vits C,E/mins (VISION ORAL) Take 1 tablet by mouth 2 (two) times daily.       calcium carbonate-vit D3-min 600 mg calcium- 400 unit Tab Take 2 tablets by mouth once daily.        colestipoL (COLESTID) 1 gram Tab Take 3 tablets (3 g total) by mouth 2 (two) times daily. For cholesterol and diarrhea 120 tablet 5     cyanocobalamin (VITAMIN B-12) 1000 MCG tablet Take 100 mcg by mouth once daily.       ELIQUIS 5 mg Tab Take 5 mg by mouth 2 (two) times daily.  1     furosemide (LASIX) 40 MG  tablet Take 1 tablet (40 mg total) by mouth once daily. 30 tablet 5     isosorbide mononitrate (IMDUR) 60 MG 24 hr tablet Take 60 mg by mouth once daily.       levothyroxine (SYNTHROID) 88 MCG tablet Take 1 tablet (88 mcg total) by mouth before breakfast. 90 tablet 1     loperamide (IMODIUM A-D) 2 mg Tab Take 2 mg by mouth 4 (four) times daily as needed.       losartan (COZAAR) 25 MG tablet Take 25 mg by mouth 2 (two) times a day.       metoprolol succinate (TOPROL-XL) 25 MG 24 hr tablet Take 25 mg by mouth once daily.       nitroGLYCERIN (NITROSTAT) 0.4 MG SL tablet Place 0.4 mg under the tongue every 5 (five) minutes as needed for Chest pain.       nystatin (MYCOSTATIN) powder Apply topically 4 (four) times daily. 60 g 5     nystatin-triamcinolone (MYCOLOG II) cream APPLY  CREAM TOPICALLY 4 TIMES DAILY 60 g 0     potassium chloride SA (K-DUR,KLOR-CON) 20 MEQ tablet Take 1 tablet (20 mEq total) by mouth once daily. 30 tablet 5     spironolactone (ALDACTONE) 25 MG tablet Take 25 mg by mouth every morning.       vitamins A,C,E-zinc-copper (PRESERVISION AREDS) 4,296 mcg-226 mg-90 mg Cap PreserVision AREDS 14,320 unit-226 mg-200 unit capsule, [RxNorm: 749239]          Review of patient's allergies indicates:   Allergen Reactions    Cephalosporins Hives     Itchiness and hives immediately after IV Ceftriaxone push    Statins-hmg-coa reductase inhibitors      Other reaction(s): Unknown       Past Medical History:   Diagnosis Date    Anemia     Arthritis     Atrial fibrillation     Back pain     Bronchiectasis, uncomplicated     CHF (congestive heart failure)     Disorder of kidney and ureter     Early stage nonexudative age-related macular degeneration of both eyes 2018    GERD (gastroesophageal reflux disease)     Hyperlipidemia     Hypertension     Hypothyroidism     Obesity     JOY (obstructive sleep apnea)     Osteoporosis     Polyneuropathy     Pressure injury of dorsum of right foot, stage 2 12/01/2017     Rheumatoid arthritis     Urinary incontinence     Venous stasis ulcer of right calf limited to breakdown of skin with varicose veins 06/24/2024     Past Surgical History:   Procedure Laterality Date    CATARACT EXTRACTION W/  INTRAOCULAR LENS IMPLANT Right 12/12/2019    Procedure: EXTRACTION, CATARACT, WITH IOL INSERTION;  Surgeon: Michael Arellano MD;  Location: [facility];  Service: Ophthalmology;  Laterality: Right;    CATARACT EXTRACTION W/  INTRAOCULAR LENS IMPLANT Left 02/13/2020    Procedure: EXTRACTION, CATARACT, WITH IOL INSERTION;  Surgeon: Michael Arellano MD;  Location: [facility] OR;  Service: Ophthalmology;  Laterality: Left;    CHOLECYSTECTOMY      ENDOSCOPIC ULTRASOUND OF UPPER GASTROINTESTINAL TRACT N/A 11/04/2020    Procedure: ULTRASOUND, ENDOSCOPIC, UPPER GI TRACT;  Surgeon: Thierry Saunders MD;  Location: [facility];  Service: Endoscopy;  Laterality: N/A;  covid 11/1 [facility]    HERNIA REPAIR      HYSTERECTOMY      knee replacemene Right 02/14/2007    deidra    PHACOEMULSIFICATION OF CATARACT Right 12/12/2019    Procedure: PHACOEMULSIFICATION, CATARACT;  Surgeon: Michael Arellano MD;  Location: [facility] OR;  Service: Ophthalmology;  Laterality: Right;    PHACOEMULSIFICATION OF CATARACT Left 02/13/2020    Procedure: PHACOEMULSIFICATION, CATARACT;  Surgeon: Michael Arellano MD;  Location: [facility] OR;  Service: Ophthalmology;  Laterality: Left;    REPLACEMENT Left 09/18/2007    knee    THYROIDECTOMY       Family History       Problem Relation (Age of Onset)    Cancer Sister          Tobacco Use    Smoking status: Never     Passive exposure: Never    Smokeless tobacco: Never   Substance and Sexual Activity    Alcohol use: Never    Drug use: Never    Sexual activity: Never     Review of Systems  Objective:     Vital Signs (Most Recent):  Temp: 98.4 °F (36.9 °C) (11/04/24 1149)  Pulse: 70 (11/04/24 1149)  Resp: 20 (11/04/24 1149)  BP: (!) 146/76 (11/04/24 1149)  SpO2: 97 % (11/04/24 1149) Vital Signs (24h Range):  Temp:  [97.7 °F  "(36.5 °C)-98.7 °F (37.1 °C)] 98.4 °F (36.9 °C)  Pulse:  [52-70] 70  Resp:  [18-20] 20  SpO2:  [95 %-99 %] 97 %  BP: (106-158)/(58-88) 146/76     Weight: 103.5 kg (228 lb 2.8 oz)  Body mass index is 35.74 kg/m².        Physical Exam    Significant Labs:  CBC:   Recent Labs   Lab 11/04/24  0215   WBC 6.15   RBC 3.37*   HGB 11.1*   HCT 35.0*      *   MCH 32.9*   MCHC 31.7*     CMP:   Recent Labs   Lab 11/04/24  0215   GLU 99   CALCIUM 8.1*   ALBUMIN 2.7*   PROT 6.0      K 3.8   CO2 16*   *   BUN 25*   CREATININE 1.1   ALKPHOS 43   ALT 8*   AST 13   BILITOT 0.2     Coagulation:   Recent Labs   Lab 11/03/24  0831 11/03/24  1819 11/04/24  0027   LABPROT 11.4  --   --    INR 1.0  --   --    APTT 29.1  28.5   < > 45.0*    < > = values in this interval not displayed.     eGFR: No results for input(s): "EGFRIFAFRICA", "EGFRCYSTC", "LABGLOM" in the last 48 hours.    Invalid input(s): "EGFRNON-AA"  Lactic Acid: No results for input(s): "LACTATE" in the last 48 hours.    Significant Diagnostics:  I have reviewed all pertinent imaging results/findings within the past 24 hours.    Assessment/Plan:     Active Diagnoses:    Diagnosis Date Noted POA    PRINCIPAL PROBLEM:  Osteomyelitis of great toe of left foot [M86.9] 11/02/2024 Yes    Diarrhea [R19.7] 11/03/2024 Yes    Cellulitis of toe of left foot [L03.032] 11/02/2024 Yes    Stage 3a chronic kidney disease (CKD) [N18.31] 02/28/2024 Yes    Candidiasis, intertrigo [B37.2] 08/31/2023 Yes    Essential hypertension [I10] 12/09/2022 Yes    COPD (chronic obstructive pulmonary disease) [J44.9] 12/09/2022 Yes    Type 2 diabetes mellitus with circulatory disorder, without long-term current use of insulin [E11.59] 05/01/2018 Yes    Chronic diastolic congestive heart failure [I50.32] 02/07/2018 Yes    PUJA (acute kidney injury) [N17.9] 02/05/2018 Yes    Myasthenia gravis, adult form [G70.00] 02/03/2018 Yes    Chronic atrial fibrillation [I48.20] 01/30/2018 Yes    " Hypothyroidism [E03.9] 08/08/2012 Yes    Hyperlipidemia [E78.5] 08/08/2012 Yes      Problems Resolved During this Admission:   89-year-old woman with history of diabetes CHF and peripheral neuropathy admitted for left great toe ulcer with underlying osteomyelitis.  CTA demonstrates no significant peripheral arterial disease althoug not able to evaluate popliteal artery fully due to streak artifact from knee replacement.  Clinically patient has a easily palpable DP pulse bilaterally thus suggesting minimal arterial insufficiency.  Additionally patient has wound in the medial aspect of her left leg with chronic venous stasis changes appears to have surrounding cellulitis extending up her calf.  This wound is suggestive of venous disease/chronic venous stasis.      Obtain ABIs TBIs with waveforms and toe pressures to more objectively measure arterial perfusion  Low suspicion for arterial insufficiency or need for revascularization prior to toe amputation  Recommend continuing IV antibiotics for osteomyelitis as well as left leg cellulitis  Recommend leg elevation, diuresis, and compression stocking therapy for chronic venous stasis      Thank you for your consult. I will follow-up with patient. Please contact us if you have any additional questions.    Hood Williamson MD  Vascular Surgery  [facility]

## 2024-11-04 NOTE — SUBJECTIVE & OBJECTIVE
Interval History: c diff negative. Feeling okay today, pening evaluation by Vascular and then will discuss with Podiatry timing of procedure -- patient seems to be leaning towards amputation of toe.    Review of Systems  Objective:     Vital Signs (Most Recent):  Temp: 98.4 °F (36.9 °C) (11/04/24 1149)  Pulse: 70 (11/04/24 1149)  Resp: 20 (11/04/24 1149)  BP: (!) 146/76 (11/04/24 1149)  SpO2: 97 % (11/04/24 1149) Vital Signs (24h Range):  Temp:  [97.7 °F (36.5 °C)-98.7 °F (37.1 °C)] 98.4 °F (36.9 °C)  Pulse:  [52-70] 70  Resp:  [18-20] 20  SpO2:  [95 %-99 %] 97 %  BP: (106-158)/(58-88) 146/76     Weight: 103.5 kg (228 lb 2.8 oz)  Body mass index is 35.74 kg/m².    Intake/Output Summary (Last 24 hours) at 11/4/2024 1321  Last data filed at 11/3/2024 2124  Gross per 24 hour   Intake 340 ml   Output 400 ml   Net -60 ml         Physical Exam  Vitals and nursing note reviewed.   Constitutional:       General: She is not in acute distress.     Appearance: She is obese. She is not ill-appearing.   HENT:      Mouth/Throat:      Mouth: Mucous membranes are moist.   Cardiovascular:      Rate and Rhythm: Normal rate.   Pulmonary:      Effort: Pulmonary effort is normal.   Abdominal:      General: Abdomen is flat.      Comments: Intertrigo noted in abdominal folds/underneath breast    Musculoskeletal:      Comments: Left toe dressing intact, right toe painted with betadine   Skin:     General: Skin is warm.   Neurological:      General: No focal deficit present.      Mental Status: She is alert.             Significant Labs: All pertinent labs within the past 24 hours have been reviewed.    Significant Imaging: I have reviewed all pertinent imaging results/findings within the past 24 hours.

## 2024-11-04 NOTE — PROGRESS NOTES
Awaiting vanc trough lab draw and result; once timed and due 11-7-2024 at 0100.  Pt's scr is stable.  Will continue to monitor.  Thanks for the consult.

## 2024-11-05 ENCOUNTER — ANESTHESIA (OUTPATIENT)
Dept: SURGERY | Facility: HOSPITAL | Age: 89
End: 2024-11-05
Payer: MEDICARE

## 2024-11-05 ENCOUNTER — ANESTHESIA EVENT (OUTPATIENT)
Dept: SURGERY | Facility: HOSPITAL | Age: 89
End: 2024-11-05
Payer: MEDICARE

## 2024-11-05 DIAGNOSIS — R60.1 GENERALIZED EDEMA: ICD-10-CM

## 2024-11-05 DIAGNOSIS — I73.9 PERIPHERAL VASCULAR DISEASE, UNSPECIFIED: ICD-10-CM

## 2024-11-05 DIAGNOSIS — I87.8 VENOUS STASIS OF LOWER EXTREMITY: ICD-10-CM

## 2024-11-05 DIAGNOSIS — M86.9 OSTEOMYELITIS OF GREAT TOE OF LEFT FOOT: Primary | ICD-10-CM

## 2024-11-05 LAB
ALBUMIN SERPL BCP-MCNC: 2.9 G/DL (ref 3.5–5.2)
ALP SERPL-CCNC: 49 U/L (ref 40–150)
ALT SERPL W/O P-5'-P-CCNC: 15 U/L (ref 10–44)
ANION GAP SERPL CALC-SCNC: 9 MMOL/L (ref 8–16)
APTT PPP: 28.7 SEC (ref 21–32)
APTT PPP: 34.3 SEC (ref 21–32)
AST SERPL-CCNC: 15 U/L (ref 10–40)
BACTERIA SPEC AEROBE CULT: ABNORMAL
BASOPHILS # BLD AUTO: 0.03 K/UL (ref 0–0.2)
BASOPHILS NFR BLD: 0.4 % (ref 0–1.9)
BILIRUB SERPL-MCNC: 0.4 MG/DL (ref 0.1–1)
BUN SERPL-MCNC: 17 MG/DL (ref 8–23)
CALCIUM SERPL-MCNC: 8.4 MG/DL (ref 8.7–10.5)
CHLORIDE SERPL-SCNC: 111 MMOL/L (ref 95–110)
CO2 SERPL-SCNC: 19 MMOL/L (ref 23–29)
CREAT SERPL-MCNC: 1 MG/DL (ref 0.5–1.4)
DIFFERENTIAL METHOD BLD: ABNORMAL
EOSINOPHIL # BLD AUTO: 0.2 K/UL (ref 0–0.5)
EOSINOPHIL NFR BLD: 3 % (ref 0–8)
ERYTHROCYTE [DISTWIDTH] IN BLOOD BY AUTOMATED COUNT: 12.9 % (ref 11.5–14.5)
EST. GFR  (NO RACE VARIABLE): 54 ML/MIN/1.73 M^2
GLUCOSE SERPL-MCNC: 105 MG/DL (ref 70–110)
HCT VFR BLD AUTO: 35.6 % (ref 37–48.5)
HGB BLD-MCNC: 11.1 G/DL (ref 12–16)
IMM GRANULOCYTES # BLD AUTO: 0.05 K/UL (ref 0–0.04)
IMM GRANULOCYTES NFR BLD AUTO: 0.7 % (ref 0–0.5)
LEFT ARM BP: 157 MMHG
LEFT TBI: 0.48
LEFT TOE PRESSURE: 75 MMHG
LYMPHOCYTES # BLD AUTO: 0.8 K/UL (ref 1–4.8)
LYMPHOCYTES NFR BLD: 10.9 % (ref 18–48)
MCH RBC QN AUTO: 31.5 PG (ref 27–31)
MCHC RBC AUTO-ENTMCNC: 31.2 G/DL (ref 32–36)
MCV RBC AUTO: 101 FL (ref 82–98)
MONOCYTES # BLD AUTO: 0.8 K/UL (ref 0.3–1)
MONOCYTES NFR BLD: 11.6 % (ref 4–15)
NEUTROPHILS # BLD AUTO: 5.1 K/UL (ref 1.8–7.7)
NEUTROPHILS NFR BLD: 73.4 % (ref 38–73)
NRBC BLD-RTO: 0 /100 WBC
OB PNL STL: NEGATIVE
PLATELET # BLD AUTO: 244 K/UL (ref 150–450)
PMV BLD AUTO: 8.7 FL (ref 9.2–12.9)
POCT GLUCOSE: 107 MG/DL (ref 70–110)
POCT GLUCOSE: 142 MG/DL (ref 70–110)
POCT GLUCOSE: 88 MG/DL (ref 70–110)
POCT GLUCOSE: 97 MG/DL (ref 70–110)
POTASSIUM SERPL-SCNC: 3.4 MMOL/L (ref 3.5–5.1)
PROT SERPL-MCNC: 6.3 G/DL (ref 6–8.4)
RBC # BLD AUTO: 3.52 M/UL (ref 4–5.4)
RIGHT TBI: 0.54
RIGHT TOE PRESSURE: 85 MMHG
RV AG STL QL IA.RAPID: NEGATIVE
SODIUM SERPL-SCNC: 139 MMOL/L (ref 136–145)
WBC # BLD AUTO: 7 K/UL (ref 3.9–12.7)
WBC #/AREA STL HPF: NORMAL /[HPF]

## 2024-11-05 PROCEDURE — 37000008 HC ANESTHESIA 1ST 15 MINUTES: Performed by: PODIATRIST

## 2024-11-05 PROCEDURE — 28825 PARTIAL AMPUTATION OF TOE: CPT | Mod: LT,,, | Performed by: PODIATRIST

## 2024-11-05 PROCEDURE — 25000003 PHARM REV CODE 250: Performed by: INTERNAL MEDICINE

## 2024-11-05 PROCEDURE — 80053 COMPREHEN METABOLIC PANEL: CPT | Performed by: STUDENT IN AN ORGANIZED HEALTH CARE EDUCATION/TRAINING PROGRAM

## 2024-11-05 PROCEDURE — 85025 COMPLETE CBC W/AUTO DIFF WBC: CPT | Performed by: STUDENT IN AN ORGANIZED HEALTH CARE EDUCATION/TRAINING PROGRAM

## 2024-11-05 PROCEDURE — 87449 NOS EACH ORGANISM AG IA: CPT | Performed by: STUDENT IN AN ORGANIZED HEALTH CARE EDUCATION/TRAINING PROGRAM

## 2024-11-05 PROCEDURE — 87186 SC STD MICRODIL/AGAR DIL: CPT | Performed by: STUDENT IN AN ORGANIZED HEALTH CARE EDUCATION/TRAINING PROGRAM

## 2024-11-05 PROCEDURE — 25000003 PHARM REV CODE 250: Performed by: STUDENT IN AN ORGANIZED HEALTH CARE EDUCATION/TRAINING PROGRAM

## 2024-11-05 PROCEDURE — 85730 THROMBOPLASTIN TIME PARTIAL: CPT | Performed by: STUDENT IN AN ORGANIZED HEALTH CARE EDUCATION/TRAINING PROGRAM

## 2024-11-05 PROCEDURE — 87102 FUNGUS ISOLATION CULTURE: CPT | Performed by: STUDENT IN AN ORGANIZED HEALTH CARE EDUCATION/TRAINING PROGRAM

## 2024-11-05 PROCEDURE — 37000009 HC ANESTHESIA EA ADD 15 MINS: Performed by: PODIATRIST

## 2024-11-05 PROCEDURE — 63600175 PHARM REV CODE 636 W HCPCS: Mod: JG | Performed by: PODIATRIST

## 2024-11-05 PROCEDURE — D9220A PRA ANESTHESIA: Mod: CRNA,,, | Performed by: NURSE ANESTHETIST, CERTIFIED REGISTERED

## 2024-11-05 PROCEDURE — D9220A PRA ANESTHESIA: Mod: ANES,,, | Performed by: ANESTHESIOLOGY

## 2024-11-05 PROCEDURE — 99900035 HC TECH TIME PER 15 MIN (STAT)

## 2024-11-05 PROCEDURE — 87075 CULTR BACTERIA EXCEPT BLOOD: CPT | Mod: 59 | Performed by: STUDENT IN AN ORGANIZED HEALTH CARE EDUCATION/TRAINING PROGRAM

## 2024-11-05 PROCEDURE — 87070 CULTURE OTHR SPECIMN AEROBIC: CPT | Performed by: STUDENT IN AN ORGANIZED HEALTH CARE EDUCATION/TRAINING PROGRAM

## 2024-11-05 PROCEDURE — 71000033 HC RECOVERY, INTIAL HOUR: Performed by: PODIATRIST

## 2024-11-05 PROCEDURE — 36000706: Performed by: PODIATRIST

## 2024-11-05 PROCEDURE — 87046 STOOL CULTR AEROBIC BACT EA: CPT | Performed by: STUDENT IN AN ORGANIZED HEALTH CARE EDUCATION/TRAINING PROGRAM

## 2024-11-05 PROCEDURE — 36000707: Performed by: PODIATRIST

## 2024-11-05 PROCEDURE — 87077 CULTURE AEROBIC IDENTIFY: CPT | Performed by: PODIATRIST

## 2024-11-05 PROCEDURE — 87186 SC STD MICRODIL/AGAR DIL: CPT | Mod: 59 | Performed by: PODIATRIST

## 2024-11-05 PROCEDURE — 87045 FECES CULTURE AEROBIC BACT: CPT | Performed by: STUDENT IN AN ORGANIZED HEALTH CARE EDUCATION/TRAINING PROGRAM

## 2024-11-05 PROCEDURE — 99233 SBSQ HOSP IP/OBS HIGH 50: CPT | Mod: ,,, | Performed by: PODIATRIST

## 2024-11-05 PROCEDURE — 94761 N-INVAS EAR/PLS OXIMETRY MLT: CPT

## 2024-11-05 PROCEDURE — 99233 SBSQ HOSP IP/OBS HIGH 50: CPT | Mod: ,,, | Performed by: SURGERY

## 2024-11-05 PROCEDURE — 27000207 HC ISOLATION

## 2024-11-05 PROCEDURE — 87116 MYCOBACTERIA CULTURE: CPT | Performed by: STUDENT IN AN ORGANIZED HEALTH CARE EDUCATION/TRAINING PROGRAM

## 2024-11-05 PROCEDURE — 27201423 OPTIME MED/SURG SUP & DEVICES STERILE SUPPLY: Performed by: PODIATRIST

## 2024-11-05 PROCEDURE — 87205 SMEAR GRAM STAIN: CPT | Performed by: STUDENT IN AN ORGANIZED HEALTH CARE EDUCATION/TRAINING PROGRAM

## 2024-11-05 PROCEDURE — 25000003 PHARM REV CODE 250: Performed by: NURSE ANESTHETIST, CERTIFIED REGISTERED

## 2024-11-05 PROCEDURE — 63600175 PHARM REV CODE 636 W HCPCS: Performed by: NURSE ANESTHETIST, CERTIFIED REGISTERED

## 2024-11-05 PROCEDURE — 87206 SMEAR FLUORESCENT/ACID STAI: CPT | Performed by: STUDENT IN AN ORGANIZED HEALTH CARE EDUCATION/TRAINING PROGRAM

## 2024-11-05 PROCEDURE — 63600175 PHARM REV CODE 636 W HCPCS: Performed by: STUDENT IN AN ORGANIZED HEALTH CARE EDUCATION/TRAINING PROGRAM

## 2024-11-05 PROCEDURE — 87427 SHIGA-LIKE TOXIN AG IA: CPT | Mod: 59 | Performed by: STUDENT IN AN ORGANIZED HEALTH CARE EDUCATION/TRAINING PROGRAM

## 2024-11-05 PROCEDURE — 85730 THROMBOPLASTIN TIME PARTIAL: CPT | Mod: 91 | Performed by: STUDENT IN AN ORGANIZED HEALTH CARE EDUCATION/TRAINING PROGRAM

## 2024-11-05 PROCEDURE — 71000039 HC RECOVERY, EACH ADD'L HOUR: Performed by: PODIATRIST

## 2024-11-05 PROCEDURE — 11000001 HC ACUTE MED/SURG PRIVATE ROOM

## 2024-11-05 PROCEDURE — 36415 COLL VENOUS BLD VENIPUNCTURE: CPT | Performed by: STUDENT IN AN ORGANIZED HEALTH CARE EDUCATION/TRAINING PROGRAM

## 2024-11-05 PROCEDURE — 27800903 OPTIME MED/SURG SUP & DEVICES OTHER IMPLANTS: Performed by: PODIATRIST

## 2024-11-05 PROCEDURE — 0Y6Q0Z1 DETACHMENT AT LEFT 1ST TOE, HIGH, OPEN APPROACH: ICD-10-PCS | Performed by: PODIATRIST

## 2024-11-05 PROCEDURE — 87070 CULTURE OTHR SPECIMN AEROBIC: CPT | Mod: 59 | Performed by: PODIATRIST

## 2024-11-05 PROCEDURE — 63600175 PHARM REV CODE 636 W HCPCS: Performed by: ANESTHESIOLOGY

## 2024-11-05 DEVICE — IMPLANTABLE DEVICE: Type: IMPLANTABLE DEVICE | Site: TOE | Status: FUNCTIONAL

## 2024-11-05 RX ORDER — PROPOFOL 10 MG/ML
VIAL (ML) INTRAVENOUS
Status: DISCONTINUED | OUTPATIENT
Start: 2024-11-05 | End: 2024-11-05

## 2024-11-05 RX ORDER — ONDANSETRON HYDROCHLORIDE 2 MG/ML
INJECTION, SOLUTION INTRAVENOUS
Status: DISCONTINUED | OUTPATIENT
Start: 2024-11-05 | End: 2024-11-05

## 2024-11-05 RX ORDER — TRAMADOL HYDROCHLORIDE 50 MG/1
50 TABLET ORAL EVERY 6 HOURS PRN
Status: DISPENSED | OUTPATIENT
Start: 2024-11-05 | End: 2024-11-10

## 2024-11-05 RX ORDER — SODIUM CHLORIDE 0.9 % (FLUSH) 0.9 %
10 SYRINGE (ML) INJECTION
Status: DISCONTINUED | OUTPATIENT
Start: 2024-11-05 | End: 2024-11-05 | Stop reason: HOSPADM

## 2024-11-05 RX ORDER — FENTANYL CITRATE 50 UG/ML
INJECTION, SOLUTION INTRAMUSCULAR; INTRAVENOUS
Status: DISCONTINUED | OUTPATIENT
Start: 2024-11-05 | End: 2024-11-05

## 2024-11-05 RX ORDER — LIDOCAINE HYDROCHLORIDE 20 MG/ML
INJECTION, SOLUTION INFILTRATION; PERINEURAL
Status: DISCONTINUED | OUTPATIENT
Start: 2024-11-05 | End: 2024-11-05 | Stop reason: HOSPADM

## 2024-11-05 RX ORDER — GLUCAGON 1 MG
1 KIT INJECTION
Status: DISCONTINUED | OUTPATIENT
Start: 2024-11-05 | End: 2024-11-05 | Stop reason: HOSPADM

## 2024-11-05 RX ORDER — PROPOFOL 10 MG/ML
VIAL (ML) INTRAVENOUS CONTINUOUS PRN
Status: DISCONTINUED | OUTPATIENT
Start: 2024-11-05 | End: 2024-11-05

## 2024-11-05 RX ORDER — HYDRALAZINE HYDROCHLORIDE 20 MG/ML
5 INJECTION INTRAMUSCULAR; INTRAVENOUS ONCE
Status: COMPLETED | OUTPATIENT
Start: 2024-11-05 | End: 2024-11-05

## 2024-11-05 RX ORDER — BUPIVACAINE HYDROCHLORIDE 5 MG/ML
INJECTION, SOLUTION PERINEURAL
Status: DISCONTINUED | OUTPATIENT
Start: 2024-11-05 | End: 2024-11-05 | Stop reason: HOSPADM

## 2024-11-05 RX ORDER — VANCOMYCIN HYDROCHLORIDE 500 MG/10ML
INJECTION, POWDER, LYOPHILIZED, FOR SOLUTION INTRAVENOUS
Status: DISCONTINUED | OUTPATIENT
Start: 2024-11-05 | End: 2024-11-05 | Stop reason: HOSPADM

## 2024-11-05 RX ORDER — LIDOCAINE HYDROCHLORIDE 20 MG/ML
INJECTION INTRAVENOUS
Status: DISCONTINUED | OUTPATIENT
Start: 2024-11-05 | End: 2024-11-05

## 2024-11-05 RX ADMIN — FENTANYL CITRATE 25 MCG: 0.05 INJECTION, SOLUTION INTRAMUSCULAR; INTRAVENOUS at 02:11

## 2024-11-05 RX ADMIN — MICONAZOLE NITRATE: 20 POWDER TOPICAL at 09:11

## 2024-11-05 RX ADMIN — LEVOTHYROXINE SODIUM 88 MCG: 0.09 TABLET ORAL at 05:11

## 2024-11-05 RX ADMIN — PIPERACILLIN SODIUM AND TAZOBACTAM SODIUM 4.5 G: 4; .5 INJECTION, POWDER, FOR SOLUTION INTRAVENOUS at 09:11

## 2024-11-05 RX ADMIN — VANCOMYCIN HYDROCHLORIDE 1250 MG: 1.25 INJECTION, POWDER, LYOPHILIZED, FOR SOLUTION INTRAVENOUS at 02:11

## 2024-11-05 RX ADMIN — DICLOFENAC SODIUM 2 G: 10 GEL TOPICAL at 09:11

## 2024-11-05 RX ADMIN — FENTANYL CITRATE 50 MCG: 0.05 INJECTION, SOLUTION INTRAMUSCULAR; INTRAVENOUS at 01:11

## 2024-11-05 RX ADMIN — COLESTIPOL HYDROCHLORIDE 3 G: 1 TABLET, FILM COATED ORAL at 09:11

## 2024-11-05 RX ADMIN — HYDRALAZINE HYDROCHLORIDE 5 MG: 20 INJECTION INTRAMUSCULAR; INTRAVENOUS at 03:11

## 2024-11-05 RX ADMIN — PROPOFOL 20 MG: 10 INJECTION, EMULSION INTRAVENOUS at 01:11

## 2024-11-05 RX ADMIN — LIDOCAINE HYDROCHLORIDE 60 MG: 20 INJECTION, SOLUTION INTRAVENOUS at 01:11

## 2024-11-05 RX ADMIN — SODIUM CHLORIDE: 0.9 INJECTION, SOLUTION INTRAVENOUS at 01:11

## 2024-11-05 RX ADMIN — PROPOFOL 20 MG: 10 INJECTION, EMULSION INTRAVENOUS at 02:11

## 2024-11-05 RX ADMIN — ONDANSETRON 4 MG: 2 INJECTION, SOLUTION INTRAMUSCULAR; INTRAVENOUS at 01:11

## 2024-11-05 RX ADMIN — PIPERACILLIN SODIUM AND TAZOBACTAM SODIUM 4.5 G: 4; .5 INJECTION, POWDER, FOR SOLUTION INTRAVENOUS at 11:11

## 2024-11-05 RX ADMIN — FENTANYL CITRATE 25 MCG: 0.05 INJECTION, SOLUTION INTRAMUSCULAR; INTRAVENOUS at 01:11

## 2024-11-05 RX ADMIN — TRAMADOL HYDROCHLORIDE 50 MG: 50 TABLET, COATED ORAL at 03:11

## 2024-11-05 RX ADMIN — PROPOFOL 50 MCG/KG/MIN: 10 INJECTION, EMULSION INTRAVENOUS at 01:11

## 2024-11-05 RX ADMIN — PIPERACILLIN SODIUM AND TAZOBACTAM SODIUM 4.5 G: 4; .5 INJECTION, POWDER, FOR SOLUTION INTRAVENOUS at 04:11

## 2024-11-05 NOTE — PROGRESS NOTES
[facility] Medicine  Progress Note    Patient Name: Stephany Skinner  MRN: [id number]  Patient Class: IP- Inpatient   Admission Date: 11/2/2024  Length of Stay: 3 days  Attending Physician: Rodney Aguilera MD  Primary Care Provider: Pipo Flowers MD        Subjective:     Principal Problem:Osteomyelitis of great toe of left foot        HPI:  This is an 89-year-old female with a past medical history of AFib (on Eliquis), HFpEF (EF: 50%), COPD, hypertension, type 2 diabetes, hyperlipidemia, CKD 3, rheumatoid arthritis, myasthenia gravis, obesity, who presents with a foot wound.      Patient presents for evaluation of worsening left 1st toe swelling and redness.  She developed this about 3 weeks prior to presentation, and has been following up with wound care.  She noted that her toe looked more swollen, and was more painful on the day of presentation. Her wound care nurse advised her to present to the ED for further evaluation. Patient denies fevers, chills,  but endorses drainage from the toe.     In the ED, the patient was hemodynamically stable.  Labs were remarkable for macrocytic anemia (11.2), elevated ESR (66), negative lactic acid (1.4).  Left foot x-ray showed degenerative changes with soft tissue swelling overlying the dorsum and medial border of the left forefoot.  Left lower extremity arterial ultrasound showed findings suggestive of 50-75% of peroneal artery stenosis, and inflow stenosis in the common femoral artery.  Patient was given vancomycin, Zosyn.  She was admitted for further management.    Overview/Hospital Course:  #Infection of toe; MRI did show signs of early osteomyelitis distal phalanx LEFT great toe. Pending Podiatry and VascSx eval/recs. Continuing V+Z for now. GNR in deep wound culture. Got contrast, to preserve renal function, will reduce from V+Z to V+ceftriaxone (non-diabetic, pseudomonas less likely, however switch to cefepime if results). Will switch from  Apixaban to heparin gtt in preparation of procedures. C diff negative.    Unlikely need for revascularization per VascSx. Patient NPO for Podiatry to proceed with procedure, please allow to eat if not.       Interval History:  NAEON.  No new issues.   CC- foot pain  All questions answered and updates on care given.       ROS:  General: Negative for fevers   Cardiac: Negative for chest pain   Pulmonary: Negative for wheezing  GI: Negative for abdominal distention   +wound     Vitals:    11/05/24 0310 11/05/24 0457 11/05/24 0736 11/05/24 0743   BP:  (!) 162/83  (!) 157/67   BP Location:  Left arm     Patient Position:  Lying     Pulse:  65  73   Resp: 20 18 18 20   Temp:  98.2 °F (36.8 °C)  98.6 °F (37 °C)   TempSrc:  Oral     SpO2:  96% 96% (!) 91%   Weight:       Height:              Body mass index is 35.74 kg/m².      PHYSICAL EXAM:  GENERAL APPEARANCE: alert and cooperative.     HEAD: NC/AT  CARDIAC: There is no cyanosis or pallor.   LUNGS: No apparent wheezing or stridor.  ABDOMEN: Non-distended. No guarding.  MSK: No joint erythema or tenderness.   EXTREMITIES: No significant new deformity or new joint abnormality.   NEUROLOGICAL: CN II-XII grossly intact.   SKIN: No lesions or eruptions.+wound Left 1 toe  PSYCHIATRIC: No tangential speech. No Hyperactive features.        Recent Results (from the past 24 hours)   POCT glucose    Collection Time: 11/04/24 11:43 AM   Result Value Ref Range    POCT Glucose 85 70 - 110 mg/dL   POCT glucose    Collection Time: 11/04/24  4:31 PM   Result Value Ref Range    POCT Glucose 132 (H) 70 - 110 mg/dL   POCT glucose    Collection Time: 11/04/24  7:26 PM   Result Value Ref Range    POCT Glucose 99 70 - 110 mg/dL   Comprehensive Metabolic Panel    Collection Time: 11/05/24  5:59 AM   Result Value Ref Range    Sodium 139 136 - 145 mmol/L    Potassium 3.4 (L) 3.5 - 5.1 mmol/L    Chloride 111 (H) 95 - 110 mmol/L    CO2 19 (L) 23 - 29 mmol/L    Glucose 105 70 - 110 mg/dL    BUN 17 8  - 23 mg/dL    Creatinine 1.0 0.5 - 1.4 mg/dL    Calcium 8.4 (L) 8.7 - 10.5 mg/dL    Total Protein 6.3 6.0 - 8.4 g/dL    Albumin 2.9 (L) 3.5 - 5.2 g/dL    Total Bilirubin 0.4 0.1 - 1.0 mg/dL    Alkaline Phosphatase 49 40 - 150 U/L    AST 15 10 - 40 U/L    ALT 15 10 - 44 U/L    eGFR 54 (A) >60 mL/min/1.73 m^2    Anion Gap 9 8 - 16 mmol/L   CBC auto differential    Collection Time: 11/05/24  5:59 AM   Result Value Ref Range    WBC 7.00 3.90 - 12.70 K/uL    RBC 3.52 (L) 4.00 - 5.40 M/uL    Hemoglobin 11.1 (L) 12.0 - 16.0 g/dL    Hematocrit 35.6 (L) 37.0 - 48.5 %     (H) 82 - 98 fL    MCH 31.5 (H) 27.0 - 31.0 pg    MCHC 31.2 (L) 32.0 - 36.0 g/dL    RDW 12.9 11.5 - 14.5 %    Platelets 244 150 - 450 K/uL    MPV 8.7 (L) 9.2 - 12.9 fL    Immature Granulocytes 0.7 (H) 0.0 - 0.5 %    Gran # (ANC) 5.1 1.8 - 7.7 K/uL    Immature Grans (Abs) 0.05 (H) 0.00 - 0.04 K/uL    Lymph # 0.8 (L) 1.0 - 4.8 K/uL    Mono # 0.8 0.3 - 1.0 K/uL    Eos # 0.2 0.0 - 0.5 K/uL    Baso # 0.03 0.00 - 0.20 K/uL    nRBC 0 0 /100 WBC    Gran % 73.4 (H) 38.0 - 73.0 %    Lymph % 10.9 (L) 18.0 - 48.0 %    Mono % 11.6 4.0 - 15.0 %    Eosinophil % 3.0 0.0 - 8.0 %    Basophil % 0.4 0.0 - 1.9 %    Differential Method Automated    APTT    Collection Time: 11/05/24  5:59 AM   Result Value Ref Range    aPTT 34.3 (H) 21.0 - 32.0 sec       Microbiology Results (last 7 days)       Procedure Component Value Units Date/Time    Aerobic culture [[id number]]  (Abnormal)  (Susceptibility) Collected: 11/02/24 1430    Order Status: Completed Specimen: Wound from Toe, Left Foot Updated: 11/05/24 0802     Aerobic Bacterial Culture Results called to and read back by: Sol Michaels 11/04/2024  06:32      ENTEROBACTER CLOACAE  Moderate        METHICILLIN RESISTANT STAPHYLOCOCCUS AUREUS  Moderate        PSEUDOMONAS AERUGINOSA  Moderate      Stool culture [[id number]] Collected: 11/05/24 0028    Order Status: Sent Specimen: Stool Updated: 11/05/24 0030    E. coli 0157  antigen [[id number]] Collected: 11/05/24 0028    Order Status: No result Specimen: Stool Updated: 11/05/24 0030    Culture, Anaerobe [[id number]] Collected: 11/02/24 1430    Order Status: Completed Specimen: Wound from Toe, Left Foot Updated: 11/04/24 0728     Anaerobic Culture Culture in progress    Clostridium difficile EIA [[id number]] Collected: 11/02/24 1636    Order Status: Completed Specimen: Stool Updated: 11/03/24 1202     C. diff Antigen Negative     C difficile Toxins A+B, EIA Negative     Comment: Testing not recommended for children <24 months old.       Narrative:      1 - 3 Days: If liquid stool unable to be collected in  community onset window (hospital day 1 - 3), the order will  be discontinued automatically. Do not send formed stool for  testing    Fungus culture [[id number]] Collected: 11/02/24 1330    Order Status: Sent Specimen: Wound from Toe, Left Foot Updated: 11/02/24 1539                        Assessment/Plan:      * Osteomyelitis of great toe of left foot  Acute OM  MRI did show signs of early osteomyelitis distal phalanx LEFT great toe.   Pending Podiatry and VascSx eval/recs.   Continuing V+Z for now.   MRSA and enterobacter  Got contrast, to preserve renal function, will reduce from V+Z to V+ceftriaxone (non-diabetic, pseudomonas less likely, however switch to cefepime if results).       Chronic atrial fibrillation  [id number] Score: 4. The patients heart rate in the last 24 hours is as follows:  Pulse  Min: 52  Max: 70     Antiarrhythmics  metoprolol succinate (TOPROL-XL) 24 hr tablet 25 mg, Daily, Oral    Anticoagulants ; Will switch from Apixaban to heparin gtt in preparation of procedures.   heparin 25,000 units in dextrose 5% 250 mL (100 units/mL) infusion LOW INTENSITY nomogram - [facility], Continuous, Intravenous  heparin 25,000 units in dextrose 5% (100 units/ml) IV bolus from bag LOW INTENSITY nomogram - [facility], As needed (PRN), Intravenous  heparin 25,000 units in dextrose 5% (100  units/ml) IV bolus from bag LOW INTENSITY nomogram - [facility], As needed (PRN), Intravenous    Plan  - Replete lytes with a goal of K>4, Mg >2  - Patient is anticoagulated, see medications listed above.  - Patient's afib is currently controlled        Diarrhea  Chronic given >3w, however no workup done  will check cdiff and chronic stool studies  C diff negative        Cellulitis of toe of left foot  Concern for cellulitis/possible underlying osteomyelitis     Plan:   Continue vancomycin/zosyn   Follow up on cultures - growing MRSA and enterobacter  Consult podiatry   Consult vascular surgery   MRI left foot ordered - early osteomyelitis noted  - Possibly amputation vs IV antibiotics     Stage 3a chronic kidney disease (CKD)  Creatine stable for now. BMP reviewed- noted Estimated Creatinine Clearance: 36.9 mL/min (based on SCr of 1.3 mg/dL). according to latest data. Based on current GFR, CKD stage is stage 3 - GFR 30-59.  Monitor UOP and serial BMP and adjust therapy as needed. Renally dose meds. Avoid nephrotoxic medications and procedures.    Candidiasis, intertrigo  Miconazole BID       COPD (chronic obstructive pulmonary disease)  No acute issues. DuoNebs PRN     Essential hypertension  Patients blood pressure range in the last 24 hours was: BP  Min: 116/70  Max: 163/73.The patient's inpatient anti-hypertensive regimen is listed below:  Current Antihypertensives  furosemide tablet 40 mg, Daily, Oral  losartan tablet 25 mg, 2 times daily, Oral  metoprolol succinate (TOPROL-XL) 24 hr tablet 25 mg, Daily, Oral  spironolactone tablet 25 mg, Every morning, Oral    Plan  - BP is controlled, no changes needed to their regimen    Type 2 diabetes mellitus with circulatory disorder, without long-term current use of insulin  Lab Results   Component Value Date    HGBA1C 5.8 (H) 11/29/2023     Glycemic protocol   MDSS      Chronic diastolic congestive heart failure  Continue home medications     PUJA (acute kidney injury)  PUJA is  likely due to pre-renal azotemia due to infection.   Baseline creatinine is  .09 . Most recent creatinine and eGFR are listed below.    Recent Labs     11/01/24  1548 11/02/24  0505 11/04/24  0215   CREATININE 1.3 0.9 1.1   EGFRNORACEVR 40* >60 48*        Plan  - PUJA is improving  - Avoid nephrotoxins and renally dose meds for GFR listed above  - Monitor urine output, serial BMP, and adjust therapy as needed  - Cr 1.3 on admit, now 0.9 after fluids. RESOLVED    Myasthenia gravis, adult form  Not on any medications. Monitor   Follow up with neurology       Hyperlipidemia  Continue statin       Hypothyroidism  Continue synthroid         VTE Risk Mitigation (From admission, onward)           Ordered     heparin 25,000 units in dextrose 5% (100 units/ml) IV bolus from bag LOW INTENSITY nomogram - [facility]  As needed (PRN)        Question:  Heparin Infusion Adjustment (DO NOT MODIFY ANSWER)  Answer:  \\[email or web address]\[email or web address]\Images\Pharmacy\HeparinInfusions\heparin LOW INTENSITY nomogram for [facility] [id number]    11/03/24 0823     heparin 25,000 units in dextrose 5% (100 units/ml) IV bolus from bag LOW INTENSITY nomogram - [facility]  As needed (PRN)        Question:  Heparin Infusion Adjustment (DO NOT MODIFY ANSWER)  Answer:  \[email or web address]\[email or web address]\Images\Pharmacy\HeparinInfusions\heparin LOW INTENSITY nomogram for [facility] [id number].pdf    11/03/24 0823     heparin 25,000 units in dextrose 5% 250 mL (100 units/mL) infusion LOW INTENSITY nomogram - [facility]  Continuous        Question:  Begin at (units/kg/hr)  Answer:  12    11/03/24 0823     IP VTE HIGH RISK PATIENT  Once         11/02/24 0252     Place sequential compression device  Until discontinued         11/02/24 0252                    Discharge Planning   MADELYN:      Code Status: Full Code   Is the patient medically ready for discharge?:     Reason for patient still in hospital (select all that apply): Patient trending condition and Treatment  Discharge Plan A: Home with family, Home Health                   Rodney Aguilera MD  Department of Hospital Medicine   [facility] [place] [facility] Surg

## 2024-11-05 NOTE — NURSING
Patient returned to unit via stretcher.  Required assistance x3 for transfer back to bed.  NAD noted.     Detail Level: Detailed Instructions: This plan will send the code FBSE to the PM system.  DO NOT or CHANGE the price. Price (Do Not Change): 0.00

## 2024-11-05 NOTE — PLAN OF CARE
Problem: Adult Inpatient Plan of Care  Goal: Plan of Care Review  Outcome: Progressing  Goal: Patient-Specific Goal (Individualized)  Outcome: Progressing     Problem: Diabetes Comorbidity  Goal: Blood Glucose Level Within Targeted Range  Outcome: Progressing     Problem: Infection  Goal: Absence of Infection Signs and Symptoms  Outcome: Progressing     Problem: Wound  Goal: Optimal Coping  Outcome: Progressing

## 2024-11-05 NOTE — OP NOTE
Distal Hallux Amputation      Surgery Date: 11/5/2024     Surgeons and Role:     * Noemi Ley DPM - Primary    Assisting Surgeon: None    Pre-op Diagnosis:  Osteomyelitis of great toe of left foot [M86.9]    Post-op Diagnosis:  Post-Op Diagnosis Codes:     * Osteomyelitis of great toe of left foot [M86.9]    Procedure(s) (LRB):  AMPUTATION, TOE- DISTAL HALLUX (Left)    Anesthesia: Local MAC    Description of the findings of the procedure: soft discolored bone to the distal hallux    Estimated Blood Loss: 15 mL         Specimens:   Specimen (24h ago, onward)       Start     Ordered    11/05/24 1442  Specimen to Pathology, Surgery Other (Podiatry)  Once        Comments: Pre-op Diagnosis: Osteomyelitis of great toe of left foot [M86.9]Procedure(s):AMPUTATION, TOE- DISTAL HALLUX Number of specimens: 2Name of specimens: 1) Distal left great toe2) Proximal margin left toe     References:    Click here for ordering Quick Tip   Question Answer Comment   Procedure Type: Other Podiatry   Specimen Class: Routine/Screening    Release to patient Immediate        11/05/24 1441                    Hemostasis: Pneumatic ankle tourniquet, not inflated     Procedure in Detail:  The patient was seen in the Holding Room. The risks, benefits, complications, treatment options, and expected outcomes were discussed with the patient. The risks and potential complications of their problem and purposed treatment include but are not limited to infection, nerve injury, vascular injury, pain, potential skin necrosis, deep vein thrombosis, possible pulmonary embolus, complications of the anesthetics and need for further amputation.  The patient concurred with the proposed plan, giving informed consent.  The site of surgery properly noted/marked. The patient was taken to Operating Room #3 identified as Stephany Skinner and the procedure verified as distal left hallux amputation. A Time Out was held and the above information confirmed.    The  patient was brought to the operating room, placed on the operating table in a supine position. Following the successful induction of anesthesia, 20 ccs of a 1:1 mixture of 2% Lidocaine plain and 0.5% Bupivicaine plain was injected as a moran block. The foot was then scrubbed, prepped, and draped in the usual aseptic manner.  The stockinette was then reflected and attention was directed to the distal aspect of the left hallux foot where there was a full-thickness ulceration with probe to bone.     A #15 blade was used to make an incision down the bone in a transverse fashion just proximal to the head of the proximal phalanx. The incision was carried mediolaterally and plantarly encompassing the toe leaving a large amount of plantar skin intact. Next, the distal phalanx was disarticulated at the interphalangeal joint and removed. The distal toe was amputated and sent to laboratory for bone culture and sensitivity as well as tissue pathology. Next, the head of the proximal phalanx was inspected and found to be healthy in appearance. Therefore, a sagittal saw was used to resect approximately 0.75 cm of the distal aspect of head of the proximal phalanx. This bone was also sent off for culture and was labeled proximal margin. Next, the flexor hallucis longus tendon was identified and retracted as far as possible distally and transected. A hemostat was used to inspect the flexor sheath to ensure no infection tracking up the sheath proximally. None was found. No purulent drainage or abscess was found. The proximal margin of the surgical site tissue was viable and healthy. There was no malodor. Next,  1 L of betadine impregnated sterile saline were instilled into the wound.    Heliogen collagen mixed with vancomycin powder applied to wound bed    All skin was reapproximated and coapted with care utilizing 3-0 prolene in a simple interrupted suture technique.  The patient tolerated the above anesthesia and surgery without  apparent complications. A standard postoperative dressing was applied consisting of betadine soaked adaptic, gauze, 4x4s, Kerlix,  Cast padding and coban. The patient was transported via cart to Postanesthesia Care Unit with vital signs able and vascular status intact to foot. She will be readmitted to the floor where we will continue to follow . Plan is to continue the antibiotics until further ID recommendations.    She will be heel weightbearing in darco shoe to the left foot and use walker or crutch assistance. She will elevate his left foot and rest the foot, keep it clean and dry. Podiatry will round on her POD 2 change dressing. Dressings are to be left clean, dry, and intact until this time.           Implants: none           Complications:  None; patient tolerated the procedure well.           Disposition: PACU - hemodynamically stable. Then back to floor           Condition: stable

## 2024-11-05 NOTE — PROGRESS NOTES
[facility] Surg  Podiatry  Progress Note    Patient Name: Stephany Skinner  MRN: [id number]  Admission Date: 11/2/2024  Hospital Length of Stay: 3 days  Attending Physician: Rodney Aguilera MD  Primary Care Provider: Pipo Flowers MD     Subjective:     Interval History:  11/05/2024 patient seen at bedside resting comfortably would like to discuss her surgical options for the treatment of her toe.  No new pedal complaints.        Scheduled Meds:   colestipoL  3 g Oral BID    diclofenac sodium  2 g Topical (Top) Daily    furosemide  40 mg Oral Daily    levothyroxine  88 mcg Oral Before breakfast    metoprolol succinate  25 mg Oral Daily    miconazole NITRATE 2 %   Topical (Top) BID    piperacillin-tazobactam (Zosyn) IV (PEDS and ADULTS) (extended infusion is not appropriate)  4.5 g Intravenous Q8H    vancomycin (VANCOCIN) IV (PEDS and ADULTS)  1,250 mg Intravenous Q24H     Continuous Infusions:   heparin (porcine) in D5W  0-40 Units/kg/hr (Adjusted) Intravenous Continuous 9.3 mL/hr at 11/04/24 0654 12 Units/kg/hr at 11/04/24 0654     PRN Meds:  Current Facility-Administered Medications:     acetaminophen, 650 mg, Oral, Q8H PRN    acetaminophen, 650 mg, Oral, Q4H PRN    albuterol-ipratropium, 3 mL, Nebulization, Q6H PRN    aluminum-magnesium hydroxide-simethicone, 30 mL, Oral, QID PRN    bisacodyL, 10 mg, Rectal, Daily PRN    glucagon (human recombinant), 1 mg, Intramuscular, PRN    glucose, 16 g, Oral, PRN    glucose, 24 g, Oral, PRN    heparin (PORCINE), 60 Units/kg (Adjusted), Intravenous, PRN    heparin (PORCINE), 30 Units/kg (Adjusted), Intravenous, PRN    insulin aspart U-100, 0-10 Units, Subcutaneous, QID (AC + HS) PRN    melatonin, 6 mg, Oral, Nightly PRN    naloxone, 0.02 mg, Intravenous, PRN    ondansetron, 4 mg, Intravenous, Q8H PRN    potassium bicarbonate, 35 mEq, Oral, PRN    potassium bicarbonate, 50 mEq, Oral, PRN    potassium bicarbonate, 60 mEq, Oral, PRN    potassium, sodium phosphates, 2  packet, Oral, PRN    potassium, sodium phosphates, 2 packet, Oral, PRN    potassium, sodium phosphates, 2 packet, Oral, PRN    prochlorperazine, 5 mg, Intravenous, Q6H PRN    senna-docusate 8.6-50 mg, 1 tablet, Oral, Daily PRN    simethicone, 1 tablet, Oral, QID PRN    sodium chloride 0.9%, 10 mL, Intravenous, Q12H PRN    Flushing PICC/Midline Protocol, , , Until Discontinued **AND** sodium chloride 0.9%, 10 mL, Intravenous, Q12H PRN    traMADoL, 50 mg, Oral, Q6H PRN    Pharmacy to dose Vancomycin consult, , , Once **AND** vancomycin - pharmacy to dose, , Intravenous, pharmacy to manage frequency    Review of Systems   Constitutional:  Negative for activity change, appetite change, chills, fatigue and fever.   Respiratory:  Negative for cough and shortness of breath.    Cardiovascular:  Negative for chest pain and leg swelling.   Gastrointestinal:  Negative for diarrhea, nausea and vomiting.   Skin:  Positive for color change and wound.   Neurological:  Positive for numbness. Negative for weakness.        + paresthesia      Objective:     Vital Signs (Most Recent):  Temp: 98.2 °F (36.8 °C) (11/05/24 0457)  Pulse: 65 (11/05/24 0457)  Resp: 18 (11/05/24 0457)  BP: (!) 162/83 (11/05/24 0457)  SpO2: 96 % (11/05/24 0457) Vital Signs (24h Range):  Temp:  [98.1 °F (36.7 °C)-99.1 °F (37.3 °C)] 98.2 °F (36.8 °C)  Pulse:  [59-70] 65  Resp:  [18-20] 18  SpO2:  [96 %-98 %] 96 %  BP: (124-162)/(70-88) 162/83     Weight: 103.5 kg (228 lb 2.8 oz)  Body mass index is 35.74 kg/m².    Physical Exam  Vitals and nursing note reviewed.   Constitutional:       General: She is not in acute distress.     Appearance: She is well-developed. She is not toxic-appearing or diaphoretic.      Comments: alert and oriented x 3.    Cardiovascular:      Pulses:           Dorsalis pedis pulses are 2+ on the right side and 2+ on the left side.        Posterior tibial pulses are 1+ on the right side and 1+ on the left side.   Pulmonary:      Effort: No  respiratory distress.   Musculoskeletal:         General: No deformity.      Right ankle: No tenderness. No lateral malleolus, medial malleolus, AITF ligament, CF ligament or posterior TF ligament tenderness.      Right Achilles Tendon: No defects. Hutchison's test negative.      Left ankle: No tenderness. No lateral malleolus, medial malleolus, AITF ligament, CF ligament or posterior TF ligament tenderness.      Left Achilles Tendon: No defects. Hutchison's test negative.      Right foot: No tenderness or bony tenderness.      Left foot: No tenderness or bony tenderness.      Comments: Muscle strength is 5/5 in all groups bilaterally.    There is equinus deformity bilateral with decreased dorsiflexion at the ankle joint bilateral.    Decreased first MPJ range of motion both weightbearing and nonweightbearing, no crepitus observed the first MP joint, + dorsal flag sign.    Feet:      Right foot:      Skin integrity: Callus and dry skin present.      Toenail Condition: Fungal disease present.     Left foot:      Skin integrity: Ulcer (see below), callus and dry skin present.      Toenail Condition: Fungal disease present.  Lymphadenopathy:      Comments: No lymphatic streaking     Skin:     General: Skin is warm and dry.      Coloration: Skin is not pale.      Findings: No rash.      Nails: There is no clubbing.   Neurological:      Sensory: Sensory deficit present.      Motor: No atrophy.      Comments: L   Psychiatric:         Attention and Perception: She is attentive.         Mood and Affect: Mood is not anxious. Affect is not inappropriate.         Speech: She is communicative. Speech is not slurred.         Behavior: Behavior is not combative.          11/05/2024   L distal plantar 1st digit wound moderate serosanguinous drainage, necrotic and sloughing base, positive probe to bone, malodor. No fluctuance or crepitus, mild pain on palpation. (Image did not load)    L medial leg wound moderate serosanguinous  "drainage, fibro granular base, mildly macerated edges. No fluctuance, crepitus, or pain on palpation.      Laboratory:  A1C: No results for input(s): "HGBA1C" in the last 4320 hours.  CBC:   Recent Labs   Lab 11/05/24  0559   WBC 7.00   RBC 3.52*   HGB 11.1*   HCT 35.6*      *   MCH 31.5*   MCHC 31.2*     CMP:   Recent Labs   Lab 11/05/24  0559      CALCIUM 8.4*   ALBUMIN 2.9*   PROT 6.3      K 3.4*   CO2 19*   *   BUN 17   CREATININE 1.0   ALKPHOS 49   ALT 15   AST 15   BILITOT 0.4     CRP: No results for input(s): "CRP" in the last 168 hours.  ESR:   Recent Labs   Lab 11/01/24  1548   SEDRATE 56*     Microbiology Results (last 7 days)       Procedure Component Value Units Date/Time    Aerobic culture [[id number]]  (Abnormal)  (Susceptibility) Collected: 11/02/24 1430    Order Status: Completed Specimen: Wound from Toe, Left Foot Updated: 11/05/24 0802     Aerobic Bacterial Culture Results called to and read back by: Sol Michaels 11/04/2024  06:32      ENTEROBACTER CLOACAE  Moderate        METHICILLIN RESISTANT STAPHYLOCOCCUS AUREUS  Moderate        PSEUDOMONAS AERUGINOSA  Moderate      Stool culture [[id number]] Collected: 11/05/24 0028    Order Status: Sent Specimen: Stool Updated: 11/05/24 0030    E. coli 0157 antigen [[id number]] Collected: 11/05/24 0028    Order Status: No result Specimen: Stool Updated: 11/05/24 0030    Culture, Anaerobe [[id number]] Collected: 11/02/24 1430    Order Status: Completed Specimen: Wound from Toe, Left Foot Updated: 11/04/24 0728     Anaerobic Culture Culture in progress    Clostridium difficile EIA [[id number]] Collected: 11/02/24 1636    Order Status: Completed Specimen: Stool Updated: 11/03/24 1202     C. diff Antigen Negative     C difficile Toxins A+B, EIA Negative     Comment: Testing not recommended for children <24 months old.       Narrative:      1 - 3 Days: If liquid stool unable to be collected in  community onset window (hospital " day 1 - 3), the order will  be discontinued automatically. Do not send formed stool for  testing    Fungus culture [[id number]] Collected: 11/02/24 1330    Order Status: Sent Specimen: Wound from Toe, Left Foot Updated: 11/02/24 0251            Diagnostic Results:    Assessment/Plan:     Active Diagnoses:    Diagnosis Date Noted POA    PRINCIPAL PROBLEM:  Osteomyelitis of great toe of left foot [M86.9] 11/02/2024 Yes    Diarrhea [R19.7] 11/03/2024 Yes    Cellulitis of toe of left foot [L03.032] 11/02/2024 Yes    Stage 3a chronic kidney disease (CKD) [N18.31] 02/28/2024 Yes    Candidiasis, intertrigo [B37.2] 08/31/2023 Yes    Essential hypertension [I10] 12/09/2022 Yes    COPD (chronic obstructive pulmonary disease) [J44.9] 12/09/2022 Yes    Type 2 diabetes mellitus with circulatory disorder, without long-term current use of insulin [E11.59] 05/01/2018 Yes    Chronic diastolic congestive heart failure [I50.32] 02/07/2018 Yes    PUJA (acute kidney injury) [N17.9] 02/05/2018 Yes    Myasthenia gravis, adult form [G70.00] 02/03/2018 Yes    Chronic atrial fibrillation [I48.20] 01/30/2018 Yes    Hypothyroidism [E03.9] 08/08/2012 Yes    Hyperlipidemia [E78.5] 08/08/2012 Yes      Problems Resolved During this Admission:       Greater than 50% of this visit spent on counseling and coordination of care. The importance of tight glycemic control, adequate vitamin supplementation, protein intake, and hydration - discussed with patient    Discussed options for treatment of confirmed OM. Two options were discussed with possible side effects of each. Toe amputation vs bone biopsy for C&S IV abx for six weeks with aggressive wound care for several months with no guarantee of wound resolution or PO abx (if indicated based on culture) for six weeks with aggressive wound care for several months with no guarantee of wound resolution (discussed regular lab work involved with abx options).  Patient in agreement with toe amputation.   NPO    To OR a 1 pm     We will continue to follow and work in partnership with treatment team on how to best treat patient concern and diagnosis.      Noemi Ley DPM  Podiatry  [facility]

## 2024-11-05 NOTE — ANESTHESIA PREPROCEDURE EVALUATION
[facility]  Anesthesia Pre-Operative Evaluation         Patient Name: Stephany Skinner  YOB: 1935  MRN: [id number]    SUBJECTIVE:     Pre-operative evaluation for Procedure(s) (LRB):  AMPUTATION, TOE- DISTAL HALLUX (Left)     11/05/2024    Stephany Skinner is a 89 y.o. female w/ a significant PMHx of Afib, HFpEF, COPD, JOY, HTN, T2DM, HLD, CKD3, rheumatoid arthritis, myasthenia gravis, obesity, hypothyroidism,     Patient now presents for the above procedure(s).      LDA: None documented.    Prev airway: None documented.    Drips: None documented.    Patient Active Problem List   Diagnosis    Obstructive sleep apnea (adult) (pediatric)    Overweight(278.02)    Osteoarthritis    Hypothyroidism    Hyperlipidemia    Incontinence in female    Venous stasis of lower extremity    Chronic atrial fibrillation    Myasthenia gravis, adult form    PUJA (acute kidney injury)    Chronic diastolic congestive heart failure    Type 2 diabetes mellitus with circulatory disorder, without long-term current use of insulin    B12 deficiency    Abnormal finding on GI tract imaging    Cyst of pancreas    Pancreatic cyst    Essential hypertension    COPD (chronic obstructive pulmonary disease)    Obesity, morbid, BMI 40.0-49.9    Conjunctivitis of both eyes    Candidiasis, intertrigo    Cellulitis    Exertional dyspnea    Stage 3a chronic kidney disease (CKD)    Sacroiliitis, not elsewhere classified    Early stage nonexudative age-related macular degeneration of both eyes    Polyneuropathy    Rheumatoid arthritis    Peripheral vascular disease, unspecified    Aortic atherosclerosis    Osteopenia    Non-pressure chronic ulcer of calf, left, with fat layer exposed    Chronic venous hypertension (idiopathic) with ulcer of left lower extremity (CODE)    Diabetic ulcer of great toe of right foot associated with type 2 diabetes mellitus, with necrosis of muscle    Hallux hammertoe, right    Venous stasis ulcer of  left calf limited to breakdown of skin with varicose veins    Ulcer of great toe of left foot, with fat layer exposed    Cellulitis of toe of left foot    Osteomyelitis of great toe of left foot    Diarrhea       Review of patient's allergies indicates:   Allergen Reactions    Cephalosporins Hives     Itchiness and hives immediately after IV Ceftriaxone push    Statins-hmg-coa reductase inhibitors      Other reaction(s): Unknown       Current Outpatient Medications:    Current Facility-Administered Medications:     acetaminophen tablet 650 mg, 650 mg, Oral, Q8H PRN, Shane Mancuso MD    acetaminophen tablet 650 mg, 650 mg, Oral, Q4H PRN, Shane Mancuso MD, 650 mg at 11/04/24 1937    albuterol-ipratropium 2.5 mg-0.5 mg/3 mL nebulizer solution 3 mL, 3 mL, Nebulization, Q6H PRN, Shane Mancuso MD    aluminum-magnesium hydroxide-simethicone 200-200-20 mg/5 mL suspension 30 mL, 30 mL, Oral, QID PRN, Shane Mancuso MD    bisacodyL suppository 10 mg, 10 mg, Rectal, Daily PRN, Shane Mancuso MD    colestipoL tablet 3 g, 3 g, Oral, BID, Shane Mancuso MD, 3 g at 11/04/24 2051    diclofenac sodium 1 % gel 2 g, 2 g, Topical (Top), Daily, Shane Mancuso MD, 2 g at 11/04/24 0938    furosemide tablet 40 mg, 40 mg, Oral, Daily, Shane Mancuso MD, 40 mg at 11/03/24 0900    glucagon (human recombinant) injection 1 mg, 1 mg, Intramuscular, PRN, Shane Mancuso MD    glucose chewable tablet 16 g, 16 g, Oral, PRN, Shane Mancuso MD    glucose chewable tablet 24 g, 24 g, Oral, PRN, Shane Mancuso MD    heparin 25,000 units in dextrose 5% (100 units/ml) IV bolus from bag LOW INTENSITY nomogram - [facility], 60 Units/kg (Adjusted), Intravenous, PRN, Rodney Aguilera MD    heparin 25,000 units in dextrose 5% (100 units/ml) IV bolus from bag LOW INTENSITY nomogram - [facility], 30 Units/kg (Adjusted), Intravenous, PRN, Rodney Aguilera MD    heparin 25,000 units in dextrose 5% 250 mL (100 units/mL) infusion LOW INTENSITY nomogram - [facility], 0-40 Units/kg/hr (Adjusted),  Intravenous, Continuous, Rodney Aguilera MD, Stopped at 11/05/24 0703    insulin aspart U-100 pen 0-10 Units, 0-10 Units, Subcutaneous, QID (AC + HS) PRN, Shane Mancuso MD    levothyroxine tablet 88 mcg, 88 mcg, Oral, Before breakfast, Shane Mancuso MD, 88 mcg at 11/05/24 0537    melatonin tablet 6 mg, 6 mg, Oral, Nightly PRN, Shane Mancuso MD, 6 mg at 11/03/24 2124    metoprolol succinate (TOPROL-XL) 24 hr tablet 25 mg, 25 mg, Oral, Daily, Shane Mancuso MD, 25 mg at 11/02/24 0900    miconazole NITRATE 2 % top powder, , Topical (Top), BID, Shane Mancuso MD, Given at 11/04/24 2051    naloxone 0.4 mg/mL injection 0.02 mg, 0.02 mg, Intravenous, PRN, Shane Mancuso MD    ondansetron injection 4 mg, 4 mg, Intravenous, Q8H PRN, Shane Mancuso MD    piperacillin-tazobactam (ZOSYN) 4.5 g in D5W 100 mL IVPB (MB+), 4.5 g, Intravenous, Q8H, Rodney Aguilera MD, Last Rate: 25 mL/hr at 11/05/24 0418, 4.5 g at 11/05/24 0418    potassium bicarbonate disintegrating tablet 35 mEq, 35 mEq, Oral, PRN, Shane Mancuso MD    potassium bicarbonate disintegrating tablet 50 mEq, 50 mEq, Oral, PRN, Shane Mancuso MD    potassium bicarbonate disintegrating tablet 60 mEq, 60 mEq, Oral, PRN, Shane Mancuso MD    potassium, sodium phosphates 280-160-250 mg packet 2 packet, 2 packet, Oral, PRN, Shane Mancuso MD    potassium, sodium phosphates 280-160-250 mg packet 2 packet, 2 packet, Oral, PRN, Shane Mancuso MD    potassium, sodium phosphates 280-160-250 mg packet 2 packet, 2 packet, Oral, PRN, Shane Mancuso MD    prochlorperazine injection Soln 5 mg, 5 mg, Intravenous, Q6H PRN, Shane Mancuso MD    senna-docusate 8.6-50 mg per tablet 1 tablet, 1 tablet, Oral, Daily PRN, Shane Mancuso MD    simethicone chewable tablet 80 mg, 1 tablet, Oral, QID PRN, Shane Mancuso MD    sodium chloride 0.9% flush 10 mL, 10 mL, Intravenous, Q12H PRN, Shane Mancuso MD    Flushing PICC/Midline Protocol, , , Until Discontinued **AND** sodium chloride 0.9% flush 10 mL, 10 mL,  Intravenous, Q12H PRN, Rodney Aguilera MD    traMADoL tablet 50 mg, 50 mg, Oral, Q6H PRN, Lesa Hensley MD, 50 mg at 11/05/24 0310    Pharmacy to dose Vancomycin consult, , , Once **AND** vancomycin - pharmacy to dose, , Intravenous, pharmacy to manage frequency, Shane Mancuso MD    vancomycin 1,250 mg in D5W 250 mL IVPB (admixture device), 1,250 mg, Intravenous, Q24H, Rodney Aguilera MD, Stopped at 11/05/24 0402    Facility-Administered Medications Ordered in Other Encounters:     tetracaine HCl (PF) 0.5 % Drop 1 drop, 1 drop, Right Eye, On Call Procedure, Michael Arellano MD, 1 drop at 12/12/19 0801    Past Surgical History:   Procedure Laterality Date    CATARACT EXTRACTION W/  INTRAOCULAR LENS IMPLANT Right 12/12/2019    Procedure: EXTRACTION, CATARACT, WITH IOL INSERTION;  Surgeon: Michael Arellano MD;  Location: [facility];  Service: Ophthalmology;  Laterality: Right;    CATARACT EXTRACTION W/  INTRAOCULAR LENS IMPLANT Left 02/13/2020    Procedure: EXTRACTION, CATARACT, WITH IOL INSERTION;  Surgeon: Michael Arellano MD;  Location: [facility];  Service: Ophthalmology;  Laterality: Left;    CHOLECYSTECTOMY      ENDOSCOPIC ULTRASOUND OF UPPER GASTROINTESTINAL TRACT N/A 11/04/2020    Procedure: ULTRASOUND, ENDOSCOPIC, UPPER GI TRACT;  Surgeon: Thierry Saunders MD;  Location: [facility] END[facility];  Service: Endoscopy;  Laterality: N/A;  covid 11/1 [facility]    HERNIA REPAIR      HYSTERECTOMY      knee replacemene Right 02/14/2007    deidra    PHACOEMULSIFICATION OF CATARACT Right 12/12/2019    Procedure: PHACOEMULSIFICATION, CATARACT;  Surgeon: Michael Arellano MD;  Location: [facility];  Service: Ophthalmology;  Laterality: Right;    PHACOEMULSIFICATION OF CATARACT Left 02/13/2020    Procedure: PHACOEMULSIFICATION, CATARACT;  Surgeon: Michael Arellano MD;  Location: [facility];  Service: Ophthalmology;  Laterality: Left;    REPLACEMENT Left 09/18/2007    knee    THYROIDECTOMY         Social History     Socioeconomic History    Marital status:      Number of children: 1   Tobacco Use    Smoking status: Never     Passive exposure: Never    Smokeless tobacco: Never   Substance and Sexual Activity    Alcohol use: Never    Drug use: Never    Sexual activity: Never     Social Drivers of Health     Financial Resource Strain: Patient Declined (11/2/2024)    Overall Financial Resource Strain (CARDIA)     Difficulty of Paying Living Expenses: Patient declined   Food Insecurity: Patient Declined (11/2/2024)    Hunger Vital Sign     Worried About Running Out of Food in the Last Year: Patient declined     Ran Out of Food in the Last Year: Patient declined   Transportation Needs: Patient Declined (11/2/2024)    TRANSPORTATION NEEDS     Transportation : Patient declined   Physical Activity: Inactive (3/13/2024)    Exercise Vital Sign     Days of Exercise per Week: 0 days     Minutes of Exercise per Session: 0 min   Stress: Patient Declined (11/2/2024)    [employer] - Occupational Stress Questionnaire     Feeling of Stress : Patient declined   Housing Stability: Patient Declined (11/2/2024)    Housing Stability Vital Sign     Unable to Pay for Housing in the Last Year: Patient declined     Homeless in the Last Year: Patient declined       OBJECTIVE:     Vital Signs Range (Last 24H):  Temp:  [36.7 °C (98.1 °F)-37.3 °C (99.1 °F)]   Pulse:  [59-73]   Resp:  [18-20]   BP: (124-162)/(67-83)   SpO2:  [91 %-98 %]       Significant Labs:  Lab Results   Component Value Date    WBC 7.00 11/05/2024    HGB 11.1 (L) 11/05/2024    HCT 35.6 (L) 11/05/2024     11/05/2024    CHOL 121 10/25/2024    TRIG 114 10/25/2024    HDL 40 10/25/2024    LDLDIRECT 61 02/05/2024    ALT 15 11/05/2024    AST 15 11/05/2024     11/05/2024    K 3.4 (L) 11/05/2024     (H) 11/05/2024    CREATININE 1.0 11/05/2024    BUN 17 11/05/2024    CO2 19 (L) 11/05/2024    TSH 1.656 10/25/2024    INR 1.0 11/03/2024    HGBA1C 5.8 (H) 11/29/2023       Diagnostic Studies: No  relevant studies.    EKG:   Results for orders placed or performed during the hospital encounter of 07/26/24   EKG 12-lead    Collection Time: 07/26/24 11:35 AM   Result Value Ref Range    QRS Duration 90 ms    [facility] QTC Calculation 428 ms    Narrative    Test Reason : R60.9    Vent. Rate : 060 BPM     Atrial Rate : 357 BPM     P-R Int : 000 ms          QRS Dur : 090 ms      QT Int : 428 ms       P-R-T Axes : 000 022 055 degrees     QTc Int : 428 ms    Atrial fibrillation  Abnormal ECG  When compared with ECG of 31-JAN-2018 09:15,  Vent. rate has decreased BY  31 BPM  Confirmed by Bebe Khan MD ([id number]) on 7/26/2024 12:24:25 PM    Referred By: [email or web address]   SELF           Confirmed By:Bebe Khan MD       2D ECHO:  TTE:  No results found for this or any previous visit.    HERMINIO:  No results found for this or any previous visit.    ASSESSMENT/PLAN:           Pre-op Assessment    I have reviewed the Patient Summary Reports.    I have reviewed the NPO Status.   I have reviewed the Medications.     Review of Systems  Anesthesia Hx:             Denies Family Hx of Anesthesia complications.     Social:  No Alcohol Use, Non-Smoker       Cardiovascular:     Hypertension    Dysrhythmias atrial fibrillation  CHF        TTE 1/31/18: LVEF 55-60%, no hemodynamically significant valvular disease                           Pulmonary:   COPD     Sleep Apnea                Renal/:  Chronic Renal Disease, CKD                Hepatic/GI:     GERD                Musculoskeletal:  Arthritis   Rheumatoid arthritis            Neurological:    Neuromuscular Disease,       Myasthenia gravis                            Endocrine:  Diabetes, type 2 Hypothyroidism        Obesity / BMI > 30      Physical Exam  General: Well nourished, Cooperative, Alert and Oriented    Airway:  Mallampati: II   Mouth Opening: Normal  TM Distance: Normal  Tongue: Normal  Neck ROM: Normal ROM    Chest/Lungs:  Normal Respiratory Rate    Heart:  Rate:  Normal        Anesthesia Plan  Type of Anesthesia, risks & benefits discussed:    Anesthesia Type: MAC  Intra-op Monitoring Plan: Standard ASA Monitors  Post Op Pain Control Plan: multimodal analgesia  Informed Consent: Informed consent signed with the Patient and all parties understand the risks and agree with anesthesia plan.  All questions answered.   ASA Score: 3    Ready For Surgery From Anesthesia Perspective.     .

## 2024-11-05 NOTE — NURSING
Patient returned to unit via stretcher.  Required assistance x3 for transfer back to bed.  NAD noted.

## 2024-11-05 NOTE — PLAN OF CARE
Patient to Return to floor with orders to ice and elevate surgical limb for the next 24 hours.     She will be heel WB in darco shoe to the left foot and use walker or crutch assistance.     Podiatry will round on her POD 2 to change dressing if still inpatient. Due to social conditions and comorbidity, patient would benefit from placement in a nursing facility on discharge.    Dressings are to be left clean, dry, and intact until this time.

## 2024-11-05 NOTE — NURSING
[facility]  Nurses Note -- 4 Eyes      11/5/2024       Skin assessed on: Q Shift      [] No Pressure Injuries Present    []Prevention Measures Documented    [x] Yes LDA  for Pressure Injury Previously documented     [] Yes New Pressure Injury Discovered   [] LDA for New Pressure Injury Added      Attending RN:  Andre Aparicio LPN     Second RN:  ROSA ISELA Majano

## 2024-11-05 NOTE — NURSING
[facility]  Nurses Note -- 4 Eyes      11/4/2024       Skin assessed on: Q Shift      [] No Pressure Injuries Present    [x]Prevention Measures Documented    [x] Yes LDA  for Pressure Injury Previously documented     [] Yes New Pressure Injury Discovered   [] LDA for New Pressure Injury Added      Attending RN:  Melecio Vera RN     Second RN:  MARGARETH Vazquez

## 2024-11-05 NOTE — CONSULTS
[facility] [place] - Surgery  Wound Care  WOC BECK    Patient Name:  Stephany Skinner   MRN:  [id number]  Date: 11/5/2024  Diagnosis: Osteomyelitis of great toe of left foot    History:     Past Medical History:   Diagnosis Date    Anemia     Arthritis     Atrial fibrillation     Back pain     Bronchiectasis, uncomplicated     CHF (congestive heart failure)     Disorder of kidney and ureter     Early stage nonexudative age-related macular degeneration of both eyes 2018    GERD (gastroesophageal reflux disease)     Hyperlipidemia     Hypertension     Hypothyroidism     Obesity     JOY (obstructive sleep apnea)     Osteoporosis     Polyneuropathy     Pressure injury of dorsum of right foot, stage 2 12/01/2017    Rheumatoid arthritis     Urinary incontinence     Venous stasis ulcer of right calf limited to breakdown of skin with varicose veins 06/24/2024       Social History     Socioeconomic History    Marital status:     Number of children: 1   Tobacco Use    Smoking status: Never     Passive exposure: Never    Smokeless tobacco: Never   Substance and Sexual Activity    Alcohol use: Never    Drug use: Never    Sexual activity: Never     Social Drivers of Health     Financial Resource Strain: Patient Declined (11/2/2024)    Overall Financial Resource Strain (CARDIA)     Difficulty of Paying Living Expenses: Patient declined   Food Insecurity: Patient Declined (11/2/2024)    Hunger Vital Sign     Worried About Running Out of Food in the Last Year: Patient declined     Ran Out of Food in the Last Year: Patient declined   Transportation Needs: Patient Declined (11/2/2024)    TRANSPORTATION NEEDS     Transportation : Patient declined   Physical Activity: Inactive (3/13/2024)    Exercise Vital Sign     Days of Exercise per Week: 0 days     Minutes of Exercise per Session: 0 min   Stress: Patient Declined (11/2/2024)    [employer] - Occupational Stress Questionnaire     Feeling of Stress : Patient  declined   Housing Stability: Patient Declined (11/2/2024)    Housing Stability Vital Sign     Unable to Pay for Housing in the Last Year: Patient declined     Homeless in the Last Year: Patient declined       Precautions:     Allergies as of 11/01/2024 - Reviewed 11/01/2024   Allergen Reaction Noted    Statins-hmg-coa reductase inhibitors  05/30/2012       [facility] Assessment Details/Treatment     Active Problem List with Overview Notes    Diagnosis Date Noted    Diarrhea 11/03/2024    Cellulitis of toe of left foot 11/02/2024    Osteomyelitis of great toe of left foot 11/02/2024    Ulcer of great toe of left foot, with fat layer exposed 10/14/2024     Patient presented to wound clinic on 10/14/2024 with a new wound on the left great toe.  There was callus with underlying wound and fluid.  Callus was removed and subcutaneous tissue was exposed.  The base was pink.  Patient is walking on her toe tip without shoes.  She was instructed to obtain a hammertoe pad and utilize she comes in all times.      Venous stasis ulcer of left calf limited to breakdown of skin with varicose veins 10/07/2024     Patient with new superficial ulcer left lower extremity.  Patient with chronic lower extremity edema.  Patient has been wearing compression stockings.  No need for debridement today.  Will cover with Mepilex Ag pad and continue compression therapy.  Patient encouraged to elevate.      Hallux hammertoe, right 09/09/2024     Patient with slight hammertoe deformity of the right great toe.    Patient has had hammertoe deformities of toes 2 and 3 previously surgically corrected.      Diabetic ulcer of great toe of right foot associated with type 2 diabetes mellitus, with necrosis of muscle 07/15/2024     Patient with new wound 7/15/2024 .  Patient with history of neuropathy.  Wound is superficial with no sign of infection.  No erythema or drainage      Non-pressure chronic ulcer of calf, left, with fat layer exposed 07/08/2024      89-year-old female presented to the wound clinic on 07/08/2024 with a history of chronic venous hypertension developed a wound of the left lateral lower leg proximally 4 weeks ago.  The wound has enlarged.  She was evaluated by her primary physician who did apply a cream.  She was referred here for further management.  She denies any pain.  She has not had any fever or chills.  She has had moderate drainage.  As of 09/09/2024, the wound is healed      Chronic venous hypertension (idiopathic) with ulcer of left lower extremity (CODE) 07/08/2024    Polyneuropathy 03/13/2024    Rheumatoid arthritis 03/13/2024    Peripheral vascular disease, unspecified 03/13/2024    Aortic atherosclerosis 03/13/2024    Osteopenia 03/13/2024    Stage 3a chronic kidney disease (CKD) 02/28/2024     Avoid NSAIDS  Good BP control  Monitor renal function closely      Sacroiliitis, not elsewhere classified 02/28/2024     Take Tylenol for pain.  Use walker or cane for ambulation.      Exertional dyspnea 11/29/2023     During six minute walk her O2 sat dropped to 85%.  At rest it is 98%.  Ambulating with O2 at 2 l NC it stayed above 90%.      Candidiasis, intertrigo 08/31/2023    Cellulitis 08/31/2023    Conjunctivitis of both eyes 05/12/2023    Obesity, morbid, BMI 40.0-49.9 03/09/2023     Patient is trying to lose weight.  She has lost a few lb.  She is following a healthy diet.  She has difficulty exercising because of her musculoskeletal condition.         Essential hypertension 12/09/2022     Two gram sodium diet.    Weight loss discussed.    Try to walk 2 miles per day.    Current medications will be:  Lasix 40 mg daily   Potassium 20 mEq daily   Imdur 60 mg daily  Metoprolol 25 mg daily   Spironolactone 25 mg daily        COPD (chronic obstructive pulmonary disease) 12/09/2022     Continue DuoNeb treatments twice daily as needed.    Her COPD is stable.         Pancreatic cyst 11/04/2020    Abnormal finding on GI tract imaging 10/21/2020     Cyst of pancreas 10/21/2020    B12 deficiency 07/31/2018    Type 2 diabetes mellitus with circulatory disorder, without long-term current use of insulin 05/01/2018     Patient no longer requires medications for this.  Her last A1c was 5.8.   Check hemoglobin A1c every 12 months.      Chronic diastolic congestive heart failure 02/07/2018     Two gram sodium diet.    Weight loss discussed.    Try to walk 2 miles per day.    Avoid smoking.    Current medications will be:  Lower Valsartan 320 mg to 160 mg daily   Lasix 40 mg daily   Lower Metoprolol 100 mg to 50 mg daily   Potassium 20 mEq daily      PUJA (acute kidney injury) 02/05/2018    Myasthenia gravis, adult form 02/03/2018     No longer on prednisone, Mestinon  Keep appointment with neurology      Chronic atrial fibrillation 01/30/2018     Continue Eliquis 5 mg daily  Keep appointment with Cardiology      Early stage nonexudative age-related macular degeneration of both eyes 2018    Venous stasis of lower extremity 10/11/2016     Continue support stockings.  Place every morning and remove at night.      Incontinence in female 06/22/2015    Obstructive sleep apnea (adult) (pediatric) 08/08/2012     Continue home CPAP at current pressure.  Use oxygen at night.      Overweight(278.02) 08/08/2012     Dx updated per 2019 IMO Load      Osteoarthritis 08/08/2012    Hypothyroidism 08/08/2012     Patient takes Synthroid 88 mcg for her hypothyroidism.  Adjust Synthroid based on TSH resutls.  Check TSH every 6 months.      Hyperlipidemia 08/08/2012     Continue fish oil  Continue Lipitor 20 mg        Nursing consult for altered skin integrity skin tears  Nursing documented on admit skin tear left leg, pressure injury left buttock and right 2nd toe, ulceration right and left 1st toe, and MASD sacral spine  An 89 year old female admitted 11/2/24 from home/[facility] ED with complaint of foot wound. Receiving  for wound care and visits to [facility] wound care.  11/5 WBC 7.0  Hgb 11.1 Hct 35.6  Alb 2.9 Weight 103.5 kg   11/29/23 A1C 5.8  On Isoflex mattress; Tom score 12  11/2 2:45 am 4 Eyes Skin Assessment- New Pressure Injuries discovered  Podiatry consult- Wound care orders written for left 1st digit, right 1st digit, and left anteromedial leg; miconazole ordered for affected areas  Vascular consult- chronic venous stasis changes left leg with surrounding cellulitis; wound suggestive of venous disease/chronic venous stasis; recommend IV antibiotics for osteomyelitis as well as left leg cellulitis; recommend heart failure workup with leg elevation, diuresis and compression stocking therapy for CVI  Photodocumentation (from Media 11/2)    Left 1st toe    Left 1st toe    Right 1st toe    Right 1st toe    Left leg wound    Buttocks    Left buttock  11/5 S/P Amputation left distal hallux per Dr. Ley for osteomyelitis of left great toe  Assessment:  Patient in Surgery; photodocumentation and orders reviewed  Treatment/Plan:  Nursing to continue wound care as per orders per Podiatry  Continue treatment of IAD/PI as per WOGs  Continue Pressure Injury Prevention Interventions  WOC nurse to assess pressure injury 11/6 or 11/7  Recommendations made to primary team. Orders placed.     11/05/2024

## 2024-11-05 NOTE — PROGRESS NOTES
[facility] Surgery  Vascular Surgery  Progress Note    Patient Name: Stephany Skinner  MRN: [id number]  Admission Date: 11/2/2024  Primary Care Provider: Pipo Flowers MD    Subjective:     Interval History:  Wounds stable cellulitis improving with antibiotic    Post-Op Info:  Procedure(s) (LRB):  AMPUTATION, TOE- DISTAL HALLUX (Left)   Day of Surgery      Medications:  Continuous Infusions:   heparin (porcine) in D5W  0-40 Units/kg/hr (Adjusted) Intravenous Continuous   Stopped at 11/05/24 0703     Scheduled Meds:   colestipoL  3 g Oral BID    diclofenac sodium  2 g Topical (Top) Daily    furosemide  40 mg Oral Daily    levothyroxine  88 mcg Oral Before breakfast    metoprolol succinate  25 mg Oral Daily    miconazole NITRATE 2 %   Topical (Top) BID    piperacillin-tazobactam (Zosyn) IV (PEDS and ADULTS) (extended infusion is not appropriate)  4.5 g Intravenous Q8H    vancomycin (VANCOCIN) IV (PEDS and ADULTS)  1,250 mg Intravenous Q24H     PRN Meds:  Current Facility-Administered Medications:     acetaminophen, 650 mg, Oral, Q8H PRN    acetaminophen, 650 mg, Oral, Q4H PRN    albuterol-ipratropium, 3 mL, Nebulization, Q6H PRN    aluminum-magnesium hydroxide-simethicone, 30 mL, Oral, QID PRN    bisacodyL, 10 mg, Rectal, Daily PRN    glucagon (human recombinant), 1 mg, Intramuscular, PRN    glucose, 16 g, Oral, PRN    glucose, 24 g, Oral, PRN    heparin (PORCINE), 60 Units/kg (Adjusted), Intravenous, PRN    heparin (PORCINE), 30 Units/kg (Adjusted), Intravenous, PRN    insulin aspart U-100, 0-10 Units, Subcutaneous, QID (AC + HS) PRN    melatonin, 6 mg, Oral, Nightly PRN    naloxone, 0.02 mg, Intravenous, PRN    ondansetron, 4 mg, Intravenous, Q8H PRN    potassium bicarbonate, 35 mEq, Oral, PRN    potassium bicarbonate, 50 mEq, Oral, PRN    potassium bicarbonate, 60 mEq, Oral, PRN    potassium, sodium phosphates, 2 packet, Oral, PRN    potassium, sodium phosphates, 2 packet, Oral, PRN    potassium, sodium  "phosphates, 2 packet, Oral, PRN    prochlorperazine, 5 mg, Intravenous, Q6H PRN    senna-docusate 8.6-50 mg, 1 tablet, Oral, Daily PRN    simethicone, 1 tablet, Oral, QID PRN    sodium chloride 0.9%, 10 mL, Intravenous, Q12H PRN    Flushing PICC/Midline Protocol, , , Until Discontinued **AND** sodium chloride 0.9%, 10 mL, Intravenous, Q12H PRN    traMADoL, 50 mg, Oral, Q6H PRN    Pharmacy to dose Vancomycin consult, , , Once **AND** vancomycin - pharmacy to dose, , Intravenous, pharmacy to manage frequency     Objective:     Vital Signs (Most Recent):  Temp: 98.3 °F (36.8 °C) (11/05/24 1201)  Pulse: 65 (11/05/24 1201)  Resp: 20 (11/05/24 1201)  BP: (!) 181/81 (11/05/24 1201)  SpO2: 98 % (11/05/24 1201) Vital Signs (24h Range):  Temp:  [98.2 °F (36.8 °C)-99.1 °F (37.3 °C)] 98.3 °F (36.8 °C)  Pulse:  [59-73] 65  Resp:  [18-20] 20  SpO2:  [91 %-98 %] 98 %  BP: (124-181)/(67-83) 181/81     Date 11/05/24 0700 - 11/06/24 0659   Shift 9622-0098 2862-6781 8349-8674 24 Hour Total   INTAKE   P.O. 0   0   IV Piggyback 219.5   219.5   Shift Total(mL/kg) 219.5(2.1)   219.5(2.1)   OUTPUT   Shift Total(mL/kg)       Weight (kg) 103.5 103.5 103.5 103.5       Physical Exam    Significant Labs:  CBC:   Recent Labs   Lab 11/05/24  0559   WBC 7.00   RBC 3.52*   HGB 11.1*   HCT 35.6*      *   MCH 31.5*   MCHC 31.2*     CMP:   Recent Labs   Lab 11/05/24 0559      CALCIUM 8.4*   ALBUMIN 2.9*   PROT 6.3      K 3.4*   CO2 19*   *   BUN 17   CREATININE 1.0   ALKPHOS 49   ALT 15   AST 15   BILITOT 0.4     Coagulation:   Recent Labs   Lab 11/05/24  0559   APTT 34.3*     eGFR: No results for input(s): "EGFRIFAFRICA", "EGFRCYSTC", "LABGLOM" in the last 48 hours.    Invalid input(s): "EGFRNON-AA"  Hemoglobin: No results for input(s): "HGBA1C" in the last 48 hours.    Significant Diagnostics:  I have reviewed all pertinent imaging results/findings within the past 24 hours.    Assessment/Plan:     Active Diagnoses: "    Diagnosis Date Noted POA    PRINCIPAL PROBLEM:  Osteomyelitis of great toe of left foot [M86.9] 11/02/2024 Yes    Diarrhea [R19.7] 11/03/2024 Yes    Cellulitis of toe of left foot [L03.032] 11/02/2024 Yes    Stage 3a chronic kidney disease (CKD) [N18.31] 02/28/2024 Yes    Candidiasis, intertrigo [B37.2] 08/31/2023 Yes    Essential hypertension [I10] 12/09/2022 Yes    COPD (chronic obstructive pulmonary disease) [J44.9] 12/09/2022 Yes    Type 2 diabetes mellitus with circulatory disorder, without long-term current use of insulin [E11.59] 05/01/2018 Yes    Chronic diastolic congestive heart failure [I50.32] 02/07/2018 Yes    PUJA (acute kidney injury) [N17.9] 02/05/2018 Yes    Myasthenia gravis, adult form [G70.00] 02/03/2018 Yes    Chronic atrial fibrillation [I48.20] 01/30/2018 Yes    Hypothyroidism [E03.9] 08/08/2012 Yes    Hyperlipidemia [E78.5] 08/08/2012 Yes      Problems Resolved During this Admission:     89-year-old woman with history of diabetes CHF and peripheral neuropathy admitted for left great toe ulcer with underlying osteomyelitis.  CTA demonstrates no significant peripheral arterial disease althoug not able to evaluate popliteal artery fully due to streak artifact from knee replacement.  Clinically patient has a easily palpable DP pulse bilaterally thus suggesting minimal arterial insufficiency.  Additionally patient has wound in the medial aspect of her left leg with chronic venous stasis changes appears to have surrounding cellulitis extending up her calf.  This wound is suggestive of venous disease/chronic venous stasis.     Toe PVRs with good waveforms and left toe pressure of 75 further suggestive of adequate perfusion for healing toe amputation without need for revascularization  Recommend continuing IV antibiotics for osteomyelitis as well as left leg cellulitis  Recommend recommend heart failure workup and leg elevation, diuresis, and compression stocking therapy for chronic venous  stasis  Patient can follow-up outpatient in one-month with venous insufficiency study    Hood Williamson MD  Vascular Surgery  [facility]

## 2024-11-05 NOTE — TRANSFER OF CARE
"Anesthesia Transfer of Care Note    Patient: Stephany Skinner    Procedure(s) Performed: Procedure(s) (LRB):  AMPUTATION, TOE- DISTAL HALLUX (Left)    Patient location: PACU    Anesthesia Type: MAC    Transport from OR: Transported from OR on room air with adequate spontaneous ventilation    Post pain: adequate analgesia    Post assessment: no apparent anesthetic complications and tolerated procedure well    Post vital signs: stable    Level of consciousness: awake    Nausea/Vomiting: no nausea/vomiting    Complications: none    Transfer of care protocol was followed    Last vitals: Visit Vitals  /62 (BP Location: Left arm)   Pulse (!) 55   Temp 36.2 °C (97.1 °F) (Temporal)   Resp 17   Ht 5' 7" (1.702 m)   Wt 103.5 kg (228 lb 2.8 oz)   SpO2 98%   Breastfeeding No   BMI 35.74 kg/m²     "

## 2024-11-06 PROBLEM — L30.8 DERMATITIS ASSOCIATED WITH MOISTURE: Status: ACTIVE | Noted: 2024-11-06

## 2024-11-06 LAB
ALBUMIN SERPL BCP-MCNC: 2.8 G/DL (ref 3.5–5.2)
ALP SERPL-CCNC: 46 U/L (ref 40–150)
ALT SERPL W/O P-5'-P-CCNC: 18 U/L (ref 10–44)
ANION GAP SERPL CALC-SCNC: 10 MMOL/L (ref 8–16)
APTT PPP: 35.2 SEC (ref 21–32)
APTT PPP: 48.4 SEC (ref 21–32)
AST SERPL-CCNC: 16 U/L (ref 10–40)
BACTERIA BLD CULT: NORMAL
BACTERIA BLD CULT: NORMAL
BACTERIA SPEC ANAEROBE CULT: NORMAL
BASOPHILS # BLD AUTO: 0.02 K/UL (ref 0–0.2)
BASOPHILS NFR BLD: 0.3 % (ref 0–1.9)
BILIRUB SERPL-MCNC: 0.5 MG/DL (ref 0.1–1)
BUN SERPL-MCNC: 14 MG/DL (ref 8–23)
CALCIUM SERPL-MCNC: 8.4 MG/DL (ref 8.7–10.5)
CHLORIDE SERPL-SCNC: 112 MMOL/L (ref 95–110)
CO2 SERPL-SCNC: 19 MMOL/L (ref 23–29)
CREAT SERPL-MCNC: 0.9 MG/DL (ref 0.5–1.4)
CRYPTOSP AG STL QL IA: NEGATIVE
DIFFERENTIAL METHOD BLD: ABNORMAL
E COLI SXT1 STL QL IA: NEGATIVE
E COLI SXT2 STL QL IA: NEGATIVE
EOSINOPHIL # BLD AUTO: 0.2 K/UL (ref 0–0.5)
EOSINOPHIL NFR BLD: 3.3 % (ref 0–8)
ERYTHROCYTE [DISTWIDTH] IN BLOOD BY AUTOMATED COUNT: 12.8 % (ref 11.5–14.5)
EST. GFR  (NO RACE VARIABLE): >60 ML/MIN/1.73 M^2
G LAMBLIA AG STL QL IA: NEGATIVE
GLUCOSE SERPL-MCNC: 99 MG/DL (ref 70–110)
GRAM STN SPEC: NORMAL
HCT VFR BLD AUTO: 34.4 % (ref 37–48.5)
HGB BLD-MCNC: 11.3 G/DL (ref 12–16)
IMM GRANULOCYTES # BLD AUTO: 0.04 K/UL (ref 0–0.04)
IMM GRANULOCYTES NFR BLD AUTO: 0.6 % (ref 0–0.5)
LYMPHOCYTES # BLD AUTO: 0.8 K/UL (ref 1–4.8)
LYMPHOCYTES NFR BLD: 11.6 % (ref 18–48)
MCH RBC QN AUTO: 32.9 PG (ref 27–31)
MCHC RBC AUTO-ENTMCNC: 32.8 G/DL (ref 32–36)
MCV RBC AUTO: 100 FL (ref 82–98)
MONOCYTES # BLD AUTO: 0.9 K/UL (ref 0.3–1)
MONOCYTES NFR BLD: 13.2 % (ref 4–15)
NEUTROPHILS # BLD AUTO: 4.9 K/UL (ref 1.8–7.7)
NEUTROPHILS NFR BLD: 71 % (ref 38–73)
NRBC BLD-RTO: 0 /100 WBC
PLATELET # BLD AUTO: 223 K/UL (ref 150–450)
PMV BLD AUTO: 8.6 FL (ref 9.2–12.9)
POCT GLUCOSE: 111 MG/DL (ref 70–110)
POCT GLUCOSE: 122 MG/DL (ref 70–110)
POCT GLUCOSE: 96 MG/DL (ref 70–110)
POTASSIUM SERPL-SCNC: 3.3 MMOL/L (ref 3.5–5.1)
PROT SERPL-MCNC: 6 G/DL (ref 6–8.4)
RBC # BLD AUTO: 3.43 M/UL (ref 4–5.4)
SODIUM SERPL-SCNC: 141 MMOL/L (ref 136–145)
WBC # BLD AUTO: 6.88 K/UL (ref 3.9–12.7)

## 2024-11-06 PROCEDURE — 27000207 HC ISOLATION

## 2024-11-06 PROCEDURE — 63600175 PHARM REV CODE 636 W HCPCS: Performed by: STUDENT IN AN ORGANIZED HEALTH CARE EDUCATION/TRAINING PROGRAM

## 2024-11-06 PROCEDURE — 25000003 PHARM REV CODE 250: Performed by: STUDENT IN AN ORGANIZED HEALTH CARE EDUCATION/TRAINING PROGRAM

## 2024-11-06 PROCEDURE — 63600175 PHARM REV CODE 636 W HCPCS: Performed by: PODIATRIST

## 2024-11-06 PROCEDURE — 88311 DECALCIFY TISSUE: CPT | Performed by: PATHOLOGY

## 2024-11-06 PROCEDURE — 25000003 PHARM REV CODE 250: Performed by: INTERNAL MEDICINE

## 2024-11-06 PROCEDURE — 25000003 PHARM REV CODE 250: Performed by: PODIATRIST

## 2024-11-06 PROCEDURE — 85025 COMPLETE CBC W/AUTO DIFF WBC: CPT | Performed by: STUDENT IN AN ORGANIZED HEALTH CARE EDUCATION/TRAINING PROGRAM

## 2024-11-06 PROCEDURE — 80053 COMPREHEN METABOLIC PANEL: CPT | Performed by: STUDENT IN AN ORGANIZED HEALTH CARE EDUCATION/TRAINING PROGRAM

## 2024-11-06 PROCEDURE — 88307 TISSUE EXAM BY PATHOLOGIST: CPT | Performed by: PATHOLOGY

## 2024-11-06 PROCEDURE — 94761 N-INVAS EAR/PLS OXIMETRY MLT: CPT

## 2024-11-06 PROCEDURE — 36415 COLL VENOUS BLD VENIPUNCTURE: CPT | Performed by: STUDENT IN AN ORGANIZED HEALTH CARE EDUCATION/TRAINING PROGRAM

## 2024-11-06 PROCEDURE — 97530 THERAPEUTIC ACTIVITIES: CPT

## 2024-11-06 PROCEDURE — 97161 PT EVAL LOW COMPLEX 20 MIN: CPT

## 2024-11-06 PROCEDURE — 85730 THROMBOPLASTIN TIME PARTIAL: CPT | Performed by: STUDENT IN AN ORGANIZED HEALTH CARE EDUCATION/TRAINING PROGRAM

## 2024-11-06 PROCEDURE — 85730 THROMBOPLASTIN TIME PARTIAL: CPT | Mod: 91 | Performed by: STUDENT IN AN ORGANIZED HEALTH CARE EDUCATION/TRAINING PROGRAM

## 2024-11-06 PROCEDURE — 63600175 PHARM REV CODE 636 W HCPCS: Performed by: INTERNAL MEDICINE

## 2024-11-06 PROCEDURE — 88305 TISSUE EXAM BY PATHOLOGIST: CPT | Performed by: PATHOLOGY

## 2024-11-06 PROCEDURE — 99223 1ST HOSP IP/OBS HIGH 75: CPT | Mod: ,,, | Performed by: INTERNAL MEDICINE

## 2024-11-06 PROCEDURE — 99900035 HC TECH TIME PER 15 MIN (STAT)

## 2024-11-06 PROCEDURE — 99223 1ST HOSP IP/OBS HIGH 75: CPT | Mod: ,,,

## 2024-11-06 PROCEDURE — 11000001 HC ACUTE MED/SURG PRIVATE ROOM

## 2024-11-06 RX ORDER — MUPIROCIN 20 MG/G
OINTMENT TOPICAL 2 TIMES DAILY
Status: COMPLETED | OUTPATIENT
Start: 2024-11-06 | End: 2024-11-10

## 2024-11-06 RX ADMIN — FUROSEMIDE 40 MG: 40 TABLET ORAL at 09:11

## 2024-11-06 RX ADMIN — MUPIROCIN: 20 OINTMENT TOPICAL at 09:11

## 2024-11-06 RX ADMIN — MICONAZOLE NITRATE 1 G: 20 POWDER TOPICAL at 10:11

## 2024-11-06 RX ADMIN — HEPARIN SODIUM 12 UNITS/KG/HR: 10000 INJECTION, SOLUTION INTRAVENOUS at 01:11

## 2024-11-06 RX ADMIN — PIPERACILLIN SODIUM AND TAZOBACTAM SODIUM 4.5 G: 4; .5 INJECTION, POWDER, FOR SOLUTION INTRAVENOUS at 10:11

## 2024-11-06 RX ADMIN — METOPROLOL SUCCINATE 25 MG: 25 TABLET, EXTENDED RELEASE ORAL at 09:11

## 2024-11-06 RX ADMIN — PIPERACILLIN SODIUM AND TAZOBACTAM SODIUM 4.5 G: 4; .5 INJECTION, POWDER, FOR SOLUTION INTRAVENOUS at 12:11

## 2024-11-06 RX ADMIN — APIXABAN 5 MG: 5 TABLET, FILM COATED ORAL at 09:11

## 2024-11-06 RX ADMIN — LEVOTHYROXINE SODIUM 88 MCG: 0.09 TABLET ORAL at 05:11

## 2024-11-06 RX ADMIN — COLESTIPOL HYDROCHLORIDE 3 G: 1 TABLET, FILM COATED ORAL at 10:11

## 2024-11-06 RX ADMIN — PIPERACILLIN SODIUM AND TAZOBACTAM SODIUM 4.5 G: 4; .5 INJECTION, POWDER, FOR SOLUTION INTRAVENOUS at 04:11

## 2024-11-06 RX ADMIN — COLESTIPOL HYDROCHLORIDE 3 G: 1 TABLET, FILM COATED ORAL at 09:11

## 2024-11-06 RX ADMIN — VANCOMYCIN HYDROCHLORIDE 1250 MG: 1.25 INJECTION, POWDER, LYOPHILIZED, FOR SOLUTION INTRAVENOUS at 02:11

## 2024-11-06 RX ADMIN — APIXABAN 5 MG: 5 TABLET, FILM COATED ORAL at 10:11

## 2024-11-06 RX ADMIN — MUPIROCIN 1 G: 20 OINTMENT TOPICAL at 10:11

## 2024-11-06 NOTE — CONSULTS
[facility]  Infectious Disease  Consult Note    Patient Name: Stephany Skinner  MRN: [id number]  Admission Date: 11/2/2024  Hospital Length of Stay: 4 days  Attending Physician: Rodney Aguilera MD  Primary Care Provider: Pipo Flowers MD     Isolation Status: Contact    Patient information was obtained from patient, past medical records, and ER records.      Inpatient consult to Infectious Diseases  Consult performed by: Lashaun Cr MD  Consult ordered by: Rodney Aguilera MD        Assessment/Plan:     Neuro  Myasthenia gravis, adult form  Caution with antibiotics    ID  * Osteomyelitis of great toe of left foot  Wound cx 11/2 grew enterobacter cloacae, pseudomonas and MRSA. S/p amputation L great toe 11/5. Culture from prox margins growing presumptive pseudomonas consistent with residual osteo.    Enterobacter is R to pip-tazo. If grows on culture from prox margin will have to challenge with meropenem.    Recommendations:  -- Will switch to meropenem. Monitor closely for allergic response.   --continue vanc. Pharm to dose. Anticipate stopping if no mrsa on sx cultures.  --f/u final cultures and path from clean margins.  --will need placement for IV antibiotics.        Thank you for your consult. I will follow-up with patient. Please contact us if you have any additional questions.    Lashaun Cr MD  Infectious Disease  [facility] [place] - Med Surg    Subjective:     Principal Problem: Osteomyelitis of great toe of left foot    HPI: Ms. Skinner is an 88y/o F with myasthenia gravis, T2DM, AFib  HFpEF, COPD, hypertension, hyperlipidemia, CKD 3, rheumatoid arthritis, who presented with diabetic toe ulcer of L great toe and was admitted for management of acute osteomyelitis.     In the ED, the patient was hemodynamically stable. Labs were remarkable for macrocytic anemia (11.2), elevated ESR (66), negative lactic acid (1.4). MRI recealed high likelihood of L great toe osteo.    Wound cx  from 11/2 grew enterobacter cloacae, pseudomonas and MRSA. Underwent L great toe amputation 11/5.    Id consulted for: Polymicrobial infection of toe with MRSA, Pseudomonas and Enterobacter. No s/p amputation. Abx LOT and d/c recs     Past Medical History:   Diagnosis Date    Anemia     Arthritis     Atrial fibrillation     Back pain     Bronchiectasis, uncomplicated     CHF (congestive heart failure)     Disorder of kidney and ureter     Early stage nonexudative age-related macular degeneration of both eyes 2018    GERD (gastroesophageal reflux disease)     Hyperlipidemia     Hypertension     Hypothyroidism     Obesity     JOY (obstructive sleep apnea)     Osteoporosis     Polyneuropathy     Pressure injury of dorsum of right foot, stage 2 12/01/2017    Rheumatoid arthritis     Urinary incontinence     Venous stasis ulcer of right calf limited to breakdown of skin with varicose veins 06/24/2024       Past Surgical History:   Procedure Laterality Date    CATARACT EXTRACTION W/  INTRAOCULAR LENS IMPLANT Right 12/12/2019    Procedure: EXTRACTION, CATARACT, WITH IOL INSERTION;  Surgeon: Michael Arellano MD;  Location: [facility];  Service: Ophthalmology;  Laterality: Right;    CATARACT EXTRACTION W/  INTRAOCULAR LENS IMPLANT Left 02/13/2020    Procedure: EXTRACTION, CATARACT, WITH IOL INSERTION;  Surgeon: Michael Arellano MD;  Location: [facility];  Service: Ophthalmology;  Laterality: Left;    CHOLECYSTECTOMY      ENDOSCOPIC ULTRASOUND OF UPPER GASTROINTESTINAL TRACT N/A 11/04/2020    Procedure: ULTRASOUND, ENDOSCOPIC, UPPER GI TRACT;  Surgeon: Thierry Saunders MD;  Location: [facility];  Service: Endoscopy;  Laterality: N/A;  covid 11/1 [facility]    HERNIA REPAIR      HYSTERECTOMY      knee replacemene Right 02/14/2007    deidra    PHACOEMULSIFICATION OF CATARACT Right 12/12/2019    Procedure: PHACOEMULSIFICATION, CATARACT;  Surgeon: Michael Arellano MD;  Location: [facility];  Service: Ophthalmology;  Laterality: Right;     PHACOEMULSIFICATION OF CATARACT Left 02/13/2020    Procedure: PHACOEMULSIFICATION, CATARACT;  Surgeon: Michael Arellano MD;  Location: [facility];  Service: Ophthalmology;  Laterality: Left;    REPLACEMENT Left 09/18/2007    knee    THYROIDECTOMY         Review of patient's allergies indicates:   Allergen Reactions    Cephalosporins Hives     Itchiness and hives immediately after IV Ceftriaxone push    Statins-hmg-coa reductase inhibitors      Other reaction(s): Unknown       Medications:  Medications Prior to Admission   Medication Sig    acetaminophen (TYLENOL) 500 MG tablet Take 1,000 mg by mouth nightly as needed for Pain.    albuterol (PROVENTIL/VENTOLIN HFA) 90 mcg/actuation inhaler     alendronate (FOSAMAX) 70 MG tablet Take 1 tablet (70 mg total) by mouth every 7 days.    atorvastatin (LIPITOR) 20 MG tablet Take 20 mg by mouth every evening.    beta-carotene,A,-vits C,E/mins (VISION ORAL) Take 1 tablet by mouth 2 (two) times daily.    calcium carbonate-vit D3-min 600 mg calcium- 400 unit Tab Take 2 tablets by mouth once daily.     colestipoL (COLESTID) 1 gram Tab Take 3 tablets (3 g total) by mouth 2 (two) times daily. For cholesterol and diarrhea    cyanocobalamin (VITAMIN B-12) 1000 MCG tablet Take 100 mcg by mouth once daily.    ELIQUIS 5 mg Tab Take 5 mg by mouth 2 (two) times daily.    furosemide (LASIX) 40 MG tablet Take 1 tablet (40 mg total) by mouth once daily.    isosorbide mononitrate (IMDUR) 60 MG 24 hr tablet Take 60 mg by mouth once daily.    levothyroxine (SYNTHROID) 88 MCG tablet Take 1 tablet (88 mcg total) by mouth before breakfast.    loperamide (IMODIUM A-D) 2 mg Tab Take 2 mg by mouth 4 (four) times daily as needed.    losartan (COZAAR) 25 MG tablet Take 25 mg by mouth 2 (two) times a day.    metoprolol succinate (TOPROL-XL) 25 MG 24 hr tablet Take 25 mg by mouth once daily.    nitroGLYCERIN (NITROSTAT) 0.4 MG SL tablet Place 0.4 mg under the tongue every 5 (five) minutes as needed for Chest  "pain.    nystatin (MYCOSTATIN) powder Apply topically 4 (four) times daily.    nystatin-triamcinolone (MYCOLOG II) cream APPLY  CREAM TOPICALLY 4 TIMES DAILY    potassium chloride SA (K-DUR,KLOR-CON) 20 MEQ tablet Take 1 tablet (20 mEq total) by mouth once daily.    spironolactone (ALDACTONE) 25 MG tablet Take 25 mg by mouth every morning.    vitamins A,C,E-zinc-copper (PRESERVISION AREDS) 4,296 mcg-226 mg-90 mg Cap PreserVision AREDS 14,320 unit-226 mg-200 unit capsule, [RxNorm: 599293]     Antibiotics (From admission, onward)      Start     Stop Route Frequency Ordered    11/06/24 1222  piperacillin-tazobactam (ZOSYN) 4.5 g in D5W 100 mL IVPB (MB+)         -- IV Every 8 hours (non-standard times) 11/06/24 1222    11/06/24 1045  mupirocin 2 % ointment         11/11/24 0859 Nasl 2 times daily 11/06/24 0945    11/02/24 2100  vancomycin 1,250 mg in D5W 250 mL IVPB (admixture device)         -- IV Every 24 hours (non-standard times) 11/02/24 0342    11/02/24 0418  vancomycin - pharmacy to dose  (vancomycin IVPB (PEDS and ADULTS))        Placed in "And" Linked Group    -- IV pharmacy to manage frequency 11/02/24 0318          Antifungals (From admission, onward)      Start     Stop Route Frequency Ordered    11/02/24 0315  miconazole NITRATE 2 % top powder         -- Top 2 times daily 11/02/24 0314          Antivirals (From admission, onward)      None             Immunization History   Administered Date(s) Administered    COVID-19 MRNA, LN-S PF (MODERNA HALF 0.25 ML DOSE) 12/15/2021    COVID-19 Vaccine 12/15/2021    COVID-19, MRNA, LN-S, PF (Pfizer) (Purple Cap) 02/18/2021, 03/11/2021    Influenza 02/16/2011    Influenza (FLUAD) - Quadrivalent - Adjuvanted - PF *Preferred* (65+) 11/18/2020, 11/29/2021    Influenza - Quadrivalent 12/23/2014    Influenza - Quadrivalent - PF *Preferred* (6 months and older) 02/16/2011    Influenza - Trivalent - Fluad - Adjuvanted - PF (65 years and older 10/18/2024    Influenza - " Trivalent - Fluzone High Dose - PF (65 years and older) 11/11/2015, 10/11/2016, 11/28/2017, 11/27/2018, 11/04/2019, 09/19/2022, 11/29/2023    PPD Test 02/16/2018    Pneumococcal Conjugate - 13 Valent 02/19/2018    Pneumococcal Polysaccharide - 23 Valent 11/04/2019    Td (ADULT) 12/04/2019       Family History       Problem Relation (Age of Onset)    Cancer Sister          Social History     Socioeconomic History    Marital status:     Number of children: 1   Tobacco Use    Smoking status: Never     Passive exposure: Never    Smokeless tobacco: Never   Substance and Sexual Activity    Alcohol use: Never    Drug use: Never    Sexual activity: Never     Social Drivers of Health     Financial Resource Strain: Patient Declined (11/2/2024)    Overall Financial Resource Strain (CARDIA)     Difficulty of Paying Living Expenses: Patient declined   Food Insecurity: Patient Declined (11/2/2024)    Hunger Vital Sign     Worried About Running Out of Food in the Last Year: Patient declined     Ran Out of Food in the Last Year: Patient declined   Transportation Needs: Patient Declined (11/2/2024)    TRANSPORTATION NEEDS     Transportation : Patient declined   Physical Activity: Inactive (3/13/2024)    Exercise Vital Sign     Days of Exercise per Week: 0 days     Minutes of Exercise per Session: 0 min   Stress: Patient Declined (11/2/2024)    [employer] - Occupational Stress Questionnaire     Feeling of Stress : Patient declined   Housing Stability: Patient Declined (11/2/2024)    Housing Stability Vital Sign     Unable to Pay for Housing in the Last Year: Patient declined     Homeless in the Last Year: Patient declined     Review of Systems   Constitutional:  Negative for chills, diaphoresis, fatigue and fever.   Skin:  Positive for wound.   All other systems reviewed and are negative.    Objective:     Vital Signs (Most Recent):  Temp: 97.9 °F (36.6 °C) (11/06/24 1144)  Pulse: (!) 59 (11/06/24  1144)  Resp: 18 (11/06/24 1144)  BP: (!) 140/65 (11/06/24 1144)  SpO2: 96 % (11/06/24 1144) Vital Signs (24h Range):  Temp:  [97.1 °F (36.2 °C)-98.7 °F (37.1 °C)] 97.9 °F (36.6 °C)  Pulse:  [55-71] 59  Resp:  [17-28] 18  SpO2:  [93 %-98 %] 96 %  BP: (120-204)/(60-91) 140/65     Weight: 103.5 kg (228 lb 2.8 oz)  Body mass index is 35.74 kg/m².    Estimated Creatinine Clearance: 53.5 mL/min (based on SCr of 0.9 mg/dL).     Physical Exam  Constitutional:       General: She is not in acute distress.     Appearance: She is well-developed.   HENT:      Head: Normocephalic and atraumatic.   Eyes:      Conjunctiva/sclera: Conjunctivae normal.      Pupils: Pupils are equal, round, and reactive to light.   Cardiovascular:      Rate and Rhythm: Normal rate and regular rhythm.      Heart sounds: Normal heart sounds.   Pulmonary:      Effort: Pulmonary effort is normal. No respiratory distress.      Breath sounds: Normal breath sounds.   Abdominal:      General: Bowel sounds are normal. There is no distension.      Palpations: Abdomen is soft.   Musculoskeletal:         General: Normal range of motion.      Cervical back: Normal range of motion and neck supple.   Skin:     General: Skin is warm and dry.      Comments: R 1st toe- skin ulceration on plantar aspect. No erythema or drainage.  L foot s/p 1st toe amputation- clean dressing in place.   Neurological:      General: No focal deficit present.      Mental Status: She is alert and oriented to person, place, and time.      Cranial Nerves: No cranial nerve deficit.   Psychiatric:         Mood and Affect: Mood normal.         Behavior: Behavior normal.          Significant Labs: Blood Culture:   Recent Labs   Lab 11/01/24  1650 11/01/24  1652   LABBLOO No Growth to date  No Growth to date  No Growth to date  No Growth to date  No Growth to date No Growth to date  No Growth to date  No Growth to date  No Growth to date  No Growth to date     Wound Culture:   Recent Labs    Lab 11/02/24  1430 11/05/24  1442   LABAERO Results called to and read back by: Sol Michaels 11/04/2024  06:32  ENTEROBACTER CLOACAE  Moderate  *  METHICILLIN RESISTANT STAPHYLOCOCCUS AUREUS  Moderate  *  PSEUDOMONAS AERUGINOSA  Moderate  * PRESUMPTIVE PSEUDOMONAS SPECIES  Few  Identification and susceptibility pending  *  GRAM NEGATIVE APARNA, NON-LACTOSE   Many  Identification and susceptibility pending  *  ENTEROCOCCUS SPECIES  Many  Identification and susceptibility pending  *     All pertinent labs within the past 24 hours have been reviewed.    Significant Imaging: I have reviewed all pertinent imaging results/findings within the past 24 hours.

## 2024-11-06 NOTE — PROGRESS NOTES
[facility] Medicine  Progress Note    Patient Name: Stephany Skinner  MRN: [id number]  Patient Class: IP- Inpatient   Admission Date: 11/2/2024  Length of Stay: 4 days  Attending Physician: Rodney Aguilera MD  Primary Care Provider: Pipo Flowers MD        Subjective:     Principal Problem:Osteomyelitis of great toe of left foot        HPI:  This is an 89-year-old female with a past medical history of AFib (on Eliquis), HFpEF (EF: 50%), COPD, hypertension, type 2 diabetes, hyperlipidemia, CKD 3, rheumatoid arthritis, myasthenia gravis, obesity, who presents with a foot wound.      Patient presents for evaluation of worsening left 1st toe swelling and redness.  She developed this about 3 weeks prior to presentation, and has been following up with wound care.  She noted that her toe looked more swollen, and was more painful on the day of presentation. Her wound care nurse advised her to present to the ED for further evaluation. Patient denies fevers, chills,  but endorses drainage from the toe.     In the ED, the patient was hemodynamically stable.  Labs were remarkable for macrocytic anemia (11.2), elevated ESR (66), negative lactic acid (1.4).  Left foot x-ray showed degenerative changes with soft tissue swelling overlying the dorsum and medial border of the left forefoot.  Left lower extremity arterial ultrasound showed findings suggestive of 50-75% of peroneal artery stenosis, and inflow stenosis in the common femoral artery.  Patient was given vancomycin, Zosyn.  She was admitted for further management.    Overview/Hospital Course:  #Infection of toe; MRI did show signs of early osteomyelitis distal phalanx LEFT great toe. Pending Podiatry and VascSx eval/recs. Continuing V+Z for now. GNR in deep wound culture. Got contrast, to preserve renal function, will reduce from V+Z to V+ceftriaxone (non-diabetic, pseudomonas less likely, however switch to cefepime if results). Will switch from  Apixaban to heparin gtt in preparation of procedures. C diff negative.    Unlikely need for revascularization per VascSx. Patient NPO for Podiatry to proceed with procedure. Polymicrobial infection of toe with MRSA, Pseudomonas and Enterobacter. No s/p amputation- ID consult for Abx LOT and d/c recs. PT OT, suspect will need SNF then HH.       Interval History:  NAEON.  No new issues.   CC- foot pain  All questions answered and updates on care given.       ROS:  General: Negative for fevers   Cardiac: Negative for chest pain   Pulmonary: Negative for wheezing  GI: Negative for abdominal distention   +wound     Vitals:    11/05/24 2342 11/06/24 0417 11/06/24 0841 11/06/24 0854   BP: (!) 144/67 (!) 142/69 (!) 141/63    BP Location: Left arm Left arm Left arm    Patient Position: Lying Lying Lying    Pulse: 68 71 63 63   Resp: 20 20 18 18   Temp: 98.7 °F (37.1 °C) 98.5 °F (36.9 °C) 98.1 °F (36.7 °C)    TempSrc: Oral Oral Oral    SpO2: 96% 96% 96% 96%   Weight:       Height:              Body mass index is 35.74 kg/m².      PHYSICAL EXAM:  GENERAL APPEARANCE: alert and cooperative.     HEAD: NC/AT  CARDIAC: There is no cyanosis or pallor.   LUNGS: No apparent wheezing or stridor.  ABDOMEN: Non-distended. No guarding.  MSK: No joint erythema or tenderness.   EXTREMITIES: No significant new deformity or new joint abnormality.   NEUROLOGICAL: CN II-XII grossly intact.   SKIN: No lesions or eruptions.+wound Left 1 toe  PSYCHIATRIC: No tangential speech. No Hyperactive features.        Recent Results (from the past 24 hours)   CV Toe Pressures Only    Collection Time: 11/05/24  9:58 AM   Result Value Ref Range    Left arm .00 mmHg    Right toe pressure 85.00 mmHg    Right TBI 0.54     Left toe pressure 75.00 mmHg    Left TBI 0.48    POCT glucose    Collection Time: 11/05/24 11:59 AM   Result Value Ref Range    POCT Glucose 97 70 - 110 mg/dL   Gram stain    Collection Time: 11/05/24  2:42 PM    Specimen: Toe, Left Foot;  Wound   Result Value Ref Range    Gram Stain Result Few WBC's     Gram Stain Result No organisms seen    Gram stain    Collection Time: 11/05/24  2:42 PM    Specimen: Toe, Left Foot; Wound   Result Value Ref Range    Gram Stain Result No WBC's     Gram Stain Result No organisms seen    POCT glucose    Collection Time: 11/05/24  5:04 PM   Result Value Ref Range    POCT Glucose 88 70 - 110 mg/dL   APTT    Collection Time: 11/05/24  6:38 PM   Result Value Ref Range    aPTT 28.7 21.0 - 32.0 sec   POCT glucose    Collection Time: 11/05/24  7:46 PM   Result Value Ref Range    POCT Glucose 142 (H) 70 - 110 mg/dL   Comprehensive Metabolic Panel    Collection Time: 11/06/24  1:58 AM   Result Value Ref Range    Sodium 141 136 - 145 mmol/L    Potassium 3.3 (L) 3.5 - 5.1 mmol/L    Chloride 112 (H) 95 - 110 mmol/L    CO2 19 (L) 23 - 29 mmol/L    Glucose 99 70 - 110 mg/dL    BUN 14 8 - 23 mg/dL    Creatinine 0.9 0.5 - 1.4 mg/dL    Calcium 8.4 (L) 8.7 - 10.5 mg/dL    Total Protein 6.0 6.0 - 8.4 g/dL    Albumin 2.8 (L) 3.5 - 5.2 g/dL    Total Bilirubin 0.5 0.1 - 1.0 mg/dL    Alkaline Phosphatase 46 40 - 150 U/L    AST 16 10 - 40 U/L    ALT 18 10 - 44 U/L    eGFR >60 >60 mL/min/1.73 m^2    Anion Gap 10 8 - 16 mmol/L   CBC auto differential    Collection Time: 11/06/24  1:58 AM   Result Value Ref Range    WBC 6.88 3.90 - 12.70 K/uL    RBC 3.43 (L) 4.00 - 5.40 M/uL    Hemoglobin 11.3 (L) 12.0 - 16.0 g/dL    Hematocrit 34.4 (L) 37.0 - 48.5 %     (H) 82 - 98 fL    MCH 32.9 (H) 27.0 - 31.0 pg    MCHC 32.8 32.0 - 36.0 g/dL    RDW 12.8 11.5 - 14.5 %    Platelets 223 150 - 450 K/uL    MPV 8.6 (L) 9.2 - 12.9 fL    Immature Granulocytes 0.6 (H) 0.0 - 0.5 %    Gran # (ANC) 4.9 1.8 - 7.7 K/uL    Immature Grans (Abs) 0.04 0.00 - 0.04 K/uL    Lymph # 0.8 (L) 1.0 - 4.8 K/uL    Mono # 0.9 0.3 - 1.0 K/uL    Eos # 0.2 0.0 - 0.5 K/uL    Baso # 0.02 0.00 - 0.20 K/uL    nRBC 0 0 /100 WBC    Gran % 71.0 38.0 - 73.0 %    Lymph % 11.6 (L) 18.0 -  48.0 %    Mono % 13.2 4.0 - 15.0 %    Eosinophil % 3.3 0.0 - 8.0 %    Basophil % 0.3 0.0 - 1.9 %    Differential Method Automated    APTT    Collection Time: 11/06/24  1:58 AM   Result Value Ref Range    aPTT 35.2 (H) 21.0 - 32.0 sec   POCT glucose    Collection Time: 11/06/24  8:38 AM   Result Value Ref Range    POCT Glucose 122 (H) 70 - 110 mg/dL       Microbiology Results (last 7 days)       Procedure Component Value Units Date/Time    Stool culture [[id number]] Collected: 11/05/24 0028    Order Status: Completed Specimen: Stool Updated: 11/06/24 0900     Stool Culture Nothing significant to date    Gram stain [[id number]] Collected: 11/05/24 1442    Order Status: Completed Specimen: Wound from Toe, Left Foot Updated: 11/06/24 0831     Gram Stain Result No WBC's      No organisms seen    Narrative:      Proximal margin left toe    Gram stain [[id number]] Collected: 11/05/24 1442    Order Status: Completed Specimen: Wound from Toe, Left Foot Updated: 11/06/24 0825     Gram Stain Result Few WBC's      No organisms seen    Narrative:      Distal left great toe    Culture, Anaerobe [[id number]] Collected: 11/05/24 1442    Order Status: Sent Specimen: Wound from Toe, Left Foot Updated: 11/05/24 1654    Culture, Anaerobe [[id number]] Collected: 11/05/24 1442    Order Status: Sent Specimen: Wound from Toe, Left Foot Updated: 11/05/24 1640    Aerobic culture [[id number]] Collected: 11/05/24 1442    Order Status: Sent Specimen: Wound from Toe, Left Foot Updated: 11/05/24 1640    AFB Culture & Smear [[id number]] Collected: 11/05/24 1442    Order Status: Sent Specimen: Wound from Toe, Left Foot Updated: 11/05/24 1638    Fungus culture [[id number]] Collected: 11/05/24 1442    Order Status: Sent Specimen: Wound from Toe, Left Foot Updated: 11/05/24 1637    Aerobic culture [[id number]] Collected: 11/05/24 1442    Order Status: Sent Specimen: Wound from Toe, Left Foot Updated: 11/05/24 1636    Aerobic culture [[id number]]   (Abnormal)  (Susceptibility) Collected: 11/02/24 1430    Order Status: Completed Specimen: Wound from Toe, Left Foot Updated: 11/05/24 0802     Aerobic Bacterial Culture Results called to and read back by: Sol Michaels 11/04/2024  06:32      ENTEROBACTER CLOACAE  Moderate        METHICILLIN RESISTANT STAPHYLOCOCCUS AUREUS  Moderate        PSEUDOMONAS AERUGINOSA  Moderate      E. coli 0157 antigen [[id number]] Collected: 11/05/24 0028    Order Status: No result Specimen: Stool Updated: 11/05/24 0030    Culture, Anaerobe [[id number]] Collected: 11/02/24 1430    Order Status: Completed Specimen: Wound from Toe, Left Foot Updated: 11/04/24 0728     Anaerobic Culture Culture in progress    Clostridium difficile EIA [[id number]] Collected: 11/02/24 1636    Order Status: Completed Specimen: Stool Updated: 11/03/24 1202     C. diff Antigen Negative     C difficile Toxins A+B, EIA Negative     Comment: Testing not recommended for children <24 months old.       Narrative:      1 - 3 Days: If liquid stool unable to be collected in  community onset window (hospital day 1 - 3), the order will  be discontinued automatically. Do not send formed stool for  testing    Fungus culture [[id number]] Collected: 11/02/24 1330    Order Status: Sent Specimen: Wound from Toe, Left Foot Updated: 11/02/24 1539                        Assessment/Plan:      * Osteomyelitis of great toe of left foot  Acute OM  MRI did show signs of early osteomyelitis distal phalanx LEFT great toe.   Pending Podiatry and VascSx eval/recs.   Continuing V+Z for now.   MRSA and enterobacter  Got contrast, to preserve renal function, will reduce from V+Z to V+ceftriaxone (non-diabetic, pseudomonas less likely, however switch to cefepime if results).       Chronic atrial fibrillation  [id number] Score: 4. The patients heart rate in the last 24 hours is as follows:  Pulse  Min: 52  Max: 70     Antiarrhythmics  metoprolol succinate (TOPROL-XL) 24 hr tablet 25 mg,  Daily, Oral    Anticoagulants ; Will switch from Apixaban to heparin gtt in preparation of procedures.   heparin 25,000 units in dextrose 5% 250 mL (100 units/mL) infusion LOW INTENSITY nomogram - [facility], Continuous, Intravenous  heparin 25,000 units in dextrose 5% (100 units/ml) IV bolus from bag LOW INTENSITY nomogram - [facility], As needed (PRN), Intravenous  heparin 25,000 units in dextrose 5% (100 units/ml) IV bolus from bag LOW INTENSITY nomogram - [facility], As needed (PRN), Intravenous    Plan  - Replete lytes with a goal of K>4, Mg >2  - Patient is anticoagulated, see medications listed above.  - Patient's afib is currently controlled        Diarrhea  Chronic given >3w, however no workup done  will check cdiff and chronic stool studies  C diff negative        Cellulitis of toe of left foot  Concern for cellulitis/possible underlying osteomyelitis     Plan:   Continue vancomycin/zosyn   Follow up on cultures - growing MRSA and enterobacter  Consult podiatry   Consult vascular surgery   MRI left foot ordered - early osteomyelitis noted  - Possibly amputation vs IV antibiotics     Stage 3a chronic kidney disease (CKD)  Creatine stable for now. BMP reviewed- noted Estimated Creatinine Clearance: 36.9 mL/min (based on SCr of 1.3 mg/dL). according to latest data. Based on current GFR, CKD stage is stage 3 - GFR 30-59.  Monitor UOP and serial BMP and adjust therapy as needed. Renally dose meds. Avoid nephrotoxic medications and procedures.    Candidiasis, intertrigo  Miconazole BID       COPD (chronic obstructive pulmonary disease)  No acute issues. DuoNebs PRN     Essential hypertension  Patients blood pressure range in the last 24 hours was: BP  Min: 116/70  Max: 163/73.The patient's inpatient anti-hypertensive regimen is listed below:  Current Antihypertensives  furosemide tablet 40 mg, Daily, Oral  losartan tablet 25 mg, 2 times daily, Oral  metoprolol succinate (TOPROL-XL) 24 hr tablet 25 mg, Daily, Oral  spironolactone  tablet 25 mg, Every morning, Oral    Plan  - BP is controlled, no changes needed to their regimen    Type 2 diabetes mellitus with circulatory disorder, without long-term current use of insulin  Lab Results   Component Value Date    HGBA1C 5.8 (H) 11/29/2023     Glycemic protocol   MDSS      Chronic diastolic congestive heart failure  Continue home medications     PUJA (acute kidney injury)  PUJA is likely due to pre-renal azotemia due to infection.   Baseline creatinine is  .09 . Most recent creatinine and eGFR are listed below.    Recent Labs     11/01/24  1548 11/02/24  0505 11/04/24  0215   CREATININE 1.3 0.9 1.1   EGFRNORACEVR 40* >60 48*        Plan  - PUJA is improving  - Avoid nephrotoxins and renally dose meds for GFR listed above  - Monitor urine output, serial BMP, and adjust therapy as needed  - Cr 1.3 on admit, now 0.9 after fluids. RESOLVED    Myasthenia gravis, adult form  Not on any medications. Monitor   Follow up with neurology       Hyperlipidemia  Continue statin       Hypothyroidism  Continue synthroid         VTE Risk Mitigation (From admission, onward)           Ordered     IP VTE HIGH RISK PATIENT  Once         11/05/24 1551     Place sequential compression device  Until discontinued         11/05/24 1551     heparin 25,000 units in dextrose 5% (100 units/ml) IV bolus from bag LOW INTENSITY nomogram - [facility]  As needed (PRN)        Question:  Heparin Infusion Adjustment (DO NOT MODIFY ANSWER)  Answer:  [email or web address]\epic\Images\Pharmacy\HeparinInfusions\heparin LOW INTENSITY nomogram for [facility] [id number]    11/03/24 0823     heparin 25,000 units in dextrose 5% (100 units/ml) IV bolus from bag LOW INTENSITY nomogram - [facility]  As needed (PRN)        Question:  Heparin Infusion Adjustment (DO NOT MODIFY ANSWER)  Answer:  [email or web address]\Images\Pharmacy\HeparinInfusions\heparin LOW INTENSITY nomogram for [facility] [id number].pdf    11/03/24 0823     heparin 25,000 units in dextrose 5% 250 mL (100 units/mL)  infusion LOW INTENSITY nomogram - [facility]  Continuous        Question:  Begin at (units/kg/hr)  Answer:  12    11/03/24 0823     Place sequential compression device  Until discontinued         11/02/24 0252                    Discharge Planning   MADELYN:      Code Status: Full Code   Is the patient medically ready for discharge?:     Reason for patient still in hospital (select all that apply): Patient trending condition and Treatment  Discharge Plan A: Home with family   Discharge Delays: None known at this time              Rodney Aguilera MD  Department of Hospital Medicine   [facility]

## 2024-11-06 NOTE — ANESTHESIA POSTPROCEDURE EVALUATION
Anesthesia Post Evaluation    Patient: Stephany Skinner    Procedure(s) Performed: Procedure(s) (LRB):  AMPUTATION, TOE- DISTAL HALLUX (Left)    Final Anesthesia Type: MAC      Patient location during evaluation: PACU  Patient participation: Yes- Able to Participate  Level of consciousness: awake and alert and oriented  Post-procedure vital signs: reviewed and stable  Pain management: adequate  Airway patency: patent    PONV status at discharge: No PONV  Anesthetic complications: no      Cardiovascular status: blood pressure returned to baseline, hemodynamically stable and stable  Respiratory status: unassisted, spontaneous ventilation and room air  Hydration status: euvolemic  Follow-up not needed.              Vitals Value Taken Time   /65 11/06/24 1144   Temp 36.6 °C (97.9 °F) 11/06/24 1144   Pulse 59 11/06/24 1144   Resp 18 11/06/24 1144   SpO2 96 % 11/06/24 1144         Event Time   Out of Recovery 11/05/2024 16:04:00         Pain/Divine Score: Pain Rating Prior to Med Admin: 7 (11/5/2024  3:10 AM)  Pain Rating Post Med Admin: 0 (11/5/2024  4:10 AM)  Divine Score: 10 (11/5/2024  3:52 PM)

## 2024-11-06 NOTE — HPI
Ms. Skinner is an 89y/o F with myasthenia gravis, T2DM, AFib  HFpEF, COPD, hypertension, hyperlipidemia, CKD 3, rheumatoid arthritis, who presented with diabetic toe ulcer of L great toe and was admitted for management of acute osteomyelitis.     In the ED, the patient was hemodynamically stable. Labs were remarkable for macrocytic anemia (11.2), elevated ESR (66), negative lactic acid (1.4). MRI recealed high likelihood of L great toe osteo.    Wound cx from 11/2 grew enterobacter cloacae, pseudomonas and MRSA. Underwent L great toe amputation 11/5.    Id consulted for: Polymicrobial infection of toe with MRSA, Pseudomonas and Enterobacter. No s/p amputation. Abx LOT and d/c recs

## 2024-11-06 NOTE — PLAN OF CARE
Received pt alert and oriented. On RA not in distress. With dressing noted on LL foot with darco shoes clean, dry and intact. Pain noted, pain med given as prn. Pt able to repositioned herself. BM X1 noted, bed pan provided. With ongoing heparin drip x 14 units, infusing well. On glucose monitoring. HOB elevated. Bed in low position. Instructed to call for assistance. Bed alarm set. Call light within reach.       Problem: Adult Inpatient Plan of Care  Goal: Plan of Care Review  Outcome: Progressing  Flowsheets (Taken 11/6/2024 0355)  Plan of Care Reviewed With: patient  Goal: Optimal Comfort and Wellbeing  Outcome: Progressing  Intervention: Monitor Pain and Promote Comfort  Flowsheets (Taken 11/6/2024 0355)  Pain Management Interventions:   pillow support provided   quiet environment facilitated   position adjusted   medication offered     Problem: Diabetes Comorbidity  Goal: Blood Glucose Level Within Targeted Range  Outcome: Progressing  Intervention: Monitor and Manage Glycemia  Flowsheets (Taken 11/6/2024 0355)  Glycemic Management: blood glucose monitored

## 2024-11-06 NOTE — SUBJECTIVE & OBJECTIVE
Past Medical History:   Diagnosis Date    Anemia     Arthritis     Atrial fibrillation     Back pain     Bronchiectasis, uncomplicated     CHF (congestive heart failure)     Disorder of kidney and ureter     Early stage nonexudative age-related macular degeneration of both eyes 2018    GERD (gastroesophageal reflux disease)     Hyperlipidemia     Hypertension     Hypothyroidism     Obesity     JOY (obstructive sleep apnea)     Osteoporosis     Polyneuropathy     Pressure injury of dorsum of right foot, stage 2 12/01/2017    Rheumatoid arthritis     Urinary incontinence     Venous stasis ulcer of right calf limited to breakdown of skin with varicose veins 06/24/2024       Past Surgical History:   Procedure Laterality Date    CATARACT EXTRACTION W/  INTRAOCULAR LENS IMPLANT Right 12/12/2019    Procedure: EXTRACTION, CATARACT, WITH IOL INSERTION;  Surgeon: Michael Arellano MD;  Location: [facility];  Service: Ophthalmology;  Laterality: Right;    CATARACT EXTRACTION W/  INTRAOCULAR LENS IMPLANT Left 02/13/2020    Procedure: EXTRACTION, CATARACT, WITH IOL INSERTION;  Surgeon: Michael Arellano MD;  Location: [facility];  Service: Ophthalmology;  Laterality: Left;    CHOLECYSTECTOMY      ENDOSCOPIC ULTRASOUND OF UPPER GASTROINTESTINAL TRACT N/A 11/04/2020    Procedure: ULTRASOUND, ENDOSCOPIC, UPPER GI TRACT;  Surgeon: Thierry Saunders MD;  Location: [facility];  Service: Endoscopy;  Laterality: N/A;  covid 11/1 [facility]    HERNIA REPAIR      HYSTERECTOMY      knee replacemene Right 02/14/2007    deidra    PHACOEMULSIFICATION OF CATARACT Right 12/12/2019    Procedure: PHACOEMULSIFICATION, CATARACT;  Surgeon: Michael Arellano MD;  Location: [facility];  Service: Ophthalmology;  Laterality: Right;    PHACOEMULSIFICATION OF CATARACT Left 02/13/2020    Procedure: PHACOEMULSIFICATION, CATARACT;  Surgeon: Michael Arellano MD;  Location: [facility];  Service: Ophthalmology;  Laterality: Left;    REPLACEMENT Left 09/18/2007    knee    THYROIDECTOMY          Review of patient's allergies indicates:   Allergen Reactions    Cephalosporins Hives     Itchiness and hives immediately after IV Ceftriaxone push    Statins-hmg-coa reductase inhibitors      Other reaction(s): Unknown       Medications:  Medications Prior to Admission   Medication Sig    acetaminophen (TYLENOL) 500 MG tablet Take 1,000 mg by mouth nightly as needed for Pain.    albuterol (PROVENTIL/VENTOLIN HFA) 90 mcg/actuation inhaler     alendronate (FOSAMAX) 70 MG tablet Take 1 tablet (70 mg total) by mouth every 7 days.    atorvastatin (LIPITOR) 20 MG tablet Take 20 mg by mouth every evening.    beta-carotene,A,-vits C,E/mins (VISION ORAL) Take 1 tablet by mouth 2 (two) times daily.    calcium carbonate-vit D3-min 600 mg calcium- 400 unit Tab Take 2 tablets by mouth once daily.     colestipoL (COLESTID) 1 gram Tab Take 3 tablets (3 g total) by mouth 2 (two) times daily. For cholesterol and diarrhea    cyanocobalamin (VITAMIN B-12) 1000 MCG tablet Take 100 mcg by mouth once daily.    ELIQUIS 5 mg Tab Take 5 mg by mouth 2 (two) times daily.    furosemide (LASIX) 40 MG tablet Take 1 tablet (40 mg total) by mouth once daily.    isosorbide mononitrate (IMDUR) 60 MG 24 hr tablet Take 60 mg by mouth once daily.    levothyroxine (SYNTHROID) 88 MCG tablet Take 1 tablet (88 mcg total) by mouth before breakfast.    loperamide (IMODIUM A-D) 2 mg Tab Take 2 mg by mouth 4 (four) times daily as needed.    losartan (COZAAR) 25 MG tablet Take 25 mg by mouth 2 (two) times a day.    metoprolol succinate (TOPROL-XL) 25 MG 24 hr tablet Take 25 mg by mouth once daily.    nitroGLYCERIN (NITROSTAT) 0.4 MG SL tablet Place 0.4 mg under the tongue every 5 (five) minutes as needed for Chest pain.    nystatin (MYCOSTATIN) powder Apply topically 4 (four) times daily.    nystatin-triamcinolone (MYCOLOG II) cream APPLY  CREAM TOPICALLY 4 TIMES DAILY    potassium chloride SA (K-DUR,KLOR-CON) 20 MEQ tablet Take 1 tablet (20 mEq total)  "by mouth once daily.    spironolactone (ALDACTONE) 25 MG tablet Take 25 mg by mouth every morning.    vitamins A,C,E-zinc-copper (PRESERVISION AREDS) 4,296 mcg-226 mg-90 mg Cap PreserVision AREDS 14,320 unit-226 mg-200 unit capsule, [RxNorm: 822025]     Antibiotics (From admission, onward)      Start     Stop Route Frequency Ordered    11/06/24 1222  piperacillin-tazobactam (ZOSYN) 4.5 g in D5W 100 mL IVPB (MB+)         -- IV Every 8 hours (non-standard times) 11/06/24 1222    11/06/24 1045  mupirocin 2 % ointment         11/11/24 0859 Nasl 2 times daily 11/06/24 0945    11/02/24 2100  vancomycin 1,250 mg in D5W 250 mL IVPB (admixture device)         -- IV Every 24 hours (non-standard times) 11/02/24 0342    11/02/24 0418  vancomycin - pharmacy to dose  (vancomycin IVPB (PEDS and ADULTS))        Placed in "And" Linked Group    -- IV pharmacy to manage frequency 11/02/24 0318          Antifungals (From admission, onward)      Start     Stop Route Frequency Ordered    11/02/24 0315  miconazole NITRATE 2 % top powder         -- Top 2 times daily 11/02/24 0314          Antivirals (From admission, onward)      None             Immunization History   Administered Date(s) Administered    COVID-19 MRNA, LN-S PF (MODERNA HALF 0.25 ML DOSE) 12/15/2021    COVID-19 Vaccine 12/15/2021    COVID-19, MRNA, LN-S, PF (Pfizer) (Purple Cap) 02/18/2021, 03/11/2021    Influenza 02/16/2011    Influenza (FLUAD) - Quadrivalent - Adjuvanted - PF *Preferred* (65+) 11/18/2020, 11/29/2021    Influenza - Quadrivalent 12/23/2014    Influenza - Quadrivalent - PF *Preferred* (6 months and older) 02/16/2011    Influenza - Trivalent - Fluad - Adjuvanted - PF (65 years and older 10/18/2024    Influenza - Trivalent - Fluzone High Dose - PF (65 years and older) 11/11/2015, 10/11/2016, 11/28/2017, 11/27/2018, 11/04/2019, 09/19/2022, 11/29/2023    PPD Test 02/16/2018    Pneumococcal Conjugate - 13 Valent 02/19/2018    Pneumococcal Polysaccharide - 23 " Valent 11/04/2019    Td (ADULT) 12/04/2019       Family History       Problem Relation (Age of Onset)    Cancer Sister          Social History     Socioeconomic History    Marital status:     Number of children: 1   Tobacco Use    Smoking status: Never     Passive exposure: Never    Smokeless tobacco: Never   Substance and Sexual Activity    Alcohol use: Never    Drug use: Never    Sexual activity: Never     Social Drivers of Health     Financial Resource Strain: Patient Declined (11/2/2024)    Overall Financial Resource Strain (CARDIA)     Difficulty of Paying Living Expenses: Patient declined   Food Insecurity: Patient Declined (11/2/2024)    Hunger Vital Sign     Worried About Running Out of Food in the Last Year: Patient declined     Ran Out of Food in the Last Year: Patient declined   Transportation Needs: Patient Declined (11/2/2024)    TRANSPORTATION NEEDS     Transportation : Patient declined   Physical Activity: Inactive (3/13/2024)    Exercise Vital Sign     Days of Exercise per Week: 0 days     Minutes of Exercise per Session: 0 min   Stress: Patient Declined (11/2/2024)    [employer] [place] [employer] - Occupational Stress Questionnaire     Feeling of Stress : Patient declined   Housing Stability: Patient Declined (11/2/2024)    Housing Stability Vital Sign     Unable to Pay for Housing in the Last Year: Patient declined     Homeless in the Last Year: Patient declined     Review of Systems   Constitutional:  Negative for chills, diaphoresis, fatigue and fever.   Skin:  Positive for wound.   All other systems reviewed and are negative.    Objective:     Vital Signs (Most Recent):  Temp: 97.9 °F (36.6 °C) (11/06/24 1144)  Pulse: (!) 59 (11/06/24 1144)  Resp: 18 (11/06/24 1144)  BP: (!) 140/65 (11/06/24 1144)  SpO2: 96 % (11/06/24 1144) Vital Signs (24h Range):  Temp:  [97.1 °F (36.2 °C)-98.7 °F (37.1 °C)] 97.9 °F (36.6 °C)  Pulse:  [55-71] 59  Resp:  [17-28] 18  SpO2:  [93 %-98 %] 96  %  BP: (120-204)/(60-91) 140/65     Weight: 103.5 kg (228 lb 2.8 oz)  Body mass index is 35.74 kg/m².    Estimated Creatinine Clearance: 53.5 mL/min (based on SCr of 0.9 mg/dL).     Physical Exam  Constitutional:       General: She is not in acute distress.     Appearance: She is well-developed.   HENT:      Head: Normocephalic and atraumatic.   Eyes:      Conjunctiva/sclera: Conjunctivae normal.      Pupils: Pupils are equal, round, and reactive to light.   Cardiovascular:      Rate and Rhythm: Normal rate and regular rhythm.      Heart sounds: Normal heart sounds.   Pulmonary:      Effort: Pulmonary effort is normal. No respiratory distress.      Breath sounds: Normal breath sounds.   Abdominal:      General: Bowel sounds are normal. There is no distension.      Palpations: Abdomen is soft.   Musculoskeletal:         General: Normal range of motion.      Cervical back: Normal range of motion and neck supple.   Skin:     General: Skin is warm and dry.      Comments: R 1st toe- skin ulceration on plantar aspect. No erythema or drainage.  L foot s/p 1st toe amputation- clean dressing in place.   Neurological:      General: No focal deficit present.      Mental Status: She is alert and oriented to person, place, and time.      Cranial Nerves: No cranial nerve deficit.   Psychiatric:         Mood and Affect: Mood normal.         Behavior: Behavior normal.          Significant Labs: Blood Culture:   Recent Labs   Lab 11/01/24  1650 11/01/24  1652   LABBLOO No Growth to date  No Growth to date  No Growth to date  No Growth to date  No Growth to date No Growth to date  No Growth to date  No Growth to date  No Growth to date  No Growth to date     Wound Culture:   Recent Labs   Lab 11/02/24  1430 11/05/24  1442   LABAERO Results called to and read back by: Sol Michaels 11/04/2024  06:32  ENTEROBACTER CLOACAE  Moderate  *  METHICILLIN RESISTANT STAPHYLOCOCCUS AUREUS  Moderate  *  PSEUDOMONAS  AERUGINOSA  Moderate  * PRESUMPTIVE PSEUDOMONAS SPECIES  Few  Identification and susceptibility pending  *  GRAM NEGATIVE APARNA, NON-LACTOSE   Many  Identification and susceptibility pending  *  ENTEROCOCCUS SPECIES  Many  Identification and susceptibility pending  *     All pertinent labs within the past 24 hours have been reviewed.    Significant Imaging: I have reviewed all pertinent imaging results/findings within the past 24 hours.

## 2024-11-06 NOTE — PLAN OF CARE
Problem: Physical Therapy  Goal: Physical Therapy Goal  Description: Goals to be met by: 24     Patient will increase functional independence with mobility by performin. Supine to sit with Contact Guard Assistance  2. Sit to supine with Contact Guard Assistance  3. Rolling to Left and Right with Contact Guard Assistance.  4. Sit to stand transfer with Contact Guard Assistance and adherence to L LE heel WB precautions and RW  5. Sitting at edge of bed x20 minutes with Modified [place]  6. Lower extremity exercise program x20 reps per handout, with independence    2024 1705 by Tray Burgess, SPT  Outcome: Progressing

## 2024-11-06 NOTE — NURSING
[facility], [facility]  Nurses Note -- 4 Eyes      11/5/2024       Skin assessed on: Q Shift      [] No Pressure Injuries Present    [x]Prevention Measures Documented    [] Yes LDA  for Pressure Injury Previously documented     [x] Yes New Pressure Injury Discovered   [] LDA for New Pressure Injury Added      Attending RN:  Melecio Vera RN     Second RN:  MARGARETH Powell

## 2024-11-06 NOTE — CONSULTS
[facility]  Wound Care  WOC BECK    Patient Name:  Stephany Skinner   MRN:  [id number]  Date: 11/6/2024  Diagnosis: Osteomyelitis of great toe of left foot    History:     Past Medical History:   Diagnosis Date    Anemia     Arthritis     Atrial fibrillation     Back pain     Bronchiectasis, uncomplicated     CHF (congestive heart failure)     Disorder of kidney and ureter     Early stage nonexudative age-related macular degeneration of both eyes 2018    GERD (gastroesophageal reflux disease)     Hyperlipidemia     Hypertension     Hypothyroidism     Obesity     JOY (obstructive sleep apnea)     Osteoporosis     Polyneuropathy     Pressure injury of dorsum of right foot, stage 2 12/01/2017    Rheumatoid arthritis     Urinary incontinence     Venous stasis ulcer of right calf limited to breakdown of skin with varicose veins 06/24/2024       Social History     Socioeconomic History    Marital status:     Number of children: 1   Tobacco Use    Smoking status: Never     Passive exposure: Never    Smokeless tobacco: Never   Substance and Sexual Activity    Alcohol use: Never    Drug use: Never    Sexual activity: Never     Social Drivers of Health     Financial Resource Strain: Patient Declined (11/2/2024)    Overall Financial Resource Strain (CARDIA)     Difficulty of Paying Living Expenses: Patient declined   Food Insecurity: Patient Declined (11/2/2024)    Hunger Vital Sign     Worried About Running Out of Food in the Last Year: Patient declined     Ran Out of Food in the Last Year: Patient declined   Transportation Needs: Patient Declined (11/2/2024)    TRANSPORTATION NEEDS     Transportation : Patient declined   Physical Activity: Inactive (3/13/2024)    Exercise Vital Sign     Days of Exercise per Week: 0 days     Minutes of Exercise per Session: 0 min   Stress: Patient Declined (11/2/2024)    [employer] [place] [employer] - Occupational Stress Questionnaire     Feeling of Stress : Patient  declined   Housing Stability: Patient Declined (11/2/2024)    Housing Stability Vital Sign     Unable to Pay for Housing in the Last Year: Patient declined     Homeless in the Last Year: Patient declined       Precautions:     Allergies as of 11/01/2024 - Reviewed 11/01/2024   Allergen Reaction Noted    Statins-hmg-coa reductase inhibitors  05/30/2012       [facility] Assessment Details/Treatment     Active Problem List with Overview Notes    Diagnosis Date Noted    Dermatitis associated with moisture 11/06/2024    Diarrhea 11/03/2024    Cellulitis of toe of left foot 11/02/2024    Osteomyelitis of great toe of left foot 11/02/2024    Ulcer of great toe of left foot, with fat layer exposed 10/14/2024     Patient presented to wound clinic on 10/14/2024 with a new wound on the left great toe.  There was callus with underlying wound and fluid.  Callus was removed and subcutaneous tissue was exposed.  The base was pink.  Patient is walking on her toe tip without shoes.  She was instructed to obtain a hammertoe pad and utilize she comes in all times.      Venous stasis ulcer of left calf limited to breakdown of skin with varicose veins 10/07/2024     Patient with new superficial ulcer left lower extremity.  Patient with chronic lower extremity edema.  Patient has been wearing compression stockings.  No need for debridement today.  Will cover with Mepilex Ag pad and continue compression therapy.  Patient encouraged to elevate.      Hallux hammertoe, right 09/09/2024     Patient with slight hammertoe deformity of the right great toe.    Patient has had hammertoe deformities of toes 2 and 3 previously surgically corrected.      Diabetic ulcer of great toe of right foot associated with type 2 diabetes mellitus, with necrosis of muscle 07/15/2024     Patient with new wound 7/15/2024 .  Patient with history of neuropathy.  Wound is superficial with no sign of infection.  No erythema or drainage      Non-pressure chronic ulcer of  calf, left, with fat layer exposed 07/08/2024     89-year-old female presented to the wound clinic on 07/08/2024 with a history of chronic venous hypertension developed a wound of the left lateral lower leg proximally 4 weeks ago.  The wound has enlarged.  She was evaluated by her primary physician who did apply a cream.  She was referred here for further management.  She denies any pain.  She has not had any fever or chills.  She has had moderate drainage.  As of 09/09/2024, the wound is healed      Chronic venous hypertension (idiopathic) with ulcer of left lower extremity (CODE) 07/08/2024    Polyneuropathy 03/13/2024    Rheumatoid arthritis 03/13/2024    Peripheral vascular disease, unspecified 03/13/2024    Aortic atherosclerosis 03/13/2024    Osteopenia 03/13/2024    Stage 3a chronic kidney disease (CKD) 02/28/2024     Avoid NSAIDS  Good BP control  Monitor renal function closely      Sacroiliitis, not elsewhere classified 02/28/2024     Take Tylenol for pain.  Use walker or cane for ambulation.      Exertional dyspnea 11/29/2023     During six minute walk her O2 sat dropped to 85%.  At rest it is 98%.  Ambulating with O2 at 2 l NC it stayed above 90%.      Candidiasis, intertrigo 08/31/2023    Cellulitis 08/31/2023    Conjunctivitis of both eyes 05/12/2023    Obesity, morbid, BMI 40.0-49.9 03/09/2023     Patient is trying to lose weight.  She has lost a few lb.  She is following a healthy diet.  She has difficulty exercising because of her musculoskeletal condition.         Essential hypertension 12/09/2022     Two gram sodium diet.    Weight loss discussed.    Try to walk 2 miles per day.    Current medications will be:  Lasix 40 mg daily   Potassium 20 mEq daily   Imdur 60 mg daily  Metoprolol 25 mg daily   Spironolactone 25 mg daily        COPD (chronic obstructive pulmonary disease) 12/09/2022     Continue DuoNeb treatments twice daily as needed.    Her COPD is stable.         Pancreatic cyst 11/04/2020     Abnormal finding on GI tract imaging 10/21/2020    Cyst of pancreas 10/21/2020    B12 deficiency 07/31/2018    Type 2 diabetes mellitus with circulatory disorder, without long-term current use of insulin 05/01/2018     Patient no longer requires medications for this.  Her last A1c was 5.8.   Check hemoglobin A1c every 12 months.      Chronic diastolic congestive heart failure 02/07/2018     Two gram sodium diet.    Weight loss discussed.    Try to walk 2 miles per day.    Avoid smoking.    Current medications will be:  Lower Valsartan 320 mg to 160 mg daily   Lasix 40 mg daily   Lower Metoprolol 100 mg to 50 mg daily   Potassium 20 mEq daily      PUJA (acute kidney injury) 02/05/2018    Myasthenia gravis, adult form 02/03/2018     No longer on prednisone, Mestinon  Keep appointment with neurology      Chronic atrial fibrillation 01/30/2018     Continue Eliquis 5 mg daily  Keep appointment with Cardiology      Early stage nonexudative age-related macular degeneration of both eyes 2018    Venous stasis of lower extremity 10/11/2016     Continue support stockings.  Place every morning and remove at night.      Incontinence in female 06/22/2015    Obstructive sleep apnea (adult) (pediatric) 08/08/2012     Continue home CPAP at current pressure.  Use oxygen at night.      Overweight(278.02) 08/08/2012     Dx updated per 2019 IMO Load      Osteoarthritis 08/08/2012    Hypothyroidism 08/08/2012     Patient takes Synthroid 88 mcg for her hypothyroidism.  Adjust Synthroid based on TSH resutls.  Check TSH every 6 months.      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 Ley  Assessment:  Turning in bed with minimal assistance. Versette + brief to contain urine.   Sacrum- No skin breakdown. No pressure injury or IAD  Left buttock- Linear area of .8 mm circular ulcerations located near Versette. Moisture associated skin damage.    Treatment/Plan:  Continue Pressure Injury Prevention Interventions with moisture management and frequent pressure shifts. Local wound care to left buttock with Remedy cleanser + moisture barrier. Avoid Mepilex sacral foam dressing due to urinary incontinence saturating dressing. Discussed with nursing placement of Jesus.   Recommendations made to primary team. Orders placed.     11/06/2024

## 2024-11-06 NOTE — NURSING
[facility] [place]  Nurses Note -- 4 Eyes      11/6/2024       Skin assessed on: Q Shift      [] No Pressure Injuries Present    []Prevention Measures Documented    [x] Yes LDA  for Pressure Injury Previously documented     [] Yes New Pressure Injury Discovered   [] LDA for New Pressure Injury Added      Attending RN:  Andre Aparicio LPN     Second RN:  ROSA ISELA Majano

## 2024-11-06 NOTE — PT/OT/SLP EVAL
Physical Therapy Evaluation    Patient Name:  Stephany Skinner   MRN:  [id number]    Recommendations:     Discharge Recommendations:  (to be determined after pt progress)   Discharge Equipment Recommendations: bedside commode   Barriers to discharge: Decreased caregiver support and impaired self care skills, impaired functional mobility    Assessment:     Stephany Skinner is a 89 y.o. female admitted with a medical diagnosis of Osteomyelitis of great toe of left foot.  She presents with the following impairments/functional limitations: weakness, impaired endurance, impaired self care skills, gait instability, impaired functional mobility, impaired balance, impaired skin.    Rehab Prognosis: Fair; patient would benefit from acute skilled PT services to address these deficits and reach maximum level of function.    Recent Surgery: Procedure(s) (LRB):  AMPUTATION, TOE- DISTAL HALLUX (Left) 1 Day Post-Op    Plan:     During this hospitalization, patient to be seen 5 x/week to address the identified rehab impairments via gait training, therapeutic activities, therapeutic exercises, neuromuscular re-education, wheelchair management/training and progress toward the following goals:    Plan of Care Expires:  11/20/24    Subjective     Chief Complaint: needing to BM  Patient/Family Comments/goals: Pt was agreeable for evaluation  Pain/Comfort:  Pain Rating 1: 0/10    Patients cultural, spiritual, Sabianism conflicts given the current situation: no    Living Environment:  Pt lives w/ sister in a disability apartment on 1st floor w/ ramps to enter building. Pt has a tub/shower combo w/ a bath bench. Pt states being Tate w/ RW in the household prior to current episode of care. Pt states that her and her sister cooked, cleaned, washed and took care of the apartment. Pt stated not walking outside, only inside apartment w/ RW. Pt has a w/c but rarely uses it.  Prior to admission, patients level of function was Tate w/ RW.  Equipment  used at home: walker, rolling, wheelchair, bath bench, grab bar.  DME owned (not currently used): none.  Upon discharge, patient will have assistance from sister and son.    Objective:     Patient found HOB elevated with L post-op boot and peripheral IV, PureWick upon PT entry to room. Pt cognition WNL. Pt able to attend to all tasks and activities. Pt requested to be put on bedpan upon PT entry to room.     General Precautions: Standard, fall, contact  Orthopedic Precautions: (LLE heel WB in post-op boot)   Braces:  (LLE post-op boot)  Respiratory Status: Room air    Exams:  Cognitive Exam:  Patient is oriented to Person, Place, Time, and Situation  Sensation:    -       Intact  light/touch   Skin Integrity/Edema:      -       Skin integrity: Pt R big toe assessed: calloused, hard wound w/ no draining and closed. Visible skin intact  RLE ROM: Ankle ROM: WFL. Hip/Knee- Functionally assessed WFL   RLE Strength: R Ankle DF/PF- 5/5, Hip/Knee functionally assessed- WFL  LLE ROM: Functionally assessed WFL  LLE Strength: Functionally assessed WFL    Functional Mobility:  Bed Mobility:     Rolling Right: modified independence. Completed 2x when applying bedpan.  Scooting: SBA utilizing BUE and B heels   Bridging: SBA    AM-PAC 6 CLICK MOBILITY  Total Score:14     Treatment & Education:  Pt educated on skilled acute care PT POC  Pt educated on L heel WB precautions and importance of adherence to facilitate wound healing.  Pt educated on importance of exercise and activity to prevent muscle atrophy 2/2 prolonged hospital stay.  Pt instructed on LE supine exercises (AP, quad sets, glut sets,) to be completed throughout the day. Pt verbalized and demonstrated understanding.    Patient left HOB elevated on bedpan with all lines intact, call button in reach, and MARGARETH Powell notified.    GOALS:   Multidisciplinary Problems       Physical Therapy Goals          Problem: Physical Therapy    Goal Priority Disciplines Outcome  Interventions   Physical Therapy Goal     PT, PT/OT Progressing    Description: Goals to be met by: 24     Patient will increase functional independence with mobility by performin. Supine to sit with Contact Guard Assistance  2. Sit to supine with Contact Guard Assistance  3. Rolling to Left and Right with Contact Guard Assistance.  4. Sit to stand transfer with Contact Guard Assistance and adherence to L LE heel WB precautions and RW  5. Sitting at edge of bed x20 minutes with Modified [place]  6. Lower extremity exercise program x20 reps per handout, with independence                         History:     Past Medical History:   Diagnosis Date    Anemia     Arthritis     Atrial fibrillation     Back pain     Bronchiectasis, uncomplicated     CHF (congestive heart failure)     Disorder of kidney and ureter     Early stage nonexudative age-related macular degeneration of both eyes     GERD (gastroesophageal reflux disease)     Hyperlipidemia     Hypertension     Hypothyroidism     Obesity     JOY (obstructive sleep apnea)     Osteoporosis     Polyneuropathy     Pressure injury of dorsum of right foot, stage 2 2017    Rheumatoid arthritis     Urinary incontinence     Venous stasis ulcer of right calf limited to breakdown of skin with varicose veins 2024       Past Surgical History:   Procedure Laterality Date    CATARACT EXTRACTION W/  INTRAOCULAR LENS IMPLANT Right 2019    Procedure: EXTRACTION, CATARACT, WITH IOL INSERTION;  Surgeon: Michael Arellano MD;  Location: [facility];  Service: Ophthalmology;  Laterality: Right;    CATARACT EXTRACTION W/  INTRAOCULAR LENS IMPLANT Left 2020    Procedure: EXTRACTION, CATARACT, WITH IOL INSERTION;  Surgeon: Michael Arellano MD;  Location: [facility] OR;  Service: Ophthalmology;  Laterality: Left;    CHOLECYSTECTOMY      ENDOSCOPIC ULTRASOUND OF UPPER GASTROINTESTINAL TRACT N/A 2020    Procedure: ULTRASOUND, ENDOSCOPIC, UPPER GI TRACT;   Surgeon: Thierry Saunders MD;  Location: [facility];  Service: Endoscopy;  Laterality: N/A;  covid 11/1 [facility]    HERNIA REPAIR      HYSTERECTOMY      knee replacemene Right 02/14/2007    deidra    PHACOEMULSIFICATION OF CATARACT Right 12/12/2019    Procedure: PHACOEMULSIFICATION, CATARACT;  Surgeon: Michael Arellano MD;  Location: [facility];  Service: Ophthalmology;  Laterality: Right;    PHACOEMULSIFICATION OF CATARACT Left 02/13/2020    Procedure: PHACOEMULSIFICATION, CATARACT;  Surgeon: Michael Arellano MD;  Location: [facility];  Service: Ophthalmology;  Laterality: Left;    REPLACEMENT Left 09/18/2007    knee    THYROIDECTOMY      TOE AMPUTATION Left 11/5/2024    Procedure: AMPUTATION, TOE- DISTAL HALLUX;  Surgeon: Noemi Ley DPM;  Location: [facility] OR;  Service: Podiatry;  Laterality: Left;       Time Tracking:     PT Received On: 11/06/24  PT Start Time: 1540     PT Stop Time: 1556  PT Total Time (min): 16 min     Billable Minutes: Evaluation 8 and Therapeutic Activity 8      11/06/2024

## 2024-11-06 NOTE — PLAN OF CARE
Cm sent SNF referrals to [facility].    I provided the patient a choice of post acute providers and offered a list of CMS rated  SNF.     Patient/family chose the following as their preferred providers:  [facility]    Informed patient and family that placement is based on medical acceptance, insurance authorization and bed availability.     11/06/24 1329   Post-Acute Status   Post-Acute Authorization Placement   Post-Acute Placement Status Pending payor review/awaiting authorization (if required)   Patient choice form signed by patient/caregiver List from System Post-Acute Care   Discharge Delays (!) Post-Acute Set-up   Discharge Plan   Discharge Plan A Skilled Nursing Facility   Discharge Plan B Home with family

## 2024-11-06 NOTE — ASSESSMENT & PLAN NOTE
Wound cx 11/2 grew enterobacter cloacae, pseudomonas and MRSA. S/p amputation L great toe 11/5. Culture from prox margins growing presumptive pseudomonas consistent with residual osteo.    Enterobacter is R to pip-tazo. If grows on culture from prox margin will have to challenge with meropenem.    Recommendations:  -- Will switch to meropenem. Monitor closely for allergic response.   --continue vanc. Pharm to dose. Anticipate stopping if no mrsa on sx cultures.  --f/u final cultures and path from clean margins.  --will need placement for IV antibiotics.

## 2024-11-07 LAB
ACID FAST MOD KINY STN SPEC: NORMAL
ALBUMIN SERPL BCP-MCNC: 2.7 G/DL (ref 3.5–5.2)
ALP SERPL-CCNC: 43 U/L (ref 40–150)
ALT SERPL W/O P-5'-P-CCNC: 16 U/L (ref 10–44)
ANION GAP SERPL CALC-SCNC: 10 MMOL/L (ref 8–16)
AST SERPL-CCNC: 15 U/L (ref 10–40)
BACTERIA STL CULT: NORMAL
BILIRUB SERPL-MCNC: 0.5 MG/DL (ref 0.1–1)
BUN SERPL-MCNC: 11 MG/DL (ref 8–23)
CALCIUM SERPL-MCNC: 8.5 MG/DL (ref 8.7–10.5)
CALPROTECTIN STL-MCNT: 130 MCG/G
CHLORIDE SERPL-SCNC: 110 MMOL/L (ref 95–110)
CO2 SERPL-SCNC: 19 MMOL/L (ref 23–29)
CREAT SERPL-MCNC: 0.9 MG/DL (ref 0.5–1.4)
EST. GFR  (NO RACE VARIABLE): >60 ML/MIN/1.73 M^2
FAT STL QL: NORMAL
GLUCOSE SERPL-MCNC: 95 MG/DL (ref 70–110)
MYCOBACTERIUM SPEC QL CULT: NORMAL
NEUTRAL FAT STL QL: NORMAL
POCT GLUCOSE: 102 MG/DL (ref 70–110)
POCT GLUCOSE: 119 MG/DL (ref 70–110)
POCT GLUCOSE: 133 MG/DL (ref 70–110)
POCT GLUCOSE: 141 MG/DL (ref 70–110)
POTASSIUM SERPL-SCNC: 3 MMOL/L (ref 3.5–5.1)
PROT SERPL-MCNC: 6.2 G/DL (ref 6–8.4)
SODIUM SERPL-SCNC: 139 MMOL/L (ref 136–145)
VANCOMYCIN TROUGH SERPL-MCNC: 18.9 UG/ML (ref 10–22)

## 2024-11-07 PROCEDURE — 94760 N-INVAS EAR/PLS OXIMETRY 1: CPT

## 2024-11-07 PROCEDURE — 97166 OT EVAL MOD COMPLEX 45 MIN: CPT

## 2024-11-07 PROCEDURE — 97110 THERAPEUTIC EXERCISES: CPT

## 2024-11-07 PROCEDURE — 97535 SELF CARE MNGMENT TRAINING: CPT

## 2024-11-07 PROCEDURE — 99232 SBSQ HOSP IP/OBS MODERATE 35: CPT | Mod: ,,, | Performed by: PODIATRIST

## 2024-11-07 PROCEDURE — 25000003 PHARM REV CODE 250: Performed by: PODIATRIST

## 2024-11-07 PROCEDURE — 63600175 PHARM REV CODE 636 W HCPCS: Performed by: INTERNAL MEDICINE

## 2024-11-07 PROCEDURE — 97116 GAIT TRAINING THERAPY: CPT

## 2024-11-07 PROCEDURE — 80202 ASSAY OF VANCOMYCIN: CPT | Performed by: PODIATRIST

## 2024-11-07 PROCEDURE — 99900035 HC TECH TIME PER 15 MIN (STAT)

## 2024-11-07 PROCEDURE — 27000207 HC ISOLATION

## 2024-11-07 PROCEDURE — 25000003 PHARM REV CODE 250: Performed by: STUDENT IN AN ORGANIZED HEALTH CARE EDUCATION/TRAINING PROGRAM

## 2024-11-07 PROCEDURE — 80053 COMPREHEN METABOLIC PANEL: CPT | Performed by: PODIATRIST

## 2024-11-07 PROCEDURE — 25000003 PHARM REV CODE 250: Performed by: INTERNAL MEDICINE

## 2024-11-07 PROCEDURE — 99232 SBSQ HOSP IP/OBS MODERATE 35: CPT | Mod: ,,, | Performed by: INTERNAL MEDICINE

## 2024-11-07 PROCEDURE — 36415 COLL VENOUS BLD VENIPUNCTURE: CPT | Performed by: PODIATRIST

## 2024-11-07 PROCEDURE — 11000001 HC ACUTE MED/SURG PRIVATE ROOM

## 2024-11-07 PROCEDURE — 63600175 PHARM REV CODE 636 W HCPCS: Performed by: PODIATRIST

## 2024-11-07 RX ORDER — ISOSORBIDE MONONITRATE 30 MG/1
60 TABLET, EXTENDED RELEASE ORAL DAILY
Status: DISCONTINUED | OUTPATIENT
Start: 2024-11-07 | End: 2024-11-13 | Stop reason: HOSPADM

## 2024-11-07 RX ORDER — HYDRALAZINE HYDROCHLORIDE 25 MG/1
25 TABLET, FILM COATED ORAL EVERY 12 HOURS
Status: DISCONTINUED | OUTPATIENT
Start: 2024-11-07 | End: 2024-11-13 | Stop reason: HOSPADM

## 2024-11-07 RX ADMIN — POTASSIUM BICARBONATE 60 MEQ: 391 TABLET, EFFERVESCENT ORAL at 01:11

## 2024-11-07 RX ADMIN — POTASSIUM BICARBONATE 60 MEQ: 391 TABLET, EFFERVESCENT ORAL at 05:11

## 2024-11-07 RX ADMIN — APIXABAN 5 MG: 5 TABLET, FILM COATED ORAL at 08:11

## 2024-11-07 RX ADMIN — VANCOMYCIN HYDROCHLORIDE 1250 MG: 1.25 INJECTION, POWDER, LYOPHILIZED, FOR SOLUTION INTRAVENOUS at 02:11

## 2024-11-07 RX ADMIN — MEROPENEM 2 G: 1 INJECTION INTRAVENOUS at 08:11

## 2024-11-07 RX ADMIN — MUPIROCIN: 20 OINTMENT TOPICAL at 09:11

## 2024-11-07 RX ADMIN — METOPROLOL SUCCINATE 25 MG: 25 TABLET, EXTENDED RELEASE ORAL at 09:11

## 2024-11-07 RX ADMIN — HYDRALAZINE HYDROCHLORIDE 25 MG: 25 TABLET ORAL at 09:11

## 2024-11-07 RX ADMIN — FUROSEMIDE 40 MG: 40 TABLET ORAL at 09:11

## 2024-11-07 RX ADMIN — MUPIROCIN: 20 OINTMENT TOPICAL at 08:11

## 2024-11-07 RX ADMIN — MICONAZOLE NITRATE: 20 POWDER TOPICAL at 08:11

## 2024-11-07 RX ADMIN — APIXABAN 5 MG: 5 TABLET, FILM COATED ORAL at 09:11

## 2024-11-07 RX ADMIN — DICLOFENAC SODIUM 2 G: 10 GEL TOPICAL at 09:11

## 2024-11-07 RX ADMIN — COLESTIPOL HYDROCHLORIDE 3 G: 1 TABLET, FILM COATED ORAL at 09:11

## 2024-11-07 RX ADMIN — HYDRALAZINE HYDROCHLORIDE 25 MG: 25 TABLET ORAL at 08:11

## 2024-11-07 RX ADMIN — COLESTIPOL HYDROCHLORIDE 3 G: 1 TABLET, FILM COATED ORAL at 08:11

## 2024-11-07 RX ADMIN — MEROPENEM 2 G: 1 INJECTION INTRAVENOUS at 02:11

## 2024-11-07 RX ADMIN — MEROPENEM 2 G: 1 INJECTION INTRAVENOUS at 01:11

## 2024-11-07 RX ADMIN — MELATONIN TAB 3 MG 6 MG: 3 TAB at 11:11

## 2024-11-07 RX ADMIN — LEVOTHYROXINE SODIUM 88 MCG: 0.09 TABLET ORAL at 05:11

## 2024-11-07 RX ADMIN — ISOSORBIDE MONONITRATE 60 MG: 30 TABLET, EXTENDED RELEASE ORAL at 06:11

## 2024-11-07 RX ADMIN — MICONAZOLE NITRATE: 20 POWDER TOPICAL at 09:11

## 2024-11-07 NOTE — PROGRESS NOTES
Pharmacokinetic Assessment Follow Up: IV Vancomycin    Vancomycin serum concentration assessment(s):    The trough level was drawn correctly and can be used to guide therapy at this time. The measurement is within the desired definitive target range of 15 to 20 mcg/mL.    Vancomycin Regimen Plan:    Continue regimen to Vancomycin 1250 mg IV every 24 hours with next serum trough concentration measured at 0200 prior to 3rd dose on 11/10    Drug levels (last 3 results):  Recent Labs   Lab Result Units 11/04/24 0215 11/07/24  0102   Vancomycin-Trough ug/mL 16.1 18.9       Pharmacy will continue to follow and monitor vancomycin.    Please contact pharmacy at extension [phone] for questions regarding this assessment.    Thank you for the consult,   Mike Sauceda       Patient brief summary:  Stephany Skinner is a 89 y.o. female initiated on antimicrobial therapy with IV Vancomycin for treatment of bone/joint infection    Drug Allergies:   Review of patient's allergies indicates:   Allergen Reactions    Cephalosporins Hives     Itchiness and hives immediately after IV Ceftriaxone push    Statins-hmg-coa reductase inhibitors      Other reaction(s): Unknown       Actual Body Weight:   103.5 kg    Renal Function:   Estimated Creatinine Clearance: 53.5 mL/min (based on SCr of 0.9 mg/dL).,     Dialysis Method (if applicable):  N/A    CBC (last 72 hours):  Recent Labs   Lab Result Units 11/04/24 0215 11/05/24 0559 11/06/24  0158   WBC K/uL 6.15 7.00 6.88   Hemoglobin g/dL 11.1* 11.1* 11.3*   Hematocrit % 35.0* 35.6* 34.4*   Platelets K/uL 232 244 223   Gran % % 69.1 73.4* 71.0   Lymph % % 14.5* 10.9* 11.6*   Mono % % 11.5 11.6 13.2   Eosinophil % % 3.7 3.0 3.3   Basophil % % 0.7 0.4 0.3   Differential Method  Automated Automated Automated       Metabolic Panel (last 72 hours):  Recent Labs   Lab Result Units 11/04/24 0215 11/05/24  0559 11/06/24  0158   Sodium mmol/L 140 139 141   Potassium mmol/L 3.8 3.4* 3.3*    Chloride mmol/L 115* 111* 112*   CO2 mmol/L 16* 19* 19*   Glucose mg/dL 99 105 99   BUN mg/dL 25* 17 14   Creatinine mg/dL 1.1 1.0 0.9   Albumin g/dL 2.7* 2.9* 2.8*   Total Bilirubin mg/dL 0.2 0.4 0.5   Alkaline Phosphatase U/L 43 49 46   AST U/L 13 15 16   ALT U/L 8* 15 18   Magnesium mg/dL 1.8  --   --    Phosphorus mg/dL 3.1  --   --        Vancomycin Administrations:  vancomycin given in the last 96 hours                     vancomycin 1,250 mg in D5W 250 mL IVPB (admixture device) (mg) 1,250 mg New Bag 11/06/24 0207     1,250 mg New Bag 11/05/24 0232     1,250 mg New Bag 11/04/24 0316    vancomycin injection (mg) 500 mg Given 11/05/24 1418                    Microbiologic Results:  Microbiology Results (last 7 days)       Procedure Component Value Units Date/Time    E. coli 0157 antigen [[id number]] Collected: 11/05/24 0028    Order Status: Completed Specimen: Stool Updated: 11/06/24 1152     Shiga Toxin 1 E.coli Negative     Shiga Toxin 2 E.coli Negative    Aerobic culture [[id number]]  (Abnormal) Collected: 11/05/24 1442    Order Status: Completed Specimen: Wound from Toe, Left Foot Updated: 11/06/24 1047     Aerobic Bacterial Culture PRESUMPTIVE PSEUDOMONAS SPECIES  Few  Identification and susceptibility pending      Narrative:      Proximal margin left toe    Aerobic culture [[id number]]  (Abnormal) Collected: 11/05/24 1442    Order Status: Completed Specimen: Wound from Toe, Left Foot Updated: 11/06/24 1045     Aerobic Bacterial Culture GRAM NEGATIVE APARNA, NON-LACTOSE   Many  Identification and susceptibility pending        ENTEROCOCCUS SPECIES  Many  Identification and susceptibility pending      Narrative:      Distal left great toe    Culture, Anaerobe [[id number]] Collected: 11/02/24 1430    Order Status: Completed Specimen: Wound from Toe, Left Foot Updated: 11/06/24 1019     Anaerobic Culture No anaerobes isolated    AFB Culture & Smear [[id number]] Collected: 11/05/24 1442    Order Status:  Sent Specimen: Wound from Toe, Left Foot Updated: 11/06/24 0941    Stool culture [[id number]] Collected: 11/05/24 0028    Order Status: Completed Specimen: Stool Updated: 11/06/24 0900     Stool Culture Nothing significant to date    Gram stain [[id number]] Collected: 11/05/24 1442    Order Status: Completed Specimen: Wound from Toe, Left Foot Updated: 11/06/24 0831     Gram Stain Result No WBC's      No organisms seen    Narrative:      Proximal margin left toe    Gram stain [[id number]] Collected: 11/05/24 1442    Order Status: Completed Specimen: Wound from Toe, Left Foot Updated: 11/06/24 0825     Gram Stain Result Few WBC's      No organisms seen    Narrative:      Distal left great toe    Culture, Anaerobe [[id number]] Collected: 11/05/24 1442    Order Status: Sent Specimen: Wound from Toe, Left Foot Updated: 11/05/24 1654    Culture, Anaerobe [[id number]] Collected: 11/05/24 1442    Order Status: Sent Specimen: Wound from Toe, Left Foot Updated: 11/05/24 1640    Fungus culture [[id number]] Collected: 11/05/24 1442    Order Status: Sent Specimen: Wound from Toe, Left Foot Updated: 11/05/24 1637    Aerobic culture [[id number]]  (Abnormal)  (Susceptibility) Collected: 11/02/24 1430    Order Status: Completed Specimen: Wound from Toe, Left Foot Updated: 11/05/24 0802     Aerobic Bacterial Culture Results called to and read back by: Sol Michaels 11/04/2024  06:32      ENTEROBACTER CLOACAE  Moderate        METHICILLIN RESISTANT STAPHYLOCOCCUS AUREUS  Moderate        PSEUDOMONAS AERUGINOSA  Moderate      Clostridium difficile EIA [[id number]] Collected: 11/02/24 1636    Order Status: Completed Specimen: Stool Updated: 11/03/24 1202     C. diff Antigen Negative     C difficile Toxins A+B, EIA Negative     Comment: Testing not recommended for children <24 months old.       Narrative:      1 - 3 Days: If liquid stool unable to be collected in  community onset window (hospital day 1 - 3), the order will  be  discontinued automatically. Do not send formed stool for  testing    Fungus culture [[id number]] Collected: 11/02/24 4086    Order Status: Sent Specimen: Wound from Toe, Left Foot Updated: 11/02/24 8533

## 2024-11-07 NOTE — PLAN OF CARE
Problem: Adult Inpatient Plan of Care  Goal: Plan of Care Review  Outcome: Progressing  Goal: Patient-Specific Goal (Individualized)  Outcome: Progressing     Problem: Diabetes Comorbidity  Goal: Blood Glucose Level Within Targeted Range  Outcome: Progressing     Problem: Infection  Goal: Absence of Infection Signs and Symptoms  Outcome: Progressing     Problem: Wound  Goal: Optimal Coping  Outcome: Progressing     Problem: Skin Injury Risk Increased  Goal: Skin Health and Integrity  Outcome: Progressing     Problem: Acute Kidney Injury/Impairment  Goal: Fluid and Electrolyte Balance  Outcome: Progressing

## 2024-11-07 NOTE — PLAN OF CARE
Problem: Occupational Therapy  Goal: Occupational Therapy Goal  Description: Goals to be met by: 11/25/24     Patient will increase functional independence with ADLs by performing:    UE Dressing with [place].  LE Dressing with Minimal Assistance.with AD as needed  Grooming while seated at sink with Modified [place].  Toileting from bedside commode with Supervision for hygiene and clothing management.   Sitting in chair x60 minutes with Supervision.  Toilet transfer to bedside commode with Supervision.  Upper extremity exercise program x10 reps per handout, with supervision.    Outcome: Progressing   Patient recommended for moderate intensity level and DME TBD. Occupational therapy to see 5x/week.

## 2024-11-07 NOTE — PT/OT/SLP EVAL
Occupational Therapy Evaluation and Treatment    Name: Stephany Skinner  MRN: [id number]  Admitting Diagnosis: Osteomyelitis of great toe of left foot 2 Days Post-Op  Recent Surgery: Procedure(s) (LRB):  AMPUTATION, TOE- DISTAL HALLUX (Left) 2 Days Post-Op    Recommendations:     Discharge Recommendations: Moderate Intensity Therapy  Level of Assistance Recommended: 24 hours significant assistance  Discharge Equipment Recommendations: to be determined by next level of care  Barriers to discharge: Other (Comment) (patient not at PLOF of modified [facility] ADLs and IADls)    Assessment:     Stephany Skinner is a 89 y.o. female with a medical diagnosis of Osteomyelitis of great toe of left foot. She presents with performance deficits affecting function including weakness, impaired endurance, impaired self care skills, impaired functional mobility, gait instability, impaired balance, decreased lower extremity function, decreased safety awareness, pain, decreased ROM, impaired coordination, impaired skin, edema, orthopedic precautions, impaired joint extensibility. Patient said she is ready to get OOB to sit up or start moving around the room so she can walk to bathroom.     Rehab Prognosis: Good; patient would benefit from acute OT services to address these deficits and reach maximum level of function.    Plan:     Patient to be seen 5 x/week to address the above listed problems via self-care/home management, therapeutic activities, therapeutic exercises  Plan of Care Expires: 11/21/24  Plan of Care Reviewed with: patient    Subjective     Chief Complaint: left toe pain   Patient Comments/Goals: she wants to walk to bathroom if able   Pain/Comfort:  Pain Rating 1: 5/10  Location - Side 1: Left  Location 1: first toe  Pain Addressed 1: Reposition, Pre-medicate for activity, Distraction, Cessation of Activity    Patients cultural, spiritual, Sabianist conflicts given the current situation: no    Social History:  Living  Environment: Patient lives with their sister in a first floor apartment accessible with number of outside stair(s): 1 ramped , bed/bath on 1st  floor, and tub-shower combo  Prior Level of Function: Prior to admission, patient was modified independent.  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  Equipment Used at Home: walker, rolling, wheelchair, bath bench, grab bar, bedside commode  DME owned (not currently used): none  Assistance Upon Discharge: family    Objective:     Communicated with RN, prior to session. Patient found HOB elevated with peripheral IV, PureWick upon OT entry to room.    General Precautions: Standard, fall, contact, diabetic   Orthopedic Precautions:  (LLE heel WB in post-op boot)   Braces:  (darco shoes)    Respiratory Status: Room air    Occupational Performance    Gait belt applied - Yes    Bed Mobility:   Rolling/Turning to Right with minimum assistance  Scooting anteriorly to EOB to have both feet planted on floor: minimum assistance  Supine to sit from right side of bed with minimum assistance    Functional Mobility/Transfers:  Sit <> Stand Transfer with contact guard assistance with rolling walker  Bed <> Chair Transfer using Step Transfer technique with contact guard assistance with rolling walker  Functional Mobility: Patient walked 3-4 steps with RW with darco shoe donned on left with heell WB noted.     Activities of Daily Living:  Grooming: independence seated in chair to brush teeth, wash face, and comb hair.   Lower Body Dressing: maximal assistance to don right sock and then darco shoe later seated EOB     Cognitive/Visual Perceptual:  Cognitive/Psychosocial Skills:    -     Oriented to: Person, Place, Time, Situation  -     Follows Commands/attention: Follows multistep  commands  -     Communication: clear/fluent  -     Memory: No Deficits noted  -     Safety awareness/insight to disability: impaired due to need for precautions for  heel WB on left foot   -     Mood/Affect/Coping skills/emotional control: Appropriate to situation  Visual/Perceptual:    -     Intact    Physical Exam:  Balance:    -     Sitting: stand by assistance  -     Standing: contact guard assistance  Postural examination/scapula alignment:    -       Rounded shoulders  -       Forward head  Skin integrity: right great toe is painted today   Edema:  Mild left lower extremity   Sensation:    -       Intact  Motor Planning: Intact  Dominant hand: Right  Upper Extremity Range of Motion:     -       Right Upper Extremity: WNL  -       Left Upper Extremity: WNL  Upper Extremity Strength:    -       Right Upper Extremity: WNL  -       Left Upper Extremity: WNL   Strength:    -       Right Upper Extremity: WNL  -       Left Upper Extremity: WNL  Fine Motor Coordination:    -       Intact  Gross motor coordination:   WFL    AMPAC 6 Click ADL:  AMPAC Total Score: 18    Treatment & Education:  Educated on the importance of mobility to maximize recovery  Educated on the importance of OOB mobility within safe range in order to decrease adverse effects of prolonged bedrest  Educated on weight bearing status  Will continue to educate as needed  She will need education re: hip kit DME (reacher and sock aide) to help with LB dressing       Patient clear to stand pivot transfer with RN/PCT, assist x1  .    Patient left up in chair with all lines intact, call button in reach, and RN notified.    GOALS:   Multidisciplinary Problems       Occupational Therapy Goals          Problem: Occupational Therapy    Goal Priority Disciplines Outcome Interventions   Occupational Therapy Goal     OT, PT/OT Progressing    Description: Goals to be met by: 11/25/24     Patient will increase functional independence with ADLs by performing:    UE Dressing with Frontier.  LE Dressing with Minimal Assistance.with AD as needed  Grooming while seated at sink with Modified Frontier.  Toileting from  bedside commode with Supervision for hygiene and clothing management.   Sitting in chair x60 minutes with Supervision.  Toilet transfer to bedside commode with Supervision.  Upper extremity exercise program x10 reps per handout, with supervision.                         History:     Past Medical History:   Diagnosis Date    Anemia     Arthritis     Atrial fibrillation     Back pain     Bronchiectasis, uncomplicated     CHF (congestive heart failure)     Disorder of kidney and ureter     Early stage nonexudative age-related macular degeneration of both eyes 2018    GERD (gastroesophageal reflux disease)     Hyperlipidemia     Hypertension     Hypothyroidism     Obesity     JOY (obstructive sleep apnea)     Osteoporosis     Polyneuropathy     Pressure injury of dorsum of right foot, stage 2 12/01/2017    Rheumatoid arthritis     Urinary incontinence     Venous stasis ulcer of right calf limited to breakdown of skin with varicose veins 06/24/2024         Past Surgical History:   Procedure Laterality Date    CATARACT EXTRACTION W/  INTRAOCULAR LENS IMPLANT Right 12/12/2019    Procedure: EXTRACTION, CATARACT, WITH IOL INSERTION;  Surgeon: Michael Arellano MD;  Location: [facility];  Service: Ophthalmology;  Laterality: Right;    CATARACT EXTRACTION W/  INTRAOCULAR LENS IMPLANT Left 02/13/2020    Procedure: EXTRACTION, CATARACT, WITH IOL INSERTION;  Surgeon: Michael Arellano MD;  Location: [facility];  Service: Ophthalmology;  Laterality: Left;    CHOLECYSTECTOMY      ENDOSCOPIC ULTRASOUND OF UPPER GASTROINTESTINAL TRACT N/A 11/04/2020    Procedure: ULTRASOUND, ENDOSCOPIC, UPPER GI TRACT;  Surgeon: Thierry Saunders MD;  Location: [facility];  Service: Endoscopy;  Laterality: N/A;  covid 11/1 [facility]    HERNIA REPAIR      HYSTERECTOMY      knee replacemene Right 02/14/2007    deidra    PHACOEMULSIFICATION OF CATARACT Right 12/12/2019    Procedure: PHACOEMULSIFICATION, CATARACT;  Surgeon: Michael Arellano MD;  Location: [facility];  Service:  Ophthalmology;  Laterality: Right;    PHACOEMULSIFICATION OF CATARACT Left 02/13/2020    Procedure: PHACOEMULSIFICATION, CATARACT;  Surgeon: Michael Arellano MD;  Location: [facility];  Service: Ophthalmology;  Laterality: Left;    REPLACEMENT Left 09/18/2007    knee    THYROIDECTOMY      TOE AMPUTATION Left 11/5/2024    Procedure: AMPUTATION, TOE- DISTAL HALLUX;  Surgeon: Noemi Ley DPM;  Location: [facility] OR;  Service: Podiatry;  Laterality: Left;       Time Tracking:     OT Date of Treatment: 11/07/24  OT Start Time: 0934  OT Stop Time: 1015  OT Total Time (min): 41 min    Billable Minutes: Evaluation 15  and Self Care/Home Management 26     11/7/2024

## 2024-11-07 NOTE — PROGRESS NOTES
[facility] S[facility]  Podiatry  Progress Note    Patient Name: Stephany Skinner  MRN: [id number]  Admission Date: 11/2/2024  Hospital Length of Stay: 5 days  Attending Physician: Rodney Aguilera MD  Primary Care Provider: Pipo Flowers MD     Subjective:     Interval History:  11/05/2024 patient seen at bedside resting comfortably would like to discuss her surgical options for the treatment of her toe.  No new pedal complaints.    11/07/2024 Patient seen at bedside.  Niece and nephew present.  Not complaining of pain to feet but relates he has been having left knee pain from being in bed and getting stiff.         Scheduled Meds:   apixaban  5 mg Oral BID    colestipoL  3 g Oral BID    diclofenac sodium  2 g Topical (Top) Daily    furosemide  40 mg Oral Daily    hydrALAZINE  25 mg Oral Q12H    isosorbide mononitrate  60 mg Oral Daily    levothyroxine  88 mcg Oral Before breakfast    meropenem IV (PEDS and ADULTS)  2 g Intravenous Q8H    metoprolol succinate  25 mg Oral Daily    miconazole NITRATE 2 %   Topical (Top) BID    mupirocin   Nasal BID     Continuous Infusions:      PRN Meds:  Current Facility-Administered Medications:     acetaminophen, 650 mg, Oral, Q8H PRN    acetaminophen, 650 mg, Oral, Q4H PRN    albuterol-ipratropium, 3 mL, Nebulization, Q6H PRN    aluminum-magnesium hydroxide-simethicone, 30 mL, Oral, QID PRN    bisacodyL, 10 mg, Rectal, Daily PRN    glucagon (human recombinant), 1 mg, Intramuscular, PRN    glucose, 16 g, Oral, PRN    glucose, 24 g, Oral, PRN    insulin aspart U-100, 0-10 Units, Subcutaneous, QID (AC + HS) PRN    melatonin, 6 mg, Oral, Nightly PRN    naloxone, 0.02 mg, Intravenous, PRN    ondansetron, 4 mg, Intravenous, Q8H PRN    potassium bicarbonate, 35 mEq, Oral, PRN    potassium bicarbonate, 50 mEq, Oral, PRN    potassium bicarbonate, 60 mEq, Oral, PRN    potassium, sodium phosphates, 2 packet, Oral, PRN    potassium, sodium phosphates, 2 packet, Oral, PRN    potassium, sodium  phosphates, 2 packet, Oral, PRN    prochlorperazine, 5 mg, Intravenous, Q6H PRN    senna-docusate 8.6-50 mg, 1 tablet, Oral, Daily PRN    simethicone, 1 tablet, Oral, QID PRN    sodium chloride 0.9%, 10 mL, Intravenous, Q12H PRN    Flushing PICC/Midline Protocol, , , Until Discontinued **AND** sodium chloride 0.9%, 10 mL, Intravenous, Q12H PRN    traMADoL, 50 mg, Oral, Q6H PRN    Review of Systems   Constitutional:  Negative for activity change, appetite change, chills, fatigue and fever.   Respiratory:  Negative for cough and shortness of breath.    Cardiovascular:  Negative for chest pain and leg swelling.   Gastrointestinal:  Negative for diarrhea, nausea and vomiting.   Musculoskeletal:  Positive for arthralgias and myalgias.   Skin:  Positive for color change and wound.   Neurological:  Positive for numbness. Negative for weakness.        + paresthesia      Objective:     Vital Signs (Most Recent):  Temp: 98.6 °F (37 °C) (11/07/24 1108)  Pulse: 68 (11/07/24 1108)  Resp: 18 (11/07/24 1108)  BP: (!) 114/57 (11/07/24 1108)  SpO2: (!) 94 % (11/07/24 1108) Vital Signs (24h Range):  Temp:  [97 °F (36.1 °C)-99.2 °F (37.3 °C)] 98.6 °F (37 °C)  Pulse:  [60-70] 68  Resp:  [16-18] 18  SpO2:  [94 %-96 %] 94 %  BP: (114-193)/(57-84) 114/57     Weight: 103.5 kg (228 lb 2.8 oz)  Body mass index is 35.74 kg/m².    Physical Exam  Vitals and nursing note reviewed.   Constitutional:       General: She is not in acute distress.     Appearance: She is well-developed. She is not toxic-appearing or diaphoretic.      Comments: alert and oriented x 3.    Cardiovascular:      Pulses:           Dorsalis pedis pulses are 2+ on the right side and 2+ on the left side.        Posterior tibial pulses are 1+ on the right side and 1+ on the left side.   Pulmonary:      Effort: No respiratory distress.   Musculoskeletal:         General: No deformity.      Right ankle: No tenderness. No lateral malleolus, medial malleolus, AITF ligament, CF  ligament or posterior TF ligament tenderness.      Right Achilles Tendon: No defects. Hutchison's test negative.      Left ankle: No tenderness. No lateral malleolus, medial malleolus, AITF ligament, CF ligament or posterior TF ligament tenderness.      Left Achilles Tendon: No defects. Hutchison's test negative.      Right foot: No tenderness or bony tenderness.      Left foot: No tenderness or bony tenderness.      Comments: Muscle strength is 5/5 in all groups bilaterally.    There is equinus deformity bilateral with decreased dorsiflexion at the ankle joint bilateral.    Decreased first MPJ range of motion both weightbearing and nonweightbearing, no crepitus observed the first MP joint, + dorsal flag sign.    Feet:      Right foot:      Skin integrity: Callus and dry skin present.      Toenail Condition: Fungal disease present.     Left foot:      Skin integrity: Ulcer (see below), callus and dry skin present.      Toenail Condition: Fungal disease present.  Lymphadenopathy:      Comments: No lymphatic streaking     Skin:     General: Skin is warm and dry.      Coloration: Skin is not pale.      Findings: No rash.      Nails: There is no clubbing.   Neurological:      Sensory: Sensory deficit present.      Motor: No atrophy.      Comments: L   Psychiatric:         Attention and Perception: She is attentive.         Mood and Affect: Mood is not anxious. Affect is not inappropriate.         Speech: She is communicative. Speech is not slurred.         Behavior: Behavior is not combative.        11/07/2024    Incision well coapt to left distal hallux stump.  Collagen and vancomysin powder still visible to site.                 11/05/2024   L distal plantar 1st digit wound moderate serosanguinous drainage, necrotic and sloughing base, positive probe to bone, malodor. No fluctuance or crepitus, mild pain on palpation. (Image did not load)    L medial leg wound moderate serosanguinous drainage, fibro granular base,  "mildly macerated edges. No fluctuance, crepitus, or pain on palpation.      Laboratory:  A1C: No results for input(s): "HGBA1C" in the last 4320 hours.  CBC:   Recent Labs   Lab 11/06/24  0158   WBC 6.88   RBC 3.43*   HGB 11.3*   HCT 34.4*      *   MCH 32.9*   MCHC 32.8     CMP:   Recent Labs   Lab 11/07/24  0610   GLU 95   CALCIUM 8.5*   ALBUMIN 2.7*   PROT 6.2      K 3.0*   CO2 19*      BUN 11   CREATININE 0.9   ALKPHOS 43   ALT 16   AST 15   BILITOT 0.5     CRP: No results for input(s): "CRP" in the last 168 hours.  ESR:   Recent Labs   Lab 11/01/24  1548   SEDRATE 56*     Microbiology Results (last 7 days)       Procedure Component Value Units Date/Time    AFB Culture & Smear [[id number]] Collected: 11/05/24 1442    Order Status: Completed Specimen: Wound from Toe, Left Foot Updated: 11/07/24 1325     AFB CULTURE STAIN No acid fast bacilli seen.    Narrative:      Distal left great toe    Aerobic culture [[id number]]  (Abnormal)  (Susceptibility) Collected: 11/05/24 1442    Order Status: Completed Specimen: Wound from Toe, Left Foot Updated: 11/07/24 1155     Aerobic Bacterial Culture GRAM NEGATIVE APARNA, NON-LACTOSE   Many  Identification and susceptibility pending        ENTEROCOCCUS FAECALIS  Many        GRAM NEGATIVE APARNA  Rare  Identification and susceptibility pending      Narrative:      Distal left great toe    Aerobic culture [[id number]]  (Abnormal) Collected: 11/05/24 1442    Order Status: Completed Specimen: Wound from Toe, Left Foot Updated: 11/07/24 0844     Aerobic Bacterial Culture PRESUMPTIVE PSEUDOMONAS SPECIES  Few  Identification and susceptibility pending      Narrative:      Proximal margin left toe    Stool culture [[id number]] Collected: 11/05/24 0028    Order Status: Completed Specimen: Stool Updated: 11/07/24 0759     Stool Culture No Salmonella,Shigella,Vibrio,Campylobacter,Yersinia isolated.    Culture, Anaerobe [[id number]] Collected: 11/05/24 1442    " Order Status: Completed Specimen: Wound from Toe, Left Foot Updated: 11/07/24 0545     Anaerobic Culture Culture in progress    Narrative:      Proximal margin left toe    Culture, Anaerobe [[id number]] Collected: 11/05/24 1442    Order Status: Completed Specimen: Wound from Toe, Left Foot Updated: 11/07/24 0545     Anaerobic Culture Culture in progress    Narrative:      Distal left great toe    E. coli 0157 antigen [[id number]] Collected: 11/05/24 0028    Order Status: Completed Specimen: Stool Updated: 11/06/24 1152     Shiga Toxin 1 E.coli Negative     Shiga Toxin 2 E.coli Negative    Culture, Anaerobe [[id number]] Collected: 11/02/24 1430    Order Status: Completed Specimen: Wound from Toe, Left Foot Updated: 11/06/24 1019     Anaerobic Culture No anaerobes isolated    Gram stain [[id number]] Collected: 11/05/24 1442    Order Status: Completed Specimen: Wound from Toe, Left Foot Updated: 11/06/24 0831     Gram Stain Result No WBC's      No organisms seen    Narrative:      Proximal margin left toe    Gram stain [[id number]] Collected: 11/05/24 1442    Order Status: Completed Specimen: Wound from Toe, Left Foot Updated: 11/06/24 0825     Gram Stain Result Few WBC's      No organisms seen    Narrative:      Distal left great toe    Fungus culture [[id number]] Collected: 11/05/24 1442    Order Status: Sent Specimen: Wound from Toe, Left Foot Updated: 11/05/24 1637    Aerobic culture [[id number]]  (Abnormal)  (Susceptibility) Collected: 11/02/24 1430    Order Status: Completed Specimen: Wound from Toe, Left Foot Updated: 11/05/24 0802     Aerobic Bacterial Culture Results called to and read back by: Sol Michaels 11/04/2024  06:32      ENTEROBACTER CLOACAE  Moderate        METHICILLIN RESISTANT STAPHYLOCOCCUS AUREUS  Moderate        PSEUDOMONAS AERUGINOSA  Moderate      Clostridium difficile EIA [[id number]] Collected: 11/02/24 1636    Order Status: Completed Specimen: Stool Updated: 11/03/24 1202     C. diff  Antigen Negative     C difficile Toxins A+B, EIA Negative     Comment: Testing not recommended for children <24 months old.       Narrative:      1 - 3 Days: If liquid stool unable to be collected in  community onset window (hospital day 1 - 3), the order will  be discontinued automatically. Do not send formed stool for  testing    Fungus culture [[id number]] Collected: 11/02/24 1330    Order Status: Sent Specimen: Wound from Toe, Left Foot Updated: 11/02/24 9388            Diagnostic Results:    Assessment/Plan:     Active Diagnoses:    Diagnosis Date Noted POA    PRINCIPAL PROBLEM:  Osteomyelitis of great toe of left foot [M86.9] 11/02/2024 Yes    Dermatitis associated with moisture [L30.8] 11/06/2024 Yes    Diarrhea [R19.7] 11/03/2024 Yes    Cellulitis of toe of left foot [L03.032] 11/02/2024 Yes    Stage 3a chronic kidney disease (CKD) [N18.31] 02/28/2024 Yes    Candidiasis, intertrigo [B37.2] 08/31/2023 Yes    Essential hypertension [I10] 12/09/2022 Yes    COPD (chronic obstructive pulmonary disease) [J44.9] 12/09/2022 Yes    Type 2 diabetes mellitus with circulatory disorder, 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    Discussed wound healing cycle, skin integrity, ways to care for skin.Counseled patient on the effects of biomechanical pressure, edema and blood glucose on healing. They verbalizes understanding that it can increase the chances of delayed healing and this prolonged exposure leads to infection or progression of infection which subsequently can result in loss of  limb.    Adequate vitamin supplementation, protein intake, and hydration - discussed with patient      Surgical site stable. DSD applied and can remain intact until Monday if still inpatient .  From podiatric perspective wounds can now be managed outpatient or at rehab facility.     Proximal margin growing few pseudomonas, may be contaminant but pathology pending.  Will defer abx plan to ID    Short-term goals include maintaining good offloading and minimizing bioburden, promoting granulation and epithelialization to healing.  Long-term goals include keeping the wound healed by good offloading and medical management under the direction of internist.    We will continue to follow and work in partnership with treatment team on how to best treat patient concern and diagnosis.      Noemi Ley DPM  Podiatry  [facility]

## 2024-11-07 NOTE — SUBJECTIVE & OBJECTIVE
Interval History: Remained afebrile overnight. No new complaints.    Review of Systems   Constitutional:  Negative for chills, fatigue and fever.   Respiratory:  Negative for cough and shortness of breath.    All other systems reviewed and are negative.    Objective:     Vital Signs (Most Recent):  Temp: 98.6 °F (37 °C) (11/07/24 1108)  Pulse: 68 (11/07/24 1108)  Resp: 18 (11/07/24 1108)  BP: (!) 114/57 (11/07/24 1108)  SpO2: (!) 94 % (11/07/24 1108) Vital Signs (24h Range):  Temp:  [97 °F (36.1 °C)-99.2 °F (37.3 °C)] 98.6 °F (37 °C)  Pulse:  [60-70] 68  Resp:  [16-18] 18  SpO2:  [94 %-96 %] 94 %  BP: (114-193)/(57-84) 114/57     Weight: 103.5 kg (228 lb 2.8 oz)  Body mass index is 35.74 kg/m².    Estimated Creatinine Clearance: 53.5 mL/min (based on SCr of 0.9 mg/dL).     Physical Exam  Constitutional:       General: She is not in acute distress.     Appearance: She is well-developed.   HENT:      Head: Normocephalic and atraumatic.   Eyes:      Conjunctiva/sclera: Conjunctivae normal.      Pupils: Pupils are equal, round, and reactive to light.   Cardiovascular:      Rate and Rhythm: Normal rate and regular rhythm.      Heart sounds: Normal heart sounds.   Pulmonary:      Effort: Pulmonary effort is normal. No respiratory distress.      Breath sounds: Normal breath sounds.   Abdominal:      General: Bowel sounds are normal. There is no distension.      Palpations: Abdomen is soft.   Musculoskeletal:         General: Normal range of motion.      Cervical back: Normal range of motion and neck supple.   Skin:     General: Skin is warm and dry.      Comments: R 1st toe- skin ulceration on plantar aspect. No erythema or drainage.  L foot s/p 1st toe amputation- clean dressing in place.   Neurological:      General: No focal deficit present.      Mental Status: She is alert and oriented to person, place, and time.      Cranial Nerves: No cranial nerve deficit.   Psychiatric:         Mood and Affect: Mood normal.          Behavior: Behavior normal.          Significant Labs: Blood Culture:   Recent Labs   Lab 11/01/24  1650 11/01/24  1652   LABBLOO No growth after 5 days. No growth after 5 days.     Wound Culture:   Recent Labs   Lab 11/02/24  1430 11/05/24  1442   LABAERO Results called to and read back by: Sol Michaels 11/04/2024  06:32  ENTEROBACTER CLOACAE  Moderate  *  METHICILLIN RESISTANT STAPHYLOCOCCUS AUREUS  Moderate  *  PSEUDOMONAS AERUGINOSA  Moderate  * GRAM NEGATIVE APARNA, NON-LACTOSE   Many  Identification and susceptibility pending  *  ENTEROCOCCUS FAECALIS  Many  *  GRAM NEGATIVE APARNA  Rare  Identification and susceptibility pending  *  PRESUMPTIVE PSEUDOMONAS SPECIES  Few  Identification and susceptibility pending  *     All pertinent labs within the past 24 hours have been reviewed.    Significant Imaging: I have reviewed all pertinent imaging results/findings within the past 24 hours.

## 2024-11-07 NOTE — ASSESSMENT & PLAN NOTE
Wound cx 11/2 grew enterobacter cloacae, pseudomonas and MRSA. S/p amputation L great toe 11/5. Culture from prox margins growing presumptive pseudomonas consistent with residual osteo.        Recommendations:  -- continue meropenem for now.   --stop vancomycin  --f/u final cultures and path from clean margins.  --will need placement for IV antibiotics.

## 2024-11-07 NOTE — CARE UPDATE
I was notified by the patient's nurse that  this am   Restarted home Imdur 60mg daily, now  Start Hydralazine 25mg po BID, first now  She apparently has MG, so avoiding Norvasc      Current BP meds:    furosemide tablet 40 mg, 40 mg, Oral, Daily     metoprolol succinate (TOPROL-XL) 24 hr tablet 25 mg, 25 mg, Oral, Daily     -She did have some PUJA, so it appears her home Aldactone and Losartan were initially ordered, then stopped    Date/Time Temp Temp #2 Pulse Resp BP Patient Position MAP (mmHg) SpO2 Weight Flow (L/min) (Oxygen Therapy) Oxygen Concentration (%) Device (Oxygen Therapy) Arterial Line BP Arterial Line BP 2 Temp #2 [facility]   11/07/24 0449 98.4 °F (36.9 °C) -- 60 18 193/84 Abnormal  Lying 121 95 % -- -- -- -- -- -- -- MG   11/06/24 2350 97 °F (36.1 °C) -- 62 18 150/67 Abnormal  Lying 97 94 % Abnormal  -- -- -- -- -- -- -- TH   11/06/24 1932 99.2 °F (37.3 °C) -- 63 18 156/71 Abnormal  Lying 107 95 % -- -- -- -- -- -- -- MG   11/06/24 1915 -- -- -- -- -- -- -- -- -- -- -- room air -- -- -- DD   11/06/24 1638 98.6 °F (37 °C) -- 70 18 151/68 Abnormal  Lying 98 94 % Abnormal  -- -- -- -- -- -- -- CC   11/06/24 1144 97.9 °F (36.6 °C) -- 59 Abnormal  18 140/65 Abnormal  Lying 94 96 % --

## 2024-11-07 NOTE — PT/OT/SLP PROGRESS
Physical Therapy Treatment    Patient Name:  Stephany Skinner   MRN:  [id number]    Recommendations:     Discharge Recommendations: Moderate Intensity Therapy  Discharge Equipment Recommendations: bedside commode  Barriers to discharge home:  Pt with increased fall risks and decreased functional mobility/ambulation with L heel WB at this time.    Assessment:     Stephany Skinner is a 89 y.o. female admitted with a medical diagnosis of Osteomyelitis of great toe of left foot.  She presents with the following impairments/functional limitations: weakness, impaired endurance, impaired self care skills, gait instability, impaired functional mobility, impaired balance, impaired skin.    Rehab Prognosis: Good; patient would benefit from acute skilled PT services to address these deficits and reach maximum level of function.    Recent Surgery: Procedure(s) (LRB):  AMPUTATION, TOE- DISTAL HALLUX (Left) 2 Days Post-Op    Plan:     During this hospitalization, patient to be seen 5 x/week to address the identified rehab impairments via gait training, therapeutic activities, therapeutic exercises, neuromuscular re-education, wheelchair management/training and progress toward the following goals:    Plan of Care Expires:  11/20/24    Subjective     Chief Complaint: loose stool  Patient/Family Comments/goals: Pt agreeable to therapy.   Pain/Comfort:  Pain Rating 1: 5/10 (L knee pain)  Pain Addressed 1: Reposition, Distraction, Cessation of Activity (Pt declined pain meds, reported opportunity to get out of bed and moving around helped.)      Objective:     Patient found up in chair reclined with BLE elevated on pillow with peripheral IV, telemetry, B post-op shoes, and Versette female external catheter upon PT entry to room.     General Precautions: Standard, fall, contact  Orthopedic Precautions:  (LLE heel WB in post-op boot)  Braces:  (B post-op shoes)  Respiratory Status: Room air     Functional Mobility:  Pt feeling much better  after getting OOB>chair with OT, however still with loose stool limiting tx session.  PT will continue to follow.   Bed Mobility:     Scooting: minimum assistance  Sit to Supine: minimum assistance and moderate assistance with LE  Transfers:     Sit to Stand: minimum assistance and moderate assistance with rolling walker x2 trials from low chair and EOB; Pt required extra time and momentum to perform sit>stand.    Chair to bed: contact guard assistance with  rolling walker  using  Step Transfer  Gait: Pt ambulated ~12 ft and ~6 ft with CGA using RW, B post-op shoes, and L heel WB.  Pt required min VC's to maintain L heel WB.  Pt with decreased weight shifting, decreased foot clearance, decreased step length, and decreased lluvia.    Balance: Pt with fair dynamic standing balance.       AM-PAC 6 CLICK MOBILITY  Turning over in bed (including adjusting bedclothes, sheets and blankets)?: 4  Sitting down on and standing up from a chair with arms (e.g., wheelchair, bedside commode, etc.): 2  Moving from lying on back to sitting on the side of the bed?: 3  Moving to and from a bed to a chair (including a wheelchair)?: 3  Need to walk in hospital room?: 3  Climbing 3-5 steps with a railing?: 1  Basic Mobility Total Score: 16       Treatment & Education:  Tx session limited 2* pt with loose stool.  Pt unable to ambulate further to get to the bathroom and unable to place onto bed pan quickly enough, bowel incontinence with brief in place.  Pt reported still going, informed to call for nursing assistance once finished for diaper change.  Pt verbalized good understanding.       Patient left HOB elevated and BLE elevated on pillow with all lines intact, call button in reach, and bed alarm on.  Tray table close by.     GOALS:   Multidisciplinary Problems       Physical Therapy Goals          Problem: Physical Therapy    Goal Priority Disciplines Outcome Interventions   Physical Therapy Goal     PT, PT/OT Progressing     Description: Goals to be met by: 24     Patient will increase functional independence with mobility by performin. Supine to sit with Mod I  2. Rolling to Left and Right with Mod I  3. Sit to stand transfer with Mod I and adherence to L LE heel WB precautions and RW  4. W/C mobility x100 ft with Mod I using BUE  5. Lower extremity exercise program x20 reps per handout, with independence  6. Gait x50 ft with mod I using RW, B post-op shoes, and L heel WB                         Time Tracking:     PT Received On: 24  PT Start Time: 1018     PT Stop Time: 1031  PT Total Time (min): 13 min     Billable Minutes: Gait Training 13 min    [id number] (13 min - 1 TE)   Pt educated/provided HEP for supine/seated LE therex, encouraged to perform supine LE therex when in bed as tolerated.  Pt performed supine BLE therex x10 reps: AP, QS, GS, HS, hip IR/ER, and hip abd/add.  Pt left R sidelying and BLE elevated on pillow.           PT/PTA: PT     Number of PTA visits since last PT visit: 0     2024

## 2024-11-07 NOTE — PROGRESS NOTES
[facility]  Infectious Disease  Progress Note    Patient Name: Stephany Skinner  MRN: [id number]  Admission Date: 11/2/2024  Length of Stay: 5 days  Attending Physician: Rodney Aguilera MD  Primary Care Provider: Pipo Flowers MD    Isolation Status: Contact  Assessment/Plan:      ID  * Osteomyelitis of great toe of left foot  Wound cx 11/2 grew enterobacter cloacae, pseudomonas and MRSA. S/p amputation L great toe 11/5. Culture from prox margins growing presumptive pseudomonas consistent with residual osteo.    Recommendations:  -- continue meropenem for now.   -- stop vancomycin  --f/u final cultures and path from clean margins.  --will need placement for IV antibiotics. Anticipate 6 wks.  --continue local woundcare    T2DM  --tight glucose control to ensure optimal wound healing    Anticipated Disposition: tbd    Thank you for your consult. I will follow-up with patient. Please contact us if you have any additional questions.    Lashaun Cr MD  Infectious Disease  [facility] - [place], [place] - [facility]    Subjective:     Principal Problem:Osteomyelitis of great toe of left foot    HPI: Ms. Skinner is an 88y/o F with myasthenia gravis, T2DM, AFib  HFpEF, COPD, hypertension, hyperlipidemia, CKD 3, rheumatoid arthritis, who presented with diabetic toe ulcer of L great toe and was admitted for management of acute osteomyelitis.     In the ED, the patient was hemodynamically stable. Labs were remarkable for macrocytic anemia (11.2), elevated ESR (66), negative lactic acid (1.4). MRI recealed high likelihood of L great toe osteo.    Wound cx from 11/2 grew enterobacter cloacae, pseudomonas and MRSA. Underwent L great toe amputation 11/5.    Id consulted for: Polymicrobial infection of toe with MRSA, Pseudomonas and Enterobacter. No s/p amputation. Abx LOT and d/c recs   Interval History: Remained afebrile overnight. No new complaints.    Review of Systems   Constitutional:  Negative for chills, fatigue and  fever.   Respiratory:  Negative for cough and shortness of breath.    All other systems reviewed and are negative.    Objective:     Vital Signs (Most Recent):  Temp: 98.6 °F (37 °C) (11/07/24 1108)  Pulse: 68 (11/07/24 1108)  Resp: 18 (11/07/24 1108)  BP: (!) 114/57 (11/07/24 1108)  SpO2: (!) 94 % (11/07/24 1108) Vital Signs (24h Range):  Temp:  [97 °F (36.1 °C)-99.2 °F (37.3 °C)] 98.6 °F (37 °C)  Pulse:  [60-70] 68  Resp:  [16-18] 18  SpO2:  [94 %-96 %] 94 %  BP: (114-193)/(57-84) 114/57     Weight: 103.5 kg (228 lb 2.8 oz)  Body mass index is 35.74 kg/m².    Estimated Creatinine Clearance: 53.5 mL/min (based on SCr of 0.9 mg/dL).     Physical Exam  Constitutional:       General: She is not in acute distress.     Appearance: She is well-developed.   HENT:      Head: Normocephalic and atraumatic.   Eyes:      Conjunctiva/sclera: Conjunctivae normal.      Pupils: Pupils are equal, round, and reactive to light.   Cardiovascular:      Rate and Rhythm: Normal rate and regular rhythm.      Heart sounds: Normal heart sounds.   Pulmonary:      Effort: Pulmonary effort is normal. No respiratory distress.      Breath sounds: Normal breath sounds.   Abdominal:      General: Bowel sounds are normal. There is no distension.      Palpations: Abdomen is soft.   Musculoskeletal:         General: Normal range of motion.      Cervical back: Normal range of motion and neck supple.   Skin:     General: Skin is warm and dry.      Comments: R 1st toe- skin ulceration on plantar aspect. No erythema or drainage.  L foot s/p 1st toe amputation- clean dressing in place.   Neurological:      General: No focal deficit present.      Mental Status: She is alert and oriented to person, place, and time.      Cranial Nerves: No cranial nerve deficit.   Psychiatric:         Mood and Affect: Mood normal.         Behavior: Behavior normal.          Significant Labs: Blood Culture:   Recent Labs   Lab 11/01/24  1650 11/01/24  1652   LABBLOO No growth  after 5 days. No growth after 5 days.     Wound Culture:   Recent Labs   Lab 11/02/24  1430 11/05/24  1442   LABAERO Results called to and read back by: Sol Michaels 11/04/2024  06:32  ENTEROBACTER CLOACAE  Moderate  *  METHICILLIN RESISTANT STAPHYLOCOCCUS AUREUS  Moderate  *  PSEUDOMONAS AERUGINOSA  Moderate  * GRAM NEGATIVE APARNA, NON-LACTOSE   Many  Identification and susceptibility pending  *  ENTEROCOCCUS FAECALIS  Many  *  GRAM NEGATIVE APARNA  Rare  Identification and susceptibility pending  *  PRESUMPTIVE PSEUDOMONAS SPECIES  Few  Identification and susceptibility pending  *     All pertinent labs within the past 24 hours have been reviewed.    Significant Imaging: I have reviewed all pertinent imaging results/findings within the past 24 hours.

## 2024-11-07 NOTE — PROGRESS NOTES
[facility] Medicine  Progress Note    Patient Name: Stephany Skinner  MRN: [id number]  Patient Class: IP- Inpatient   Admission Date: 11/2/2024  Length of Stay: 5 days  Attending Physician: Rodney Aguilera MD  Primary Care Provider: Pipo Flowers MD        Subjective:     Principal Problem:Osteomyelitis of great toe of left foot        HPI:  This is an 89-year-old female with a past medical history of AFib (on Eliquis), HFpEF (EF: 50%), COPD, hypertension, type 2 diabetes, hyperlipidemia, CKD 3, rheumatoid arthritis, myasthenia gravis, obesity, who presents with a foot wound.      Patient presents for evaluation of worsening left 1st toe swelling and redness.  She developed this about 3 weeks prior to presentation, and has been following up with wound care.  She noted that her toe looked more swollen, and was more painful on the day of presentation. Her wound care nurse advised her to present to the ED for further evaluation. Patient denies fevers, chills,  but endorses drainage from the toe.     In the ED, the patient was hemodynamically stable.  Labs were remarkable for macrocytic anemia (11.2), elevated ESR (66), negative lactic acid (1.4).  Left foot x-ray showed degenerative changes with soft tissue swelling overlying the dorsum and medial border of the left forefoot.  Left lower extremity arterial ultrasound showed findings suggestive of 50-75% of peroneal artery stenosis, and inflow stenosis in the common femoral artery.  Patient was given vancomycin, Zosyn.  She was admitted for further management.    Overview/Hospital Course:  #Infection of toe; MRI did show signs of early osteomyelitis distal phalanx LEFT great toe. Pending Podiatry and VascSx eval/recs. Continuing V+Z for now. GNR in deep wound culture. Got contrast, to preserve renal function, will reduce from V+Z to V+ceftriaxone (non-diabetic, pseudomonas less likely, however switch to cefepime if results). Will switch from  Apixaban to heparin gtt in preparation of procedures. C diff negative.    Unlikely need for revascularization per VascSx. Patient NPO for Podiatry to proceed with procedure. Polymicrobial infection of toe with MRSA, Pseudomonas and Enterobacter. No s/p amputation- ID consult for Abx LOT and d/c recs. PT OT, suspect will need SNF then HH.       Interval History:  NAEON.  No new issues.   CC- foot pain  All questions answered and updates on care given.       ROS:  General: Negative for fevers   Cardiac: Negative for chest pain   Pulmonary: Negative for wheezing  GI: Negative for abdominal distention   +wound     Vitals:    11/06/24 1932 11/06/24 2350 11/07/24 0449 11/07/24 0741   BP: (!) 156/71 (!) 150/67 (!) 193/84 (!) 129/57   BP Location: Left arm Left arm  Left arm   Patient Position: Lying Lying Lying Lying   Pulse: 63 62 60 65   Resp: 18 18 18 16   Temp: 99.2 °F (37.3 °C) 97 °F (36.1 °C) 98.4 °F (36.9 °C) 98.4 °F (36.9 °C)   TempSrc: Oral Oral Oral Oral   SpO2: 95% (!) 94% 95% 95%   Weight:       Height:              Body mass index is 35.74 kg/m².      PHYSICAL EXAM:  GENERAL APPEARANCE: alert and cooperative.     HEAD: NC/AT  CARDIAC: There is no cyanosis or pallor.   LUNGS: No apparent wheezing or stridor.  ABDOMEN: Non-distended. No guarding.  MSK: No joint erythema or tenderness.   EXTREMITIES: No significant new deformity or new joint abnormality.   NEUROLOGICAL: CN II-XII grossly intact.   SKIN: No lesions or eruptions.+wound Left 1 toe  PSYCHIATRIC: No tangential speech. No Hyperactive features.        Recent Results (from the past 24 hours)   POCT glucose    Collection Time: 11/06/24  8:38 AM   Result Value Ref Range    POCT Glucose 122 (H) 70 - 110 mg/dL   APTT    Collection Time: 11/06/24  9:03 AM   Result Value Ref Range    aPTT 48.4 (H) 21.0 - 32.0 sec   POCT glucose    Collection Time: 11/06/24 11:30 AM   Result Value Ref Range    POCT Glucose 111 (H) 70 - 110 mg/dL   POCT glucose    Collection  Time: 11/06/24  7:32 PM   Result Value Ref Range    POCT Glucose 96 70 - 110 mg/dL   VANCOMYCIN, TROUGH    Collection Time: 11/07/24  1:02 AM   Result Value Ref Range    Vancomycin-Trough 18.9 10.0 - 22.0 ug/mL   Comprehensive Metabolic Panel    Collection Time: 11/07/24  6:10 AM   Result Value Ref Range    Sodium 139 136 - 145 mmol/L    Potassium 3.0 (L) 3.5 - 5.1 mmol/L    Chloride 110 95 - 110 mmol/L    CO2 19 (L) 23 - 29 mmol/L    Glucose 95 70 - 110 mg/dL    BUN 11 8 - 23 mg/dL    Creatinine 0.9 0.5 - 1.4 mg/dL    Calcium 8.5 (L) 8.7 - 10.5 mg/dL    Total Protein 6.2 6.0 - 8.4 g/dL    Albumin 2.7 (L) 3.5 - 5.2 g/dL    Total Bilirubin 0.5 0.1 - 1.0 mg/dL    Alkaline Phosphatase 43 40 - 150 U/L    AST 15 10 - 40 U/L    ALT 16 10 - 44 U/L    eGFR >60 >60 mL/min/1.73 m^2    Anion Gap 10 8 - 16 mmol/L   POCT glucose    Collection Time: 11/07/24  7:43 AM   Result Value Ref Range    POCT Glucose 102 70 - 110 mg/dL       Microbiology Results (last 7 days)       Procedure Component Value Units Date/Time    Stool culture [[id number]] Collected: 11/05/24 0028    Order Status: Completed Specimen: Stool Updated: 11/07/24 0759     Stool Culture No Salmonella,Shigella,Vibrio,Campylobacter,Yersinia isolated.    Culture, Anaerobe [[id number]] Collected: 11/05/24 1442    Order Status: Completed Specimen: Wound from Toe, Left Foot Updated: 11/07/24 0545     Anaerobic Culture Culture in progress    Narrative:      Proximal margin left toe    Culture, Anaerobe [[id number]] Collected: 11/05/24 1442    Order Status: Completed Specimen: Wound from Toe, Left Foot Updated: 11/07/24 0545     Anaerobic Culture Culture in progress    Narrative:      Distal left great toe    E. coli 0157 antigen [[id number]] Collected: 11/05/24 0028    Order Status: Completed Specimen: Stool Updated: 11/06/24 1152     Shiga Toxin 1 E.coli Negative     Shiga Toxin 2 E.coli Negative    Aerobic culture [[id number]]  (Abnormal) Collected: 11/05/24 1442    Order  Status: Completed Specimen: Wound from Toe, Left Foot Updated: 11/06/24 1047     Aerobic Bacterial Culture PRESUMPTIVE PSEUDOMONAS SPECIES  Few  Identification and susceptibility pending      Narrative:      Proximal margin left toe    Aerobic culture [[id number]]  (Abnormal) Collected: 11/05/24 1442    Order Status: Completed Specimen: Wound from Toe, Left Foot Updated: 11/06/24 1045     Aerobic Bacterial Culture GRAM NEGATIVE APARNA, NON-LACTOSE   Many  Identification and susceptibility pending        ENTEROCOCCUS SPECIES  Many  Identification and susceptibility pending      Narrative:      Distal left great toe    Culture, Anaerobe [[id number]] Collected: 11/02/24 1430    Order Status: Completed Specimen: Wound from Toe, Left Foot Updated: 11/06/24 1019     Anaerobic Culture No anaerobes isolated    AFB Culture & Smear [[id number]] Collected: 11/05/24 1442    Order Status: Sent Specimen: Wound from Toe, Left Foot Updated: 11/06/24 0941    Gram stain [[id number]] Collected: 11/05/24 1442    Order Status: Completed Specimen: Wound from Toe, Left Foot Updated: 11/06/24 0831     Gram Stain Result No WBC's      No organisms seen    Narrative:      Proximal margin left toe    Gram stain [[id number]] Collected: 11/05/24 1442    Order Status: Completed Specimen: Wound from Toe, Left Foot Updated: 11/06/24 0825     Gram Stain Result Few WBC's      No organisms seen    Narrative:      Distal left great toe    Fungus culture [[id number]] Collected: 11/05/24 1442    Order Status: Sent Specimen: Wound from Toe, Left Foot Updated: 11/05/24 1637    Aerobic culture [[id number]]  (Abnormal)  (Susceptibility) Collected: 11/02/24 1430    Order Status: Completed Specimen: Wound from Toe, Left Foot Updated: 11/05/24 0802     Aerobic Bacterial Culture Results called to and read back by: Sol Michaels 11/04/2024  06:32      ENTEROBACTER CLOACAE  Moderate        METHICILLIN RESISTANT STAPHYLOCOCCUS AUREUS  Moderate         PSEUDOMONAS AERUGINOSA  Moderate      Clostridium difficile EIA [[id number]] Collected: 11/02/24 1636    Order Status: Completed Specimen: Stool Updated: 11/03/24 1202     C. diff Antigen Negative     C difficile Toxins A+B, EIA Negative     Comment: Testing not recommended for children <24 months old.       Narrative:      1 - 3 Days: If liquid stool unable to be collected in  community onset window (hospital day 1 - 3), the order will  be discontinued automatically. Do not send formed stool for  testing    Fungus culture [[id number]] Collected: 11/02/24 1330    Order Status: Sent Specimen: Wound from Toe, Left Foot Updated: 11/02/24 1539                        Assessment/Plan:      * Osteomyelitis of great toe of left foot  Acute OM  MRI did show signs of early osteomyelitis distal phalanx LEFT great toe.   Pending Podiatry and VascSx eval/recs.   Continuing V+Z for now.   MRSA and enterobacter  Got contrast, to preserve renal function, will reduce from V+Z to V+ceftriaxone (non-diabetic, pseudomonas less likely, however switch to cefepime if results).       Chronic atrial fibrillation  [id number] Score: 4. The patients heart rate in the last 24 hours is as follows:  Pulse  Min: 52  Max: 70     Antiarrhythmics  metoprolol succinate (TOPROL-XL) 24 hr tablet 25 mg, Daily, Oral    Anticoagulants ; Will switch from Apixaban to heparin gtt in preparation of procedures.   heparin 25,000 units in dextrose 5% 250 mL (100 units/mL) infusion LOW INTENSITY nomogram - [facility], Continuous, Intravenous  heparin 25,000 units in dextrose 5% (100 units/ml) IV bolus from bag LOW INTENSITY nomogram - [facility], As needed (PRN), Intravenous  heparin 25,000 units in dextrose 5% (100 units/ml) IV bolus from bag LOW INTENSITY nomogram - [facility], As needed (PRN), Intravenous    Plan  - Replete lytes with a goal of K>4, Mg >2  - Patient is anticoagulated, see medications listed above.  - Patient's afib is currently controlled        Diarrhea  Chronic  given >3w, however no workup done  will check cdiff and chronic stool studies  C diff negative        Cellulitis of toe of left foot  Concern for cellulitis/possible underlying osteomyelitis     Plan:   Continue vancomycin/zosyn   Follow up on cultures - growing MRSA and enterobacter  Consult podiatry   Consult vascular surgery   MRI left foot ordered - early osteomyelitis noted  - Possibly amputation vs IV antibiotics     Stage 3a chronic kidney disease (CKD)  Creatine stable for now. BMP reviewed- noted Estimated Creatinine Clearance: 36.9 mL/min (based on SCr of 1.3 mg/dL). according to latest data. Based on current GFR, CKD stage is stage 3 - GFR 30-59.  Monitor UOP and serial BMP and adjust therapy as needed. Renally dose meds. Avoid nephrotoxic medications and procedures.    Candidiasis, intertrigo  Miconazole BID       COPD (chronic obstructive pulmonary disease)  No acute issues. DuoNebs PRN     Essential hypertension  Patients blood pressure range in the last 24 hours was: BP  Min: 116/70  Max: 163/73.The patient's inpatient anti-hypertensive regimen is listed below:  Current Antihypertensives  furosemide tablet 40 mg, Daily, Oral  losartan tablet 25 mg, 2 times daily, Oral  metoprolol succinate (TOPROL-XL) 24 hr tablet 25 mg, Daily, Oral  spironolactone tablet 25 mg, Every morning, Oral    Plan  - BP is controlled, no changes needed to their regimen    Type 2 diabetes mellitus with circulatory disorder, without long-term current use of insulin  Lab Results   Component Value Date    HGBA1C 5.8 (H) 11/29/2023     Glycemic protocol   MDSS      Chronic diastolic congestive heart failure  Continue home medications     PUJA (acute kidney injury)  PUJA is likely due to pre-renal azotemia due to infection.   Baseline creatinine is  .09 . Most recent creatinine and eGFR are listed below.    Recent Labs     11/01/24  1548 11/02/24  0505 11/04/24  0215   CREATININE 1.3 0.9 1.1   EGFRNORACEVR 40* >60 48*        Plan  -  PUJA is improving  - Avoid nephrotoxins and renally dose meds for GFR listed above  - Monitor urine output, serial BMP, and adjust therapy as needed  - Cr 1.3 on admit, now 0.9 after fluids. RESOLVED    Myasthenia gravis, adult form  Not on any medications. Monitor   Follow up with neurology       Hyperlipidemia  Continue statin       Hypothyroidism  Continue synthroid         VTE Risk Mitigation (From admission, onward)           Ordered     apixaban tablet 5 mg  2 times daily         11/06/24 0940     IP VTE HIGH RISK PATIENT  Once         11/05/24 1551     Place sequential compression device  Until discontinued         11/05/24 1551     Place sequential compression device  Until discontinued         11/02/24 0252                    Discharge Planning   MADELYN:      Code Status: Full Code   Is the patient medically ready for discharge?:     Reason for patient still in hospital (select all that apply): Patient trending condition and Treatment  Discharge Plan A: Skilled Nursing Facility   Discharge Delays: (!) Post-Acute Set-up              Rodney Aguilera MD  Department of Hospital Medicine   [facility]

## 2024-11-08 LAB
ALBUMIN SERPL BCP-MCNC: 2.6 G/DL (ref 3.5–5.2)
ALP SERPL-CCNC: 39 U/L (ref 40–150)
ALT SERPL W/O P-5'-P-CCNC: 16 U/L (ref 10–44)
ANION GAP SERPL CALC-SCNC: 9 MMOL/L (ref 8–16)
AST SERPL-CCNC: 12 U/L (ref 10–40)
BACTERIA SPEC AEROBE CULT: ABNORMAL
BILIRUB SERPL-MCNC: 0.3 MG/DL (ref 0.1–1)
BUN SERPL-MCNC: 14 MG/DL (ref 8–23)
CALCIUM SERPL-MCNC: 8.3 MG/DL (ref 8.7–10.5)
CHLORIDE SERPL-SCNC: 109 MMOL/L (ref 95–110)
CO2 SERPL-SCNC: 23 MMOL/L (ref 23–29)
CREAT SERPL-MCNC: 0.9 MG/DL (ref 0.5–1.4)
ELASTASE 1, FECAL: 379 MCG/G
EST. GFR  (NO RACE VARIABLE): >60 ML/MIN/1.73 M^2
GLUCOSE SERPL-MCNC: 99 MG/DL (ref 70–110)
POCT GLUCOSE: 110 MG/DL (ref 70–110)
POCT GLUCOSE: 118 MG/DL (ref 70–110)
POCT GLUCOSE: 144 MG/DL (ref 70–110)
POCT GLUCOSE: 96 MG/DL (ref 70–110)
POTASSIUM SERPL-SCNC: 3.9 MMOL/L (ref 3.5–5.1)
PROT SERPL-MCNC: 5.7 G/DL (ref 6–8.4)
SODIUM SERPL-SCNC: 141 MMOL/L (ref 136–145)

## 2024-11-08 PROCEDURE — 63600175 PHARM REV CODE 636 W HCPCS: Performed by: INTERNAL MEDICINE

## 2024-11-08 PROCEDURE — 27000207 HC ISOLATION

## 2024-11-08 PROCEDURE — 25000003 PHARM REV CODE 250: Performed by: STUDENT IN AN ORGANIZED HEALTH CARE EDUCATION/TRAINING PROGRAM

## 2024-11-08 PROCEDURE — 97116 GAIT TRAINING THERAPY: CPT | Mod: CQ

## 2024-11-08 PROCEDURE — 25000003 PHARM REV CODE 250: Performed by: INTERNAL MEDICINE

## 2024-11-08 PROCEDURE — 11000001 HC ACUTE MED/SURG PRIVATE ROOM

## 2024-11-08 PROCEDURE — 25000003 PHARM REV CODE 250: Performed by: PODIATRIST

## 2024-11-08 PROCEDURE — 80053 COMPREHEN METABOLIC PANEL: CPT | Performed by: PODIATRIST

## 2024-11-08 PROCEDURE — 97530 THERAPEUTIC ACTIVITIES: CPT | Mod: CQ

## 2024-11-08 PROCEDURE — 36415 COLL VENOUS BLD VENIPUNCTURE: CPT | Performed by: PODIATRIST

## 2024-11-08 PROCEDURE — 97535 SELF CARE MNGMENT TRAINING: CPT

## 2024-11-08 PROCEDURE — 97110 THERAPEUTIC EXERCISES: CPT | Mod: CQ

## 2024-11-08 PROCEDURE — 99233 SBSQ HOSP IP/OBS HIGH 50: CPT | Mod: ,,, | Performed by: INTERNAL MEDICINE

## 2024-11-08 RX ADMIN — APIXABAN 5 MG: 5 TABLET, FILM COATED ORAL at 08:11

## 2024-11-08 RX ADMIN — COLESTIPOL HYDROCHLORIDE 3 G: 1 TABLET, FILM COATED ORAL at 08:11

## 2024-11-08 RX ADMIN — MELATONIN TAB 3 MG 6 MG: 3 TAB at 08:11

## 2024-11-08 RX ADMIN — HYDRALAZINE HYDROCHLORIDE 25 MG: 25 TABLET ORAL at 08:11

## 2024-11-08 RX ADMIN — ACETAMINOPHEN 650 MG: 325 TABLET ORAL at 01:11

## 2024-11-08 RX ADMIN — METOPROLOL SUCCINATE 25 MG: 25 TABLET, EXTENDED RELEASE ORAL at 08:11

## 2024-11-08 RX ADMIN — LEVOTHYROXINE SODIUM 88 MCG: 0.09 TABLET ORAL at 05:11

## 2024-11-08 RX ADMIN — FUROSEMIDE 40 MG: 40 TABLET ORAL at 08:11

## 2024-11-08 RX ADMIN — MEROPENEM 2 G: 1 INJECTION INTRAVENOUS at 05:11

## 2024-11-08 RX ADMIN — MUPIROCIN: 20 OINTMENT TOPICAL at 08:11

## 2024-11-08 RX ADMIN — ISOSORBIDE MONONITRATE 60 MG: 30 TABLET, EXTENDED RELEASE ORAL at 08:11

## 2024-11-08 RX ADMIN — MICONAZOLE NITRATE: 20 POWDER TOPICAL at 08:11

## 2024-11-08 RX ADMIN — PIPERACILLIN SODIUM AND TAZOBACTAM SODIUM 4.5 G: 4; .5 INJECTION, POWDER, FOR SOLUTION INTRAVENOUS at 08:11

## 2024-11-08 RX ADMIN — ACETAMINOPHEN 650 MG: 325 TABLET ORAL at 08:11

## 2024-11-08 RX ADMIN — MEROPENEM 2 G: 1 INJECTION INTRAVENOUS at 12:11

## 2024-11-08 NOTE — ASSESSMENT & PLAN NOTE
Wound cx 11/2 grew enterobacter cloacae, pseudomonas and MRSA. S/p amputation L great toe 11/5. Unfortunately, pt now with residual osteo, Culture from prox margins growing pseudomonas putida.    FQ contraindicated with myasthenia gravis.     Recommendations:  --switch carson to pip-tazo to target pseudomonas  --plan to treat for 6 wks. Last day 12/17.  --Will need weekly cbc, cmp, crp while on IV antibiotics to monitor for toxicity. Please fax results to [facility] ID clinic [phone].  --will need placement for IV antibiotics.  --continue local wound care.  --will arrange f/u in ID clinic

## 2024-11-08 NOTE — PROGRESS NOTES
[facility] Medicine  Progress Note    Patient Name: Stephany Skinner  MRN: [id number]  Patient Class: IP- Inpatient   Admission Date: 11/2/2024  Length of Stay: 6 days  Attending Physician: Rodney Aguilera MD  Primary Care Provider: Pipo Flowers MD        Subjective:     Principal Problem:Osteomyelitis of great toe of left foot        HPI:  This is an 89-year-old female with a past medical history of AFib (on Eliquis), HFpEF (EF: 50%), COPD, hypertension, type 2 diabetes, hyperlipidemia, CKD 3, rheumatoid arthritis, myasthenia gravis, obesity, who presents with a foot wound.      Patient presents for evaluation of worsening left 1st toe swelling and redness.  She developed this about 3 weeks prior to presentation, and has been following up with wound care.  She noted that her toe looked more swollen, and was more painful on the day of presentation. Her wound care nurse advised her to present to the ED for further evaluation. Patient denies fevers, chills,  but endorses drainage from the toe.     In the ED, the patient was hemodynamically stable.  Labs were remarkable for macrocytic anemia (11.2), elevated ESR (66), negative lactic acid (1.4).  Left foot x-ray showed degenerative changes with soft tissue swelling overlying the dorsum and medial border of the left forefoot.  Left lower extremity arterial ultrasound showed findings suggestive of 50-75% of peroneal artery stenosis, and inflow stenosis in the common femoral artery.  Patient was given vancomycin, Zosyn.  She was admitted for further management.    Overview/Hospital Course:  #Infection of toe; MRI did show signs of early osteomyelitis distal phalanx LEFT great toe. Pending Podiatry and VascSx eval/recs. Continuing V+Z for now. GNR in deep wound culture. Got contrast, to preserve renal function, will reduce from V+Z to V+ceftriaxone (non-diabetic, pseudomonas less likely, however switch to cefepime if results). Will switch from  Apixaban to heparin gtt in preparation of procedures. C diff negative.    Unlikely need for revascularization per VascSx. Patient NPO for Podiatry to proceed with procedure. Polymicrobial infection of toe with MRSA, Pseudomonas and Enterobacter. No s/p amputation- ID consult for Abx LOT and d/c recs. PT OT, suspect will need SNF then HH.       Interval History:  NAEON.  No new issues.   CC- foot pain  All questions answered and updates on care given.       ROS:  General: Negative for fevers   Cardiac: Negative for chest pain   Pulmonary: Negative for wheezing  GI: Negative for abdominal distention   +wound     Vitals:    11/07/24 2327 11/08/24 0400 11/08/24 0753 11/08/24 0831   BP: 133/83 129/76 (!) 181/80 (!) 181/80   BP Location: Left arm Left arm Left arm    Patient Position: Lying Lying Lying    Pulse: 77 67 63    Resp: 18 18 16    Temp: 98.9 °F (37.2 °C) 98.6 °F (37 °C) 98 °F (36.7 °C)    TempSrc: Oral Oral Oral    SpO2: 95% 95% 97%    Weight:       Height:              Body mass index is 35.74 kg/m².      PHYSICAL EXAM:  GENERAL APPEARANCE: alert and cooperative.     HEAD: NC/AT  CARDIAC: There is no cyanosis or pallor.   LUNGS: No apparent wheezing or stridor.  ABDOMEN: Non-distended. No guarding.  MSK: No joint erythema or tenderness.   EXTREMITIES: No significant new deformity or new joint abnormality.   NEUROLOGICAL: CN II-XII grossly intact.   SKIN: No lesions or eruptions.+wound Left 1 toe  PSYCHIATRIC: No tangential speech. No Hyperactive features.        Recent Results (from the past 24 hours)   POCT glucose    Collection Time: 11/07/24 11:10 AM   Result Value Ref Range    POCT Glucose 133 (H) 70 - 110 mg/dL   POCT glucose    Collection Time: 11/07/24  4:51 PM   Result Value Ref Range    POCT Glucose 141 (H) 70 - 110 mg/dL   POCT glucose    Collection Time: 11/07/24  7:50 PM   Result Value Ref Range    POCT Glucose 119 (H) 70 - 110 mg/dL   Comprehensive Metabolic Panel    Collection Time: 11/08/24   5:06 AM   Result Value Ref Range    Sodium 141 136 - 145 mmol/L    Potassium 3.9 3.5 - 5.1 mmol/L    Chloride 109 95 - 110 mmol/L    CO2 23 23 - 29 mmol/L    Glucose 99 70 - 110 mg/dL    BUN 14 8 - 23 mg/dL    Creatinine 0.9 0.5 - 1.4 mg/dL    Calcium 8.3 (L) 8.7 - 10.5 mg/dL    Total Protein 5.7 (L) 6.0 - 8.4 g/dL    Albumin 2.6 (L) 3.5 - 5.2 g/dL    Total Bilirubin 0.3 0.1 - 1.0 mg/dL    Alkaline Phosphatase 39 (L) 40 - 150 U/L    AST 12 10 - 40 U/L    ALT 16 10 - 44 U/L    eGFR >60 >60 mL/min/1.73 m^2    Anion Gap 9 8 - 16 mmol/L   POCT glucose    Collection Time: 11/08/24  7:51 AM   Result Value Ref Range    POCT Glucose 96 70 - 110 mg/dL       Microbiology Results (last 7 days)       Procedure Component Value Units Date/Time    Aerobic culture [[id number]]  (Abnormal)  (Susceptibility) Collected: 11/05/24 1442    Order Status: Completed Specimen: Wound from Toe, Left Foot Updated: 11/08/24 0652     Aerobic Bacterial Culture PSEUDOMONAS FLUORESCENS/PUTIDA  Few      Narrative:      Proximal margin left toe    Aerobic culture [[id number]]  (Abnormal)  (Susceptibility) Collected: 11/05/24 1442    Order Status: Completed Specimen: Wound from Toe, Left Foot Updated: 11/08/24 0651     Aerobic Bacterial Culture PSEUDOMONAS FLUORESCENS/PUTIDA  Many        ENTEROCOCCUS FAECALIS  Many      Narrative:      Distal left great toe    AFB Culture & Smear [[id number]] Collected: 11/05/24 1442    Order Status: Completed Specimen: Wound from Toe, Left Foot Updated: 11/07/24 2127     AFB Culture & Smear Culture in progress     AFB CULTURE STAIN No acid fast bacilli seen.    Narrative:      Distal left great toe    Stool culture [[id number]] Collected: 11/05/24 0028    Order Status: Completed Specimen: Stool Updated: 11/07/24 0759     Stool Culture No Salmonella,Shigella,Vibrio,Campylobacter,Yersinia isolated.    Culture, Anaerobe [[id number]] Collected: 11/05/24 1442    Order Status: Completed Specimen: Wound from Toe, Left Foot  Updated: 11/07/24 0545     Anaerobic Culture Culture in progress    Narrative:      Proximal margin left toe    Culture, Anaerobe [[id number]] Collected: 11/05/24 1442    Order Status: Completed Specimen: Wound from Toe, Left Foot Updated: 11/07/24 0545     Anaerobic Culture Culture in progress    Narrative:      Distal left great toe    E. coli 0157 antigen [[id number]] Collected: 11/05/24 0028    Order Status: Completed Specimen: Stool Updated: 11/06/24 1152     Shiga Toxin 1 E.coli Negative     Shiga Toxin 2 E.coli Negative    Culture, Anaerobe [[id number]] Collected: 11/02/24 1430    Order Status: Completed Specimen: Wound from Toe, Left Foot Updated: 11/06/24 1019     Anaerobic Culture No anaerobes isolated    Gram stain [[id number]] Collected: 11/05/24 1442    Order Status: Completed Specimen: Wound from Toe, Left Foot Updated: 11/06/24 0831     Gram Stain Result No WBC's      No organisms seen    Narrative:      Proximal margin left toe    Gram stain [[id number]] Collected: 11/05/24 1442    Order Status: Completed Specimen: Wound from Toe, Left Foot Updated: 11/06/24 0825     Gram Stain Result Few WBC's      No organisms seen    Narrative:      Distal left great toe    Fungus culture [[id number]] Collected: 11/05/24 1442    Order Status: Sent Specimen: Wound from Toe, Left Foot Updated: 11/05/24 1637    Aerobic culture [[id number]]  (Abnormal)  (Susceptibility) Collected: 11/02/24 1430    Order Status: Completed Specimen: Wound from Toe, Left Foot Updated: 11/05/24 0802     Aerobic Bacterial Culture Results called to and read back by: Sol Michaels 11/04/2024  06:32      ENTEROBACTER CLOACAE  Moderate        METHICILLIN RESISTANT STAPHYLOCOCCUS AUREUS  Moderate        PSEUDOMONAS AERUGINOSA  Moderate      Clostridium difficile EIA [[id number]] Collected: 11/02/24 1636    Order Status: Completed Specimen: Stool Updated: 11/03/24 1202     C. diff Antigen Negative     C difficile Toxins A+B, EIA Negative      Comment: Testing not recommended for children <24 months old.       Narrative:      1 - 3 Days: If liquid stool unable to be collected in  community onset window (hospital day 1 - 3), the order will  be discontinued automatically. Do not send formed stool for  testing    Fungus culture [[id number]] Collected: 11/02/24 1330    Order Status: Sent Specimen: Wound from Toe, Left Foot Updated: 11/02/24 7985                        Assessment/Plan:      * Osteomyelitis of great toe of left foot  Acute OM  MRI did show signs of early osteomyelitis distal phalanx LEFT great toe.   Pending Podiatry and VascSx eval/recs.   Continuing V+Z for now.   MRSA and enterobacter  Got contrast, to preserve renal function, will reduce from V+Z to V+ceftriaxone (non-diabetic, pseudomonas less likely, however switch to cefepime if results).       Chronic atrial fibrillation  [id number] Score: 4. The patients heart rate in the last 24 hours is as follows:  Pulse  Min: 52  Max: 70     Antiarrhythmics  metoprolol succinate (TOPROL-XL) 24 hr tablet 25 mg, Daily, Oral    Anticoagulants ; Will switch from Apixaban to heparin gtt in preparation of procedures.   heparin 25,000 units in dextrose 5% 250 mL (100 units/mL) infusion LOW INTENSITY nomogram - [facility], Continuous, Intravenous  heparin 25,000 units in dextrose 5% (100 units/ml) IV bolus from bag LOW INTENSITY nomogram - [facility], As needed (PRN), Intravenous  heparin 25,000 units in dextrose 5% (100 units/ml) IV bolus from bag LOW INTENSITY nomogram - [facility], As needed (PRN), Intravenous    Plan  - Replete lytes with a goal of K>4, Mg >2  - Patient is anticoagulated, see medications listed above.  - Patient's afib is currently controlled        Diarrhea  Chronic given >3w, however no workup done  will check cdiff and chronic stool studies  C diff negative        Cellulitis of toe of left foot  Concern for cellulitis/possible underlying osteomyelitis     Plan:   Continue vancomycin/zosyn   Follow up on  cultures - growing MRSA and enterobacter  Consult podiatry   Consult vascular surgery   MRI left foot ordered - early osteomyelitis noted  - Possibly amputation vs IV antibiotics     Stage 3a chronic kidney disease (CKD)  Creatine stable for now. BMP reviewed- noted Estimated Creatinine Clearance: 36.9 mL/min (based on SCr of 1.3 mg/dL). according to latest data. Based on current GFR, CKD stage is stage 3 - GFR 30-59.  Monitor UOP and serial BMP and adjust therapy as needed. Renally dose meds. Avoid nephrotoxic medications and procedures.    Candidiasis, intertrigo  Miconazole BID       COPD (chronic obstructive pulmonary disease)  No acute issues. DuoNebs PRN     Essential hypertension  Patients blood pressure range in the last 24 hours was: BP  Min: 116/70  Max: 163/73.The patient's inpatient anti-hypertensive regimen is listed below:  Current Antihypertensives  furosemide tablet 40 mg, Daily, Oral  losartan tablet 25 mg, 2 times daily, Oral  metoprolol succinate (TOPROL-XL) 24 hr tablet 25 mg, Daily, Oral  spironolactone tablet 25 mg, Every morning, Oral    Plan  - BP is controlled, no changes needed to their regimen    Type 2 diabetes mellitus with circulatory disorder, without long-term current use of insulin  Lab Results   Component Value Date    HGBA1C 5.8 (H) 11/29/2023     Glycemic protocol   MDSS      Chronic diastolic congestive heart failure  Continue home medications     PUJA (acute kidney injury)  PUJA is likely due to pre-renal azotemia due to infection.   Baseline creatinine is  .09 . Most recent creatinine and eGFR are listed below.    Recent Labs     11/01/24  1548 11/02/24  0505 11/04/24  0215   CREATININE 1.3 0.9 1.1   EGFRNORACEVR 40* >60 48*        Plan  - PUJA is improving  - Avoid nephrotoxins and renally dose meds for GFR listed above  - Monitor urine output, serial BMP, and adjust therapy as needed  - Cr 1.3 on admit, now 0.9 after fluids. RESOLVED    Myasthenia gravis, adult form  Not on any  medications. Monitor   Follow up with neurology       Hyperlipidemia  Continue statin       Hypothyroidism  Continue synthroid         VTE Risk Mitigation (From admission, onward)           Ordered     apixaban tablet 5 mg  2 times daily         11/06/24 0940     IP VTE HIGH RISK PATIENT  Once         11/05/24 1551     Place sequential compression device  Until discontinued         11/05/24 1551     Place sequential compression device  Until discontinued         11/02/24 0252                    Discharge Planning   MADELYN:      Code Status: Full Code   Is the patient medically ready for discharge?:     Reason for patient still in hospital (select all that apply): Patient trending condition and Treatment  Discharge Plan A: Skilled Nursing Facility   Discharge Delays: (!) Post-Acute Set-up              Rodney Aguilera MD  Department of Hospital Medicine   [facility]

## 2024-11-08 NOTE — PROGRESS NOTES
[facility]  Infectious Disease  Progress Note    Patient Name: Stephany Skinner  MRN: [id number]  Admission Date: 11/2/2024  Length of Stay: 6 days  Attending Physician: Rodney Aguilera MD  Primary Care Provider: Pipo Flowers MD    Isolation Status: Contact  Assessment/Plan:      ID  * Osteomyelitis of great toe of left foot  Wound cx 11/2 grew enterobacter cloacae, pseudomonas and MRSA. S/p amputation L great toe 11/5. Unfortunately, pt now with residual osteo, Culture from prox margins growing pseudomonas putida.    FQ contraindicated with myasthenia gravis.     Recommendations:  --switch carson to pip-tazo to target pseudomonas  --plan to treat for 6 wks. Last day 12/17.  --Will need weekly cbc, cmp, crp while on IV antibiotics to monitor for toxicity. Please fax results to [facility] ID clinic [phone].  --will need placement for IV antibiotics.  --continue local wound care.  --will arrange f/u in ID clinic          Please notify ID of any new growth on cx from proximal margins.     Anticipated Disposition: [facility]    Thank you for your consult. I will sign off. Please contact us if you have any additional questions.    Lashaun Cr MD  Infectious Disease  [facility]    Subjective:     Principal Problem:Osteomyelitis of great toe of left foot    HPI: Ms. Skinner is an 89y/o F with myasthenia gravis, T2DM, AFib  HFpEF, COPD, hypertension, hyperlipidemia, CKD 3, rheumatoid arthritis, who presented with diabetic toe ulcer of L great toe and was admitted for management of acute osteomyelitis.     In the ED, the patient was hemodynamically stable. Labs were remarkable for macrocytic anemia (11.2), elevated ESR (66), negative lactic acid (1.4). MRI recealed high likelihood of L great toe osteo.    Wound cx from 11/2 grew enterobacter cloacae, pseudomonas and MRSA. Underwent L great toe amputation 11/5.    Id consulted for: Polymicrobial infection of toe with MRSA, Pseudomonas and  Enterobacter. No s/p amputation. Abx LOT and d/c recs   Interval History: Remained afebrile overnight. No new complaints.    Review of Systems   Constitutional:  Negative for chills, fatigue and fever.   Respiratory:  Negative for cough and shortness of breath.    All other systems reviewed and are negative.    Objective:     Vital Signs (Most Recent):  Temp: 98.3 °F (36.8 °C) (11/08/24 1154)  Pulse: 64 (11/08/24 1154)  Resp: 16 (11/08/24 1154)  BP: (!) 107/55 (11/08/24 1154)  SpO2: 95 % (11/08/24 1154) Vital Signs (24h Range):  Temp:  [98 °F (36.7 °C)-98.9 °F (37.2 °C)] 98.3 °F (36.8 °C)  Pulse:  [62-77] 64  Resp:  [16-18] 16  SpO2:  [95 %-97 %] 95 %  BP: (103-181)/(52-83) 107/55     Weight: 103.5 kg (228 lb 2.8 oz)  Body mass index is 35.74 kg/m².    Estimated Creatinine Clearance: 53.5 mL/min (based on SCr of 0.9 mg/dL).     Physical Exam  Constitutional:       General: She is not in acute distress.     Appearance: She is well-developed.   HENT:      Head: Normocephalic and atraumatic.   Eyes:      Conjunctiva/sclera: Conjunctivae normal.      Pupils: Pupils are equal, round, and reactive to light.   Cardiovascular:      Rate and Rhythm: Normal rate and regular rhythm.      Heart sounds: Normal heart sounds.   Pulmonary:      Effort: Pulmonary effort is normal. No respiratory distress.      Breath sounds: Normal breath sounds.   Abdominal:      General: Bowel sounds are normal. There is no distension.      Palpations: Abdomen is soft.   Musculoskeletal:         General: Normal range of motion.      Cervical back: Normal range of motion and neck supple.   Skin:     General: Skin is warm and dry.      Comments: R 1st toe- skin ulceration on plantar aspect. No erythema or drainage.  L foot s/p 1st toe amputation- clean dressing in place.   Neurological:      General: No focal deficit present.      Mental Status: She is alert and oriented to person, place, and time.      Cranial Nerves: No cranial nerve deficit.    Psychiatric:         Mood and Affect: Mood normal.         Behavior: Behavior normal.          Significant Labs: Blood Culture:   Recent Labs   Lab 11/01/24  1650 11/01/24  1652   LABBLOO No growth after 5 days. No growth after 5 days.     Wound Culture:   Recent Labs   Lab 11/02/24  1430 11/05/24  1442   LABAERO Results called to and read back by: Sol Michaels 11/04/2024  06:32  ENTEROBACTER CLOACAE  Moderate  *  METHICILLIN RESISTANT STAPHYLOCOCCUS AUREUS  Moderate  *  PSEUDOMONAS AERUGINOSA  Moderate  * PSEUDOMONAS FLUORESCENS/PUTIDA  Few  *  PSEUDOMONAS FLUORESCENS/PUTIDA  Many  *  ENTEROCOCCUS FAECALIS  Many  *     All pertinent labs within the past 24 hours have been reviewed.    Significant Imaging: I have reviewed all pertinent imaging results/findings within the past 24 hours.    I spent a total of 50 minutes on the day of the visit.This includes face to face time and non-face to face time preparing to see the patient (eg, review of tests), obtaining and/or reviewing separately obtained history, documenting clinical information in the electronic or other health record, independently interpreting results and communicating results to the patient/family/caregiver, or care coordinator.

## 2024-11-08 NOTE — PLAN OF CARE
Changes in medical condition or discharge plan: Per MD, waiting on pathology report.     Does patient need new DME? none    Follow up appts needed: PCP and Infectious disease    Medically stable: No    Estimated Discharge Date:  11/11/24    [facility] has accepted pt. Pt's family completed admission paperwork today. CM will assist as needed.     11/08/24 1433   Discharge Reassessment   Assessment Type Discharge Planning Reassessment   Did the patient's condition or plan change since previous assessment? Yes   Discharge Plan discussed with: Patient   Communicated MADELYN with patient/caregiver Yes   Discharge Plan A Skilled Nursing Facility   Discharge Plan B Home with family   DME Needed Upon Discharge  other (see comments)  (TBD)   Transition of Care Barriers None   Why the patient remains in the hospital Requires continued medical care   Post-Acute Status   Discharge Delays None known at this time

## 2024-11-08 NOTE — PT/OT/SLP PROGRESS
Physical Therapy Treatment    Patient Name:  Stephany Skinner   MRN:  [id number]    Recommendations:     Discharge Recommendations: Moderate Intensity Therapy  Discharge Equipment Recommendations: bedside commode  Barriers to discharge: None    Assessment:     Stephany Skinner is a 89 y.o. female admitted with a medical diagnosis of Osteomyelitis of great toe of left foot.  She presents with the following impairments/functional limitations: impaired balance, weakness, impaired endurance, impaired self care skills, impaired functional mobility, gait instability, decreased lower extremity function, decreased upper extremity function, impaired coordination, decreased ROM, decreased coordination, decreased safety awareness, pain, impaired cognition, impaired skin, edema, orthopedic precautions .    Rehab Prognosis: Good; patient would benefit from acute skilled PT services to address these deficits and reach maximum level of function.    Recent Surgery: Procedure(s) (LRB):  AMPUTATION, TOE- DISTAL HALLUX (Left) 3 Days Post-Op    Plan:     During this hospitalization, patient to be seen 5 x/week to address the identified rehab impairments via gait training, therapeutic activities, therapeutic exercises, neuromuscular re-education, wheelchair management/training and progress toward the following goals:    Plan of Care Expires:  11/20/24    Subjective     Chief Complaint: weakness   Patient/Family Comments/goals: pt is pleasant and agreeable to therapy   Pain/Comfort:  Pain Rating 1: 0/10  Pain Rating Post-Intervention 1: 0/10      Objective:     Communicated with nurse  prior to session.  Patient found up in chair with oxygen, PureWick, peripheral IV upon PT entry to room.     General Precautions: Standard, fall, contact  Orthopedic Precautions:  (LLE heel WB in post-op boot)  Braces:  (B post-op shoes)  Respiratory Status: Room air     Functional Mobility:  Bed Mobility:     Rolling Left:  supervision  Rolling Right:  supervision  Scooting: stand by assistance  Sit to Supine: minimum assistance with BLE   Transfers: V/T cues for safety technique , L  heel WB precaution and walker management.     Sit to Stand: 1 trial  contact guard assistance from bed  and 3 trials from chair  moderate assistance with rolling walker  Chair to bed : contact guard assistance and minimum assistance with  rolling walker  using  Step Transfer  Gait: Patient ambulated ~ 10 ft x 2 trials  on level tile with Rolling Walker with Minimal Assistance/ CGA (L heel WB and B post of shoes , chair followed) . Pt required seated rest break 2* to fatigue .  Pt with demonstarting a  3-point gait with decreased lluvia, increased time in double stance, decreased velocity of limb motion, decreased step length, decreased swing-to-stance ratio, decreased toe-to-floor clearance and decreased weight-shifting ability.Impairments contributing to gait deviations include impaired balance, decreased flexibility, pain, impaired postural control, decreased ROM and decreased strength. V/T cues to maintain L heel WB ,  for safety technique and walker management.    Balance:  good in sitting, fair in standing       AM-PAC 6 CLICK MOBILITY  Turning over in bed (including adjusting bedclothes, sheets and blankets)?: 4  Sitting down on and standing up from a chair with arms (e.g., wheelchair, bedside commode, etc.): 2  Moving from lying on back to sitting on the side of the bed?: 3  Moving to and from a bed to a chair (including a wheelchair)?: 3  Need to walk in hospital room?: 3  Climbing 3-5 steps with a railing?: 2  Basic Mobility Total Score: 17       Treatment & Education:  Lower Extremity Exercises.    Patient educated on: Purpose and Benefits of Therapeutic Exercise(s).    Patient verbalized acceptance/understanding of instruction(s), expectation(s), and limitation(s) (for safety).  Performed BLE x 10 reps x 2 : AP, QS in reclined chair.   Patient performed: 2 sets of 10 reps  (each) of B LE Ther Ex: AP, LAQ, Hip abd/add, Hip flexion while sitting up on chair.       Patient required  verbal cues/tactile cues to ensure correct sequence, to maintain proper form, and to allow for self-correction.  Pt reported no complaints or problems with exercise activity.     Patient required increase Reaction Time to initiate movements and a Sitting Rest Break between set(s) to recover.      Patient left HOB elevated with BLE elevated , heels offloading  all lines intact, call button in reach, bed alarm on, and nurse notified..    GOALS:   Multidisciplinary Problems       Physical Therapy Goals          Problem: Physical Therapy    Goal Priority Disciplines Outcome Interventions   Physical Therapy Goal     PT, PT/OT Progressing    Description: Goals to be met by: 24     Patient will increase functional independence with mobility by performin. Supine to sit with Mod I  2. Rolling to Left and Right with Mod I  3. Sit to stand transfer with Mod I and adherence to L LE heel WB precautions and RW  4. W/C mobility x100 ft with Mod I using BUE  5. Lower extremity exercise program x20 reps per handout, with independence  6. Gait x50 ft with mod I using RW, B post-op shoes, and L heel WB                         Time Tracking:     PT Received On: 24  PT Start Time: 1455     PT Stop Time: 1533  PT Total Time (min): 38 min     Billable Minutes: Gait Training 12, Therapeutic Activity 12, and Therapeutic Exercise 14    Treatment Type: Treatment  PT/PTA: PTA     Number of PTA visits since last PT visit: 2024

## 2024-11-08 NOTE — PT/OT/SLP PROGRESS
Occupational Therapy   Treatment    Name: Stephany Skinner  MRN: [id number]  Admitting Diagnosis:  Osteomyelitis of great toe of left foot  3 Days Post-Op    Recommendations:     Discharge Recommendations: Moderate Intensity Therapy  Discharge Equipment Recommendations:  to be determined by next level of care  Barriers to discharge:  Other (Comment) (patient not at PLOF of modified independent wtih ADLs and IADls)    Assessment:     Stephany Skinner is a 89 y.o. female with a medical diagnosis of Osteomyelitis of great toe of left foot.  She presents with HOB elevated in bed. Performance deficits affecting function are weakness, impaired endurance, impaired self care skills, impaired functional mobility, gait instability, impaired balance, decreased lower extremity function, decreased safety awareness, decreased ROM, impaired skin, edema, orthopedic precautions.  She wanted to walk to bathroom, so had her don darco shoes sitting EOB and she walked on left heel from bed to bathroom.     Rehab Prognosis:  Good; patient would benefit from acute skilled OT services to address these deficits and reach maximum level of function.       Plan:     Patient to be seen 5 x/week to address the above listed problems via self-care/home management, therapeutic activities, therapeutic exercises  Plan of Care Expires: 11/21/24  Plan of Care Reviewed with: patient    Subjective     Chief Complaint: none   Patient/Family Comments/goals: she wanted to walk to bathroom today   Pain/Comfort:  Pain Rating 1: 0/10    Objective:     Communicated with: RNCa, prior to session.  Patient found HOB elevated with peripheral IV, PureWick upon OT entry to room.    General Precautions: Standard, fall, contact, diabetic    Orthopedic Precautions: (LLE heel WB in post-op boot)  Braces:  (darco shoes)  Respiratory Status: Room air     Occupational Performance:     Bed Mobility:    Patient completed Rolling/Turning to Left with  stand by  assistance  Patient completed Scooting/Bridging with stand by assistance  Patient completed Supine to Sit with stand by assistance     Functional Mobility/Transfers:  Patient completed Sit <> Stand Transfer with contact guard assistance  with  rolling walker   Patient completed Bed <> Chair Transfer using Step Transfer technique with contact guard assistance with rolling walker  Patient completed Toilet Transfer Step Transfer technique with contact guard assistance with  rolling walker  Functional Mobility: Patient walked with CGA with RW and darco shoes with reminders to keep heel WB on left foot from bed to bathroom and then to chair for ~10'.     Activities of Daily Living:  Grooming: independence seated in chair to wash face and brush teeth at sink   Upper Body Dressing: stand by assistance to don outer gown   Lower Body Dressing: maximal assistance to don darco shoes seated EOB    Toileting: minimum assistance for hygiene and max assist to doff and don brief and mike       [facility] 6 Click ADL: 19    Treatment & Education:  Occupational therapy re-educated her re: heel WB precautions on left side and she demonstrated understanding.       Patient left up in chair with all lines intact, call button in reach, and RN  notified    GOALS:   Multidisciplinary Problems       Occupational Therapy Goals          Problem: Occupational Therapy    Goal Priority Disciplines Outcome Interventions   Occupational Therapy Goal     OT, PT/OT Progressing    Description: Goals to be met by: 11/25/24     Patient will increase functional independence with ADLs by performing:    UE Dressing with [place].  LE Dressing with Minimal Assistance.with AD as needed  Grooming while seated at sink with Modified [place].  Toileting from bedside commode with Supervision for hygiene and clothing management.   Sitting in chair x60 minutes with Supervision.  Toilet transfer to bedside commode with Supervision.  Upper extremity exercise  program x10 reps per handout, with supervision.                         Time Tracking:     OT Date of Treatment: 11/08/24  OT Start Time: 1048  OT Stop Time: 1130  OT Total Time (min): 42 min    Billable Minutes:Self Care/Home Management 42     OT/GRIS: OT          11/8/2024

## 2024-11-08 NOTE — NURSING
Patient is watching wheel of fortune and doing well. Her chest xray was done today and her dressing to her Left foot was changed today by the doctor.She was also given potassium by mouth because she was at 3.0 in the am.Her family came today and they spoke with the  about plans. Her midline is flushing fine. All safety needs are being met and bed is in the lowest position.

## 2024-11-08 NOTE — SUBJECTIVE & OBJECTIVE
Interval History: Remained afebrile overnight. No new complaints.    Review of Systems   Constitutional:  Negative for chills, fatigue and fever.   Respiratory:  Negative for cough and shortness of breath.    All other systems reviewed and are negative.    Objective:     Vital Signs (Most Recent):  Temp: 98.3 °F (36.8 °C) (11/08/24 1154)  Pulse: 64 (11/08/24 1154)  Resp: 16 (11/08/24 1154)  BP: (!) 107/55 (11/08/24 1154)  SpO2: 95 % (11/08/24 1154) Vital Signs (24h Range):  Temp:  [98 °F (36.7 °C)-98.9 °F (37.2 °C)] 98.3 °F (36.8 °C)  Pulse:  [62-77] 64  Resp:  [16-18] 16  SpO2:  [95 %-97 %] 95 %  BP: (103-181)/(52-83) 107/55     Weight: 103.5 kg (228 lb 2.8 oz)  Body mass index is 35.74 kg/m².    Estimated Creatinine Clearance: 53.5 mL/min (based on SCr of 0.9 mg/dL).     Physical Exam  Constitutional:       General: She is not in acute distress.     Appearance: She is well-developed.   HENT:      Head: Normocephalic and atraumatic.   Eyes:      Conjunctiva/sclera: Conjunctivae normal.      Pupils: Pupils are equal, round, and reactive to light.   Cardiovascular:      Rate and Rhythm: Normal rate and regular rhythm.      Heart sounds: Normal heart sounds.   Pulmonary:      Effort: Pulmonary effort is normal. No respiratory distress.      Breath sounds: Normal breath sounds.   Abdominal:      General: Bowel sounds are normal. There is no distension.      Palpations: Abdomen is soft.   Musculoskeletal:         General: Normal range of motion.      Cervical back: Normal range of motion and neck supple.   Skin:     General: Skin is warm and dry.      Comments: R 1st toe- skin ulceration on plantar aspect. No erythema or drainage.  L foot s/p 1st toe amputation- clean dressing in place.   Neurological:      General: No focal deficit present.      Mental Status: She is alert and oriented to person, place, and time.      Cranial Nerves: No cranial nerve deficit.   Psychiatric:         Mood and Affect: Mood normal.          Behavior: Behavior normal.          Significant Labs: Blood Culture:   Recent Labs   Lab 11/01/24  1650 11/01/24  1652   LABBLOO No growth after 5 days. No growth after 5 days.     Wound Culture:   Recent Labs   Lab 11/02/24  1430 11/05/24  1442   LABAERO Results called to and read back by: Sol Michaels 11/04/2024  06:32  ENTEROBACTER CLOACAE  Moderate  *  METHICILLIN RESISTANT STAPHYLOCOCCUS AUREUS  Moderate  *  PSEUDOMONAS AERUGINOSA  Moderate  * PSEUDOMONAS FLUORESCENS/PUTIDA  Few  *  PSEUDOMONAS FLUORESCENS/PUTIDA  Many  *  ENTEROCOCCUS FAECALIS  Many  *     All pertinent labs within the past 24 hours have been reviewed.    Significant Imaging: I have reviewed all pertinent imaging results/findings within the past 24 hours.

## 2024-11-08 NOTE — NURSING
[facility], [facility]  Nurses Note -- 4 Eyes      11/7/2024       Skin assessed on: Q Shift      [x] No Pressure Injuries Present    [x]Prevention Measures Documented    [] Yes LDA  for Pressure Injury Previously documented     [] Yes New Pressure Injury Discovered   [] LDA for New Pressure Injury Added      Attending RN:  Pratima Platt RN     Second RN:  MARGARETH Penaloza

## 2024-11-09 LAB
ALBUMIN SERPL BCP-MCNC: 2.5 G/DL (ref 3.5–5.2)
ALP SERPL-CCNC: 40 U/L (ref 40–150)
ALT SERPL W/O P-5'-P-CCNC: 13 U/L (ref 10–44)
ANION GAP SERPL CALC-SCNC: 8 MMOL/L (ref 8–16)
AST SERPL-CCNC: 11 U/L (ref 10–40)
BACTERIA SPEC ANAEROBE CULT: NORMAL
BACTERIA SPEC ANAEROBE CULT: NORMAL
BILIRUB SERPL-MCNC: 0.4 MG/DL (ref 0.1–1)
BUN SERPL-MCNC: 14 MG/DL (ref 8–23)
CALCIUM SERPL-MCNC: 8.4 MG/DL (ref 8.7–10.5)
CHLORIDE SERPL-SCNC: 108 MMOL/L (ref 95–110)
CO2 SERPL-SCNC: 24 MMOL/L (ref 23–29)
CREAT SERPL-MCNC: 0.9 MG/DL (ref 0.5–1.4)
EST. GFR  (NO RACE VARIABLE): >60 ML/MIN/1.73 M^2
GLUCOSE SERPL-MCNC: 91 MG/DL (ref 70–110)
POCT GLUCOSE: 100 MG/DL (ref 70–110)
POCT GLUCOSE: 104 MG/DL (ref 70–110)
POCT GLUCOSE: 106 MG/DL (ref 70–110)
POCT GLUCOSE: 99 MG/DL (ref 70–110)
POTASSIUM SERPL-SCNC: 3.6 MMOL/L (ref 3.5–5.1)
PROT SERPL-MCNC: 5.7 G/DL (ref 6–8.4)
SODIUM SERPL-SCNC: 140 MMOL/L (ref 136–145)

## 2024-11-09 PROCEDURE — C1751 CATH, INF, PER/CENT/MIDLINE: HCPCS

## 2024-11-09 PROCEDURE — 27000207 HC ISOLATION

## 2024-11-09 PROCEDURE — 25000003 PHARM REV CODE 250: Performed by: INTERNAL MEDICINE

## 2024-11-09 PROCEDURE — 25000003 PHARM REV CODE 250: Performed by: STUDENT IN AN ORGANIZED HEALTH CARE EDUCATION/TRAINING PROGRAM

## 2024-11-09 PROCEDURE — 02HV33Z INSERTION OF INFUSION DEVICE INTO SUPERIOR VENA CAVA, PERCUTANEOUS APPROACH: ICD-10-PCS | Performed by: HOSPITALIST

## 2024-11-09 PROCEDURE — 36415 COLL VENOUS BLD VENIPUNCTURE: CPT | Performed by: PODIATRIST

## 2024-11-09 PROCEDURE — 94761 N-INVAS EAR/PLS OXIMETRY MLT: CPT

## 2024-11-09 PROCEDURE — 11000001 HC ACUTE MED/SURG PRIVATE ROOM

## 2024-11-09 PROCEDURE — 99900035 HC TECH TIME PER 15 MIN (STAT)

## 2024-11-09 PROCEDURE — 80053 COMPREHEN METABOLIC PANEL: CPT | Performed by: PODIATRIST

## 2024-11-09 PROCEDURE — 25000003 PHARM REV CODE 250: Performed by: PODIATRIST

## 2024-11-09 PROCEDURE — 63600175 PHARM REV CODE 636 W HCPCS: Performed by: INTERNAL MEDICINE

## 2024-11-09 PROCEDURE — 36569 INSJ PICC 5 YR+ W/O IMAGING: CPT

## 2024-11-09 RX ORDER — POTASSIUM CHLORIDE 20 MEQ/1
20 TABLET, EXTENDED RELEASE ORAL ONCE
Status: COMPLETED | OUTPATIENT
Start: 2024-11-09 | End: 2024-11-09

## 2024-11-09 RX ORDER — L. ACIDOPHILUS/L.BULGARICUS 100MM CELL
1 GRANULES IN PACKET (EA) ORAL 2 TIMES DAILY
Status: DISCONTINUED | OUTPATIENT
Start: 2024-11-09 | End: 2024-11-13 | Stop reason: HOSPADM

## 2024-11-09 RX ADMIN — MUPIROCIN: 20 OINTMENT TOPICAL at 08:11

## 2024-11-09 RX ADMIN — HYDRALAZINE HYDROCHLORIDE 25 MG: 25 TABLET ORAL at 08:11

## 2024-11-09 RX ADMIN — LACTOBACILLUS ACIDOPHILUS / LACTOBACILLUS BULGARICUS 1 EACH: 100 MILLION CFU STRENGTH GRANULES at 08:11

## 2024-11-09 RX ADMIN — PIPERACILLIN SODIUM AND TAZOBACTAM SODIUM 4.5 G: 4; .5 INJECTION, POWDER, FOR SOLUTION INTRAVENOUS at 11:11

## 2024-11-09 RX ADMIN — METOPROLOL SUCCINATE 25 MG: 25 TABLET, EXTENDED RELEASE ORAL at 09:11

## 2024-11-09 RX ADMIN — PIPERACILLIN SODIUM AND TAZOBACTAM SODIUM 4.5 G: 4; .5 INJECTION, POWDER, FOR SOLUTION INTRAVENOUS at 05:11

## 2024-11-09 RX ADMIN — COLESTIPOL HYDROCHLORIDE 3 G: 1 TABLET, FILM COATED ORAL at 09:11

## 2024-11-09 RX ADMIN — ISOSORBIDE MONONITRATE 60 MG: 30 TABLET, EXTENDED RELEASE ORAL at 09:11

## 2024-11-09 RX ADMIN — LACTOBACILLUS ACIDOPHILUS / LACTOBACILLUS BULGARICUS 1 EACH: 100 MILLION CFU STRENGTH GRANULES at 11:11

## 2024-11-09 RX ADMIN — MICONAZOLE NITRATE: 20 POWDER TOPICAL at 09:11

## 2024-11-09 RX ADMIN — COLESTIPOL HYDROCHLORIDE 3 G: 1 TABLET, FILM COATED ORAL at 08:11

## 2024-11-09 RX ADMIN — LEVOTHYROXINE SODIUM 88 MCG: 0.09 TABLET ORAL at 05:11

## 2024-11-09 RX ADMIN — POTASSIUM CHLORIDE 20 MEQ: 1500 TABLET, EXTENDED RELEASE ORAL at 11:11

## 2024-11-09 RX ADMIN — APIXABAN 5 MG: 5 TABLET, FILM COATED ORAL at 08:11

## 2024-11-09 RX ADMIN — DICLOFENAC SODIUM 2 G: 10 GEL TOPICAL at 09:11

## 2024-11-09 RX ADMIN — MUPIROCIN: 20 OINTMENT TOPICAL at 09:11

## 2024-11-09 RX ADMIN — MICONAZOLE NITRATE: 20 POWDER TOPICAL at 08:11

## 2024-11-09 RX ADMIN — ACETAMINOPHEN 650 MG: 325 TABLET ORAL at 05:11

## 2024-11-09 RX ADMIN — APIXABAN 5 MG: 5 TABLET, FILM COATED ORAL at 09:11

## 2024-11-09 RX ADMIN — HYDRALAZINE HYDROCHLORIDE 25 MG: 25 TABLET ORAL at 09:11

## 2024-11-09 RX ADMIN — FUROSEMIDE 40 MG: 40 TABLET ORAL at 09:11

## 2024-11-09 RX ADMIN — PIPERACILLIN SODIUM AND TAZOBACTAM SODIUM 4.5 G: 4; .5 INJECTION, POWDER, FOR SOLUTION INTRAVENOUS at 08:11

## 2024-11-09 NOTE — PROCEDURES
"Stephany Skinner is a 89 y.o. female patient.    Temp: 98 °F (36.7 °C) (11/09/24 1150)  Pulse: 70 (11/09/24 1150)  Resp: 18 (11/09/24 1150)  BP: 113/61 (11/09/24 1150)  SpO2: 96 % (11/09/24 1150)  Weight: 103.5 kg (228 lb 2.8 oz) (11/04/24 0331)  Height: 5' 7" (170.2 cm) (11/02/24 0245)    PICC  Date/Time: 11/9/2024 12:50 PM  Performed by: Antolin Scott RN  Consent Done: Yes  Time out: Immediately prior to procedure a time out was called to verify the correct patient, procedure, equipment, support staff and site/side marked as required  Indications: med administration and vascular access  Anesthesia: local infiltration  Local anesthetic: lidocaine 1% without epinephrine  Anesthetic Total (mL): 5  Preparation: skin prepped with ChloraPrep  Skin prep agent dried: skin prep agent completely dried prior to procedure  Sterile barriers: all five maximum sterile barriers used - cap, mask, sterile gown, sterile gloves, and large sterile sheet  Hand hygiene: hand hygiene performed prior to central venous catheter insertion  Location details: right basilic  Catheter type: double lumen  Catheter size: 5 Fr  Catheter Length: 40cm    Vascular probe with ultrasound: over the wire.  Number of attempts: 1  Post-procedure: blood return through all ports, chlorhexidine patch and sterile dressing applied            Name antolin scott  11/9/2024    "

## 2024-11-09 NOTE — CONSULTS
[facility]  Telemedicine Consult Note       Stephany Skinner has been accepted for transfer to [facility] and will be followed through telemedicine services beginning 11/09/24 at 7 AM.      Isabell Waters MD  [facility] Medicine Staff

## 2024-11-09 NOTE — ASSESSMENT & PLAN NOTE
Chronic given >3w, however no workup done  C diff negative and cultures neg  Eating cheese regularly at home but says improves symptoms  Continue colestipol as taking at home

## 2024-11-09 NOTE — ASSESSMENT & PLAN NOTE
Acute OM  MRI did show signs of early osteomyelitis distal phalanx LEFT great toe.   Pending Podiatry and VascSx eval/recs.   Continuing V+Z for now.   MRSA and enterobacter  Got contrast, to preserve renal function, will reduce from V+Z to V+ceftriaxone (non-diabetic, pseudomonas less likely, however switch to cefepime if results).   S/p left great toe amputation - plan for 6 weeks of IV Zosyn due to culture from proximal margins growing Pseudomonas; PICC line ordered.

## 2024-11-09 NOTE — PT/OT/SLP PROGRESS
Physical Therapy      Patient Name:  Stephany Skinner   MRN:  [id number]    Patient not seen today secondary to Other (PICC lines placement ) then  X-ray. Will follow-up as able later hour/day .

## 2024-11-09 NOTE — ASSESSMENT & PLAN NOTE
Creatine stable for now. BMP reviewed- noted Estimated Creatinine Clearance: 53.5 mL/min (based on SCr of 0.9 mg/dL). according to latest data. Based on current GFR, CKD stage is stage 3 - GFR 30-59.  Monitor UOP and serial BMP and adjust therapy as needed. Renally dose meds. Avoid nephrotoxic medications and procedures.

## 2024-11-09 NOTE — ASSESSMENT & PLAN NOTE
Patients blood pressure range in the last 24 hours was: BP  Min: 103/52  Max: 204/91.The patient's inpatient anti-hypertensive regimen is listed below:  Current Antihypertensives  furosemide tablet 40 mg, Daily, Oral  metoprolol succinate (TOPROL-XL) 24 hr tablet 25 mg, Daily, Oral  isosorbide mononitrate 24 hr tablet 60 mg, Daily, Oral  hydrALAZINE tablet 25 mg, Every 12 hours, Oral    Plan  - BP is controlled, no changes needed to their regimen

## 2024-11-09 NOTE — NURSING
[facility], [facility]  Nurses Note -- 4 Eyes      11/8/2024       Skin assessed on: Q Shift      [x] No Pressure Injuries Present    [x]Prevention Measures Documented    [] Yes LDA  for Pressure Injury Previously documented     [] Yes New Pressure Injury Discovered   [] LDA for New Pressure Injury Added      Attending RN:  Pratima Platt RN     Second RN:  MARGARETH Penaloza

## 2024-11-09 NOTE — ASSESSMENT & PLAN NOTE
[id number] Score: 4. The patients heart rate in the last 24 hours is as follows:  Pulse  Min: 63  Max: 70     Antiarrhythmics  metoprolol succinate (TOPROL-XL) 24 hr tablet 25 mg, Daily, Oral    Anticoagulants ; switched from Apixaban to heparin gtt in preparation of procedures.   apixaban tablet 5 mg, 2 times daily, Oral    Plan  - Replete lytes with a goal of K>4, Mg >2  - Patient is anticoagulated, see medications listed above.  - Patient's afib is currently controlled

## 2024-11-09 NOTE — NURSING
[facility], [facility] [place]  Nurses Note -- 4 Eyes      11/9/2024       Skin assessed on: Q Shift      [x] No Pressure Injuries Present    [x]Prevention Measures Documented    [] Yes LDA  for Pressure Injury Previously documented     [] Yes New Pressure Injury Discovered   [] LDA for New Pressure Injury Added      Attending RN:  Taylor Felix LPN     Second RN:  ROSA ISELA Andujar

## 2024-11-09 NOTE — SUBJECTIVE & OBJECTIVE
This follow-up encounter was provided through telemedicine to address  Osteomyelitis of great toe of left foot present on admission.  Patient was transferred to the telemedicine service on:  11/09/2024   The patient location is: W[facility] A admitted 11/2/2024  2:38 AM.    Cc: toe redness and swelling    Interval History/Overnight Events:   Clinical record since admit reviewed.    Patient is able to provide adequate history.    Uneventful night - denies pain  - diarrhea appears to be improving; none so far today with 2 episodes yesterday - she has tried avoiding milk at home but uses cheese which she says improves symptoms.  Agreeable to SNF at discharge to continue therapy with IV abx.     Review of Systems   Constitutional:  Positive for activity change, appetite change and fatigue. Negative for fever.   Respiratory:  Negative for shortness of breath.    Gastrointestinal:  Negative for nausea.   Musculoskeletal:  Negative for arthralgias.          I have reviewed the following on 11/09/2024:    Data  Details     [x]   Lab results reviewed   CMP wnl except alb and protein low    [x]   Micro reports reviewed      []   Pathology reports reviewed      []   Imaging reports reviewed      []   Cardiology Procedure reports reviewed      []   Non- records/CareEverywhere notes reviewed      [x]  Tests/studies orders placed or verified   Mg    [x]  Independently viewed/assessed   Corrected Ca: 9.6    []  11/09/2024 Discussion of:        Inpatient Medications reviewed and prescribed for management of current problems:  Scheduled Meds:   apixaban  5 mg Oral BID    colestipoL  3 g Oral BID    diclofenac sodium  2 g Topical (Top) Daily    furosemide  40 mg Oral Daily    hydrALAZINE  25 mg Oral Q12H    isosorbide mononitrate  60 mg Oral Daily    lactobacillus acidophilus & bulgar  1 packet Oral BID    levothyroxine  88 mcg Oral Before breakfast    metoprolol succinate  25 mg Oral Daily    miconazole NITRATE 2 %   Topical (Top)  BID    mupirocin   Nasal BID    piperacillin-tazobactam (Zosyn) IV (PEDS and ADULTS) (extended infusion is not appropriate)  4.5 g Intravenous Q8H     Continuous Infusions:  PRN Meds:.  Current Facility-Administered Medications:     acetaminophen, 650 mg, Oral, Q8H PRN    acetaminophen, 650 mg, Oral, Q4H PRN    albuterol-ipratropium, 3 mL, Nebulization, Q6H PRN    aluminum-magnesium hydroxide-simethicone, 30 mL, Oral, QID PRN    bisacodyL, 10 mg, Rectal, Daily PRN    glucagon (human recombinant), 1 mg, Intramuscular, PRN    glucose, 16 g, Oral, PRN    glucose, 24 g, Oral, PRN    insulin aspart U-100, 0-10 Units, Subcutaneous, QID (AC + HS) PRN    melatonin, 6 mg, Oral, Nightly PRN    naloxone, 0.02 mg, Intravenous, PRN    ondansetron, 4 mg, Intravenous, Q8H PRN    potassium bicarbonate, 35 mEq, Oral, PRN    potassium bicarbonate, 50 mEq, Oral, PRN    potassium bicarbonate, 60 mEq, Oral, PRN    potassium, sodium phosphates, 2 packet, Oral, PRN    potassium, sodium phosphates, 2 packet, Oral, PRN    potassium, sodium phosphates, 2 packet, Oral, PRN    prochlorperazine, 5 mg, Intravenous, Q6H PRN    senna-docusate 8.6-50 mg, 1 tablet, Oral, Daily PRN    simethicone, 1 tablet, Oral, QID PRN    sodium chloride 0.9%, 10 mL, Intravenous, Q12H PRN    Flushing PICC/Midline Protocol, , , Until Discontinued **AND** sodium chloride 0.9%, 10 mL, Intravenous, Q12H PRN    traMADoL, 50 mg, Oral, Q6H PRN      Objective:     Temp:  [97.6 °F (36.4 °C)-98.3 °F (36.8 °C)] 98 °F (36.7 °C)  Pulse:  [63-70] 70  Resp:  [16-19] 18  SpO2:  [94 %-96 %] 96 %  BP: (113-148)/(58-78) 113/61      Intake/Output Summary (Last 24 hours) at 11/9/2024 1614  Last data filed at 11/9/2024 1612  Gross per 24 hour   Intake 457.47 ml   Output 700 ml   Net -242.53 ml        Body mass index is 35.74 kg/m².    Physical Exam  Vitals and nursing note reviewed.   Constitutional:       General: She is not in acute distress.     Appearance: Normal appearance. She is  ill-appearing.   HENT:      Head: Normocephalic.   Cardiovascular:      Rate and Rhythm: Normal rate.   Pulmonary:      Effort: Pulmonary effort is normal. No tachypnea or respiratory distress.   Neurological:      General: No focal deficit present.      Mental Status: She is alert.      Cranial Nerves: No cranial nerve deficit.      Motor: No weakness.   Psychiatric:         Attention and Perception: Attention normal.         Mood and Affect: Mood and affect normal.         Speech: Speech normal.         Behavior: Behavior is cooperative.          Labs: All labs within the last 24 hours were reviewed.   Recent Results (from the past 24 hours)   POCT glucose    Collection Time: 11/08/24  4:25 PM   Result Value Ref Range    POCT Glucose 118 (H) 70 - 110 mg/dL   POCT glucose    Collection Time: 11/08/24  7:56 PM   Result Value Ref Range    POCT Glucose 144 (H) 70 - 110 mg/dL   Comprehensive Metabolic Panel    Collection Time: 11/09/24  4:18 AM   Result Value Ref Range    Sodium 140 136 - 145 mmol/L    Potassium 3.6 3.5 - 5.1 mmol/L    Chloride 108 95 - 110 mmol/L    CO2 24 23 - 29 mmol/L    Glucose 91 70 - 110 mg/dL    BUN 14 8 - 23 mg/dL    Creatinine 0.9 0.5 - 1.4 mg/dL    Calcium 8.4 (L) 8.7 - 10.5 mg/dL    Total Protein 5.7 (L) 6.0 - 8.4 g/dL    Albumin 2.5 (L) 3.5 - 5.2 g/dL    Total Bilirubin 0.4 0.1 - 1.0 mg/dL    Alkaline Phosphatase 40 40 - 150 U/L    AST 11 10 - 40 U/L    ALT 13 10 - 44 U/L    eGFR >60 >60 mL/min/1.73 m^2    Anion Gap 8 8 - 16 mmol/L   POCT glucose    Collection Time: 11/09/24  7:38 AM   Result Value Ref Range    POCT Glucose 99 70 - 110 mg/dL   POCT glucose    Collection Time: 11/09/24 11:51 AM   Result Value Ref Range    POCT Glucose 100 70 - 110 mg/dL        Lab Results   Component Value Date    [id number] Negative 12/07/2022       Recent Labs   Lab 11/04/24  0215 11/05/24  0559 11/06/24  0158   WBC 6.15 7.00 6.88   LYMPH 14.5*  0.9* 10.9*  0.8* 11.6*  0.8*   HGB 11.1* 11.1* 11.3*  "  HCT 35.0* 35.6* 34.4*    244 223     Recent Labs   Lab 11/04/24 0215 11/05/24  0559 11/07/24  0610 11/08/24  0506 11/09/24 0418      < > 139 141 140   K 3.8   < > 3.0* 3.9 3.6   *   < > 110 109 108   CO2 16*   < > 19* 23 24   BUN 25*   < > 11 14 14   CREATININE 1.1   < > 0.9 0.9 0.9   GLU 99   < > 95 99 91   CALCIUM 8.1*   < > 8.5* 8.3* 8.4*   MG 1.8  --   --   --   --    PHOS 3.1  --   --   --   --     < > = values in this interval not displayed.     Recent Labs   Lab 11/03/24  0831 11/04/24 0215 11/07/24 0610 11/08/24  0506 11/09/24 0418   ALKPHOS  --    < > 43 39* 40   ALT  --    < > 16 16 13   AST  --    < > 15 12 11   ALBUMIN  --    < > 2.7* 2.6* 2.5*   PROT  --    < > 6.2 5.7* 5.7*   BILITOT  --    < > 0.5 0.3 0.4   INR 1.0  --   --   --   --     < > = values in this interval not displayed.        No results for input(s): "DDIMER", "FERRITIN", "CRP", "LDH", "BNP", "TROPONINI", "CPK" in the last 72 hours.    Invalid input(s): "PROCALCITONIN"        Microbiology: All microbiology updates for the past 24 hours have been reviewed.  Microbiology Results (last 7 days)       Procedure Component Value Units Date/Time    Culture, Anaerobe [[id number]] Collected: 11/05/24 1442    Order Status: Completed Specimen: Wound from Toe, Left Foot Updated: 11/09/24 0605     Anaerobic Culture No anaerobes isolated    Narrative:      Distal left great toe    Culture, Anaerobe [[id number]] Collected: 11/05/24 1442    Order Status: Completed Specimen: Wound from Toe, Left Foot Updated: 11/09/24 0600     Anaerobic Culture No anaerobes isolated    Narrative:      Proximal margin left toe    Aerobic culture [[id number]]  (Abnormal)  (Susceptibility) Collected: 11/05/24 1442    Order Status: Completed Specimen: Wound from Toe, Left Foot Updated: 11/08/24 0652     Aerobic Bacterial Culture PSEUDOMONAS FLUORESCENS/PUTIDA  Few      Narrative:      Proximal margin left toe    Aerobic culture [[id number]]  " (Abnormal)  (Susceptibility) Collected: 11/05/24 1442    Order Status: Completed Specimen: Wound from Toe, Left Foot Updated: 11/08/24 0651     Aerobic Bacterial Culture PSEUDOMONAS FLUORESCENS/PUTIDA  Many        ENTEROCOCCUS FAECALIS  Many      Narrative:      Distal left great toe    AFB Culture & Smear [[id number]] Collected: 11/05/24 1442    Order Status: Completed Specimen: Wound from Toe, Left Foot Updated: 11/07/24 2127     AFB Culture & Smear Culture in progress     AFB CULTURE STAIN No acid fast bacilli seen.    Narrative:      Distal left great toe    Stool culture [[id number]] Collected: 11/05/24 0028    Order Status: Completed Specimen: Stool Updated: 11/07/24 0759     Stool Culture No Salmonella,Shigella,Vibrio,Campylobacter,Yersinia isolated.    E. coli 0157 antigen [[id number]] Collected: 11/05/24 0028    Order Status: Completed Specimen: Stool Updated: 11/06/24 1152     Shiga Toxin 1 E.coli Negative     Shiga Toxin 2 E.coli Negative    Culture, Anaerobe [[id number]] Collected: 11/02/24 1430    Order Status: Completed Specimen: Wound from Toe, Left Foot Updated: 11/06/24 1019     Anaerobic Culture No anaerobes isolated    Gram stain [[id number]] Collected: 11/05/24 1442    Order Status: Completed Specimen: Wound from Toe, Left Foot Updated: 11/06/24 0831     Gram Stain Result No WBC's      No organisms seen    Narrative:      Proximal margin left toe    Gram stain [[id number]] Collected: 11/05/24 1442    Order Status: Completed Specimen: Wound from Toe, Left Foot Updated: 11/06/24 0825     Gram Stain Result Few WBC's      No organisms seen    Narrative:      Distal left great toe    Fungus culture [[id number]] Collected: 11/05/24 1442    Order Status: Sent Specimen: Wound from Toe, Left Foot Updated: 11/05/24 1637    Aerobic culture [[id number]]  (Abnormal)  (Susceptibility) Collected: 11/02/24 1430    Order Status: Completed Specimen: Wound from Toe, Left Foot Updated: 11/05/24 0802     Aerobic  Bacterial Culture Results called to and read back by: Sol Michaels 11/04/2024  06:32      ENTEROBACTER CLOACAE  Moderate        METHICILLIN RESISTANT STAPHYLOCOCCUS AUREUS  Moderate        PSEUDOMONAS AERUGINOSA  Moderate      Clostridium difficile EIA [[id number]] Collected: 11/02/24 1636    Order Status: Completed Specimen: Stool Updated: 11/03/24 1202     C. diff Antigen Negative     C difficile Toxins A+B, EIA Negative     Comment: Testing not recommended for children <24 months old.       Narrative:      1 - 3 Days: If liquid stool unable to be collected in  community onset window (hospital day 1 - 3), the order will  be discontinued automatically. Do not send formed stool for  testing              Imaging All imaging within the last 24 hours was reviewed.       No results found for this or any previous visit.      X-Ray Chest AP Portable  Narrative: EXAMINATION:  XR CHEST AP PORTABLE    CLINICAL HISTORY:  picc placement;    TECHNIQUE:  Single frontal view of the chest was performed.    COMPARISON:  Chest radiograph 11/07/2024    FINDINGS:  Interval placement of right-sided PICC catheter.  Tip slightly courses towards midline and lies about the region of the proximal superior vena cava, noting that patient may be slightly rotated, limiting evaluation.  Correlation and possible repositioning advised.    Cardiomediastinal silhouette is unchanged in size.  Aortic atherosclerosis.    Hypoventilatory exam.  Coarsened interstitial lung markings.  No new consolidation, worsening pleural effusion, or pneumothorax.    Osseous structures demonstrate no evidence for acute fracture or osseous destructive lesion.  Impression: Interval placement of right-sided PICC catheter. Tip slightly courses towards midline and lies about the region of the proximal superior vena cava, noting that patient may be slightly rotated, limiting evaluation.  Correlation and possible repositioning advised.    Electronically signed by: Ramon  Wilfredo  Date:    11/09/2024  Time:    13:54      Social Drivers of Health     Tobacco Use: Low Risk  (11/1/2024)    Patient History     Smoking Tobacco Use: Never     Smokeless Tobacco Use: Never     Passive Exposure: Never   Alcohol Use: Not At Risk (3/13/2024)    AUDIT-C     Frequency of Alcohol Consumption: Never     Average Number of Drinks: Patient does not drink     Frequency of Binge Drinking: Never   Financial Resource Strain: Patient Declined (11/2/2024)    Overall Financial Resource Strain (CARDIA)     Difficulty of Paying Living Expenses: Patient declined   Food Insecurity: Patient Declined (11/2/2024)    Hunger Vital Sign     Worried About Running Out of Food in the Last Year: Patient declined     Ran Out of Food in the Last Year: Patient declined   Transportation Needs: Patient Declined (11/2/2024)    TRANSPORTATION NEEDS     Transportation : Patient declined   Physical Activity: Inactive (3/13/2024)    Exercise Vital Sign     Days of Exercise per Week: 0 days     Minutes of Exercise per Session: 0 min   Stress: Patient Declined (11/2/2024)    [employer] [place] [employer] - Occupational Stress Questionnaire     Feeling of Stress : Patient declined   Housing Stability: Patient Declined (11/2/2024)    Housing Stability Vital Sign     Unable to Pay for Housing in the Last Year: Patient declined     Homeless in the Last Year: Patient declined   Depression: Low Risk  (3/13/2024)    Depression     Last PHQ-4: Flowsheet Data: 1   Utilities: Patient Declined (11/2/2024)    [facility] Utilities     Threatened with loss of utilities: Patient declined   Health Literacy: Patient Declined (11/2/2024)     Health Literacy     Frequency of need for help with medical instructions: Patient declines to respond   Social Isolation: Not on file

## 2024-11-09 NOTE — NURSING
Patient is currently eating dinner and watching television. She is hopeful of being placed in a facility closer to home. She was free from pain today and glucose was good all day. BP was high in the am but is down now. All safety needs are being met and call bell is within reach.

## 2024-11-09 NOTE — ASSESSMENT & PLAN NOTE
Concern for cellulitis/possible underlying osteomyelitis   Plan:   Continue vancomycin/zosyn   Follow up on cultures - growing MRSA and enterobacter  Consult podiatry   Consult vascular surgery   MRI left foot ordered - early osteomyelitis noted  - s/p left great toe amputation by podiatry on 11/5  -surgical cx with pseudomonas so 6 weeks abx with zosyn planned until 12/17 as recommended by ID; PICC line ordered; plan for SNF.

## 2024-11-09 NOTE — PROGRESS NOTES
[facility] Medicine  Telemedicine Progress Note    Patient Name: Stephany Skinner  MRN: [id number]  Patient Class: IP- Inpatient   Admission Date: 11/2/2024  Length of Stay: 7 days  Attending Physician: Emelina Cruz MD  Primary Care Provider: Pipo Flowers MD          Subjective:     Principal Problem:Osteomyelitis of great toe of left foot        HPI:  This is an 89-year-old female with a past medical history of AFib (on Eliquis), HFpEF (EF: 50%), COPD, hypertension, type 2 diabetes, hyperlipidemia, CKD 3, rheumatoid arthritis, myasthenia gravis, obesity, who presents with a foot wound.      Patient presents for evaluation of worsening left 1st toe swelling and redness.  She developed this about 3 weeks prior to presentation, and has been following up with wound care.  She noted that her toe looked more swollen, and was more painful on the day of presentation. Her wound care nurse advised her to present to the ED for further evaluation. Patient denies fevers, chills,  but endorses drainage from the toe.     In the ED, the patient was hemodynamically stable.  Labs were remarkable for macrocytic anemia (11.2), elevated ESR (66), negative lactic acid (1.4).  Left foot x-ray showed degenerative changes with soft tissue swelling overlying the dorsum and medial border of the left forefoot.  Left lower extremity arterial ultrasound showed findings suggestive of 50-75% of peroneal artery stenosis, and inflow stenosis in the common femoral artery.  Patient was given vancomycin, Zosyn.  She was admitted for further management.    Overview/Hospital Course:  #Infection of toe; MRI did show signs of early osteomyelitis distal phalanx LEFT great toe. Pending Podiatry and VascSx eval/recs. Continuing V+Z for now. GNR in deep wound culture. Got contrast, to preserve renal function, will reduce from V+Z to V+ceftriaxone (non-diabetic, pseudomonas less likely, however switch to cefepime if results).  Will switch from Apixaban to heparin gtt in preparation of procedures. C diff negative.    Unlikely need for revascularization per VascSx. Patient NPO for Podiatry to proceed with procedure. Polymicrobial infection of toe with MRSA, Pseudomonas and Enterobacter. No s/p amputation- ID consult for Abx LOT and d/c recs. PT OT, suspect will need SNF then HH.       This follow-up encounter was provided through telemedicine to address  Osteomyelitis of great toe of left foot present on admission.  Patient was transferred to the telemedicine service on:  11/09/2024   The patient location is: [facility]9/419 A admitted 11/2/2024  2:38 AM.    Cc: toe redness and swelling    Interval History/Overnight Events:   Clinical record since admit reviewed.    Patient is able to provide adequate history.    Uneventful night - denies pain  - diarrhea appears to be improving; none so far today with 2 episodes yesterday - she has tried avoiding milk at home but uses cheese which she says improves symptoms.  Agreeable to SNF at discharge to continue therapy with IV abx.     Review of Systems   Constitutional:  Positive for activity change, appetite change and fatigue. Negative for fever.   Respiratory:  Negative for shortness of breath.    Gastrointestinal:  Negative for nausea.   Musculoskeletal:  Negative for arthralgias.          I have reviewed the following on 11/09/2024:    Data  Details     [x]   Lab results reviewed   CMP wnl except alb and protein low    [x]   Micro reports reviewed      []   Pathology reports reviewed      []   Imaging reports reviewed      []   Cardiology Procedure reports reviewed      []   Non- records/CareEverywhere notes reviewed      [x]  Tests/studies orders placed or verified   Mg    [x]  Independently viewed/assessed   Corrected Ca: 9.6    []  11/09/2024 Discussion of:        Inpatient Medications reviewed and prescribed for management of current problems:  Scheduled Meds:   apixaban  5 mg Oral BID     colestipoL  3 g Oral BID    diclofenac sodium  2 g Topical (Top) Daily    furosemide  40 mg Oral Daily    hydrALAZINE  25 mg Oral Q12H    isosorbide mononitrate  60 mg Oral Daily    lactobacillus acidophilus & bulgar  1 packet Oral BID    levothyroxine  88 mcg Oral Before breakfast    metoprolol succinate  25 mg Oral Daily    miconazole NITRATE 2 %   Topical (Top) BID    mupirocin   Nasal BID    piperacillin-tazobactam (Zosyn) IV (PEDS and ADULTS) (extended infusion is not appropriate)  4.5 g Intravenous Q8H     Continuous Infusions:  PRN Meds:.  Current Facility-Administered Medications:     acetaminophen, 650 mg, Oral, Q8H PRN    acetaminophen, 650 mg, Oral, Q4H PRN    albuterol-ipratropium, 3 mL, Nebulization, Q6H PRN    aluminum-magnesium hydroxide-simethicone, 30 mL, Oral, QID PRN    bisacodyL, 10 mg, Rectal, Daily PRN    glucagon (human recombinant), 1 mg, Intramuscular, PRN    glucose, 16 g, Oral, PRN    glucose, 24 g, Oral, PRN    insulin aspart U-100, 0-10 Units, Subcutaneous, QID (AC + HS) PRN    melatonin, 6 mg, Oral, Nightly PRN    naloxone, 0.02 mg, Intravenous, PRN    ondansetron, 4 mg, Intravenous, Q8H PRN    potassium bicarbonate, 35 mEq, Oral, PRN    potassium bicarbonate, 50 mEq, Oral, PRN    potassium bicarbonate, 60 mEq, Oral, PRN    potassium, sodium phosphates, 2 packet, Oral, PRN    potassium, sodium phosphates, 2 packet, Oral, PRN    potassium, sodium phosphates, 2 packet, Oral, PRN    prochlorperazine, 5 mg, Intravenous, Q6H PRN    senna-docusate 8.6-50 mg, 1 tablet, Oral, Daily PRN    simethicone, 1 tablet, Oral, QID PRN    sodium chloride 0.9%, 10 mL, Intravenous, Q12H PRN    Flushing PICC/Midline Protocol, , , Until Discontinued **AND** sodium chloride 0.9%, 10 mL, Intravenous, Q12H PRN    traMADoL, 50 mg, Oral, Q6H PRN      Objective:     Temp:  [97.6 °F (36.4 °C)-98.3 °F (36.8 °C)] 98 °F (36.7 °C)  Pulse:  [63-70] 70  Resp:  [16-19] 18  SpO2:  [94 %-96 %] 96 %  BP: (113-148)/(58-78)  113/61      Intake/Output Summary (Last 24 hours) at 11/9/2024 1614  Last data filed at 11/9/2024 1612  Gross per 24 hour   Intake 457.47 ml   Output 700 ml   Net -242.53 ml        Body mass index is 35.74 kg/m².    Physical Exam  Vitals and nursing note reviewed.   Constitutional:       General: She is not in acute distress.     Appearance: Normal appearance. She is ill-appearing.   HENT:      Head: Normocephalic.   Cardiovascular:      Rate and Rhythm: Normal rate.   Pulmonary:      Effort: Pulmonary effort is normal. No tachypnea or respiratory distress.   Neurological:      General: No focal deficit present.      Mental Status: She is alert.      Cranial Nerves: No cranial nerve deficit.      Motor: No weakness.   Psychiatric:         Attention and Perception: Attention normal.         Mood and Affect: Mood and affect normal.         Speech: Speech normal.         Behavior: Behavior is cooperative.          Labs: All labs within the last 24 hours were reviewed.   Recent Results (from the past 24 hours)   POCT glucose    Collection Time: 11/08/24  4:25 PM   Result Value Ref Range    POCT Glucose 118 (H) 70 - 110 mg/dL   POCT glucose    Collection Time: 11/08/24  7:56 PM   Result Value Ref Range    POCT Glucose 144 (H) 70 - 110 mg/dL   Comprehensive Metabolic Panel    Collection Time: 11/09/24  4:18 AM   Result Value Ref Range    Sodium 140 136 - 145 mmol/L    Potassium 3.6 3.5 - 5.1 mmol/L    Chloride 108 95 - 110 mmol/L    CO2 24 23 - 29 mmol/L    Glucose 91 70 - 110 mg/dL    BUN 14 8 - 23 mg/dL    Creatinine 0.9 0.5 - 1.4 mg/dL    Calcium 8.4 (L) 8.7 - 10.5 mg/dL    Total Protein 5.7 (L) 6.0 - 8.4 g/dL    Albumin 2.5 (L) 3.5 - 5.2 g/dL    Total Bilirubin 0.4 0.1 - 1.0 mg/dL    Alkaline Phosphatase 40 40 - 150 U/L    AST 11 10 - 40 U/L    ALT 13 10 - 44 U/L    eGFR >60 >60 mL/min/1.73 m^2    Anion Gap 8 8 - 16 mmol/L   POCT glucose    Collection Time: 11/09/24  7:38 AM   Result Value Ref Range    POCT Glucose 99  "70 - 110 mg/dL   POCT glucose    Collection Time: 11/09/24 11:51 AM   Result Value Ref Range    POCT Glucose 100 70 - 110 mg/dL        Lab Results   Component Value Date    [id number] Negative 12/07/2022       Recent Labs   Lab 11/04/24 0215 11/05/24 0559 11/06/24  0158   WBC 6.15 7.00 6.88   LYMPH 14.5*  0.9* 10.9*  0.8* 11.6*  0.8*   HGB 11.1* 11.1* 11.3*   HCT 35.0* 35.6* 34.4*    244 223     Recent Labs   Lab 11/04/24 0215 11/05/24  0559 11/07/24  0610 11/08/24  0506 11/09/24  0418      < > 139 141 140   K 3.8   < > 3.0* 3.9 3.6   *   < > 110 109 108   CO2 16*   < > 19* 23 24   BUN 25*   < > 11 14 14   CREATININE 1.1   < > 0.9 0.9 0.9   GLU 99   < > 95 99 91   CALCIUM 8.1*   < > 8.5* 8.3* 8.4*   MG 1.8  --   --   --   --    PHOS 3.1  --   --   --   --     < > = values in this interval not displayed.     Recent Labs   Lab 11/03/24 0831 11/04/24 0215 11/07/24  0610 11/08/24  0506 11/09/24  0418   ALKPHOS  --    < > 43 39* 40   ALT  --    < > 16 16 13   AST  --    < > 15 12 11   ALBUMIN  --    < > 2.7* 2.6* 2.5*   PROT  --    < > 6.2 5.7* 5.7*   BILITOT  --    < > 0.5 0.3 0.4   INR 1.0  --   --   --   --     < > = values in this interval not displayed.        No results for input(s): "DDIMER", "FERRITIN", "CRP", "LDH", "BNP", "TROPONINI", "CPK" in the last 72 hours.    Invalid input(s): "PROCALCITONIN"        Microbiology: All microbiology updates for the past 24 hours have been reviewed.  Microbiology Results (last 7 days)       Procedure Component Value Units Date/Time    Culture, Anaerobe [[id number]] Collected: 11/05/24 1442    Order Status: Completed Specimen: Wound from Toe, Left Foot Updated: 11/09/24 0605     Anaerobic Culture No anaerobes isolated    Narrative:      Distal left great toe    Culture, Anaerobe [[id number]] Collected: 11/05/24 1442    Order Status: Completed Specimen: Wound from Toe, Left Foot Updated: 11/09/24 0600     Anaerobic Culture No anaerobes isolated    " Narrative:      Proximal margin left toe    Aerobic culture [[id number]]  (Abnormal)  (Susceptibility) Collected: 11/05/24 1442    Order Status: Completed Specimen: Wound from Toe, Left Foot Updated: 11/08/24 0652     Aerobic Bacterial Culture PSEUDOMONAS FLUORESCENS/PUTIDA  Few      Narrative:      Proximal margin left toe    Aerobic culture [[id number]]  (Abnormal)  (Susceptibility) Collected: 11/05/24 1442    Order Status: Completed Specimen: Wound from Toe, Left Foot Updated: 11/08/24 0651     Aerobic Bacterial Culture PSEUDOMONAS FLUORESCENS/PUTIDA  Many        ENTEROCOCCUS FAECALIS  Many      Narrative:      Distal left great toe    AFB Culture & Smear [[id number]] Collected: 11/05/24 1442    Order Status: Completed Specimen: Wound from Toe, Left Foot Updated: 11/07/24 2127     AFB Culture & Smear Culture in progress     AFB CULTURE STAIN No acid fast bacilli seen.    Narrative:      Distal left great toe    Stool culture [[id number]] Collected: 11/05/24 0028    Order Status: Completed Specimen: Stool Updated: 11/07/24 0759     Stool Culture No Salmonella,Shigella,Vibrio,Campylobacter,Yersinia isolated.    E. coli 0157 antigen [[id number]] Collected: 11/05/24 0028    Order Status: Completed Specimen: Stool Updated: 11/06/24 1152     Shiga Toxin 1 E.coli Negative     Shiga Toxin 2 E.coli Negative    Culture, Anaerobe [[id number]] Collected: 11/02/24 1430    Order Status: Completed Specimen: Wound from Toe, Left Foot Updated: 11/06/24 1019     Anaerobic Culture No anaerobes isolated    Gram stain [[id number]] Collected: 11/05/24 1442    Order Status: Completed Specimen: Wound from Toe, Left Foot Updated: 11/06/24 0831     Gram Stain Result No WBC's      No organisms seen    Narrative:      Proximal margin left toe    Gram stain [[id number]] Collected: 11/05/24 1442    Order Status: Completed Specimen: Wound from Toe, Left Foot Updated: 11/06/24 0825     Gram Stain Result Few WBC's      No organisms seen     Narrative:      Distal left great toe    Fungus culture [[id number]] Collected: 11/05/24 1442    Order Status: Sent Specimen: Wound from Toe, Left Foot Updated: 11/05/24 1637    Aerobic culture [[id number]]  (Abnormal)  (Susceptibility) Collected: 11/02/24 1430    Order Status: Completed Specimen: Wound from Toe, Left Foot Updated: 11/05/24 0802     Aerobic Bacterial Culture Results called to and read back by: Sol Michaels 11/04/2024  06:32      ENTEROBACTER CLOACAE  Moderate        METHICILLIN RESISTANT STAPHYLOCOCCUS AUREUS  Moderate        PSEUDOMONAS AERUGINOSA  Moderate      Clostridium difficile EIA [[id number]] Collected: 11/02/24 1636    Order Status: Completed Specimen: Stool Updated: 11/03/24 1202     C. diff Antigen Negative     C difficile Toxins A+B, EIA Negative     Comment: Testing not recommended for children <24 months old.       Narrative:      1 - 3 Days: If liquid stool unable to be collected in  community onset window (hospital day 1 - 3), the order will  be discontinued automatically. Do not send formed stool for  testing              Imaging All imaging within the last 24 hours was reviewed.       No results found for this or any previous visit.      X-Ray Chest AP Portable  Narrative: EXAMINATION:  XR CHEST AP PORTABLE    CLINICAL HISTORY:  picc placement;    TECHNIQUE:  Single frontal view of the chest was performed.    COMPARISON:  Chest radiograph 11/07/2024    FINDINGS:  Interval placement of right-sided PICC catheter.  Tip slightly courses towards midline and lies about the region of the proximal superior vena cava, noting that patient may be slightly rotated, limiting evaluation.  Correlation and possible repositioning advised.    Cardiomediastinal silhouette is unchanged in size.  Aortic atherosclerosis.    Hypoventilatory exam.  Coarsened interstitial lung markings.  No new consolidation, worsening pleural effusion, or pneumothorax.    Osseous structures demonstrate no evidence for  acute fracture or osseous destructive lesion.  Impression: Interval placement of right-sided PICC catheter. Tip slightly courses towards midline and lies about the region of the proximal superior vena cava, noting that patient may be slightly rotated, limiting evaluation.  Correlation and possible repositioning advised.    Electronically signed by: Ramon Villalobos  Date:    11/09/2024  Time:    13:54      Social Drivers of Health     Tobacco Use: Low Risk  (11/1/2024)    Patient History     Smoking Tobacco Use: Never     Smokeless Tobacco Use: Never     Passive Exposure: Never   Alcohol Use: Not At Risk (3/13/2024)    AUDIT-C     Frequency of Alcohol Consumption: Never     Average Number of Drinks: Patient does not drink     Frequency of Binge Drinking: Never   Financial Resource Strain: Patient Declined (11/2/2024)    Overall Financial Resource Strain (CARDIA)     Difficulty of Paying Living Expenses: Patient declined   Food Insecurity: Patient Declined (11/2/2024)    Hunger Vital Sign     Worried About Running Out of Food in the Last Year: Patient declined     Ran Out of Food in the Last Year: Patient declined   Transportation Needs: Patient Declined (11/2/2024)    TRANSPORTATION NEEDS     Transportation : Patient declined   Physical Activity: Inactive (3/13/2024)    Exercise Vital Sign     Days of Exercise per Week: 0 days     Minutes of Exercise per Session: 0 min   Stress: Patient Declined (11/2/2024)    [employer] [place] [employer] - Occupational Stress Questionnaire     Feeling of Stress : Patient declined   Housing Stability: Patient Declined (11/2/2024)    Housing Stability Vital Sign     Unable to Pay for Housing in the Last Year: Patient declined     Homeless in the Last Year: Patient declined   Depression: Low Risk  (3/13/2024)    Depression     Last PHQ-4: Flowsheet Data: 1   Utilities: Patient Declined (11/2/2024)    [facility] Utilities     Threatened with loss of utilities: Patient declined   Health  Literacy: Patient Declined (11/2/2024)     Health Literacy     Frequency of need for help with medical instructions: Patient declines to respond   Social Isolation: Not on file         Assessment/Plan:      * Osteomyelitis of great toe of left foot  Acute OM  MRI did show signs of early osteomyelitis distal phalanx LEFT great toe.   Pending Podiatry and VascSx eval/recs.   Continuing V+Z for now.   MRSA and enterobacter  Got contrast, to preserve renal function, will reduce from V+Z to V+ceftriaxone (non-diabetic, pseudomonas less likely, however switch to cefepime if results).   S/p left great toe amputation - plan for 6 weeks of IV Zosyn due to culture from proximal margins growing Pseudomonas; PICC line ordered.       Diarrhea  Chronic given >3w, however no workup done  C diff negative and cultures neg  Eating cheese regularly at home but says improves symptoms  Continue colestipol as taking at home        Cellulitis of toe of left foot  Concern for cellulitis/possible underlying osteomyelitis   Plan:   Continue vancomycin/zosyn   Follow up on cultures - growing MRSA and enterobacter  Consult podiatry   Consult vascular surgery   MRI left foot ordered - early osteomyelitis noted  - s/p left great toe amputation by podiatry on 11/5  -surgical cx with pseudomonas so 6 weeks abx with zosyn planned until 12/17 as recommended by ID; PICC line ordered; plan for SNF.     Stage 3a chronic kidney disease (CKD)  Creatine stable for now. BMP reviewed- noted Estimated Creatinine Clearance: 53.5 mL/min (based on SCr of 0.9 mg/dL). according to latest data. Based on current GFR, CKD stage is stage 3 - GFR 30-59.  Monitor UOP and serial BMP and adjust therapy as needed. Renally dose meds. Avoid nephrotoxic medications and procedures.    Candidiasis, intertrigo  Miconazole BID       COPD (chronic obstructive pulmonary disease)  No acute issues. DuoNebs PRN     Essential hypertension  Patients blood pressure range in the  last 24 hours was: BP  Min: 103/52  Max: 204/91.The patient's inpatient anti-hypertensive regimen is listed below:  Current Antihypertensives  furosemide tablet 40 mg, Daily, Oral  metoprolol succinate (TOPROL-XL) 24 hr tablet 25 mg, Daily, Oral  isosorbide mononitrate 24 hr tablet 60 mg, Daily, Oral  hydrALAZINE tablet 25 mg, Every 12 hours, Oral    Plan  - BP is controlled, no changes needed to their regimen    Type 2 diabetes mellitus with circulatory disorder, without long-term current use of insulin  Lab Results   Component Value Date    HGBA1C 5.8 (H) 11/29/2023     Glycemic protocol   MDSS      Chronic diastolic congestive heart failure  Continue home medications     PUJA (acute kidney injury)  PUJA is likely due to pre-renal azotemia due to infection.   Baseline creatinine is  .09 . Most recent creatinine and eGFR are listed below.    Recent Labs     11/07/24  0610 11/08/24  0506 11/09/24  0418   CREATININE 0.9 0.9 0.9   EGFRNORACEVR >60 >60 >60        Plan  - PUJA is improving  - Avoid nephrotoxins and renally dose meds for GFR listed above  - Monitor urine output, serial BMP, and adjust therapy as needed  - Cr 1.3 on admit, now 0.9 after fluids. RESOLVED    Myasthenia gravis, adult form  Not on any medications. Monitor   Follow up with neurology       Chronic atrial fibrillation  [id number] Score: 4. The patients heart rate in the last 24 hours is as follows:  Pulse  Min: 63  Max: 70     Antiarrhythmics  metoprolol succinate (TOPROL-XL) 24 hr tablet 25 mg, Daily, Oral    Anticoagulants ; switched from Apixaban to heparin gtt in preparation of procedures.   apixaban tablet 5 mg, 2 times daily, Oral    Plan  - Replete lytes with a goal of K>4, Mg >2  - Patient is anticoagulated, see medications listed above.  - Patient's afib is currently controlled        Hyperlipidemia  Continue statin       Hypothyroidism  Continue synthroid         VTE Risk Mitigation (From admission, onward)           Ordered     apixaban  tablet 5 mg  2 times daily         11/06/24 0940     IP VTE HIGH RISK PATIENT  Once         11/05/24 1551     Place sequential compression device  Until discontinued         11/05/24 1551     Place sequential compression device  Until discontinued         11/02/24 0252                      I have completed this tele-visit without the assistance of a telepresenter.    The attending portion of this evaluation, treatment, and documentation was performed per Emelina Cruz MD via Telemedicine AudioVisual using the secure [employer] software platform with 2 way audio/video. The provider was located off-site and the patient is located in the hospital. The aforementioned video software was utilized to document the relevant history and physical exam    Emelina Cruz MD  Department of Hospital Medicine   [facility]

## 2024-11-10 LAB
ALBUMIN SERPL BCP-MCNC: 2.6 G/DL (ref 3.5–5.2)
ALP SERPL-CCNC: 45 U/L (ref 40–150)
ALT SERPL W/O P-5'-P-CCNC: 13 U/L (ref 10–44)
ANION GAP SERPL CALC-SCNC: 7 MMOL/L (ref 8–16)
AST SERPL-CCNC: 12 U/L (ref 10–40)
BILIRUB SERPL-MCNC: 0.4 MG/DL (ref 0.1–1)
BUN SERPL-MCNC: 13 MG/DL (ref 8–23)
CALCIUM SERPL-MCNC: 8.5 MG/DL (ref 8.7–10.5)
CHLORIDE SERPL-SCNC: 107 MMOL/L (ref 95–110)
CO2 SERPL-SCNC: 26 MMOL/L (ref 23–29)
CREAT SERPL-MCNC: 0.9 MG/DL (ref 0.5–1.4)
EST. GFR  (NO RACE VARIABLE): >60 ML/MIN/1.73 M^2
GLUCOSE SERPL-MCNC: 94 MG/DL (ref 70–110)
MAGNESIUM SERPL-MCNC: 2 MG/DL (ref 1.6–2.6)
POCT GLUCOSE: 102 MG/DL (ref 70–110)
POCT GLUCOSE: 105 MG/DL (ref 70–110)
POCT GLUCOSE: 95 MG/DL (ref 70–110)
POCT GLUCOSE: 99 MG/DL (ref 70–110)
POTASSIUM SERPL-SCNC: 3.7 MMOL/L (ref 3.5–5.1)
PROT SERPL-MCNC: 5.8 G/DL (ref 6–8.4)
SODIUM SERPL-SCNC: 140 MMOL/L (ref 136–145)

## 2024-11-10 PROCEDURE — 25000003 PHARM REV CODE 250: Performed by: STUDENT IN AN ORGANIZED HEALTH CARE EDUCATION/TRAINING PROGRAM

## 2024-11-10 PROCEDURE — 94761 N-INVAS EAR/PLS OXIMETRY MLT: CPT

## 2024-11-10 PROCEDURE — 93005 ELECTROCARDIOGRAM TRACING: CPT

## 2024-11-10 PROCEDURE — 25000003 PHARM REV CODE 250: Performed by: INTERNAL MEDICINE

## 2024-11-10 PROCEDURE — 97116 GAIT TRAINING THERAPY: CPT | Mod: CQ

## 2024-11-10 PROCEDURE — 99900035 HC TECH TIME PER 15 MIN (STAT)

## 2024-11-10 PROCEDURE — 63600175 PHARM REV CODE 636 W HCPCS: Performed by: INTERNAL MEDICINE

## 2024-11-10 PROCEDURE — 25000003 PHARM REV CODE 250: Performed by: PODIATRIST

## 2024-11-10 PROCEDURE — 11000001 HC ACUTE MED/SURG PRIVATE ROOM

## 2024-11-10 PROCEDURE — 80053 COMPREHEN METABOLIC PANEL: CPT | Performed by: PODIATRIST

## 2024-11-10 PROCEDURE — 27000207 HC ISOLATION

## 2024-11-10 PROCEDURE — 97530 THERAPEUTIC ACTIVITIES: CPT | Mod: CQ

## 2024-11-10 PROCEDURE — 93010 ELECTROCARDIOGRAM REPORT: CPT | Mod: ,,, | Performed by: INTERNAL MEDICINE

## 2024-11-10 PROCEDURE — 83735 ASSAY OF MAGNESIUM: CPT | Performed by: INTERNAL MEDICINE

## 2024-11-10 RX ORDER — MICONAZOLE NITRATE 2 G/100G
POWDER TOPICAL 2 TIMES DAILY
Start: 2024-11-10

## 2024-11-10 RX ORDER — L. ACIDOPHILUS/L.BULGARICUS 100MM CELL
1 GRANULES IN PACKET (EA) ORAL 2 TIMES DAILY
Start: 2024-11-10 | End: 2025-01-17

## 2024-11-10 RX ORDER — DICLOFENAC SODIUM 10 MG/G
2 GEL TOPICAL 2 TIMES DAILY
Start: 2024-11-10

## 2024-11-10 RX ORDER — LOPERAMIDE HCL 2 MG
2 TABLET ORAL 3 TIMES DAILY PRN
Start: 2024-11-10

## 2024-11-10 RX ORDER — DICLOFENAC SODIUM 10 MG/G
2 GEL TOPICAL 2 TIMES DAILY
Status: DISCONTINUED | OUTPATIENT
Start: 2024-11-10 | End: 2024-11-13 | Stop reason: HOSPADM

## 2024-11-10 RX ORDER — ALBUTEROL SULFATE 90 UG/1
2 INHALANT RESPIRATORY (INHALATION) EVERY 4 HOURS PRN
Start: 2024-11-10

## 2024-11-10 RX ORDER — VIT A/VIT C/VIT E/ZINC/COPPER 4296-226
1 CAPSULE ORAL DAILY
Start: 2024-11-10

## 2024-11-10 RX ORDER — ACETAMINOPHEN 325 MG/1
650 TABLET ORAL EVERY 8 HOURS PRN
Start: 2024-11-10

## 2024-11-10 RX ORDER — INSULIN LISPRO 100 [IU]/ML
0-5 INJECTION, SOLUTION INTRAVENOUS; SUBCUTANEOUS
Start: 2024-11-10 | End: 2025-11-10

## 2024-11-10 RX ORDER — ALENDRONATE SODIUM 70 MG/1
70 TABLET ORAL
COMMUNITY
Start: 2024-11-10

## 2024-11-10 RX ADMIN — HYDRALAZINE HYDROCHLORIDE 25 MG: 25 TABLET ORAL at 09:11

## 2024-11-10 RX ADMIN — HYDRALAZINE HYDROCHLORIDE 25 MG: 25 TABLET ORAL at 08:11

## 2024-11-10 RX ADMIN — DICLOFENAC SODIUM 2 G: 10 GEL TOPICAL at 08:11

## 2024-11-10 RX ADMIN — ACETAMINOPHEN 650 MG: 325 TABLET ORAL at 07:11

## 2024-11-10 RX ADMIN — PIPERACILLIN SODIUM AND TAZOBACTAM SODIUM 4.5 G: 4; .5 INJECTION, POWDER, FOR SOLUTION INTRAVENOUS at 12:11

## 2024-11-10 RX ADMIN — FUROSEMIDE 40 MG: 40 TABLET ORAL at 09:11

## 2024-11-10 RX ADMIN — APIXABAN 5 MG: 5 TABLET, FILM COATED ORAL at 09:11

## 2024-11-10 RX ADMIN — PIPERACILLIN SODIUM AND TAZOBACTAM SODIUM 4.5 G: 4; .5 INJECTION, POWDER, FOR SOLUTION INTRAVENOUS at 04:11

## 2024-11-10 RX ADMIN — LEVOTHYROXINE SODIUM 88 MCG: 0.09 TABLET ORAL at 05:11

## 2024-11-10 RX ADMIN — PIPERACILLIN SODIUM AND TAZOBACTAM SODIUM 4.5 G: 4; .5 INJECTION, POWDER, FOR SOLUTION INTRAVENOUS at 08:11

## 2024-11-10 RX ADMIN — COLESTIPOL HYDROCHLORIDE 3 G: 1 TABLET, FILM COATED ORAL at 08:11

## 2024-11-10 RX ADMIN — METOPROLOL SUCCINATE 25 MG: 25 TABLET, EXTENDED RELEASE ORAL at 09:11

## 2024-11-10 RX ADMIN — DICLOFENAC SODIUM 2 G: 10 GEL TOPICAL at 10:11

## 2024-11-10 RX ADMIN — MICONAZOLE NITRATE: 20 POWDER TOPICAL at 08:11

## 2024-11-10 RX ADMIN — MICONAZOLE NITRATE: 20 POWDER TOPICAL at 10:11

## 2024-11-10 RX ADMIN — ISOSORBIDE MONONITRATE 60 MG: 30 TABLET, EXTENDED RELEASE ORAL at 09:11

## 2024-11-10 RX ADMIN — LACTOBACILLUS ACIDOPHILUS / LACTOBACILLUS BULGARICUS 1 EACH: 100 MILLION CFU STRENGTH GRANULES at 09:11

## 2024-11-10 RX ADMIN — APIXABAN 5 MG: 5 TABLET, FILM COATED ORAL at 08:11

## 2024-11-10 RX ADMIN — MUPIROCIN: 20 OINTMENT TOPICAL at 09:11

## 2024-11-10 RX ADMIN — COLESTIPOL HYDROCHLORIDE 3 G: 1 TABLET, FILM COATED ORAL at 09:11

## 2024-11-10 RX ADMIN — LACTOBACILLUS ACIDOPHILUS / LACTOBACILLUS BULGARICUS 1 EACH: 100 MILLION CFU STRENGTH GRANULES at 08:11

## 2024-11-10 RX ADMIN — MUPIROCIN: 20 OINTMENT TOPICAL at 08:11

## 2024-11-10 NOTE — PLAN OF CARE
[facility]    FACILITY TRANSFER ORDERS      Patient Name: Stephany Skinner  YOB: 1935    PCP: Pipo Flowers MD   PCP Address: [facility] [address]  / [place] [address]  PCP Phone Number: [phone]  PCP Fax: [phone]    Encounter Date: 11/10/2024     Admit to: skilled nursing    Diagnoses:   Active Hospital Problems    Diagnosis  POA    *Osteomyelitis of great toe of left foot [M86.9]  Yes    Dermatitis associated with moisture [L30.8]  Yes    Diarrhea [R19.7]  Yes    Cellulitis of toe of left foot [L03.032]  Yes    Stage 3a chronic kidney disease (CKD) [N18.31]  Yes     Avoid NSAIDS  Good BP control  Monitor renal function closely      Candidiasis, intertrigo [B37.2]  Yes    Essential hypertension [I10]  Yes     Two gram sodium diet.    Weight loss discussed.    Try to walk 2 miles per day.    Current medications will be:  Lasix 40 mg daily   Potassium 20 mEq daily   Imdur 60 mg daily  Metoprolol 25 mg daily   Spironolactone 25 mg daily        COPD (chronic obstructive pulmonary disease) [J44.9]  Yes     Continue DuoNeb treatments twice daily as needed.    Her COPD is stable.         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  Her last A1c was 5.8.   Check hemoglobin A1c every 12 months.      Chronic diastolic congestive heart failure [I50.32]  Yes     Two gram sodium diet.    Weight loss discussed.    Try to walk 2 miles per day.    Avoid smoking.    Current medications will be:  Lower Valsartan 320 mg to 160 mg daily   Lasix 40 mg daily   Lower Metoprolol 100 mg to 50 mg daily   Potassium 20 mEq daily      PUJA (acute kidney injury) [N17.9]  Yes    Myasthenia gravis, adult form [G70.00]  Yes     No longer on prednisone, Mestinon  Keep appointment with neurology      Chronic atrial fibrillation [I48.20]  Yes     Continue Eliquis 5 mg daily  Keep appointment with  Cardiology      Hypothyroidism [E03.9]  Yes     Patient takes Synthroid 88 mcg for her hypothyroidism.  Adjust Synthroid based on TSH resutls.  Check TSH every 6 months.      Hyperlipidemia [E78.5]  Yes     Continue fish oil  Continue Lipitor 20 mg        Resolved Hospital Problems   No resolved problems to display.     Allergies:   Review of patient's allergies indicates:   Allergen Reactions    Cephalosporins Hives     Itchiness and hives immediately after IV Ceftriaxone push    Statins-hmg-coa reductase inhibitors      Other reaction(s): Unknown       Code Status: full code    Vitals: Every shift       Diet: cardiac diet and diabetic diet: 2000 calorie    Activity: Weight bearing status: partial weight bearing: left leg - heel weight bearing with Darco shoe    Nursing Precautions: Aspiration , Fall, and Pressure ulcer prevention    Consults: PT to evaluate and treat- 5 times a week, OT to evaluate and treat- 5 times a week, Wound Care, and Nutrition to evaluate and recommend diet    Oxygen: room air    Labs:    Weekly CBC, CMP, CRP on Monday while on IV antibiotics to monitor for toxicity. Please fax results to [facility] ID clinic [phone].     Pending Diagnostic Studies:       Procedure Component Value Units Date/Time    Specimen to Pathology, Surgery Other (podiatry) [[id number]] Collected: 11/06/24 1023    Order Status: Sent Lab Status: In process Updated: 11/06/24 1102    Specimen: Tissue     Stool Exam-Ova,Cysts,Parasites [[id number]] Collected: 11/04/24 0235    Order Status: Sent Lab Status: In process Updated: 11/05/24 0031    Specimen: Stool             Miscellaneous Care:     IV therapy:  Zosyn 4.5 gm IV Q8 hours   End date: 12/17/2024    PICC Line Care: PICC Line Use/Care Instructions:  Scrub the Hub: Prior to accessing the line, always perform a 30 second alcohol scrub  Each lumen of the central line is to be flushed at least daily with 10 mL Normal Saline and 3 mL Heparin flush (10 units/mL)  Skilled  Nurse (SN) may draw blood from IV access  Blood Draw Procedure:  - Aspirate at least 5 mL of blood  - Discard  - Obtain specimen  - Change injection cap  - Flush with 20 mL Normal Saline followed by a 3-5 mL Heparin flush (10 units/mL)    Central :  - Sterile dressing changes are done weekly and as needed.  - Use chlor-hexadine scrub to cleanse site, apply Biopatch to insertion site, apply securement device dressing  - Injection caps are changed weekly and after EVERY lab draw.  - If sterile gauze is under dressing to control oozing, dressing change must be performed every 24 hours until gauze is not needed.    MISCELLANEOUS CARE:  Routine Skin for Bedridden Patients: Apply moisture barrier cream to all skin folds and wet areas in perineal area daily and after baths and all bowel movements. and Diabetes Care:   SN to perform and educate Diabetic management with blood glucose monitoring:, Fingerstick blood sugar AC and HS, and Report CBG < 60 or > 350 to physician.    WOUND CARE ORDERS    Right 1st digit: paint with betadine and leave open to air daily    Left 1st digit amputation site: TBD by podiatry    L anteromedial leg: every 3 days; dress with Aquacel AG then  kerlix; no compression     Left buttock: every shift every shift and prn cleansing- Cleanse with Remedy No Rinse Foaming Cleanser and apply Remedy Moisture Barrier (blue top tube)     IV Access: PICC     Medications: Discontinue all previous medication orders, if any. See new list below.  Current Discharge Medication List        START taking these medications    Details   D5W PgBk 100 mL with piperacillin-tazobactam 4.5 gram SolR 4.5 g Inject 4.5 g into the vein every 8 (eight) hours. Ending on 12/17/2024.      diclofenac sodium (VOLTAREN) 1 % Gel Apply 2 g topically 2 (two) times daily. Apply to bilateral knees      insulin lispro (HUMALOG U-100 INSULIN) 100 unit/mL injection Inject 0-5 Units into the skin every meal as needed for High  Blood Sugar. Blood Glucose  mg/dL                  Pre-meal       151-200                1 units       201-250                2 units       251-300                3 units      301-350                4 units            >350                     5 units          Administer subcutaneously if needed at times designated by monitoring  schedule.      lactobacillus acidophilus & bulgar (LACTINEX) 100 million cell packet Take 1 packet (1 each total) by mouth 2 (two) times daily. May discontinue on 1/17/2025      miconazole NITRATE 2 % (MICOTIN) 2 % top powder Apply topically 2 (two) times daily. To groin           CONTINUE these medications which have CHANGED    Details   acetaminophen (TYLENOL) 325 MG tablet Take 2 tablets (650 mg total) by mouth every 8 (eight) hours as needed for Temperature greater than or Pain (or equal to 100 degree F. Not to exceed 4 grams daily).      albuterol (PROVENTIL/VENTOLIN HFA) 90 mcg/actuation inhaler Inhale 2 puffs into the lungs every 4 (four) hours as needed for Wheezing or Shortness of Breath. Rescue      alendronate (FOSAMAX) 70 MG tablet Take 1 tablet (70 mg total) by mouth every 7 days. Resume on discharge from skilled nursing.    Associated Diagnoses: Osteopenia, unspecified location      loperamide (IMODIUM A-D) 2 mg Tab Take 1 tablet (2 mg total) by mouth 3 (three) times daily as needed (diarrhea).      vitamins A,C,E-zinc-copper (PRESERVISION AREDS) 4,296 mcg-226 mg-90 mg Cap Take 1 capsule by mouth Daily. Resume once discharged from skilled nursing.           CONTINUE these medications which have NOT CHANGED    Details   atorvastatin (LIPITOR) 20 MG tablet Take 20 mg by mouth every evening.      beta-carotene,A,-vits C,E/mins (VISION ORAL) Take 1 tablet by mouth 2 (two) times daily.      calcium carbonate-vit D3-min 600 mg calcium- 400 unit Tab Take 2 tablets by mouth once daily.       colestipoL (COLESTID) 1 gram Tab Take 3 tablets (3 g total) by mouth 2 (two) times daily. For  cholesterol and diarrhea  Qty: 120 tablet, Refills: 5    Associated Diagnoses: Chronic diarrhea      cyanocobalamin (VITAMIN B-12) 1000 MCG tablet Take 100 mcg by mouth once daily.      ELIQUIS 5 mg Tab Take 5 mg by mouth 2 (two) times daily.  Refills: 1      furosemide (LASIX) 40 MG tablet Take 1 tablet (40 mg total) by mouth once daily.  Qty: 30 tablet, Refills: 5    Associated Diagnoses: Chronic diastolic congestive heart failure; Essential hypertension      isosorbide mononitrate (IMDUR) 60 MG 24 hr tablet Take 60 mg by mouth once daily.      levothyroxine (SYNTHROID) 88 MCG tablet Take 1 tablet (88 mcg total) by mouth before breakfast.  Qty: 90 tablet, Refills: 1    Associated Diagnoses: Hypothyroidism due to acquired atrophy of thyroid      losartan (COZAAR) 25 MG tablet Take 25 mg by mouth 2 (two) times a day.      metoprolol succinate (TOPROL-XL) 25 MG 24 hr tablet Take 25 mg by mouth once daily.      nitroGLYCERIN (NITROSTAT) 0.4 MG SL tablet Place 0.4 mg under the tongue every 5 (five) minutes as needed for Chest pain.      potassium chloride SA (K-DUR,KLOR-CON) 20 MEQ tablet Take 1 tablet (20 mEq total) by mouth once daily.  Qty: 30 tablet, Refills: 5    Associated Diagnoses: Chronic diastolic congestive heart failure; Essential hypertension      spironolactone (ALDACTONE) 25 MG tablet Take 25 mg by mouth every morning.           STOP taking these medications       nystatin (MYCOSTATIN) powder Comments:   Reason for Stopping:         nystatin-triamcinolone (MYCOLOG II) cream Comments:   Reason for Stopping:             Follow up:       Immunizations Administered as of 11/10/2024       Name Date Dose VIS Date Route Exp Date    COVID-19, MRNA, LN-S, PF (Pfizer) (Purple Cap) 3/11/2021  9:40 AM 0.3 mL 12/12/2020 Intramuscular 6/30/2021    Site: Left deltoid     Given By: Skylar Spears RN     : [employer]     Lot: [id number]     Comment: given by uma page rn     COVID-19, MRNA LN-S, PF (Pfizer)  (Purple Cap) 2/18/2021  9:06 AM 0.3 mL 12/12/2020 Intramuscular 5/31/2021    Site: Left deltoid     Given By: Skylar Spears RN     : [employer]     Lot: [id number]                       _________________________________  Emelina Cruz MD  11/10/2024

## 2024-11-10 NOTE — PT/OT/SLP PROGRESS
Physical Therapy Treatment    Patient Name:  Stephany Skinner   MRN:  [id number]    Recommendations:     Discharge Recommendations: Moderate Intensity Therapy  Discharge Equipment Recommendations: bedside commode  Barriers to discharge: None    Assessment:     Stephany Skinner is a 89 y.o. female admitted with a medical diagnosis of Osteomyelitis of great toe of left foot.  She presents with the following impairments/functional limitations: weakness, gait instability, orthopedic precautions, pain, edema, impaired balance, impaired cognition, impaired coordination, impaired endurance, impaired functional mobility, impaired self care skills, impaired skin, decreased coordination, decreased lower extremity function, decreased ROM, decreased safety awareness, decreased upper extremity function.    Supine>sit with CGA  Sit>stand with RW and Lina from EOB, SBA from raised toilet seat, cueing for heel WB on LLE  Toilet transfer with RW and SBA, step transfer  Amb 12' + 15' with RW and SBA, cueing for heel WB on LLE, B DARCO shoes donned  Pt reporting no pain, able to transfer and amb limited distance with SBA using RW  Rec Moderate Intensity therapy    Rehab Prognosis: Good; patient would benefit from acute skilled PT services to address these deficits and reach maximum level of function.    Recent Surgery: Procedure(s) (LRB):  AMPUTATION, TOE- DISTAL HALLUX (Left) 5 Days Post-Op    Plan:     During this hospitalization, patient to be seen 5 x/week to address the identified rehab impairments via gait training, therapeutic activities, therapeutic exercises, neuromuscular re-education, wheelchair management/training and progress toward the following goals:    Plan of Care Expires:  11/20/24    Subjective     Chief Complaint: none stated  Patient/Family Comments/goals: I'm starting to think about the nursing home, because my sister doesn't want to take care of me  Pain/Comfort:  Pain Rating 1: 0/10  Pain Rating Post-Intervention  1: 0/10      Objective:     Communicated with nursing prior to session.  Patient found HOB elevated with PureWick, peripheral IV upon PT entry to room.     General Precautions: Standard, fall, contact  Orthopedic Precautions:  (LLE heel WB in post-op boot)  Braces:  (B post-op shoes)  Respiratory Status: Room air     Functional Mobility:  Bed Mobility:     Supine to Sit: contact guard assistance  Transfers:     Sit to Stand:  stand by assistance and minimum assistance with rolling walker  Toilet Transfer: stand by assistance with  rolling walker  using  Step Transfer  Gait: 12' + 15' with RW and SBA, cueing for heel WB on LLE, B DARCO shoes donned      AM-PAC 6 CLICK MOBILITY  Turning over in bed (including adjusting bedclothes, sheets and blankets)?: 4  Sitting down on and standing up from a chair with arms (e.g., wheelchair, bedside commode, etc.): 3  Moving from lying on back to sitting on the side of the bed?: 3  Moving to and from a bed to a chair (including a wheelchair)?: 3  Need to walk in hospital room?: 3  Climbing 3-5 steps with a railing?: 2  Basic Mobility Total Score: 18       Treatment & Education:  Seated therex BLE: LAQ x 10  Gait and transfer training as noted, cueing for heel WB on LLE    Patient left up in chair with all lines intact, call button in reach, nurse notified, and lunch served ..    GOALS:   Multidisciplinary Problems       Physical Therapy Goals          Problem: Physical Therapy    Goal Priority Disciplines Outcome Interventions   Physical Therapy Goal     PT, PT/OT Progressing    Description: Goals to be met by: 24     Patient will increase functional independence with mobility by performin. Supine to sit with Mod I  2. Rolling to Left and Right with Mod I  3. Sit to stand transfer with Mod I and adherence to L LE heel WB precautions and RW  4. W/C mobility x100 ft with Mod I using BUE  5. Lower extremity exercise program x20 reps per handout, with independence  6.  Gait x50 ft with mod I using RW, B post-op shoes, and L heel WB                         Time Tracking:     PT Received On: 11/10/24  PT Start Time: 1139     PT Stop Time: 1205  PT Total Time (min): 26 min     Billable Minutes: Gait Training 13 and Therapeutic Activity 13    Treatment Type: Treatment  PT/PTA: PTA     Number of PTA visits since last PT visit: 2     11/10/2024

## 2024-11-10 NOTE — PLAN OF CARE
Patient alert and oriented. Forgetful at times. Respirations even and unlabored. No distress noted. Skin warm and dry. Picc line to right arm. Saline locked. Flushed as ordered. Iv antibiotics given as ordered. Excoriation noted to sacrum and skin folds. Cream and powder applied as ordered. Frequent weight shift assistance. Dressing to left foot and leg. Clean, dry, and intact. No complications noted. Purewick in place. No complications noted. Patient utilized bedpan throughout shift as needed. Blood sugar monitored. No insulin given per sliding scale. Patient has no complaints at this time. Bed in lowest position. Call bell within reach. Bed alarms audible. Contact precautions maintained throughout shift.     Problem: Adult Inpatient Plan of Care  Goal: Plan of Care Review  Outcome: Progressing  Flowsheets (Taken 11/10/2024 0530)  Plan of Care Reviewed With: patient  Goal: Patient-Specific Goal (Individualized)  Outcome: Progressing  Goal: Absence of Hospital-Acquired Illness or Injury  Outcome: Progressing  Intervention: Identify and Manage Fall Risk  Flowsheets (Taken 11/10/2024 0530)  Safety Promotion/Fall Prevention:   assistive device/personal item within reach   bed alarm set   Fall Risk reviewed with patient/family   high risk medications identified   medications reviewed   nonskid shoes/socks when out of bed     Problem: Diabetes Comorbidity  Goal: Blood Glucose Level Within Targeted Range  Outcome: Progressing  Intervention: Monitor and Manage Glycemia  Flowsheets (Taken 11/10/2024 0530)  Glycemic Management: blood glucose monitored     Problem: Wound  Goal: Optimal Coping  Outcome: Progressing  Intervention: Support Patient and Family Response  Flowsheets (Taken 11/10/2024 0530)  Supportive Measures: active listening utilized     Problem: Skin Injury Risk Increased  Goal: Skin Health and Integrity  Outcome: Progressing  Intervention: Optimize Skin Protection  Flowsheets (Taken 11/10/2024 0530)  Pressure  Reduction Techniques:   frequent weight shift encouraged   weight shift assistance provided  Activity Management: Rolling - L1  Head of Bed (HOB) Positioning: HOB elevated

## 2024-11-10 NOTE — ASSESSMENT & PLAN NOTE
Acute OM  MRI did show signs of early osteomyelitis distal phalanx LEFT great toe.   Pending Podiatry and VascSx eval/recs.   Continuing V+Z for now.   MRSA and enterobacter  Got contrast, to preserve renal function, will reduce from V+Z to V+ceftriaxone (non-diabetic, pseudomonas less likely, however switch to cefepime if results).   S/p left great toe amputation - plan for 6 weeks of IV Zosyn due to culture from proximal margins growing Pseudomonas; PICC line placed

## 2024-11-10 NOTE — ASSESSMENT & PLAN NOTE
Concern for cellulitis/possible underlying osteomyelitis   Plan:   Continue vancomycin/zosyn   Follow up on cultures - growing MRSA and enterobacter  Consult podiatry   Consult vascular surgery   MRI left foot ordered - early osteomyelitis noted  - s/p left great toe amputation by podiatry on 11/5  -surgical cx with pseudomonas so 6 weeks abx with zosyn planned until 12/17 as recommended by ID; PICC line placed; plan for SNF.

## 2024-11-10 NOTE — PROGRESS NOTES
[facility] Medicine  Telemedicine Progress Note    Patient Name: Stephany Skinner  MRN: [id number]  Patient Class: IP- Inpatient   Admission Date: 11/2/2024  Length of Stay: 8 days  Attending Physician: Emelina Cruz MD  Primary Care Provider: Pipo Flowers MD          Subjective:     Principal Problem:Osteomyelitis of great toe of left foot        HPI:  This is an 89-year-old female with a past medical history of AFib (on Eliquis), HFpEF (EF: 50%), COPD, hypertension, type 2 diabetes, hyperlipidemia, CKD 3, rheumatoid arthritis, myasthenia gravis, obesity, who presents with a foot wound.      Patient presents for evaluation of worsening left 1st toe swelling and redness.  She developed this about 3 weeks prior to presentation, and has been following up with wound care.  She noted that her toe looked more swollen, and was more painful on the day of presentation. Her wound care nurse advised her to present to the ED for further evaluation. Patient denies fevers, chills,  but endorses drainage from the toe.     In the ED, the patient was hemodynamically stable.  Labs were remarkable for macrocytic anemia (11.2), elevated ESR (66), negative lactic acid (1.4).  Left foot x-ray showed degenerative changes with soft tissue swelling overlying the dorsum and medial border of the left forefoot.  Left lower extremity arterial ultrasound showed findings suggestive of 50-75% of peroneal artery stenosis, and inflow stenosis in the common femoral artery.  Patient was given vancomycin, Zosyn.  She was admitted for further management.    Overview/Hospital Course:  #Infection of toe; MRI did show signs of early osteomyelitis distal phalanx LEFT great toe. Pending Podiatry and VascSx eval/recs. Continuing V+Z for now. GNR in deep wound culture. Got contrast, to preserve renal function, will reduce from V+Z to V+ceftriaxone (non-diabetic, pseudomonas less likely, however switch to cefepime if results).  Will switch from Apixaban to heparin gtt in preparation of procedures. C diff negative.    Unlikely need for revascularization per VascSx. Patient NPO for Podiatry to proceed with procedure. Polymicrobial infection of toe with MRSA, Pseudomonas and Enterobacter. No s/p amputation- ID consult for Abx LOT and d/c recs. PT OT, suspect will need SNF then HH.       This follow-up encounter was provided through telemedicine to address  Osteomyelitis of great toe of left foot present on admission.  Patient was transferred to the telemedicine service on:  11/09/2024   The patient location is: [facility] A admitted 11/2/2024  2:38 AM.    Cc: toe redness and swelling    Interval History/Overnight Events:     Patient is able to provide adequate history.    Uneventful night - denies pain - she is surprised by having decreased leg pain  - diarrhea remains improved and no fecal incontinence.  PICC line placed on 11/9. Agreeable to SNF at discharge to continue therapy with IV abx.     Review of Systems   Constitutional:  Positive for activity change, appetite change and fatigue. Negative for fever.   Respiratory:  Negative for shortness of breath.    Gastrointestinal:  Negative for nausea.   Musculoskeletal:  Negative for arthralgias.          I have reviewed the following on 11/10/2024:    Data  Details     [x]   Lab results reviewed   CMP wnl except alb and protein low; Mg wnl    [x]   Micro reports reviewed      []   Pathology reports reviewed      []   Imaging reports reviewed      []   Cardiology Procedure reports reviewed      []   Non- records/CareEverywhere notes reviewed      []  Tests/studies orders placed or verified       [x]  Independently viewed/assessed   Corrected Ca: 9.6    []  11/10/2024 Discussion of:        Inpatient Medications reviewed and prescribed for management of current problems:  Scheduled Meds:   apixaban  5 mg Oral BID    colestipoL  3 g Oral BID    diclofenac sodium  2 g Topical (Top) Daily     furosemide  40 mg Oral Daily    hydrALAZINE  25 mg Oral Q12H    isosorbide mononitrate  60 mg Oral Daily    lactobacillus acidophilus & bulgar  1 packet Oral BID    levothyroxine  88 mcg Oral Before breakfast    metoprolol succinate  25 mg Oral Daily    miconazole NITRATE 2 %   Topical (Top) BID    mupirocin   Nasal BID    piperacillin-tazobactam (Zosyn) IV (PEDS and ADULTS) (extended infusion is not appropriate)  4.5 g Intravenous Q8H     Continuous Infusions:  PRN Meds:.  Current Facility-Administered Medications:     acetaminophen, 650 mg, Oral, Q8H PRN    acetaminophen, 650 mg, Oral, Q4H PRN    albuterol-ipratropium, 3 mL, Nebulization, Q6H PRN    aluminum-magnesium hydroxide-simethicone, 30 mL, Oral, QID PRN    bisacodyL, 10 mg, Rectal, Daily PRN    glucagon (human recombinant), 1 mg, Intramuscular, PRN    glucose, 16 g, Oral, PRN    glucose, 24 g, Oral, PRN    insulin aspart U-100, 0-10 Units, Subcutaneous, QID (AC + HS) PRN    melatonin, 6 mg, Oral, Nightly PRN    naloxone, 0.02 mg, Intravenous, PRN    ondansetron, 4 mg, Intravenous, Q8H PRN    potassium bicarbonate, 35 mEq, Oral, PRN    potassium bicarbonate, 50 mEq, Oral, PRN    potassium bicarbonate, 60 mEq, Oral, PRN    potassium, sodium phosphates, 2 packet, Oral, PRN    potassium, sodium phosphates, 2 packet, Oral, PRN    potassium, sodium phosphates, 2 packet, Oral, PRN    prochlorperazine, 5 mg, Intravenous, Q6H PRN    senna-docusate 8.6-50 mg, 1 tablet, Oral, Daily PRN    simethicone, 1 tablet, Oral, QID PRN    sodium chloride 0.9%, 10 mL, Intravenous, Q12H PRN    Flushing PICC/Midline Protocol, , , Until Discontinued **AND** sodium chloride 0.9%, 10 mL, Intravenous, Q12H PRN      Objective:     Temp:  [97.5 °F (36.4 °C)-98.9 °F (37.2 °C)] 97.7 °F (36.5 °C)  Pulse:  [65-73] 65  Resp:  [18] 18  SpO2:  [94 %-97 %] 96 %  BP: (115-170)/(60-82) 117/67      Intake/Output Summary (Last 24 hours) at 11/10/2024 1250  Last data filed at 11/10/2024 1200  Gross  per 24 hour   Intake 626.48 ml   Output 1300 ml   Net -673.52 ml        Body mass index is 35.74 kg/m².    Physical Exam  Vitals and nursing note reviewed.   Constitutional:       General: She is not in acute distress.     Appearance: Normal appearance. She is ill-appearing.   HENT:      Head: Normocephalic.   Cardiovascular:      Rate and Rhythm: Normal rate.   Pulmonary:      Effort: Pulmonary effort is normal. No tachypnea or respiratory distress.   Neurological:      General: No focal deficit present.      Mental Status: She is alert.      Cranial Nerves: No cranial nerve deficit.      Motor: No weakness.   Psychiatric:         Attention and Perception: Attention normal.         Mood and Affect: Mood and affect normal.         Speech: Speech normal.         Behavior: Behavior is cooperative.          Labs: All labs within the last 24 hours were reviewed.   Recent Results (from the past 24 hours)   POCT glucose    Collection Time: 11/09/24  4:41 PM   Result Value Ref Range    POCT Glucose 104 70 - 110 mg/dL   POCT glucose    Collection Time: 11/09/24  7:22 PM   Result Value Ref Range    POCT Glucose 106 70 - 110 mg/dL   Comprehensive Metabolic Panel    Collection Time: 11/10/24  4:36 AM   Result Value Ref Range    Sodium 140 136 - 145 mmol/L    Potassium 3.7 3.5 - 5.1 mmol/L    Chloride 107 95 - 110 mmol/L    CO2 26 23 - 29 mmol/L    Glucose 94 70 - 110 mg/dL    BUN 13 8 - 23 mg/dL    Creatinine 0.9 0.5 - 1.4 mg/dL    Calcium 8.5 (L) 8.7 - 10.5 mg/dL    Total Protein 5.8 (L) 6.0 - 8.4 g/dL    Albumin 2.6 (L) 3.5 - 5.2 g/dL    Total Bilirubin 0.4 0.1 - 1.0 mg/dL    Alkaline Phosphatase 45 40 - 150 U/L    AST 12 10 - 40 U/L    ALT 13 10 - 44 U/L    eGFR >60 >60 mL/min/1.73 m^2    Anion Gap 7 (L) 8 - 16 mmol/L   Magnesium    Collection Time: 11/10/24  4:36 AM   Result Value Ref Range    Magnesium 2.0 1.6 - 2.6 mg/dL   POCT glucose    Collection Time: 11/10/24  8:00 AM   Result Value Ref Range    POCT Glucose 95 70 -  "110 mg/dL   POCT glucose    Collection Time: 11/10/24 11:36 AM   Result Value Ref Range    POCT Glucose 99 70 - 110 mg/dL        Lab Results   Component Value Date    [id number] Negative 12/07/2022       Recent Labs   Lab 11/04/24 0215 11/05/24 0559 11/06/24  0158   WBC 6.15 7.00 6.88   LYMPH 14.5*  0.9* 10.9*  0.8* 11.6*  0.8*   HGB 11.1* 11.1* 11.3*   HCT 35.0* 35.6* 34.4*    244 223     Recent Labs   Lab 11/04/24 0215 11/05/24 0559 11/08/24  0506 11/09/24  0418 11/10/24  0436      < > 141 140 140   K 3.8   < > 3.9 3.6 3.7   *   < > 109 108 107   CO2 16*   < > 23 24 26   BUN 25*   < > 14 14 13   CREATININE 1.1   < > 0.9 0.9 0.9   GLU 99   < > 99 91 94   CALCIUM 8.1*   < > 8.3* 8.4* 8.5*   MG 1.8  --   --   --  2.0   PHOS 3.1  --   --   --   --     < > = values in this interval not displayed.     Recent Labs   Lab 11/08/24  0506 11/09/24  0418 11/10/24  0436   ALKPHOS 39* 40 45   ALT 16 13 13   AST 12 11 12   ALBUMIN 2.6* 2.5* 2.6*   PROT 5.7* 5.7* 5.8*   BILITOT 0.3 0.4 0.4        No results for input(s): "DDIMER", "FERRITIN", "CRP", "LDH", "BNP", "TROPONINI", "CPK" in the last 72 hours.    Invalid input(s): "PROCALCITONIN"        Microbiology: All microbiology updates for the past 24 hours have been reviewed.  Microbiology Results (last 7 days)       Procedure Component Value Units Date/Time    Culture, Anaerobe [[id number]] Collected: 11/05/24 1442    Order Status: Completed Specimen: Wound from Toe, Left Foot Updated: 11/09/24 0605     Anaerobic Culture No anaerobes isolated    Narrative:      Distal left great toe    Culture, Anaerobe [[id number]] Collected: 11/05/24 1442    Order Status: Completed Specimen: Wound from Toe, Left Foot Updated: 11/09/24 0600     Anaerobic Culture No anaerobes isolated    Narrative:      Proximal margin left toe    Aerobic culture [[id number]]  (Abnormal)  (Susceptibility) Collected: 11/05/24 1442    Order Status: Completed Specimen: Wound from Toe, " Left Foot Updated: 11/08/24 0652     Aerobic Bacterial Culture PSEUDOMONAS FLUORESCENS/PUTIDA  Few      Narrative:      Proximal margin left toe    Aerobic culture [[id number]]  (Abnormal)  (Susceptibility) Collected: 11/05/24 1442    Order Status: Completed Specimen: Wound from Toe, Left Foot Updated: 11/08/24 0651     Aerobic Bacterial Culture PSEUDOMONAS FLUORESCENS/PUTIDA  Many        ENTEROCOCCUS FAECALIS  Many      Narrative:      Distal left great toe    AFB Culture & Smear [[id number]] Collected: 11/05/24 1442    Order Status: Completed Specimen: Wound from Toe, Left Foot Updated: 11/07/24 2127     AFB Culture & Smear Culture in progress     AFB CULTURE STAIN No acid fast bacilli seen.    Narrative:      Distal left great toe    Stool culture [[id number]] Collected: 11/05/24 0028    Order Status: Completed Specimen: Stool Updated: 11/07/24 0759     Stool Culture No Salmonella,Shigella,Vibrio,Campylobacter,Yersinia isolated.    E. coli 0157 antigen [[id number]] Collected: 11/05/24 0028    Order Status: Completed Specimen: Stool Updated: 11/06/24 1152     Shiga Toxin 1 E.coli Negative     Shiga Toxin 2 E.coli Negative    Culture, Anaerobe [[id number]] Collected: 11/02/24 1430    Order Status: Completed Specimen: Wound from Toe, Left Foot Updated: 11/06/24 1019     Anaerobic Culture No anaerobes isolated    Gram stain [[id number]] Collected: 11/05/24 1442    Order Status: Completed Specimen: Wound from Toe, Left Foot Updated: 11/06/24 0831     Gram Stain Result No WBC's      No organisms seen    Narrative:      Proximal margin left toe    Gram stain [[id number]] Collected: 11/05/24 1442    Order Status: Completed Specimen: Wound from Toe, Left Foot Updated: 11/06/24 0825     Gram Stain Result Few WBC's      No organisms seen    Narrative:      Distal left great toe    Fungus culture [[id number]] Collected: 11/05/24 1442    Order Status: Sent Specimen: Wound from Toe, Left Foot Updated: 11/05/24 1637    Aerobic  culture [[id number]]  (Abnormal)  (Susceptibility) Collected: 11/02/24 1430    Order Status: Completed Specimen: Wound from Toe, Left Foot Updated: 11/05/24 0802     Aerobic Bacterial Culture Results called to and read back by: Sol Michaels 11/04/2024  06:32      ENTEROBACTER CLOACAE  Moderate        METHICILLIN RESISTANT STAPHYLOCOCCUS AUREUS  Moderate        PSEUDOMONAS AERUGINOSA  Moderate                Imaging All imaging within the last 24 hours was reviewed.       No results found for this or any previous visit.      X-Ray Chest AP Portable  Narrative: EXAMINATION:  XR CHEST AP PORTABLE    CLINICAL HISTORY:  picc placement;    TECHNIQUE:  Single frontal view of the chest was performed.    COMPARISON:  Chest radiograph 11/07/2024    FINDINGS:  Interval placement of right-sided PICC catheter.  Tip slightly courses towards midline and lies about the region of the proximal superior vena cava, noting that patient may be slightly rotated, limiting evaluation.  Correlation and possible repositioning advised.    Cardiomediastinal silhouette is unchanged in size.  Aortic atherosclerosis.    Hypoventilatory exam.  Coarsened interstitial lung markings.  No new consolidation, worsening pleural effusion, or pneumothorax.    Osseous structures demonstrate no evidence for acute fracture or osseous destructive lesion.  Impression: Interval placement of right-sided PICC catheter. Tip slightly courses towards midline and lies about the region of the proximal superior vena cava, noting that patient may be slightly rotated, limiting evaluation.  Correlation and possible repositioning advised.    Electronically signed by: Ramon Villalobos  Date:    11/09/2024  Time:    13:54      Social Drivers of Health     Tobacco Use: Low Risk  (11/1/2024)    Patient History     Smoking Tobacco Use: Never     Smokeless Tobacco Use: Never     Passive Exposure: Never   Alcohol Use: Not At Risk (3/13/2024)    AUDIT-C     Frequency of Alcohol  Consumption: Never     Average Number of Drinks: Patient does not drink     Frequency of Binge Drinking: Never   Financial Resource Strain: Patient Declined (11/2/2024)    Overall Financial Resource Strain (CARDIA)     Difficulty of Paying Living Expenses: Patient declined   Food Insecurity: Patient Declined (11/2/2024)    Hunger Vital Sign     Worried About Running Out of Food in the Last Year: Patient declined     Ran Out of Food in the Last Year: Patient declined   Transportation Needs: Patient Declined (11/2/2024)    TRANSPORTATION NEEDS     Transportation : Patient declined   Physical Activity: Inactive (3/13/2024)    Exercise Vital Sign     Days of Exercise per Week: 0 days     Minutes of Exercise per Session: 0 min   Stress: Patient Declined (11/2/2024)    [employer] - Occupational Stress Questionnaire     Feeling of Stress : Patient declined   Housing Stability: Patient Declined (11/2/2024)    Housing Stability Vital Sign     Unable to Pay for Housing in the Last Year: Patient declined     Homeless in the Last Year: Patient declined   Depression: Low Risk  (3/13/2024)    Depression     Last PHQ-4: Flowsheet Data: 1   Utilities: Patient Declined (11/2/2024)    [facility] Utilities     Threatened with loss of utilities: Patient declined   Health Literacy: Patient Declined (11/2/2024)     Health Literacy     Frequency of need for help with medical instructions: Patient declines to respond   Social Isolation: Not on file         Assessment/Plan:      * Osteomyelitis of great toe of left foot  Acute OM  MRI did show signs of early osteomyelitis distal phalanx LEFT great toe.   Pending Podiatry and VascSx eval/recs.   Continuing V+Z for now.   MRSA and enterobacter  Got contrast, to preserve renal function, will reduce from V+Z to V+ceftriaxone (non-diabetic, pseudomonas less likely, however switch to cefepime if results).   S/p left great toe amputation - plan for 6 weeks of IV Zosyn  due to culture from proximal margins growing Pseudomonas; PICC line placed      Dermatitis associated with moisture  Assessed by wound care; continue miconazole powder      Diarrhea  Chronic given >3w, however no workup done  C diff negative and cultures neg  Eating cheese regularly at home but says improves symptoms  Continue colestipol as taking at home        Cellulitis of toe of left foot  Concern for cellulitis/possible underlying osteomyelitis   Plan:   Continue vancomycin/zosyn   Follow up on cultures - growing MRSA and enterobacter  Consult podiatry   Consult vascular surgery   MRI left foot ordered - early osteomyelitis noted  - s/p left great toe amputation by podiatry on 11/5  -surgical cx with pseudomonas so 6 weeks abx with zosyn planned until 12/17 as recommended by ID; PICC line placed; plan for SNF.     Stage 3a chronic kidney disease (CKD)  Creatine stable for now. BMP reviewed- noted Estimated Creatinine Clearance: 53.5 mL/min (based on SCr of 0.9 mg/dL). according to latest data. Based on current GFR, CKD stage is stage 3 - GFR 30-59.  Monitor UOP and serial BMP and adjust therapy as needed. Renally dose meds. Avoid nephrotoxic medications and procedures.    Candidiasis, intertrigo  Miconazole BID       COPD (chronic obstructive pulmonary disease)  No acute issues. DuoNebs PRN     Essential hypertension  Patients blood pressure range in the last 24 hours was: BP  Min: 103/52  Max: 204/91.The patient's inpatient anti-hypertensive regimen is listed below:  Current Antihypertensives  furosemide tablet 40 mg, Daily, Oral  metoprolol succinate (TOPROL-XL) 24 hr tablet 25 mg, Daily, Oral  isosorbide mononitrate 24 hr tablet 60 mg, Daily, Oral  hydrALAZINE tablet 25 mg, Every 12 hours, Oral    Plan  - BP is controlled, no changes needed to their regimen    Type 2 diabetes mellitus with circulatory disorder, without long-term current use of insulin  Lab Results   Component Value Date    HGBA1C 5.8 (H)  11/29/2023     Glycemic protocol   MDSS      Chronic diastolic congestive heart failure  Continue home medications     PUJA (acute kidney injury)  PUJA is likely due to pre-renal azotemia due to infection.   Baseline creatinine is  .09 . Most recent creatinine and eGFR are listed below.    Recent Labs     11/07/24  0610 11/08/24  0506 11/09/24  0418   CREATININE 0.9 0.9 0.9   EGFRNORACEVR >60 >60 >60        Plan  - PUJA is improving  - Avoid nephrotoxins and renally dose meds for GFR listed above  - Monitor urine output, serial BMP, and adjust therapy as needed  - Cr 1.3 on admit, now 0.9 after fluids. RESOLVED    Myasthenia gravis, adult form  Not on any medications. Monitor   Follow up with neurology       Chronic atrial fibrillation  [id number] Score: 4. The patients heart rate in the last 24 hours is as follows:  Pulse  Min: 63  Max: 70     Antiarrhythmics  metoprolol succinate (TOPROL-XL) 24 hr tablet 25 mg, Daily, Oral    Anticoagulants ; switched from Apixaban to heparin gtt in preparation of procedures.   apixaban tablet 5 mg, 2 times daily, Oral    Plan  - Replete lytes with a goal of K>4, Mg >2  - Patient is anticoagulated, see medications listed above.  - Patient's afib is currently controlled        Hyperlipidemia  Continue statin       Hypothyroidism  Continue synthroid         VTE Risk Mitigation (From admission, onward)           Ordered     apixaban tablet 5 mg  2 times daily         11/06/24 0940     IP VTE HIGH RISK PATIENT  Once         11/05/24 1551     Place sequential compression device  Until discontinued         11/05/24 1551     Place sequential compression device  Until discontinued         11/02/24 0252                      I have completed this tele-visit without the assistance of a telepresenter.    The attending portion of this evaluation, treatment, and documentation was performed per Emelina Cruz MD via Telemedicine Audio[employer] using the secure [employer] software platform with 2 way  audio/video. The provider was located off-site and the patient is located in the hospital. The aforementioned video software was utilized to document the relevant history and physical exam    Emelina Cruz MD  Department of Hospital Medicine   [facility]

## 2024-11-10 NOTE — SUBJECTIVE & OBJECTIVE
This follow-up encounter was provided through telemedicine to address  Osteomyelitis of great toe of left foot present on admission.  Patient was transferred to the telemedicine service on:  11/09/2024   The patient location is: W419/[facility]419 A admitted 11/2/2024  2:38 AM.    Cc: toe redness and swelling    Interval History/Overnight Events:     Patient is able to provide adequate history.    Uneventful night - denies pain - she is surprised by having decreased leg pain  - diarrhea remains improved and no fecal incontinence.  PICC line placed on 11/9. Agreeable to SNF at discharge to continue therapy with IV abx.     Review of Systems   Constitutional:  Positive for activity change, appetite change and fatigue. Negative for fever.   Respiratory:  Negative for shortness of breath.    Gastrointestinal:  Negative for nausea.   Musculoskeletal:  Negative for arthralgias.          I have reviewed the following on 11/10/2024:    Data  Details     [x]   Lab results reviewed   CMP wnl except alb and protein low; Mg wnl    [x]   Micro reports reviewed      []   Pathology reports reviewed      []   Imaging reports reviewed      []   Cardiology Procedure reports reviewed      []   Non- records/CareEverywhere notes reviewed      []  Tests/studies orders placed or verified       [x]  Independently viewed/assessed   Corrected Ca: 9.6    []  11/10/2024 Discussion of:        Inpatient Medications reviewed and prescribed for management of current problems:  Scheduled Meds:   apixaban  5 mg Oral BID    colestipoL  3 g Oral BID    diclofenac sodium  2 g Topical (Top) Daily    furosemide  40 mg Oral Daily    hydrALAZINE  25 mg Oral Q12H    isosorbide mononitrate  60 mg Oral Daily    lactobacillus acidophilus & bulgar  1 packet Oral BID    levothyroxine  88 mcg Oral Before breakfast    metoprolol succinate  25 mg Oral Daily    miconazole NITRATE 2 %   Topical (Top) BID    mupirocin   Nasal BID    piperacillin-tazobactam (Zosyn) IV (PEDS  and ADULTS) (extended infusion is not appropriate)  4.5 g Intravenous Q8H     Continuous Infusions:  PRN Meds:.  Current Facility-Administered Medications:     acetaminophen, 650 mg, Oral, Q8H PRN    acetaminophen, 650 mg, Oral, Q4H PRN    albuterol-ipratropium, 3 mL, Nebulization, Q6H PRN    aluminum-magnesium hydroxide-simethicone, 30 mL, Oral, QID PRN    bisacodyL, 10 mg, Rectal, Daily PRN    glucagon (human recombinant), 1 mg, Intramuscular, PRN    glucose, 16 g, Oral, PRN    glucose, 24 g, Oral, PRN    insulin aspart U-100, 0-10 Units, Subcutaneous, QID (AC + HS) PRN    melatonin, 6 mg, Oral, Nightly PRN    naloxone, 0.02 mg, Intravenous, PRN    ondansetron, 4 mg, Intravenous, Q8H PRN    potassium bicarbonate, 35 mEq, Oral, PRN    potassium bicarbonate, 50 mEq, Oral, PRN    potassium bicarbonate, 60 mEq, Oral, PRN    potassium, sodium phosphates, 2 packet, Oral, PRN    potassium, sodium phosphates, 2 packet, Oral, PRN    potassium, sodium phosphates, 2 packet, Oral, PRN    prochlorperazine, 5 mg, Intravenous, Q6H PRN    senna-docusate 8.6-50 mg, 1 tablet, Oral, Daily PRN    simethicone, 1 tablet, Oral, QID PRN    sodium chloride 0.9%, 10 mL, Intravenous, Q12H PRN    Flushing PICC/Midline Protocol, , , Until Discontinued **AND** sodium chloride 0.9%, 10 mL, Intravenous, Q12H PRN      Objective:     Temp:  [97.5 °F (36.4 °C)-98.9 °F (37.2 °C)] 97.7 °F (36.5 °C)  Pulse:  [65-73] 65  Resp:  [18] 18  SpO2:  [94 %-97 %] 96 %  BP: (115-170)/(60-82) 117/67      Intake/Output Summary (Last 24 hours) at 11/10/2024 1250  Last data filed at 11/10/2024 1200  Gross per 24 hour   Intake 626.48 ml   Output 1300 ml   Net -673.52 ml        Body mass index is 35.74 kg/m².    Physical Exam  Vitals and nursing note reviewed.   Constitutional:       General: She is not in acute distress.     Appearance: Normal appearance. She is ill-appearing.   HENT:      Head: Normocephalic.   Cardiovascular:      Rate and Rhythm: Normal rate.    Pulmonary:      Effort: Pulmonary effort is normal. No tachypnea or respiratory distress.   Neurological:      General: No focal deficit present.      Mental Status: She is alert.      Cranial Nerves: No cranial nerve deficit.      Motor: No weakness.   Psychiatric:         Attention and Perception: Attention normal.         Mood and Affect: Mood and affect normal.         Speech: Speech normal.         Behavior: Behavior is cooperative.          Labs: All labs within the last 24 hours were reviewed.   Recent Results (from the past 24 hours)   POCT glucose    Collection Time: 11/09/24  4:41 PM   Result Value Ref Range    POCT Glucose 104 70 - 110 mg/dL   POCT glucose    Collection Time: 11/09/24  7:22 PM   Result Value Ref Range    POCT Glucose 106 70 - 110 mg/dL   Comprehensive Metabolic Panel    Collection Time: 11/10/24  4:36 AM   Result Value Ref Range    Sodium 140 136 - 145 mmol/L    Potassium 3.7 3.5 - 5.1 mmol/L    Chloride 107 95 - 110 mmol/L    CO2 26 23 - 29 mmol/L    Glucose 94 70 - 110 mg/dL    BUN 13 8 - 23 mg/dL    Creatinine 0.9 0.5 - 1.4 mg/dL    Calcium 8.5 (L) 8.7 - 10.5 mg/dL    Total Protein 5.8 (L) 6.0 - 8.4 g/dL    Albumin 2.6 (L) 3.5 - 5.2 g/dL    Total Bilirubin 0.4 0.1 - 1.0 mg/dL    Alkaline Phosphatase 45 40 - 150 U/L    AST 12 10 - 40 U/L    ALT 13 10 - 44 U/L    eGFR >60 >60 mL/min/1.73 m^2    Anion Gap 7 (L) 8 - 16 mmol/L   Magnesium    Collection Time: 11/10/24  4:36 AM   Result Value Ref Range    Magnesium 2.0 1.6 - 2.6 mg/dL   POCT glucose    Collection Time: 11/10/24  8:00 AM   Result Value Ref Range    POCT Glucose 95 70 - 110 mg/dL   POCT glucose    Collection Time: 11/10/24 11:36 AM   Result Value Ref Range    POCT Glucose 99 70 - 110 mg/dL        Lab Results   Component Value Date    [id number] Negative 12/07/2022       Recent Labs   Lab 11/04/24  0215 11/05/24  0559 11/06/24  0158   WBC 6.15 7.00 6.88   LYMPH 14.5*  0.9* 10.9*  0.8* 11.6*  0.8*   HGB 11.1* 11.1* 11.3*  "  HCT 35.0* 35.6* 34.4*    244 223     Recent Labs   Lab 11/04/24  0215 11/05/24  0559 11/08/24  0506 11/09/24  0418 11/10/24  0436      < > 141 140 140   K 3.8   < > 3.9 3.6 3.7   *   < > 109 108 107   CO2 16*   < > 23 24 26   BUN 25*   < > 14 14 13   CREATININE 1.1   < > 0.9 0.9 0.9   GLU 99   < > 99 91 94   CALCIUM 8.1*   < > 8.3* 8.4* 8.5*   MG 1.8  --   --   --  2.0   PHOS 3.1  --   --   --   --     < > = values in this interval not displayed.     Recent Labs   Lab 11/08/24  0506 11/09/24  0418 11/10/24  0436   ALKPHOS 39* 40 45   ALT 16 13 13   AST 12 11 12   ALBUMIN 2.6* 2.5* 2.6*   PROT 5.7* 5.7* 5.8*   BILITOT 0.3 0.4 0.4        No results for input(s): "DDIMER", "FERRITIN", "CRP", "LDH", "BNP", "TROPONINI", "CPK" in the last 72 hours.    Invalid input(s): "PROCALCITONIN"        Microbiology: All microbiology updates for the past 24 hours have been reviewed.  Microbiology Results (last 7 days)       Procedure Component Value Units Date/Time    Culture, Anaerobe [[id number]] Collected: 11/05/24 1442    Order Status: Completed Specimen: Wound from Toe, Left Foot Updated: 11/09/24 0605     Anaerobic Culture No anaerobes isolated    Narrative:      Distal left great toe    Culture, Anaerobe [[id number]] Collected: 11/05/24 1442    Order Status: Completed Specimen: Wound from Toe, Left Foot Updated: 11/09/24 0600     Anaerobic Culture No anaerobes isolated    Narrative:      Proximal margin left toe    Aerobic culture [[id number]]  (Abnormal)  (Susceptibility) Collected: 11/05/24 1442    Order Status: Completed Specimen: Wound from Toe, Left Foot Updated: 11/08/24 0652     Aerobic Bacterial Culture PSEUDOMONAS FLUORESCENS/PUTIDA  Few      Narrative:      Proximal margin left toe    Aerobic culture [[id number]]  (Abnormal)  (Susceptibility) Collected: 11/05/24 1442    Order Status: Completed Specimen: Wound from Toe, Left Foot Updated: 11/08/24 0651     Aerobic Bacterial Culture PSEUDOMONAS " FLUORESCENS/PUTIDA  Many        ENTEROCOCCUS FAECALIS  Many      Narrative:      Distal left great toe    AFB Culture & Smear [[id number]] Collected: 11/05/24 1442    Order Status: Completed Specimen: Wound from Toe, Left Foot Updated: 11/07/24 2127     AFB Culture & Smear Culture in progress     AFB CULTURE STAIN No acid fast bacilli seen.    Narrative:      Distal left great toe    Stool culture [[id number]] Collected: 11/05/24 0028    Order Status: Completed Specimen: Stool Updated: 11/07/24 0759     Stool Culture No Salmonella,Shigella,Vibrio,Campylobacter,Yersinia isolated.    E. coli 0157 antigen [[id number]] Collected: 11/05/24 0028    Order Status: Completed Specimen: Stool Updated: 11/06/24 1152     Shiga Toxin 1 E.coli Negative     Shiga Toxin 2 E.coli Negative    Culture, Anaerobe [[id number]] Collected: 11/02/24 1430    Order Status: Completed Specimen: Wound from Toe, Left Foot Updated: 11/06/24 1019     Anaerobic Culture No anaerobes isolated    Gram stain [[id number]] Collected: 11/05/24 1442    Order Status: Completed Specimen: Wound from Toe, Left Foot Updated: 11/06/24 0831     Gram Stain Result No WBC's      No organisms seen    Narrative:      Proximal margin left toe    Gram stain [[id number]] Collected: 11/05/24 1442    Order Status: Completed Specimen: Wound from Toe, Left Foot Updated: 11/06/24 0825     Gram Stain Result Few WBC's      No organisms seen    Narrative:      Distal left great toe    Fungus culture [[id number]] Collected: 11/05/24 1442    Order Status: Sent Specimen: Wound from Toe, Left Foot Updated: 11/05/24 1637    Aerobic culture [[id number]]  (Abnormal)  (Susceptibility) Collected: 11/02/24 1430    Order Status: Completed Specimen: Wound from Toe, Left Foot Updated: 11/05/24 0802     Aerobic Bacterial Culture Results called to and read back by: Sol Michaels 11/04/2024  06:32      ENTEROBACTER CLOACAE  Moderate        METHICILLIN RESISTANT STAPHYLOCOCCUS AUREUS  Moderate         PSEUDOMONAS AERUGINOSA  Moderate                Imaging All imaging within the last 24 hours was reviewed.       No results found for this or any previous visit.      X-Ray Chest AP Portable  Narrative: EXAMINATION:  XR CHEST AP PORTABLE    CLINICAL HISTORY:  picc placement;    TECHNIQUE:  Single frontal view of the chest was performed.    COMPARISON:  Chest radiograph 11/07/2024    FINDINGS:  Interval placement of right-sided PICC catheter.  Tip slightly courses towards midline and lies about the region of the proximal superior vena cava, noting that patient may be slightly rotated, limiting evaluation.  Correlation and possible repositioning advised.    Cardiomediastinal silhouette is unchanged in size.  Aortic atherosclerosis.    Hypoventilatory exam.  Coarsened interstitial lung markings.  No new consolidation, worsening pleural effusion, or pneumothorax.    Osseous structures demonstrate no evidence for acute fracture or osseous destructive lesion.  Impression: Interval placement of right-sided PICC catheter. Tip slightly courses towards midline and lies about the region of the proximal superior vena cava, noting that patient may be slightly rotated, limiting evaluation.  Correlation and possible repositioning advised.    Electronically signed by: Ramon Villalobos  Date:    11/09/2024  Time:    13:54      Social Drivers of Health     Tobacco Use: Low Risk  (11/1/2024)    Patient History     Smoking Tobacco Use: Never     Smokeless Tobacco Use: Never     Passive Exposure: Never   Alcohol Use: Not At Risk (3/13/2024)    AUDIT-C     Frequency of Alcohol Consumption: Never     Average Number of Drinks: Patient does not drink     Frequency of Binge Drinking: Never   Financial Resource Strain: Patient Declined (11/2/2024)    Overall Financial Resource Strain (CARDIA)     Difficulty of Paying Living Expenses: Patient declined   Food Insecurity: Patient Declined (11/2/2024)    Hunger Vital Sign     Worried About Running  Out of Food in the Last Year: Patient declined     Ran Out of Food in the Last Year: Patient declined   Transportation Needs: Patient Declined (11/2/2024)    TRANSPORTATION NEEDS     Transportation : Patient declined   Physical Activity: Inactive (3/13/2024)    Exercise Vital Sign     Days of Exercise per Week: 0 days     Minutes of Exercise per Session: 0 min   Stress: Patient Declined (11/2/2024)    [employer] [place] [employer] - Occupational Stress Questionnaire     Feeling of Stress : Patient declined   Housing Stability: Patient Declined (11/2/2024)    Housing Stability Vital Sign     Unable to Pay for Housing in the Last Year: Patient declined     Homeless in the Last Year: Patient declined   Depression: Low Risk  (3/13/2024)    Depression     Last PHQ-4: Flowsheet Data: 1   Utilities: Patient Declined (11/2/2024)    [facility] Utilities     Threatened with loss of utilities: Patient declined   Health Literacy: Patient Declined (11/2/2024)     Health Literacy     Frequency of need for help with medical instructions: Patient declines to respond   Social Isolation: Not on file

## 2024-11-10 NOTE — NURSING
[facility]  Nurses Note -- 4 Eyes      11/10/2024       Skin assessed on: Q Shift      [x] No Pressure Injuries Present    [x]Prevention Measures Documented    [] Yes LDA  for Pressure Injury Previously documented     [] Yes New Pressure Injury Discovered   [] LDA for New Pressure Injury Added      Attending RN:  Taylor Felix LPN     Second RN:  ROSA ISELA Hurd

## 2024-11-10 NOTE — NURSING
PT worked with patient and put patient in chair. Patient tolerated well. Assisted patient back into bed. Did wound care per wound care orders. Patient did not complain of any pain throughout shift. Bed in lowest position, wheels locked, call bell in reach, side rails up x2.

## 2024-11-11 LAB
ALBUMIN SERPL BCP-MCNC: 2.7 G/DL (ref 3.5–5.2)
ALP SERPL-CCNC: 41 U/L (ref 40–150)
ALT SERPL W/O P-5'-P-CCNC: 11 U/L (ref 10–44)
ANION GAP SERPL CALC-SCNC: 11 MMOL/L (ref 8–16)
AST SERPL-CCNC: 10 U/L (ref 10–40)
BILIRUB SERPL-MCNC: 0.5 MG/DL (ref 0.1–1)
BUN SERPL-MCNC: 14 MG/DL (ref 8–23)
CALCIUM SERPL-MCNC: 8.5 MG/DL (ref 8.7–10.5)
CHLORIDE SERPL-SCNC: 106 MMOL/L (ref 95–110)
CO2 SERPL-SCNC: 26 MMOL/L (ref 23–29)
CREAT SERPL-MCNC: 0.9 MG/DL (ref 0.5–1.4)
CRP SERPL-MCNC: 15.3 MG/L (ref 0–8.2)
EST. GFR  (NO RACE VARIABLE): >60 ML/MIN/1.73 M^2
FINAL PATHOLOGIC DIAGNOSIS: NORMAL
FUNGUS SPEC CULT: NORMAL
GLUCOSE SERPL-MCNC: 95 MG/DL (ref 70–110)
GROSS: NORMAL
Lab: NORMAL
O+P STL MICRO: NORMAL
POCT GLUCOSE: 101 MG/DL (ref 70–110)
POCT GLUCOSE: 106 MG/DL (ref 70–110)
POCT GLUCOSE: 99 MG/DL (ref 70–110)
POTASSIUM SERPL-SCNC: 3.5 MMOL/L (ref 3.5–5.1)
PROT SERPL-MCNC: 5.9 G/DL (ref 6–8.4)
SODIUM SERPL-SCNC: 143 MMOL/L (ref 136–145)

## 2024-11-11 PROCEDURE — 27000207 HC ISOLATION

## 2024-11-11 PROCEDURE — 86140 C-REACTIVE PROTEIN: CPT | Performed by: INTERNAL MEDICINE

## 2024-11-11 PROCEDURE — 25000003 PHARM REV CODE 250: Performed by: PODIATRIST

## 2024-11-11 PROCEDURE — 25000003 PHARM REV CODE 250: Performed by: STUDENT IN AN ORGANIZED HEALTH CARE EDUCATION/TRAINING PROGRAM

## 2024-11-11 PROCEDURE — 99232 SBSQ HOSP IP/OBS MODERATE 35: CPT | Mod: ,,, | Performed by: PODIATRIST

## 2024-11-11 PROCEDURE — 94761 N-INVAS EAR/PLS OXIMETRY MLT: CPT

## 2024-11-11 PROCEDURE — 25000003 PHARM REV CODE 250: Performed by: INTERNAL MEDICINE

## 2024-11-11 PROCEDURE — 11000001 HC ACUTE MED/SURG PRIVATE ROOM

## 2024-11-11 PROCEDURE — 63600175 PHARM REV CODE 636 W HCPCS: Performed by: INTERNAL MEDICINE

## 2024-11-11 PROCEDURE — 80053 COMPREHEN METABOLIC PANEL: CPT | Performed by: INTERNAL MEDICINE

## 2024-11-11 PROCEDURE — 97530 THERAPEUTIC ACTIVITIES: CPT | Mod: CQ

## 2024-11-11 PROCEDURE — 97110 THERAPEUTIC EXERCISES: CPT

## 2024-11-11 RX ADMIN — PIPERACILLIN SODIUM AND TAZOBACTAM SODIUM 4.5 G: 4; .5 INJECTION, POWDER, FOR SOLUTION INTRAVENOUS at 11:11

## 2024-11-11 RX ADMIN — APIXABAN 5 MG: 5 TABLET, FILM COATED ORAL at 08:11

## 2024-11-11 RX ADMIN — PIPERACILLIN SODIUM AND TAZOBACTAM SODIUM 4.5 G: 4; .5 INJECTION, POWDER, FOR SOLUTION INTRAVENOUS at 04:11

## 2024-11-11 RX ADMIN — COLESTIPOL HYDROCHLORIDE 3 G: 1 TABLET, FILM COATED ORAL at 09:11

## 2024-11-11 RX ADMIN — LACTOBACILLUS ACIDOPHILUS / LACTOBACILLUS BULGARICUS 1 EACH: 100 MILLION CFU STRENGTH GRANULES at 08:11

## 2024-11-11 RX ADMIN — MICONAZOLE NITRATE: 20 POWDER TOPICAL at 09:11

## 2024-11-11 RX ADMIN — HYDRALAZINE HYDROCHLORIDE 25 MG: 25 TABLET ORAL at 09:11

## 2024-11-11 RX ADMIN — MELATONIN TAB 3 MG 6 MG: 3 TAB at 11:11

## 2024-11-11 RX ADMIN — MICONAZOLE NITRATE: 20 POWDER TOPICAL at 08:11

## 2024-11-11 RX ADMIN — ACETAMINOPHEN 650 MG: 325 TABLET ORAL at 11:11

## 2024-11-11 RX ADMIN — DICLOFENAC SODIUM 2 G: 10 GEL TOPICAL at 09:11

## 2024-11-11 RX ADMIN — FUROSEMIDE 40 MG: 40 TABLET ORAL at 09:11

## 2024-11-11 RX ADMIN — APIXABAN 5 MG: 5 TABLET, FILM COATED ORAL at 09:11

## 2024-11-11 RX ADMIN — HYDRALAZINE HYDROCHLORIDE 25 MG: 25 TABLET ORAL at 08:11

## 2024-11-11 RX ADMIN — LEVOTHYROXINE SODIUM 88 MCG: 0.09 TABLET ORAL at 05:11

## 2024-11-11 RX ADMIN — DICLOFENAC SODIUM 2 G: 10 GEL TOPICAL at 08:11

## 2024-11-11 RX ADMIN — ISOSORBIDE MONONITRATE 60 MG: 30 TABLET, EXTENDED RELEASE ORAL at 09:11

## 2024-11-11 RX ADMIN — PIPERACILLIN SODIUM AND TAZOBACTAM SODIUM 4.5 G: 4; .5 INJECTION, POWDER, FOR SOLUTION INTRAVENOUS at 08:11

## 2024-11-11 RX ADMIN — LACTOBACILLUS ACIDOPHILUS / LACTOBACILLUS BULGARICUS 1 EACH: 100 MILLION CFU STRENGTH GRANULES at 09:11

## 2024-11-11 RX ADMIN — COLESTIPOL HYDROCHLORIDE 3 G: 1 TABLET, FILM COATED ORAL at 08:11

## 2024-11-11 NOTE — SUBJECTIVE & OBJECTIVE
Interval History: Patient HDS and afebile. No acute events overnight. No new complaints this morning. Continue zosyn. Pain controlled. Having regular Bms. Pt is medically stable for DC, pending placement.       Review of Systems   Constitutional:  Positive for activity change and fatigue. Negative for fever.   Respiratory:  Negative for shortness of breath.    Cardiovascular:  Negative for chest pain.   Gastrointestinal:  Negative for abdominal pain.   Musculoskeletal:  Positive for arthralgias.   Skin:  Positive for wound.   Neurological:  Negative for dizziness and headaches.   All other systems reviewed and are negative.    Objective:     Vital Signs (Most Recent):  Temp: 98.1 °F (36.7 °C) (11/11/24 1603)  Pulse: 68 (11/11/24 1603)  Resp: 18 (11/11/24 1055)  BP: 116/71 (11/11/24 1603)  SpO2: (!) 94 % (11/11/24 1603) Vital Signs (24h Range):  Temp:  [97.5 °F (36.4 °C)-98.2 °F (36.8 °C)] 98.1 °F (36.7 °C)  Pulse:  [52-68] 68  Resp:  [18-20] 18  SpO2:  [94 %-98 %] 94 %  BP: (107-170)/() 116/71     Weight: 103.5 kg (228 lb 2.8 oz)  Body mass index is 35.74 kg/m².    Intake/Output Summary (Last 24 hours) at 11/11/2024 1724  Last data filed at 11/11/2024 1519  Gross per 24 hour   Intake 702.55 ml   Output 1050 ml   Net -347.45 ml         Physical Exam  Vitals and nursing note reviewed.   Constitutional:       Appearance: Normal appearance.   HENT:      Head: Normocephalic and atraumatic.   Eyes:      Extraocular Movements: Extraocular movements intact.      Pupils: Pupils are equal, round, and reactive to light.   Cardiovascular:      Rate and Rhythm: Normal rate and regular rhythm.   Pulmonary:      Effort: Pulmonary effort is normal. No respiratory distress.   Abdominal:      General: There is no distension.   Musculoskeletal:         General: Normal range of motion.      Cervical back: Normal range of motion.   Neurological:      General: No focal deficit present.      Mental Status: She is alert and oriented  "to person, place, and time.   Psychiatric:         Mood and Affect: Mood normal.         Behavior: Behavior normal.             Significant Labs: All pertinent labs within the past 24 hours have been reviewed.  CBC: No results for input(s): "WBC", "HGB", "HCT", "PLT" in the last 48 hours.  CMP:   Recent Labs   Lab 11/10/24  0436 11/11/24  0448    143   K 3.7 3.5    106   CO2 26 26   GLU 94 95   BUN 13 14   CREATININE 0.9 0.9   CALCIUM 8.5* 8.5*   PROT 5.8* 5.9*   ALBUMIN 2.6* 2.7*   BILITOT 0.4 0.5   ALKPHOS 45 41   AST 12 10   ALT 13 11   ANIONGAP 7* 11       Significant Imaging: I have reviewed all pertinent imaging results/findings within the past 24 hours.  "

## 2024-11-11 NOTE — PT/OT/SLP PROGRESS
Occupational Therapy   Treatment    Name: Stephany Skinner  MRN: [id number]  Admitting Diagnosis:  Osteomyelitis of great toe of left foot  6 Days Post-Op    Recommendations:     Discharge Recommendations: Moderate Intensity Therapy  Discharge Equipment Recommendations:  to be determined by next level of care  Barriers to discharge:  Other (Comment) (patient not at PLOF of modified independent wtih ADLs and IADls)    Assessment:     Stephany Skinner is a 89 y.o. female with a medical diagnosis of Osteomyelitis of great toe of left foot.  She presents with HOB elevated and with PCT present assisting her scooting to HOB and with toileting care. Performance deficits affecting function are weakness, impaired endurance, impaired self care skills, impaired functional mobility, gait instability, impaired balance, decreased lower extremity function, decreased safety awareness, impaired skin, orthopedic precautions. Occupational therapy increased foot of bed and had her move pillows and hand rails to pull up and she scooted with SBA to HOB.     Rehab Prognosis:  Good; patient would benefit from acute skilled OT services to address these deficits and reach maximum level of function.       Plan:     Patient to be seen 5 x/week to address the above listed problems via self-care/home management, therapeutic activities, therapeutic exercises  Plan of Care Expires: 11/21/24  Plan of Care Reviewed with: patient    Subjective     Chief Complaint: fatigue after UB therex   Patient/Family Comments/goals: patient still agreeable for SNF   Pain/Comfort:  Pain Rating 1: 0/10    Objective:     Communicated with: RN prior to session.  Patient found HOB elevated with telemetry, PureWick upon OT entry to room.    General Precautions: Standard, fall, contact    Orthopedic Precautions: (LLE heel WB in post-op boot ; R darco shoe)  Braces:  (darco shoes)  Respiratory Status: Room air     Occupational Performance:     Bed Mobility:    Patient  completed Scooting/Bridging with stand by assistance     Functional Mobility/Transfers:  Did not do during session     Activities of Daily Living:  Toileting: maximal assistance to apply brief and purewick  Lower Body Dressing: maximal assistance to doff right sock and apply pillows under legs     AMPAC 6 Click ADL: 20    Treatment & Education:  She demonstrated UB therex with medium red resistance theraband for overhead shoulder raises x 3 sets of 15 reps     Patient left supine and HOB elevated with all lines intact and PCT present     GOALS:   Multidisciplinary Problems       Occupational Therapy Goals          Problem: Occupational Therapy    Goal Priority Disciplines Outcome Interventions   Occupational Therapy Goal     OT, PT/OT Progressing    Description: Goals to be met by: 11/25/24     Patient will increase functional independence with ADLs by performing:    UE Dressing with [place].  LE Dressing with Minimal Assistance.with AD as needed  Grooming while seated at sink with Modified [place].  Toileting from bedside commode with Supervision for hygiene and clothing management.   Sitting in chair x60 minutes with Supervision.  Toilet transfer to bedside commode with Supervision.  Upper extremity exercise program x10 reps per handout, with supervision.                         Time Tracking:     OT Date of Treatment: 11/11/24  OT Start Time: 1510  OT Stop Time: 1534  OT Total Time (min): 24 min    Billable Minutes:Self Care/Home Management 15   Therapeutic Exercise 9     OT/GRIS: OT          11/11/2024

## 2024-11-11 NOTE — PROGRESS NOTES
[facility] Medicine  Telemedicine Progress Note    Patient Name: Stephany Skinner  MRN: [id number]  Patient Class: IP- Inpatient   Admission Date: 11/2/2024  Length of Stay: 9 days  Attending Physician: Isabell Waters MD  Primary Care Provider: Pipo Flowers MD          Subjective:     Principal Problem:Osteomyelitis of great toe of left foot        HPI:  This is an 89-year-old female with a past medical history of AFib (on Eliquis), HFpEF (EF: 50%), COPD, hypertension, type 2 diabetes, hyperlipidemia, CKD 3, rheumatoid arthritis, myasthenia gravis, obesity, who presents with a foot wound.      Patient presents for evaluation of worsening left 1st toe swelling and redness.  She developed this about 3 weeks prior to presentation, and has been following up with wound care.  She noted that her toe looked more swollen, and was more painful on the day of presentation. Her wound care nurse advised her to present to the ED for further evaluation. Patient denies fevers, chills,  but endorses drainage from the toe.     In the ED, the patient was hemodynamically stable.  Labs were remarkable for macrocytic anemia (11.2), elevated ESR (66), negative lactic acid (1.4).  Left foot x-ray showed degenerative changes with soft tissue swelling overlying the dorsum and medial border of the left forefoot.  Left lower extremity arterial ultrasound showed findings suggestive of 50-75% of peroneal artery stenosis, and inflow stenosis in the common femoral artery.  Patient was given vancomycin, Zosyn.  She was admitted for further management.    Overview/Hospital Course:  #Infection of toe; MRI did show signs of early osteomyelitis distal phalanx LEFT great toe. Pending Podiatry and VascSx eval/recs. Continuing V+Z for now. GNR in deep wound culture. Got contrast, to preserve renal function, will reduce from V+Z to V+ceftriaxone (non-diabetic, pseudomonas less likely, however switch to cefepime if  results). Will switch from Apixaban to heparin gtt in preparation of procedures. C diff negative.    Unlikely need for revascularization per VascSx. Patient NPO for Podiatry to proceed with procedure. Polymicrobial infection of toe with MRSA, Pseudomonas and Enterobacter. No s/p amputation- ID consult for Abx LOT and d/c recs. PT OT, suspect will need SNF then HH.     Interval History: Patient HDS and afebile. No acute events overnight. No new complaints this morning. Continue zosyn. Pain controlled. Having regular Bms. Pt is medically stable for DC, pending placement.       Review of Systems   Constitutional:  Positive for activity change and fatigue. Negative for fever.   Respiratory:  Negative for shortness of breath.    Cardiovascular:  Negative for chest pain.   Gastrointestinal:  Negative for abdominal pain.   Musculoskeletal:  Positive for arthralgias.   Skin:  Positive for wound.   Neurological:  Negative for dizziness and headaches.   All other systems reviewed and are negative.    Objective:     Vital Signs (Most Recent):  Temp: 98.1 °F (36.7 °C) (11/11/24 1603)  Pulse: 68 (11/11/24 1603)  Resp: 18 (11/11/24 1055)  BP: 116/71 (11/11/24 1603)  SpO2: (!) 94 % (11/11/24 1603) Vital Signs (24h Range):  Temp:  [97.5 °F (36.4 °C)-98.2 °F (36.8 °C)] 98.1 °F (36.7 °C)  Pulse:  [52-68] 68  Resp:  [18-20] 18  SpO2:  [94 %-98 %] 94 %  BP: (107-170)/() 116/71     Weight: 103.5 kg (228 lb 2.8 oz)  Body mass index is 35.74 kg/m².    Intake/Output Summary (Last 24 hours) at 11/11/2024 1724  Last data filed at 11/11/2024 1519  Gross per 24 hour   Intake 702.55 ml   Output 1050 ml   Net -347.45 ml         Physical Exam  Vitals and nursing note reviewed.   Constitutional:       Appearance: Normal appearance.   HENT:      Head: Normocephalic and atraumatic.   Eyes:      Extraocular Movements: Extraocular movements intact.      Pupils: Pupils are equal, round, and reactive to light.   Cardiovascular:      Rate and  "Rhythm: Normal rate and regular rhythm.   Pulmonary:      Effort: Pulmonary effort is normal. No respiratory distress.   Abdominal:      General: There is no distension.   Musculoskeletal:         General: Normal range of motion.      Cervical back: Normal range of motion.   Neurological:      General: No focal deficit present.      Mental Status: She is alert and oriented to person, place, and time.   Psychiatric:         Mood and Affect: Mood normal.         Behavior: Behavior normal.             Significant Labs: All pertinent labs within the past 24 hours have been reviewed.  CBC: No results for input(s): "WBC", "HGB", "HCT", "PLT" in the last 48 hours.  CMP:   Recent Labs   Lab 11/10/24  0436 11/11/24  0448    143   K 3.7 3.5    106   CO2 26 26   GLU 94 95   BUN 13 14   CREATININE 0.9 0.9   CALCIUM 8.5* 8.5*   PROT 5.8* 5.9*   ALBUMIN 2.6* 2.7*   BILITOT 0.4 0.5   ALKPHOS 45 41   AST 12 10   ALT 13 11   ANIONGAP 7* 11       Significant Imaging: I have reviewed all pertinent imaging results/findings within the past 24 hours.    Assessment/Plan:      * Osteomyelitis of great toe of left foot  Acute OM  MRI did show signs of early osteomyelitis distal phalanx LEFT great toe.   Pending Podiatry and VascSx eval/recs.   Continuing V+Z for now.   MRSA and enterobacter  Got contrast, to preserve renal function, will reduce from V+Z to V+ceftriaxone (non-diabetic, pseudomonas less likely, however switch to cefepime if results).   S/p left great toe amputation - plan for 6 weeks of IV Zosyn due to culture from proximal margins growing Pseudomonas; PICC line placed      Dermatitis associated with moisture  Assessed by wound care; continue miconazole powder      Diarrhea  Chronic given >3w, however no workup done  C diff negative and cultures neg  Eating cheese regularly at home but says improves symptoms  Continue colestipol as taking at home  -improved        Cellulitis of toe of left foot  Concern for " cellulitis/possible underlying osteomyelitis   Plan:   Continue vancomycin/zosyn   Follow up on cultures - growing MRSA and enterobacter  Consult podiatry   Consult vascular surgery   MRI left foot ordered - early osteomyelitis noted  - s/p left great toe amputation by podiatry on 11/5  -surgical cx with pseudomonas so 6 weeks abx with zosyn planned until 12/17 as recommended by ID; PICC line placed; plan for SNF.     Stage 3a chronic kidney disease (CKD)  Creatine stable for now. BMP reviewed- noted Estimated Creatinine Clearance: 53.5 mL/min (based on SCr of 0.9 mg/dL). according to latest data. Based on current GFR, CKD stage is stage 3 - GFR 30-59.  Monitor UOP and serial BMP and adjust therapy as needed. Renally dose meds. Avoid nephrotoxic medications and procedures.    Candidiasis, intertrigo  Miconazole BID       COPD (chronic obstructive pulmonary disease)  No acute issues. DuoNebs PRN     Essential hypertension  Patients blood pressure range in the last 24 hours was: BP  Min: 103/52  Max: 204/91.The patient's inpatient anti-hypertensive regimen is listed below:  Current Antihypertensives  furosemide tablet 40 mg, Daily, Oral  metoprolol succinate (TOPROL-XL) 24 hr tablet 25 mg, Daily, Oral  isosorbide mononitrate 24 hr tablet 60 mg, Daily, Oral  hydrALAZINE tablet 25 mg, Every 12 hours, Oral    Plan  - BP is controlled, no changes needed to their regimen    Type 2 diabetes mellitus with circulatory disorder, without long-term current use of insulin  Lab Results   Component Value Date    HGBA1C 5.8 (H) 11/29/2023     Glycemic protocol   MDSS      Chronic diastolic congestive heart failure  Continue home medications     Debility  -Patient noted to be medically stable for discharge at this time  -Has been able to work with OT/PT who recommend placement for further rehabilitation  -Patient pending placement, continue in-hospital care as stated above in the meantime  -More than 20 minutes of my time has been  spent discussing and planning just her safe disposition with patient/family/CM/SW/Nursing staff (not including other time spent in clinical discussion and management)  -CM/SW following, appreciate input   -Will place DC orders once placement has been secured        PUJA (acute kidney injury)  PUJA is likely due to pre-renal azotemia due to infection.   Baseline creatinine is  .09 . Most recent creatinine and eGFR are listed below.    Recent Labs     11/09/24  0418 11/10/24  0436 11/11/24  0448   CREATININE 0.9 0.9 0.9   EGFRNORACEVR >60 >60 >60        Plan  - PUJA is improving  - Avoid nephrotoxins and renally dose meds for GFR listed above  - Monitor urine output, serial BMP, and adjust therapy as needed  - Cr 1.3 on admit, now 0.9 after fluids. RESOLVED    Myasthenia gravis, adult form  Not on any medications. Monitor   Follow up with neurology       Chronic atrial fibrillation  [id number] Score: 4. The patients heart rate in the last 24 hours is as follows:  Pulse  Min: 52  Max: 68     Antiarrhythmics  metoprolol succinate (TOPROL-XL) 24 hr tablet 25 mg, Daily, Oral    Anticoagulants ; switched from Apixaban to heparin gtt in preparation of procedures. Now apixiban resumed.     apixaban tablet 5 mg, 2 times daily, Oral    Plan  - Replete lytes with a goal of K>4, Mg >2  - Patient is anticoagulated, see medications listed above.  - Patient's afib is currently controlled        Hyperlipidemia  Continue statin       Hypothyroidism  Continue synthroid         VTE Risk Mitigation (From admission, onward)           Ordered     apixaban tablet 5 mg  2 times daily         11/06/24 0940     IP VTE HIGH RISK PATIENT  Once         11/05/24 1551     Place sequential compression device  Until discontinued         11/05/24 1551     Place sequential compression device  Until discontinued         11/02/24 0252                          I have completed this tele-visit without the assistance of a telepresenter.    The attending  portion of this evaluation, treatment, and documentation was performed per Isabell Waters MD via Telemedicine AudioVisual using the secure [employer] software platform with 2 way audio/video. The provider was located off-site and the patient is located in the hospital. The aforementioned video software was utilized to document the relevant history and physical exam    Isabell Waters MD  Department of Hospital Medicine   [facility]

## 2024-11-11 NOTE — PT/OT/SLP PROGRESS
"Physical Therapy Treatment    Patient Name:  Stephany Skinner   MRN:  [id number]    Recommendations:     Discharge Recommendations: Moderate Intensity Therapy  Discharge Equipment Recommendations: bedside commode  Barriers to discharge: None    Assessment:     Stephany Skinner is a 89 y.o. female admitted with a medical diagnosis of Osteomyelitis of great toe of left foot.  She presents with the following impairments/functional limitations: weakness, gait instability, orthopedic precautions, pain, edema, impaired balance, impaired cognition, impaired coordination, impaired endurance, impaired functional mobility, impaired self care skills, impaired skin, decreased coordination, decreased lower extremity function, decreased ROM, decreased safety awareness, decreased upper extremity function     Rehab Prognosis: Good; patient would benefit from acute skilled PT services to address these deficits and reach maximum level of function.    Recent Surgery: Procedure(s) (LRB):  AMPUTATION, TOE- DISTAL HALLUX (Left) 6 Days Post-Op    Plan:     During this hospitalization, patient to be seen 5 x/week to address the identified rehab impairments via gait training, therapeutic activities, therapeutic exercises, neuromuscular re-education, wheelchair management/training and progress toward the following goals:    Plan of Care Expires:  11/20/24    Subjective     Chief Complaint: "do you know when I'm leaving?"  Patient/Family Comments/goals: Pt agreed to therapy  Pain/Comfort:  Pain Rating 1: 0/10      Objective:     Communicated with nursing prior to session.  Patient found HOB elevated with telemetry, PureWick, peripheral IV, pressure relief boots, SCD upon PT entry to room.     General Precautions: Standard, fall, contact  Orthopedic Precautions:  (LLE heel WB in post-op boot)  Braces:  (B post-op shoes)  Respiratory Status: Room air     Functional Mobility:  Bed Mobility:     Scooting: contact guard assistance  Supine to Sit: " contact guard assistance  Transfers: gait belt donned, x1 trial from EOB   Sit to Stand:  contact guard assistance with rolling walker  Bed to Chair: stand by assistance with  rolling walker  using  Step Transfer  Gait: Pt ambulated ~10ft from EOB to chair using RW and SBA, while maintaining LLE heel WB status. Pt with decreased lluvia and step length. Verbal cueing for Heel WB.  Balance: Pt with Fair sitting balance, Fair- standing balance      AM-PAC 6 CLICK MOBILITY  Turning over in bed (including adjusting bedclothes, sheets and blankets)?: 3  Sitting down on and standing up from a chair with arms (e.g., wheelchair, bedside commode, etc.): 3  Moving from lying on back to sitting on the side of the bed?: 3  Moving to and from a bed to a chair (including a wheelchair)?: 3  Need to walk in hospital room?: 3  Climbing 3-5 steps with a railing?: 3  Basic Mobility Total Score: 18       Treatment & Education:  Pt instructed to perform seated LAQ, HR, hip flexion 2x10    Patient left up in chair with all lines intact, call button in reach, nursing notified, and family present..    GOALS:   Multidisciplinary Problems       Physical Therapy Goals          Problem: Physical Therapy    Goal Priority Disciplines Outcome Interventions   Physical Therapy Goal     PT, PT/OT Progressing    Description: Goals to be met by: 24     Patient will increase functional independence with mobility by performin. Supine to sit with Mod I  2. Rolling to Left and Right with Mod I  3. Sit to stand transfer with Mod I and adherence to L LE heel WB precautions and RW  4. W/C mobility x100 ft with Mod I using BUE  5. Lower extremity exercise program x20 reps per handout, with independence  6. Gait x50 ft with mod I using RW, B post-op shoes, and L heel WB                         Time Tracking:     PT Received On: 24  PT Start Time: 1130     PT Stop Time: 1146  PT Total Time (min): 16 min     Billable Minutes: Therapeutic  Activity 16    Treatment Type: Treatment  PT/PTA: PTA     Number of PTA visits since last PT visit: 3     11/11/2024

## 2024-11-11 NOTE — PROGRESS NOTES
[facility] Surg  Podiatry  Progress Note    Patient Name: Stephany Skinner  MRN: [id number]  Admission Date: 11/2/2024  Hospital Length of Stay: 9 days  Attending Physician: Isabell Waters MD  Primary Care Provider: Pipo Flowers MD     Subjective:     Interval History:  11/05/2024 patient seen at bedside resting comfortably would like to discuss her surgical options for the treatment of her toe.  No new pedal complaints.    11/07/2024 Patient seen at bedside.  Niece and nephew present.  Not complaining of pain to feet but relates he has been having left knee pain from being in bed and getting stiff.     11/11/24: Patient seen sitting in chair. Family present.     Scheduled Meds:   apixaban  5 mg Oral BID    colestipoL  3 g Oral BID    diclofenac sodium  2 g Topical (Top) BID    furosemide  40 mg Oral Daily    hydrALAZINE  25 mg Oral Q12H    isosorbide mononitrate  60 mg Oral Daily    lactobacillus acidophilus & bulgar  1 packet Oral BID    levothyroxine  88 mcg Oral Before breakfast    metoprolol succinate  25 mg Oral Daily    miconazole NITRATE 2 %   Topical (Top) BID    piperacillin-tazobactam (Zosyn) IV (PEDS and ADULTS) (extended infusion is not appropriate)  4.5 g Intravenous Q8H     Continuous Infusions:      PRN Meds:  Current Facility-Administered Medications:     acetaminophen, 650 mg, Oral, Q8H PRN    acetaminophen, 650 mg, Oral, Q4H PRN    albuterol-ipratropium, 3 mL, Nebulization, Q6H PRN    aluminum-magnesium hydroxide-simethicone, 30 mL, Oral, QID PRN    bisacodyL, 10 mg, Rectal, Daily PRN    glucagon (human recombinant), 1 mg, Intramuscular, PRN    glucose, 16 g, Oral, PRN    glucose, 24 g, Oral, PRN    insulin aspart U-100, 0-10 Units, Subcutaneous, QID (AC + HS) PRN    melatonin, 6 mg, Oral, Nightly PRN    naloxone, 0.02 mg, Intravenous, PRN    ondansetron, 4 mg, Intravenous, Q8H PRN    potassium bicarbonate, 35 mEq, Oral, PRN    potassium bicarbonate, 50 mEq, Oral, PRN    potassium  bicarbonate, 60 mEq, Oral, PRN    potassium, sodium phosphates, 2 packet, Oral, PRN    potassium, sodium phosphates, 2 packet, Oral, PRN    potassium, sodium phosphates, 2 packet, Oral, PRN    prochlorperazine, 5 mg, Intravenous, Q6H PRN    senna-docusate 8.6-50 mg, 1 tablet, Oral, Daily PRN    simethicone, 1 tablet, Oral, QID PRN    sodium chloride 0.9%, 10 mL, Intravenous, Q12H PRN    Flushing PICC/Midline Protocol, , , Until Discontinued **AND** sodium chloride 0.9%, 10 mL, Intravenous, Q12H PRN    Review of Systems   Constitutional:  Negative for activity change, appetite change, chills, fatigue and fever.   Respiratory:  Negative for cough and shortness of breath.    Cardiovascular:  Negative for chest pain and leg swelling.   Gastrointestinal:  Negative for diarrhea, nausea and vomiting.   Musculoskeletal:  Positive for arthralgias and myalgias.   Skin:  Positive for color change and wound.   Neurological:  Positive for numbness. Negative for weakness.        + paresthesia      Objective:     Vital Signs (Most Recent):  Temp: 97.9 °F (36.6 °C) (11/11/24 1055)  Pulse: 62 (11/11/24 1055)  Resp: 18 (11/11/24 1055)  BP: 107/64 (11/11/24 1055)  SpO2: (!) 94 % (11/11/24 1055) Vital Signs (24h Range):  Temp:  [97.5 °F (36.4 °C)-98.2 °F (36.8 °C)] 97.9 °F (36.6 °C)  Pulse:  [52-66] 62  Resp:  [18-20] 18  SpO2:  [94 %-98 %] 94 %  BP: (107-170)/() 107/64     Weight: 103.5 kg (228 lb 2.8 oz)  Body mass index is 35.74 kg/m².    Physical Exam  Vitals and nursing note reviewed.   Constitutional:       General: She is not in acute distress.     Appearance: She is well-developed. She is not toxic-appearing or diaphoretic.      Comments: alert and oriented x 3.    Cardiovascular:      Pulses:           Dorsalis pedis pulses are 2+ on the right side and 2+ on the left side.        Posterior tibial pulses are 1+ on the right side and 1+ on the left side.   Pulmonary:      Effort: No respiratory distress.   Musculoskeletal:          General: No deformity.      Right ankle: No tenderness. No lateral malleolus, medial malleolus, AITF ligament, CF ligament or posterior TF ligament tenderness.      Right Achilles Tendon: No defects. Hutchison's test negative.      Left ankle: No tenderness. No lateral malleolus, medial malleolus, AITF ligament, CF ligament or posterior TF ligament tenderness.      Left Achilles Tendon: No defects. Hutchison's test negative.      Right foot: No tenderness or bony tenderness.      Left foot: No tenderness or bony tenderness.      Comments: Muscle strength is 5/5 in all groups bilaterally.    There is equinus deformity bilateral with decreased dorsiflexion at the ankle joint bilateral.    Decreased first MPJ range of motion both weightbearing and nonweightbearing, no crepitus observed the first MP joint, + dorsal flag sign.    Feet:      Right foot:      Skin integrity: Callus and dry skin present.      Toenail Condition: Fungal disease present.     Left foot:      Skin integrity: Ulcer (see below), callus and dry skin present.      Toenail Condition: Fungal disease present.  Lymphadenopathy:      Comments: No lymphatic streaking     Skin:     General: Skin is warm and dry.      Coloration: Skin is not pale.      Findings: No rash.      Nails: There is no clubbing.   Neurological:      Sensory: Sensory deficit present.      Motor: No atrophy.      Comments: L   Psychiatric:         Attention and Perception: She is attentive.         Mood and Affect: Mood is not anxious. Affect is not inappropriate.         Speech: She is communicative. Speech is not slurred.         Behavior: Behavior is not combative.        11/11/24:              11/07/2024    Incision well coapt to left distal hallux stump.  Collagen and vancomysin powder still visible to site.                 11/05/2024   L distal plantar 1st digit wound moderate serosanguinous drainage, necrotic and sloughing base, positive probe to bone, malodor. No  "fluctuance or crepitus, mild pain on palpation. (Image did not load)    L medial leg wound moderate serosanguinous drainage, fibro granular base, mildly macerated edges. No fluctuance, crepitus, or pain on palpation.      Laboratory:  A1C: No results for input(s): "HGBA1C" in the last 4320 hours.  CBC:   Recent Labs   Lab 11/06/24  0158   WBC 6.88   RBC 3.43*   HGB 11.3*   HCT 34.4*      *   MCH 32.9*   MCHC 32.8     CMP:   Recent Labs   Lab 11/11/24 0448   GLU 95   CALCIUM 8.5*   ALBUMIN 2.7*   PROT 5.9*      K 3.5   CO2 26      BUN 14   CREATININE 0.9   ALKPHOS 41   ALT 11   AST 10   BILITOT 0.5     CRP:   Recent Labs   Lab 11/11/24 0448   CRP 15.3*     ESR:   No results for input(s): "SEDRATE" in the last 168 hours.    Microbiology Results (last 7 days)       Procedure Component Value Units Date/Time    Fungus culture [[id number]] Collected: 11/02/24 1430    Order Status: Completed Specimen: Wound from Toe, Left Foot Updated: 11/11/24 1409     Fungus (Mycology) Culture No fungal growth to date    Culture, Anaerobe [[id number]] Collected: 11/05/24 1442    Order Status: Completed Specimen: Wound from Toe, Left Foot Updated: 11/09/24 0605     Anaerobic Culture No anaerobes isolated    Narrative:      Distal left great toe    Culture, Anaerobe [[id number]] Collected: 11/05/24 1442    Order Status: Completed Specimen: Wound from Toe, Left Foot Updated: 11/09/24 0600     Anaerobic Culture No anaerobes isolated    Narrative:      Proximal margin left toe    Aerobic culture [[id number]]  (Abnormal)  (Susceptibility) Collected: 11/05/24 1442    Order Status: Completed Specimen: Wound from Toe, Left Foot Updated: 11/08/24 0652     Aerobic Bacterial Culture PSEUDOMONAS FLUORESCENS/PUTIDA  Few      Narrative:      Proximal margin left toe    Aerobic culture [[id number]]  (Abnormal)  (Susceptibility) Collected: 11/05/24 1442    Order Status: Completed Specimen: Wound from Toe, Left Foot Updated: " 11/08/24 0651     Aerobic Bacterial Culture PSEUDOMONAS FLUORESCENS/PUTIDA  Many        ENTEROCOCCUS FAECALIS  Many      Narrative:      Distal left great toe    AFB Culture & Smear [[id number]] Collected: 11/05/24 1442    Order Status: Completed Specimen: Wound from Toe, Left Foot Updated: 11/07/24 2127     AFB Culture & Smear Culture in progress     AFB CULTURE STAIN No acid fast bacilli seen.    Narrative:      Distal left great toe    Stool culture [[id number]] Collected: 11/05/24 0028    Order Status: Completed Specimen: Stool Updated: 11/07/24 0759     Stool Culture No Salmonella,Shigella,Vibrio,Campylobacter,Yersinia isolated.    E. coli 0157 antigen [[id number]] Collected: 11/05/24 0028    Order Status: Completed Specimen: Stool Updated: 11/06/24 1152     Shiga Toxin 1 E.coli Negative     Shiga Toxin 2 E.coli Negative    Culture, Anaerobe [[id number]] Collected: 11/02/24 1430    Order Status: Completed Specimen: Wound from Toe, Left Foot Updated: 11/06/24 1019     Anaerobic Culture No anaerobes isolated    Gram stain [[id number]] Collected: 11/05/24 1442    Order Status: Completed Specimen: Wound from Toe, Left Foot Updated: 11/06/24 0831     Gram Stain Result No WBC's      No organisms seen    Narrative:      Proximal margin left toe    Gram stain [[id number]] Collected: 11/05/24 1442    Order Status: Completed Specimen: Wound from Toe, Left Foot Updated: 11/06/24 0825     Gram Stain Result Few WBC's      No organisms seen    Narrative:      Distal left great toe    Fungus culture [[id number]] Collected: 11/05/24 1442    Order Status: Sent Specimen: Wound from Toe, Left Foot Updated: 11/05/24 1637    Aerobic culture [[id number]]  (Abnormal)  (Susceptibility) Collected: 11/02/24 1430    Order Status: Completed Specimen: Wound from Toe, Left Foot Updated: 11/05/24 0802     Aerobic Bacterial Culture Results called to and read back by: Sol Michaels 11/04/2024  06:32      ENTEROBACTER CLOACAE  Moderate         METHICILLIN RESISTANT STAPHYLOCOCCUS AUREUS  Moderate        PSEUDOMONAS AERUGINOSA  Moderate              Diagnostic Results:    Assessment/Plan:     Active Diagnoses:    Diagnosis Date Noted POA    PRINCIPAL PROBLEM:  Osteomyelitis of great toe of left foot [M86.9] 11/02/2024 Yes    Dermatitis associated with moisture [L30.8] 11/06/2024 Yes    Diarrhea [R19.7] 11/03/2024 Yes    Cellulitis of toe of left foot [L03.032] 11/02/2024 Yes    Stage 3a chronic kidney disease (CKD) [N18.31] 02/28/2024 Yes    Candidiasis, intertrigo [B37.2] 08/31/2023 Yes    Essential hypertension [I10] 12/09/2022 Yes    COPD (chronic obstructive pulmonary disease) [J44.9] 12/09/2022 Yes    Type 2 diabetes mellitus with circulatory disorder, 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    Discussed wound healing cycle, skin integrity, ways to care for skin.Counseled patient on the effects of biomechanical pressure, edema and blood glucose on healing. They verbalizes understanding that it can increase the chances of delayed healing and this prolonged exposure leads to infection or progression of infection which subsequently can result in loss of limb.    Adequate vitamin supplementation, protein intake, and hydration - discussed with patient      Surgical site stable. DSD applied   From podiatric perspective wounds can now be managed outpatient or at rehab facility.     Proximal margin growing few pseudomonas, may be contaminant but pathology pending.  Will defer abx plan to ID    Short-term goals include maintaining  good offloading and minimizing bioburden, promoting granulation and epithelialization to healing.  Long-term goals include keeping the wound healed by good offloading and medical management under the direction of internist.    We will continue to follow and work in partnership with treatment team on how to best treat patient concern and diagnosis.      Irais Trejo DPM  Podiatry  [facility] - Med Surg

## 2024-11-11 NOTE — NURSING
[facility]  Nurses Note -- 4 Eyes      11/10/2024       Skin assessed on: Q Shift      [x] No Pressure Injuries Present    [x]Prevention Measures Documented    [] Yes LDA  for Pressure Injury Previously documented     [] Yes New Pressure Injury Discovered   [] LDA for New Pressure Injury Added      Attending RN:  Vannessa Way RN     Second RN:  Taylor Felix Lpn

## 2024-11-11 NOTE — ASSESSMENT & PLAN NOTE
Chronic given >3w, however no workup done  C diff negative and cultures neg  Eating cheese regularly at home but says improves symptoms  Continue colestipol as taking at home  -improved

## 2024-11-11 NOTE — ASSESSMENT & PLAN NOTE
[id number] Score: 4. The patients heart rate in the last 24 hours is as follows:  Pulse  Min: 52  Max: 68     Antiarrhythmics  metoprolol succinate (TOPROL-XL) 24 hr tablet 25 mg, Daily, Oral    Anticoagulants ; switched from Apixaban to heparin gtt in preparation of procedures. Now apixiban resumed.     apixaban tablet 5 mg, 2 times daily, Oral    Plan  - Replete lytes with a goal of K>4, Mg >2  - Patient is anticoagulated, see medications listed above.  - Patient's afib is currently controlled

## 2024-11-11 NOTE — NURSING
Sitting in chair. Family at bedside. Surgeon in room, changed dsg to left foot as ordered. Family at bedside. Pending being transported to long term care facility for IV atb until 12/17/24. Has surgical shoe to left foot.

## 2024-11-11 NOTE — ASSESSMENT & PLAN NOTE
PUJA is likely due to pre-renal azotemia due to infection.   Baseline creatinine is  .09 . Most recent creatinine and eGFR are listed below.    Recent Labs     11/09/24  0418 11/10/24  0436 11/11/24  0448   CREATININE 0.9 0.9 0.9   EGFRNORACEVR >60 >60 >60        Plan  - PUJA is improving  - Avoid nephrotoxins and renally dose meds for GFR listed above  - Monitor urine output, serial BMP, and adjust therapy as needed  - Cr 1.3 on admit, now 0.9 after fluids. RESOLVED

## 2024-11-11 NOTE — PLAN OF CARE
Patient alert and oriented. Forgetful at times. Respirations even and unlabored. No distress noted. Skin warm and dry. Picc line to right arm. Saline locked. Flushed as ordered. Iv antibiotics given as ordered. Excoriation noted to sacrum and skin folds. Cream and powder applied as ordered. Frequent weight shift assistance. Dressing to left foot and leg. Clean, dry, and intact. No complications noted. Purewick in place. No complications noted. Patient utilized bedpan throughout shift as needed. Blood sugar monitored. No insulin given per sliding scale. Scds to rle. No compression to left leg per md orders.  Bilateral foot boots in place. Patient has no complaints at this time. Bed in lowest position. Call bell within reach. Bed alarms audible. Contact precautions maintained throughout shift.      Problem: Adult Inpatient Plan of Care  Goal: Plan of Care Review  Outcome: Progressing  Flowsheets (Taken 11/11/2024 0332)  Plan of Care Reviewed With: patient  Goal: Patient-Specific Goal (Individualized)  Outcome: Progressing  Goal: Absence of Hospital-Acquired Illness or Injury  Outcome: Progressing  Intervention: Identify and Manage Fall Risk  Flowsheets (Taken 11/11/2024 0332)  Safety Promotion/Fall Prevention:   assistive device/personal item within reach   bed alarm set   Fall Risk reviewed with patient/family   Fall Risk signage in place   high risk medications identified   medications reviewed     Problem: Diabetes Comorbidity  Goal: Blood Glucose Level Within Targeted Range  Outcome: Progressing  Intervention: Monitor and Manage Glycemia  Flowsheets (Taken 11/11/2024 0332)  Glycemic Management: blood glucose monitored     Problem: Infection  Goal: Absence of Infection Signs and Symptoms  Outcome: Progressing  Intervention: Prevent or Manage Infection  Flowsheets (Taken 11/11/2024 0332)  Infection Management: aseptic technique maintained  Isolation Precautions:   precautions maintained   contact     Problem:  Wound  Goal: Optimal Coping  Outcome: Progressing  Intervention: Support Patient and Family Response  Flowsheets (Taken 11/11/2024 0332)  Supportive Measures: active listening utilized  Family/Support System Care: support provided     Problem: Skin Injury Risk Increased  Goal: Skin Health and Integrity  Outcome: Progressing  Intervention: Optimize Skin Protection  Flowsheets (Taken 11/11/2024 0332)  Pressure Reduction Techniques:   frequent weight shift encouraged   sit time limited to 2 hours  Activity Management: Rolling - L1  Head of Bed (HOB) Positioning: HOB elevated

## 2024-11-11 NOTE — NURSING
[facility]  Nurses Note -- 4 Eyes      11/11/2024       Skin assessed on: Q Shift      [x] No Pressure Injuries Present    [x]Prevention Measures Documented    [] Yes LDA  for Pressure Injury Previously documented     [] Yes New Pressure Injury Discovered   [] LDA for New Pressure Injury Added      Attending RN:  Luz Calderon RN     Second RN: Mirela

## 2024-11-11 NOTE — PLAN OF CARE
Lying in bed. No distress noted. Has purewick on, no distress noted. SCD on rt leg. Dialysis tomorrow per Josh in dialysis. Pending transfer to LTACH for IV therapy. Afebrile.   Problem: Adult Inpatient Plan of Care  Goal: Plan of Care Review  Outcome: Progressing  Goal: Optimal Comfort and Wellbeing  Outcome: Progressing  Goal: Readiness for Transition of Care  Outcome: Progressing     Problem: Wound  Goal: Optimal Functional Ability  Outcome: Progressing  Goal: Absence of Infection Signs and Symptoms  Outcome: Progressing  Goal: Improved Oral Intake  Outcome: Progressing

## 2024-11-12 ENCOUNTER — TELEPHONE (OUTPATIENT)
Dept: PODIATRY | Facility: CLINIC | Age: 89
End: 2024-11-12
Payer: MEDICARE

## 2024-11-12 LAB
OHS QRS DURATION: 80 MS
OHS QTC CALCULATION: 460 MS
POCT GLUCOSE: 107 MG/DL (ref 70–110)
POCT GLUCOSE: 107 MG/DL (ref 70–110)
POCT GLUCOSE: 127 MG/DL (ref 70–110)
POCT GLUCOSE: 98 MG/DL (ref 70–110)

## 2024-11-12 PROCEDURE — 30200315 PPD INTRADERMAL TEST REV CODE 302: Performed by: HOSPITALIST

## 2024-11-12 PROCEDURE — 97530 THERAPEUTIC ACTIVITIES: CPT | Mod: CQ

## 2024-11-12 PROCEDURE — 97116 GAIT TRAINING THERAPY: CPT | Mod: CQ

## 2024-11-12 PROCEDURE — 25000003 PHARM REV CODE 250: Performed by: INTERNAL MEDICINE

## 2024-11-12 PROCEDURE — 25000003 PHARM REV CODE 250: Performed by: STUDENT IN AN ORGANIZED HEALTH CARE EDUCATION/TRAINING PROGRAM

## 2024-11-12 PROCEDURE — 11000001 HC ACUTE MED/SURG PRIVATE ROOM

## 2024-11-12 PROCEDURE — 27000207 HC ISOLATION

## 2024-11-12 PROCEDURE — 97110 THERAPEUTIC EXERCISES: CPT | Mod: CQ

## 2024-11-12 PROCEDURE — 25000003 PHARM REV CODE 250: Performed by: PODIATRIST

## 2024-11-12 PROCEDURE — 97110 THERAPEUTIC EXERCISES: CPT

## 2024-11-12 PROCEDURE — 86580 TB INTRADERMAL TEST: CPT | Performed by: HOSPITALIST

## 2024-11-12 PROCEDURE — 63600175 PHARM REV CODE 636 W HCPCS: Performed by: INTERNAL MEDICINE

## 2024-11-12 RX ADMIN — APIXABAN 5 MG: 5 TABLET, FILM COATED ORAL at 09:11

## 2024-11-12 RX ADMIN — PIPERACILLIN SODIUM AND TAZOBACTAM SODIUM 4.5 G: 4; .5 INJECTION, POWDER, FOR SOLUTION INTRAVENOUS at 05:11

## 2024-11-12 RX ADMIN — METOPROLOL SUCCINATE 25 MG: 25 TABLET, EXTENDED RELEASE ORAL at 09:11

## 2024-11-12 RX ADMIN — HYDRALAZINE HYDROCHLORIDE 25 MG: 25 TABLET ORAL at 08:11

## 2024-11-12 RX ADMIN — ISOSORBIDE MONONITRATE 60 MG: 30 TABLET, EXTENDED RELEASE ORAL at 09:11

## 2024-11-12 RX ADMIN — LACTOBACILLUS ACIDOPHILUS / LACTOBACILLUS BULGARICUS 1 EACH: 100 MILLION CFU STRENGTH GRANULES at 08:11

## 2024-11-12 RX ADMIN — PIPERACILLIN SODIUM AND TAZOBACTAM SODIUM 4.5 G: 4; .5 INJECTION, POWDER, FOR SOLUTION INTRAVENOUS at 08:11

## 2024-11-12 RX ADMIN — LEVOTHYROXINE SODIUM 88 MCG: 0.09 TABLET ORAL at 05:11

## 2024-11-12 RX ADMIN — COLESTIPOL HYDROCHLORIDE 3 G: 1 TABLET, FILM COATED ORAL at 09:11

## 2024-11-12 RX ADMIN — TUBERCULIN PURIFIED PROTEIN DERIVATIVE 5 UNITS: 5 INJECTION, SOLUTION INTRADERMAL at 03:11

## 2024-11-12 RX ADMIN — COLESTIPOL HYDROCHLORIDE 3 G: 1 TABLET, FILM COATED ORAL at 08:11

## 2024-11-12 RX ADMIN — ACETAMINOPHEN 650 MG: 325 TABLET ORAL at 08:11

## 2024-11-12 RX ADMIN — DICLOFENAC SODIUM 2 G: 10 GEL TOPICAL at 08:11

## 2024-11-12 RX ADMIN — HYDRALAZINE HYDROCHLORIDE 25 MG: 25 TABLET ORAL at 09:11

## 2024-11-12 RX ADMIN — LACTOBACILLUS ACIDOPHILUS / LACTOBACILLUS BULGARICUS 1 EACH: 100 MILLION CFU STRENGTH GRANULES at 09:11

## 2024-11-12 RX ADMIN — MICONAZOLE NITRATE: 20 POWDER TOPICAL at 08:11

## 2024-11-12 RX ADMIN — MICONAZOLE NITRATE: 20 POWDER TOPICAL at 09:11

## 2024-11-12 RX ADMIN — FUROSEMIDE 40 MG: 40 TABLET ORAL at 09:11

## 2024-11-12 RX ADMIN — APIXABAN 5 MG: 5 TABLET, FILM COATED ORAL at 08:11

## 2024-11-12 RX ADMIN — DICLOFENAC SODIUM 2 G: 10 GEL TOPICAL at 09:11

## 2024-11-12 RX ADMIN — PIPERACILLIN SODIUM AND TAZOBACTAM SODIUM 4.5 G: 4; .5 INJECTION, POWDER, FOR SOLUTION INTRAVENOUS at 11:11

## 2024-11-12 NOTE — SUBJECTIVE & OBJECTIVE
Interval History: Patient HDS and afebile. No acute events overnight. No new complaints this morning. Continue zosyn. Pt is medically stable for DC, pending placement.       Review of Systems   Constitutional:  Positive for activity change and fatigue. Negative for fever.   Respiratory:  Negative for shortness of breath.    Cardiovascular:  Negative for chest pain.   Gastrointestinal:  Negative for abdominal pain.   Musculoskeletal:  Positive for arthralgias.   Skin:  Positive for wound.   Neurological:  Negative for dizziness and headaches.   All other systems reviewed and are negative.    Objective:     Vital Signs (Most Recent):  Temp: 98 °F (36.7 °C) (11/12/24 1205)  Pulse: 64 (11/12/24 1205)  Resp: 18 (11/12/24 1205)  BP: (!) 102/52 (11/12/24 1205)  SpO2: 97 % (11/12/24 1205) Vital Signs (24h Range):  Temp:  [98 °F (36.7 °C)-98.7 °F (37.1 °C)] 98 °F (36.7 °C)  Pulse:  [57-70] 64  Resp:  [18] 18  SpO2:  [93 %-97 %] 97 %  BP: (102-145)/(52-83) 102/52     Weight: 103.5 kg (228 lb 2.8 oz)  Body mass index is 35.74 kg/m².    Intake/Output Summary (Last 24 hours) at 11/12/2024 1430  Last data filed at 11/12/2024 0917  Gross per 24 hour   Intake 539.5 ml   Output 250 ml   Net 289.5 ml         Physical Exam  Vitals and nursing note reviewed.   Constitutional:       Appearance: Normal appearance.   HENT:      Head: Normocephalic and atraumatic.   Eyes:      Extraocular Movements: Extraocular movements intact.      Pupils: Pupils are equal, round, and reactive to light.   Cardiovascular:      Rate and Rhythm: Normal rate and regular rhythm.   Pulmonary:      Effort: Pulmonary effort is normal. No respiratory distress.   Abdominal:      General: There is no distension.   Musculoskeletal:         General: Normal range of motion.      Cervical back: Normal range of motion.   Neurological:      General: No focal deficit present.      Mental Status: She is alert and oriented to person, place, and time.   Psychiatric:          "Mood and Affect: Mood normal.         Behavior: Behavior normal.             Significant Labs: All pertinent labs within the past 24 hours have been reviewed.  CBC: No results for input(s): "WBC", "HGB", "HCT", "PLT" in the last 48 hours.  CMP:   Recent Labs   Lab 11/11/24  0448      K 3.5      CO2 26   GLU 95   BUN 14   CREATININE 0.9   CALCIUM 8.5*   PROT 5.9*   ALBUMIN 2.7*   BILITOT 0.5   ALKPHOS 41   AST 10   ALT 11   ANIONGAP 11       Significant Imaging: I have reviewed all pertinent imaging results/findings within the past 24 hours.  "

## 2024-11-12 NOTE — NURSING
Complains of headache, on PRN Acetaminophen  Bed care done  No distress noted  No new/acute changes overnight

## 2024-11-12 NOTE — PLAN OF CARE
[facility] FOLLOW UP APPOINTMENT          The following appointments have been scheduled for patient by Case Management.   Post Acute Facility to arrange patient transportation to and from specialty appointments during patient stay at facility.           Follow-up Information:       Pipo Flowers MD. Go on 11/27/2024.   Specialty: Family Medicine  Why: Appointment scheduled for 8:45am  Contact information:  [address] [place] [address]  [phone]      Hood Williamson MD. Go on 12/3/2024.   Specialties: Vascular Surgery, Cardiothoracic Surgery  Why: Appointments scheduled for 1:30pm and 2:45pm  Contact information:  [address] [facility] [address]  [place] [address]  [phone]

## 2024-11-12 NOTE — PROGRESS NOTES
[facility] Medicine  Telemedicine Progress Note    Patient Name: Stephany Skinner  MRN: [id number]  Patient Class: IP- Inpatient   Admission Date: 11/2/2024  Length of Stay: 10 days  Attending Physician: Isabell Waters MD  Primary Care Provider: Pipo Flowers MD          Subjective:     Principal Problem:Osteomyelitis of great toe of left foot        HPI:  This is an 89-year-old female with a past medical history of AFib (on Eliquis), HFpEF (EF: 50%), COPD, hypertension, type 2 diabetes, hyperlipidemia, CKD 3, rheumatoid arthritis, myasthenia gravis, obesity, who presents with a foot wound.      Patient presents for evaluation of worsening left 1st toe swelling and redness.  She developed this about 3 weeks prior to presentation, and has been following up with wound care.  She noted that her toe looked more swollen, and was more painful on the day of presentation. Her wound care nurse advised her to present to the ED for further evaluation. Patient denies fevers, chills,  but endorses drainage from the toe.     In the ED, the patient was hemodynamically stable.  Labs were remarkable for macrocytic anemia (11.2), elevated ESR (66), negative lactic acid (1.4).  Left foot x-ray showed degenerative changes with soft tissue swelling overlying the dorsum and medial border of the left forefoot.  Left lower extremity arterial ultrasound showed findings suggestive of 50-75% of peroneal artery stenosis, and inflow stenosis in the common femoral artery.  Patient was given vancomycin, Zosyn.  She was admitted for further management.    Overview/Hospital Course:  #Infection of toe; MRI did show signs of early osteomyelitis distal phalanx LEFT great toe. Pending Podiatry and VascSx eval/recs. Continuing V+Z for now. GNR in deep wound culture. Got contrast, to preserve renal function, will reduce from V+Z to V+ceftriaxone (non-diabetic, pseudomonas less likely, however switch to cefepime if  results). Will switch from Apixaban to heparin gtt in preparation of procedures. C diff negative.    Unlikely need for revascularization per VascSx. Patient NPO for Podiatry to proceed with procedure. Polymicrobial infection of toe with MRSA, Pseudomonas and Enterobacter. No s/p amputation- ID consult for Abx LOT and d/c recs. PT OT, suspect will need SNF then HH.     Interval History: Patient HDS and afebile. No acute events overnight. No new complaints this morning. Continue zosyn. Pt is medically stable for DC, pending placement.       Review of Systems   Constitutional:  Positive for activity change and fatigue. Negative for fever.   Respiratory:  Negative for shortness of breath.    Cardiovascular:  Negative for chest pain.   Gastrointestinal:  Negative for abdominal pain.   Musculoskeletal:  Positive for arthralgias.   Skin:  Positive for wound.   Neurological:  Negative for dizziness and headaches.   All other systems reviewed and are negative.    Objective:     Vital Signs (Most Recent):  Temp: 98 °F (36.7 °C) (11/12/24 1205)  Pulse: 64 (11/12/24 1205)  Resp: 18 (11/12/24 1205)  BP: (!) 102/52 (11/12/24 1205)  SpO2: 97 % (11/12/24 1205) Vital Signs (24h Range):  Temp:  [98 °F (36.7 °C)-98.7 °F (37.1 °C)] 98 °F (36.7 °C)  Pulse:  [57-70] 64  Resp:  [18] 18  SpO2:  [93 %-97 %] 97 %  BP: (102-145)/(52-83) 102/52     Weight: 103.5 kg (228 lb 2.8 oz)  Body mass index is 35.74 kg/m².    Intake/Output Summary (Last 24 hours) at 11/12/2024 1430  Last data filed at 11/12/2024 0917  Gross per 24 hour   Intake 539.5 ml   Output 250 ml   Net 289.5 ml         Physical Exam  Vitals and nursing note reviewed.   Constitutional:       Appearance: Normal appearance.   HENT:      Head: Normocephalic and atraumatic.   Eyes:      Extraocular Movements: Extraocular movements intact.      Pupils: Pupils are equal, round, and reactive to light.   Cardiovascular:      Rate and Rhythm: Normal rate and regular rhythm.   Pulmonary:      " Effort: Pulmonary effort is normal. No respiratory distress.   Abdominal:      General: There is no distension.   Musculoskeletal:         General: Normal range of motion.      Cervical back: Normal range of motion.   Neurological:      General: No focal deficit present.      Mental Status: She is alert and oriented to person, place, and time.   Psychiatric:         Mood and Affect: Mood normal.         Behavior: Behavior normal.             Significant Labs: All pertinent labs within the past 24 hours have been reviewed.  CBC: No results for input(s): "WBC", "HGB", "HCT", "PLT" in the last 48 hours.  CMP:   Recent Labs   Lab 11/11/24  0448      K 3.5      CO2 26   GLU 95   BUN 14   CREATININE 0.9   CALCIUM 8.5*   PROT 5.9*   ALBUMIN 2.7*   BILITOT 0.5   ALKPHOS 41   AST 10   ALT 11   ANIONGAP 11       Significant Imaging: I have reviewed all pertinent imaging results/findings within the past 24 hours.    Assessment/Plan:        * Osteomyelitis of great toe of left foot  Acute OM  MRI did show signs of early osteomyelitis distal phalanx LEFT great toe.   Pending Podiatry and VascSx eval/recs.   Continuing V+Z for now.   MRSA and enterobacter  Got contrast, to preserve renal function, will reduce from V+Z to V+ceftriaxone (non-diabetic, pseudomonas less likely, however switch to cefepime if results).   S/p left great toe amputation - plan for 6 weeks of IV Zosyn due to culture from proximal margins growing Pseudomonas; PICC line placed      Dermatitis associated with moisture  Assessed by wound care; continue miconazole powder      Diarrhea  Chronic given >3w, however no workup done  C diff negative and cultures neg  Eating cheese regularly at home but says improves symptoms  Continue colestipol as taking at home  -improved        Cellulitis of toe of left foot  Concern for cellulitis/possible underlying osteomyelitis   Plan:   Continue vancomycin/zosyn   Follow up on cultures - growing MRSA and " enterobacter  Consult podiatry   Consult vascular surgery   MRI left foot ordered - early osteomyelitis noted  - s/p left great toe amputation by podiatry on 11/5  -surgical cx with pseudomonas so 6 weeks abx with zosyn planned until 12/17 as recommended by ID; PICC line placed; plan for SNF.     Stage 3a chronic kidney disease (CKD)  Creatine stable for now. BMP reviewed- noted Estimated Creatinine Clearance: 53.5 mL/min (based on SCr of 0.9 mg/dL). according to latest data. Based on current GFR, CKD stage is stage 3 - GFR 30-59.  Monitor UOP and serial BMP and adjust therapy as needed. Renally dose meds. Avoid nephrotoxic medications and procedures.    Candidiasis, intertrigo  Miconazole BID       COPD (chronic obstructive pulmonary disease)  No acute issues. DuoNebs PRN     Essential hypertension  Patients blood pressure range in the last 24 hours was: BP  Min: 103/52  Max: 204/91.The patient's inpatient anti-hypertensive regimen is listed below:  Current Antihypertensives  furosemide tablet 40 mg, Daily, Oral  metoprolol succinate (TOPROL-XL) 24 hr tablet 25 mg, Daily, Oral  isosorbide mononitrate 24 hr tablet 60 mg, Daily, Oral  hydrALAZINE tablet 25 mg, Every 12 hours, Oral    Plan  - BP is controlled, no changes needed to their regimen    Type 2 diabetes mellitus with circulatory disorder, without long-term current use of insulin  Lab Results   Component Value Date    HGBA1C 5.8 (H) 11/29/2023     Glycemic protocol   MDSS      Chronic diastolic congestive heart failure  Continue home medications     Debility  -Patient noted to be medically stable for discharge at this time  -Has been able to work with OT/PT who recommend placement for further rehabilitation  -Patient pending placement, continue in-hospital care as stated above in the meantime  -More than 20 minutes of my time has been spent discussing and planning just her safe disposition with patient/family/CM/SW/Nursing staff (not including other time  spent in clinical discussion and management)  -CM/SW following, appreciate input   -Will place DC orders once placement has been secured        PUJA (acute kidney injury)  PUJA is likely due to pre-renal azotemia due to infection.   Baseline creatinine is  .09 . Most recent creatinine and eGFR are listed below.    Recent Labs     11/09/24  0418 11/10/24  0436 11/11/24  0448   CREATININE 0.9 0.9 0.9   EGFRNORACEVR >60 >60 >60        Plan  - PUJA is improving  - Avoid nephrotoxins and renally dose meds for GFR listed above  - Monitor urine output, serial BMP, and adjust therapy as needed  - Cr 1.3 on admit, now 0.9 after fluids. RESOLVED    Myasthenia gravis, adult form  Not on any medications. Monitor   Follow up with neurology       Chronic atrial fibrillation  [id number] Score: 4. The patients heart rate in the last 24 hours is as follows:  Pulse  Min: 52  Max: 68     Antiarrhythmics  metoprolol succinate (TOPROL-XL) 24 hr tablet 25 mg, Daily, Oral    Anticoagulants ; switched from Apixaban to heparin gtt in preparation of procedures. Now apixiban resumed.     apixaban tablet 5 mg, 2 times daily, Oral    Plan  - Replete lytes with a goal of K>4, Mg >2  - Patient is anticoagulated, see medications listed above.  - Patient's afib is currently controlled        Hyperlipidemia  Continue statin       Hypothyroidism  Continue synthroid         VTE Risk Mitigation (From admission, onward)           Ordered     apixaban tablet 5 mg  2 times daily         11/06/24 0940     IP VTE HIGH RISK PATIENT  Once         11/05/24 1551     Place sequential compression device  Until discontinued         11/05/24 1551     Place sequential compression device  Until discontinued         11/02/24 0252                          I have completed this tele-visit without the assistance of a telepresenter.    The attending portion of this evaluation, treatment, and documentation was performed per Isabell Waters MD via Telemedicine AudioVisual  using the secure [employer] software platform with 2 way audio/video. The provider was located off-site and the patient is located in the hospital. The aforementioned video software was utilized to document the relevant history and physical exam    Isabell Waters MD  Department of [facility] Medicine   [facility]

## 2024-11-12 NOTE — TELEPHONE ENCOUNTER
----- Message from  Jennifer sent at 11/12/2024  2:55 PM CST -----  Regarding: Hospital Follow Up  Please contact patient to schedule hospital follow up. She is likely discharging tomorrow, thank you!

## 2024-11-12 NOTE — NURSING
[facility]  Nurses Note -- 4 Eyes      11/12/2024       Skin assessed on: Q Shift      [x] No Pressure Injuries Present    [x]Prevention Measures Documented    [] Yes LDA  for Pressure Injury Previously documented     [] Yes New Pressure Injury Discovered   [] LDA for New Pressure Injury Added      Attending RN:  Luz Calderon RN     Second RN:  Pratima

## 2024-11-12 NOTE — PLAN OF CARE
Changes in medical condition or discharge plan: No    Does patient need new DME? TBD    Follow up appts needed: PCP, ID, Podiatry, and Vascular Surgery    Medically stable: Yes    Estimated Discharge Date: TBD    Per attending, patient medically stable for discharge.  Patient to discharge to [facility] Phone: [phone] SNF; awaiting facility acceptance; per [facility]'s review board had questions regarding patient's biopsy and wanted to ensure they would be able to provide pt's required IV medications.  CM sent updated clinicals and review board should provide an answer today.  Per Podiatry, patient underwent amputation of left distal hallux on 11/5.  Per ID, pt now with residual osteo; culture from prox margins growing pseudomonas putida.  Patient needing 6 weeks of IV abx, ending 12/17.  At baseline, patient lives at home with her sister and uses a rollator, oxygen, CPAP and a commode at home; per chart review, she has adequate familial support at home.  CM to continue to assess for and until discharge.    11/12/24 0901   Discharge Reassessment   Assessment Type Discharge Planning Reassessment   Did the patient's condition or plan change since previous assessment? No   Discharge Plan A Skilled Nursing Facility   Discharge Plan B Home with family   DME Needed Upon Discharge  other (see comments)  (TBD)   Transition of Care Barriers None   Why the patient remains in the hospital Other (see comment)  (Awaiting accepting facility)   Post-Acute Status   Post-Acute Authorization Placement   Post-Acute Placement Status Pending post-acute provider review/more information requested   Hospital Resources/Appts/Education Provided Provided patient/caregiver with written discharge plan information   Discharge Delays None known at this time       11:39am: JESSE spoke with admissions coordinator with [facility]; she stated review board has approved patient's referral but her IV abx need  to be ordered today for delivery tomorrow.  Facility able to take patient tomorrow.

## 2024-11-12 NOTE — PLAN OF CARE
Sitting in chair. Therapy in progress. TB skin test completed to rt forearm, tolerated well. Dsg to lt foot cdi, wound care completed to rt foot and buttock as ordered. Denies pain. No distress noted. LTACH placement pending.  Problem: Adult Inpatient Plan of Care  Goal: Plan of Care Review  Outcome: Progressing  Goal: Optimal Comfort and Wellbeing  Outcome: Progressing  Goal: Readiness for Transition of Care  Outcome: Progressing     Problem: Wound  Goal: Optimal Functional Ability  Outcome: Progressing  Goal: Absence of Infection Signs and Symptoms  Outcome: Progressing  Goal: Improved Oral Intake  Outcome: Progressing

## 2024-11-12 NOTE — PT/OT/SLP PROGRESS
Physical Therapy Treatment    Patient Name:  Stephany Skinner   MRN:  [id number]    Recommendations:     Discharge Recommendations: Moderate Intensity Therapy  Discharge Equipment Recommendations: bedside commode  Barriers to discharge: None    Assessment:     Stephany Skinner is a 89 y.o. female admitted with a medical diagnosis of Osteomyelitis of great toe of left foot.  She presents with the following impairments/functional limitations: weakness, impaired endurance, impaired functional mobility, gait instability, impaired self care skills, decreased lower extremity function, decreased coordination, decreased ROM, pain, impaired balance, decreased safety awareness, impaired cognition, orthopedic precautions, edema, impaired skin .    Rehab Prognosis: Good; patient would benefit from acute skilled PT services to address these deficits and reach maximum level of function.    Recent Surgery: Procedure(s) (LRB):  AMPUTATION, TOE- DISTAL HALLUX (Left) 7 Days Post-Op    Plan:     During this hospitalization, patient to be seen 5 x/week to address the identified rehab impairments via gait training, therapeutic activities, therapeutic exercises, neuromuscular re-education, wheelchair management/training and progress toward the following goals:    Plan of Care Expires:  11/20/24    Subjective     Chief Complaint: none  Patient/Family Comments/goals: to walk to the restroom.   Pain/Comfort:  Pain Rating 1: 0/10  Pain Rating Post-Intervention 1: 0/10      Objective:     Communicated with nurse  prior to session.  Patient found HOB elevated with telemetry, bed alarm, PICC line upon PT entry to room.     General Precautions: Standard, fall, diabetic, contact  Orthopedic Precautions:  (LLE heel WB in post-op boot)  Braces:  (B post-op shoes)  Respiratory Status: Room air     Functional Mobility:  Bed Mobility:     Rolling Right: stand by assistance  Scooting: stand by assistance  Supine to Sit: stand by assistance  Transfers: V/T  cues for safety technique, WB precautions and walker management.     Sit to Stand: x 2 trials from bed, 1 trial from elevated toilet seat and 1 trial from chair  minimum assistance with rolling walker  Bed to Chair: contact guard assistance with  rolling walker  using  Step Transfer  Toilet Transfer: contact guard assistance with  rolling walker  using  Step Transfer  Gait:  Patient ambulated ~ 15 ft x 2 trials  on level tile with Rolling Walker with  CGA (L heel WB and B post of shoes ) . Pt required seated rest break 2* to fatigue .  Pt with demonstarting a  3-point gait with decreased lluvia, increased time in double stance, decreased velocity of limb motion, decreased step length, decreased swing-to-stance ratio, decreased toe-to-floor clearance and decreased weight-shifting ability.Impairments contributing to gait deviations include impaired balance, decreased flexibility, pain, impaired postural control, decreased ROM and decreased strength. V/T cues to maintain L heel WB ,  for safety technique and walker management.    Balance:  good in sitting, fair in standing     AM-PAC 6 CLICK MOBILITY  Turning over in bed (including adjusting bedclothes, sheets and blankets)?: 4  Sitting down on and standing up from a chair with arms (e.g., wheelchair, bedside commode, etc.): 2  Moving from lying on back to sitting on the side of the bed?: 3  Moving to and from a bed to a chair (including a wheelchair)?: 3  Need to walk in hospital room?: 3  Climbing 3-5 steps with a railing?: 2  Basic Mobility Total Score: 17       Treatment & Education:  Lower Extremity Exercises.    Patient educated on: Purpose and Benefits of Therapeutic Exercise(s).    Patient verbalized acceptance/understanding of instruction(s), expectation(s), and limitation(s) (for safety).  Performed BLE x 10 reps : AP, QS in bed.   Patient performed: 2 sets of 10 reps (each) of B LE Ther Ex: AP, LAQ, Hip abd/add, Hip flexion while sitting up on EOB.        Patient required verbal cues/tactile cues to ensure correct sequence, to maintain proper form, and to allow for self-correction.  Pt reported no complaints or problems with exercise activity.     Patient required increase Reaction Time to initiate movements and a Sitting Rest Break between set(s) to recover.    Pt tolerated static standing with RW, SBA for balance while getting total A for posterior wipe and diaper placement.   Patient left up in reclined chair , BLE elevated , heels offloading with all lines intact, call button in reach, bed alarm on, nurse notified .     GOALS:   Multidisciplinary Problems       Physical Therapy Goals          Problem: Physical Therapy    Goal Priority Disciplines Outcome Interventions   Physical Therapy Goal     PT, PT/OT Progressing    Description: Goals to be met by: 24     Patient will increase functional independence with mobility by performin. Supine to sit with Mod I  2. Rolling to Left and Right with Mod I  3. Sit to stand transfer with Mod I and adherence to L LE heel WB precautions and RW  4. W/C mobility x100 ft with Mod I using BUE  5. Lower extremity exercise program x20 reps per handout, with independence  6. Gait x50 ft with mod I using RW, B post-op shoes, and L heel WB                         Time Tracking:     PT Received On: 24  PT Start Time: 952     PT Stop Time: 1030  PT Total Time (min): 38 min     Billable Minutes: Gait Training 10, Therapeutic Activity 15, and Therapeutic Exercise 13    Treatment Type: Treatment  PT/PTA: PTA     Number of PTA visits since last PT visit: 4     2024

## 2024-11-12 NOTE — PLAN OF CARE
Recommendations  1. Continue diabetic diet 2000 kcals   2. Add cardiac restriction   3. Consider adding Ethan BID for wound healing   4. Monitor weight and labs   5. Encourage PO intake as tolerated    Goals: Pt will consume 75% meal intake by RD follow up

## 2024-11-12 NOTE — NURSING
[facility]  Nurses Note -- 4 Eyes      11/11/2024       Skin assessed on: Q Shift      [x] No Pressure Injuries Present    [x]Prevention Measures Documented    [] Yes LDA  for Pressure Injury Previously documented     [] Yes New Pressure Injury Discovered   [] LDA for New Pressure Injury Added      Attending RN:  Pratima Platt RN     Second RN:  ROSA ISELA Streeter

## 2024-11-12 NOTE — PT/OT/SLP PROGRESS
Occupational Therapy   Treatment    Name: Stephany Skinner  MRN: [id number]  Admitting Diagnosis:  Osteomyelitis of great toe of left foot  7 Days Post-Op    Recommendations:     Discharge Recommendations: Moderate Intensity Therapy  Discharge Equipment Recommendations:  to be determined by next level of care  Barriers to discharge:  Other (Comment) (patient not at PLOF of modified independent wtih ADLs and IADl)    Assessment:     Stephany Skinner is a 89 y.o. female with a medical diagnosis of Osteomyelitis of great toe of left foot.  She presents with up in chair . Performance deficits affecting function are weakness, impaired endurance, impaired sensation, impaired self care skills, impaired functional mobility, gait instability, impaired balance, decreased lower extremity function, decreased safety awareness, pain, decreased ROM, edema, orthopedic precautions.     Rehab Prognosis:  Good; patient would benefit from acute skilled OT services to address these deficits and reach maximum level of function.       Plan:     Patient to be seen 5 x/week to address the above listed problems via self-care/home management, therapeutic activities, therapeutic exercises  Plan of Care Expires: 11/21/24  Plan of Care Reviewed with: patient    Subjective     Chief Complaint:  none   Patient/Family Comments/goals: she said she may discharge to [facility] tomorrow   Pain/Comfort:  Pain Rating 1: 0/10    Objective:     Communicated with: RN,  prior to session.  Patient found up in chair with telemetry, bed alarm, PICC line upon OT entry to room.    General Precautions: Standard, fall, diabetic, contact    Orthopedic Precautions: (LLE heel WB in post-op boot ; R darco shoe)  Braces:  (darco shoes)  Respiratory Status: Room air     Occupational Performance:     Bed Mobility:    NT due to patient wanted to stay in chair      Functional Mobility/Transfers:  NT due to patient wanted to stay in chair     Activities of Daily Living:  Grooming:  modified independence seated in chair       [facility] ADL: 20    Treatment & Education:  Patient demonstrated UB therex with medium resistance (red) theraband seated in chair with the following exercises: 10 x 2 sets each  1) Upper back rows-with occupational therapy providing resistance   2) Triceps/biceps: per each arm     Patient left up in chair with all lines intact, call button in reach, and RN  notified    GOALS:   Multidisciplinary Problems       Occupational Therapy Goals          Problem: Occupational Therapy    Goal Priority Disciplines Outcome Interventions   Occupational Therapy Goal     OT, PT/OT Progressing    Description: Goals to be met by: 11/25/24     Patient will increase functional independence with ADLs by performing:    UE Dressing with [place].  LE Dressing with Minimal Assistance.with AD as needed  Grooming while seated at sink with Modified [place].  Toileting from bedside commode with Supervision for hygiene and clothing management.   Sitting in chair x60 minutes with Supervision.  Toilet transfer to bedside commode with Supervision.  Upper extremity exercise program x10 reps per handout, with supervision.                         Time Tracking:     OT Date of Treatment: 11/12/24  OT Start Time: 1455  OT Stop Time: 1512  OT Total Time (min): 17 min    Billable Minutes:Therapeutic Exercise 17     OT/GRIS: OT          11/12/2024

## 2024-11-12 NOTE — PROGRESS NOTES
[facility] - Med Surg  Adult Nutrition  Progress Note    SUMMARY     Recommendations  1. Continue diabetic diet 2000 kcals   2. Add cardiac restriction   3. Consider adding Ethan BID for wound healing   4. Monitor weight and labs   5. Encourage PO intake as tolerated    Goals: Pt will consume 75% meal intake by RD follow up     Nutrition Goal Status: new  Communication of RD Recs:  (POC)    Assessment and Plan  Nutrition Problem  Increased protein needs     Related to (etiology):   Wound healing     Signs and Symptoms (as evidenced by):   S/p left toe amputation    Tom-16/ left distal anterior leg, left buttocks, ulceration right anterior toe first, ulceration right anterior toe second, incision left toe first     Interventions:  Collaboration by nutrition professional with other providers   Commercial beverage medical food supplement therapy- Ethan BID     Nutrition Diagnosis Status:   New    Malnutrition Assessment  Weight Loss (Malnutrition):  (Noted 10 lb weight loss in 10 months)     Reason for Assessment  Reason For Assessment: length of stay  Diagnosis: other (see comments) (osteomyelitis of great toe of left foot)  General Information Comments: Pt is currently on a diabetic diet 2000 kcals. Left toe amputation 11/5/24. Tom-16/left distal anterior leg, left buttocks, ulceration right anterior toe first, ulceration right anterior toe second, incision left toe first. Noted 100 lb meal intake. Presented for eval of worsening left 1st toe swelling and redness. macorcytic anemia. S/p left toe amputation.  Suspect will need SNF then HH. medically stable for DC, pending placement. Unable to conduct NFPE at this time, pt on contact precautions.    Nutrition Risk Screen  Nutrition Risk Screen: no indicators present    Nutrition/Diet History  Patient Reported Diet/Restrictions/Preferences: heart healthy, diabetic diet  Food Preferences: JOSIAS  Spiritual, Cultural Beliefs, Buddhism Practices, Values that Affect  "Care: no  Factors Affecting Nutritional Intake: None identified at this time    Food Insecurity: Patient Declined (2024)    Hunger Vital Sign     Worried About Running Out of Food in the Last Year: Patient declined     Ran Out of Food in the Last Year: Patient declined     Anthropometrics  Temp: 98 °F (36.7 °C)  Height Method: Stated  Height: 5' 7" (170.2 cm)  Height (inches): 67 in  Weight Method: Bed Scale  Weight: 103.5 kg (228 lb 2.8 oz)  Weight (lb): 228.18 lb  Ideal Body Weight (IBW), Female: 135 lb  % Ideal Body Weight, Female (lb): 169.02 %  BMI (Calculated): 35.7  BMI Grade: 35 - 39.9 - obesity - grade II  Usual Body Weight (UBW), k.4 kg (24)  % Usual Body Weight: 95.68  % Weight Change From Usual Weight: -4.52 %     Lab/Procedures/Meds  Pertinent Labs Reviewed: reviewed  Pertinent Labs Comments: Ca 8.5, GFR 51, Hgb 11.3, Hct 34.4  Pertinent Medications Reviewed: reviewed  Pertinent Medications Comments: colestipol, furosemide, levothyroxine, miconazole    Estimated/Assessed Needs  Weight Used For Calorie Calculations: 103.5 kg (228 lb 2.8 oz)  Energy Calorie Requirements (kcal): 1868 kcals (PAL 1.3)  Energy Need Method: [place]  Protein Requirements: 103.5 g (1g/kg)  Weight Used For Protein Calculations: 103.5 kg (228 lb 2.8 oz)     Estimated Fluid Requirement Method: RDA Method  RDA Method (mL): 1868  CHO Requirement: 230g    Nutrition Prescription Ordered  Current Diet Order: Diabetic diet 2000 kcals    Evaluation of Received Nutrient/Fluid Intake  I/O: 299/0  Energy Calories Required: meeting needs  Protein Required: meeting needs  Fluid Required: meeting needs  Comments: LBM-24  Tolerance: tolerating  % Intake of Estimated Energy Needs: 75 - 100 %  % Meal Intake: 75 - 100 %    Nutrition Risk  Level of Risk/Frequency of Follow-up: low (1x/week)     Monitor and Evaluation  Food and Nutrient Intake: energy intake, food and beverage intake  Food and Nutrient Adminstration: " diet order  Anthropometric Measurements: weight change, body mass index  Biochemical Data, Medical Tests and Procedures: electrolyte and renal panel, gastrointestinal profile, glucose/endocrine profile, inflammatory profile, lipid profile     Nutrition Follow-Up  RD Follow-up?: Yes

## 2024-11-13 VITALS
HEART RATE: 59 BPM | WEIGHT: 228.19 LBS | HEIGHT: 67 IN | DIASTOLIC BLOOD PRESSURE: 72 MMHG | BODY MASS INDEX: 35.82 KG/M2 | RESPIRATION RATE: 17 BRPM | OXYGEN SATURATION: 98 % | SYSTOLIC BLOOD PRESSURE: 136 MMHG | TEMPERATURE: 98 F

## 2024-11-13 LAB
ALBUMIN SERPL BCP-MCNC: 2.7 G/DL (ref 3.5–5.2)
ALP SERPL-CCNC: 43 U/L (ref 40–150)
ALT SERPL W/O P-5'-P-CCNC: 11 U/L (ref 10–44)
ANION GAP SERPL CALC-SCNC: 11 MMOL/L (ref 8–16)
AST SERPL-CCNC: 10 U/L (ref 10–40)
BILIRUB SERPL-MCNC: 0.4 MG/DL (ref 0.1–1)
BUN SERPL-MCNC: 13 MG/DL (ref 8–23)
CALCIUM SERPL-MCNC: 8.3 MG/DL (ref 8.7–10.5)
CHLORIDE SERPL-SCNC: 105 MMOL/L (ref 95–110)
CO2 SERPL-SCNC: 26 MMOL/L (ref 23–29)
CREAT SERPL-MCNC: 1 MG/DL (ref 0.5–1.4)
EST. GFR  (NO RACE VARIABLE): 54 ML/MIN/1.73 M^2
GLUCOSE SERPL-MCNC: 96 MG/DL (ref 70–110)
POCT GLUCOSE: 104 MG/DL (ref 70–110)
POCT GLUCOSE: 114 MG/DL (ref 70–110)
POCT GLUCOSE: 96 MG/DL (ref 70–110)
POTASSIUM SERPL-SCNC: 3.1 MMOL/L (ref 3.5–5.1)
PROT SERPL-MCNC: 5.9 G/DL (ref 6–8.4)
SODIUM SERPL-SCNC: 142 MMOL/L (ref 136–145)

## 2024-11-13 PROCEDURE — 25000003 PHARM REV CODE 250: Performed by: INTERNAL MEDICINE

## 2024-11-13 PROCEDURE — 25000003 PHARM REV CODE 250: Performed by: PODIATRIST

## 2024-11-13 PROCEDURE — 63600175 PHARM REV CODE 636 W HCPCS: Performed by: INTERNAL MEDICINE

## 2024-11-13 PROCEDURE — 80053 COMPREHEN METABOLIC PANEL: CPT | Performed by: INTERNAL MEDICINE

## 2024-11-13 PROCEDURE — 97110 THERAPEUTIC EXERCISES: CPT | Mod: CQ

## 2024-11-13 PROCEDURE — 97116 GAIT TRAINING THERAPY: CPT | Mod: CQ

## 2024-11-13 PROCEDURE — 25000003 PHARM REV CODE 250: Performed by: STUDENT IN AN ORGANIZED HEALTH CARE EDUCATION/TRAINING PROGRAM

## 2024-11-13 PROCEDURE — 99232 SBSQ HOSP IP/OBS MODERATE 35: CPT | Mod: ,,, | Performed by: PODIATRIST

## 2024-11-13 PROCEDURE — 97535 SELF CARE MNGMENT TRAINING: CPT

## 2024-11-13 RX ADMIN — MICONAZOLE NITRATE: 20 POWDER TOPICAL at 10:11

## 2024-11-13 RX ADMIN — APIXABAN 5 MG: 5 TABLET, FILM COATED ORAL at 10:11

## 2024-11-13 RX ADMIN — PIPERACILLIN SODIUM AND TAZOBACTAM SODIUM 4.5 G: 4; .5 INJECTION, POWDER, FOR SOLUTION INTRAVENOUS at 11:11

## 2024-11-13 RX ADMIN — PIPERACILLIN SODIUM AND TAZOBACTAM SODIUM 4.5 G: 4; .5 INJECTION, POWDER, FOR SOLUTION INTRAVENOUS at 05:11

## 2024-11-13 RX ADMIN — COLESTIPOL HYDROCHLORIDE 3 G: 1 TABLET, FILM COATED ORAL at 10:11

## 2024-11-13 RX ADMIN — LACTOBACILLUS ACIDOPHILUS / LACTOBACILLUS BULGARICUS 1 EACH: 100 MILLION CFU STRENGTH GRANULES at 10:11

## 2024-11-13 RX ADMIN — DICLOFENAC SODIUM 2 G: 10 GEL TOPICAL at 10:11

## 2024-11-13 RX ADMIN — LEVOTHYROXINE SODIUM 88 MCG: 0.09 TABLET ORAL at 05:11

## 2024-11-13 NOTE — PROGRESS NOTES
[facility]  Podiatry  Progress Note    Patient Name: Stephany Skinner  MRN: [id number]  Admission Date: 11/2/2024  Hospital Length of Stay: 11 days  Attending Physician: Isabell Waters MD  Primary Care Provider: Pipo Flowers MD     Subjective:     Interval History:  11/05/2024 patient seen at bedside resting comfortably would like to discuss her surgical options for the treatment of her toe.  No new pedal complaints.    11/07/2024 Patient seen at bedside.  Niece and nephew present.  Not complaining of pain to feet but relates he has been having left knee pain from being in bed and getting stiff.     11/11/24: Patient seen sitting in chair. Family present.     11/13/24: Patient seen bedside in chair eating lunch.     Scheduled Meds:   apixaban  5 mg Oral BID    colestipoL  3 g Oral BID    diclofenac sodium  2 g Topical (Top) BID    furosemide  40 mg Oral Daily    hydrALAZINE  25 mg Oral Q12H    isosorbide mononitrate  60 mg Oral Daily    lactobacillus acidophilus & bulgar  1 packet Oral BID    levothyroxine  88 mcg Oral Before breakfast    metoprolol succinate  25 mg Oral Daily    miconazole NITRATE 2 %   Topical (Top) BID    piperacillin-tazobactam (Zosyn) IV (PEDS and ADULTS) (extended infusion is not appropriate)  4.5 g Intravenous Q8H     Continuous Infusions:      PRN Meds:  Current Facility-Administered Medications:     acetaminophen, 650 mg, Oral, Q8H PRN    acetaminophen, 650 mg, Oral, Q4H PRN    albuterol-ipratropium, 3 mL, Nebulization, Q6H PRN    aluminum-magnesium hydroxide-simethicone, 30 mL, Oral, QID PRN    bisacodyL, 10 mg, Rectal, Daily PRN    glucagon (human recombinant), 1 mg, Intramuscular, PRN    glucose, 16 g, Oral, PRN    glucose, 24 g, Oral, PRN    insulin aspart U-100, 0-10 Units, Subcutaneous, QID (AC + HS) PRN    melatonin, 6 mg, Oral, Nightly PRN    naloxone, 0.02 mg, Intravenous, PRN    ondansetron, 4 mg, Intravenous, Q8H PRN    potassium bicarbonate, 35 mEq, Oral, PRN     potassium bicarbonate, 50 mEq, Oral, PRN    potassium bicarbonate, 60 mEq, Oral, PRN    potassium, sodium phosphates, 2 packet, Oral, PRN    potassium, sodium phosphates, 2 packet, Oral, PRN    potassium, sodium phosphates, 2 packet, Oral, PRN    prochlorperazine, 5 mg, Intravenous, Q6H PRN    senna-docusate 8.6-50 mg, 1 tablet, Oral, Daily PRN    simethicone, 1 tablet, Oral, QID PRN    sodium chloride 0.9%, 10 mL, Intravenous, Q12H PRN    Flushing PICC/Midline Protocol, , , Until Discontinued **AND** sodium chloride 0.9%, 10 mL, Intravenous, Q12H PRN    Review of Systems   Constitutional:  Negative for activity change, appetite change, chills, fatigue and fever.   Respiratory:  Negative for cough and shortness of breath.    Cardiovascular:  Negative for chest pain and leg swelling.   Gastrointestinal:  Negative for diarrhea, nausea and vomiting.   Musculoskeletal:  Positive for arthralgias and myalgias.   Skin:  Positive for color change and wound.   Neurological:  Positive for numbness. Negative for weakness.        + paresthesia      Objective:     Vital Signs (Most Recent):  Temp: 98.1 °F (36.7 °C) (11/13/24 1208)  Pulse: (!) 59 (11/13/24 1208)  Resp: 17 (11/13/24 1208)  BP: 136/72 (11/13/24 1208)  SpO2: 98 % (11/13/24 1208) Vital Signs (24h Range):  Temp:  [97.3 °F (36.3 °C)-98.3 °F (36.8 °C)] 98.1 °F (36.7 °C)  Pulse:  [58-69] 59  Resp:  [16-18] 17  SpO2:  [95 %-98 %] 98 %  BP: (106-138)/(55-72) 136/72     Weight: 103.5 kg (228 lb 2.8 oz)  Body mass index is 35.74 kg/m².    Physical Exam  Vitals and nursing note reviewed.   Constitutional:       General: She is not in acute distress.     Appearance: She is well-developed. She is not toxic-appearing or diaphoretic.      Comments: alert and oriented x 3.    Cardiovascular:      Pulses:           Dorsalis pedis pulses are 2+ on the right side and 2+ on the left side.        Posterior tibial pulses are 1+ on the right side and 1+ on the left side.   Pulmonary:       Effort: No respiratory distress.   Musculoskeletal:         General: No deformity.      Right ankle: No tenderness. No lateral malleolus, medial malleolus, AITF ligament, CF ligament or posterior TF ligament tenderness.      Right Achilles Tendon: No defects. Hutchison's test negative.      Left ankle: No tenderness. No lateral malleolus, medial malleolus, AITF ligament, CF ligament or posterior TF ligament tenderness.      Left Achilles Tendon: No defects. Hutchison's test negative.      Right foot: No tenderness or bony tenderness.      Left foot: No tenderness or bony tenderness.      Comments: Muscle strength is 5/5 in all groups bilaterally.    There is equinus deformity bilateral with decreased dorsiflexion at the ankle joint bilateral.    Decreased first MPJ range of motion both weightbearing and nonweightbearing, no crepitus observed the first MP joint, + dorsal flag sign.    Feet:      Right foot:      Skin integrity: Callus and dry skin present.      Toenail Condition: Fungal disease present.     Left foot:      Skin integrity: Ulcer (see below), callus and dry skin present.      Toenail Condition: Fungal disease present.  Lymphadenopathy:      Comments: No lymphatic streaking     Skin:     General: Skin is warm and dry.      Coloration: Skin is not pale.      Findings: No rash.      Nails: There is no clubbing.   Neurological:      Sensory: Sensory deficit present.      Motor: No atrophy.      Comments: L   Psychiatric:         Attention and Perception: She is attentive.         Mood and Affect: Mood is not anxious. Affect is not inappropriate.         Speech: She is communicative. Speech is not slurred.         Behavior: Behavior is not combative.       11/13/24:               11/11/24:              11/07/2024    Incision well coapt to left distal hallux stump.  Collagen and vancomysin powder still visible to site.                 11/05/2024   L distal plantar 1st digit wound moderate serosanguinous  "drainage, necrotic and sloughing base, positive probe to bone, malodor. No fluctuance or crepitus, mild pain on palpation. (Image did not load)    L medial leg wound moderate serosanguinous drainage, fibro granular base, mildly macerated edges. No fluctuance, crepitus, or pain on palpation.      Laboratory:  A1C: No results for input(s): "HGBA1C" in the last 4320 hours.  CBC:   No results for input(s): "WBC", "RBC", "HGB", "HCT", "PLT", "MCV", "MCH", "MCHC" in the last 168 hours.    CMP:   Recent Labs   Lab 11/13/24  0501   GLU 96   CALCIUM 8.3*   ALBUMIN 2.7*   PROT 5.9*      K 3.1*   CO2 26      BUN 13   CREATININE 1.0   ALKPHOS 43   ALT 11   AST 10   BILITOT 0.4     CRP:   Recent Labs   Lab 11/11/24  0448   CRP 15.3*     ESR:   No results for input(s): "SEDRATE" in the last 168 hours.    Microbiology Results (last 7 days)       Procedure Component Value Units Date/Time    Fungus culture [[id number]] Collected: 11/02/24 1430    Order Status: Completed Specimen: Wound from Toe, Left Foot Updated: 11/11/24 1409     Fungus (Mycology) Culture No fungal growth to date    Culture, Anaerobe [[id number]] Collected: 11/05/24 1442    Order Status: Completed Specimen: Wound from Toe, Left Foot Updated: 11/09/24 0605     Anaerobic Culture No anaerobes isolated    Narrative:      Distal left great toe    Culture, Anaerobe [[id number]] Collected: 11/05/24 1442    Order Status: Completed Specimen: Wound from Toe, Left Foot Updated: 11/09/24 0600     Anaerobic Culture No anaerobes isolated    Narrative:      Proximal margin left toe    Aerobic culture [[id number]]  (Abnormal)  (Susceptibility) Collected: 11/05/24 1442    Order Status: Completed Specimen: Wound from Toe, Left Foot Updated: 11/08/24 0652     Aerobic Bacterial Culture PSEUDOMONAS FLUORESCENS/PUTIDA  Few      Narrative:      Proximal margin left toe    Aerobic culture [[id number]]  (Abnormal)  (Susceptibility) Collected: 11/05/24 1442    Order Status: " Completed Specimen: Wound from Toe, Left Foot Updated: 11/08/24 0651     Aerobic Bacterial Culture PSEUDOMONAS FLUORESCENS/PUTIDA  Many        ENTEROCOCCUS FAECALIS  Many      Narrative:      Distal left great toe    AFB Culture & Smear [[id number]] Collected: 11/05/24 1442    Order Status: Completed Specimen: Wound from Toe, Left Foot Updated: 11/07/24 2127     AFB Culture & Smear Culture in progress     AFB CULTURE STAIN No acid fast bacilli seen.    Narrative:      Distal left great toe    Stool culture [[id number]] Collected: 11/05/24 0028    Order Status: Completed Specimen: Stool Updated: 11/07/24 0759     Stool Culture No Salmonella,Shigella,Vibrio,Campylobacter,Yersinia isolated.            Diagnostic Results:    Assessment/Plan:     Active Diagnoses:    Diagnosis Date Noted POA    PRINCIPAL PROBLEM:  Osteomyelitis of great toe of left foot [M86.9] 11/02/2024 Yes    Dermatitis associated with moisture [L30.8] 11/06/2024 Yes    Diarrhea [R19.7] 11/03/2024 Yes    Cellulitis of toe of left foot [L03.032] 11/02/2024 Yes    Stage 3a chronic kidney disease (CKD) [N18.31] 02/28/2024 Yes    Candidiasis, intertrigo [B37.2] 08/31/2023 Yes    Essential hypertension [I10] 12/09/2022 Yes    COPD (chronic obstructive pulmonary disease) [J44.9] 12/09/2022 Yes    Type 2 diabetes mellitus with circulatory disorder, 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    Discussed wound healing cycle, skin integrity, ways to care for skin.Counseled patient on the effects of  biomechanical pressure, edema and blood glucose on healing. They verbalizes understanding that it can increase the chances of delayed healing and this prolonged exposure leads to infection or progression of infection which subsequently can result in loss of limb.    Adequate vitamin supplementation, protein intake, and hydration - discussed with patient      Surgical site stable. DSD applied   From podiatric perspective wounds can now be managed outpatient or at rehab facility.     Abx per ID    Ok for discharge from podiatry standpoint    Short-term goals include maintaining good offloading and minimizing bioburden, promoting granulation and epithelialization to healing.  Long-term goals include keeping the wound healed by good offloading and medical management under the direction of internist.    We will continue to follow and work in partnership with treatment team on how to best treat patient concern and diagnosis.      Irais Trejo DPM  Podiatry  [facility] [place] - Med Surg

## 2024-11-13 NOTE — PLAN OF CARE
11/13/24 1302   Medicare Message   Important Message from Medicare regarding Discharge Appeal Rights Given to patient/caregiver;Explained to patient/caregiver;Other (comments)  (spoke with patient's son via telephone -  Jerrell Magaña [phone].  Explained IMM and he verbalized understanding)   Date IMM was signed 11/13/24   Time IMM was signed 6024

## 2024-11-13 NOTE — NURSING
Report received, care assumed. Resting in bed quietly. AAO, responsive to verbal stimuli. Denies pain/discomfort. Board updated. Plan of care discussed with patient.[facility]  Nurses Note -- 4 Eyes      11/13/2024       Skin assessed on: Q Shift      [x] No Pressure Injuries Present    []Prevention Measures Documented    [] Yes LDA  for Pressure Injury Previously documented     [] Yes New Pressure Injury Discovered   [] LDA for New Pressure Injury Added      Attending RN:  Halima Moreno RN     Second RN:  ROSA ISELA Andujar

## 2024-11-13 NOTE — PT/OT/SLP PROGRESS
Occupational Therapy   Treatment    Name: Stephany Skinner  MRN: [id number]  Admitting Diagnosis:  Osteomyelitis of great toe of left foot  8 Days Post-Op    Recommendations:     Discharge Recommendations: Moderate Intensity Therapy  Discharge Equipment Recommendations:  to be determined by next level of care  Barriers to discharge:  Other (Comment)    Assessment:     Stephany Skinner is a 89 y.o. female with a medical diagnosis of Osteomyelitis of great toe of left foot.  She presents with up in chair and legs elevated. Performance deficits affecting function are weakness, impaired endurance, impaired self care skills, gait instability, impaired functional mobility, impaired balance, impaired cognition, decreased lower extremity function, decreased safety awareness, pain, decreased ROM, impaired coordination, impaired skin, edema, impaired cardiopulmonary response to activity, impaired joint extensibility, orthopedic precautions. Patient says she is transferring to [facility] in [place] today and she will be closer to her family.     Rehab Prognosis:  Good; patient would benefit from acute skilled OT services to address these deficits and reach maximum level of function.       Plan:     Patient to be seen 5 x/week to address the above listed problems via self-care/home management, therapeutic activities, therapeutic exercises  Plan of Care Expires: 11/21/24  Plan of Care Reviewed with: patient    Subjective     Chief Complaint: none   Patient/Family Comments/goals: she wanted to don regular clothes   Pain/Comfort:  Pain Rating 1: 0/10    Objective:     Communicated with: RN  prior to session.  Patient found up in chair with telemetry, peripheral IV, bed alarm, PureWick upon OT entry to room.    General Precautions: Standard, fall, contact    Orthopedic Precautions:N/A  Braces: N/A  Respiratory Status: Room air     Occupational Performance:     Bed Mobility:    NT due to up in chair      Functional  Mobility/Transfers:  Patient completed Sit <> Stand Transfer with minimum assistance  with  rolling walker   Patient completed Bed <> Chair Transfer using Step Transfer technique with minimum assistance with rolling walker  Patient completed Toilet Transfer Step Transfer technique with minimum assistance with  rolling walker  Functional Mobility: Patient had no difficulty walking with RW with CGA to SBA in room from chair to bathroom and then returning to chair. However, she needed some help getting up from low chair and toilet with min to mod assist overall.     Activities of Daily Living:  Upper Body Dressing: minimum assistance to assist with long sleeve due to IV   Lower Body Dressing: contact guard assistance to doff brief while standing; she donned new depends and pad herself   Toileting: contact guard assistance see LB dressing; hygiene-no assistance needed       [facility] 6 Click ADL: 20    Treatment & Education:  Patient has UB therex to bring with her to SNF, also     Patient left up in chair with all lines intact, call button in reach, and RN  notified    GOALS:   Multidisciplinary Problems       Occupational Therapy Goals          Problem: Occupational Therapy    Goal Priority Disciplines Outcome Interventions   Occupational Therapy Goal     OT, PT/OT Progressing    Description: Goals to be met by: 11/25/24     Patient will increase functional independence with ADLs by performing:    UE Dressing with [place].  LE Dressing with Minimal Assistance.with AD as needed  Grooming while seated at sink with Modified [place].  Toileting from bedside commode with Supervision for hygiene and clothing management.   Sitting in chair x60 minutes with Supervision.  Toilet transfer to bedside commode with Supervision.  Upper extremity exercise program x10 reps per handout, with supervision.                         Time Tracking:     OT Date of Treatment: 11/13/24  OT Start Time: 1420  OT Stop Time: 1455  OT Total  Time (min): 35 min    Billable Minutes:Self Care/Home Management 35     OT/GRIS: OT          11/13/2024

## 2024-11-13 NOTE — PLAN OF CARE
Case Management Final Discharge Note      Discharge Disposition: Skilled nursing at [facility]: [phone]    New DME ordered / company name: None    Relevant SDOH / Transition of Care Barriers:  None    Primary Caretaker and contact information: Shiar Montiel (Sister) [phone]; Jerrell Skinner (Son) [phone]     Scheduled followup appointment: Follow up appointments with PCP, Electrocardiology, and Vascular Surgery scheduled and added to patient AVS    Referrals placed: Referrals placed to Infectious Disease, Outpatient Case Management, and Vascular Surgery    Transportation: Wheelchair van requested to transport patient to facility        Patient and family educated on discharge services and updated on DC plan.  Patient to discharge to [facility].  Patient's follow up appointments scheduled and sent to facility via [facility]; facility notified.  Bedside RN notified, patient clear to discharge from Case Management Perspective.    11/13/24 1356   Final Note   Assessment Type Final Discharge Note   Anticipated Discharge Disposition SNF   Hospital Resources/Appts/Education Provided Provided patient/caregiver with written discharge plan information;Appointments scheduled and added to AVS;Post-Acute resouces added to AVS   Post-Acute Status   Post-Acute Authorization Placement   Post-Acute Placement Status Set-up Complete/Auth obtained   Discharge Delays None known at this time

## 2024-11-13 NOTE — NURSING
[facility]  Nurses Note -- 4 Eyes      11/13/2024       Skin assessed on: Discharge      [x] No Pressure Injuries Present    []Prevention Measures Documented    [] Yes LDA  for Pressure Injury Previously documented     [] Yes New Pressure Injury Discovered   [] LDA for New Pressure Injury Added      Attending RN:  Halima Moreno RN     Second RN:  ROSA ISELA Oneil

## 2024-11-13 NOTE — PT/OT/SLP PROGRESS
Physical Therapy Treatment    Patient Name:  Stephany Skinner   MRN:  [id number]    Recommendations:     Discharge Recommendations: Moderate Intensity Therapy  Discharge Equipment Recommendations: bedside commode  Barriers to discharge: None    Assessment:     Stephany Skinner is a 89 y.o. female admitted with a medical diagnosis of Osteomyelitis of great toe of left foot.  She presents with the following impairments/functional limitations: weakness, impaired endurance, impaired functional mobility, impaired self care skills, gait instability, decreased lower extremity function, impaired balance, decreased coordination, decreased upper extremity function, pain, decreased safety awareness, decreased ROM, edema, impaired skin, orthopedic precautions .    Rehab Prognosis: Good; patient would benefit from acute skilled PT services to address these deficits and reach maximum level of function.    Recent Surgery: Procedure(s) (LRB):  AMPUTATION, TOE- DISTAL HALLUX (Left) 8 Days Post-Op    Plan:     During this hospitalization, patient to be seen 5 x/week to address the identified rehab impairments via gait training, therapeutic activities, therapeutic exercises, neuromuscular re-education, wheelchair management/training and progress toward the following goals:    Plan of Care Expires:  11/20/24    Subjective     Chief Complaint: none  Patient/Family Comments/goals: pt is pleasant and agreeable to therapy   Pain/Comfort:  Pain Rating 1: 0/10  Pain Rating Post-Intervention 1: 0/10      Objective:     Communicated with nurse  prior to session.  Patient found HOB elevated with telemetry, peripheral IV, bed alarm, PureWick upon PT entry to room.     General Precautions: Standard, fall, contact  Orthopedic Precautions: (LLE heel WB in post-op boot)   Braces: (B post-op shoes)   Respiratory Status: Room air     Functional Mobility:  Bed Mobility:     Rolling Right: stand by assistance  Scooting: stand by assistance  Supine to Sit:  stand by assistance  Transfers: V/T cues for safety technique, WB precautions and walker management.     Bed to Chair: contact guard assistance with  rolling walker  using  Step Transfer  Gait:  Patient ambulated 20 ft x 2 trials  on level tile with Rolling Walker with  CGA (L heel WB and B post of shoes ) . Pt required seated rest break 2* to fatigue .  Pt with demonstarting a  3-point gait with decreased lluvia, increased time in double stance, decreased velocity of limb motion, decreased step length, decreased swing-to-stance ratio, decreased toe-to-floor clearance and decreased weight-shifting ability.Impairments contributing to gait deviations include impaired balance, decreased flexibility, pain, impaired postural control, decreased ROM and decreased strength. V/T cues to maintain L heel WB ,  for safety technique and walker management.    Balance:  good in sitting, fair in standing     AM-PAC 6 CLICK MOBILITY  Turning over in bed (including adjusting bedclothes, sheets and blankets)?: 4  Sitting down on and standing up from a chair with arms (e.g., wheelchair, bedside commode, etc.): 3  Moving from lying on back to sitting on the side of the bed?: 3  Moving to and from a bed to a chair (including a wheelchair)?: 3  Need to walk in hospital room?: 3  Climbing 3-5 steps with a railing?: 2  Basic Mobility Total Score: 18       Treatment & Education:  Lower Extremity Exercises.    Patient educated on: Purpose and Benefits of Therapeutic Exercise(s).    Patient verbalized acceptance/understanding of instruction(s), expectation(s), and limitation(s) (for safety).  Patient performed: 2 sets of 10 reps (each) of B LE Ther Ex: AP, LAQ, Hip abd/add, Hip flexion while sitting up on EOB.       Patient required verbal cues/tactile cues to ensure correct sequence, to maintain proper form, and to allow for self-correction.  Pt reported no complaints or problems with exercise activity.     Patient required increase Reaction  Time to initiate movements and a Sitting Rest Break between set(s) to recover.      Patient left up in chair with BLE elevated, heels offloading  all lines intact, call button in reach, and nurse notified..    GOALS:   Multidisciplinary Problems       Physical Therapy Goals          Problem: Physical Therapy    Goal Priority Disciplines Outcome Interventions   Physical Therapy Goal     PT, PT/OT Progressing    Description: Goals to be met by: 24     Patient will increase functional independence with mobility by performin. Supine to sit with Mod I  2. Rolling to Left and Right with Mod I  3. Sit to stand transfer with Mod I and adherence to L LE heel WB precautions and RW  4. W/C mobility x100 ft with Mod I using BUE  5. Lower extremity exercise program x20 reps per handout, with independence  6. Gait x50 ft with mod I using RW, B post-op shoes, and L heel WB                         Time Tracking:     PT Received On: 24  PT Start Time: 1030     PT Stop Time: 1056  PT Total Time (min): 26 min     Billable Minutes: Gait Training 13 and Therapeutic Exercise 13    Treatment Type: Treatment  PT/PTA: PTA     Number of PTA visits since last PT visit: 4     2024

## 2024-11-13 NOTE — NURSING
[facility] [place] -- 4 [place]      11/12/2024       Skin assessed on: Q Shift      [x] No Pressure Injuries Present    [x]Prevention Measures Documented    [] Yes LDA  for Pressure Injury Previously documented     [] Yes New Pressure Injury Discovered   [] LDA for New Pressure Injury Added      Attending RN:  Pratima Platt RN     Second RN:  ROSA ISELA Streeter

## 2024-11-13 NOTE — NURSING
Patient discharged to SNF at this time. Left unit safely via wheelchair accompanied by SPD personnel.

## 2024-11-13 NOTE — PLAN OF CARE
[facility]    FACILITY TRANSFER ORDERS      Patient Name: Stephany Skinner  YOB: 1935    PCP: Pipo Flowers MD   PCP Address: [facility] [address]  / [place] [address]  PCP Phone Number: [phone]  PCP Fax: [phone]    Encounter Date: 11/13/2024     Admit to: skilled nursing    Diagnoses:   Active Hospital Problems    Diagnosis  POA    *Osteomyelitis of great toe of left foot [M86.9]  Yes    Dermatitis associated with moisture [L30.8]  Yes    Diarrhea [R19.7]  Yes    Cellulitis of toe of left foot [L03.032]  Yes    Stage 3a chronic kidney disease (CKD) [N18.31]  Yes     Avoid NSAIDS  Good BP control  Monitor renal function closely      Candidiasis, intertrigo [B37.2]  Yes    Essential hypertension [I10]  Yes     Two gram sodium diet.    Weight loss discussed.    Try to walk 2 miles per day.    Current medications will be:  Lasix 40 mg daily   Potassium 20 mEq daily   Imdur 60 mg daily  Metoprolol 25 mg daily   Spironolactone 25 mg daily        COPD (chronic obstructive pulmonary disease) [J44.9]  Yes     Continue DuoNeb treatments twice daily as needed.    Her COPD is stable.         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  Her last A1c was 5.8.   Check hemoglobin A1c every 12 months.      Chronic diastolic congestive heart failure [I50.32]  Yes     Two gram sodium diet.    Weight loss discussed.    Try to walk 2 miles per day.    Avoid smoking.    Current medications will be:  Lower Valsartan 320 mg to 160 mg daily   Lasix 40 mg daily   Lower Metoprolol 100 mg to 50 mg daily   Potassium 20 mEq daily      Debility [R53.81]  Yes    PUJA (acute kidney injury) [N17.9]  Yes    Myasthenia gravis, adult form [G70.00]  Yes     No longer on prednisone, Mestinon  Keep appointment with neurology      Chronic atrial fibrillation [I48.20]  Yes     Continue Eliquis 5 mg  daily  Keep appointment with Cardiology      Hypothyroidism [E03.9]  Yes     Patient takes Synthroid 88 mcg for her hypothyroidism.  Adjust Synthroid based on TSH resutls.  Check TSH every 6 months.      Hyperlipidemia [E78.5]  Yes     Continue fish oil  Continue Lipitor 20 mg        Resolved Hospital Problems   No resolved problems to display.     Allergies:   Review of patient's allergies indicates:   Allergen Reactions    Cephalosporins Hives     Itchiness and hives immediately after IV Ceftriaxone push    Statins-hmg-coa reductase inhibitors      Other reaction(s): Unknown       Code Status: full code    Vitals: Every shift       Diet: cardiac diet and diabetic diet: 2000 calorie    Activity: Weight bearing status: partial weight bearing: left leg - heel weight bearing with Darco shoe    Nursing Precautions: Aspiration , Fall, and Pressure ulcer prevention    Consults: PT to evaluate and treat- 5 times a week, OT to evaluate and treat- 5 times a week, Wound Care, and Nutrition to evaluate and recommend diet    Oxygen: room air    Labs:    Weekly CBC, CMP, CRP on Monday while on IV antibiotics to monitor for toxicity. Please fax results to [facility] ID clinic [phone].     Pending Diagnostic Studies:       None            Miscellaneous Care:     IV therapy:  Zosyn 4.5 gm IV Q8 hours   End date: 12/17/2024    PICC Line Care: PICC Line Use/Care Instructions:  Scrub the Hub: Prior to accessing the line, always perform a 30 second alcohol scrub  Each lumen of the central line is to be flushed at least daily with 10 mL Normal Saline and 3 mL Heparin flush (10 units/mL)  Skilled Nurse (SN) may draw blood from IV access  Blood Draw Procedure:  - Aspirate at least 5 mL of blood  - Discard  - Obtain specimen  - Change injection cap  - Flush with 20 mL Normal Saline followed by a 3-5 mL Heparin flush (10 units/mL)    Central :  - Sterile dressing changes are done weekly and as needed.  - Use chlor-hexadine  scrub to cleanse site, apply Biopatch to insertion site, apply securement device dressing  - Injection caps are changed weekly and after EVERY lab draw.  - If sterile gauze is under dressing to control oozing, dressing change must be performed every 24 hours until gauze is not needed.    MISCELLANEOUS CARE:  Routine Skin for Bedridden Patients: Apply moisture barrier cream to all skin folds and wet areas in perineal area daily and after baths and all bowel movements. and Diabetes Care:   SN to perform and educate Diabetic management with blood glucose monitoring:, Fingerstick blood sugar AC and HS, and Report CBG < 60 or > 350 to physician.    WOUND CARE ORDERS    Right 1st digit: paint with betadine and leave open to air daily    Left 1st digit amputation site: TBD by podiatry    L anteromedial leg: every 3 days; dress with Aquacel AG then  kerlix; no compression     Left buttock: every shift every shift and prn cleansing- Cleanse with Remedy No Rinse Foaming Cleanser and apply Remedy Moisture Barrier (blue top tube)     IV Access: PICC     Medications: Discontinue all previous medication orders, if any. See new list below.  Current Discharge Medication List        START taking these medications    Details   D5W PgBk 100 mL with piperacillin-tazobactam 4.5 gram SolR 4.5 g Inject 4.5 g into the vein every 8 (eight) hours. Ending on 12/17/2024.      diclofenac sodium (VOLTAREN) 1 % Gel Apply 2 g topically 2 (two) times daily. Apply to bilateral knees      insulin lispro (HUMALOG U-100 INSULIN) 100 unit/mL injection Inject 0-5 Units into the skin every meal as needed for High Blood Sugar. Blood Glucose  mg/dL                  Pre-meal       151-200                1 units       201-250                2 units       251-300                3 units      301-350                4 units            >350                     5 units          Administer subcutaneously if needed at times designated by monitoring  schedule.       lactobacillus acidophilus & bulgar (LACTINEX) 100 million cell packet Take 1 packet (1 each total) by mouth 2 (two) times daily. May discontinue on 1/17/2025      miconazole NITRATE 2 % (MICOTIN) 2 % top powder Apply topically 2 (two) times daily. To groin           CONTINUE these medications which have CHANGED    Details   acetaminophen (TYLENOL) 325 MG tablet Take 2 tablets (650 mg total) by mouth every 8 (eight) hours as needed for Temperature greater than or Pain (or equal to 100 degree F. Not to exceed 4 grams daily).      albuterol (PROVENTIL/VENTOLIN HFA) 90 mcg/actuation inhaler Inhale 2 puffs into the lungs every 4 (four) hours as needed for Wheezing or Shortness of Breath. Rescue      alendronate (FOSAMAX) 70 MG tablet Take 1 tablet (70 mg total) by mouth every 7 days. Resume on discharge from skilled nursing.    Associated Diagnoses: Osteopenia, unspecified location      loperamide (IMODIUM A-D) 2 mg Tab Take 1 tablet (2 mg total) by mouth 3 (three) times daily as needed (diarrhea).      vitamins A,C,E-zinc-copper (PRESERVISION AREDS) 4,296 mcg-226 mg-90 mg Cap Take 1 capsule by mouth Daily. Resume once discharged from skilled nursing.           CONTINUE these medications which have NOT CHANGED    Details   atorvastatin (LIPITOR) 20 MG tablet Take 20 mg by mouth every evening.      beta-carotene,A,-vits C,E/mins (VISION ORAL) Take 1 tablet by mouth 2 (two) times daily.      calcium carbonate-vit D3-min 600 mg calcium- 400 unit Tab Take 2 tablets by mouth once daily.       colestipoL (COLESTID) 1 gram Tab Take 3 tablets (3 g total) by mouth 2 (two) times daily. For cholesterol and diarrhea  Qty: 120 tablet, Refills: 5    Associated Diagnoses: Chronic diarrhea      cyanocobalamin (VITAMIN B-12) 1000 MCG tablet Take 100 mcg by mouth once daily.      ELIQUIS 5 mg Tab Take 5 mg by mouth 2 (two) times daily.  Refills: 1      furosemide (LASIX) 40 MG tablet Take 1 tablet (40 mg total) by mouth once  daily.  Qty: 30 tablet, Refills: 5    Associated Diagnoses: Chronic diastolic congestive heart failure; Essential hypertension      isosorbide mononitrate (IMDUR) 60 MG 24 hr tablet Take 60 mg by mouth once daily.      levothyroxine (SYNTHROID) 88 MCG tablet Take 1 tablet (88 mcg total) by mouth before breakfast.  Qty: 90 tablet, Refills: 1    Associated Diagnoses: Hypothyroidism due to acquired atrophy of thyroid      losartan (COZAAR) 25 MG tablet Take 25 mg by mouth 2 (two) times a day.      metoprolol succinate (TOPROL-XL) 25 MG 24 hr tablet Take 25 mg by mouth once daily.      nitroGLYCERIN (NITROSTAT) 0.4 MG SL tablet Place 0.4 mg under the tongue every 5 (five) minutes as needed for Chest pain.      potassium chloride SA (K-DUR,KLOR-CON) 20 MEQ tablet Take 1 tablet (20 mEq total) by mouth once daily.  Qty: 30 tablet, Refills: 5    Associated Diagnoses: Chronic diastolic congestive heart failure; Essential hypertension      spironolactone (ALDACTONE) 25 MG tablet Take 25 mg by mouth every morning.           STOP taking these medications       nystatin (MYCOSTATIN) powder Comments:   Reason for Stopping:         nystatin-triamcinolone (MYCOLOG II) cream Comments:   Reason for Stopping:             Follow up:    Follow-up Information       Pipo Flowers MD. Go on 11/27/2024.    Specialty: Family Medicine  Why: Appointment scheduled for 8:45am  Contact information:  [address] [place], [place] [address] [address]  [phone]               Hood Williamson MD. Go on 12/3/2024.    Specialties: Vascular Surgery, Cardiothoracic Surgery  Why: Appointments scheduled for 1:30pm and 2:45pm  Contact information:  [address] [facility] [address]  [place] [address]  [phone]                               Immunizations Administered as of 11/13/2024       Name Date Dose VIS Date Route Exp Date    COVID-19, MRNA, LN-S, PF (Pfizer) (Purple Cap) 3/11/2021  9:40 AM 0.3 mL 12/12/2020 Intramuscular 6/30/2021    Site: Left  deltoid     Given By: Skylar Spears RN     : [employer]     Lot: [id number]     Comment: given by uma page rn     COVID-19, MRNA, LN-S, PF (Pfizer) (Purple Cap) 2/18/2021  9:06 AM 0.3 mL 12/12/2020 Intramuscular 5/31/2021    Site: Left deltoid     Given By: Skylar Spears RN     : [employer]     Lot: [id number]                       _________________________________  Isabell Waters MD  11/13/2024

## 2024-11-18 ENCOUNTER — OUTPATIENT CASE MANAGEMENT (OUTPATIENT)
Dept: ADMINISTRATIVE | Facility: OTHER | Age: 89
End: 2024-11-18
Payer: MEDICARE

## 2024-11-18 NOTE — PROGRESS NOTES
Outpatient Care Management  Patient Not Qualified    Patient: Stephany Skinner  MRN:  [id number]  Date of Service:  11/18/2024  Completed by:  Mai Wong RN    Chief Complaint   Patient presents with    OPCM Chart Review    OPCM Enrollment Call    Case Closure       Patient Summary           Reason Not Qualified:  Followed by SNF

## 2024-11-25 ENCOUNTER — TELEPHONE (OUTPATIENT)
Dept: ADMINISTRATIVE | Facility: CLINIC | Age: 89
End: 2024-11-25
Payer: MEDICARE

## 2024-11-27 ENCOUNTER — TELEPHONE (OUTPATIENT)
Dept: INFECTIOUS DISEASES | Facility: CLINIC | Age: 89
End: 2024-11-27
Payer: MEDICARE

## 2024-11-27 NOTE — TELEPHONE ENCOUNTER
Put a recall in until  books open, I let Lisset know from [facility].      ----- Message from ROSA ISELA Cyr sent at 11/27/2024 10:21 AM CST -----  Regarding: appt  Good morning,   I am following the above patient at [facility]. The hospital sent a referral for f/u appt and she saw Dr. Cr inpatient. Please call [facility] at [phone] and speak to Lisset to schedule.    Thanks,  Klarissa Alvarado RN

## 2024-12-02 ENCOUNTER — TELEPHONE (OUTPATIENT)
Dept: VASCULAR SURGERY | Facility: CLINIC | Age: 89
End: 2024-12-02
Payer: MEDICARE

## 2024-12-03 ENCOUNTER — TELEPHONE (OUTPATIENT)
Dept: ADMINISTRATIVE | Facility: CLINIC | Age: 89
End: 2024-12-03
Payer: MEDICARE

## 2024-12-03 ENCOUNTER — OFFICE VISIT (OUTPATIENT)
Dept: VASCULAR SURGERY | Facility: CLINIC | Age: 89
End: 2024-12-03
Payer: MEDICARE

## 2024-12-03 ENCOUNTER — HOSPITAL ENCOUNTER (OUTPATIENT)
Dept: CARDIOLOGY | Facility: HOSPITAL | Age: 89
Discharge: HOME | End: 2024-12-03
Attending: SURGERY
Payer: MEDICARE

## 2024-12-03 ENCOUNTER — HOSPITAL ENCOUNTER (INPATIENT)
Facility: HOSPITAL | Age: 89
LOS: 3 days | Discharge: NURSING, REHAB OR LONG-TERM CARE | DRG: 300 | End: 2024-12-06
Attending: EMERGENCY MEDICINE | Admitting: HOSPITALIST
Payer: MEDICARE

## 2024-12-03 VITALS
HEART RATE: 90 BPM | DIASTOLIC BLOOD PRESSURE: 78 MMHG | HEIGHT: 67 IN | BODY MASS INDEX: 35.82 KG/M2 | SYSTOLIC BLOOD PRESSURE: 138 MMHG | WEIGHT: 228.19 LBS

## 2024-12-03 DIAGNOSIS — M86.9 OSTEOMYELITIS OF GREAT TOE OF LEFT FOOT: ICD-10-CM

## 2024-12-03 DIAGNOSIS — S98.112A AMPUTATION OF LEFT GREAT TOE: ICD-10-CM

## 2024-12-03 DIAGNOSIS — E78.2 MIXED HYPERLIPIDEMIA: ICD-10-CM

## 2024-12-03 DIAGNOSIS — R07.9 CHEST PAIN: ICD-10-CM

## 2024-12-03 DIAGNOSIS — L03.115 CELLULITIS OF RIGHT LOWER EXTREMITY: ICD-10-CM

## 2024-12-03 DIAGNOSIS — I87.8 VENOUS STASIS OF LOWER EXTREMITY: ICD-10-CM

## 2024-12-03 DIAGNOSIS — I89.0 LYMPHEDEMA: Primary | ICD-10-CM

## 2024-12-03 DIAGNOSIS — I87.2 CHRONIC VENOUS INSUFFICIENCY: Primary | ICD-10-CM

## 2024-12-03 DIAGNOSIS — I10 ESSENTIAL HYPERTENSION: ICD-10-CM

## 2024-12-03 DIAGNOSIS — R60.0 BILATERAL LOWER EXTREMITY EDEMA: ICD-10-CM

## 2024-12-03 DIAGNOSIS — R60.1 GENERALIZED EDEMA: ICD-10-CM

## 2024-12-03 LAB
ALBUMIN SERPL BCP-MCNC: 2.8 G/DL (ref 3.5–5.2)
ALP SERPL-CCNC: 57 U/L (ref 40–150)
ALT SERPL W/O P-5'-P-CCNC: 18 U/L (ref 10–44)
ANION GAP SERPL CALC-SCNC: 11 MMOL/L (ref 8–16)
AST SERPL-CCNC: 30 U/L (ref 10–40)
BASOPHILS # BLD AUTO: 0.04 K/UL (ref 0–0.2)
BASOPHILS NFR BLD: 0.9 % (ref 0–1.9)
BILIRUB SERPL-MCNC: 0.4 MG/DL (ref 0.1–1)
BUN SERPL-MCNC: 18 MG/DL (ref 8–23)
CALCIUM SERPL-MCNC: 8.5 MG/DL (ref 8.7–10.5)
CHLORIDE SERPL-SCNC: 108 MMOL/L (ref 95–110)
CO2 SERPL-SCNC: 23 MMOL/L (ref 23–29)
CREAT SERPL-MCNC: 1.1 MG/DL (ref 0.5–1.4)
DIFFERENTIAL METHOD BLD: ABNORMAL
EOSINOPHIL # BLD AUTO: 0.3 K/UL (ref 0–0.5)
EOSINOPHIL NFR BLD: 6.3 % (ref 0–8)
ERYTHROCYTE [DISTWIDTH] IN BLOOD BY AUTOMATED COUNT: 13.3 % (ref 11.5–14.5)
EST. GFR  (NO RACE VARIABLE): 48 ML/MIN/1.73 M^2
GLUCOSE SERPL-MCNC: 86 MG/DL (ref 70–110)
HCT VFR BLD AUTO: 34.3 % (ref 37–48.5)
HGB BLD-MCNC: 10.7 G/DL (ref 12–16)
IMM GRANULOCYTES # BLD AUTO: 0.01 K/UL (ref 0–0.04)
IMM GRANULOCYTES NFR BLD AUTO: 0.2 % (ref 0–0.5)
LYMPHOCYTES # BLD AUTO: 0.7 K/UL (ref 1–4.8)
LYMPHOCYTES NFR BLD: 15.1 % (ref 18–48)
MCH RBC QN AUTO: 31.4 PG (ref 27–31)
MCHC RBC AUTO-ENTMCNC: 31.2 G/DL (ref 32–36)
MCV RBC AUTO: 101 FL (ref 82–98)
MONOCYTES # BLD AUTO: 0.7 K/UL (ref 0.3–1)
MONOCYTES NFR BLD: 16.3 % (ref 4–15)
NEUTROPHILS # BLD AUTO: 2.7 K/UL (ref 1.8–7.7)
NEUTROPHILS NFR BLD: 61.2 % (ref 38–73)
NRBC BLD-RTO: 0 /100 WBC
PLATELET # BLD AUTO: 228 K/UL (ref 150–450)
PMV BLD AUTO: 8.8 FL (ref 9.2–12.9)
POTASSIUM SERPL-SCNC: 3.2 MMOL/L (ref 3.5–5.1)
PROT SERPL-MCNC: 6.8 G/DL (ref 6–8.4)
RBC # BLD AUTO: 3.41 M/UL (ref 4–5.4)
SODIUM SERPL-SCNC: 142 MMOL/L (ref 136–145)
WBC # BLD AUTO: 4.43 K/UL (ref 3.9–12.7)

## 2024-12-03 PROCEDURE — 11000001 HC ACUTE MED/SURG PRIVATE ROOM

## 2024-12-03 PROCEDURE — 99999 PR PBB SHADOW E&M-EST. PATIENT-LVL IV: CPT | Mod: PBBFAC,,, | Performed by: SURGERY

## 2024-12-03 PROCEDURE — 85025 COMPLETE CBC W/AUTO DIFF WBC: CPT

## 2024-12-03 PROCEDURE — 99285 EMERGENCY DEPT VISIT HI MDM: CPT | Mod: 25,27

## 2024-12-03 PROCEDURE — 80053 COMPREHEN METABOLIC PANEL: CPT

## 2024-12-03 PROCEDURE — 93970 EXTREMITY STUDY: CPT | Mod: TC

## 2024-12-03 PROCEDURE — 87040 BLOOD CULTURE FOR BACTERIA: CPT

## 2024-12-03 PROCEDURE — 99215 OFFICE O/P EST HI 40 MIN: CPT | Mod: S$PBB,,, | Performed by: SURGERY

## 2024-12-03 PROCEDURE — 25000003 PHARM REV CODE 250: Performed by: STUDENT IN AN ORGANIZED HEALTH CARE EDUCATION/TRAINING PROGRAM

## 2024-12-03 PROCEDURE — 99214 OFFICE O/P EST MOD 30 MIN: CPT | Mod: PBBFAC,25 | Performed by: SURGERY

## 2024-12-03 PROCEDURE — 63600175 PHARM REV CODE 636 W HCPCS: Performed by: STUDENT IN AN ORGANIZED HEALTH CARE EDUCATION/TRAINING PROGRAM

## 2024-12-03 PROCEDURE — 63600175 PHARM REV CODE 636 W HCPCS: Mod: JZ,JG | Performed by: EMERGENCY MEDICINE

## 2024-12-03 RX ORDER — POTASSIUM CHLORIDE 20 MEQ/1
20 TABLET, EXTENDED RELEASE ORAL DAILY
Status: DISCONTINUED | OUTPATIENT
Start: 2024-12-04 | End: 2024-12-06 | Stop reason: HOSPADM

## 2024-12-03 RX ORDER — LEVOTHYROXINE SODIUM 88 UG/1
88 TABLET ORAL
Status: DISCONTINUED | OUTPATIENT
Start: 2024-12-04 | End: 2024-12-06 | Stop reason: HOSPADM

## 2024-12-03 RX ORDER — TALC
6 POWDER (GRAM) TOPICAL NIGHTLY PRN
Status: DISCONTINUED | OUTPATIENT
Start: 2024-12-03 | End: 2024-12-06 | Stop reason: HOSPADM

## 2024-12-03 RX ORDER — FUROSEMIDE 40 MG/1
40 TABLET ORAL DAILY
Status: DISCONTINUED | OUTPATIENT
Start: 2024-12-04 | End: 2024-12-06 | Stop reason: HOSPADM

## 2024-12-03 RX ORDER — CLINDAMYCIN PHOSPHATE 600 MG/50ML
600 INJECTION, SOLUTION INTRAVENOUS
Status: COMPLETED | OUTPATIENT
Start: 2024-12-03 | End: 2024-12-03

## 2024-12-03 RX ORDER — COLESTIPOL HYDROCHLORIDE 1 G/1
3 TABLET ORAL 2 TIMES DAILY
Status: DISCONTINUED | OUTPATIENT
Start: 2024-12-03 | End: 2024-12-06 | Stop reason: HOSPADM

## 2024-12-03 RX ORDER — IBUPROFEN 200 MG
24 TABLET ORAL
Status: DISCONTINUED | OUTPATIENT
Start: 2024-12-03 | End: 2024-12-06 | Stop reason: HOSPADM

## 2024-12-03 RX ORDER — SIMETHICONE 80 MG
1 TABLET,CHEWABLE ORAL 4 TIMES DAILY PRN
Status: DISCONTINUED | OUTPATIENT
Start: 2024-12-03 | End: 2024-12-06 | Stop reason: HOSPADM

## 2024-12-03 RX ORDER — POLYETHYLENE GLYCOL 3350 17 G/17G
17 POWDER, FOR SOLUTION ORAL DAILY PRN
Status: DISCONTINUED | OUTPATIENT
Start: 2024-12-03 | End: 2024-12-06 | Stop reason: HOSPADM

## 2024-12-03 RX ORDER — CLINDAMYCIN PHOSPHATE 150 MG/ML
600 INJECTION, SOLUTION INTRAVENOUS
Status: DISCONTINUED | OUTPATIENT
Start: 2024-12-03 | End: 2024-12-03

## 2024-12-03 RX ORDER — PROCHLORPERAZINE EDISYLATE 5 MG/ML
5 INJECTION INTRAMUSCULAR; INTRAVENOUS EVERY 6 HOURS PRN
Status: DISCONTINUED | OUTPATIENT
Start: 2024-12-03 | End: 2024-12-06 | Stop reason: HOSPADM

## 2024-12-03 RX ORDER — ALUMINUM HYDROXIDE, MAGNESIUM HYDROXIDE, AND SIMETHICONE 1200; 120; 1200 MG/30ML; MG/30ML; MG/30ML
30 SUSPENSION ORAL 4 TIMES DAILY PRN
Status: DISCONTINUED | OUTPATIENT
Start: 2024-12-03 | End: 2024-12-06 | Stop reason: HOSPADM

## 2024-12-03 RX ORDER — SODIUM CHLORIDE 0.9 % (FLUSH) 0.9 %
3 SYRINGE (ML) INJECTION EVERY 12 HOURS PRN
Status: DISCONTINUED | OUTPATIENT
Start: 2024-12-03 | End: 2024-12-06 | Stop reason: HOSPADM

## 2024-12-03 RX ORDER — ACETAMINOPHEN 325 MG/1
650 TABLET ORAL EVERY 4 HOURS PRN
Status: DISCONTINUED | OUTPATIENT
Start: 2024-12-03 | End: 2024-12-06 | Stop reason: HOSPADM

## 2024-12-03 RX ORDER — POTASSIUM CHLORIDE 20 MEQ/1
40 TABLET, EXTENDED RELEASE ORAL ONCE
Status: COMPLETED | OUTPATIENT
Start: 2024-12-03 | End: 2024-12-03

## 2024-12-03 RX ORDER — METOPROLOL SUCCINATE 25 MG/1
25 TABLET, EXTENDED RELEASE ORAL DAILY
Status: DISCONTINUED | OUTPATIENT
Start: 2024-12-04 | End: 2024-12-06 | Stop reason: HOSPADM

## 2024-12-03 RX ORDER — ACETAMINOPHEN 325 MG/1
650 TABLET ORAL EVERY 8 HOURS PRN
Status: DISCONTINUED | OUTPATIENT
Start: 2024-12-03 | End: 2024-12-06 | Stop reason: HOSPADM

## 2024-12-03 RX ORDER — IBUPROFEN 200 MG
16 TABLET ORAL
Status: DISCONTINUED | OUTPATIENT
Start: 2024-12-03 | End: 2024-12-06 | Stop reason: HOSPADM

## 2024-12-03 RX ORDER — GLUCAGON 1 MG
1 KIT INJECTION
Status: DISCONTINUED | OUTPATIENT
Start: 2024-12-03 | End: 2024-12-06 | Stop reason: HOSPADM

## 2024-12-03 RX ORDER — ONDANSETRON HYDROCHLORIDE 2 MG/ML
4 INJECTION, SOLUTION INTRAVENOUS EVERY 8 HOURS PRN
Status: DISCONTINUED | OUTPATIENT
Start: 2024-12-03 | End: 2024-12-06 | Stop reason: HOSPADM

## 2024-12-03 RX ADMIN — POTASSIUM CHLORIDE 40 MEQ: 1500 TABLET, EXTENDED RELEASE ORAL at 09:12

## 2024-12-03 RX ADMIN — PIPERACILLIN SODIUM AND TAZOBACTAM SODIUM 4.5 G: 4; .5 INJECTION, POWDER, FOR SOLUTION INTRAVENOUS at 10:12

## 2024-12-03 RX ADMIN — CLINDAMYCIN PHOSPHATE 600 MG: 600 INJECTION, SOLUTION INTRAVENOUS at 06:12

## 2024-12-03 RX ADMIN — APIXABAN 5 MG: 5 TABLET, FILM COATED ORAL at 10:12

## 2024-12-03 RX ADMIN — COLESTIPOL HYDROCHLORIDE 3 G: 1 TABLET, FILM COATED ORAL at 10:12

## 2024-12-03 NOTE — ED NOTES
Patient presents to ED reports redness with edema/ weeping to RLE. Patient states recently had 1/2 of left great toe removed due to infection. Patient states has been in a skilled nursing facility, is currently receiving IV antibiotics at the facility. Patient states that swelling started in foot, and after having foot elevated all day swelling moved up leg.

## 2024-12-03 NOTE — PROGRESS NOTES
[facility]     12/03/2024    PATIENT ID: Stephany Skinner is a 89 y.o. female.    I. HISTORY     Chief Complaint:  Leg swelling    HPI: Stephany Skinner is a 89 y.o. female who is here today for evaluation of leg swelling.      89-year-old female with a history of multiple comorbidities including CHF and neuropathy presenting with left great toe ulcer found to have osteomyelitis.  Patient reports that she had gone to wound care and they cut her left great toe this is at time it has gotten worse with the past week or so.  She reports that she was unable to feel due to her decreased sensation/neuropathy in her feet.  She also reports developing a wound on the medial aspect of her left leg above her ankle.  She reports chronic swelling in her lower extremities.     History of AFib on NOAC.       Past Medical History:   Diagnosis Date    Anemia     Arthritis     Atrial fibrillation     Back pain     Bronchiectasis, uncomplicated     CHF (congestive heart failure)     Disorder of kidney and ureter     Early stage nonexudative age-related macular degeneration of both eyes 2018    GERD (gastroesophageal reflux disease)     Hyperlipidemia     Hypertension     Hypothyroidism     Obesity     JOY (obstructive sleep apnea)     Osteoporosis     Polyneuropathy     Pressure injury of dorsum of right foot, stage 2 12/01/2017    Rheumatoid arthritis     Urinary incontinence     Venous stasis ulcer of right calf limited to breakdown of skin with varicose veins 06/24/2024        Past Surgical History:   Procedure Laterality Date    CATARACT EXTRACTION W/  INTRAOCULAR LENS IMPLANT Right 12/12/2019    Procedure: EXTRACTION, CATARACT, WITH IOL INSERTION;  Surgeon: Michael Arellano MD;  Location: [facility];  Service: Ophthalmology;  Laterality: Right;    CATARACT EXTRACTION W/  INTRAOCULAR LENS IMPLANT Left 02/13/2020    Procedure: EXTRACTION, CATARACT, WITH IOL INSERTION;  Surgeon: Michael Arellano MD;  Location:  [facility];  Service: Ophthalmology;  Laterality: Left;    CHOLECYSTECTOMY      ENDOSCOPIC ULTRASOUND OF UPPER GASTROINTESTINAL TRACT N/A 11/04/2020    Procedure: ULTRASOUND, ENDOSCOPIC, UPPER GI TRACT;  Surgeon: Thierry Saunders MD;  Location: [facility];  Service: Endoscopy;  Laterality: N/A;  covid 11/1 [facility]    HERNIA REPAIR      HYSTERECTOMY      knee replacemene Right 02/14/2007    deidra    PHACOEMULSIFICATION OF CATARACT Right 12/12/2019    Procedure: PHACOEMULSIFICATION, CATARACT;  Surgeon: Michael Arellano MD;  [facility]: [facility];  Service: Ophthalmology;  Laterality: Right;    PHACOEMULSIFICATION OF CATARACT Left 02/13/2020    Procedure: PHACOEMULSIFICATION, CATARACT;  Surgeon: Michael Arellano MD;  Location: [facility];  Service: Ophthalmology;  Laterality: Left;    REPLACEMENT Left 09/18/2007    knee    THYROIDECTOMY      TOE AMPUTATION Left 11/5/2024    Procedure: AMPUTATION, TOE- DISTAL HALLUX;  Surgeon: Noemi Ley DPM;  Location: [facility] OR;  Service: Podiatry;  Laterality: Left;       Social History     Occupational History    Not on file   Tobacco Use    Smoking status: Never     Passive exposure: Never    Smokeless tobacco: Never   Substance and Sexual Activity    Alcohol use: Never    Drug use: Never    Sexual activity: Never         Current Outpatient Medications:     acetaminophen (TYLENOL) 325 MG tablet, Take 2 tablets (650 mg total) by mouth every 8 (eight) hours as needed for Temperature greater than or Pain (or equal to 100 degree F. Not to exceed 4 grams daily)., Disp: , Rfl:     albuterol (PROVENTIL/VENTOLIN HFA) 90 mcg/actuation inhaler, Inhale 2 puffs into the lungs every 4 (four) hours as needed for Wheezing or Shortness of Breath. Rescue, Disp: , Rfl:     alendronate (FOSAMAX) 70 MG tablet, Take 1 tablet (70 mg total) by mouth every 7 days. Resume on discharge from skilled nursing., Disp: , Rfl:     atorvastatin (LIPITOR) 20 MG tablet, Take 20 mg by mouth every evening., Disp: , Rfl:      beta-carotene,A,-vits C,E/mins (VISION ORAL), Take 1 tablet by mouth 2 (two) times daily., Disp: , Rfl:     calcium carbonate-vit D3-min 600 mg calcium- 400 unit Tab, Take 2 tablets by mouth once daily. , Disp: , Rfl:     colestipoL (COLESTID) 1 gram Tab, Take 3 tablets (3 g total) by mouth 2 (two) times daily. For cholesterol and diarrhea, Disp: 120 tablet, Rfl: 5    cyanocobalamin (VITAMIN B-12) 1000 MCG tablet, Take 100 mcg by mouth once daily., Disp: , Rfl:     D5W PgBk 100 mL with piperacillin-tazobactam 4.5 gram SolR 4.5 g, Inject 4.5 g into the vein every 8 (eight) hours. Ending on 12/17/2024., Disp: , Rfl:     diclofenac sodium (VOLTAREN) 1 % Gel, Apply 2 g topically 2 (two) times daily. Apply to bilateral knees, Disp: , Rfl:     ELIQUIS 5 mg Tab, Take 5 mg by mouth 2 (two) times daily., Disp: , Rfl: 1    furosemide (LASIX) 40 MG tablet, Take 1 tablet (40 mg total) by mouth once daily., Disp: 30 tablet, Rfl: 5    insulin lispro (HUMALOG U-100 INSULIN) 100 unit/mL injection, Inject 0-5 Units into the skin every meal as needed for High Blood Sugar. Blood Glucose mg/dL                  Pre-meal      151-200                1 units      201-250                2 units      251-300                3 units     301-350                4 units           >350                     5 units         Administer subcutaneously if needed at times designated by monitoring schedule., Disp: , Rfl:     isosorbide mononitrate (IMDUR) 60 MG 24 hr tablet, Take 60 mg by mouth once daily., Disp: , Rfl:     lactobacillus acidophilus & bulgar (LACTINEX) 100 million cell packet, Take 1 packet (1 each total) by mouth 2 (two) times daily. May discontinue on 1/17/2025, Disp: , Rfl:     levothyroxine (SYNTHROID) 88 MCG tablet, Take 1 tablet (88 mcg total) by mouth before breakfast., Disp: 90 tablet, Rfl: 1    loperamide (IMODIUM A-D) 2 mg Tab, Take 1 tablet (2 mg total) by mouth 3 (three) times daily as needed (diarrhea)., Disp: , Rfl:      losartan (COZAAR) 25 MG tablet, Take 25 mg by mouth 2 (two) times a day., Disp: , Rfl:     metoprolol succinate (TOPROL-XL) 25 MG 24 hr tablet, Take 25 mg by mouth once daily., Disp: , Rfl:     miconazole NITRATE 2 % (MICOTIN) 2 % top powder, Apply topically 2 (two) times daily. To groin, Disp: , Rfl:     nitroGLYCERIN (NITROSTAT) 0.4 MG SL tablet, Place 0.4 mg under the tongue every 5 (five) minutes as needed for Chest pain., Disp: , Rfl:     potassium chloride SA (K-DUR,KLOR-CON) 20 MEQ tablet, Take 1 tablet (20 mEq total) by mouth once daily., Disp: 30 tablet, Rfl: 5    spironolactone (ALDACTONE) 25 MG tablet, Take 25 mg by mouth every morning., Disp: , Rfl:     vitamins A,C,E-zinc-copper (PRESERVISION AREDS) 4,296 mcg-226 mg-90 mg Cap, Take 1 capsule by mouth Daily. Resume once discharged from skilled nursing., Disp: , Rfl:   No current facility-administered medications for this visit.    Facility-Administered Medications Ordered in Other Visits:     tetracaine HCl (PF) 0.5 % Drop 1 drop, 1 drop, Right Eye, On Call Procedure, Michael Arellano MD, 1 drop at 12/12/19 0801    ROS  Otherwise negative    II. PHYSICAL EXAM     Physical Exam          Imaging Results: (I have personally reviewed the images/studies and provided my interpretation below)        III. ASSESSMENT & PLAN (MEDICAL DECISION MAKING)       No diagnosis found.            Assessment/Diagnosis:  89 y.o. female  with woman with history of diabetes CHF presenting with worsening bilateral leg lymphedema and right leg cellulitis with weeping.  No vascular intervention recommended at this time.    Plan:    -recommend admission to Medicine for IV antibiotics for right leg cellulitis  -Recommend compression stockings and elevation  -pneumatic compression devices ordered, referral sent to lymphedema clinic  -RTC in 3 months    Hood Williamson MD, PhD  Vascular and Endovascular Surgery

## 2024-12-03 NOTE — ED PROVIDER NOTES
Encounter Date: 12/3/2024       History     Chief Complaint   Patient presents with    Leg Swelling     Pt sent by  for right leg swelling with drainage. Pt stated  Stated she has an infection to her leg but is currently on antibiotics for recent left toe amputation and is concerned.     This patient presents to the emergency department sent over from Dr. Williamson office concerned about the possibility of infection/DVT in the right lower extremity.  Patient has a history of chronic venous insufficiency in bilateral lower extremities.  Recently had a great toe amputated on the opposite side therefore the left.  Patient presents here today doing worsening of erythema warmth with no tenderness to the right mid calf down.  The ears one or 2 small areas of grade 1 ulceration developing on the posterior aspect of the lower leg.  The patient underwent ultrasonography bilateral lower extremities prior to coming to the emergency department today.    The history is provided by the patient.     Review of patient's allergies indicates:   Allergen Reactions    Cephalosporins Hives     Itchiness and hives immediately after IV Ceftriaxone push    Statins-hmg-coa reductase inhibitors      Other reaction(s): Unknown     Past Medical History:   Diagnosis Date    Anemia     Arthritis     Atrial fibrillation     Back pain     Bronchiectasis, uncomplicated     CHF (congestive heart failure)     Disorder of kidney and ureter     Early stage nonexudative age-related macular degeneration of both eyes 2018    GERD (gastroesophageal reflux disease)     Hyperlipidemia     Hypertension     Hypothyroidism     Obesity     JOY (obstructive sleep apnea)     Osteoporosis     Polyneuropathy     Pressure injury of dorsum of right foot, stage 2 12/01/2017    Rheumatoid arthritis     Urinary incontinence     Venous stasis ulcer of right calf limited to breakdown of skin with varicose veins 06/24/2024     Past Surgical History:   Procedure  Laterality Date    CATARACT EXTRACTION W/  INTRAOCULAR LENS IMPLANT Right 12/12/2019    Procedure: EXTRACTION, CATARACT, WITH IOL INSERTION;  Surgeon: Michael Arellano MD;  Location: [facility];  Service: Ophthalmology;  Laterality: Right;    CATARACT EXTRACTION W/  INTRAOCULAR LENS IMPLANT Left 02/13/2020    Procedure: EXTRACTION, CATARACT, WITH IOL INSERTION;  Surgeon: Michael Arellano MD;  Location: [facility];  Service: Ophthalmology;  Laterality: Left;    CHOLECYSTECTOMY      ENDOSCOPIC ULTRASOUND OF UPPER GASTROINTESTINAL TRACT N/A 11/04/2020    Procedure: ULTRASOUND, ENDOSCOPIC, UPPER GI TRACT;  Surgeon: Thierry Saunders MD;  Location[facility];  Service: Endoscopy;  Laterality: N/A;  covid 11/1 [facility]    HERNIA REPAIR      HYSTERECTOMY      knee replacemene Right 02/14/2007    deidra    PHACOEMULSIFICATION OF CATARACT Right 12/12/2019    Procedure: PHACOEMULSIFICATION, CATARACT;  Surgeon: Michael Arellano MD;  Location: [facility];  Service: Ophthalmology;  Laterality: Right;    PHACOEMULSIFICATION OF CATARACT Left 02/13/2020    Procedure: PHACOEMULSIFICATION, CATARACT;  Surgeon: Michael Arellano MD;  Location: [facility];  Service: Ophthalmology;  Laterality: Left;    REPLACEMENT Left 09/18/2007    knee    THYROIDECTOMY      TOE AMPUTATION Left 11/5/2024    Procedure: AMPUTATION, TOE- DISTAL HALLUX;  Surgeon: Noemi Ley DPM;  Location: [facility];  Service: Podiatry;  Laterality: Left;     Family History   Problem Relation Name Age of Onset    Cancer Sister 3      Social History     Tobacco Use    Smoking status: Never     Passive exposure: Never    Smokeless tobacco: Never   Substance Use Topics    Alcohol use: Never    Drug use: Never     Review of Systems   Constitutional: Negative.    HENT: Negative.     Eyes: Negative.    Respiratory: Negative.     Cardiovascular: Negative.    Gastrointestinal: Negative.    Endocrine: Negative.    Genitourinary: Negative.    Musculoskeletal: Negative.    Skin:  Positive for color change  (brawny edema with erythema to the right lower extremity) and wound (right lower extremity).   Allergic/Immunologic: Negative.    Neurological: Negative.    Hematological: Negative.    Psychiatric/Behavioral: Negative.     All other systems reviewed and are negative.      Physical Exam     Initial Vitals [12/03/24 1529]   BP Pulse Resp Temp SpO2   133/72 75 18 97.9 °F (36.6 °C) 98 %      MAP       --         Physical Exam    Nursing note and vitals reviewed.  Constitutional: Vital signs are normal. She is Obese . She is active and cooperative.   HENT:   Head: Normocephalic and atraumatic.   Eyes: Conjunctivae, EOM and lids are normal. Pupils are equal, round, and reactive to light.   Neck: Trachea normal and phonation normal. Neck supple. No thyroid mass present.   Normal range of motion.   Full passive range of motion without pain.     Cardiovascular:  Normal rate, regular rhythm, S1 normal, S2 normal, normal heart sounds, intact distal pulses and normal pulses.           Pulmonary/Chest: Effort normal and breath sounds normal.   Abdominal: Abdomen is soft and protuberant. Bowel sounds are normal. There is no abdominal tenderness.   Musculoskeletal:         General: Normal range of motion.      Cervical back: Full passive range of motion without pain, normal range of motion and neck supple.        Legs:      Lymphadenopathy:     She has no axillary adenopathy.   Neurological: She is alert and oriented to person, place, and time. She has normal strength. No cranial nerve deficit or sensory deficit. GCS eye subscore is 4. GCS verbal subscore is 5. GCS motor subscore is 6.   Mild neuropathy bilateral lower extremities.   Skin: Skin is warm, dry and intact.   Psychiatric: She has a normal mood and affect. Her speech is normal and behavior is normal. Judgment and thought content normal. Cognition and memory are normal.         ED Course   Procedures  Labs Reviewed   CBC W/ AUTO DIFFERENTIAL - Abnormal       Result Value     WBC 4.43      RBC 3.41 (*)     Hemoglobin 10.7 (*)     Hematocrit 34.3 (*)      (*)     MCH 31.4 (*)     MCHC 31.2 (*)     RDW 13.3      Platelets 228      MPV 8.8 (*)     Immature Granulocytes 0.2      Gran # (ANC) 2.7      Immature Grans (Abs) 0.01      Lymph # 0.7 (*)     Mono # 0.7      Eos # 0.3      Baso # 0.04      nRBC 0      Gran % 61.2      Lymph % 15.1 (*)     Mono % 16.3 (*)     Eosinophil % 6.3      Basophil % 0.9      Differential Method Automated     COMPREHENSIVE METABOLIC PANEL - Abnormal    Sodium 142      Potassium 3.2 (*)     Chloride 108      CO2 23      Glucose 86      BUN 18      Creatinine 1.1      Calcium 8.5 (*)     Total Protein 6.8      Albumin 2.8 (*)     Total Bilirubin 0.4      Alkaline Phosphatase 57      AST 30      ALT 18      eGFR 48 (*)     Anion Gap 11     CULTURE, BLOOD          Imaging Results    None          Medications   clindamycin in D5W 600 mg/50 mL IVPB 600 mg (has no administration in time range)     Medical Decision Making  This patient presents to the emergency department sent over from Dr. Williamson office concerned about the possibility of infection/DVT in the right lower extremity. Patient has a history of chronic venous insufficiency in bilateral lower extremities. Recently had a great toe amputated on the opposite side therefore the left. Patient presents here today doing worsening of erythema warmth with no tenderness to the right mid calf down. The ears one or 2 small areas of grade 1 ulceration developing on the posterior aspect of the lower leg. The patient underwent ultrasonography bilateral lower extremities prior to coming to the emergency department today.  The patient has no fever no elevation in white blood cell count. Patient's right lower extremities markedly erythematous warm to touch as well as swollen. Worried about the likelihood of cellulitis in his lower extremity. The patient warrants admission to the hospital for IV antibiotic  therapy I have given the patient broad-spectrum antibiotics in the emergency department.       Risk  Prescription drug management.                                      Clinical Impression:  Final diagnoses:  [L03.115] Cellulitis of right lower extremity (Primary)  [I87.2] Chronic venous insufficiency          ED Disposition Condition    Admit Stable                Darion Magana MD  12/03/24 1816

## 2024-12-04 ENCOUNTER — TELEPHONE (OUTPATIENT)
Dept: ADMINISTRATIVE | Facility: CLINIC | Age: 89
End: 2024-12-04
Payer: MEDICARE

## 2024-12-04 PROBLEM — R60.0 BILATERAL LOWER EXTREMITY EDEMA: Status: ACTIVE | Noted: 2024-12-04

## 2024-12-04 LAB
ANION GAP SERPL CALC-SCNC: 8 MMOL/L (ref 8–16)
BASOPHILS # BLD AUTO: 0.02 K/UL (ref 0–0.2)
BASOPHILS NFR BLD: 0.5 % (ref 0–1.9)
BUN SERPL-MCNC: 15 MG/DL (ref 8–23)
CALCIUM SERPL-MCNC: 8 MG/DL (ref 8.7–10.5)
CHLORIDE SERPL-SCNC: 111 MMOL/L (ref 95–110)
CO2 SERPL-SCNC: 23 MMOL/L (ref 23–29)
CREAT SERPL-MCNC: 1 MG/DL (ref 0.5–1.4)
DIFFERENTIAL METHOD BLD: ABNORMAL
EOSINOPHIL # BLD AUTO: 0.6 K/UL (ref 0–0.5)
EOSINOPHIL NFR BLD: 15 % (ref 0–8)
ERYTHROCYTE [DISTWIDTH] IN BLOOD BY AUTOMATED COUNT: 13.3 % (ref 11.5–14.5)
EST. GFR  (NO RACE VARIABLE): 54 ML/MIN/1.73 M^2
ESTIMATED AVG GLUCOSE: 111 MG/DL (ref 68–131)
GLUCOSE SERPL-MCNC: 73 MG/DL (ref 70–110)
HBA1C MFR BLD: 5.5 % (ref 4–5.6)
HCT VFR BLD AUTO: 30.7 % (ref 37–48.5)
HGB BLD-MCNC: 9.8 G/DL (ref 12–16)
IMM GRANULOCYTES # BLD AUTO: 0.01 K/UL (ref 0–0.04)
IMM GRANULOCYTES NFR BLD AUTO: 0.2 % (ref 0–0.5)
LEFT GREAT SAPHENOUS DISTAL THIGH DIA: 0.76 CM
LEFT GREAT SAPHENOUS JUNCTION DIA: 1.07 CM
LEFT GREAT SAPHENOUS KNEE DIA: 0.47 CM
LEFT GREAT SAPHENOUS KNEE REFLUX: 1621 MS
LEFT GREAT SAPHENOUS MIDDLE THIGH DIA: 0.61 CM
LEFT GREAT SAPHENOUS MIDDLE THIGH REFLUX: 1014 MS
LEFT GREAT SAPHENOUS PROXIMAL CALF DIA: 0.46 CM
LEFT GREAT SAPHENOUS PROXIMAL CALF REFLUX: 1479 MS
LEFT SMALL SAPHENOUS KNEE DIA: 0.49 CM
LYMPHOCYTES # BLD AUTO: 0.8 K/UL (ref 1–4.8)
LYMPHOCYTES NFR BLD: 19.2 % (ref 18–48)
MAGNESIUM SERPL-MCNC: 1.7 MG/DL (ref 1.6–2.6)
MCH RBC QN AUTO: 32.1 PG (ref 27–31)
MCHC RBC AUTO-ENTMCNC: 31.9 G/DL (ref 32–36)
MCV RBC AUTO: 101 FL (ref 82–98)
MONOCYTES # BLD AUTO: 0.7 K/UL (ref 0.3–1)
MONOCYTES NFR BLD: 16.7 % (ref 4–15)
NEUTROPHILS # BLD AUTO: 2 K/UL (ref 1.8–7.7)
NEUTROPHILS NFR BLD: 48.4 % (ref 38–73)
NRBC BLD-RTO: 0 /100 WBC
PLATELET # BLD AUTO: 184 K/UL (ref 150–450)
PMV BLD AUTO: 8.8 FL (ref 9.2–12.9)
POCT GLUCOSE: 118 MG/DL (ref 70–110)
POCT GLUCOSE: 122 MG/DL (ref 70–110)
POCT GLUCOSE: 123 MG/DL (ref 70–110)
POCT GLUCOSE: 69 MG/DL (ref 70–110)
POCT GLUCOSE: 96 MG/DL (ref 70–110)
POTASSIUM SERPL-SCNC: 3.4 MMOL/L (ref 3.5–5.1)
RBC # BLD AUTO: 3.05 M/UL (ref 4–5.4)
RIGHT GREAT SAPHENOUS DISTAL THIGH DIA: 0.44 CM
RIGHT GREAT SAPHENOUS JUNCTION DIA: 1.03 CM
RIGHT GREAT SAPHENOUS KNEE DIA: 0.39 CM
RIGHT GREAT SAPHENOUS MIDDLE THIGH DIA: 0.74 CM
RIGHT GREAT SAPHENOUS PROXIMAL CALF DIA: 0.35 CM
SODIUM SERPL-SCNC: 142 MMOL/L (ref 136–145)
WBC # BLD AUTO: 4.07 K/UL (ref 3.9–12.7)

## 2024-12-04 PROCEDURE — 36415 COLL VENOUS BLD VENIPUNCTURE: CPT | Performed by: STUDENT IN AN ORGANIZED HEALTH CARE EDUCATION/TRAINING PROGRAM

## 2024-12-04 PROCEDURE — 83735 ASSAY OF MAGNESIUM: CPT | Performed by: STUDENT IN AN ORGANIZED HEALTH CARE EDUCATION/TRAINING PROGRAM

## 2024-12-04 PROCEDURE — 83036 HEMOGLOBIN GLYCOSYLATED A1C: CPT | Performed by: STUDENT IN AN ORGANIZED HEALTH CARE EDUCATION/TRAINING PROGRAM

## 2024-12-04 PROCEDURE — 25000003 PHARM REV CODE 250: Performed by: STUDENT IN AN ORGANIZED HEALTH CARE EDUCATION/TRAINING PROGRAM

## 2024-12-04 PROCEDURE — 85025 COMPLETE CBC W/AUTO DIFF WBC: CPT | Performed by: STUDENT IN AN ORGANIZED HEALTH CARE EDUCATION/TRAINING PROGRAM

## 2024-12-04 PROCEDURE — 63600175 PHARM REV CODE 636 W HCPCS: Performed by: STUDENT IN AN ORGANIZED HEALTH CARE EDUCATION/TRAINING PROGRAM

## 2024-12-04 PROCEDURE — 80048 BASIC METABOLIC PNL TOTAL CA: CPT | Performed by: STUDENT IN AN ORGANIZED HEALTH CARE EDUCATION/TRAINING PROGRAM

## 2024-12-04 PROCEDURE — 21400001 HC TELEMETRY ROOM

## 2024-12-04 PROCEDURE — 99222 1ST HOSP IP/OBS MODERATE 55: CPT | Mod: ,,, | Performed by: PODIATRIST

## 2024-12-04 RX ADMIN — FUROSEMIDE 40 MG: 40 TABLET ORAL at 09:12

## 2024-12-04 RX ADMIN — PIPERACILLIN SODIUM AND TAZOBACTAM SODIUM 4.5 G: 4; .5 INJECTION, POWDER, FOR SOLUTION INTRAVENOUS at 03:12

## 2024-12-04 RX ADMIN — LEVOTHYROXINE SODIUM 88 MCG: 0.09 TABLET ORAL at 05:12

## 2024-12-04 RX ADMIN — COLESTIPOL HYDROCHLORIDE 3 G: 1 TABLET, FILM COATED ORAL at 10:12

## 2024-12-04 RX ADMIN — METOPROLOL SUCCINATE 25 MG: 25 TABLET, EXTENDED RELEASE ORAL at 09:12

## 2024-12-04 RX ADMIN — APIXABAN 5 MG: 5 TABLET, FILM COATED ORAL at 09:12

## 2024-12-04 RX ADMIN — APIXABAN 5 MG: 5 TABLET, FILM COATED ORAL at 10:12

## 2024-12-04 RX ADMIN — COLESTIPOL HYDROCHLORIDE 3 G: 1 TABLET, FILM COATED ORAL at 09:12

## 2024-12-04 RX ADMIN — PIPERACILLIN SODIUM AND TAZOBACTAM SODIUM 4.5 G: 4; .5 INJECTION, POWDER, FOR SOLUTION INTRAVENOUS at 06:12

## 2024-12-04 RX ADMIN — ACETAMINOPHEN 650 MG: 325 TABLET ORAL at 03:12

## 2024-12-04 RX ADMIN — POTASSIUM CHLORIDE 20 MEQ: 1500 TABLET, EXTENDED RELEASE ORAL at 09:12

## 2024-12-04 RX ADMIN — PIPERACILLIN SODIUM AND TAZOBACTAM SODIUM 4.5 G: 4; .5 INJECTION, POWDER, FOR SOLUTION INTRAVENOUS at 10:12

## 2024-12-04 NOTE — ASSESSMENT & PLAN NOTE
Patient reports that she no longer requires medication for this.  - A1c ordered   -low dose SSI + ACHS glucose checks

## 2024-12-04 NOTE — H&P
[facility]  History & Physical    Patient Name: Stephany Skinner  MRN: [id number]  Patient Class: IP- Inpatient  Admission Date: 12/3/2024  Attending Physician: Justin Chase, *   Primary Care Provider: Pipo Flowers MD         Patient information was obtained from patient and ER records.     Subjective:     Principal Problem:<principal problem not specified>    Chief Complaint:   Chief Complaint   Patient presents with    Leg Swelling     Pt sent by  for right leg swelling with drainage. Pt stated  Stated she has an infection to her leg but is currently on antibiotics for recent left toe amputation and is concerned.        HPI: Patient is an 89yoF with history of AFib (on Eliquis), HFpEF (EF: 50%), COPD, hypertension, type 2 diabetes, CKD, rheumatoid arthritis, myasthenia gravis, and PVD who was sent from vascular surgery clinic for concern for right lower extremity cellulitis. Patient presented to clinic complaining of acute on chronic bilateral lower extremity edema and erythema as well as new onset drainage from RLE. Recently hospitalized from 11/2-11/13 for L great toe OM. At that time patient underwent great toe amputation and was discharged on 6 weeks of IV zosyn per ID to cover pseudomonas that grew in the proximal marigins. Also at that time noted to have evidence of b/l LE PVD on CT, however ABIs normal and revasculaization was not needed per vascular surgery.     On arrival VSS. Afebrile. On exam, patient noted to have bilateral LE edema and erythema R>L with clear, non foul smelling drainage noted. Also noted to have digit ulceration and site of L great toe amputation clean and dry with sutures still in place. CBC/CMP largely unremarkable aside from macrocytic anemia with Hgb 10.7, K 3.2, and Albumin 2.8. No leukocytosis. BLE US negative for DVT. In the ED patient given IV clindamycin.    Past Medical History:   Diagnosis Date    Anemia     Arthritis      Atrial fibrillation     Back pain     Bronchiectasis, uncomplicated     CHF (congestive heart failure)     Disorder of kidney and ureter     Early stage nonexudative age-related macular degeneration of both eyes 2018    GERD (gastroesophageal reflux disease)     Hyperlipidemia     Hypertension     Hypothyroidism     Obesity     JOY (obstructive sleep apnea)     Osteoporosis     Polyneuropathy     Pressure injury of dorsum of right foot, stage 2 12/01/2017    Rheumatoid arthritis     Urinary incontinence     Venous stasis ulcer of right calf limited to breakdown of skin with varicose veins 06/24/2024       Past Surgical History:   Procedure Laterality Date    CATARACT EXTRACTION W/  INTRAOCULAR LENS IMPLANT Right 12/12/2019    Procedure: EXTRACTION, CATARACT, WITH IOL INSERTION;  Surgeon: Michael Arellano MD;  Location: [facility];  Service: Ophthalmology;  Laterality: Right;    CATARACT EXTRACTION W/  INTRAOCULAR LENS IMPLANT Left 02/13/2020    Procedure: EXTRACTION, CATARACT, WITH IOL INSERTION;  Surgeon: Michael Arellano MD;  Location: [facility];  Service: Ophthalmology;  Laterality: Left;    CHOLECYSTECTOMY      ENDOSCOPIC ULTRASOUND OF UPPER GASTROINTESTINAL TRACT N/A 11/04/2020    Procedure: ULTRASOUND, ENDOSCOPIC, UPPER GI TRACT;  Surgeon: Thierry Saunders MD;  Location: [facility];  Service: Endoscopy;  Laterality: N/A;  covid 11/1 [facility]    HERNIA REPAIR      HYSTERECTOMY      knee replacemene Right 02/14/2007    deidra    PHACOEMULSIFICATION OF CATARACT Right 12/12/2019    Procedure: PHACOEMULSIFICATION, CATARACT;  Surgeon: Michael Arellano MD;  Location: [facility];  Service: Ophthalmology;  Laterality: Right;    PHACOEMULSIFICATION OF CATARACT Left 02/13/2020    Procedure: PHACOEMULSIFICATION, CATARACT;  Surgeon: Michael Arellano MD;  Location: [facility];  Service: Ophthalmology;  Laterality: Left;    REPLACEMENT Left 09/18/2007    knee    THYROIDECTOMY      TOE AMPUTATION Left 11/5/2024    Procedure: AMPUTATION, TOE- DISTAL  HALLUX;  Surgeon: Noemi Ley DPM;  Location: [facility] OR;  Service: Podiatry;  Laterality: Left;       Review of patient's allergies indicates:   Allergen Reactions    Cephalosporins Hives     Itchiness and hives immediately after IV Ceftriaxone push    Statins-hmg-coa reductase inhibitors      Other reaction(s): Unknown       Current Facility-Administered Medications on File Prior to Encounter   Medication    tetracaine HCl (PF) 0.5 % Drop 1 drop     Current Outpatient Medications on File Prior to Encounter   Medication Sig    acetaminophen (TYLENOL) 325 MG tablet Take 2 tablets (650 mg total) by mouth every 8 (eight) hours as needed for Temperature greater than or Pain (or equal to 100 degree F. Not to exceed 4 grams daily).    albuterol (PROVENTIL/VENTOLIN HFA) 90 mcg/actuation inhaler Inhale 2 puffs into the lungs every 4 (four) hours as needed for Wheezing or Shortness of Breath. Rescue    alendronate (FOSAMAX) 70 MG tablet Take 1 tablet (70 mg total) by mouth every 7 days. Resume on discharge from skilled nursing.    atorvastatin (LIPITOR) 20 MG tablet Take 20 mg by mouth every evening.    beta-carotene,A,-vits C,E/mins (VISION ORAL) Take 1 tablet by mouth 2 (two) times daily.    calcium carbonate-vit D3-min 600 mg calcium- 400 unit Tab Take 2 tablets by mouth once daily.     colestipoL (COLESTID) 1 gram Tab Take 3 tablets (3 g total) by mouth 2 (two) times daily. For cholesterol and diarrhea    cyanocobalamin (VITAMIN B-12) 1000 MCG tablet Take 100 mcg by mouth once daily.    D5W PgBk 100 mL with piperacillin-tazobactam 4.5 gram SolR 4.5 g Inject 4.5 g into the vein every 8 (eight) hours. Ending on 12/17/2024.    diclofenac sodium (VOLTAREN) 1 % Gel Apply 2 g topically 2 (two) times daily. Apply to bilateral knees    ELIQUIS 5 mg Tab Take 5 mg by mouth 2 (two) times daily.    furosemide (LASIX) 40 MG tablet Take 1 tablet (40 mg total) by mouth once daily.    insulin lispro (HUMALOG U-100 INSULIN) 100 unit/mL  injection Inject 0-5 Units into the skin every meal as needed for High Blood Sugar. Blood Glucose  mg/dL                  Pre-meal       151-200                1 units       201-250                2 units       251-300                3 units      301-350                4 units            >350                     5 units          Administer subcutaneously if needed at times designated by monitoring  schedule.    isosorbide mononitrate (IMDUR) 60 MG 24 hr tablet Take 60 mg by mouth once daily.    lactobacillus acidophilus & bulgar (LACTINEX) 100 million cell packet Take 1 packet (1 each total) by mouth 2 (two) times daily. May discontinue on 1/17/2025    levothyroxine (SYNTHROID) 88 MCG tablet Take 1 tablet (88 mcg total) by mouth before breakfast.    loperamide (IMODIUM A-D) 2 mg Tab Take 1 tablet (2 mg total) by mouth 3 (three) times daily as needed (diarrhea).    losartan (COZAAR) 25 MG tablet Take 25 mg by mouth 2 (two) times a day.    metoprolol succinate (TOPROL-XL) 25 MG 24 hr tablet Take 25 mg by mouth once daily.    miconazole NITRATE 2 % (MICOTIN) 2 % top powder Apply topically 2 (two) times daily. To groin    nitroGLYCERIN (NITROSTAT) 0.4 MG SL tablet Place 0.4 mg under the tongue every 5 (five) minutes as needed for Chest pain.    potassium chloride SA (K-DUR,KLOR-CON) 20 MEQ tablet Take 1 tablet (20 mEq total) by mouth once daily.    spironolactone (ALDACTONE) 25 MG tablet Take 25 mg by mouth every morning.    vitamins A,C,E-zinc-copper (PRESERVISION AREDS) 4,296 mcg-226 mg-90 mg Cap Take 1 capsule by mouth Daily. Resume once discharged from skilled nursing.    [DISCONTINUED] omega-3 acid ethyl esters (LOVAZA) 1 gram capsule Take 2 capsules (2 g total) by mouth 2 (two) times daily.     Family History       Problem Relation (Age of Onset)    Cancer Sister          Tobacco Use    Smoking status: Never     Passive exposure: Never    Smokeless tobacco: Never   Substance and Sexual Activity    Alcohol use:  Never    Drug use: Never    Sexual activity: Never     Review of Systems   Constitutional:  Negative for chills, fatigue and fever.   Respiratory:  Negative for cough, shortness of breath and wheezing.    Cardiovascular:  Positive for leg swelling. Negative for chest pain.   Gastrointestinal:  Negative for abdominal pain, diarrhea, nausea and vomiting.   Genitourinary:  Negative for dysuria and flank pain.   Skin:  Positive for color change and wound.   Neurological:  Negative for dizziness, weakness and headaches.     Objective:     Vital Signs (Most Recent):  Temp: 98.7 °F (37.1 °C) (12/03/24 2357)  Pulse: 65 (12/03/24 2357)  Resp: 18 (12/03/24 2357)  BP: (!) 159/70 (12/03/24 2357)  SpO2: 98 % (12/03/24 2357) Vital Signs (24h Range):  Temp:  [97.8 °F (36.6 °C)-98.7 °F (37.1 °C)] 98.7 °F (37.1 °C)  Pulse:  [65-90] 65  Resp:  [15-18] 18  SpO2:  [94 %-98 %] 98 %  BP: (133-159)/(64-78) 159/70     Weight: 112 kg (246 lb 14.6 oz)  Body mass index is 38.67 kg/m².     Physical Exam  Constitutional:       General: She is not in acute distress.     Appearance: She is not ill-appearing.   HENT:      Head: Normocephalic and atraumatic.      Mouth/Throat:      Mouth: Mucous membranes are moist.      Pharynx: Oropharynx is clear.   Eyes:      Pupils: Pupils are equal, round, and reactive to light.   Cardiovascular:      Rate and Rhythm: Normal rate and regular rhythm.      Heart sounds: No murmur heard.     No friction rub. No gallop.   Pulmonary:      Effort: Pulmonary effort is normal. No respiratory distress.      Breath sounds: Normal breath sounds. No wheezing or rhonchi.   Abdominal:      General: There is no distension.      Palpations: Abdomen is soft.      Tenderness: There is no abdominal tenderness. There is no guarding or rebound.   Musculoskeletal:         General: Swelling present.      Right lower leg: Edema present.      Left lower leg: Edema present.      Comments: Bilateral LE edema and erythema R>L with  clear, non foul smelling drainage noted. Also noted to have digit ulceration and site of L great toe amputation clean and dry with sutures still in place.    Skin:     General: Skin is warm.      Findings: Erythema and lesion present.   Neurological:      General: No focal deficit present.      Mental Status: She is alert and oriented to person, place, and time. Mental status is at baseline.      Motor: No weakness.              CRANIAL NERVES     CN III, IV, VI   Pupils are equal, round, and reactive to light.      Recent Results (from the past 24 hours)   CV US Lower Extremity Veins Bilateral Insufficiency    Collection Time: 12/03/24  2:17 PM   Result Value Ref Range    Right GSJ supa 1.03 cm    Right GSMT supa 0.74 cm    Right GSDT supa 0.44 cm    Right GSK supa 0.39 cm    Right GSPC supa 0.35 cm    Left GSJ supa 1.07 cm    Left GSMT supa 0.61 cm    Left GSMT reflux 1,014.00 ms    Left GSDT supa 0.76 cm    Left GSK supa 0.47 cm    Left GSK reflux 1,621.00 ms    Left GSPC supa 0.46 cm    Left GSPC reflux 1,479.00 ms    Left Small Saphenous Knee Diameter 0.49 cm   CBC auto differential    Collection Time: 12/03/24  4:28 PM   Result Value Ref Range    WBC 4.43 3.90 - 12.70 K/uL    RBC 3.41 (L) 4.00 - 5.40 M/uL    Hemoglobin 10.7 (L) 12.0 - 16.0 g/dL    Hematocrit 34.3 (L) 37.0 - 48.5 %     (H) 82 - 98 fL    MCH 31.4 (H) 27.0 - 31.0 pg    MCHC 31.2 (L) 32.0 - 36.0 g/dL    RDW 13.3 11.5 - 14.5 %    Platelets 228 150 - 450 K/uL    MPV 8.8 (L) 9.2 - 12.9 fL    Immature Granulocytes 0.2 0.0 - 0.5 %    Gran # (ANC) 2.7 1.8 - 7.7 K/uL    Immature Grans (Abs) 0.01 0.00 - 0.04 K/uL    Lymph # 0.7 (L) 1.0 - 4.8 K/uL    Mono # 0.7 0.3 - 1.0 K/uL    Eos # 0.3 0.0 - 0.5 K/uL    Baso # 0.04 0.00 - 0.20 K/uL    nRBC 0 0 /100 WBC    Gran % 61.2 38.0 - 73.0 %    Lymph % 15.1 (L) 18.0 - 48.0 %    Mono % 16.3 (H) 4.0 - 15.0 %    Eosinophil % 6.3 0.0 - 8.0 %    Basophil % 0.9 0.0 - 1.9 %    Differential Method Automated    Comprehensive  metabolic panel    Collection Time: 12/03/24  4:28 PM   Result Value Ref Range    Sodium 142 136 - 145 mmol/L    Potassium 3.2 (L) 3.5 - 5.1 mmol/L    Chloride 108 95 - 110 mmol/L    CO2 23 23 - 29 mmol/L    Glucose 86 70 - 110 mg/dL    BUN 18 8 - 23 mg/dL    Creatinine 1.1 0.5 - 1.4 mg/dL    Calcium 8.5 (L) 8.7 - 10.5 mg/dL    Total Protein 6.8 6.0 - 8.4 g/dL    Albumin 2.8 (L) 3.5 - 5.2 g/dL    Total Bilirubin 0.4 0.1 - 1.0 mg/dL    Alkaline Phosphatase 57 40 - 150 U/L    AST 30 10 - 40 U/L    ALT 18 10 - 44 U/L    eGFR 48 (A) >60 mL/min/1.73 m^2    Anion Gap 11 8 - 16 mmol/L   Blood Culture #1 **CANNOT BE ORDERED STAT**    Collection Time: 12/03/24  4:28 PM    Specimen: Peripheral, Antecubital, Left; Blood   Result Value Ref Range    Blood Culture, Routine No Growth to date        Microbiology Results (last 7 days)       Procedure Component Value Units Date/Time    Blood Culture #1 **CANNOT BE ORDERED STAT** [[id number]] Collected: 12/03/24 1628    Order Status: Completed Specimen: Blood from Peripheral, Antecubital, Left Updated: 12/03/24 2312     Blood Culture, Routine No Growth to date             Imaging Results    None        Assessment/Plan:     Bilateral lower extremity edema and erythema, ulceration  Likely 2/2 chronic venous stasis, less likely cellulitis as presentation is bilateral without fever, leukocytosis or purulent drainage; additionally patient has been on Zosyn therapy at home.  - continue home zosyn, will hold additional abx at this time  - wound care consulted  - B/l LE US negative for DVTs  - ABIs last admission normal; however CT LE showed evidence of PVD      Osteomyelitis of great toe of left foot  Admitted for this last month s/p L great toe partial amputation.  - sutures remain in place  - podiatry consulted to evaluate surgical site  - continue home IV zosyn      Type 2 diabetes mellitus with circulatory disorder, without long-term current use of insulin  Patient reports that she no  longer requires medication for this.  - A1c ordered   -low dose SSI + ACHS glucose checks      Chronic diastolic congestive heart failure  Does not appear to be in acute exacerbation  - continue home lasix 40mg, home metoprolol    Chronic atrial fibrillation   [id number] Score: 4. The patients heart rate in the last 24 hours is as follows:  Pulse  Min: 65  Max: 90     Antiarrhythmics  metoprolol succinate (TOPROL-XL) 24 hr tablet 25 mg, Daily, Oral    Anticoagulants  apixaban tablet 5 mg, 2 times daily, Oral    Plan  - Replete lytes with a goal of K>4, Mg >2  - Patient is anticoagulated, see medications listed above.  - Continue home medications as above      Hypothyroidism  - continue home synthroid        VTE Risk Mitigation (From admission, onward)           Ordered     apixaban tablet 5 mg  2 times daily         12/03/24 2360                     Gabriela Moreno MD  Department of Hospital Medicine  [facility] - Med Surg

## 2024-12-04 NOTE — HPI
Patient is an 89yoF with history of AFib (on Eliquis), HFpEF (EF: 50%), COPD, hypertension, type 2 diabetes, CKD, rheumatoid arthritis, myasthenia gravis, and PVD who was sent from vascular surgery clinic for concern for right lower extremity cellulitis. Patient presented to clinic complaining of acute on chronic bilateral lower extremity edema and erythema as well as new onset drainage from RLE. Recently hospitalized from 11/2-11/13 for L great toe OM. At that time patient underwent great toe amputation and was discharged on 6 weeks of IV zosyn per ID to cover pseudomonas that grew in the proximal marigins. Also at that time noted to have evidence of b/l LE PVD on CT, however ABIs normal and revasculaization was not needed per vascular surgery.     On arrival VSS. Afebrile. On exam, patient noted to have bilateral LE edema and erythema R>L with clear, non foul smelling drainage noted. Also noted to have digit ulceration and site of L great toe amputation clean and dry with sutures still in place. CBC/CMP largely unremarkable aside from macrocytic anemia with Hgb 10.7, K 3.2, and Albumin 2.8. No leukocytosis. BLE US negative for DVT. In the ED patient given IV clindamycin.

## 2024-12-04 NOTE — CARE UPDATE
I have seen and evaluated this patient and agree with my colleague's assessment and plan.  Wound care consult pending.  Continue IV antibiotics.    Mumtaz Escobar Jr., APRN, AGATa[facility] Hospital-BC  Hospitalist - Department of Hospital Medicine  [facility] [address] [place] [address]  Office #: [phone]; Pager #: [phone]

## 2024-12-04 NOTE — PLAN OF CARE
Problem: Adult Inpatient Plan of Care  Goal: Plan of Care Review  Outcome: Progressing  Flowsheets (Taken 12/4/2024 1730)  Plan of Care Reviewed With: patient  Goal: Patient-Specific Goal (Individualized)  Outcome: Progressing  Goal: Absence of Hospital-Acquired Illness or Injury  Outcome: Progressing  Intervention: Identify and Manage Fall Risk  Flowsheets (Taken 12/4/2024 1730)  Safety Promotion/Fall Prevention:   assistive device/personal item within reach   Fall Risk reviewed with patient/family   high risk medications identified   instructed to call staff for mobility   medications reviewed   nonskid shoes/socks when out of bed   room near unit station   side rails raised x 2   bed alarm set  Intervention: Prevent Skin Injury  Flowsheets (Taken 12/4/2024 1730)  Body Position:   position changed independently   weight shifting  Device Skin Pressure Protection:   absorbent pad utilized/changed   tubing/devices free from skin contact   adhesive use limited   pressure points protected  Intervention: Prevent and Manage VTE (Venous Thromboembolism) Risk  Flowsheets (Taken 12/4/2024 1730)  VTE Prevention/Management:   ambulation promoted   bleeding precautions maintained   bleeding risk assessed  Intervention: Prevent Infection  Flowsheets (Taken 12/4/2024 1730)  Infection Prevention: hand hygiene promoted  Goal: Optimal Comfort and Wellbeing  Outcome: Progressing  Goal: Readiness for Transition of Care  Outcome: Progressing     Problem: Diabetes Comorbidity  Goal: Blood Glucose Level Within Targeted Range  Outcome: Progressing  Intervention: Monitor and Manage Glycemia  Flowsheets (Taken 12/4/2024 1730)  Glycemic Management: blood glucose monitored     Problem: Acute Kidney Injury/Impairment  Goal: Fluid and Electrolyte Balance  Outcome: Progressing  Goal: Improved Oral Intake  Outcome: Progressing  Goal: Effective Renal Function  Outcome: Progressing     Problem: Infection  Goal: Absence of Infection Signs and  Symptoms  Outcome: Progressing  Intervention: Prevent or Manage Infection  Flowsheets (Taken 12/4/2024 1730)  Infection Management: aseptic technique maintained     Problem: Wound  Goal: Optimal Coping  Outcome: Progressing  Goal: Optimal Functional Ability  Outcome: Progressing  Goal: Absence of Infection Signs and Symptoms  Outcome: Progressing  Intervention: Prevent or Manage Infection  Flowsheets (Taken 12/4/2024 1730)  Infection Management: aseptic technique maintained  Goal: Improved Oral Intake  Outcome: Progressing  Goal: Optimal Pain Control and Function  Outcome: Progressing  Goal: Skin Health and Integrity  Outcome: Progressing  Goal: Optimal Wound Healing  Outcome: Progressing     Problem: Skin Injury Risk Increased  Goal: Skin Health and Integrity  Outcome: Progressing  Intervention: Optimize Skin Protection  Flowsheets (Taken 12/4/2024 1730)  Pressure Reduction Techniques:   frequent weight shift encouraged   weight shift assistance provided   pressure points protected  Activity Management:   Ankle pumps - L1   Arm raise - L1   Rolling - L1   Straight leg raise - L1  Head of Bed (HOB) Positioning: HOB at 30-45 degrees     Problem: Fall Injury Risk  Goal: Absence of Fall and Fall-Related Injury  Outcome: Progressing  Intervention: Identify and Manage Contributors  Flowsheets (Taken 12/4/2024 1730)  Self-Care Promotion:   independence encouraged   adaptive equipment use encouraged   BADL personal objects within reach   BADL personal routines maintained   meal set-up provided  Medication Review/Management:   medications reviewed   high-risk medications identified  Intervention: Promote Injury-Free Environment  Flowsheets (Taken 12/4/2024 1730)  Safety Promotion/Fall Prevention:   assistive device/personal item within reach   Fall Risk reviewed with patient/family   high risk medications identified   instructed to call staff for mobility   medications reviewed   nonskid shoes/socks when out of bed   room  near unit station   side rails raised x 2   bed alarm set   Pt alert able to make needs known,maría meds well,IV abx remains in progress,no s/s adverse reaction noted,reposition self q 2hrs,pain controlled by prn pain medication,POC explained,remains free from falls and pressure injuries,safety maintained,continue monitoring.

## 2024-12-04 NOTE — NURSING
[facility] [place]  Nurses Note -- 4 Eyes      12/4/2024       Skin assessed on: Admit      [x] No Pressure Injuries Present    []Prevention Measures Documented    [] Yes LDA  for Pressure Injury Previously documented     [] Yes New Pressure Injury Discovered   [] LDA for New Pressure Injury Added      Attending RN:  Tadeo Arellano LPN     Second RN:  ZEV Zimmerman

## 2024-12-04 NOTE — NURSING
Pt received from the ED AAO X 3. Resp even and unlabored on RA.  20G SL noted to the pts LAC. DL picc to the BILLIE. Pt oriented to her room and how to use the call light. Pt also instructed on the use of the bed rails. Informed pt about the visiting hours and the white board. Bed locked in the lowest position with SR up X 2 and call light within reach. Personal belongings placed at the bedside. L great toe note surgical incision with sutures intact.

## 2024-12-04 NOTE — PLAN OF CARE
Case Management Assessment     PCP: Pipo Flowers MD   Pharmacy:   [id number]  [address] - [address] [facility] [address]  [address] [facility] [address]  [place] [address]  Phone: [phone] Fax: [phone]       Patient Arrived From: [facility]  Existing Help at Home: Shira Quezada [phone]    Barriers to Discharge: None     Discharge Plan:    A. Skilled Nursing Facility   B. Skilled Nursing Facility      CM met with patient at bedside to discuss discharge planning. Patient's sister provided assistance with patient's assessment via telephone per patient's request. Patient is currently from [facility] (SNF). Patient will be returning upon discharge.     Patient reside with her sister Shira and will return home once skilled nursing is complete. Patient uses a shower chair, bedside commode, wheelchair and rollator and completes ADL's independently.     CM contacted [facility] and spoke with Maria Guadalupe. Maria Guadalupe informed CM that patient has more SNF days and is able to return upon discharge.          12/04/24 1334   Discharge Assessment   Assessment Type Discharge Planning Assessment   Confirmed/corrected address, phone number and insurance Yes   Confirmed Demographics Correct on Facesheet   Source of Information patient   When was your last doctors appointment? 12/03/24   Communicated MADELYN with patient/caregiver Date not available/Unable to determine   People in Home sibling(s)   Facility Arrived From: Home   Do you expect to return to your current living situation? Yes   Do you have help at home or someone to help you manage your care at home? Yes   Who are your caregiver(s) and their phone number(s)? Jeny Quezada [phone]   Prior to hospitilization cognitive status: Alert/Oriented   Current cognitive status: Alert/Oriented   Walking or Climbing Stairs Difficulty no   Dressing/Bathing Difficulty no   Equipment Currently Used at Home none   Readmission within 30 days? Yes    Patient currently being followed by outpatient case management? No   Do you currently have service(s) that help you manage your care at home? Yes   Name and Contact number of agency [facility]   Is the pt/caregiver preference to resume services with current agency Yes   Do you take prescription medications? Yes   Do you have prescription coverage? Yes   Coverage Medicare Part A & B   Do you have any problems affording any of your prescribed medications? No   Is the patient taking medications as prescribed? yes   Who is going to help you get home at discharge? Jeny Quezada [phone]   How do you get to doctors appointments? family or friend will provide   Are you on dialysis? No   Do you take coumadin? No   Discharge Plan A Home Health   Discharge Plan B Home Health   DME Needed Upon Discharge  none   Discharge Plan discussed with: Patient;Sibling   Name(s) and Number(s) Shira Quezada [phone]   Transition of Care Barriers None   SDOH   (None)   Physical Activity   On average, how many days per week do you engage in moderate to strenuous exercise (like a brisk walk)? 0 days   On average, how many minutes do you engage in exercise at this level? 0 min   Financial Resource Strain   How hard is it for you to pay for the very basics like food, housing, medical care, and heating? Not hard   Housing Stability   In the last 12 months, was there a time when you were not able to pay the mortgage or rent on time? N   At any time in the past 12 months, were you homeless or living in a shelter (including now)? N   Transportation Needs   Has the lack of transportation kept you from medical appointments, meetings, work or from getting things needed for daily living? No   Food Insecurity   Within the past 12 months, you worried that your food would run out before you got the money to buy more. Never true   Within the past 12 months, the food you bought just didn't last and you didn't have money to get more.  Never true   Stress   Do you feel stress - tense, restless, nervous, or anxious, or unable to sleep at night because your mind is troubled all the time - these days? Not at all   Social Isolation   How often do you feel lonely or isolated from those around you?  Never   Alcohol Use   Q1: How often do you have a drink containing alcohol? Never   Q2: How many drinks containing alcohol do you have on a typical day when you are drinking? None   Q3: How often do you have six or more drinks on one occasion? Never

## 2024-12-04 NOTE — NURSING
Pt arrived on unit, awake alert and oriented. IV site noted, saline lock. PICC BILLIE, saline lock.  Pt on room air, no distress noted. Vitals assessed. Scheduled medication given without difficulty. Skin assessed, swelling and redness noted to lower extremities, redness  noted to sacral area no wounds or pressure injuries noted. Purewick in place. Safety measures maintained. Will cont to monitor

## 2024-12-04 NOTE — SUBJECTIVE & OBJECTIVE
Past Medical History:   Diagnosis Date    Anemia     Arthritis     Atrial fibrillation     Back pain     Bronchiectasis, uncomplicated     CHF (congestive heart failure)     Disorder of kidney and ureter     Early stage nonexudative age-related macular degeneration of both eyes 2018    GERD (gastroesophageal reflux disease)     Hyperlipidemia     Hypertension     Hypothyroidism     Obesity     JOY (obstructive sleep apnea)     Osteoporosis     Polyneuropathy     Pressure injury of dorsum of right foot, stage 2 12/01/2017    Rheumatoid arthritis     Urinary incontinence     Venous stasis ulcer of right calf limited to breakdown of skin with varicose veins 06/24/2024       Past Surgical History:   Procedure Laterality Date    CATARACT EXTRACTION W/  INTRAOCULAR LENS IMPLANT Right 12/12/2019    Procedure: EXTRACTION, CATARACT, WITH IOL INSERTION;  Surgeon: Michael Arellano MD;  Location: [facility];  Service: Ophthalmology;  Laterality: Right;    CATARACT EXTRACTION W/  INTRAOCULAR LENS IMPLANT Left 02/13/2020    Procedure: EXTRACTION, CATARACT, WITH IOL INSERTION;  Surgeon: Michael Arellano MD;  Location: [facility];  Service: Ophthalmology;  Laterality: Left;    CHOLECYSTECTOMY      ENDOSCOPIC ULTRASOUND OF UPPER GASTROINTESTINAL TRACT N/A 11/04/2020    Procedure: ULTRASOUND, ENDOSCOPIC, UPPER GI TRACT;  Surgeon: Thierry Saunders MD;  Location: [facility];  Service: Endoscopy;  Laterality: N/A;  covid 11/1 [facility]    HERNIA REPAIR      HYSTERECTOMY      knee replacemene Right 02/14/2007    deidra    PHACOEMULSIFICATION OF CATARACT Right 12/12/2019    Procedure: PHACOEMULSIFICATION, CATARACT;  Surgeon: Michael Arellano MD;  Location: [facility];  Service: Ophthalmology;  Laterality: Right;    PHACOEMULSIFICATION OF CATARACT Left 02/13/2020    Procedure: PHACOEMULSIFICATION, CATARACT;  Surgeon: Michael Arellano MD;  Location: [facility];  Service: Ophthalmology;  Laterality: Left;    REPLACEMENT Left 09/18/2007    knee    THYROIDECTOMY      TOE  AMPUTATION Left 11/5/2024    Procedure: AMPUTATION, TOE- DISTAL HALLUX;  Surgeon: Noemi Ley DPM;  Location: [facility] OR;  Service: Podiatry;  Laterality: Left;       Review of patient's allergies indicates:   Allergen Reactions    Cephalosporins Hives     Itchiness and hives immediately after IV Ceftriaxone push    Statins-hmg-coa reductase inhibitors      Other reaction(s): Unknown       Current Facility-Administered Medications on File Prior to Encounter   Medication    tetracaine HCl (PF) 0.5 % Drop 1 drop     Current Outpatient Medications on File Prior to Encounter   Medication Sig    acetaminophen (TYLENOL) 325 MG tablet Take 2 tablets (650 mg total) by mouth every 8 (eight) hours as needed for Temperature greater than or Pain (or equal to 100 degree F. Not to exceed 4 grams daily).    albuterol (PROVENTIL/VENTOLIN HFA) 90 mcg/actuation inhaler Inhale 2 puffs into the lungs every 4 (four) hours as needed for Wheezing or Shortness of Breath. Rescue    alendronate (FOSAMAX) 70 MG tablet Take 1 tablet (70 mg total) by mouth every 7 days. Resume on discharge from skilled nursing.    atorvastatin (LIPITOR) 20 MG tablet Take 20 mg by mouth every evening.    beta-carotene,A,-vits C,E/mins (VISION ORAL) Take 1 tablet by mouth 2 (two) times daily.    calcium carbonate-vit D3-min 600 mg calcium- 400 unit Tab Take 2 tablets by mouth once daily.     colestipoL (COLESTID) 1 gram Tab Take 3 tablets (3 g total) by mouth 2 (two) times daily. For cholesterol and diarrhea    cyanocobalamin (VITAMIN B-12) 1000 MCG tablet Take 100 mcg by mouth once daily.    D5W PgBk 100 mL with piperacillin-tazobactam 4.5 gram SolR 4.5 g Inject 4.5 g into the vein every 8 (eight) hours. Ending on 12/17/2024.    diclofenac sodium (VOLTAREN) 1 % Gel Apply 2 g topically 2 (two) times daily. Apply to bilateral knees    ELIQUIS 5 mg Tab Take 5 mg by mouth 2 (two) times daily.    furosemide (LASIX) 40 MG tablet Take 1 tablet (40 mg total) by mouth  once daily.    insulin lispro (HUMALOG U-100 INSULIN) 100 unit/mL injection Inject 0-5 Units into the skin every meal as needed for High Blood Sugar. Blood Glucose  mg/dL                  Pre-meal       151-200                1 units       201-250                2 units       251-300                3 units      301-350                4 units            >350                     5 units          Administer subcutaneously if needed at times designated by monitoring  schedule.    isosorbide mononitrate (IMDUR) 60 MG 24 hr tablet Take 60 mg by mouth once daily.    lactobacillus acidophilus & bulgar (LACTINEX) 100 million cell packet Take 1 packet (1 each total) by mouth 2 (two) times daily. May discontinue on 1/17/2025    levothyroxine (SYNTHROID) 88 MCG tablet Take 1 tablet (88 mcg total) by mouth before breakfast.    loperamide (IMODIUM A-D) 2 mg Tab Take 1 tablet (2 mg total) by mouth 3 (three) times daily as needed (diarrhea).    losartan (COZAAR) 25 MG tablet Take 25 mg by mouth 2 (two) times a day.    metoprolol succinate (TOPROL-XL) 25 MG 24 hr tablet Take 25 mg by mouth once daily.    miconazole NITRATE 2 % (MICOTIN) 2 % top powder Apply topically 2 (two) times daily. To groin    nitroGLYCERIN (NITROSTAT) 0.4 MG SL tablet Place 0.4 mg under the tongue every 5 (five) minutes as needed for Chest pain.    potassium chloride SA (K-DUR,KLOR-CON) 20 MEQ tablet Take 1 tablet (20 mEq total) by mouth once daily.    spironolactone (ALDACTONE) 25 MG tablet Take 25 mg by mouth every morning.    vitamins A,C,E-zinc-copper (PRESERVISION AREDS) 4,296 mcg-226 mg-90 mg Cap Take 1 capsule by mouth Daily. Resume once discharged from skilled nursing.    [DISCONTINUED] omega-3 acid ethyl esters (LOVAZA) 1 gram capsule Take 2 capsules (2 g total) by mouth 2 (two) times daily.     Family History       Problem Relation (Age of Onset)    Cancer Sister          Tobacco Use    Smoking status: Never     Passive exposure: Never    Smokeless  tobacco: Never   Substance and Sexual Activity    Alcohol use: Never    Drug use: Never    Sexual activity: Never     Review of Systems   Constitutional:  Negative for chills, fatigue and fever.   Respiratory:  Negative for cough, shortness of breath and wheezing.    Cardiovascular:  Positive for leg swelling. Negative for chest pain.   Gastrointestinal:  Negative for abdominal pain, diarrhea, nausea and vomiting.   Genitourinary:  Negative for dysuria and flank pain.   Skin:  Positive for color change and wound.   Neurological:  Negative for dizziness, weakness and headaches.     Objective:     Vital Signs (Most Recent):  Temp: 98.7 °F (37.1 °C) (12/03/24 2357)  Pulse: 65 (12/03/24 2357)  Resp: 18 (12/03/24 2357)  BP: (!) 159/70 (12/03/24 2357)  SpO2: 98 % (12/03/24 2357) Vital Signs (24h Range):  Temp:  [97.8 °F (36.6 °C)-98.7 °F (37.1 °C)] 98.7 °F (37.1 °C)  Pulse:  [65-90] 65  Resp:  [15-18] 18  SpO2:  [94 %-98 %] 98 %  BP: (133-159)/(64-78) 159/70     Weight: 112 kg (246 lb 14.6 oz)  Body mass index is 38.67 kg/m².     Physical Exam  Constitutional:       General: She is not in acute distress.     Appearance: She is not ill-appearing.   HENT:      Head: Normocephalic and atraumatic.      Mouth/Throat:      Mouth: Mucous membranes are moist.      Pharynx: Oropharynx is clear.   Eyes:      Pupils: Pupils are equal, round, and reactive to light.   Cardiovascular:      Rate and Rhythm: Normal rate and regular rhythm.      Heart sounds: No murmur heard.     No friction rub. No gallop.   Pulmonary:      Effort: Pulmonary effort is normal. No respiratory distress.      Breath sounds: Normal breath sounds. No wheezing or rhonchi.   Abdominal:      General: There is no distension.      Palpations: Abdomen is soft.      Tenderness: There is no abdominal tenderness. There is no guarding or rebound.   Musculoskeletal:         General: Swelling present.      Right lower leg: Edema present.      Left lower leg: Edema  present.      Comments: Bilateral LE edema and erythema R>L with clear, non foul smelling drainage noted. Also noted to have digit ulceration and site of L great toe amputation clean and dry with sutures still in place.    Skin:     General: Skin is warm.      Findings: Erythema and lesion present.   Neurological:      General: No focal deficit present.      Mental Status: She is alert and oriented to person, place, and time. Mental status is at baseline.      Motor: No weakness.              CRANIAL NERVES     CN III, IV, VI   Pupils are equal, round, and reactive to light.      Recent Results (from the past 24 hours)   CV US Lower Extremity Veins Bilateral Insufficiency    Collection Time: 12/03/24  2:17 PM   Result Value Ref Range    Right GSJ supa 1.03 cm    Right GSMT supa 0.74 cm    Right GSDT supa 0.44 cm    Right GSK supa 0.39 cm    Right GSPC supa 0.35 cm    Left GSJ supa 1.07 cm    Left GSMT supa 0.61 cm    Left GSMT reflux 1,014.00 ms    Left GSDT supa 0.76 cm    Left GSK supa 0.47 cm    Left GSK reflux 1,621.00 ms    Left GSPC supa 0.46 cm    Left GSPC reflux 1,479.00 ms    Left Small Saphenous Knee Diameter 0.49 cm   CBC auto differential    Collection Time: 12/03/24  4:28 PM   Result Value Ref Range    WBC 4.43 3.90 - 12.70 K/uL    RBC 3.41 (L) 4.00 - 5.40 M/uL    Hemoglobin 10.7 (L) 12.0 - 16.0 g/dL    Hematocrit 34.3 (L) 37.0 - 48.5 %     (H) 82 - 98 fL    MCH 31.4 (H) 27.0 - 31.0 pg    MCHC 31.2 (L) 32.0 - 36.0 g/dL    RDW 13.3 11.5 - 14.5 %    Platelets 228 150 - 450 K/uL    MPV 8.8 (L) 9.2 - 12.9 fL    Immature Granulocytes 0.2 0.0 - 0.5 %    Gran # (ANC) 2.7 1.8 - 7.7 K/uL    Immature Grans (Abs) 0.01 0.00 - 0.04 K/uL    Lymph # 0.7 (L) 1.0 - 4.8 K/uL    Mono # 0.7 0.3 - 1.0 K/uL    Eos # 0.3 0.0 - 0.5 K/uL    Baso # 0.04 0.00 - 0.20 K/uL    nRBC 0 0 /100 WBC    Gran % 61.2 38.0 - 73.0 %    Lymph % 15.1 (L) 18.0 - 48.0 %    Mono % 16.3 (H) 4.0 - 15.0 %    Eosinophil % 6.3 0.0 - 8.0 %    Basophil %  0.9 0.0 - 1.9 %    Differential Method Automated    Comprehensive metabolic panel    Collection Time: 12/03/24  4:28 PM   Result Value Ref Range    Sodium 142 136 - 145 mmol/L    Potassium 3.2 (L) 3.5 - 5.1 mmol/L    Chloride 108 95 - 110 mmol/L    CO2 23 23 - 29 mmol/L    Glucose 86 70 - 110 mg/dL    BUN 18 8 - 23 mg/dL    Creatinine 1.1 0.5 - 1.4 mg/dL    Calcium 8.5 (L) 8.7 - 10.5 mg/dL    Total Protein 6.8 6.0 - 8.4 g/dL    Albumin 2.8 (L) 3.5 - 5.2 g/dL    Total Bilirubin 0.4 0.1 - 1.0 mg/dL    Alkaline Phosphatase 57 40 - 150 U/L    AST 30 10 - 40 U/L    ALT 18 10 - 44 U/L    eGFR 48 (A) >60 mL/min/1.73 m^2    Anion Gap 11 8 - 16 mmol/L   Blood Culture #1 **CANNOT BE ORDERED STAT**    Collection Time: 12/03/24  4:28 PM    Specimen: Peripheral, Antecubital, Left; Blood   Result Value Ref Range    Blood Culture, Routine No Growth to date        Microbiology Results (last 7 days)       Procedure Component Value Units Date/Time    Blood Culture #1 **CANNOT BE ORDERED STAT** [[id number]] Collected: 12/03/24 1628    Order Status: Completed Specimen: Blood from Peripheral, Antecubital, Left Updated: 12/03/24 2312     Blood Culture, Routine No Growth to date             Imaging Results    None

## 2024-12-04 NOTE — ASSESSMENT & PLAN NOTE
[id number] Score: 4. The patients heart rate in the last 24 hours is as follows:  Pulse  Min: 65  Max: 90     Antiarrhythmics  metoprolol succinate (TOPROL-XL) 24 hr tablet 25 mg, Daily, Oral    Anticoagulants  apixaban tablet 5 mg, 2 times daily, Oral    Plan  - Replete lytes with a goal of K>4, Mg >2  - Patient is anticoagulated, see medications listed above.  - Continue home medications as above

## 2024-12-04 NOTE — ASSESSMENT & PLAN NOTE
Admitted for this last month s/p L great toe partial amputation.  - sutures remain in place  - podiatry consulted to evaluate surgical site  - continue home IV zosyn

## 2024-12-04 NOTE — ASSESSMENT & PLAN NOTE
Likely 2/2 chronic venous stasis, less likely cellulitis as presentation is bilateral without fever, leukocytosis or purulent drainage; additionally patient has been on Zosyn therapy at home.  - continue home zosyn, will hold additional abx at this time  - wound care consulted  - B/l LE US negative for DVTs  - ABIs last admission normal; however CT LE showed evidence of PVD

## 2024-12-05 ENCOUNTER — TELEPHONE (OUTPATIENT)
Dept: VASCULAR SURGERY | Facility: CLINIC | Age: 89
End: 2024-12-05
Payer: MEDICARE

## 2024-12-05 LAB
ALBUMIN SERPL BCP-MCNC: 2.1 G/DL (ref 3.5–5.2)
ALP SERPL-CCNC: 42 U/L (ref 40–150)
ALT SERPL W/O P-5'-P-CCNC: 13 U/L (ref 10–44)
ANION GAP SERPL CALC-SCNC: 8 MMOL/L (ref 8–16)
AST SERPL-CCNC: 24 U/L (ref 10–40)
BASOPHILS # BLD AUTO: 0.01 K/UL (ref 0–0.2)
BASOPHILS NFR BLD: 0.2 % (ref 0–1.9)
BILIRUB SERPL-MCNC: 0.2 MG/DL (ref 0.1–1)
BUN SERPL-MCNC: 11 MG/DL (ref 8–23)
C DIFF GDH STL QL: NEGATIVE
C DIFF TOX A+B STL QL IA: NEGATIVE
CALCIUM SERPL-MCNC: 7.7 MG/DL (ref 8.7–10.5)
CHLORIDE SERPL-SCNC: 110 MMOL/L (ref 95–110)
CO2 SERPL-SCNC: 22 MMOL/L (ref 23–29)
CREAT SERPL-MCNC: 1 MG/DL (ref 0.5–1.4)
DIFFERENTIAL METHOD BLD: ABNORMAL
EOSINOPHIL # BLD AUTO: 0.8 K/UL (ref 0–0.5)
EOSINOPHIL NFR BLD: 19.1 % (ref 0–8)
ERYTHROCYTE [DISTWIDTH] IN BLOOD BY AUTOMATED COUNT: 13.5 % (ref 11.5–14.5)
EST. GFR  (NO RACE VARIABLE): 54 ML/MIN/1.73 M^2
GLUCOSE SERPL-MCNC: 134 MG/DL (ref 70–110)
HCT VFR BLD AUTO: 34.8 % (ref 37–48.5)
HGB BLD-MCNC: 10.3 G/DL (ref 12–16)
IMM GRANULOCYTES # BLD AUTO: 0.01 K/UL (ref 0–0.04)
IMM GRANULOCYTES NFR BLD AUTO: 0.2 % (ref 0–0.5)
LYMPHOCYTES # BLD AUTO: 0.7 K/UL (ref 1–4.8)
LYMPHOCYTES NFR BLD: 15.2 % (ref 18–48)
MCH RBC QN AUTO: 31.3 PG (ref 27–31)
MCHC RBC AUTO-ENTMCNC: 29.6 G/DL (ref 32–36)
MCV RBC AUTO: 106 FL (ref 82–98)
MONOCYTES # BLD AUTO: 0.5 K/UL (ref 0.3–1)
MONOCYTES NFR BLD: 11.3 % (ref 4–15)
NEUTROPHILS # BLD AUTO: 2.3 K/UL (ref 1.8–7.7)
NEUTROPHILS NFR BLD: 54 % (ref 38–73)
NRBC BLD-RTO: 0 /100 WBC
PLATELET # BLD AUTO: 169 K/UL (ref 150–450)
PMV BLD AUTO: 9.8 FL (ref 9.2–12.9)
POCT GLUCOSE: 117 MG/DL (ref 70–110)
POCT GLUCOSE: 127 MG/DL (ref 70–110)
POCT GLUCOSE: 127 MG/DL (ref 70–110)
POCT GLUCOSE: 88 MG/DL (ref 70–110)
POTASSIUM SERPL-SCNC: 4.1 MMOL/L (ref 3.5–5.1)
PROT SERPL-MCNC: 5.9 G/DL (ref 6–8.4)
RBC # BLD AUTO: 3.29 M/UL (ref 4–5.4)
SODIUM SERPL-SCNC: 140 MMOL/L (ref 136–145)
WBC # BLD AUTO: 4.34 K/UL (ref 3.9–12.7)

## 2024-12-05 PROCEDURE — 80053 COMPREHEN METABOLIC PANEL: CPT | Performed by: HOSPITALIST

## 2024-12-05 PROCEDURE — 99223 1ST HOSP IP/OBS HIGH 75: CPT | Mod: ,,,

## 2024-12-05 PROCEDURE — 25000003 PHARM REV CODE 250: Performed by: STUDENT IN AN ORGANIZED HEALTH CARE EDUCATION/TRAINING PROGRAM

## 2024-12-05 PROCEDURE — 63600175 PHARM REV CODE 636 W HCPCS: Performed by: STUDENT IN AN ORGANIZED HEALTH CARE EDUCATION/TRAINING PROGRAM

## 2024-12-05 PROCEDURE — 85025 COMPLETE CBC W/AUTO DIFF WBC: CPT | Performed by: HOSPITALIST

## 2024-12-05 PROCEDURE — 21400001 HC TELEMETRY ROOM

## 2024-12-05 PROCEDURE — 25000003 PHARM REV CODE 250: Performed by: HOSPITALIST

## 2024-12-05 PROCEDURE — 36415 COLL VENOUS BLD VENIPUNCTURE: CPT | Performed by: HOSPITALIST

## 2024-12-05 PROCEDURE — 87324 CLOSTRIDIUM AG IA: CPT | Performed by: HOSPITALIST

## 2024-12-05 RX ORDER — L. ACIDOPHILUS/L.BULGARICUS 100MM CELL
1 GRANULES IN PACKET (EA) ORAL 2 TIMES DAILY
Status: DISCONTINUED | OUTPATIENT
Start: 2024-12-05 | End: 2024-12-06 | Stop reason: HOSPADM

## 2024-12-05 RX ORDER — MUPIROCIN 20 MG/G
OINTMENT TOPICAL 2 TIMES DAILY
Status: DISCONTINUED | OUTPATIENT
Start: 2024-12-05 | End: 2024-12-06 | Stop reason: HOSPADM

## 2024-12-05 RX ADMIN — MUPIROCIN: 20 OINTMENT TOPICAL at 09:12

## 2024-12-05 RX ADMIN — LACTOBACILLUS ACIDOPHILUS / LACTOBACILLUS BULGARICUS 1 EACH: 100 MILLION CFU STRENGTH GRANULES at 08:12

## 2024-12-05 RX ADMIN — METOPROLOL SUCCINATE 25 MG: 25 TABLET, EXTENDED RELEASE ORAL at 08:12

## 2024-12-05 RX ADMIN — COLESTIPOL HYDROCHLORIDE 3 G: 1 TABLET, FILM COATED ORAL at 08:12

## 2024-12-05 RX ADMIN — PSYLLIUM HUSK 1 PACKET: 3.4 POWDER ORAL at 03:12

## 2024-12-05 RX ADMIN — LEVOTHYROXINE SODIUM 88 MCG: 0.09 TABLET ORAL at 06:12

## 2024-12-05 RX ADMIN — APIXABAN 5 MG: 5 TABLET, FILM COATED ORAL at 08:12

## 2024-12-05 RX ADMIN — POTASSIUM CHLORIDE 20 MEQ: 1500 TABLET, EXTENDED RELEASE ORAL at 08:12

## 2024-12-05 RX ADMIN — PIPERACILLIN SODIUM AND TAZOBACTAM SODIUM 4.5 G: 4; .5 INJECTION, POWDER, FOR SOLUTION INTRAVENOUS at 10:12

## 2024-12-05 RX ADMIN — FUROSEMIDE 40 MG: 40 TABLET ORAL at 08:12

## 2024-12-05 RX ADMIN — PIPERACILLIN SODIUM AND TAZOBACTAM SODIUM 4.5 G: 4; .5 INJECTION, POWDER, FOR SOLUTION INTRAVENOUS at 06:12

## 2024-12-05 RX ADMIN — PIPERACILLIN SODIUM AND TAZOBACTAM SODIUM 4.5 G: 4; .5 INJECTION, POWDER, FOR SOLUTION INTRAVENOUS at 03:12

## 2024-12-05 NOTE — TELEPHONE ENCOUNTER
----- Message from Yadiel sent at 12/5/2024 11:15 AM CST -----  Regarding: niece  Type: Patient Call Back    Who called:anish    What is the request in detail:calling to speak to the office regarding the pt when asked the reason    Can the clinic reply by [facility]?no    Would the patient rather a call back or a response via [facility]? Callback     Best call back number:[phone]    Additional Information:

## 2024-12-05 NOTE — NURSING
[facility]  Nurses Note -- 4 Eyes      12/4/2024       Skin assessed on: Q Shift      [] No Pressure Injuries Present    []Prevention Measures Documented    [x] Yes LDA  for Pressure Injury Previously documented     [] Yes New Pressure Injury Discovered   [] LDA for New Pressure Injury Added      Attending RN:  Pooja Guadarrama LPN     Second RN:  Srini Ellison LPN

## 2024-12-05 NOTE — ASSESSMENT & PLAN NOTE
Likely 2/2 chronic venous stasis, less likely cellulitis as presentation is bilateral without fever, leukocytosis or purulent drainage; additionally patient has been on Zosyn therapy at home.  - continue home zosyn, will hold additional abx at this time  - wound care consulted  - B/l LE US negative for DVTs  - ABIs last admission normal; however CT LE showed evidence of PVD  - heel protector boots in place, elevated lower extremities

## 2024-12-05 NOTE — CONSULTS
[facility] Surg  Wound Care  WOC BECK    Patient Name:  Stephany Skinner   MRN:  [id number]  Date: 12/5/2024  Diagnosis: Bilateral lower extremity edema    History:     Past Medical History:   Diagnosis Date    Anemia     Arthritis     Atrial fibrillation     Back pain     Bronchiectasis, uncomplicated     CHF (congestive heart failure)     Disorder of kidney and ureter     Early stage nonexudative age-related macular degeneration of both eyes 2018    GERD (gastroesophageal reflux disease)     Hyperlipidemia     Hypertension     Hypothyroidism     Obesity     JOY (obstructive sleep apnea)     Osteoporosis     Polyneuropathy     Pressure injury of dorsum of right foot, stage 2 12/01/2017    Rheumatoid arthritis     Urinary incontinence     Venous stasis ulcer of right calf limited to breakdown of skin with varicose veins 06/24/2024       Social History     Socioeconomic History    Marital status:     Number of children: 1   Tobacco Use    Smoking status: Never     Passive exposure: Never    Smokeless tobacco: Never   Substance and Sexual Activity    Alcohol use: Never    Drug use: Never    Sexual activity: Never     Social Drivers of Health     Financial Resource Strain: Low Risk  (12/4/2024)    Overall Financial Resource Strain (CARDIA)     Difficulty of Paying Living Expenses: Not hard at all   Food Insecurity: No Food Insecurity (12/4/2024)    Hunger Vital Sign     Worried About Running Out of Food in the Last Year: Never true     Ran Out of Food in the Last Year: Never true   Transportation Needs: No Transportation Needs (12/4/2024)    TRANSPORTATION NEEDS     Transportation : No   Physical Activity: Inactive (12/4/2024)    Exercise Vital Sign     Days of Exercise per Week: 0 days     Minutes of Exercise per Session: 0 min   Stress: No Stress Concern Present (12/4/2024)    [employer] - Occupational Stress Questionnaire     Feeling of Stress : Not at all   Housing Stability:  Low Risk  (12/4/2024)    Housing Stability Vital Sign     Unable to Pay for Housing in the Last Year: No     Homeless in the Last Year: No       Precautions:     Allergies as of 12/03/2024 - Reviewed 12/03/2024   Allergen Reaction Noted    Cephalosporins Hives 11/03/2024    Statins-hmg-coa reductase inhibitors  05/30/2012       [facility] Assessment Details/Treatment     Active Problem List with Overview Notes    Diagnosis Date Noted    Bilateral lower extremity edema and erythema, ulceration 12/04/2024    Dermatitis associated with moisture 11/06/2024    Diarrhea 11/03/2024    Cellulitis of toe of left foot 11/02/2024    Osteomyelitis of great toe of left foot 11/02/2024    Ulcer of great toe of left foot, with fat layer exposed 10/14/2024     Patient presented to wound clinic on 10/14/2024 with a new wound on the left great toe.  There was callus with underlying wound and fluid.  Callus was removed and subcutaneous tissue was exposed.  The base was pink.  Patient is walking on her toe tip without shoes.  She was instructed to obtain a hammertoe pad and utilize she comes in all times.      Venous stasis ulcer of left calf limited to breakdown of skin with varicose veins 10/07/2024     Patient with new superficial ulcer left lower extremity.  Patient with chronic lower extremity edema.  Patient has been wearing compression stockings.  No need for debridement today.  Will cover with Mepilex Ag pad and continue compression therapy.  Patient encouraged to elevate.      Hallux hammertoe, right 09/09/2024     Patient with slight hammertoe deformity of the right great toe.    Patient has had hammertoe deformities of toes 2 and 3 previously surgically corrected.      Diabetic ulcer of great toe of right foot associated with type 2 diabetes mellitus, with necrosis of muscle 07/15/2024     Patient with new wound 7/15/2024 .  Patient with history of neuropathy.  Wound is superficial with no sign of infection.  No erythema or  drainage      Non-pressure chronic ulcer of calf, left, with fat layer exposed 07/08/2024     89-year-old female presented to the wound clinic on 07/08/2024 with a history of chronic venous hypertension developed a wound of the left lateral lower leg proximally 4 weeks ago.  The wound has enlarged.  She was evaluated by her primary physician who did apply a cream.  She was referred here for further management.  She denies any pain.  She has not had any fever or chills.  She has had moderate drainage.  As of 09/09/2024, the wound is healed      Chronic venous hypertension (idiopathic) with ulcer of left lower extremity (CODE) 07/08/2024    Polyneuropathy 03/13/2024    Rheumatoid arthritis 03/13/2024    Peripheral vascular disease, unspecified 03/13/2024    Aortic atherosclerosis 03/13/2024    Osteopenia 03/13/2024    Stage 3a chronic kidney disease (CKD) 02/28/2024     Avoid NSAIDS  Good BP control  Monitor renal function closely      Sacroiliitis, not elsewhere classified 02/28/2024     Take Tylenol for pain.  Use walker or cane for ambulation.      Exertional dyspnea 11/29/2023     During six minute walk her O2 sat dropped to 85%.  At rest it is 98%.  Ambulating with O2 at 2 l NC it stayed above 90%.      Candidiasis, intertrigo 08/31/2023    Cellulitis 08/31/2023    Conjunctivitis of both eyes 05/12/2023    Obesity, morbid, BMI 40.0-49.9 03/09/2023     Patient is trying to lose weight.  She has lost a few lb.  She is following a healthy diet.  She has difficulty exercising because of her musculoskeletal condition.         Essential hypertension 12/09/2022     Two gram sodium diet.    Weight loss discussed.    Try to walk 2 miles per day.    Current medications will be:  Lasix 40 mg daily   Potassium 20 mEq daily   Imdur 60 mg daily  Metoprolol 25 mg daily   Spironolactone 25 mg daily        COPD (chronic obstructive pulmonary disease) 12/09/2022     Continue DuoNeb treatments twice daily as needed.    Her COPD is  stable.         Pancreatic cyst 11/04/2020    Abnormal finding on GI tract imaging 10/21/2020    Cyst of pancreas 10/21/2020    B12 deficiency 07/31/2018    Type 2 diabetes mellitus with circulatory disorder, without long-term current use of insulin 05/01/2018     Patient no longer requires medications for this.  Her last A1c was 5.8.   Check hemoglobin A1c every 12 months.      Debility 02/07/2018    Chronic diastolic congestive heart failure 02/07/2018     Two gram sodium diet.    Weight loss discussed.    Try to walk 2 miles per day.    Avoid smoking.    Current medications will be:  Lower Valsartan 320 mg to 160 mg daily   Lasix 40 mg daily   Lower Metoprolol 100 mg to 50 mg daily   Potassium 20 mEq daily      PUJA (acute kidney injury) 02/05/2018    Myasthenia gravis, adult form 02/03/2018     No longer on prednisone, Mestinon  Keep appointment with neurology      Chronic atrial fibrillation 01/30/2018     Continue Eliquis 5 mg daily  Keep appointment with Cardiology      Early stage nonexudative age-related macular degeneration of both eyes 2018    Venous stasis of lower extremity 10/11/2016     Continue support stockings.  Place every morning and remove at night.      Incontinence in female 06/22/2015    Obstructive sleep apnea (adult) (pediatric) 08/08/2012     Continue home CPAP at current pressure.  Use oxygen at night.      Overweight(278.02) 08/08/2012     Dx updated per 2019 IMO Load      Osteoarthritis 08/08/2012    Hypothyroidism 08/08/2012     Patient takes Synthroid 88 mcg for her hypothyroidism.  Adjust Synthroid based on TSH resutls.  Check TSH every 6 months.      Hyperlipidemia 08/08/2012     Continue fish oil  Continue Lipitor 20 mg        Consulted for venous stasis ulcers- stasis dermatitis, ulceration  An 89 year old female admitted 12/3/24 from MD office/[facility] with complaint of leg swelling. Recent amputation of left toe (11/5) per Podiatry- on antibiotics.  Sent to hospital by Vascular Surgery for IV antibiotics for right leg cellulitis. Vascular has recommended compression stockings and elevation; pneumatic compression device ordered and referral to lymphedema clinic  12/5 Podiatry orders- daily paint all wounds with betadine cover left great toe with Mepilex border, all other wounds open to air- order attached to incision left 1st toe and venous ulcer left anterior leg  12/4 WBC 4.07 Hgb 9.8 Hct 30.7   12/3 Alb 2.8 Weight 112 kg  12/4 A1C 5.5  On Isoflex mattress; Tom score 14; heels suspended off bed with EHOB boots  12/3 9:20 pm 4 Eyes Skin Assessment- No Pressure Injuries POA  Photodocumentation (from Media 12/5)    Right leg    Right leg    Right leg  Assessment:  Bilateral lower legs with light erythema and small amount of edema.   Right lateral/posterior leg with linear area of discoloration- 5 cm(L) 1 cm(W) dark purple area without drainage or fluctuance. No surrounding induration. Patient reports legs were draining when sitting up in wheelchair.   Treatment/Plan:  Nursing to continue local wound care with betadine as per Podiatry orders. Continue PIP interventions with heels suspended off bed in EHOB boots. Avoid adhesives on lower legs. Continue to monitor legs for drainage. If right leg resumes draining, recommend applying hydrofiber dressing and secure with stockinette.   Treatment plan discussed with patient and nursing.  Recommendations made to primary team. Orders placed.     12/05/2024

## 2024-12-05 NOTE — NURSING
PER handoff received from Casandra, Pt laying in bed awake. NAD noted. No C/O pain. BM noted. New skin tear on left buttocks.   Fall and safety precaution maintained. Bed alarm activated and audible.Bed locked in lowest position, with side rails up x2. Call bell and personal items within reach.      Will cont to monitor and follow treatment plan.

## 2024-12-05 NOTE — PLAN OF CARE
Problem: Adult Inpatient Plan of Care  Goal: Plan of Care Review  Outcome: Progressing  Flowsheets (Taken 12/5/2024 1205)  Plan of Care Reviewed With: patient  Goal: Patient-Specific Goal (Individualized)  Outcome: Progressing  Goal: Absence of Hospital-Acquired Illness or Injury  Outcome: Progressing  Intervention: Identify and Manage Fall Risk  Flowsheets (Taken 12/5/2024 1205)  Safety Promotion/Fall Prevention:   assistive device/personal item within reach   bed alarm set   Fall Risk reviewed with patient/family   medications reviewed   instructed to call staff for mobility   high risk medications identified   nonskid shoes/socks when out of bed   room near unit station   side rails raised x 2  Intervention: Prevent Skin Injury  Flowsheets (Taken 12/5/2024 1205)  Body Position:   turned   weight shifting  Skin Protection: incontinence pads utilized  Device Skin Pressure Protection:   absorbent pad utilized/changed   adhesive use limited   tubing/devices free from skin contact  Intervention: Prevent and Manage VTE (Venous Thromboembolism) Risk  Flowsheets (Taken 12/5/2024 1205)  VTE Prevention/Management:   ambulation promoted   bleeding precautions maintained   bleeding risk assessed  Intervention: Prevent Infection  Flowsheets (Taken 12/5/2024 1205)  Infection Prevention: hand hygiene promoted  Goal: Optimal Comfort and Wellbeing  Outcome: Progressing  Goal: Readiness for Transition of Care  Outcome: Progressing     Problem: Diabetes Comorbidity  Goal: Blood Glucose Level Within Targeted Range  Outcome: Progressing  Intervention: Monitor and Manage Glycemia  Flowsheets (Taken 12/5/2024 1205)  Glycemic Management: blood glucose monitored     Problem: Acute Kidney Injury/Impairment  Goal: Fluid and Electrolyte Balance  Outcome: Progressing  Goal: Improved Oral Intake  Outcome: Progressing  Goal: Effective Renal Function  Outcome: Progressing     Problem: Infection  Goal: Absence of Infection Signs and  Symptoms  Outcome: Progressing  Intervention: Prevent or Manage Infection  Flowsheets (Taken 12/5/2024 1205)  Infection Management: aseptic technique maintained     Problem: Wound  Goal: Optimal Coping  Outcome: Progressing  Intervention: Support Patient and Family Response  Flowsheets (Taken 12/5/2024 1205)  Supportive Measures:   positive reinforcement provided   problem-solving facilitated   relaxation techniques promoted   self-care encouraged   self-responsibility promoted   self-reflection promoted   verbalization of feelings encouraged  Goal: Optimal Functional Ability  Outcome: Progressing  Goal: Absence of Infection Signs and Symptoms  Outcome: Progressing  Intervention: Prevent or Manage Infection  Flowsheets (Taken 12/5/2024 1205)  Infection Management: aseptic technique maintained  Goal: Improved Oral Intake  Outcome: Progressing  Goal: Optimal Pain Control and Function  Outcome: Progressing  Goal: Skin Health and Integrity  Outcome: Progressing  Intervention: Optimize Skin Protection  Flowsheets (Taken 12/5/2024 1205)  Pressure Reduction Techniques:   frequent weight shift encouraged   heels elevated off bed   weight shift assistance provided   pressure points protected  Pressure Reduction Devices: pressure-redistributing mattress utilized  Skin Protection: incontinence pads utilized  Activity Management:   Rolling - L1   Arm raise - L1   Straight leg raise - L1   Ankle pumps - L1  Head of Bed (HOB) Positioning: HOB at 30-45 degrees  Goal: Optimal Wound Healing  Outcome: Progressing     Problem: Skin Injury Risk Increased  Goal: Skin Health and Integrity  Outcome: Progressing  Intervention: Optimize Skin Protection  Flowsheets (Taken 12/5/2024 1205)  Pressure Reduction Techniques:   frequent weight shift encouraged   heels elevated off bed   weight shift assistance provided   pressure points protected  Pressure Reduction Devices: pressure-redistributing mattress utilized  Skin Protection: incontinence  pads utilized  Activity Management:   Rolling - L1   Arm raise - L1   Straight leg raise - L1   Ankle pumps - L1  Head of Bed (HOB) Positioning: HOB at 30-45 degrees     Problem: Fall Injury Risk  Goal: Absence of Fall and Fall-Related Injury  Outcome: Progressing  Intervention: Identify and Manage Contributors  Flowsheets (Taken 12/5/2024 1205)  Self-Care Promotion:   independence encouraged   BADL personal objects within reach   meal set-up provided   BADL personal routines maintained   adaptive equipment use encouraged  Medication Review/Management:   medications reviewed   high-risk medications identified  Intervention: Promote Injury-Free Environment  Flowsheets (Taken 12/5/2024 1205)  Safety Promotion/Fall Prevention:   assistive device/personal item within reach   bed alarm set   Fall Risk reviewed with patient/family   medications reviewed   instructed to call staff for mobility   high risk medications identified   nonskid shoes/socks when out of bed   room near unit station   side rails raised x 2   Pt alert able to make needs known,maría meds well,IV abx remains in progress,no s/s adverse reaction noted,reposition self q 2hrs,no c/o pain at this time,POC explained,remains free from falls and pressure injuries,safety maintained,continue monitoring.

## 2024-12-05 NOTE — HOSPITAL COURSE
Mrs. Skinner was hospitalized for suspected cellulitis RLE.  US bilat lower extremities negative for DVT.  Labs stable, no fever or leukocytosis.  Seen by Podiatry and sutures removed, wound healing well.  Continue IV antibiotics.  Seen by Wound Care as well with skin care recommendations.  Orders placed for return to SNF.  All findings and plan discussed with patient and her niece, all questions answered.  Discharged back to [facility] in [place], [place] in stable condition.  Incidentally, facility required covid test for return that returned a positive result.  Patient stable on room air and appears asymptomatic.  Would recommend re-test at facility to confirm.

## 2024-12-05 NOTE — CONSULTS
[facility] Surg  Podiatry  Consult Note    Patient Name: Stephany Skinner  MRN: [id number]  Admission Date: 12/3/2024  Hospital Length of Stay: 1 days  Attending Physician: Justin Chase, *  Primary Care Provider: Pipo Flowers MD     Inpatient consult to Podiatry  Consult performed by: Irais Trejo DPM  Consult ordered by: Gabriela Moreno MD        Subjective:     History of Present Illness: 89 y/o female PMH DM2 admitted for Right leg swelling. S/p left partial hallux amputation DOS: 11/5/24 per Dr. Ley. Consulted for evaluation of left surgical site.     Scheduled Meds:   apixaban  5 mg Oral BID    colestipoL  3 g Oral BID    furosemide  40 mg Oral Daily    levothyroxine  88 mcg Oral Before breakfast    metoprolol succinate  25 mg Oral Daily    piperacillin-tazobactam (Zosyn) IV (PEDS and ADULTS) (extended infusion is not appropriate)  4.5 g Intravenous Q8H    potassium chloride SA  20 mEq Oral Daily     Continuous Infusions:  PRN Meds:  Current Facility-Administered Medications:     acetaminophen, 650 mg, Oral, Q8H PRN    acetaminophen, 650 mg, Oral, Q4H PRN    aluminum-magnesium hydroxide-simethicone, 30 mL, Oral, QID PRN    glucagon (human recombinant), 1 mg, Intramuscular, PRN    glucose, 16 g, Oral, PRN    glucose, 24 g, Oral, PRN    melatonin, 6 mg, Oral, Nightly PRN    ondansetron, 4 mg, Intravenous, Q8H PRN    polyethylene glycol, 17 g, Oral, Daily PRN    prochlorperazine, 5 mg, Intravenous, Q6H PRN    simethicone, 1 tablet, Oral, QID PRN    sodium chloride 0.9%, 3 mL, Intravenous, Q12H PRN    Review of patient's allergies indicates:   Allergen Reactions    Cephalosporins Hives     Itchiness and hives immediately after IV Ceftriaxone push    Statins-hmg-coa reductase inhibitors      Other reaction(s): Unknown        Past Medical History:   Diagnosis Date    Anemia     Arthritis     Atrial fibrillation     Back pain     Bronchiectasis, uncomplicated     CHF (congestive heart failure)      Disorder of kidney and ureter     Early stage nonexudative age-related macular degeneration of both eyes 2018    GERD (gastroesophageal reflux disease)     Hyperlipidemia     Hypertension     Hypothyroidism     Obesity     JOY (obstructive sleep apnea)     Osteoporosis     Polyneuropathy     Pressure injury of dorsum of right foot, stage 2 12/01/2017    Rheumatoid arthritis     Urinary incontinence     Venous stasis ulcer of right calf limited to breakdown of skin with varicose veins 06/24/2024     Past Surgical History:   Procedure Laterality Date    CATARACT EXTRACTION W/  INTRAOCULAR LENS IMPLANT Right 12/12/2019    Procedure: EXTRACTION, CATARACT, WITH IOL INSERTION;  Surgeon: Michael Arellano MD;  Location: [facility];  Service: Ophthalmology;  Laterality: Right;    CATARACT EXTRACTION W/  INTRAOCULAR LENS IMPLANT Left 02/13/2020    Procedure: EXTRACTION, CATARACT, WITH IOL INSERTION;  Surgeon: Michael Arellano MD;  Location: [facility];  Service: Ophthalmology;  Laterality: Left;    CHOLECYSTECTOMY      ENDOSCOPIC ULTRASOUND OF UPPER GASTROINTESTINAL TRACT N/A 11/04/2020    Procedure: ULTRASOUND, ENDOSCOPIC, UPPER GI TRACT;  Surgeon: Thierry Saunders MD;  Location: [facility];  Service: Endoscopy;  Laterality: N/A;  covid 11/1 [facility]    HERNIA REPAIR      HYSTERECTOMY      knee replacemene Right 02/14/2007    deidra    PHACOEMULSIFICATION OF CATARACT Right 12/12/2019    Procedure: PHACOEMULSIFICATION, CATARACT;  Surgeon: Michael Arellano MD;  Location: [facility];  Service: Ophthalmology;  Laterality: Right;    PHACOEMULSIFICATION OF CATARACT Left 02/13/2020    Procedure: PHACOEMULSIFICATION, CATARACT;  Surgeon: Michael Arellano MD;  Location: [facility];  Service: Ophthalmology;  Laterality: Left;    REPLACEMENT Left 09/18/2007    knee    THYROIDECTOMY      TOE AMPUTATION Left 11/5/2024    Procedure: AMPUTATION, TOE- DISTAL HALLUX;  Surgeon: Noemi Ley DPM;  Location: [facility];  Service: Podiatry;  Laterality: Left;        Family History       Problem Relation (Age of Onset)    Cancer Sister          Tobacco Use    Smoking status: Never     Passive exposure: Never    Smokeless tobacco: Never   Substance and Sexual Activity    Alcohol use: Never    Drug use: Never    Sexual activity: Never     Review of Systems   Constitutional:  Negative for activity change.   Cardiovascular:  Positive for leg swelling.   Genitourinary: Negative.    Musculoskeletal:  Negative for arthralgias.   Skin:  Positive for wound.   Neurological: Negative.    Psychiatric/Behavioral: Negative.       Objective:     Vital Signs (Most Recent):  Temp: 97.9 °F (36.6 °C) (12/04/24 2305)  Pulse: 65 (12/04/24 2305)  Resp: 20 (12/04/24 2305)  BP: (!) 142/87 (12/04/24 2305)  SpO2: 95 % (12/04/24 2305) Vital Signs (24h Range):  Temp:  [97.8 °F (36.6 °C)-98.7 °F (37.1 °C)] 97.9 °F (36.6 °C)  Pulse:  [60-73] 65  Resp:  [18-20] 20  SpO2:  [93 %-98 %] 95 %  BP: (112-159)/(55-87) 142/87     Weight: 112 kg (246 lb 14.6 oz)  Body mass index is 38.67 kg/m².    Foot Exam    General  Orientation: alert and oriented to person, place, and time       Right Foot/Ankle     Inspection and Palpation  Skin Exam: ulcer;     Neurovascular  Dorsalis pedis: 1+  Posterior tibial: 1+  Saphenous nerve sensation: diminished  Tibial nerve sensation: diminished  Superficial peroneal nerve sensation: diminished  Deep peroneal nerve sensation: diminished  Sural nerve sensation: diminished      Left Foot/Ankle      Inspection and Palpation  Skin Exam: ulcer;     Neurovascular  Dorsalis pedis: 1+  Posterior tibial: 1+  Saphenous nerve sensation: diminished  Tibial nerve sensation: diminished  Superficial peroneal nerve sensation: diminished  Deep peroneal nerve sensation: diminished  Sural nerve sensation: diminished        12/5/24:  Sutures intact skin edges well coapted.       S/p suture removal.      Laboratory:  CBC:   Recent Labs   Lab 12/04/24  7113   WBC 4.07   RBC 3.05*   HGB 9.8*   HCT  30.7*      *   MCH 32.1*   MCHC 31.9*     CMP:   Recent Labs   Lab 12/03/24  1628 12/04/24  0413   GLU 86 73   CALCIUM 8.5* 8.0*   ALBUMIN 2.8*  --    PROT 6.8  --     142   K 3.2* 3.4*   CO2 23 23    111*   BUN 18 15   CREATININE 1.1 1.0   ALKPHOS 57  --    ALT 18  --    AST 30  --    BILITOT 0.4  --        Diagnostic Results:      Clinical Findings:  S/p left partial hallux amputation suture intact skin edges well coapted.     Assessment/Plan:     Active Diagnoses:    Diagnosis Date Noted POA    PRINCIPAL PROBLEM:  Bilateral lower extremity edema and erythema, ulceration [R60.0] 12/04/2024 Yes    Osteomyelitis of great toe of left foot [M86.9] 11/02/2024 Yes    Type 2 diabetes mellitus with circulatory disorder, without long-term current use of insulin [E11.59] 05/01/2018 Yes    Chronic diastolic congestive heart failure [I50.32] 02/07/2018 Yes    Chronic atrial fibrillation [I48.20] 01/30/2018 Yes    Hypothyroidism [E03.9] 08/08/2012 Yes      Problems Resolved During this Admission:       S/p left partial hallux amputation DOS: 11/5/24 per Dr. Ley.   All sutures removed. Painted with betadine    Betadine painted to all wounds    Nursing wound care orders placed  Heel protector boots ordered     Thank you for your consult. I will sign off. Please contact us if you have any additional questions.    Irais Trejo DPM  Podiatry  [facility] - Med Surg

## 2024-12-05 NOTE — NURSING
[facility]  Nurses Note -- 4 Eyes      12/4/2024       Skin assessed on: Q Shift      [] No Pressure Injuries Present    []Prevention Measures Documented    [x] Yes LDA  for Pressure Injury Previously documented     [] Yes New Pressure Injury Discovered   [] LDA for New Pressure Injury Added      Attending RN:  Terra Milligan LPN     Second RN:  ROSA ISELA Piedra

## 2024-12-05 NOTE — NURSING
[facility]  Nurses Note -- 4 Eyes      12/5/2024       Skin assessed on: Q Shift      [] No Pressure Injuries Present    [x]Prevention Measures Documented    [x] Yes LDA  for Pressure Injury Previously documented     [] Yes New Pressure Injury Discovered   [] LDA for New Pressure Injury Added      Attending RN:  Terra Milligan LPN     Second RN:  MARGARETH Patton/TingRN

## 2024-12-05 NOTE — SUBJECTIVE & OBJECTIVE
Interval History: no new complaints     Review of Systems   Cardiovascular:  Positive for leg swelling.   All other systems reviewed and are negative.    Objective:     Vital Signs (Most Recent):  Temp: 97.8 °F (36.6 °C) (12/05/24 1104)  Pulse: 62 (12/05/24 1146)  Resp: 20 (12/05/24 1104)  BP: (!) 117/57 (12/05/24 1104)  SpO2: 97 % (12/05/24 1104) Vital Signs (24h Range):  Temp:  [97.5 °F (36.4 °C)-98.3 °F (36.8 °C)] 97.8 °F (36.6 °C)  Pulse:  [61-72] 62  Resp:  [20] 20  SpO2:  [94 %-98 %] 97 %  BP: (117-160)/(57-87) 117/57     Weight: 112 kg (246 lb 14.6 oz)  Body mass index is 38.67 kg/m².    Intake/Output Summary (Last 24 hours) at 12/5/2024 1605  Last data filed at 12/5/2024 0845  Gross per 24 hour   Intake 240 ml   Output 450 ml   Net -210 ml         Physical Exam  Vitals and nursing note reviewed.   Constitutional:       General: She is not in acute distress.     Appearance: She is well-developed.   HENT:      Head: Normocephalic and atraumatic.      Right Ear: External ear normal.      Left Ear: External ear normal.      Nose: Nose normal.   Cardiovascular:      Rate and Rhythm: Normal rate and regular rhythm.   Pulmonary:      Effort: Pulmonary effort is normal. No respiratory distress.   Musculoskeletal:      Right lower leg: Edema present.      Left lower leg: Edema present.   Skin:     General: Skin is warm and dry.   Neurological:      Mental Status: She is alert and oriented to person, place, and time.   Psychiatric:         Thought Content: Thought content normal.             Significant Labs: All pertinent labs within the past 24 hours have been reviewed.    Significant Imaging: I have reviewed all pertinent imaging results/findings within the past 24 hours.

## 2024-12-05 NOTE — PROGRESS NOTES
[facility] Medicine  Progress Note    Patient Name: Stephany Skinner  MRN: [id number]  Patient Class: IP- Inpatient   Admission Date: 12/3/2024  Length of Stay: 2 days  Attending Physician: Justin Chase, *  Primary Care Provider: Pipo Flowers MD        Subjective     Principal Problem:Bilateral lower extremity edema        HPI:  Patient is an 89yoF with history of AFib (on Eliquis), HFpEF (EF: 50%), COPD, hypertension, type 2 diabetes, CKD, rheumatoid arthritis, myasthenia gravis, and PVD who was sent from vascular surgery clinic for concern for right lower extremity cellulitis. Patient presented to clinic complaining of acute on chronic bilateral lower extremity edema and erythema as well as new onset drainage from RLE. Recently hospitalized from 11/2-11/13 for L great toe OM. At that time patient underwent great toe amputation and was discharged on 6 weeks of IV zosyn per ID to cover pseudomonas that grew in the proximal marigins. Also at that time noted to have evidence of b/l LE PVD on CT, however ABIs normal and revasculaization was not needed per vascular surgery.     On arrival VSS. Afebrile. On exam, patient noted to have bilateral LE edema and erythema R>L with clear, non foul smelling drainage noted. Also noted to have digit ulceration and site of L great toe amputation clean and dry with sutures still in place. CBC/CMP largely unremarkable aside from macrocytic anemia with Hgb 10.7, K 3.2, and Albumin 2.8. No leukocytosis. BLE US negative for DVT. In the ED patient given IV clindamycin.    Overview/Hospital Course:  Mrs. Skinner was hospitalized for suspected cellulitis RLE.  US bilat lower extremities negative for DVT.  Labs stable, no fever or leukocytosis.  Seen by Podiatry and sutures removed, wound healing well.  Continue IV antibiotics.    Interval History: no new complaints     Review of Systems   Cardiovascular:  Positive for leg swelling.   All other systems  reviewed and are negative.    Objective:     Vital Signs (Most Recent):  Temp: 97.8 °F (36.6 °C) (12/05/24 1104)  Pulse: 62 (12/05/24 1146)  Resp: 20 (12/05/24 1104)  BP: (!) 117/57 (12/05/24 1104)  SpO2: 97 % (12/05/24 1104) Vital Signs (24h Range):  Temp:  [97.5 °F (36.4 °C)-98.3 °F (36.8 °C)] 97.8 °F (36.6 °C)  Pulse:  [61-72] 62  Resp:  [20] 20  SpO2:  [94 %-98 %] 97 %  BP: (117-160)/(57-87) 117/57     Weight: 112 kg (246 lb 14.6 oz)  Body mass index is 38.67 kg/m².    Intake/Output Summary (Last 24 hours) at 12/5/2024 1605  Last data filed at 12/5/2024 0845  Gross per 24 hour   Intake 240 ml   Output 450 ml   Net -210 ml         Physical Exam  Vitals and nursing note reviewed.   Constitutional:       General: She is not in acute distress.     Appearance: She is well-developed.   HENT:      Head: Normocephalic and atraumatic.      Right Ear: External ear normal.      Left Ear: External ear normal.      Nose: Nose normal.   Cardiovascular:      Rate and Rhythm: Normal rate and regular rhythm.   Pulmonary:      Effort: Pulmonary effort is normal. No respiratory distress.   Musculoskeletal:      Right lower leg: Edema present.      Left lower leg: Edema present.   Skin:     General: Skin is warm and dry.   Neurological:      Mental Status: She is alert and oriented to person, place, and time.   Psychiatric:         Thought Content: Thought content normal.             Significant Labs: All pertinent labs within the past 24 hours have been reviewed.    Significant Imaging: I have reviewed all pertinent imaging results/findings within the past 24 hours.    Assessment and Plan     * Bilateral lower extremity edema and erythema, ulceration  Likely 2/2 chronic venous stasis, less likely cellulitis as presentation is bilateral without fever, leukocytosis or purulent drainage; additionally patient has been on Zosyn therapy at home.  - continue home zosyn, will hold additional abx at this time  - wound care consulted  - B/l  LE US negative for DVTs  - ABIs last admission normal; however CT LE showed evidence of PVD  - heel protector boots in place, elevated lower extremities      Osteomyelitis of great toe of left foot  Admitted for this last month s/p L great toe partial amputation.  - sutures remain in place  - podiatry consulted to evaluate surgical site  - continue home IV zosyn      Type 2 diabetes mellitus with circulatory disorder, without long-term current use of insulin  Patient reports that she no longer requires medication for this.  - A1c ordered   -low dose SSI + ACHS glucose checks      Chronic diastolic congestive heart failure  Does not appear to be in acute exacerbation  - continue home lasix 40mg, home metoprolol    Chronic atrial fibrillation   [id number] Score: 4. The patients heart rate in the last 24 hours is as follows:  Pulse  Min: 65  Max: 90     Antiarrhythmics  metoprolol succinate (TOPROL-XL) 24 hr tablet 25 mg, Daily, Oral    Anticoagulants  apixaban tablet 5 mg, 2 times daily, Oral    Plan  - Replete lytes with a goal of K>4, Mg >2  - Patient is anticoagulated, see medications listed above.  - Continue home medications as above      Hypothyroidism  - continue home synthroid        VTE Risk Mitigation (From admission, onward)           Ordered     apixaban tablet 5 mg  2 times daily         12/03/24 2204                    Discharge Planning   MADELYN:      Code Status: Full Code   Medical Readiness for Discharge Date:   Discharge Plan A: Skilled Nursing Facility      Mumtaz Escobar Jr., APRN, AGACNP-BC  Hospitalist - Department of Hospital Medicine  [facility] [address] [place] [address]  Office #: [phone]; Pager #: [phone]

## 2024-12-05 NOTE — TELEPHONE ENCOUNTER
Called and spoke with pt.anish. states she accompanied pt.to clinic visit to see vascular . States pt. lives in ns.home. Per niece pt.was admitted to hospital as observation and was to go back to ns.home. States she has not been to hospital or spoke with anyone regarding her relative care. Has been in contact with nursing home and they told her pt. In hospital and if stays longer than 3 days will be charge. Anish upset that  not doctor taking care of her family member at the hospital. I explained to anish that when pt. goes to ER they are evaluated by ER doctor and not always admitted to doctor who sent her and may not be consulted to see pt.while in hospital. Aware pt.has a hospitalist who is there PCP while in hospital. If she needs more information she is to contact hospital or [facility] where pt.resides. Aware  does want pt.to f/u in clinic 3 wks after discharged from hospital. Provider aware and will try to contact   at hospital.

## 2024-12-05 NOTE — PLAN OF CARE
I provided the patient a choice of post acute providers and offered a list of CMS rated SNF      Patient/family chose the following as their preferred providers:  Return to [facility].     Informed patient and family that placement is based on medical acceptance, insurance authorization and bed availability.      CM attempted to contact admissions at [facility]. CM left a VM.     CM will continue to follow-up for discharge needs.

## 2024-12-06 VITALS
HEIGHT: 67 IN | RESPIRATION RATE: 18 BRPM | BODY MASS INDEX: 38.76 KG/M2 | WEIGHT: 246.94 LBS | HEART RATE: 71 BPM | OXYGEN SATURATION: 98 % | TEMPERATURE: 99 F | SYSTOLIC BLOOD PRESSURE: 148 MMHG | DIASTOLIC BLOOD PRESSURE: 70 MMHG

## 2024-12-06 DIAGNOSIS — U07.1 COVID-19 VIRUS DETECTED: ICD-10-CM

## 2024-12-06 LAB
POCT GLUCOSE: 70 MG/DL (ref 70–110)
POCT GLUCOSE: 88 MG/DL (ref 70–110)
SARS-COV-2 RDRP RESP QL NAA+PROBE: POSITIVE

## 2024-12-06 PROCEDURE — 25000003 PHARM REV CODE 250: Performed by: STUDENT IN AN ORGANIZED HEALTH CARE EDUCATION/TRAINING PROGRAM

## 2024-12-06 PROCEDURE — 87635 SARS-COV-2 COVID-19 AMP PRB: CPT | Performed by: HOSPITALIST

## 2024-12-06 PROCEDURE — 63600175 PHARM REV CODE 636 W HCPCS: Performed by: STUDENT IN AN ORGANIZED HEALTH CARE EDUCATION/TRAINING PROGRAM

## 2024-12-06 PROCEDURE — 25000003 PHARM REV CODE 250: Performed by: HOSPITALIST

## 2024-12-06 RX ADMIN — COLESTIPOL HYDROCHLORIDE 3 G: 1 TABLET, FILM COATED ORAL at 08:12

## 2024-12-06 RX ADMIN — POTASSIUM CHLORIDE 20 MEQ: 1500 TABLET, EXTENDED RELEASE ORAL at 08:12

## 2024-12-06 RX ADMIN — MUPIROCIN: 20 OINTMENT TOPICAL at 08:12

## 2024-12-06 RX ADMIN — LEVOTHYROXINE SODIUM 88 MCG: 0.09 TABLET ORAL at 05:12

## 2024-12-06 RX ADMIN — APIXABAN 5 MG: 5 TABLET, FILM COATED ORAL at 08:12

## 2024-12-06 RX ADMIN — PIPERACILLIN SODIUM AND TAZOBACTAM SODIUM 4.5 G: 4; .5 INJECTION, POWDER, FOR SOLUTION INTRAVENOUS at 06:12

## 2024-12-06 RX ADMIN — PSYLLIUM HUSK 1 PACKET: 3.4 POWDER ORAL at 08:12

## 2024-12-06 RX ADMIN — ACETAMINOPHEN 650 MG: 325 TABLET ORAL at 05:12

## 2024-12-06 RX ADMIN — LACTOBACILLUS ACIDOPHILUS / LACTOBACILLUS BULGARICUS 1 EACH: 100 MILLION CFU STRENGTH GRANULES at 08:12

## 2024-12-06 RX ADMIN — FUROSEMIDE 40 MG: 40 TABLET ORAL at 08:12

## 2024-12-06 RX ADMIN — METOPROLOL SUCCINATE 25 MG: 25 TABLET, EXTENDED RELEASE ORAL at 08:12

## 2024-12-06 NOTE — PLAN OF CARE
Case Management Final Discharge Note    Discharge Disposition: [facility]     New DME ordered / company name: None     Relevant SDOH / Transition of Care Barriers:  None     Primary Caretaker and contact information: Extended Emergency Contact Information  Primary Emergency Contact: Shira Montiel  Mobile Phone: [phone]  Relation: Sister  Preferred language: English   needed? No  Secondary Emergency Contact: Lizabeth Lawson  Mobile Phone: [phone]  Relation: Relative  Preferred language: English     Referrals placed: Physical Therapy/Occupational Therapy - Patient asked office to contact her Monday to schedule an appointment.     Transportation: Transportation has been scheduled to transport patient back to the Skilled Nursing Facility.     Call report to [phone] ask for gallo 300 nurse.     ADT 30 order placed for Van Transportation.  Requested  time: 1:00 pm   If transportation does not arrive at ETA time nurse will be instructed to follow protocol for transportation below:  How can I get in touch directly with dispatch, if needed?                 Non-emergent dispatch: [phone]      Patient and family educated on discharge services and updated on DC plan. Bedside RN notified, patient clear to discharge from Case Management Perspective.       12/06/24 1206   Final Note   Assessment Type Final Discharge Note   Anticipated Discharge Disposition SNF   What phone number can be called within the next 1-3 days to see how you are doing after discharge? [phone]   Post-Acute Status   Post-Acute Authorization Placement   Post-Acute Placement Status Set-up Complete/Auth obtained   Coverage Medicare Part A & B   Discharge Delays None known at this time

## 2024-12-06 NOTE — NURSING
[facility]  Nurses Note -- 4 Eyes      12/5/2024       Skin assessed on: Q Shift      [] No Pressure Injuries Present    []Prevention Measures Documented    [x] Yes LDA  for Pressure Injury Previously documented     [] Yes New Pressure Injury Discovered   [] LDA for New Pressure Injury Added      Attending RN:  Pooja Guadarrama LPN     Second RN:  Srini Ellison LPN

## 2024-12-06 NOTE — NURSING
[facility]  Nurses Note -- 4 Eyes      12/6/2024       Skin assessed on: Q Shift      [] No Pressure Injuries Present    [x]Prevention Measures Documented    [x] Yes LDA  for Pressure Injury Previously documented     [] Yes New Pressure Injury Discovered   [] LDA for New Pressure Injury Added      Attending RN:  Terra Milligan LPN     Second RN:  ROSA ISELA Castro       complains of pain/discomfort

## 2024-12-06 NOTE — DISCHARGE SUMMARY
[facility] Medicine  Discharge Summary      Patient Name: Stephany Skinner  MRN: [id number]  [facility]: [id number]  Patient Class: IP- Inpatient  Admission Date: 12/3/2024  Hospital Length of Stay: 3 days  Discharge Date and Time: 12/6/2024  2:51 PM  Attending Physician: Justin Chase MD  Discharging Provider: Mumtaz Escobar Jr, NP  Primary Care Provider: Pipo Flowers MD    Primary Care Team: MUMTAZ ESCOBAR    HPI:   Patient is an 89yoF with history of AFib (on Eliquis), HFpEF (EF: 50%), COPD, hypertension, type 2 diabetes, CKD, rheumatoid arthritis, myasthenia gravis, and PVD who was sent from vascular surgery clinic for concern for right lower extremity cellulitis. Patient presented to clinic complaining of acute on chronic bilateral lower extremity edema and erythema as well as new onset drainage from RLE. Recently hospitalized from 11/2-11/13 for L great toe OM. At that time patient underwent great toe amputation and was discharged on 6 weeks of IV zosyn per ID to cover pseudomonas that grew in the proximal marigins. Also at that time noted to have evidence of b/l LE PVD on CT, however ABIs normal and revasculaization was not needed per vascular surgery.     On arrival VSS. Afebrile. On exam, patient noted to have bilateral LE edema and erythema R>L with clear, non foul smelling drainage noted. Also noted to have digit ulceration and site of L great toe amputation clean and dry with sutures still in place. CBC/CMP largely unremarkable aside from macrocytic anemia with Hgb 10.7, K 3.2, and Albumin 2.8. No leukocytosis. BLE US negative for DVT. In the ED patient given IV clindamycin.    * No surgery found *      Hospital Course:   Mrs. Skinner was hospitalized for suspected cellulitis RLE.  US bilat lower extremities negative for DVT.  Labs stable, no fever or leukocytosis.  Seen by Podiatry and sutures removed, wound healing well.  Continue IV antibiotics.  Seen by Wound Care as well with  skin care recommendations.  Orders placed for return to SNF.  All findings and plan discussed with patient and her niece, all questions answered.  Discharged back to [facility] SN[facility] in [place], [place] in stable condition.  Incidentally, facility required covid test for return that returned a positive result.  Patient stable on room air and appears asymptomatic.  Would recommend re-test at facility to confirm.     Goals of Care Treatment Preferences:  Code Status: Full Code      SDOH Screening:  The patient was screened for utility difficulties, food insecurity, transport difficulties, housing insecurity, and interpersonal safety and there were no concerns identified this admission.     Consults:   Consults (From admission, onward)          Status Ordering Provider     Inpatient consult to Podiatry  Once        Provider:  Irais Trejo DPM    Completed UMNDO LEE            * Bilateral lower extremity edema and erythema, ulceration  Likely 2/2 chronic venous stasis, less likely cellulitis as presentation is bilateral without fever, leukocytosis or purulent drainage; additionally patient has been on Zosyn therapy at home.  - continue home zosyn, will hold additional abx at this time  - wound care consulted  - B/l LE US negative for DVTs  - ABIs last admission normal; however CT LE showed evidence of PVD  - heel protector boots in place, elevated lower extremities      Osteomyelitis of great toe of left foot  Admitted for this last month s/p L great toe partial amputation.  - sutures remain in place  - podiatry consulted to evaluate surgical site  - continue home IV zosyn      Type 2 diabetes mellitus with circulatory disorder, without long-term current use of insulin  Patient reports that she no longer requires medication for this.  - A1c ordered   -low dose SSI + ACHS glucose checks      Chronic diastolic congestive heart failure  Does not appear to be in acute exacerbation  - continue home lasix 40mg, home  metoprolol    Chronic atrial fibrillation   [id number] Score: 4. The patients heart rate in the last 24 hours is as follows:  Pulse  Min: 65  Max: 90     Antiarrhythmics  metoprolol succinate (TOPROL-XL) 24 hr tablet 25 mg, Daily, Oral    Anticoagulants  apixaban tablet 5 mg, 2 times daily, Oral    Plan  - Replete lytes with a goal of K>4, Mg >2  - Patient is anticoagulated, see medications listed above.  - Continue home medications as above      Hypothyroidism  - continue home synthroid        Final Active Diagnoses:    Diagnosis Date Noted POA    PRINCIPAL PROBLEM:  Bilateral lower extremity edema and erythema, ulceration [R60.0] 12/04/2024 Yes    Osteomyelitis of great toe of left foot [M86.9] 11/02/2024 Yes    Type 2 diabetes mellitus with circulatory disorder, without long-term current use of insulin [E11.59] 05/01/2018 Yes    Chronic diastolic congestive heart failure [I50.32] 02/07/2018 Yes    Chronic atrial fibrillation [I48.20] 01/30/2018 Yes    Hypothyroidism [E03.9] 08/08/2012 Yes      Problems Resolved During this Admission:       Discharged Condition: stable    Disposition: Skilled Nursing Facility    Follow Up:   Follow-up Information       [facility] Follow up today.    Specialty: Physical Therapy  Why: Patient has requested the office contact her on Monday 12/09/24 to schedule with PT.  Contact information:  [address] [place], [place] [address] [address] [phone]                           Patient Instructions:      Diet Cardiac     Activity as tolerated       Significant Diagnostic Studies: Labs: CMP   Recent Labs   Lab 12/05/24  1408      K 4.1      CO2 22*   *   BUN 11   CREATININE 1.0   CALCIUM 7.7*   PROT 5.9*   ALBUMIN 2.1*   BILITOT 0.2   ALKPHOS 42   AST 24   ALT 13   ANIONGAP 8    and CBC   Recent Labs   Lab 12/05/24  1408   WBC 4.34   HGB 10.3*   HCT 34.8*          Pending Diagnostic Studies:       None           Medications:  Reconciled Home  Medications:      Medication List        CONTINUE taking these medications      acetaminophen 325 MG tablet  Commonly known as: TYLENOL  Take 2 tablets (650 mg total) by mouth every 8 (eight) hours as needed for Temperature greater than or Pain (or equal to 100 degree F. Not to exceed 4 grams daily).     albuterol 90 mcg/actuation inhaler  Commonly known as: PROVENTIL/VENTOLIN HFA  Inhale 2 puffs into the lungs every 4 (four) hours as needed for Wheezing or Shortness of Breath. Rescue     alendronate 70 MG tablet  Commonly known as: FOSAMAX  Take 1 tablet (70 mg total) by mouth every 7 days. Resume on discharge from skilled nursing.     atorvastatin 20 MG tablet  Commonly known as: LIPITOR  Take 20 mg by mouth every evening.     calcium carbonate-vit D3-min 600 mg-10 mcg (400 unit) Tab  Take 2 tablets by mouth once daily.     colestipoL 1 gram Tab  Commonly known as: COLESTID  Take 3 tablets (3 g total) by mouth 2 (two) times daily. For cholesterol and diarrhea     D5W PgBk 100 mL with piperacillin-tazobactam 4.5 gram SolR 4.5 g  Inject 4.5 g into the vein every 8 (eight) hours. Ending on 12/17/2024.     diclofenac sodium 1 % Gel  Commonly known as: VOLTAREN  Apply 2 g topically 2 (two) times daily. Apply to bilateral knees     ELIQUIS 5 mg Tab  Generic drug: apixaban  Take 5 mg by mouth 2 (two) times daily.     furosemide 40 MG tablet  Commonly known as: LASIX  Take 1 tablet (40 mg total) by mouth once daily.     insulin lispro 100 unit/mL injection  Commonly known as: HumaLOG U-100 Insulin  Inject 0-5 Units into the skin every meal as needed for High Blood Sugar. Blood Glucose  mg/dL                  Pre-meal       151-200                1 units       201-250                2 units       251-300                3 units      301-350                4 units            >350                     5 units          Administer subcutaneously if needed at times designated by monitoring  schedule.     isosorbide mononitrate 60  MG 24 hr tablet  Commonly known as: IMDUR  Take 60 mg by mouth once daily.     lactobacillus acidophilus & bulgar 100 million cell packet  Commonly known as: LACTINEX  Take 1 packet (1 each total) by mouth 2 (two) times daily. May discontinue on 1/17/2025     levothyroxine 88 MCG tablet  Commonly known as: SYNTHROID  Take 1 tablet (88 mcg total) by mouth before breakfast.     loperamide 2 mg Tab  Commonly known as: IMODIUM A-D  Take 1 tablet (2 mg total) by mouth 3 (three) times daily as needed (diarrhea).     losartan 25 MG tablet  Commonly known as: COZAAR  Take 25 mg by mouth 2 (two) times a day.     metoprolol succinate 25 MG 24 hr tablet  Commonly known as: TOPROL-XL  Take 25 mg by mouth once daily.     miconazole NITRATE 2 % 2 % top powder  Commonly known as: MICOTIN  Apply topically 2 (two) times daily. To groin     nitroGLYCERIN 0.4 MG SL tablet  Commonly known as: NITROSTAT  Place 0.4 mg under the tongue every 5 (five) minutes as needed for Chest pain.     potassium chloride SA 20 MEQ tablet  Commonly known as: K-DUR,KLOR-CON  Take 1 tablet (20 mEq total) by mouth once daily.     PRESERVISION AREDS 4,296 mcg-226 mg-90 mg Cap  Generic drug: vitamins A,C,E-zinc-copper  Take 1 capsule by mouth Daily. Resume once discharged from skilled nursing.     spironolactone 25 MG tablet  Commonly known as: ALDACTONE  Take 25 mg by mouth every morning.     VISION ORAL  Take 1 tablet by mouth 2 (two) times daily.     VITAMIN B-12 1000 MCG tablet  Generic drug: cyanocobalamin  Take 100 mcg by mouth once daily.              Indwelling Lines/Drains at time of discharge:   Lines/Drains/Airways       Peripherally Inserted Central Catheter Line  Duration             PICC Double Lumen 11/09/24 1250 right basilic 27 days                    Time spent on the discharge of patient: 35 minutes         Mumtaz Escobar Jr NP  Department of [facility] Medicine  [facility]

## 2024-12-06 NOTE — NURSING
[facility] transport arrived on unit to transport this pt back to [facility] report was called in to nurse Damon, pt had large BM mitchel care was done pt assisted to w/c with this nurse and staff RN assist, pt now leaving unit,OLYA.

## 2024-12-06 NOTE — PLAN OF CARE
12/06/24 1021   Medicare Message   Important Message from Medicare regarding Discharge Appeal Rights Given to patient/caregiver;Explained to patient/caregiver;Signed/date by patient/caregiver   Date IMM was signed 12/06/24   Time IMM was signed 0954

## 2024-12-06 NOTE — NURSING
PER handoff received from Terra, Pt laying in bed awake. NAD noted. No C/O pain.  Fall and safety precaution maintained. Bed alarm activated and audible.Bed loacked in lowest position, with side rails up x2. Call bell and personal items within reach.    Will cont to monitor.

## 2024-12-07 LAB — BACTERIA BLD CULT: NORMAL

## 2024-12-09 ENCOUNTER — HOSPITAL ENCOUNTER (EMERGENCY)
Facility: HOSPITAL | Age: 89
End: 2024-12-10
Attending: STUDENT IN AN ORGANIZED HEALTH CARE EDUCATION/TRAINING PROGRAM
Payer: MEDICARE

## 2024-12-09 ENCOUNTER — HOSPITAL ENCOUNTER (EMERGENCY)
Facility: HOSPITAL | Age: 89
Discharge: HOME | End: 2024-12-09
Attending: STUDENT IN AN ORGANIZED HEALTH CARE EDUCATION/TRAINING PROGRAM
Payer: MEDICARE

## 2024-12-09 VITALS
BODY MASS INDEX: 38.61 KG/M2 | HEART RATE: 77 BPM | OXYGEN SATURATION: 99 % | TEMPERATURE: 98 F | DIASTOLIC BLOOD PRESSURE: 63 MMHG | WEIGHT: 246 LBS | RESPIRATION RATE: 18 BRPM | SYSTOLIC BLOOD PRESSURE: 130 MMHG | HEIGHT: 67 IN

## 2024-12-09 DIAGNOSIS — R41.0 CONFUSION: ICD-10-CM

## 2024-12-09 DIAGNOSIS — R41.82 ALTERED MENTAL STATUS: Primary | ICD-10-CM
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
## 2024-12-09 DIAGNOSIS — B37.31 CANDIDA VAGINITIS: ICD-10-CM

## 2024-12-09 LAB
ABO + RH BLD: NORMAL
ALBUMIN SERPL BCP-MCNC: 2.3 G/DL (ref 3.5–5.2)
ALBUMIN SERPL BCP-MCNC: 2.6 G/DL (ref 3.5–5.2)
ALP SERPL-CCNC: 42 U/L (ref 40–150)
ALP SERPL-CCNC: 50 U/L (ref 40–150)
ALT SERPL W/O P-5'-P-CCNC: 18 U/L (ref 10–44)
ALT SERPL W/O P-5'-P-CCNC: 18 U/L (ref 10–44)
AMMONIA PLAS-SCNC: 25 UMOL/L (ref 10–50)
AMPHET+METHAMPHET UR QL: NEGATIVE
ANION GAP SERPL CALC-SCNC: 10 MMOL/L (ref 8–16)
ANION GAP SERPL CALC-SCNC: 15 MMOL/L (ref 8–16)
APAP SERPL-MCNC: <3 UG/ML (ref 10–20)
AST SERPL-CCNC: 23 U/L (ref 10–40)
AST SERPL-CCNC: 26 U/L (ref 10–40)
BARBITURATES UR QL SCN>200 NG/ML: NEGATIVE
BASOPHILS # BLD AUTO: 0.03 K/UL (ref 0–0.2)
BASOPHILS # BLD AUTO: 0.04 K/UL (ref 0–0.2)
BASOPHILS NFR BLD: 0.4 % (ref 0–1.9)
BASOPHILS NFR BLD: 0.4 % (ref 0–1.9)
BENZODIAZ UR QL SCN>200 NG/ML: NEGATIVE
BILIRUB SERPL-MCNC: 0.6 MG/DL (ref 0.1–1)
BILIRUB SERPL-MCNC: 0.6 MG/DL (ref 0.1–1)
BILIRUB UR QL STRIP: NEGATIVE
BLD GP AB SCN CELLS X3 SERPL QL: NORMAL
BUN SERPL-MCNC: 11 MG/DL (ref 8–23)
BUN SERPL-MCNC: 11 MG/DL (ref 8–23)
BZE UR QL SCN: NEGATIVE
CALCIUM SERPL-MCNC: 7.4 MG/DL (ref 8.7–10.5)
CALCIUM SERPL-MCNC: 8 MG/DL (ref 8.7–10.5)
CANNABINOIDS UR QL SCN: NEGATIVE
CAOX CRY URNS QL MICRO: ABNORMAL
CHLORIDE SERPL-SCNC: 109 MMOL/L (ref 95–110)
CHLORIDE SERPL-SCNC: 111 MMOL/L (ref 95–110)
CLARITY UR: ABNORMAL
CO2 SERPL-SCNC: 19 MMOL/L (ref 23–29)
CO2 SERPL-SCNC: 20 MMOL/L (ref 23–29)
COLOR UR: YELLOW
CREAT SERPL-MCNC: 1.2 MG/DL (ref 0.5–1.4)
CREAT SERPL-MCNC: 1.2 MG/DL (ref 0.5–1.4)
CREAT UR-MCNC: 142.7 MG/DL (ref 15–325)
DIFFERENTIAL METHOD BLD: ABNORMAL
DIFFERENTIAL METHOD BLD: ABNORMAL
EOSINOPHIL # BLD AUTO: 0.9 K/UL (ref 0–0.5)
EOSINOPHIL # BLD AUTO: 1 K/UL (ref 0–0.5)
EOSINOPHIL NFR BLD: 10.2 % (ref 0–8)
EOSINOPHIL NFR BLD: 12.6 % (ref 0–8)
ERYTHROCYTE [DISTWIDTH] IN BLOOD BY AUTOMATED COUNT: 13.3 % (ref 11.5–14.5)
ERYTHROCYTE [DISTWIDTH] IN BLOOD BY AUTOMATED COUNT: 13.6 % (ref 11.5–14.5)
EST. GFR  (NO RACE VARIABLE): 44 ML/MIN/1.73 M^2
EST. GFR  (NO RACE VARIABLE): 44 ML/MIN/1.73 M^2
ETHANOL SERPL-MCNC: <10 MG/DL
GLUCOSE SERPL-MCNC: 81 MG/DL (ref 70–110)
GLUCOSE SERPL-MCNC: 99 MG/DL (ref 70–110)
GLUCOSE UR QL STRIP: NEGATIVE
HCT VFR BLD AUTO: 28.3 % (ref 37–48.5)
HCT VFR BLD AUTO: 32.2 % (ref 37–48.5)
HGB BLD-MCNC: 10.6 G/DL (ref 12–16)
HGB BLD-MCNC: 9.2 G/DL (ref 12–16)
HGB UR QL STRIP: ABNORMAL
IMM GRANULOCYTES # BLD AUTO: 0.07 K/UL (ref 0–0.04)
IMM GRANULOCYTES # BLD AUTO: 0.08 K/UL (ref 0–0.04)
IMM GRANULOCYTES NFR BLD AUTO: 0.9 % (ref 0–0.5)
IMM GRANULOCYTES NFR BLD AUTO: 1 % (ref 0–0.5)
KETONES UR QL STRIP: ABNORMAL
LEUKOCYTE ESTERASE UR QL STRIP: NEGATIVE
LYMPHOCYTES # BLD AUTO: 0.8 K/UL (ref 1–4.8)
LYMPHOCYTES # BLD AUTO: 0.8 K/UL (ref 1–4.8)
LYMPHOCYTES NFR BLD: 11.8 % (ref 18–48)
LYMPHOCYTES NFR BLD: 8.4 % (ref 18–48)
MCH RBC QN AUTO: 31.9 PG (ref 27–31)
MCH RBC QN AUTO: 32 PG (ref 27–31)
MCHC RBC AUTO-ENTMCNC: 32.5 G/DL (ref 32–36)
MCHC RBC AUTO-ENTMCNC: 32.9 G/DL (ref 32–36)
MCV RBC AUTO: 97 FL (ref 82–98)
MCV RBC AUTO: 98 FL (ref 82–98)
METHADONE UR QL SCN>300 NG/ML: NEGATIVE
MICROSCOPIC COMMENT: ABNORMAL
MONOCYTES # BLD AUTO: 0.7 K/UL (ref 0.3–1)
MONOCYTES # BLD AUTO: 0.7 K/UL (ref 0.3–1)
MONOCYTES NFR BLD: 7.6 % (ref 4–15)
MONOCYTES NFR BLD: 9.9 % (ref 4–15)
NEUTROPHILS # BLD AUTO: 4.5 K/UL (ref 1.8–7.7)
NEUTROPHILS # BLD AUTO: 6.8 K/UL (ref 1.8–7.7)
NEUTROPHILS NFR BLD: 64.3 % (ref 38–73)
NEUTROPHILS NFR BLD: 72.5 % (ref 38–73)
NITRITE UR QL STRIP: NEGATIVE
NRBC BLD-RTO: 0 /100 WBC
NRBC BLD-RTO: 0 /100 WBC
OPIATES UR QL SCN: NEGATIVE
PCP UR QL SCN>25 NG/ML: NEGATIVE
PH UR STRIP: 6 [PH] (ref 5–8)
PLATELET # BLD AUTO: 253 K/UL (ref 150–450)
PLATELET # BLD AUTO: 260 K/UL (ref 150–450)
PLATELET BLD QL SMEAR: ABNORMAL
PMV BLD AUTO: 8.8 FL (ref 9.2–12.9)
PMV BLD AUTO: 8.9 FL (ref 9.2–12.9)
POTASSIUM SERPL-SCNC: 3.5 MMOL/L (ref 3.5–5.1)
POTASSIUM SERPL-SCNC: 3.6 MMOL/L (ref 3.5–5.1)
PROT SERPL-MCNC: 5.3 G/DL (ref 6–8.4)
PROT SERPL-MCNC: 6.1 G/DL (ref 6–8.4)
PROT UR QL STRIP: ABNORMAL
RBC # BLD AUTO: 2.88 M/UL (ref 4–5.4)
RBC # BLD AUTO: 3.31 M/UL (ref 4–5.4)
RBC #/AREA URNS HPF: 2 /HPF (ref 0–4)
SALICYLATES SERPL-MCNC: <5 MG/DL (ref 15–30)
SODIUM SERPL-SCNC: 141 MMOL/L (ref 136–145)
SODIUM SERPL-SCNC: 143 MMOL/L (ref 136–145)
SP GR UR STRIP: 1.01 (ref 1–1.03)
SPECIMEN OUTDATE: NORMAL
SQUAMOUS #/AREA URNS HPF: 25 /HPF
T4 FREE SERPL-MCNC: 1 NG/DL (ref 0.71–1.51)
TOXICOLOGY INFORMATION: NORMAL
TSH SERPL DL<=0.005 MIU/L-ACNC: 4.83 UIU/ML (ref 0.4–4)
URN SPEC COLLECT METH UR: ABNORMAL
UROBILINOGEN UR STRIP-ACNC: NEGATIVE EU/DL
WBC # BLD AUTO: 6.96 K/UL (ref 3.9–12.7)
WBC # BLD AUTO: 9.4 K/UL (ref 3.9–12.7)
YEAST URNS QL MICRO: ABNORMAL

## 2024-12-09 PROCEDURE — 82140 ASSAY OF AMMONIA: CPT | Performed by: STUDENT IN AN ORGANIZED HEALTH CARE EDUCATION/TRAINING PROGRAM

## 2024-12-09 PROCEDURE — 99285 EMERGENCY DEPT VISIT HI MDM: CPT | Mod: 25

## 2024-12-09 PROCEDURE — 84443 ASSAY THYROID STIM HORMONE: CPT | Performed by: STUDENT IN AN ORGANIZED HEALTH CARE EDUCATION/TRAINING PROGRAM

## 2024-12-09 PROCEDURE — 80053 COMPREHEN METABOLIC PANEL: CPT | Mod: 91 | Performed by: NURSE PRACTITIONER

## 2024-12-09 PROCEDURE — 85025 COMPLETE CBC W/AUTO DIFF WBC: CPT | Mod: 91 | Performed by: NURSE PRACTITIONER

## 2024-12-09 PROCEDURE — 85025 COMPLETE CBC W/AUTO DIFF WBC: CPT | Performed by: STUDENT IN AN ORGANIZED HEALTH CARE EDUCATION/TRAINING PROGRAM

## 2024-12-09 PROCEDURE — 81000 URINALYSIS NONAUTO W/SCOPE: CPT | Performed by: NURSE PRACTITIONER

## 2024-12-09 PROCEDURE — 80307 DRUG TEST PRSMV CHEM ANLYZR: CPT | Performed by: STUDENT IN AN ORGANIZED HEALTH CARE EDUCATION/TRAINING PROGRAM

## 2024-12-09 PROCEDURE — 25000003 PHARM REV CODE 250: Performed by: STUDENT IN AN ORGANIZED HEALTH CARE EDUCATION/TRAINING PROGRAM

## 2024-12-09 PROCEDURE — 80179 DRUG ASSAY SALICYLATE: CPT | Performed by: STUDENT IN AN ORGANIZED HEALTH CARE EDUCATION/TRAINING PROGRAM

## 2024-12-09 PROCEDURE — 99285 EMERGENCY DEPT VISIT HI MDM: CPT | Mod: 25,27

## 2024-12-09 PROCEDURE — 80143 DRUG ASSAY ACETAMINOPHEN: CPT | Performed by: STUDENT IN AN ORGANIZED HEALTH CARE EDUCATION/TRAINING PROGRAM

## 2024-12-09 PROCEDURE — 84439 ASSAY OF FREE THYROXINE: CPT | Performed by: STUDENT IN AN ORGANIZED HEALTH CARE EDUCATION/TRAINING PROGRAM

## 2024-12-09 PROCEDURE — 82077 ASSAY SPEC XCP UR&BREATH IA: CPT | Performed by: STUDENT IN AN ORGANIZED HEALTH CARE EDUCATION/TRAINING PROGRAM

## 2024-12-09 PROCEDURE — 80053 COMPREHEN METABOLIC PANEL: CPT | Performed by: STUDENT IN AN ORGANIZED HEALTH CARE EDUCATION/TRAINING PROGRAM

## 2024-12-09 PROCEDURE — 87040 BLOOD CULTURE FOR BACTERIA: CPT | Mod: 59 | Performed by: NURSE PRACTITIONER

## 2024-12-09 PROCEDURE — 86850 RBC ANTIBODY SCREEN: CPT | Performed by: STUDENT IN AN ORGANIZED HEALTH CARE EDUCATION/TRAINING PROGRAM

## 2024-12-09 RX ORDER — FLUCONAZOLE 150 MG/1
150 TABLET ORAL
Status: COMPLETED | OUTPATIENT
Start: 2024-12-09 | End: 2024-12-09

## 2024-12-09 RX ADMIN — FLUCONAZOLE 150 MG: 150 TABLET ORAL at 11:12

## 2024-12-09 NOTE — ED PROVIDER NOTES
Encounter Date: 12/9/2024       History     Chief Complaint   Patient presents with    Fall     Pt to ED via [facility] with c/o left leg pain after ground level fall; + BT, unknown LOC, unsure if head hit. Left leg shortening and rotation noted upon arrival. 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  Patient reports that she fell onto her left leg, and is having left hip and knee pain.  Reports that she does not remember whether she hit her head, but denies any headache, vision changes, numbness, weakness.  No neck pain or back pain.  No chest pain or abdominal pain.  Patient reports any chest pain, lightheadedness, shortness of breath prior to her fall.  No other complaints.      Review of patient's allergies indicates:   Allergen Reactions    Cephalosporins Hives     Itchiness and hives immediately after IV Ceftriaxone push    Statins-hmg-coa reductase inhibitors      Other reaction(s): Unknown     Past Medical History:   Diagnosis Date    Anemia     Arthritis     Atrial fibrillation     Back pain     Bronchiectasis, uncomplicated     CHF (congestive heart failure)     Disorder of kidney and ureter     Early stage nonexudative age-related macular degeneration of both eyes 2018    GERD (gastroesophageal reflux disease)     Hyperlipidemia     Hypertension     Hypothyroidism     Obesity     JOY (obstructive sleep apnea)     Osteoporosis     Polyneuropathy     Pressure injury of dorsum of right foot, stage 2 12/01/2017    Rheumatoid arthritis     Urinary incontinence     Venous stasis ulcer of right calf limited to breakdown of skin with varicose veins 06/24/2024     Past Surgical History:   Procedure Laterality Date    CATARACT EXTRACTION W/  INTRAOCULAR LENS IMPLANT Right 12/12/2019    Procedure: EXTRACTION, CATARACT, WITH IOL INSERTION;  Surgeon: Michael Arellano MD;  Location: [facility] OR;   Service: Ophthalmology;  Laterality: Right;    CATARACT EXTRACTION W/  INTRAOCULAR LENS IMPLANT Left 02/13/2020    Procedure: EXTRACTION, CATARACT, WITH IOL INSERTION;  Surgeon: Michael Arellano MD;  Location: [facility] OR;  Service: Ophthalmology;  Laterality: Left;    CHOLECYSTECTOMY      ENDOSCOPIC ULTRASOUND OF UPPER GASTROINTESTINAL TRACT N/A 11/04/2020    Procedure: ULTRASOUND, ENDOSCOPIC, UPPER GI TRACT;  Surgeon: Thierry Saunders MD;  Location: [facility];  Service: Endoscopy;  Laterality: N/A;  covid 11/1 [facility]    HERNIA REPAIR      HYSTERECTOMY      knee replacemene Right 02/14/2007    deidra    PHACOEMULSIFICATION OF CATARACT Right 12/12/2019    Procedure: PHACOEMULSIFICATION, CATARACT;  Surgeon: Michael Arellano MD;  Location: [facility];  Service: Ophthalmology;  Laterality: Right;    PHACOEMULSIFICATION OF CATARACT Left 02/13/2020    Procedure: PHACOEMULSIFICATION, CATARACT;  Surgeon: Michael Arellano MD;  Location: [facility];  Service: Ophthalmology;  Laterality: Left;    REPLACEMENT Left 09/18/2007    knee    THYROIDECTOMY      TOE AMPUTATION Left 11/5/2024    Procedure: AMPUTATION, TOE- DISTAL HALLUX;  Surgeon: Noemi Ley DPM;  Location: [facility];  Service: Podiatry;  Laterality: Left;     Family History   Problem Relation Name Age of Onset    Cancer Sister 3      Social History     Tobacco Use    Smoking status: Never     Passive exposure: Never    Smokeless tobacco: Never   Substance Use Topics    Alcohol use: Never    Drug use: Never     Review of Systems   Constitutional:  Negative for fever.   HENT:  Negative for sore throat.    Respiratory:  Negative for shortness of breath.    Cardiovascular:  Negative for chest pain.   Gastrointestinal:  Negative for nausea.   Genitourinary:  Negative for dysuria.   Musculoskeletal:  Negative for back pain.        Left hip and left knee pain   Skin:  Negative for rash.   Neurological:  Negative for weakness.   Hematological:  Does not bruise/bleed easily.       Physical Exam      Initial Vitals [12/09/24 0327]   BP Pulse Resp Temp SpO2   136/80 -- 18 97.9 °F (36.6 °C) --      MAP       --         Physical Exam    Nursing note and vitals reviewed.  Constitutional: She appears well-developed.   HENT:   Head: Normocephalic.   Eyes: Pupils are equal, round, and reactive to light.   Neck:   Normal range of motion.  Cardiovascular:            No murmur heard.  Pulmonary/Chest: No respiratory distress.   Abdominal: Abdomen is soft.   Musculoskeletal:         General: No edema.      Cervical back: Normal range of motion.      Comments: Tenderness to palpation to left hip with limited range of motion.  Left lower extremity is shortened and externally rotated.  There was pain with ranging of the left knee, patient had prior surgery and has a healed surgical wound.  DP pulse 2 +.     Neurological: She is alert.   Skin: Skin is warm.   Psychiatric: She has a normal mood and affect.         ED Course   Procedures  Labs Reviewed   CBC W/ AUTO DIFFERENTIAL   COMPREHENSIVE METABOLIC PANEL   TYPE & SCREEN          Imaging Results    None          Medications - No data to display  Medical Decision Making  DDX:  Patient presenting after a fall onto her left leg with external rotation and shortening of the left leg concerning for possible hip fracture.  Will also rule out fracture to left knee and left ankle.  Patient is unsure about whether she hit her head, given her age will image to rule out intracranial hemorrhage/fracture.  DX:  X-ray, CT, CBC, CMP, type and screen  TX:  Analgesia PRN  Dispo:  Pending workup        Amount and/or Complexity of Data Reviewed  Labs: ordered.  Radiology: ordered.                                      Clinical Impression:  Final diagnoses:  [W19.XXXA] Armando Valle MD  12/09/24 0330

## 2024-12-09 NOTE — ED NOTES
Patient had bowel movement on self.  Patient cleaned, soiled linen removed, clean brief applied.  Patient with breakdown to bilaterally posterior upper thighs, stage 1.  Redness noted to perineal area.

## 2024-12-10 ENCOUNTER — DOCUMENT SCAN (OUTPATIENT)
Dept: HOME HEALTH SERVICES | Facility: HOSPITAL | Age: 89
End: 2024-12-10
Payer: MEDICARE

## 2024-12-10 VITALS
WEIGHT: 246 LBS | HEIGHT: 67 IN | DIASTOLIC BLOOD PRESSURE: 63 MMHG | TEMPERATURE: 98 F | RESPIRATION RATE: 23 BRPM | OXYGEN SATURATION: 98 % | SYSTOLIC BLOOD PRESSURE: 140 MMHG | HEART RATE: 88 BPM | BODY MASS INDEX: 38.61 KG/M2

## 2024-12-10 NOTE — ED PROVIDER NOTES
Encounter Date: 12/9/2024       History     Chief Complaint   Patient presents with    Altered Mental Status     Pt to ED via [facility] from [facility] with altered mental status; GCS 14. Currently COVID +.     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  Patient denies any new falls or injuries.  Denies any chest pain, shortness breath, lightheadedness, abdominal pain, nausea, vomiting, diarrhea, dysuria.      Review of patient's allergies indicates:   Allergen Reactions    Cephalosporins Hives     Itchiness and hives immediately after IV Ceftriaxone push    Statins-hmg-coa reductase inhibitors      Other reaction(s): Unknown     Past Medical History:   Diagnosis Date    Anemia     Arthritis     Atrial fibrillation     Back pain     Bronchiectasis, uncomplicated     CHF (congestive heart failure)     Disorder of kidney and ureter     Early stage nonexudative age-related macular degeneration of both eyes 2018    GERD (gastroesophageal reflux disease)     Hyperlipidemia     Hypertension     Hypothyroidism     Obesity     JOY (obstructive sleep apnea)     Osteoporosis     Polyneuropathy     Pressure injury of dorsum of right foot, stage 2 12/01/2017    Rheumatoid arthritis     Urinary incontinence     Venous stasis ulcer of right calf limited to breakdown of skin with varicose veins 06/24/2024     Past Surgical History:   Procedure Laterality Date    CATARACT EXTRACTION W/  INTRAOCULAR LENS IMPLANT Right 12/12/2019    Procedure: EXTRACTION, CATARACT, WITH IOL INSERTION;   Surgeon: Michael Arellano MD;  Location: [facility];  Service: Ophthalmology;  Laterality: Right;    CATARACT EXTRACTION W/  INTRAOCULAR LENS IMPLANT Left 02/13/2020    Procedure: EXTRACTION, CATARACT, WITH IOL INSERTION;  Surgeon: Michael Arellano MD;  Location: [facility] OR;  Service: Ophthalmology;  Laterality: Left;    CHOLECYSTECTOMY      ENDOSCOPIC ULTRASOUND OF UPPER GASTROINTESTINAL TRACT N/A 11/04/2020    Procedure: ULTRASOUND, ENDOSCOPIC, UPPER GI TRACT;  Surgeon: Thierry Saunders MD;  Location: [facility];  Service: Endoscopy;  Laterality: N/A;  covid 11/1 [facility]    HERNIA REPAIR      HYSTERECTOMY      knee replacemene Right 02/14/2007    deidra    PHACOEMULSIFICATION OF CATARACT Right 12/12/2019    Procedure: PHACOEMULSIFICATION, CATARACT;  Surgeon: Michael Arellano MD;  Location: [facility];  Service: Ophthalmology;  Laterality: Right;    PHACOEMULSIFICATION OF CATARACT Left 02/13/2020    Procedure: PHACOEMULSIFICATION, CATARACT;  Surgeon: Michael Arellano MD;  Location: [facility];  Service: Ophthalmology;  Laterality: Left;    REPLACEMENT Left 09/18/2007    knee    THYROIDECTOMY      TOE AMPUTATION Left 11/5/2024    Procedure: AMPUTATION, TOE- DISTAL HALLUX;  Surgeon: Noemi Ley DPM;  Location: [facility] OR;  Service: Podiatry;  Laterality: Left;     Family History   Problem Relation Name Age of Onset    Cancer Sister 3      Social History     Tobacco Use    Smoking status: Never     Passive exposure: Never    Smokeless tobacco: Never   Substance Use Topics    Alcohol use: Never    Drug use: Never     Review of Systems   Constitutional:  Negative for fever.   HENT:  Negative for sore throat.    Respiratory:  Negative for shortness of breath.    Cardiovascular:  Negative for chest pain.   Gastrointestinal:  Negative for nausea.   Genitourinary:  Negative for dysuria.   Musculoskeletal:  Negative for back pain.   Skin:  Negative for rash.   Neurological:  Negative for weakness.   Hematological:  Does not bruise/bleed easily.        Physical Exam     Initial Vitals   BP Pulse Resp Temp SpO2   12/09/24 1918 12/09/24 1918 12/09/24 1918 12/09/24 1951 12/09/24 1952   (!) 119/57 75 (!) 28 98.2 °F (36.8 °C) 96 %      MAP       --                Physical Exam    Nursing note and vitals reviewed.  Constitutional: She appears well-developed. She is not diaphoretic. No distress.   HENT:   Head: Normocephalic.   Eyes: Pupils are equal, round, and reactive to light.   Neck:   Normal range of motion.  Cardiovascular:            No murmur heard.  Pulmonary/Chest: No respiratory distress.   Clear lungs bilaterally.  No respiratory distress.  No wheezing or rales.  Good air movement.     Abdominal: Abdomen is soft.   Musculoskeletal:         General: No edema.      Cervical back: Normal range of motion.      Comments: Moving all extremities. No pain with palpation to bilateral shoulders, elbows, wrists, hips, knees, ankles. No spinal midline tenderness.        Neurological: She is alert.   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 No facial droop. CN 2-12 intact. Fluent speech with expression and comprehension. Strength 5/5 and sensation intact in upper and lower extremities.    Skin: Skin is warm.   Psychiatric: She has a normal mood and affect.         ED Course   Procedures  Labs Reviewed   CBC W/ AUTO DIFFERENTIAL - Abnormal       Result Value    WBC 6.96      RBC 2.88 (*)     Hemoglobin 9.2 (*)     Hematocrit 28.3 (*)     MCV 98      MCH 31.9 (*)     MCHC 32.5      RDW 13.6      Platelets 253      MPV 8.8 (*)     Immature Granulocytes 1.0 (*)     Gran # (ANC) 4.5      Immature Grans (Abs) 0.07 (*)     Lymph # 0.8 (*)     Mono # 0.7      Eos # 0.9 (*)     Baso # 0.03      nRBC 0      Gran % 64.3      Lymph % 11.8 (*)     Mono % 9.9      Eosinophil % 12.6 (*)     Basophil % 0.4      Differential Method Automated     COMPREHENSIVE METABOLIC PANEL - Abnormal    Sodium 141      Potassium 3.5       Chloride 111 (*)     CO2 20 (*)     Glucose 81      BUN 11      Creatinine 1.2      Calcium 7.4 (*)     Total Protein 5.3 (*)     Albumin 2.3 (*)     Total Bilirubin 0.6      Alkaline Phosphatase 42      AST 26      ALT 18      eGFR 44 (*)     Anion Gap 10     URINALYSIS, REFLEX TO URINE CULTURE - Abnormal    Specimen UA Urine, Catheterized      Color, UA Yellow      Appearance, UA Hazy (*)     pH, UA 6.0      Specific Gravity, UA 1.015      Protein, UA Trace (*)     Glucose, UA Negative      Ketones, UA Trace (*)     Bilirubin (UA) Negative      Occult Blood UA 1+ (*)     Nitrite, UA Negative      Urobilinogen, UA Negative      Leukocytes, UA Negative      Narrative:     Specimen Source->Urine   ACETAMINOPHEN LEVEL - Abnormal    Acetaminophen (Tylenol), Serum <3.0 (*)    SALICYLATE LEVEL - Abnormal    Salicylate Lvl <5.0 (*)    TSH - Abnormal    TSH 4.834 (*)    URINALYSIS MICROSCOPIC - Abnormal    RBC, UA 2      Yeast, UA Few (*)     Squam Epithel, UA 25      Ca Oxalate Leanne, UA Moderate      Microscopic Comment SEE COMMENT      Narrative:     Specimen Source->Urine   CULTURE, BLOOD    Blood Culture, Routine No Growth to date      Blood Culture, Routine No Growth to date      Blood Culture, Routine No Growth to date     CULTURE, BLOOD    Blood Culture, Routine No Growth to date      Blood Culture, Routine No Growth to date      Blood Culture, Routine No Growth to date     AMMONIA    Ammonia 25     DRUG SCREEN PANEL, URINE EMERGENCY    Benzodiazepines Negative      Methadone metabolites Negative      Cocaine (Metab.) Negative      Opiate Scrn, Ur Negative      Barbiturate Screen, Ur Negative      Amphetamine Screen, Ur Negative      THC Negative      Phencyclidine Negative      Creatinine, Urine 142.7      Toxicology Information SEE COMMENT      Narrative:     Specimen Source->Urine   ALCOHOL,MEDICAL (ETHANOL)    Alcohol, Serum <10     T4, FREE    Free T4 1.00            Imaging Results              CT Head Without  Contrast (Final result)  Result time 12/09/24 20:18:40      Final result by Terri Conner MD (12/09/24 20:18:40)                   Impression:      No acute abnormality.      Electronically signed by: Terri Conner  Date:    12/09/2024  Time:    20:18               Narrative:    EXAMINATION:  CT HEAD WITHOUT CONTRAST    CLINICAL HISTORY:  Mental status change, unknown cause;    TECHNIQUE:  Low dose axial CT images obtained throughout the head without intravenous contrast. Sagittal and coronal reconstructions were performed.    COMPARISON:  12/09/2024    FINDINGS:  Intracranial compartment:    Ventricles and sulci are normal in size for age without evidence of hydrocephalus. No extra-axial blood or fluid collections.    Moderate chronic microvascular ischemia.  no parenchymal mass, hemorrhage, edema or major vascular distribution infarct.    Skull/extracranial contents (limited evaluation): No fracture. Mild pansinusitis.  Mastoid air cells are essentially clear.                                       X-Ray Chest AP Portable (Final result)  Result time 12/09/24 20:26:22      Final result by Terri Conner MD (12/09/24 20:26:22)                   Impression:      As above      Electronically signed by: Terri Conner  Date:    12/09/2024  Time:    20:26               Narrative:    EXAMINATION:  XR CHEST AP PORTABLE    CLINICAL HISTORY:  Altered mental status, unspecified    TECHNIQUE:  Single frontal view of the chest was performed.    COMPARISON:  11/09/2024    FINDINGS:  Mildly prominent cardiac silhouette.  Mild diffuse interstitial prominence representing edema or pneumonitis versus chronic interstitial scarring.  Right upper extremity PICC line tip projects over the SVC, similar to prior.  Minimal bibasilar airspace disease or atelectasis, similar to prior.                                       Medications   fluconazole tablet 150 mg (150 mg Oral Given 12/9/24 4939)     Medical Decision  Making  DDX: Altered mental status - r/o infection, hypoglycemia, electrolyte abnormality, metabolic abnormality, ICH/mass, intoxication.  DX: Fs. BMP, CBC, TSH, ASA level, tylenol level, EtOH level, Utox, UA. CTH.   TX: Analgesia PRN. Treatment/consult as indicated by studies.   DISPO: Pending studies        Amount and/or Complexity of Data Reviewed  Labs: ordered.               ED Course as of 12/12/24 0734   Mon Dec 09, 2024   2328 Patient does not appear altered anymore.  She is able to say that she is a resident at a skilled nursing facility, and believes that she was brought in because she stated that she worked the, but states that she knew that this has incorrect she just could not think of the word to describe her being a resident. [NB]      ED Course User Index  [NB] Armando Mena MD                           Clinical Impression:  Final diagnoses:  [R41.82] Altered mental status (Primary)  [R41.0] Confusion  [B37.31] Candida vaginitis          ED Disposition Condition    Discharge Stable          ED Prescriptions    None       Follow-up Information       Follow up With Specialties Details Why Contact Info Additional Information    Pipo Flowers MD Family Medicine Schedule an appointment as soon as possible for a visit in 2 days  [address] [facility]   [place] [address]  [phone]       [facility] - Emergency Dept Emergency Medicine  If symptoms worsen [address] [facility] [address]  [phone]     [facility] [facility] - Neurology Neurology Schedule an appointment as soon as possible for a visit in 2 days  [address] [facility] [address]  [phone] Specialty Clinic    Robby Ortiz MD Neurology Schedule an appointment as soon as possible for a visit in 2 days  [address] [address]  [phone]                Armando Mena MD  12/09/24 1913       Armando Mena MD  12/12/24 8177

## 2024-12-10 NOTE — ED NOTES
"REPORT GIVEN FROM NURSE AT [facility] IS "TALKING TO DEAD PEOPLE" THINKS SHE IS ON STAFF AND TRIED TO CALL THE POLICE ON THEM"  "

## 2024-12-11 ENCOUNTER — TELEPHONE (OUTPATIENT)
Dept: NEUROLOGY | Facility: CLINIC | Age: 89
End: 2024-12-11
Payer: MEDICARE

## 2024-12-12 ENCOUNTER — TELEPHONE (OUTPATIENT)
Dept: ADMINISTRATIVE | Facility: CLINIC | Age: 89
End: 2024-12-12
Payer: MEDICARE

## 2024-12-14 LAB
BACTERIA BLD CULT: NORMAL

## 2024-12-15 LAB
BACTERIA BLD CULT: NORMAL
BACTERIA BLD CULT: NORMAL

## 2024-12-17 ENCOUNTER — TELEPHONE (OUTPATIENT)
Dept: ADMINISTRATIVE | Facility: CLINIC | Age: 89
End: 2024-12-17
Payer: MEDICARE

## 2024-12-24 ENCOUNTER — TELEPHONE (OUTPATIENT)
Dept: ADMINISTRATIVE | Facility: CLINIC | Age: 89
End: 2024-12-24
Payer: MEDICARE

## 2024-12-31 ENCOUNTER — LAB VISIT (OUTPATIENT)
Dept: LAB | Facility: HOSPITAL | Age: 89
End: 2024-12-31
Attending: HOSPITALIST
Payer: MEDICARE

## 2024-12-31 ENCOUNTER — TELEPHONE (OUTPATIENT)
Dept: ADMINISTRATIVE | Facility: CLINIC | Age: 89
End: 2024-12-31
Payer: MEDICARE

## 2024-12-31 DIAGNOSIS — E83.51 SERUM CALCIUM DECREASED: ICD-10-CM

## 2024-12-31 DIAGNOSIS — E83.51 SERUM CALCIUM DECREASED: Primary | ICD-10-CM

## 2024-12-31 LAB — CA-I BLDV-SCNC: 1.12 MMOL/L (ref 1.06–1.42)

## 2024-12-31 PROCEDURE — 36415 COLL VENOUS BLD VENIPUNCTURE: CPT | Performed by: HOSPITALIST

## 2024-12-31 PROCEDURE — 82330 ASSAY OF CALCIUM: CPT | Performed by: HOSPITALIST

## 2025-01-06 ENCOUNTER — OFFICE VISIT (OUTPATIENT)
Dept: NEUROLOGY | Facility: CLINIC | Age: 89
End: 2025-01-06
Payer: MEDICARE

## 2025-01-06 ENCOUNTER — TELEPHONE (OUTPATIENT)
Dept: ADMINISTRATIVE | Facility: CLINIC | Age: 89
End: 2025-01-06
Payer: MEDICARE

## 2025-01-06 VITALS
HEIGHT: 67 IN | BODY MASS INDEX: 38.53 KG/M2 | HEART RATE: 54 BPM | SYSTOLIC BLOOD PRESSURE: 110 MMHG | RESPIRATION RATE: 14 BRPM | DIASTOLIC BLOOD PRESSURE: 76 MMHG

## 2025-01-06 DIAGNOSIS — G56.03 BILATERAL CARPAL TUNNEL SYNDROME: Primary | ICD-10-CM

## 2025-01-06 DIAGNOSIS — E66.01 OBESITY, MORBID, BMI 40.0-49.9: ICD-10-CM

## 2025-01-06 DIAGNOSIS — M54.16 LUMBAR RADICULOPATHY: ICD-10-CM

## 2025-01-06 DIAGNOSIS — E53.8 B12 DEFICIENCY: ICD-10-CM

## 2025-01-06 DIAGNOSIS — G93.41 METABOLIC ENCEPHALOPATHY: ICD-10-CM

## 2025-01-06 PROBLEM — G70.00 MYASTHENIA GRAVIS, ADULT FORM: Status: RESOLVED | Noted: 2018-02-03 | Resolved: 2025-01-06

## 2025-01-06 PROCEDURE — 99205 OFFICE O/P NEW HI 60 MIN: CPT | Mod: S$PBB | Performed by: PSYCHIATRY & NEUROLOGY

## 2025-01-06 PROCEDURE — 99999 PR PBB SHADOW E&M-EST. PATIENT-LVL V: CPT | Mod: PBBFAC,,, | Performed by: PSYCHIATRY & NEUROLOGY

## 2025-01-06 PROCEDURE — 99215 OFFICE O/P EST HI 40 MIN: CPT | Mod: PBBFAC | Performed by: PSYCHIATRY & NEUROLOGY

## 2025-01-06 PROCEDURE — 99999 PR STA SHADOW: CPT | Mod: PBBFAC,,, | Performed by: PSYCHIATRY & NEUROLOGY

## 2025-01-06 NOTE — PROGRESS NOTES
HPI:  Stephany Skinner is a 89 y.o. female with  increased falls, some lumbar radicular pain.  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 Admitted for ARF with hypercapnea, ptosis, and gaze restriction in 1/2018; response to steroids suggestive of myasthenia, though antibodies are negative, and EMG with repetitve stimulation from 7/2018 was inconclusive. PUJA during 1/2018 admit, secondary to overdiuresis. She has JOY, DM, HTN, HLD, hypothyroidism, B12 deficiency, and A-fib.     The patient was last seen a year ago.    She presents for a hospital follow up where she had altered mental status.    She was seen in the ER by ER MD last month as the staff at the NH where she now lives noted her confusion. 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    She had had a fall that same day also worked up in the ER. She was ruled out for UTI at that time but this was in the throws of a Covid infection and three days prior she had been d/c'ed for lower leg cellulitis and a month prior to that was hospitalized for toe ulcer that required amputation.    It was not clear if she hit her head  CT head was done and was normal as noted below    Here with her niece today    Memory was confused at times to niece during that time but patient feels this is improved with time away from the illnesses    Niece has not noted any confusion in the past 2 weeks. She is improved.   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   HENT:  Negative for nosebleeds.    Eyes:  Positive for blurred vision. Negative for double vision.   Respiratory:  Negative for hemoptysis.    Cardiovascular:  Negative for leg swelling.   Gastrointestinal:  Negative for blood in stool.   Genitourinary:  Negative for hematuria.   Musculoskeletal:  Positive for joint pain. Negative for falls.   Skin:  Negative for itching.   Neurological:  Negative for loss of consciousness.   Endo/Heme/Allergies:  Does not bruise/bleed easily.   Psychiatric/Behavioral:  The patient does not have insomnia.          Exam:  Gen Appearance, well developed/nourished in no apparent distress  CV: 2+ distal pulses with no edema or swelling  Neuro:  MS: Awake, alert, Sustains attention. Knows it is Monday and in January. Recent recall is fairly good/remote memory intact, Language is full to spontaneous speech/comprehension. Fund of Knowledge is full  CN: Optic discs are not viewed due to misosis,  PERRL, Extraoccular movements and visual fields are full-no gaze restriction today. Normal facial sensation and strength-no ptosis, Hearing symmetric, Tongue and Palate are midline and strong. Shoulder Shrug symmetric and strong.  Motor: Normal bulk, tone, no abnormal movements. 5/5 strength bilateral upper/lower extremities with 1+ reflexes in the arms, and are less in the legs and no clonus  Sensory: symmetric to temp, and vibration decreased sensation in the legs. Romberg negative  Cerebellar: Finger-nose, Rapid alternating movements intact  Gait: Not done: in a wheelchair today after  recent toe amputation    Imagin/16 CT L spine: Multilevel degenerative changes of the lumbar spine without evidence for fracture or subluxation.  There is likely a component of mild neural foraminal narrowing and central canal stenosis at the L3-4 and L4-5 levels.  This could be better evaluated on a nonemergent basis with MRI.    2016 MRI L spine: Multilevel degenerative changes of the lumbar spine contributing to central canal stenosis and neural foraminal narrowing as detailed in the above report.    Edema-like signal about the posterior elements on the right at L5-S1 which can be a source of pain.    2018 EMG with repetitive stimulation at [facility] per Dr. Luis Angel Vu:   Slow repetitive stimulation (3 Htz) performed at baseline and at one minute post exercise intervals at the left ADM and left trapezius muscles revealed significant CMAP detriment at the ADM, but none at the trapezius.     Impression:   The results of the study are inconclusive. There is no definite EP evidence of a neuromuscular junction pathology.     2016 EMG legs:   1. Mild Bilateral Carpal Tunnel Syndrome  2. 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 Bilateral Lumbar Radiculopathy best localizing from L4-5 on this study    Xray chest over time: no thymoma and CTA chest 2018: Bilateral groundglass opacities within the lung fields.  Minimal dependent subsegmental atelectasis.  No pleural effusion or pneumothorax.     MRI and MRA brain: Age-appropriate generalized cerebral volume loss with moderate chronic microvascular ischemic disease.  No evidence for an acute infarction.     No significant arterial abnormalities.     Left maxillary sinus disease.    2023 Left knee xray: Postoperative changes of a total knee arthroplasty.  No hardware complication is seen.  No fracture or dislocation.     Extensive vascular calcifications.     2023 CT head:Moderate brain atrophy with deep white matter ischemic changes.   Otherwise negative head CT.     12/2024 CT head:      No acute abnormality.      Labs: 10/2016  glucose 136. Reviewed CMP, CBC TSh   SPEP normal  B12 low normal    2019 BMP, CBC reviewed    11/2018 BMP with alt reviewed  12/2019 A1C normal    2022 B12 level 1200s    2023 A1C less than 7    2024 TSH/T4 unremarkable  CMP reviewed    Assessment/Plan: Stephany Skinner is a 89 y.o. female with   falls, some lumbar radicular pain.  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 Admitted for ARF with hypercapnea, ptosis, and gaze restriction in 1/2018; response to steroids suggestive of myasthenia, though antibodies are negative, and EMG with repetitve stimulation from 7/2018 was inconclusive. PUJA during 1/2018 admit, secondary to overdiuresis. She has JOY, DM, HTN, HLD, hypothyroidism, B12 deficiency, and A-fib.     I recommend:   1. Despite negative MG antibodies and inconclusive EMG with rep stim findings without definitive evidence of neuromuscular junction disorer, her clinical picture was suggestive of possible myasthenia gravis- but is currently improved long term. No active symptoms- NOT CERTAIN OF THIS DIAGNOSIS    2. Stopped Mestinon without any diplopia or weakness. She has chronic diarrhea, unrelated to mestinon use  -she has had no worsening with tapering/ being off prednisone  - Will restart for any further symptoms   - If she needs to remain on steroids long term, could consider steroid sparing therapy, such as Imuran versus Cellcept. She has DM, though well controlled currently. She will follow with PCP for management of DM2  -Advised on symptoms of myasthenic crisis and when to report to the ER prior    3. Wear brace nightly for CTS symptoms PRN  but note she improved with CT injection per pain management prior- can see pain  management back PRN    4. Continue B12 for low normal B12 levels well corrected in 2022.  Her DM could be causing her neuropathy.  DM treated per PCP.     5. Saw pain management for  Lumbar radiculopathy prior. OTC topical and PT helped reduce pain greatly as well as falls prior. See pain management PRN  -Fall precautions reviewed prior-- using walker.  - No pain from neuropathy currently.  -Left  knee pain noted in 2023. Referred to ortho/ joint replacement surgeon, Dr Bar per local ortho PA who thought this was mediated from the lumbar spine. She was referred to pain management at [facility] and received a back injection which helped and she will see pain management PRN  -needs to reduce morbid obesity    6. She presented with right eye lower field blindness in 2022. Per [facility] this was a branch retinal artery occlusion. She no longer drives  -She had unremarkable  Carotid US, and echo  from Dr Patel in 2022. Noting cardiology increased her valsartan and statin doses.   -1/2023 MRI brain and  MRA brain: No CVA to explain her symptoms.  -She has been on Eliquis for years for stroke prevention due to her afib and did recall a skipped dosing at least once prior to these symptoms Has been on full dose.  She is allergic to ASA. She is compliant with daily dosing now    7. Presents today with AMS last month while in the throws of a Covid infection and soon after she was treated for LE cellulitis and had a recent toe amputation  -CT head 12/2024 reassuring  -Suspect encephalopathy due to her medical illnesses at that time/ improved now. Will monitor. Reportedly, she is also getting benadryl for itching with rash per NH MD. Discussed with her niece this could also contribute to some confusion/ but this should be a temporary med for her  -Now living in the NH/ being closely monitored.     RTC 3 months      Total time spent on DOS including chart review: 40 minutes

## 2025-01-08 ENCOUNTER — HOSPITAL ENCOUNTER (EMERGENCY)
Facility: HOSPITAL | Age: 89
Discharge: HOME | End: 2025-01-09
Attending: FAMILY MEDICINE
Payer: MEDICARE

## 2025-01-08 ENCOUNTER — TELEPHONE (OUTPATIENT)
Dept: ADMINISTRATIVE | Facility: CLINIC | Age: 89
End: 2025-01-08
Payer: MEDICARE

## 2025-01-08 DIAGNOSIS — L30.9 DERMATITIS: Primary | ICD-10-CM

## 2025-01-08 DIAGNOSIS — R06.02 SHORTNESS OF BREATH: ICD-10-CM

## 2025-01-08 DIAGNOSIS — R21 RASH AND NONSPECIFIC SKIN ERUPTION: ICD-10-CM

## 2025-01-08 DIAGNOSIS — T50.905D ADVERSE EFFECT OF DRUG, SUBSEQUENT ENCOUNTER: ICD-10-CM

## 2025-01-08 LAB
ALBUMIN SERPL BCP-MCNC: 2.4 G/DL (ref 3.5–5.2)
ALP SERPL-CCNC: 77 U/L (ref 40–150)
ALT SERPL W/O P-5'-P-CCNC: 17 U/L (ref 10–44)
ANION GAP SERPL CALC-SCNC: 8 MMOL/L (ref 8–16)
APTT PPP: 34.2 SEC (ref 21–32)
AST SERPL-CCNC: 17 U/L (ref 10–40)
BASOPHILS # BLD AUTO: 0.04 K/UL (ref 0–0.2)
BASOPHILS NFR BLD: 0.4 % (ref 0–1.9)
BILIRUB SERPL-MCNC: 0.3 MG/DL (ref 0.1–1)
BNP SERPL-MCNC: 96 PG/ML (ref 0–99)
BUN SERPL-MCNC: 32 MG/DL (ref 8–23)
CALCIUM SERPL-MCNC: 7.9 MG/DL (ref 8.7–10.5)
CHLORIDE SERPL-SCNC: 109 MMOL/L (ref 95–110)
CO2 SERPL-SCNC: 19 MMOL/L (ref 23–29)
CREAT SERPL-MCNC: 1.2 MG/DL (ref 0.5–1.4)
DIFFERENTIAL METHOD BLD: ABNORMAL
EOSINOPHIL # BLD AUTO: 0.5 K/UL (ref 0–0.5)
EOSINOPHIL NFR BLD: 4.6 % (ref 0–8)
ERYTHROCYTE [DISTWIDTH] IN BLOOD BY AUTOMATED COUNT: 14.6 % (ref 11.5–14.5)
EST. GFR  (NO RACE VARIABLE): 44 ML/MIN/1.73 M^2
GLUCOSE SERPL-MCNC: 104 MG/DL (ref 70–110)
HCT VFR BLD AUTO: 34.1 % (ref 37–48.5)
HGB BLD-MCNC: 11 G/DL (ref 12–16)
IMM GRANULOCYTES # BLD AUTO: 0.15 K/UL (ref 0–0.04)
IMM GRANULOCYTES NFR BLD AUTO: 1.5 % (ref 0–0.5)
INR PPP: 1.2 (ref 0.8–1.2)
LYMPHOCYTES # BLD AUTO: 1.1 K/UL (ref 1–4.8)
LYMPHOCYTES NFR BLD: 11.1 % (ref 18–48)
MAGNESIUM SERPL-MCNC: 1.9 MG/DL (ref 1.6–2.6)
MCH RBC QN AUTO: 31.7 PG (ref 27–31)
MCHC RBC AUTO-ENTMCNC: 32.3 G/DL (ref 32–36)
MCV RBC AUTO: 98 FL (ref 82–98)
MONOCYTES # BLD AUTO: 0.8 K/UL (ref 0.3–1)
MONOCYTES NFR BLD: 8.2 % (ref 4–15)
NEUTROPHILS # BLD AUTO: 7.2 K/UL (ref 1.8–7.7)
NEUTROPHILS NFR BLD: 74.2 % (ref 38–73)
NRBC BLD-RTO: 0 /100 WBC
PLATELET # BLD AUTO: 370 K/UL (ref 150–450)
PMV BLD AUTO: 8.4 FL (ref 9.2–12.9)
POTASSIUM SERPL-SCNC: 5 MMOL/L (ref 3.5–5.1)
PROT SERPL-MCNC: 6.5 G/DL (ref 6–8.4)
PROTHROMBIN TIME: 12.6 SEC (ref 9–12.5)
RBC # BLD AUTO: 3.47 M/UL (ref 4–5.4)
SODIUM SERPL-SCNC: 136 MMOL/L (ref 136–145)
TROPONIN I SERPL DL<=0.01 NG/ML-MCNC: <0.006 NG/ML (ref 0–0.03)
WBC # BLD AUTO: 9.69 K/UL (ref 3.9–12.7)

## 2025-01-08 PROCEDURE — 93010 ELECTROCARDIOGRAM REPORT: CPT | Mod: ,,, | Performed by: INTERNAL MEDICINE

## 2025-01-08 PROCEDURE — 85025 COMPLETE CBC W/AUTO DIFF WBC: CPT | Performed by: FAMILY MEDICINE

## 2025-01-08 PROCEDURE — 93005 ELECTROCARDIOGRAM TRACING: CPT

## 2025-01-08 PROCEDURE — 84484 ASSAY OF TROPONIN QUANT: CPT | Performed by: FAMILY MEDICINE

## 2025-01-08 PROCEDURE — 85730 THROMBOPLASTIN TIME PARTIAL: CPT | Performed by: FAMILY MEDICINE

## 2025-01-08 PROCEDURE — 80053 COMPREHEN METABOLIC PANEL: CPT | Performed by: FAMILY MEDICINE

## 2025-01-08 PROCEDURE — 83880 ASSAY OF NATRIURETIC PEPTIDE: CPT | Performed by: FAMILY MEDICINE

## 2025-01-08 PROCEDURE — 99285 EMERGENCY DEPT VISIT HI MDM: CPT | Mod: 25

## 2025-01-08 PROCEDURE — 85610 PROTHROMBIN TIME: CPT | Performed by: FAMILY MEDICINE

## 2025-01-08 PROCEDURE — 83735 ASSAY OF MAGNESIUM: CPT | Performed by: FAMILY MEDICINE

## 2025-01-08 RX ORDER — ELECTROLYTES/DEXTROSE
SOLUTION, ORAL ORAL 2 TIMES DAILY
Status: DISCONTINUED | OUTPATIENT
Start: 2025-01-08 | End: 2025-01-09 | Stop reason: HOSPADM

## 2025-01-08 RX ORDER — CAMPHOR
SPIRIT TOPICAL ONCE
Status: DISCONTINUED | OUTPATIENT
Start: 2025-01-08 | End: 2025-01-09

## 2025-01-09 VITALS
SYSTOLIC BLOOD PRESSURE: 113 MMHG | WEIGHT: 245 LBS | TEMPERATURE: 99 F | BODY MASS INDEX: 38.37 KG/M2 | RESPIRATION RATE: 20 BRPM | HEART RATE: 79 BPM | DIASTOLIC BLOOD PRESSURE: 56 MMHG | OXYGEN SATURATION: 99 %

## 2025-01-09 PROCEDURE — 25000003 PHARM REV CODE 250: Performed by: FAMILY MEDICINE

## 2025-01-09 RX ORDER — HYDROCORTISONE 25 MG/G
OINTMENT TOPICAL 2 TIMES DAILY
Qty: 453.6 G | Refills: 0 | Status: SHIPPED | OUTPATIENT
Start: 2025-01-09 | End: 2025-01-19

## 2025-01-09 RX ORDER — CETIRIZINE HYDROCHLORIDE 10 MG/1
10 TABLET ORAL DAILY
Qty: 30 TABLET | Refills: 0 | Status: SHIPPED | OUTPATIENT
Start: 2025-01-09 | End: 2025-02-08

## 2025-01-09 RX ADMIN — HYDROCORTISONE: 0.01 CREAM TOPICAL at 07:01

## 2025-01-09 NOTE — ED NOTES
Zoey RN gave report to Julia (pt's nurse) at the [facility]. States they will attempt to set up transport.

## 2025-01-09 NOTE — DISCHARGE INSTRUCTIONS
Please follow up with your primary care physician within 2 days. Ensure that you review all lab work results and/or imaging results. If you have any questions about your discharge paperwork please call the Emergency Department.    Return to the ED for any fevers, nausea, vomiting, abdominal pain, diarrhea, inability to take food or water by mouth without vomiting, or any new or worsening symptoms.       If you were prescribed antibiotics, please take them to completion. If you were prescribed a narcotic or any sedating medication, do not drive or operate heavy equipment or machinery while taking these medications.  If you were diagnosed with a seizure, syncope, any loss of consciousness or decreased alertness, do not drive, swim, operate heavy machinery, or put yourself in any position where a sudden loss of consciousness could put yourself or others in danger.    Thank you for visiting [facility], Department of Emergency Medicine. Please see the entirety of the educational materials provided. Please note that a visit to the emergency department does not substitute ongoing care from a primary medical provider or specialist. Please ensure to follow up as recommended. However, please return to the emergency department immediately if symptoms do not improve as discussed, symptoms worsen, new symptoms develop, difficulty in following up or for any of your concerns or issues. Please note on discharge you are acknowledging understanding and agreement on medical evaluation, management recommendations and follow up recommendations.

## 2025-01-09 NOTE — ED NOTES
AFTER CALLING PATIENTS NURSE JUDY AT THE [facility] TO GET REPORT, NURSE STATED THAT HE ARRIVED AT 3PM AND NOTICED RASH ON PT ABD, BILAT LEGS AND BACK WHEN HE WENT TO ADMINISTER SUBQ INSULIN. NURSE STATED THAT HE HAS NEVER SEEN RASH ON PT BEFORE AND SHE WAS COMPLAINING OF SOB AND ITCHING. PT STATED THAT SHE HAS HAD THE RASH FOR OVER A WEEK AND THE PT NURSING AID STATED THAT SHE HAS BEEN TREATING THE RASH WITH BENADRYL AND TOPICAL CREAM. PT HAS NO ACUTE COMPLAINTS.

## 2025-01-09 NOTE — ED PROVIDER NOTES
Encounter Date: 1/8/2025       History     Chief Complaint   Patient presents with    Rash     Pt to ED via EMS from [facility] with a rash (bilat legs, ABD and back) for over a week.     HPI  89-year-old female with a history of CHF, GERD, hypertension, hyperlipidemia that presents to the ED from [facility] with a rash that patient has had for over a week.  Per report of nerves he states that the rash had worsened.  It is on bilateral legs, abdomen, back.  She is being treated at [facility].  She did not receive a dose of Benadryl prior to presentation from [facility].  She was given Benadryl by EMS.  Patient does report that it is pruritic in nature.  Per report she was short of breath however patient denies any chest pain or shortness of breath.  No nausea, vomiting, diarrhea.  Review of patient's allergies indicates:   Allergen Reactions    Cephalosporins Hives     Itchiness and hives immediately after IV Ceftriaxone push    Statins-hmg-coa reductase inhibitors      Other reaction(s): Unknown     Past Medical History:   Diagnosis Date    Anemia     Arthritis     Atrial fibrillation     Back pain     Bronchiectasis, uncomplicated     CHF (congestive heart failure)     Disorder of kidney and ureter     Early stage nonexudative age-related macular degeneration of both eyes 2018    GERD (gastroesophageal reflux disease)     Hyperlipidemia     Hypertension     Hypothyroidism     Obesity     JOY (obstructive sleep apnea)     Osteoporosis     Polyneuropathy     Pressure injury of dorsum of right foot, stage 2 12/01/2017    Rheumatoid arthritis     Urinary incontinence     Venous stasis ulcer of right calf limited to breakdown of skin with varicose veins 06/24/2024     Past Surgical History:   Procedure Laterality Date    CATARACT EXTRACTION W/  INTRAOCULAR LENS IMPLANT Right 12/12/2019    Procedure: EXTRACTION, CATARACT, WITH IOL INSERTION;  Surgeon: Michael Arellano MD;  Location: [facility] OR;  Service:  Ophthalmology;  Laterality: Right;    CATARACT EXTRACTION W/  INTRAOCULAR LENS IMPLANT Left 02/13/2020    Procedure: EXTRACTION, CATARACT, WITH IOL INSERTION;  Surgeon: Michael Arellano MD;  [facility]: [facility];  Service: Ophthalmology;  Laterality: Left;    CHOLECYSTECTOMY      ENDOSCOPIC ULTRASOUND OF UPPER GASTROINTESTINAL TRACT N/A 11/04/2020    Procedure: ULTRASOUND, ENDOSCOPIC, UPPER GI TRACT;  Surgeon: Thierry Saunders MD;  Location: [facility];  Service: Endoscopy;  Laterality: N/A;  covid 11/1 [facility]    HERNIA REPAIR      HYSTERECTOMY      knee replacemene Right 02/14/2007    deidra    PHACOEMULSIFICATION OF CATARACT Right 12/12/2019    Procedure: PHACOEMULSIFICATION, CATARACT;  Surgeon: Michael Arellano MD;  Location: [facility];  Service: Ophthalmology;  Laterality: Right;    PHACOEMULSIFICATION OF CATARACT Left 02/13/2020    Procedure: PHACOEMULSIFICATION, CATARACT;  Surgeon: Michael Arellano MD;  Location: [facility];  Service: Ophthalmology;  Laterality: Left;    REPLACEMENT Left 09/18/2007    knee    THYROIDECTOMY      TOE AMPUTATION Left 11/5/2024    Procedure: AMPUTATION, TOE- DISTAL HALLUX;  Surgeon: Noemi Ley DPM;  Location: [facility] OR;  Service: Podiatry;  Laterality: Left;     Family History   Problem Relation Name Age of Onset    Cancer Sister 3      Social History     Tobacco Use    Smoking status: Never     Passive exposure: Never    Smokeless tobacco: Never   Substance Use Topics    Alcohol use: Never    Drug use: Never     Review of Systems   Constitutional:  Negative for chills and fever.   HENT:  Negative for sore throat.    Respiratory:  Negative for shortness of breath.    Cardiovascular:  Positive for leg swelling. Negative for chest pain.   Gastrointestinal:  Negative for diarrhea, nausea and vomiting.   Genitourinary:  Negative for dysuria.   Skin:  Positive for rash.   Neurological:  Negative for headaches.   All other systems reviewed and are negative.      Physical Exam     Initial Vitals   BP  Pulse Resp Temp SpO2   01/08/25 1925 01/08/25 1925 01/08/25 2024 01/08/25 1925 01/08/25 1925   (!) 152/67 76 16 98.6 °F (37 °C) 99 %      MAP       --                Physical Exam    Constitutional: She appears well-developed and well-nourished. She is not diaphoretic. No distress.   HENT:   Head: Normocephalic and atraumatic.   Right Ear: External ear normal.   Left Ear: External ear normal.   Eyes: EOM are normal. Pupils are equal, round, and reactive to light.   Neck:   Normal range of motion.  Cardiovascular:            Irregular   Pulmonary/Chest: Breath sounds normal. No respiratory distress. She has no wheezes.   Abdominal: Abdomen is soft. She exhibits no distension. There is no abdominal tenderness.   Musculoskeletal:         General: Edema present.      Cervical back: Normal range of motion.     Neurological: She is alert and oriented to person, place, and time.   Skin: Skin is warm and dry. Rash (please see reticulin and erythematous rash on bilateral lower extremities, abdomen, back) noted.   Psychiatric: She has a normal mood and affect.         ED Course   Procedures  Labs Reviewed   CBC W/ AUTO DIFFERENTIAL - Abnormal       Result Value    WBC 9.69      RBC 3.47 (*)     Hemoglobin 11.0 (*)     Hematocrit 34.1 (*)     MCV 98      MCH 31.7 (*)     MCHC 32.3      RDW 14.6 (*)     Platelets 370      MPV 8.4 (*)     Immature Granulocytes 1.5 (*)     Gran # (ANC) 7.2      Immature Grans (Abs) 0.15 (*)     Lymph # 1.1      Mono # 0.8      Eos # 0.5      Baso # 0.04      nRBC 0      Gran % 74.2 (*)     Lymph % 11.1 (*)     Mono % 8.2      Eosinophil % 4.6      Basophil % 0.4      Differential Method Automated     COMPREHENSIVE METABOLIC PANEL - Abnormal    Sodium 136      Potassium 5.0      Chloride 109      CO2 19 (*)     Glucose 104      BUN 32 (*)     Creatinine 1.2      Calcium 7.9 (*)     Total Protein 6.5      Albumin 2.4 (*)     Total Bilirubin 0.3      Alkaline Phosphatase 77      AST 17      ALT 17       eGFR 44 (*)     Anion Gap 8     APTT - Abnormal    aPTT 34.2 (*)    PROTIME-INR - Abnormal    Prothrombin Time 12.6 (*)     INR 1.2     MAGNESIUM    Magnesium 1.9     B-TYPE NATRIURETIC PEPTIDE   TROPONIN I   B-TYPE NATRIURETIC PEPTIDE    BNP 96     TROPONIN I    Troponin I <0.006            Imaging Results              X-Ray Chest AP Portable (Final result)  Result time 01/08/25 21:17:23      Final result by Oscar Lazo DO (01/08/25 21:17:23)                   Impression:      No acute abnormality.      Electronically signed by: Oscar Lazo  Date:    01/08/2025  Time:    21:17               Narrative:    EXAMINATION:  XR CHEST AP PORTABLE    CLINICAL HISTORY:  Shortness of breath    TECHNIQUE:  Single frontal view of the chest was performed.    COMPARISON:  12/09/2024.    FINDINGS:  The lungs are well expanded and clear. No focal opacities are seen. The pleural spaces are clear. The cardiac silhouette is enlarged.  There are calcifications of the aortic arch.  The visualized osseous structures demonstrate degenerative changes.                                       Medications   hydrocortisone-aloe vera 1 % cream (has no administration in time range)     Medical Decision Making  Differential diagnosis:  Medication rash, dermatitis    89-year-old female that presents to the ED for concern of rash.  Patient currently on therapy for her rash.  She states that doctor told her that it was a reaction to a medication.  She denies any other complaints despite the nursing home stating that she is short of breath.  Patient does state that she has chronic intermittent shortness of breath.  EKG, labs reviewed.  Patient resting comfortably while in the ED. emollient topical lotion place as we do not have hydrocortisone.  Prescription sent for hydrocortisone and Zyrtec.  Strict return precautions.  Will be discharged back to the nursing home.  Recommend close follow-up with PCP and Dermatology.    Amount and/or  Complexity of Data Reviewed  Labs: ordered.  Radiology: ordered.    Risk  OTC drugs.  Prescription drug management.                                      Clinical Impression:  Final diagnoses:  [R06.02] Shortness of breath  [L30.9] Dermatitis (Primary)  [T50.905D] Adverse effect of drug, subsequent encounter  [R21] Rash and nonspecific skin eruption          ED Disposition Condition    Discharge Stable          ED Prescriptions       Medication Sig Dispense Start Date End Date Auth. Provider    hydrocortisone 2.5 % ointment Apply topically 2 (two) times daily. Please apply to affected areas for 10 days 453.6 g 1/9/2025 1/19/2025 Ana Paula Freeman MD    cetirizine (ZYRTEC) 10 MG tablet Take 1 tablet (10 mg total) by mouth once daily. 30 tablet 1/9/2025 2/8/2025 Ana Paula Freeman MD          Follow-up Information       Follow up With Specialties Details Why Contact Info    Pipo Flowers MD Family Medicine Schedule an appointment as soon as possible for a visit in 2 days  [address] [place] [address] [address]i [address] [id number]  [phone]          Please have patient follow up with dermatologist within 48-72 hours.             Ana Paula Freeman MD  01/09/25 0032

## 2025-01-09 NOTE — ED NOTES
Called [facility] for update on discharge status; reports attempting to locate  to transport back to nursing home.

## 2025-01-13 ENCOUNTER — PATIENT MESSAGE (OUTPATIENT)
Dept: INFECTIOUS DISEASES | Facility: CLINIC | Age: 89
End: 2025-01-13
Payer: MEDICARE

## 2025-01-13 LAB
OHS QRS DURATION: 76 MS
OHS QTC CALCULATION: 435 MS

## 2025-01-14 ENCOUNTER — OFFICE VISIT (OUTPATIENT)
Dept: INFECTIOUS DISEASES | Facility: CLINIC | Age: 89
End: 2025-01-14
Payer: MEDICARE

## 2025-01-14 VITALS
TEMPERATURE: 99 F | HEART RATE: 80 BPM | SYSTOLIC BLOOD PRESSURE: 94 MMHG | HEIGHT: 67 IN | DIASTOLIC BLOOD PRESSURE: 54 MMHG | BODY MASS INDEX: 38.37 KG/M2

## 2025-01-14 DIAGNOSIS — M86.9 OSTEOMYELITIS OF GREAT TOE OF LEFT FOOT: Primary | ICD-10-CM

## 2025-01-14 DIAGNOSIS — L97.522 ULCER OF TOE OF LEFT FOOT, WITH FAT LAYER EXPOSED: ICD-10-CM

## 2025-01-14 DIAGNOSIS — L97.419 DIABETIC ULCER OF RIGHT HEEL ASSOCIATED WITH DIABETES MELLITUS DUE TO UNDERLYING CONDITION, UNSPECIFIED ULCER STAGE: ICD-10-CM

## 2025-01-14 DIAGNOSIS — E08.621 DIABETIC ULCER OF RIGHT HEEL ASSOCIATED WITH DIABETES MELLITUS DUE TO UNDERLYING CONDITION, UNSPECIFIED ULCER STAGE: ICD-10-CM

## 2025-01-14 PROCEDURE — 99215 OFFICE O/P EST HI 40 MIN: CPT | Mod: S$PBB,,, | Performed by: INTERNAL MEDICINE

## 2025-01-14 PROCEDURE — 99999 PR PBB SHADOW E&M-EST. PATIENT-LVL V: CPT | Mod: PBBFAC,,, | Performed by: INTERNAL MEDICINE

## 2025-01-14 PROCEDURE — 99215 OFFICE O/P EST HI 40 MIN: CPT | Mod: PBBFAC | Performed by: INTERNAL MEDICINE

## 2025-01-14 NOTE — PROGRESS NOTES
"Infectious Disease Clinic Note  01/14/2025       Subjective:       Patient ID: Stephany Skinner is a 89 y.o. female being seen for an new visit.    Chief Complaint: Follow-up    HPI   89y/o F with myasthenia gravis, T2DM, AFib  HFpEF, COPD, hypertension, hyperlipidemia, CKD 3, rheumatoid arthritis, who was admitted 11/2-11/13 with diabetic toe ulcer of L great toe c/b acute osteomyelitis S/p amputation L great toe 11/5 presents for follow-up.    Wound cx 11/2 grew enterobacter cloacae, pseudomonas and MRSA. Culture from prox margins growing pseudomonas putida concerning for residual osteomyelitis. Discharged to Skilled nursing at [facility] to complete a 6 wk course of pip-tazo x 6 wks- last day 12/17.    Pt reports she got the antibiotics at the NH. She is unclear when the last day was. Niece reports foot looks "beautiful." However, concern second toe is a bit swollen and breakdown at toe nail. Additionally, they report she has a new R heel ulcer. She has not seen a podiatrist. [place]. Denies fevers, chills or sweats or other signs or symptoms of infection. No recent episodes of cofusion.     Of note, saw vascular sx 12/3. Trying to get established with vasc sx near home.           Family History   Problem Relation Name Age of Onset    Cancer Sister 3      Social History     Socioeconomic History    Marital status:     Number of children: 1   Tobacco Use    Smoking status: Never     Passive exposure: Never    Smokeless tobacco: Never   Substance and Sexual Activity    Alcohol use: Never    Drug use: Never    Sexual activity: Never     Social Drivers of Health     Financial Resource Strain: Low Risk  (12/4/2024)    Overall Financial Resource Strain (CARDIA)     Difficulty of Paying Living Expenses: Not hard at all   Food Insecurity: No Food Insecurity (12/4/2024)    Hunger Vital Sign     Worried About Running Out of Food in the Last Year: Never true     Ran Out of Food in the Last Year: " Never true   Transportation Needs: No Transportation Needs (12/4/2024)    TRANSPORTATION NEEDS     Transportation : No   Physical Activity: Inactive (12/4/2024)    Exercise Vital Sign     Days of Exercise per Week: 0 days     Minutes of Exercise per Session: 0 min   Stress: No Stress Concern Present (12/4/2024)    [employer] - Occupational Stress Questionnaire     Feeling of Stress : Not at all   Housing Stability: Low Risk  (12/4/2024)    Housing Stability Vital Sign     Unable to Pay for Housing in the Last Year: No     Homeless in the Last Year: No     Past Surgical History:   Procedure Laterality Date    CATARACT EXTRACTION W/  INTRAOCULAR LENS IMPLANT Right 12/12/2019    Procedure: EXTRACTION, CATARACT, WITH IOL INSERTION;  Surgeon: Michael Arellano MD;  Location: [facility] OR;  Service: Ophthalmology;  Laterality: Right;    CATARACT EXTRACTION W/  INTRAOCULAR LENS IMPLANT Left 02/13/2020    Procedure: EXTRACTION, CATARACT, WITH IOL INSERTION;  Surgeon: Michael Arellano MD;  Location: [facility] OR;  Service: Ophthalmology;  Laterality: Left;    CHOLECYSTECTOMY      ENDOSCOPIC ULTRASOUND OF UPPER GASTROINTESTINAL TRACT N/A 11/04/2020    Procedure: ULTRASOUND, ENDOSCOPIC, UPPER GI TRACT;  Surgeon: Thierry Saunders MD;  Location: [facility] ENDO;  Service: Endoscopy;  Laterality: N/A;  covid 11/1 [facility]    HERNIA REPAIR      HYSTERECTOMY      knee replacemene Right 02/14/2007    deidra    PHACOEMULSIFICATION OF CATARACT Right 12/12/2019    Procedure: PHACOEMULSIFICATION, CATARACT;  Surgeon: Michael Arellano MD;  Location: [facility];  Service: Ophthalmology;  Laterality: Right;    PHACOEMULSIFICATION OF CATARACT Left 02/13/2020    Procedure: PHACOEMULSIFICATION, CATARACT;  Surgeon: Michael Arelalno MD;  Location: [facility] OR;  Service: Ophthalmology;  Laterality: Left;    REPLACEMENT Left 09/18/2007    knee    THYROIDECTOMY      TOE AMPUTATION Left 11/5/2024    Procedure: AMPUTATION, TOE- DISTAL HALLUX;  Surgeon: Jil  Noemi BHAGAT DPM;  Location: [facility];  Service: Podiatry;  Laterality: Left;       Patient's Medications   New Prescriptions    No medications on file   Previous Medications    ACETAMINOPHEN (TYLENOL) 325 MG TABLET    Take 2 tablets (650 mg total) by mouth every 8 (eight) hours as needed for Temperature greater than or Pain (or equal to 100 degree F. Not to exceed 4 grams daily).    ALBUTEROL (PROVENTIL/VENTOLIN HFA) 90 MCG/ACTUATION INHALER    Inhale 2 puffs into the lungs every 4 (four) hours as needed for Wheezing or Shortness of Breath. Rescue    ALENDRONATE (FOSAMAX) 70 MG TABLET    Take 1 tablet (70 mg total) by mouth every 7 days. Resume on discharge from skilled nursing.    ATORVASTATIN (LIPITOR) 20 MG TABLET    Take 20 mg by mouth every evening.    BETA-CAROTENE,A,-VITS C,E/MINS (VISION ORAL)    Take 1 tablet by mouth 2 (two) times daily.    CALCIUM CARBONATE-VIT D3- MG CALCIUM- 400 UNIT TAB    Take 2 tablets by mouth once daily.     CETIRIZINE (ZYRTEC) 10 MG TABLET    Take 1 tablet (10 mg total) by mouth once daily.    COLESTIPOL (COLESTID) 1 GRAM TAB    Take 3 tablets (3 g total) by mouth 2 (two) times daily. For cholesterol and diarrhea    CYANOCOBALAMIN (VITAMIN B-12) 1000 MCG TABLET    Take 100 mcg by mouth once daily.    DICLOFENAC SODIUM (VOLTAREN) 1 % GEL    Apply 2 g topically 2 (two) times daily. Apply to bilateral knees    ELIQUIS 5 MG TAB    Take 5 mg by mouth 2 (two) times daily.    FUROSEMIDE (LASIX) 40 MG TABLET    Take 1 tablet (40 mg total) by mouth once daily.    HYDROCORTISONE 2.5 % OINTMENT    Apply topically 2 (two) times daily. Please apply to affected areas for 10 days    INSULIN LISPRO (HUMALOG U-100 INSULIN) 100 UNIT/ML INJECTION    Inject 0-5 Units into the skin every meal as needed for High Blood Sugar. Blood Glucose  mg/dL                  Pre-meal       151-200                1 units       201-250                2 units       251-300                3 units      301-350                 4 units            >350                     5 units          Administer subcutaneously if needed at times designated by monitoring  schedule.    ISOSORBIDE MONONITRATE (IMDUR) 60 MG 24 HR TABLET    Take 60 mg by mouth once daily.    LACTOBACILLUS ACIDOPHILUS & BULGAR (LACTINEX) 100 MILLION CELL PACKET    Take 1 packet (1 each total) by mouth 2 (two) times daily. May discontinue on 1/17/2025    LEVOTHYROXINE (SYNTHROID) 88 MCG TABLET    Take 1 tablet (88 mcg total) by mouth before breakfast.    LOPERAMIDE (IMODIUM A-D) 2 MG TAB    Take 1 tablet (2 mg total) by mouth 3 (three) times daily as needed (diarrhea).    LOSARTAN (COZAAR) 25 MG TABLET    Take 25 mg by mouth 2 (two) times a day.    METOPROLOL SUCCINATE (TOPROL-XL) 25 MG 24 HR TABLET    Take 25 mg by mouth once daily.    MICONAZOLE NITRATE 2 % (MICOTIN) 2 % TOP POWDER    Apply topically 2 (two) times daily. To groin    NITROGLYCERIN (NITROSTAT) 0.4 MG SL TABLET    Place 0.4 mg under the tongue every 5 (five) minutes as needed for Chest pain.    POTASSIUM CHLORIDE SA (K-DUR,KLOR-CON) 20 MEQ TABLET    Take 1 tablet (20 mEq total) by mouth once daily.    SPIRONOLACTONE (ALDACTONE) 25 MG TABLET    Take 25 mg by mouth every morning.    VITAMINS A,C,E-ZINC-COPPER (PRESERVISION AREDS) 4,296 MCG-226 MG-90 MG CAP    Take 1 capsule by mouth Daily. Resume once discharged from skilled nursing.   Modified Medications    No medications on file   Discontinued Medications    No medications on file       Patient Active Problem List    Diagnosis Date Noted    Bilateral lower extremity edema and erythema, ulceration 12/04/2024    Dermatitis associated with moisture 11/06/2024    Diarrhea 11/03/2024    Cellulitis of toe of left foot 11/02/2024    Osteomyelitis of great toe of left foot 11/02/2024    Ulcer of great toe of left foot, with fat layer exposed 10/14/2024     Patient presented to wound clinic on 10/14/2024 with a new wound on the left great toe.  There was  callus with underlying wound and fluid.  Callus was removed and subcutaneous tissue was exposed.  The base was pink.  Patient is walking on her toe tip without shoes.  She was instructed to obtain a hammertoe pad and utilize she comes in all times.      Venous stasis ulcer of left calf limited to breakdown of skin with varicose veins 10/07/2024     Patient with new superficial ulcer left lower extremity.  Patient with chronic lower extremity edema.  Patient has been wearing compression stockings.  No need for debridement today.  Will cover with Mepilex Ag pad and continue compression therapy.  Patient encouraged to elevate.      Hallux hammertoe, right 09/09/2024     Patient with slight hammertoe deformity of the right great toe.    Patient has had hammertoe deformities of toes 2 and 3 previously surgically corrected.      Diabetic ulcer of great toe of right foot associated with type 2 diabetes mellitus, with necrosis of muscle 07/15/2024     Patient with new wound 7/15/2024 .  Patient with history of neuropathy.  Wound is superficial with no sign of infection.  No erythema or drainage      Non-pressure chronic ulcer of calf, left, with fat layer exposed 07/08/2024     89-year-old female presented to the wound clinic on 07/08/2024 with a history of chronic venous hypertension developed a wound of the left lateral lower leg proximally 4 weeks ago.  The wound has enlarged.  She was evaluated by her primary physician who did apply a cream.  She was referred here for further management.  She denies any pain.  She has not had any fever or chills.  She has had moderate drainage.  As of 09/09/2024, the wound is healed      Chronic venous hypertension (idiopathic) with ulcer of left lower extremity (CODE) 07/08/2024    Polyneuropathy 03/13/2024    Rheumatoid arthritis 03/13/2024    Peripheral vascular disease, unspecified 03/13/2024    Aortic atherosclerosis 03/13/2024    Osteopenia 03/13/2024    Stage 3a chronic kidney  disease (CKD) 02/28/2024     Avoid NSAIDS  Good BP control  Monitor renal function closely      Sacroiliitis, not elsewhere classified 02/28/2024     Take Tylenol for pain.  Use walker or cane for ambulation.      Exertional dyspnea 11/29/2023     During six minute walk her O2 sat dropped to 85%.  At rest it is 98%.  Ambulating with O2 at 2 l NC it stayed above 90%.      Candidiasis, intertrigo 08/31/2023    Cellulitis 08/31/2023    Conjunctivitis of both eyes 05/12/2023    Obesity, morbid, BMI 40.0-49.9 03/09/2023     Patient is trying to lose weight.  She has lost a few lb.  She is following a healthy diet.  She has difficulty exercising because of her musculoskeletal condition.         Essential hypertension 12/09/2022     Two gram sodium diet.    Weight loss discussed.    Try to walk 2 miles per day.    Current medications will be:  Lasix 40 mg daily   Potassium 20 mEq daily   Imdur 60 mg daily  Metoprolol 25 mg daily   Spironolactone 25 mg daily        COPD (chronic obstructive pulmonary disease) 12/09/2022     Continue DuoNeb treatments twice daily as needed.    Her COPD is stable.         Pancreatic cyst 11/04/2020    Abnormal finding on GI tract imaging 10/21/2020    Cyst of pancreas 10/21/2020    B12 deficiency 07/31/2018    Type 2 diabetes mellitus with circulatory disorder, without long-term current use of insulin 05/01/2018     Patient no longer requires medications for this.  Her last A1c was 5.8.   Check hemoglobin A1c every 12 months.      Debility 02/07/2018    Chronic diastolic congestive heart failure 02/07/2018     Two gram sodium diet.    Weight loss discussed.    Try to walk 2 miles per day.    Avoid smoking.    Current medications will be:  Lower Valsartan 320 mg to 160 mg daily   Lasix 40 mg daily   Lower Metoprolol 100 mg to 50 mg daily   Potassium 20 mEq daily      PUJA (acute kidney injury) 02/05/2018    Chronic atrial fibrillation 01/30/2018     Continue Eliquis 5 mg daily  Keep appointment  "with Cardiology      Early stage nonexudative age-related macular degeneration of both eyes 2018    Venous stasis of lower extremity 10/11/2016     Continue support stockings.  Place every morning and remove at night.      Incontinence in female 06/22/2015    Obstructive sleep apnea (adult) (pediatric) 08/08/2012     Continue home CPAP at current pressure.  Use oxygen at night.      Overweight(278.02) 08/08/2012     Dx updated per 2019 IMO Load      Osteoarthritis 08/08/2012    Hypothyroidism 08/08/2012     Patient takes Synthroid 88 mcg for her hypothyroidism.  Adjust Synthroid based on TSH resutls.  Check TSH every 6 months.      Hyperlipidemia 08/08/2012     Continue fish oil  Continue Lipitor 20 mg         Review of Systems   Review of Systems   Constitutional:  Negative for chills, fever and weight loss.   Cardiovascular:  Positive for leg swelling.   All other systems reviewed and are negative.          Objective:      BP (!) 94/54   Pulse 80   Temp 99 °F (37.2 °C) (Oral)   Ht 5' 7" (1.702 m)   BMI 38.37 kg/m²   Estimated body mass index is 38.37 kg/m² as calculated from the following:    Height as of this encounter: 5' 7" (1.702 m).    Weight as of 1/8/25: 111.1 kg (245 lb).    Physical Exam  Constitutional:       General: She is not in acute distress.     Appearance: She is well-developed.   HENT:      Head: Normocephalic and atraumatic.   Eyes:      Conjunctiva/sclera: Conjunctivae normal.      Pupils: Pupils are equal, round, and reactive to light.   Cardiovascular:      Rate and Rhythm: Normal rate and regular rhythm.      Heart sounds: Normal heart sounds.   Pulmonary:      Effort: Pulmonary effort is normal. No respiratory distress.      Breath sounds: Normal breath sounds.   Abdominal:      General: Bowel sounds are normal. There is no distension.      Palpations: Abdomen is soft.   Musculoskeletal:         General: Normal range of motion.      Cervical back: Normal range of motion and neck supple. "      Right lower leg: Edema present.      Left lower leg: Edema present.      Comments: Lt foot s/p amputation distal 1st toe. Incision site fully healed.. 2nd toes is mildly edematous.     R foot - large heal ulceration. No purulence or malodor. No exposed bone. 2nd toe discoloration at distal end.     Skin weeping on both legs.   Skin:     General: Skin is warm and dry.   Neurological:      General: No focal deficit present.      Mental Status: She is alert and oriented to person, place, and time.      Cranial Nerves: No cranial nerve deficit.   Psychiatric:         Mood and Affect: Mood normal.         Behavior: Behavior normal.         Assessment:         1. Osteomyelitis of great toe of left foot  Ambulatory referral/consult to Wound Clinic      2. Diabetic ulcer of right heel associated with diabetes mellitus due to underlying condition, unspecified ulcer stage  Ambulatory referral/consult to Podiatry    Ambulatory referral/consult to Wound Clinic            Plan:       Stephany was seen today for follow-up.    Diagnoses and all orders for this visit:    Osteomyelitis of great toe of left foot  S/p 1st toe amputation c/b residual osteo. Now s/p 6 wks iv zosyn. Wound has completely healed.   --no indication for additional abx  --counseled on signs and symptoms of recurrence of infection and advised to notify me if these were to arise.     Diabetic ulcer of right heel associated with diabetes mellitus due to underlying condition, unspecified ulcer stage  New R heel ulcer. Does not appear infected.   -     Ambulatory referral/consult to Podiatry; Future  -     Ambulatory referral/consult to Wound Clinic; Future    Bilateral lower extremity edema  Recently started on new diuretics per niece. Recommend low sodium diet, elevating legs.   Also concern for PAD given exam findings of dusky Rt foot toes and L 2nd toe swelling.  F/u with vascular surgery- pt to make appointment near home.  Strict ED return precautions  provided.      RTC GILLIAN Cr MD  Infectious Disease     Total professional time spent for the encounter: 50 minutes  Time was spent preparing to see the patient, reviewing results of prior testing, obtaining and/or reviewing separately obtained history, performing a medically appropriate examination and interview, counseling and educating the patient/family, ordering medications/tests/procedures, referring and communicating with other health care professionals, documenting clinical information in the electronic health record, and independently interpreting results.

## 2025-02-12 ENCOUNTER — TELEPHONE (OUTPATIENT)
Dept: VASCULAR SURGERY | Facility: CLINIC | Age: 89
End: 2025-02-12
Payer: MEDICARE

## 2025-02-21 DIAGNOSIS — Z00.00 ENCOUNTER FOR MEDICARE ANNUAL WELLNESS EXAM: ICD-10-CM

## 2025-05-26 ENCOUNTER — OFFICE VISIT (OUTPATIENT)
Dept: NEUROLOGY | Facility: CLINIC | Age: 89
End: 2025-05-26
Payer: MEDICARE

## 2025-05-26 VITALS
SYSTOLIC BLOOD PRESSURE: 92 MMHG | BODY MASS INDEX: 38.37 KG/M2 | RESPIRATION RATE: 18 BRPM | OXYGEN SATURATION: 93 % | HEART RATE: 64 BPM | HEIGHT: 67 IN | DIASTOLIC BLOOD PRESSURE: 70 MMHG

## 2025-05-26 DIAGNOSIS — E53.8 B12 DEFICIENCY: ICD-10-CM

## 2025-05-26 DIAGNOSIS — M54.16 LUMBAR RADICULOPATHY: ICD-10-CM

## 2025-05-26 DIAGNOSIS — G56.03 BILATERAL CARPAL TUNNEL SYNDROME: ICD-10-CM

## 2025-05-26 DIAGNOSIS — R19.7 DIARRHEA, UNSPECIFIED TYPE: ICD-10-CM

## 2025-05-26 DIAGNOSIS — R41.3 MEMORY LOSS: Primary | ICD-10-CM

## 2025-05-26 PROCEDURE — 99999 PR STA SHADOW: CPT | Mod: PBBFAC,,, | Performed by: PSYCHIATRY & NEUROLOGY

## 2025-05-26 PROCEDURE — 99999 PR PBB SHADOW E&M-EST. PATIENT-LVL V: CPT | Mod: PBBFAC,,, | Performed by: PSYCHIATRY & NEUROLOGY

## 2025-05-26 PROCEDURE — 99214 OFFICE O/P EST MOD 30 MIN: CPT | Mod: S$PBB | Performed by: PSYCHIATRY & NEUROLOGY

## 2025-05-26 PROCEDURE — 99215 OFFICE O/P EST HI 40 MIN: CPT | Mod: PBBFAC | Performed by: PSYCHIATRY & NEUROLOGY

## 2025-05-26 NOTE — PROGRESS NOTES
"HPI:  Stephany Skinner is a 89 y.o. female with  increased falls, some lumbar radicular pain.  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 Admitted for ARF with hypercapnea, ptosis, and gaze restriction in 1/2018; response to steroids suggestive of myasthenia, though antibodies are negative, and EMG with repetitve stimulation from 7/2018 was inconclusive. PUJA during 1/2018 admit, secondary to overdiuresis. She has JOY, DM, HTN, HLD, hypothyroidism, B12 deficiency, and A-fib.       Here for 3 months follow up      Confusion is resolved      Memory is still noted/ forgetful of short term details but not severe      Benadryl for itching with rash not going used/ uses topical agent.       No diplopia, ptosis, no generalized weakness, no speech or swallowing problems      Left knee pain is treated with Tylenol PRN    Chronic Lower back pain is well controlled        Neuropathy pain and numbness is tolerable    CTS symptoms      Continues B12    Continues NOAC     Weight not done    Biggest complaint is twice daily stools which are "gritty" and she can be incontinent of feces a few times a day.    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   HENT:  Negative for nosebleeds.    Eyes:  Positive for blurred vision. Negative for double vision.   Respiratory:  Negative for hemoptysis.    Cardiovascular:  Negative for leg swelling.   Gastrointestinal:  Negative " for blood in stool.   Genitourinary:  Negative for hematuria.   Musculoskeletal:  Positive for joint pain. Negative for falls.   Skin:  Negative for itching.   Neurological:  Negative for loss of consciousness.   Endo/Heme/Allergies:  Does not bruise/bleed easily.   Psychiatric/Behavioral:  The patient does not have insomnia.          Exam:  Gen Appearance, well developed/nourished in no apparent distress  CV: 2+ distal pulses with no edema or swelling  Neuro:  MS: Awake, alert, Sustains attention. Fully oriented. Recent recall is fairly good/remote memory intact, Language is full to spontaneous speech/comprehension. Fund of Knowledge is full  CN: Optic discs are not viewed due to misosis,  PERRL, Extraoccular movements and visual fields are full-no gaze restriction today. Normal facial sensation and strength-no ptosis, Hearing symmetric, Tongue and Palate are midline and strong. Shoulder Shrug symmetric and strong.  Motor: Normal bulk, tone, no abnormal movements. 5/5 strength bilateral upper/lower extremities with 1+ reflexes in the arms, and are less in the legs and no clonus  Sensory: symmetric to temp, and vibration decreased sensation in the legs. Romberg negative  Cerebellar: Finger-nose, Rapid alternating movements intact  Gait: Not done: in a wheelchair today after recent toe amputation    Imagin/16 CT L spine: Multilevel degenerative changes of the lumbar spine without evidence for fracture or subluxation.  There is likely a component of mild neural foraminal narrowing and central canal stenosis at the L3-4 and L4-5 levels.  This could be better evaluated on a nonemergent basis with MRI.    2016 MRI L spine: Multilevel degenerative changes of the lumbar spine contributing to central canal stenosis and neural foraminal narrowing as detailed in the above report.    Edema-like signal about the posterior elements on the right at L5-S1 which can be a source of pain.    2018 [place] with [place]  [place] [facility] [place] Angel Vu:   Slow repetitive stimulation (3 Htz) performed at baseline and at one minute post exercise intervals at the left ADM and left trapezius muscles revealed significant CMAP detriment at the ADM, but none at the trapezius.     Impression:   The results of the study are inconclusive. There is no definite EP evidence of a neuromuscular junction pathology.     6/2016 EMG legs:   1. Mild Bilateral Carpal Tunnel Syndrome  2. 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 Bilateral Lumbar Radiculopathy best localizing from L4-5 on this study    Xray chest over time: no thymoma and CTA chest 2018: Bilateral groundglass opacities within the lung fields.  Minimal dependent subsegmental atelectasis.  No pleural effusion or pneumothorax.    2022 MRI and MRA brain: Age-appropriate generalized cerebral volume loss with moderate chronic microvascular ischemic disease.  No evidence for an acute infarction.     No significant arterial abnormalities.     Left maxillary sinus disease.    7/2023 Left knee xray: Postoperative changes of a total knee arthroplasty.  No hardware complication is seen.  No fracture or dislocation.     Extensive vascular calcifications.     8/2023 CT head:Moderate brain atrophy with deep white matter ischemic changes.  Otherwise negative head CT.     12/2024 CT head:      No acute abnormality.      Labs: 10/2016  glucose 136. Reviewed CMP, CBC TSh   SPEP normal  B12 low normal    2019 BMP, CBC reviewed    11/2018 BMP with alt reviewed  12/2019 A1C normal    2022 B12 level 1200s    2023 A1C less than 7    2024 TSH/T4 unremarkable  CMP reviewed    Assessment/Plan: Stephany Skinner is a 89 y.o. female with   falls, some lumbar radicular pain.  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 Admitted for ARF with hypercapnea, ptosis, and gaze restriction in 1/2018; response to steroids suggestive of myasthenia, though antibodies are negative, and EMG with repetitve stimulation from 7/2018 was inconclusive. PUJA during 1/2018 admit, secondary to overdiuresis. She has JOY, DM, HTN, HLD, hypothyroidism, B12 deficiency, and A-fib.     I recommend:   1. Despite negative MG antibodies and inconclusive EMG with rep stim findings without definitive evidence of neuromuscular junction disorer, her clinical picture was suggestive of possible myasthenia gravis- but is currently improved long term. No active symptoms- NOT CERTAIN OF THIS DIAGNOSIS    2. Stopped Mestinon without any diplopia or weakness. She has chronic diarrhea, unrelated to mestinon use  -Refer to GI for chronic stool incontinence/diarrhea complaint  -she has had no worsening with tapering/ being off prednisone  - Will restart for any further symptoms   - If she needs to remain on steroids long term, could consider steroid sparing therapy, such as Imuran versus Cellcept. She has DM, though well controlled currently. She will follow with PCP for management of DM2  -Advised on symptoms of myasthenic crisis and when to report to the ER prior    3. Wear brace nightly for CTS symptoms PRN  but note she improved with CT injection per pain management prior- can see pain management back PRN    4. Continue B12 for low normal B12 levels well corrected in 2022.  Her DM could be causing her neuropathy.  DM treated per PCP.     5. Saw pain management for  Lumbar radiculopathy prior. OTC topical and PT helped reduce pain greatly as well as falls prior. See pain management PRN  -Fall precautions reviewed prior-- using walker.  - No pain from neuropathy currently.  -Left  knee pain noted in 2023. Referred to ortho/ joint replacement surgeon, Dr Bar per local ortho PA who thought this was mediated from the lumbar spine. She was referred to pain  management at [facility] and received a back injection which helped and she will see pain management PRN  -needs to reduce morbid obesity    6. She presented with right eye lower field blindness in 2022. Per Graham this was a branch retinal artery occlusion. She no longer drives  -She had unremarkable  Carotid US, and echo  from Dr Patel in 2022. Noting cardiology increased her valsartan and statin doses.   -1/2023 MRI brain and  MRA brain: No CVA to explain her symptoms.  -She has been on Eliquis for years for stroke prevention due to her afib and did recall a skipped dosing at least once prior to these symptoms Has been on full dose.  She is allergic to ASA. She is compliant with daily dosing now    7. Presented in 1/2025 with AMS  while in the throws of a Covid infection and soon after she was treated for LE cellulitis and had a prior toe amputation  -CT head 12/2024 reassuring  -Suspected encephalopathy due to her medical illnesses at that time/ improved now. Only mild age appropriated memory loss currently   -Now living in the NH/ being closely monitored.     RTC 6 months

## 2025-06-18 PROBLEM — L98.499 ARTERIAL INSUFFICIENCY WITH ISCHEMIC ULCER: Status: ACTIVE | Noted: 2025-06-18

## 2025-06-18 PROBLEM — I77.1 ARTERIAL INSUFFICIENCY WITH ISCHEMIC ULCER: Status: ACTIVE | Noted: 2025-06-18

## 2025-07-14 ENCOUNTER — HOSPITAL ENCOUNTER (OUTPATIENT)
Dept: RADIOLOGY | Facility: HOSPITAL | Age: 89
Discharge: HOME | End: 2025-07-14
Attending: INTERNAL MEDICINE
Payer: MEDICARE

## 2025-07-14 DIAGNOSIS — R06.02 SOB (SHORTNESS OF BREATH): Primary | ICD-10-CM

## 2025-07-14 DIAGNOSIS — R19.7 DIARRHEA: ICD-10-CM

## 2025-07-14 PROCEDURE — 74177 CT ABD & PELVIS W/CONTRAST: CPT | Mod: TC

## 2025-07-14 PROCEDURE — 74177 CT ABD & PELVIS W/CONTRAST: CPT | Mod: 26,,, | Performed by: RADIOLOGY

## 2025-07-14 PROCEDURE — 25500020 PHARM REV CODE 255: Performed by: INTERNAL MEDICINE

## 2025-07-14 RX ADMIN — IOHEXOL 100 ML: 350 INJECTION, SOLUTION INTRAVENOUS at 02:07

## 2025-07-19 ENCOUNTER — LAB REQUISITION (OUTPATIENT)
Dept: LAB | Facility: HOSPITAL | Age: 89
End: 2025-07-19
Payer: MEDICARE

## 2025-07-19 DIAGNOSIS — U07.1 COVID-19: ICD-10-CM

## 2025-07-19 LAB
ABSOLUTE EOSINOPHIL (OHS): 0.25 K/UL
ABSOLUTE MONOCYTE (OHS): 0.45 K/UL (ref 0.3–1)
ABSOLUTE NEUTROPHIL COUNT (OHS): 3.39 K/UL (ref 1.8–7.7)
ALBUMIN SERPL BCP-MCNC: 2.7 G/DL (ref 3.5–5.2)
ALP SERPL-CCNC: 39 UNIT/L (ref 40–150)
ALT SERPL W/O P-5'-P-CCNC: 10 UNIT/L (ref 10–44)
ANION GAP (OHS): 7 MMOL/L (ref 8–16)
AST SERPL-CCNC: 11 UNIT/L (ref 11–45)
BASOPHILS # BLD AUTO: 0.04 K/UL
BASOPHILS NFR BLD AUTO: 0.7 %
BILIRUB SERPL-MCNC: 0.3 MG/DL (ref 0.1–1)
BUN SERPL-MCNC: 39 MG/DL (ref 8–23)
CALCIUM SERPL-MCNC: 8.3 MG/DL (ref 8.7–10.5)
CHLORIDE SERPL-SCNC: 112 MMOL/L (ref 95–110)
CO2 SERPL-SCNC: 20 MMOL/L (ref 23–29)
CREAT SERPL-MCNC: 1.1 MG/DL (ref 0.5–1.4)
ERYTHROCYTE [DISTWIDTH] IN BLOOD BY AUTOMATED COUNT: 15.1 % (ref 11.5–14.5)
GFR SERPLBLD CREATININE-BSD FMLA CKD-EPI: 48 ML/MIN/1.73/M2
GLUCOSE SERPL-MCNC: 152 MG/DL (ref 70–110)
HCT VFR BLD AUTO: 31.4 % (ref 37–48.5)
HGB BLD-MCNC: 10.2 GM/DL (ref 12–16)
IMM GRANULOCYTES # BLD AUTO: 0.03 K/UL (ref 0–0.04)
IMM GRANULOCYTES NFR BLD AUTO: 0.5 % (ref 0–0.5)
LYMPHOCYTES # BLD AUTO: 1.52 K/UL (ref 1–4.8)
MCH RBC QN AUTO: 31.6 PG (ref 27–31)
MCHC RBC AUTO-ENTMCNC: 32.5 G/DL (ref 32–36)
MCV RBC AUTO: 97 FL (ref 82–98)
NUCLEATED RBC (/100WBC) (OHS): 0 /100 WBC
PLATELET # BLD AUTO: 239 K/UL (ref 150–450)
PMV BLD AUTO: 8.7 FL (ref 9.2–12.9)
POTASSIUM SERPL-SCNC: 3.6 MMOL/L (ref 3.5–5.1)
PROT SERPL-MCNC: 6.2 GM/DL (ref 6–8.4)
RBC # BLD AUTO: 3.23 M/UL (ref 4–5.4)
RELATIVE EOSINOPHIL (OHS): 4.4 %
RELATIVE LYMPHOCYTE (OHS): 26.8 % (ref 18–48)
RELATIVE MONOCYTE (OHS): 7.9 % (ref 4–15)
RELATIVE NEUTROPHIL (OHS): 59.7 % (ref 38–73)
SODIUM SERPL-SCNC: 139 MMOL/L (ref 136–145)
WBC # BLD AUTO: 5.68 K/UL (ref 3.9–12.7)

## 2025-07-19 PROCEDURE — 85025 COMPLETE CBC W/AUTO DIFF WBC: CPT | Performed by: NURSE PRACTITIONER

## 2025-07-19 PROCEDURE — 80053 COMPREHEN METABOLIC PANEL: CPT | Performed by: NURSE PRACTITIONER

## 2025-08-21 ENCOUNTER — LAB REQUISITION (OUTPATIENT)
Dept: LAB | Facility: HOSPITAL | Age: 89
End: 2025-08-21
Payer: MEDICARE

## 2025-08-21 DIAGNOSIS — N18.31 CHRONIC KIDNEY DISEASE, STAGE 3A: ICD-10-CM

## 2025-08-21 LAB
ABSOLUTE EOSINOPHIL (OHS): 0.21 K/UL
ABSOLUTE MONOCYTE (OHS): 0.76 K/UL (ref 0.3–1)
ABSOLUTE NEUTROPHIL COUNT (OHS): 5.22 K/UL (ref 1.8–7.7)
ALBUMIN SERPL BCP-MCNC: 3.1 G/DL (ref 3.5–5.2)
ALP SERPL-CCNC: 44 UNIT/L (ref 40–150)
ALT SERPL W/O P-5'-P-CCNC: 9 UNIT/L (ref 10–44)
ANION GAP (OHS): 10 MMOL/L (ref 8–16)
AST SERPL-CCNC: 15 UNIT/L (ref 11–45)
BASOPHILS # BLD AUTO: 0.03 K/UL
BASOPHILS NFR BLD AUTO: 0.4 %
BILIRUB SERPL-MCNC: 0.3 MG/DL (ref 0.1–1)
BUN SERPL-MCNC: 45 MG/DL (ref 8–23)
CALCIUM SERPL-MCNC: 8.5 MG/DL (ref 8.7–10.5)
CHLORIDE SERPL-SCNC: 109 MMOL/L (ref 95–110)
CO2 SERPL-SCNC: 20 MMOL/L (ref 23–29)
CREAT SERPL-MCNC: 1.4 MG/DL (ref 0.5–1.4)
ERYTHROCYTE [DISTWIDTH] IN BLOOD BY AUTOMATED COUNT: 13.8 % (ref 11.5–14.5)
GFR SERPLBLD CREATININE-BSD FMLA CKD-EPI: 36 ML/MIN/1.73/M2
GLUCOSE SERPL-MCNC: 88 MG/DL (ref 70–110)
HCT VFR BLD AUTO: 34.1 % (ref 37–48.5)
HGB BLD-MCNC: 10.9 GM/DL (ref 12–16)
IMM GRANULOCYTES # BLD AUTO: 0.04 K/UL (ref 0–0.04)
IMM GRANULOCYTES NFR BLD AUTO: 0.5 % (ref 0–0.5)
LYMPHOCYTES # BLD AUTO: 1.21 K/UL (ref 1–4.8)
MCH RBC QN AUTO: 31.5 PG (ref 27–31)
MCHC RBC AUTO-ENTMCNC: 32 G/DL (ref 32–36)
MCV RBC AUTO: 99 FL (ref 82–98)
NUCLEATED RBC (/100WBC) (OHS): 0 /100 WBC
PLATELET # BLD AUTO: 237 K/UL (ref 150–450)
PMV BLD AUTO: 9 FL (ref 9.2–12.9)
POTASSIUM SERPL-SCNC: 4.3 MMOL/L (ref 3.5–5.1)
PROT SERPL-MCNC: 6.2 GM/DL (ref 6–8.4)
RBC # BLD AUTO: 3.46 M/UL (ref 4–5.4)
RELATIVE EOSINOPHIL (OHS): 2.8 %
RELATIVE LYMPHOCYTE (OHS): 16.2 % (ref 18–48)
RELATIVE MONOCYTE (OHS): 10.2 % (ref 4–15)
RELATIVE NEUTROPHIL (OHS): 69.9 % (ref 38–73)
SODIUM SERPL-SCNC: 139 MMOL/L (ref 136–145)
WBC # BLD AUTO: 7.47 K/UL (ref 3.9–12.7)

## 2025-08-21 PROCEDURE — 85025 COMPLETE CBC W/AUTO DIFF WBC: CPT | Performed by: STUDENT IN AN ORGANIZED HEALTH CARE EDUCATION/TRAINING PROGRAM

## 2025-08-21 PROCEDURE — 82040 ASSAY OF SERUM ALBUMIN: CPT | Performed by: STUDENT IN AN ORGANIZED HEALTH CARE EDUCATION/TRAINING PROGRAM

## (undated) DEVICE — BNDG COFLEX FOAM LF2 ST 4X5YD

## (undated) DEVICE — PACK EYE

## (undated) DEVICE — GOWN NONREINF SET-IN SLV XL

## (undated) DEVICE — BLANKET UPPER BODY 78.7X29.9IN

## (undated) DEVICE — SHIELD EYE PLASTIC 3100G

## (undated) DEVICE — TOURNIQUET SB QC DP 18X4IN

## (undated) DEVICE — GAUZE CNFRM STRL 4INX4.1YD

## (undated) DEVICE — ELECTRODE REM PLYHSV RETURN 9

## (undated) DEVICE — CARTRIDGE PHACO INSERT

## (undated) DEVICE — LINER GLOVE POWDERFREE SZ 7.5

## (undated) DEVICE — CANISTER SUCTION RIGID 2000CC

## (undated) DEVICE — BANDAGE MATRIX HK LOOP 4IN 5YD

## (undated) DEVICE — DERM CURETTE 5MM DISP

## (undated) DEVICE — COVER OVERHEAD SURG LT BLUE

## (undated) DEVICE — DRAPE THREE-QUARTER 53X77IN

## (undated) DEVICE — CASSETTE INFINITI

## (undated) DEVICE — CONTAINER SPECIMEN OR STER 4OZ

## (undated) DEVICE — BLADE MEDIUM LONG 9MM X 31MM

## (undated) DEVICE — SOL BETADINE 5%

## (undated) DEVICE — DRESSING N ADH OIL EMUL 3X3

## (undated) DEVICE — SET EXT W/2 VLVE PORTS 40

## (undated) DEVICE — SYR 30CC LUER LOCK

## (undated) DEVICE — PACK ARTHROSCOPY W/ISO BAC

## (undated) DEVICE — TAPE SURG TRANSPORE 1 SNG USE

## (undated) DEVICE — SPONGE COTTON TRAY 4X4IN

## (undated) DEVICE — PAD CAST SPECIALIST STRL 4

## (undated) DEVICE — SOL IRR SOD CHL .9% POUR

## (undated) DEVICE — BANDAGE ELAS SOFTWRAP ST 4X5YD

## (undated) DEVICE — SYR 10CC LUER LOCK

## (undated) DEVICE — SUT PROLENE 3-0 PS-2 BL 18IN

## (undated) DEVICE — PAD PREP CUFFED NS 24X48IN

## (undated) DEVICE — Device

## (undated) DEVICE — PULSAVAC ZIMMER